# Patient Record
Sex: FEMALE | Race: WHITE | NOT HISPANIC OR LATINO | Employment: FULL TIME | URBAN - METROPOLITAN AREA
[De-identification: names, ages, dates, MRNs, and addresses within clinical notes are randomized per-mention and may not be internally consistent; named-entity substitution may affect disease eponyms.]

---

## 2017-01-16 ENCOUNTER — INITIAL CONSULT (OUTPATIENT)
Dept: OPHTHALMOLOGY | Facility: CLINIC | Age: 77
End: 2017-01-16
Payer: COMMERCIAL

## 2017-01-16 ENCOUNTER — HOSPITAL ENCOUNTER (OUTPATIENT)
Dept: CARDIOLOGY | Facility: CLINIC | Age: 77
Discharge: HOME | End: 2017-01-16
Payer: COMMERCIAL

## 2017-01-16 ENCOUNTER — TELEPHONE (OUTPATIENT)
Dept: ELECTROPHYSIOLOGY | Facility: CLINIC | Age: 77
End: 2017-01-16

## 2017-01-16 DIAGNOSIS — H02.132 SENILE ECTROPION OF BOTH LOWER EYELIDS: Primary | ICD-10-CM

## 2017-01-16 DIAGNOSIS — H02.135 SENILE ECTROPION OF BOTH LOWER EYELIDS: Primary | ICD-10-CM

## 2017-01-16 DIAGNOSIS — I50.20 SYSTOLIC CONGESTIVE HEART FAILURE WITH REDUCED LEFT VENTRICULAR FUNCTION, NYHA CLASS 2: ICD-10-CM

## 2017-01-16 LAB
AORTIC VALVE REGURGITATION: ABNORMAL
DIASTOLIC DYSFUNCTION: YES
ESTIMATED PA SYSTOLIC PRESSURE: 32
MITRAL VALVE REGURGITATION: ABNORMAL
RETIRED EF AND QEF - SEE NOTES: 58 (ref 55–65)
TRICUSPID VALVE REGURGITATION: ABNORMAL

## 2017-01-16 PROCEDURE — 68840 EXPLORE/IRRIGATE TEAR DUCTS: CPT | Mod: 50,S$GLB,, | Performed by: OPHTHALMOLOGY

## 2017-01-16 PROCEDURE — 99999 PR PBB SHADOW E&M-EST. PATIENT-LVL IV: CPT | Mod: PBBFAC,,, | Performed by: OPHTHALMOLOGY

## 2017-01-16 PROCEDURE — 92014 COMPRE OPH EXAM EST PT 1/>: CPT | Mod: 25,S$GLB,, | Performed by: OPHTHALMOLOGY

## 2017-01-16 PROCEDURE — 93306 TTE W/DOPPLER COMPLETE: CPT | Mod: S$GLB,,, | Performed by: INTERNAL MEDICINE

## 2017-01-16 PROCEDURE — 92285 EXTERNAL OCULAR PHOTOGRAPHY: CPT | Mod: S$GLB,,, | Performed by: OPHTHALMOLOGY

## 2017-01-16 NOTE — TELEPHONE ENCOUNTER
Called MsZane Jamila to discuss echo results from this morning and explained that her heart's systolic function has made a full recovery since revascularization in October. She expressed gratitude for the care she has received at [facility] and continues to feel very well.    Edita LANZA

## 2017-01-16 NOTE — LETTER
January 16, 2017      Raina Daily, OD  [address]  [facility] [address]           [facility] - Ophthalmology  [address]  [facility] [address]  Phone: [phone]  Fax: [phone]          Patient: Krissy Marino   MR Number: [id number]   YOB: 1940   Date of Visit: 1/16/2017       Dear Dr. Raina Daily:    Thank you for referring Krissy Marino to me for evaluation. Attached you will find relevant portions of my assessment and plan of care.    If you have questions, please do not hesitate to call me. I look forward to following Krissy Mairno along with you.    Sincerely,    Anastasia Nieto MD    Enclosure  CC:  No Recipients    If you would like to receive this communication electronically, please contact [email or web address] or [phone] to request more information on [employer] access.    For providers and/or their staff who would like to refer a patient to [facility], please contact us through our one-stop-shop provider referral line, [facility], at [phone].    If you feel you have received this communication in error or would no longer like to receive these types of communications, please e-mail [email or web address]

## 2017-01-16 NOTE — PROGRESS NOTES
HPI     Eye Problem    Additional comments: ref. by Dr. Daily for ectropion eval.           Comments   Right eye started tearing about 1 month. Feels irritated. Used   erythromycin ointment for 1 week       Last edited by Nayeli Light on 1/16/2017 11:52 AM. (History)            Assessment /Plan     For exam results, see Encounter Report.    Senile ectropion of both lower eyelids  -     External Photography - OU - Both Eyes      Patient bothered by chronic tearing and irritation od>os.  Plan for bilateral lower eyelid ectropion repair.      Irrigation note:  OD: patent right lower lid punctum, canaliculus, and nld  OS:patent left lower lid punctum, canaliculus, and nld     Informed consent obtained after extensive risks/benefits/alternatives were discussed with the patient including but not limited to pain, bleeding, infection, ocular injury, loss of the eye, asymmetry, need for revision in future, scarring.  Alternatives such as waiting were discussed.  All questions were answered.      Hold ASA, NSAIDS, and fish oil 5 to 7 days prior to procedure.  Patient with cardiac stent placement in October 2016.  Patient recommended to be on plavix for one year.  Will defer surgery at this time. Return in October to discuss bilateral lower eyelid ectropion repair.

## 2017-01-18 ENCOUNTER — OFFICE VISIT (OUTPATIENT)
Dept: RHEUMATOLOGY | Facility: CLINIC | Age: 77
End: 2017-01-18
Payer: COMMERCIAL

## 2017-01-18 VITALS
HEIGHT: 65 IN | HEART RATE: 64 BPM | SYSTOLIC BLOOD PRESSURE: 133 MMHG | DIASTOLIC BLOOD PRESSURE: 66 MMHG | BODY MASS INDEX: 35.18 KG/M2 | WEIGHT: 211.19 LBS

## 2017-01-18 DIAGNOSIS — Z79.52 LONG TERM (CURRENT) USE OF SYSTEMIC STEROIDS: ICD-10-CM

## 2017-01-18 DIAGNOSIS — E55.9 MILD VITAMIN D DEFICIENCY: ICD-10-CM

## 2017-01-18 DIAGNOSIS — M31.6 GIANT CELL ARTERITIS: Primary | ICD-10-CM

## 2017-01-18 DIAGNOSIS — L40.9 PSORIASIS: ICD-10-CM

## 2017-01-18 PROCEDURE — 1159F MED LIST DOCD IN RCRD: CPT | Mod: S$GLB,,, | Performed by: INTERNAL MEDICINE

## 2017-01-18 PROCEDURE — 1160F RVW MEDS BY RX/DR IN RCRD: CPT | Mod: S$GLB,,, | Performed by: INTERNAL MEDICINE

## 2017-01-18 PROCEDURE — 1126F AMNT PAIN NOTED NONE PRSNT: CPT | Mod: S$GLB,,, | Performed by: INTERNAL MEDICINE

## 2017-01-18 PROCEDURE — 99214 OFFICE O/P EST MOD 30 MIN: CPT | Mod: S$GLB,,, | Performed by: INTERNAL MEDICINE

## 2017-01-18 PROCEDURE — 3078F DIAST BP <80 MM HG: CPT | Mod: S$GLB,,, | Performed by: INTERNAL MEDICINE

## 2017-01-18 PROCEDURE — 99999 PR PBB SHADOW E&M-EST. PATIENT-LVL III: CPT | Mod: PBBFAC,,, | Performed by: INTERNAL MEDICINE

## 2017-01-18 PROCEDURE — 1157F ADVNC CARE PLAN IN RCRD: CPT | Mod: S$GLB,,, | Performed by: INTERNAL MEDICINE

## 2017-01-18 PROCEDURE — 3075F SYST BP GE 130 - 139MM HG: CPT | Mod: S$GLB,,, | Performed by: INTERNAL MEDICINE

## 2017-01-18 RX ORDER — ERGOCALCIFEROL 1.25 MG/1
CAPSULE ORAL
Qty: 6 CAPSULE | Refills: 2 | Status: SHIPPED | OUTPATIENT
Start: 2017-01-18 | End: 2017-03-05 | Stop reason: DRUGHIGH

## 2017-01-18 RX ORDER — PREDNISONE 2.5 MG/1
5 TABLET ORAL DAILY
Qty: 180 TABLET | Refills: 1 | Status: SHIPPED | OUTPATIENT
Start: 2017-01-18 | End: 2017-05-03

## 2017-01-18 ASSESSMENT — ROUTINE ASSESSMENT OF PATIENT INDEX DATA (RAPID3)
AM STIFFNESS SCORE: 0, NO
PSYCHOLOGICAL DISTRESS SCORE: 0
TOTAL RAPID3 SCORE: .44
FATIGUE SCORE: 0
MDHAQ FUNCTION SCORE: .4
PATIENT GLOBAL ASSESSMENT SCORE: 0
PAIN SCORE: 0

## 2017-01-18 NOTE — PROGRESS NOTES
Subjective:       Patient ID: Krissy Marino is a 76 y.o. female possible GCA, osteoporosis and OA of hands & knees.  .    Chief Complaint: No chief complaint on file.  Returns for follow-up.  Was last seen on 9/14/16.  She has been asymptomatic on prednisone 5 mg/day. She still has bouts of instantaneous right eye pain that comes & goes but even this is less frequent.  Denies joint pains, fever, chills,visual loss, diplopia, scalp tenderness, jaw or other claudication, headache or head pain. Denies PMR sxs: AM stiffness, shoulder or hip girdle stiffness. Her OA is not bothering her. No gout episodes. No pain, pruritus, visual disturbance. Psoriasis is present over elbows and scalp. Possibly a tad worse but not bothersome.        Pertinent negatives include no fatigue or fever.           Had CA stent placed in October.    Review of patient's allergies indicates:   Allergen Reactions    Sulfamethoxazole-trimethoprim Nausea And Vomiting    Latex, natural rubber Rash    Pcn [penicillins] Rash       Past Medical History   Diagnosis Date    Adverse effect of glucocorticoid or synthetic analogue     Allergy     Arthritis     Cancer     CHF (congestive heart failure)     Chronic kidney disease     Coronary artery disease involving native coronary artery of native heart without angina pectoris 10/11/2016    Diabetic neuropathy 10/6/2014    Giant cell arteritis 9/24/2012    Hyperlipidemia     Hypertension     Hypothyroid     Obesity (BMI 30-39.9) 10/6/2014    Osteopenia     Primary osteoarthritis of both knees 9/24/2012    Type II or unspecified type diabetes mellitus with renal manifestations, uncontrolled        Past Surgical History   Procedure Laterality Date    Appendectomy      Tonsillectomy      Cholecystectomy      Hysterectomy      Cataract extraction, bilateral      Joint replacement       bilateral total knee    Skin biopsy      Eye surgery           Review of Systems   Constitutional:  "Negative.  Negative for diaphoresis, fatigue, fever and unexpected weight change.   HENT: Negative.  Negative for mouth sores, sore throat and tinnitus.    Eyes: Negative.  Negative for visual disturbance.   Respiratory: Negative.  Negative for cough, choking, chest tightness and shortness of breath.    Cardiovascular: Negative.  Negative for chest pain, palpitations and leg swelling.   Gastrointestinal: Negative.  Negative for abdominal distention, abdominal pain, blood in stool, constipation, diarrhea, nausea and vomiting.   Endocrine: Negative.    Genitourinary: Negative.  Negative for frequency, hematuria and menstrual problem.   Musculoskeletal: Negative.  Negative for back pain, neck pain and neck stiffness.   Skin: Positive for rash (scalp & elbow psoriasis).   Allergic/Immunologic: Negative.    Neurological: Negative.  Negative for dizziness, syncope, weakness, light-headedness and numbness.   Hematological: Negative for adenopathy. Bruises/bleeds easily.   Psychiatric/Behavioral: Negative.  Negative for dysphoric mood and sleep disturbance. The patient is not nervous/anxious.          SOCIAL HISTORY: She does not smoke, does not drink alcohol. There is no   recreational drug use. She is the  of [employer] and loves what she does. She is not formally exercising but is quite active.    FAMILY HISTORY: Family history is negative for giant cell arteritis.   Family history is positive for a brother with liver cancer and lung cancer.         Objective:     Visit Vitals    /66    Pulse 64    Ht 5' 5" (1.651 m)    Wt 95.8 kg (211 lb 3.2 oz)    BMI 35.15 kg/m2    Was 216 lb 8 oz on 9/14/16.       Physical Exam   Vitals reviewed.  Constitutional: She is oriented to person, place, and time and well-developed, well-nourished, and in no distress. No distress.   HENT:   Head: Normocephalic and atraumatic.   Mouth/Throat: Oropharynx is clear and moist. No oropharyngeal exudate.   No facial " rashes  Parotids not enlarged  No oral ulcers  Temporal aa good pulsations;   No scalp tenderness or pain.   Eyes: Conjunctivae and EOM are normal. Pupils are equal, round, and reactive to light. Right eye exhibits no discharge. Left eye exhibits no discharge. No scleral icterus.   Neck: Neck supple. No JVD present. No tracheal deviation present. No thyromegaly present.   Cardiovascular: Normal rate, regular rhythm, normal heart sounds and intact distal pulses.  Exam reveals no gallop and no friction rub.    No murmur heard.  Pulmonary/Chest: Effort normal and breath sounds normal. No respiratory distress. She has no wheezes. She has no rales. She exhibits no tenderness.   Abdominal: Soft. Bowel sounds are normal. She exhibits no distension and no mass. There is no splenomegaly or hepatomegaly. There is no tenderness. There is no rebound and no guarding.   Lymphadenopathy:     She has no cervical adenopathy.        Right: No inguinal adenopathy present.        Left: No inguinal adenopathy present.   Neurological: She is alert and oriented to person, place, and time. She has normal reflexes. No cranial nerve deficit. Gait normal.   Quad strength 4/5 bilaterally. Cannot get out of chair without pushing off on arms.  Otherwise 5/5.   Skin: Skin is warm and dry. No rash noted. She is not diaphoretic.     Psoriasis both elbows R>L & left occipital scalp.   Psychiatric: Mood, memory, affect and judgment normal.   Musculoskeletal: Normal range of motion. She exhibits no edema or tenderness.   Cspine FROM no tenderness  Tspine FROM no tenderness  Lspine FROM no tenderness.  TMJ: unremarkable  Shoulders: FROM; no synovitis;  Elbows: FROM; no synovitis; no tophi or nodules  Wrists: FROM; no synovitis;    MCPs: FROM; no synovitis; no metacarpalgia;  ok;  PIPs: Rell's; FROM; no synovitis;   DIPs: Heberden's; FROM; no synovitis;   HIPS: FROM  Knees: Bilateral TKR scars; FROM; no synovitis; no instability;  Ankles: FROM:  no synovitis   Toes: Bilateral HV; redness on 1st MTP; no heel pain.         LABS:  11/28/16: ESR 46; CRP 1.7; CBC ok; BUN 64; cnne 1.3; GFR 39.4; ;   9/7/16: CMP cnne 1.1;   8/19/16: ESR 48; CRP 4.8; cnne 1.7; ;   5/28/16: ESR 83; CRp 19.1; CMP BUN 52; cnne 1.0; Glu 138;   5/5/16: ESR 41; CBC plt 138;   2/27/16: ESR 40; CRP 8;  2/13/16: ESR 63; CRP 9.7  8/22/15: ESR 26; CRP 7; CBC ok; cnne 1.1; BUN 40; ;   7/11/15: Vit d 43;   10/4/14: ESR 32; CRP 3.2; BUN 63; cnne 1.1; TSH ok;  8/22/14: CBC ok;  6/30/14: ESR 35;  6/21/14: ESR 49; CRP 3.0  11/11/13: ESR 35; CRP 9.6;   9/21/13: ESR 47; CRP 4.7; ; cnne 1.3;   6/25/13: ESR 40; CRP 3.9   6/11/13: cnne 1.5; GFR 34;  10/25/10: UA 5.5       3/2/16: bilateral feet:  personally reviewed: Mark HV with inferior calcaneal spurs & pes planus. Also vascular calcifications.  DXA 2/16/15: TLX +1.7; TFN -1.0; TTH -0.9    Does not do MELANIE's.     Assessment:   Possible GCA: Elevated ESR (with normal CRPs;  dating back to 12/17/09),    max at 120 associated with intermittent left eye pain; subsequently right sided and responsive to steroids.    Negative biopsy.    Sore mouth w. MTX (uncertain). Never on HCQ.    Developed eye pain/headache on 5 mg/day with ESR 50;   (bouts with intermittent eye pain (instantaneous);    R/O mild trigeminal neuralgia.   On prednisone 5 mg/day.    CAD with LAD stent 10/16 & systolic CHF with decreased LVF    NYHA class 2    At risk for steroid induced OP: on alendronate & steroids: alendronate stopped by endocrine end of 2015   DXA 2/16/15: TLS +1.7; TFN -1.0; TTH- 0.9     Vitamin D deficiency now on chronic monthly supplementation.     OA of both knees. S/P B TKRs.     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     Hypertension & Dyslipidemia.     Psoriasis--Had been on MTX remotely--appeared to have responded to soriatane..     Hypothyroidism.     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     Past history of gout (not substantiated).     Past history of asthma.     S/P Basal cell carcinoma removed from nose.     Obesity --improving    Plan:   Drop prednisone further to 5 mg alternating with 2.5 mg/day and after 1 month if no change drop to 2.5 mg/daily.  Rx given for 2.5 mg tablets.  Contact us if GCA or PMR sxs (reviewed).  Medi Dan Extra Strength for scalp psoriasis.  Refilled ergocalciferol 50,000 IU every 2 weeks prescribed by Endocrine.  We will check labs at the beginning of March & include vitamin D level.  RTC 3-4 months or prn

## 2017-01-18 NOTE — MR AVS SNAPSHOT
[facility] - [facility]  [address]  [facility] [address]  Phone: [phone]  Fax: [phone]                  Krissy Marino   2017 3:00 PM   Office Visit    Description:  Female : 1940   Provider:  Leticia Mcnamara MD   Department:  [facility]           Reason for Visit     Disease Management           Diagnoses this Visit        Comments    Giant cell arteritis    -  Primary     Mild vitamin D deficiency         Psoriasis         Long term (current) use of systemic steroids                To Do List           Future Appointments        Provider Department Dept Phone    2017 1:00 PM Rock Lopez MD [facility] - Sports Medicine [phone]    3/28/2017 1:00 PM Og Cid MD [facility] - Internal Medicine [phone]      Goals (5 Years of Data)     None       These Medications        Disp Refills Start End    predniSONE (DELTASONE) 2.5 MG tablet 180 tablet 1 2017     Take 2 tablets (5 mg total) by mouth once daily. - Oral    Pharmacy: [facility] [address] [place] [address] [facility] [address] [address] Ph #: [phone]       ergocalciferol (VITAMIN D2) 50,000 unit Cap 6 capsule 2 2017     TAKE 1 CAPSULE BY MOUTH EVERY 2 WEEKS    Pharmacy: [facility] [address] [place] [address] [facility] [address] Ph #: [phone]         [facility]     [facility] On Call Nurse Care Line -  Assistance  Registered nurses in the [facility] On Call Center provide clinical advisement, health education, appointment booking, and other advisory services.  Call for this free service at [phone].             Medications           Message regarding Medications     Verify the changes and/or additions to your medication regime listed below are the same as discussed with your clinician today.  If any of these changes or additions are incorrect, please notify your healthcare  provider.        START taking these NEW medications        Refills    predniSONE (DELTASONE) 2.5 MG tablet 1    Sig: Take 2 tablets (5 mg total) by mouth once daily.    Class: Normal    Route: Oral      CHANGE how you are taking these medications     Start Taking Instead of    ergocalciferol (VITAMIN D2) 50,000 unit Cap VITAMIN D2 50,000 unit capsule    Dosage:  TAKE 1 CAPSULE BY MOUTH EVERY 2 WEEKS Dosage:  TAKE 1 CAPSULE BY MOUTH EVERY 2 WEEKS    Reason for Change:  Reorder            Verify that the below list of medications is an accurate representation of the medications you are currently taking.  If none reported, the list may be blank. If incorrect, please contact your healthcare provider. Carry this list with you in case of emergency.           Current Medications     albuterol 90 mcg/actuation inhaler Inhale 2 puffs into the lungs every 6 (six) hours as needed for Wheezing.    allopurinol (ZYLOPRIM) 300 MG tablet Take 1 tablet (300 mg total) by mouth once daily.    aspirin (ECOTRIN) 81 MG EC tablet Take 1 tablet (81 mg total) by mouth once daily.    atorvastatin (LIPITOR) 40 MG tablet Take 1 tablet (40 mg total) by mouth once daily.    azelastine (ASTELIN) 137 mcg (0.1 %) nasal spray 1 spray (137 mcg total) by Nasal route 2 (two) times daily.    BIOTIN ORAL Take by mouth.    blood sugar diagnostic (ACCU-CHEK AMELIA PLUS TEST STRP) Strp Checks bg 2 x day before meals    ciclopirox (PENLAC) 8 % Soln Apply topically nightly.    clopidogrel (PLAVIX) 75 mg tablet Take 1 tablet (75 mg total) by mouth once daily.    desonide (DESOWEN) 0.05 % cream AAA bid    ergocalciferol (VITAMIN D2) 50,000 unit Cap TAKE 1 CAPSULE BY MOUTH EVERY 2 WEEKS    erythromycin (ROMYCIN) ophthalmic ointment Place into the right eye 3 (three) times daily.    FERROUS GLUCONATE (FERATE ORAL) Take by mouth.    folic acid (FOLVITE) 1 MG tablet Take 1 tablet (1,000 mcg total) by mouth once daily.    furosemide (LASIX) 40 MG tablet Take 1 tablet  "(40 mg total) by mouth once daily.    insulin lispro (HUMALOG KWIKPEN) 100 unit/mL InPn pen Inject 5 Units into the skin 3 (three) times daily with meals.    insulin needles, disposable, (BD ULTRA-FINE ILIANA PEN NEEDLES) 32 x 5/32 " Ndle Injects insulin 3 x day    KRILL/OM3/DHA/EPA/OM6/LIP/ASTX (KRILL OIL, OMEGA 3 & 6, ORAL) Take by mouth.    levothyroxine (SYNTHROID) 75 MCG tablet Take 1 tablet (75 mcg total) by mouth before breakfast.    lidocaine HCL 2% (XYLOCAINE) 2 % jelly Apply topically as needed.    lisinopril (PRINIVIL,ZESTRIL) 2.5 MG tablet Take 1 tablet (2.5 mg total) by mouth once daily.    metoprolol succinate (TOPROL-XL) 50 MG 24 hr tablet Take 1 tablet (50 mg total) by mouth once daily.    miconazole NITRATE 2 % (ZEASORB AF) 2 % top powder Apply topically as needed for Itching.    mometasone (NASONEX) 50 mcg/actuation nasal spray 2 sprays by Nasal route once daily.    nystatin-triamcinolone (MYCOLOG II) cream Apply topically 2 (two) times daily.    omeprazole (PRILOSEC) 40 MG capsule Take 1 capsule (40 mg total) by mouth daily as needed.    ondansetron (ZOFRAN-ODT) 8 MG TbDL Take 1 tablet (8 mg total) by mouth every 12 (twelve) hours as needed.    ranitidine (ZANTAC) 150 MG tablet Take 1 tablet (150 mg total) by mouth 2 (two) times daily as needed for Heartburn.    silver sulfADIAZINE 1% (SILVADENE) 1 % cream aaa buttock bid    SORIATANE 10 mg capsule     SYMBICORT 160-4.5 mcg/actuation HFAA INHALE 2 PUFFS BY MOUTH INTO THE LUNGS EVERY 12 HOURS    triamcinolone acetonide 0.1% (KENALOG) 0.1 % cream APPLY TO THE AFFECTED AREA TWICE DAILY    predniSONE (DELTASONE) 2.5 MG tablet Take 2 tablets (5 mg total) by mouth once daily.           Clinical Reference Information           Vital Signs - Last Recorded  Most recent update: 1/18/2017  3:26 PM by Svitlana C Cl, RN    BP Pulse Ht Wt BMI    133/66 64 5' 5" (1.651 m) 95.8 kg (211 lb 3.2 oz) 35.15 kg/m2      Blood Pressure          Most Recent Value    BP  " 133/66      Allergies as of 1/18/2017     Sulfamethoxazole-trimethoprim    Latex, Natural Rubber    Pcn [Penicillins]      Immunizations Administered on Date of Encounter - 1/18/2017     None      Orders Placed During Today's Visit     Future Labs/Procedures Expected by Expires    Vitamin D  1/18/2017 3/19/2018    Recurring Lab Work Interval Expires    C-reactive protein   1/18/2018    CBC auto differential   1/18/2018    Comprehensive metabolic panel   1/18/2018    Sedimentation rate, manual   1/18/2018      Instructions    Try topicals for your psoriasis. Medi Dan Extra Strength shampoo.    Try taking one prednisone 2.5 mg alternating with 2 prednisone (total 5 mg) every day x 1 month & then if about the same drop to one 2.5 mg tablet every day.

## 2017-01-18 NOTE — PATIENT INSTRUCTIONS
Try topicals for your psoriasis. Medi Dan Extra Strength shampoo.    Try taking one prednisone 2.5 mg alternating with 2 prednisone (total 5 mg) every day x 1 month & then if about the same drop to one 2.5 mg tablet every day.

## 2017-01-27 ENCOUNTER — OFFICE VISIT (OUTPATIENT)
Dept: SPORTS MEDICINE | Facility: CLINIC | Age: 77
End: 2017-01-27
Payer: COMMERCIAL

## 2017-01-27 VITALS — HEIGHT: 65 IN | WEIGHT: 211 LBS | TEMPERATURE: 98 F | BODY MASS INDEX: 35.16 KG/M2

## 2017-01-27 DIAGNOSIS — M12.811 ROTATOR CUFF ARTHROPATHY, RIGHT: Primary | ICD-10-CM

## 2017-01-27 DIAGNOSIS — M19.011 OSTEOARTHRITIS OF GLENOHUMERAL JOINT, RIGHT: ICD-10-CM

## 2017-01-27 DIAGNOSIS — M25.511 CHRONIC RIGHT SHOULDER PAIN: ICD-10-CM

## 2017-01-27 DIAGNOSIS — G89.29 CHRONIC RIGHT SHOULDER PAIN: ICD-10-CM

## 2017-01-27 PROCEDURE — 99999 PR PBB SHADOW E&M-EST. PATIENT-LVL III: CPT | Mod: PBBFAC,,, | Performed by: FAMILY MEDICINE

## 2017-01-27 PROCEDURE — 1160F RVW MEDS BY RX/DR IN RCRD: CPT | Mod: S$GLB,,, | Performed by: FAMILY MEDICINE

## 2017-01-27 PROCEDURE — 20610 DRAIN/INJ JOINT/BURSA W/O US: CPT | Mod: RT,S$GLB,, | Performed by: FAMILY MEDICINE

## 2017-01-27 PROCEDURE — 1159F MED LIST DOCD IN RCRD: CPT | Mod: S$GLB,,, | Performed by: FAMILY MEDICINE

## 2017-01-27 PROCEDURE — 1157F ADVNC CARE PLAN IN RCRD: CPT | Mod: S$GLB,,, | Performed by: FAMILY MEDICINE

## 2017-01-27 PROCEDURE — 99214 OFFICE O/P EST MOD 30 MIN: CPT | Mod: 25,S$GLB,, | Performed by: FAMILY MEDICINE

## 2017-01-27 PROCEDURE — 1125F AMNT PAIN NOTED PAIN PRSNT: CPT | Mod: S$GLB,,, | Performed by: FAMILY MEDICINE

## 2017-01-27 RX ORDER — TRIAMCINOLONE ACETONIDE 40 MG/ML
40 INJECTION, SUSPENSION INTRA-ARTICULAR; INTRAMUSCULAR
Status: DISCONTINUED | OUTPATIENT
Start: 2017-01-27 | End: 2017-01-27 | Stop reason: HOSPADM

## 2017-01-27 RX ADMIN — TRIAMCINOLONE ACETONIDE 40 MG: 40 INJECTION, SUSPENSION INTRA-ARTICULAR; INTRAMUSCULAR at 12:01

## 2017-01-27 NOTE — MR AVS SNAPSHOT
[facility]  [address]  [facility] [address]  Phone: [phone]                  Krissy Marino   2017 12:00 PM   Appointment    Description:  Female : 1940   Provider:  Rock Lopez MD   Department:  [facility]                To Do List           Future Appointments        Provider Department Dept Phone    2017 12:00 PM Rock Lopez MD [facility] [phone]    2017 1:00 PM Rock Lopez MD [facility] [phone]    3/4/2017 8:00 AM [facility] [phone]    3/28/2017 1:00 PM Og Cid MD [facility] [address] - Internal Medicine [phone]    2017 3:00 PM Leticia Mcnamara MD [facility] [address] - Rheumatology [phone]      Goals (5 Years of Data)     None      [facility]     [facility] On Call Nurse Care Line -  Assistance  Registered nurses in the [facility] On Call Center provide clinical advisement, health education, appointment booking, and other advisory services.  Call for this free service at [phone].             Medications           Message regarding Medications     Verify the changes and/or additions to your medication regime listed below are the same as discussed with your clinician today.  If any of these changes or additions are incorrect, please notify your healthcare provider.             Verify that the below list of medications is an accurate representation of the medications you are currently taking.  If none reported, the list may be blank. If incorrect, please contact your healthcare provider. Carry this list with you in case of emergency.           Current Medications     albuterol 90 mcg/actuation inhaler Inhale 2 puffs into the lungs every 6 (six) hours as needed for Wheezing.    allopurinol (ZYLOPRIM) 300 MG tablet Take 1 tablet (300 mg total) by mouth once daily.    aspirin (ECOTRIN) 81 MG EC tablet Take 1 tablet  "(81 mg total) by mouth once daily.    atorvastatin (LIPITOR) 40 MG tablet Take 1 tablet (40 mg total) by mouth once daily.    azelastine (ASTELIN) 137 mcg (0.1 %) nasal spray 1 spray (137 mcg total) by Nasal route 2 (two) times daily.    BIOTIN ORAL Take by mouth.    blood sugar diagnostic (ACCU-CHEK AMELIA PLUS TEST STRP) Strp Checks bg 2 x day before meals    ciclopirox (PENLAC) 8 % Soln Apply topically nightly.    clopidogrel (PLAVIX) 75 mg tablet Take 1 tablet (75 mg total) by mouth once daily.    desonide (DESOWEN) 0.05 % cream AAA bid    ergocalciferol (VITAMIN D2) 50,000 unit Cap TAKE 1 CAPSULE BY MOUTH EVERY 2 WEEKS    erythromycin (ROMYCIN) ophthalmic ointment Place into the right eye 3 (three) times daily.    FERROUS GLUCONATE (FERATE ORAL) Take by mouth.    folic acid (FOLVITE) 1 MG tablet Take 1 tablet (1,000 mcg total) by mouth once daily.    furosemide (LASIX) 40 MG tablet Take 1 tablet (40 mg total) by mouth once daily.    insulin lispro (HUMALOG KWIKPEN) 100 unit/mL InPn pen Inject 5 Units into the skin 3 (three) times daily with meals.    insulin needles, disposable, (BD ULTRA-FINE ILIANA PEN NEEDLES) 32 x 5/32 " Ndle Injects insulin 3 x day    KRILL/OM3/DHA/EPA/OM6/LIP/ASTX (KRILL OIL, OMEGA 3 & 6, ORAL) Take by mouth.    levothyroxine (SYNTHROID) 75 MCG tablet Take 1 tablet (75 mcg total) by mouth before breakfast.    lidocaine HCL 2% (XYLOCAINE) 2 % jelly Apply topically as needed.    lisinopril (PRINIVIL,ZESTRIL) 2.5 MG tablet Take 1 tablet (2.5 mg total) by mouth once daily.    metoprolol succinate (TOPROL-XL) 50 MG 24 hr tablet Take 1 tablet (50 mg total) by mouth once daily.    miconazole NITRATE 2 % (ZEASORB AF) 2 % top powder Apply topically as needed for Itching.    mometasone (NASONEX) 50 mcg/actuation nasal spray 2 sprays by Nasal route once daily.    nystatin-triamcinolone (MYCOLOG II) cream Apply topically 2 (two) times daily.    omeprazole (PRILOSEC) 40 MG capsule Take 1 capsule (40 mg " total) by mouth daily as needed.    ondansetron (ZOFRAN-ODT) 8 MG TbDL Take 1 tablet (8 mg total) by mouth every 12 (twelve) hours as needed.    predniSONE (DELTASONE) 2.5 MG tablet Take 2 tablets (5 mg total) by mouth once daily.    ranitidine (ZANTAC) 150 MG tablet Take 1 tablet (150 mg total) by mouth 2 (two) times daily as needed for Heartburn.    silver sulfADIAZINE 1% (SILVADENE) 1 % cream aaa buttock bid    SORIATANE 10 mg capsule     SYMBICORT 160-4.5 mcg/actuation HFAA INHALE 2 PUFFS BY MOUTH INTO THE LUNGS EVERY 12 HOURS    triamcinolone acetonide 0.1% (KENALOG) 0.1 % cream APPLY TO THE AFFECTED AREA TWICE DAILY           Clinical Reference Information           Allergies as of 1/27/2017     Sulfamethoxazole-trimethoprim    Latex, Natural Rubber    Pcn [Penicillins]      Immunizations Administered on Date of Encounter - 1/27/2017     None

## 2017-01-27 NOTE — PROGRESS NOTES
Subjective:          Chief Complaint: Krissy Marino is a 76 y.o. female who had concerns including Follow-up of the Right Shoulder.    Pain       Hand dominance, right    Location:  anterior and lateral, right  Onset:  Insidious, many years  Palliative:     Relative rest   Oral analgesics (tylenol PM)   CSI, SAB, 07/18/16, 80% improvement   CSI, SAB, 12/02/17, moderate improvement for ~ 3 weeks    fPT @ [facility], has concluded    Heat   Provocative:     abduction and GH flexion   Overhead ADLs   Prior:     Currently doing cardiac rehab following stent placement 16.10  Progression: worsening discomfort   Quality:     Sharp pain at times activity   Throbbing at times with activity    Muscle soreness   Radiation:  none  Severity:  per nursing documentation  Timing:  intermittent with use  Trauma:  none known    ROS  Review of systems (ROS):  A 10+ review of systems was performed with pertinent positives and negatives noted above in the history of present illness. Other systems were negative unless otherwise specified.      Pain Related Questions  Over the past 3 days, what was your average pain during activity? (I.e. running, jogging, walking, climbing stairs, getting dressed, ect.): 10  Over the past 3 days, what was your highest pain level?: 10  Over the past 3 days, what was your lowest pain level? : 5    Other  How many nights a week are you awakened by your affected body part?: 0  Was the patient's HEIGHT measured or patient reported?: Patient Reported  Was the patient's WEIGHT measured or patient reported?: Measured      Objective:        General: Krissy is well-developed, well-nourished, appears stated age, in no acute distress, alert and oriented to time, place and person.     Ortho/SPM Exam    Constitutional:     -Vital signs: height, weight, and temperature: reviewed    -General appearance: no apparent distress  Dermatologic/skin:     -Inspection: No acute lesions, ulceration, or induration of the skin  noted about the area evaluated   -Palpation: No subcutaneous crepitus noted about the area evaluated  Musculoskeletal:   Observation:  There is no edema, erythema, or ecchymoses about the affected shoulder.      PAIN WITH active GH abduction to 90 degrees, and THROUGH THE PAINFUL ARC  PAIN WITH active GH flexion to 90 degrees   POSITIVE empty can test (humeral internal rotation and flexion against resistance)   OHaydens test is negative for evidence of glenoid labral pathology  Active internal humeral rotation against resistance DOES produce pain   Active external rotation against rotation DOES produce pain   Back scratch test is PAINFUL   No tenderness with palpation of the long head of the biceps tendon nor the AC joint  Neer's and Hawkins tests are negative for evidence of subacromial impingment   Scarf movement does not produce pain  Speed's test is negative    Strength:  Strength testing is 4/5 in all significant muscle groups of the shoulder, EXCEPT AS DESCRIBED ABOVE DURING PAINFUL TESTS    Neg sulcus sign in affected shoulder.  Neg anterior/posterior laxity of humeral head in affected shoulder.  Neg anterior humeral head apprehension in affected shoulder.  Neg posterior humeral head apprehension in affected shoulder.      Cardiovascular:   -Examined extremity is warm and well perfused   Neurologic:   -Examined extremitys sensation is appropriate     AAFP/FPM: Pocket Guide Documentation Guidelines, (c)2014    (20646=3+ systems/areas or 12+ bullets (8 MSK)   37785=0+ systems/areas or 18+ bullets (12 MSK))        Assessment:       Encounter Diagnoses   Name Primary?    Chronic right shoulder pain     Rotator cuff arthropathy, right Yes    Osteoarthritis of glenohumeral joint, right           Plan:       Assessment:   #1 glenohumeral osteoarthritis, right   W/ supraspinatus tendinosis    No evidence of vascular pathology    Imaging studies reviewed:   X-ray spine, cervical 16.07   X-ray shoulder, right  16.07    Plan:    We discussed options including:  #1 watchful waiting  #2 physical therapy aimed at:   GH RoM   Rotator cuff strengthening   Scapular stability  #3 injection therapy:   CSI SAB    Right: moderate effectiveness, repeat    CSI iaGH   orthobiologics   #4 consultation re: TSA   Surgical candidate?     The patient chooses #3 csi sab right    Pain management: handout given  Bracing:   Physical therapy:   Activity (e.g. sports, work) restrictions: as tolerated   school/vocation: works at [employer]     Follow up in 4 w  A/e csi sab right  Consider csi iaGH  Should symptoms worsen or fail to resolve, consider:  Revisiting the above options      Patient questionnaires may have been collected.

## 2017-01-27 NOTE — PROCEDURES
Large Joint Aspiration/Injection  Date/Time: 1/27/2017 12:05 PM  Performed by: MOE MIRELES  Authorized by: MOE MIRELES     Consent Done?:  Yes (Verbal)  Indications:  Pain  Procedure site marked: Yes    Timeout: Prior to procedure the correct patient, procedure, and site was verified      Location:  Shoulder  Site:  R subacromial bursa  Prep: Patient was prepped and draped in usual sterile fashion    Ultrasonic Guidance for needle placement: No  Needle size:  22 G  Approach:  Posterior  Medications:  40 mg triamcinolone acetonide 40 mg/mL  Patient tolerance:  Patient tolerated the procedure well with no immediate complications

## 2017-02-10 ENCOUNTER — TELEPHONE (OUTPATIENT)
Dept: DERMATOLOGY | Facility: CLINIC | Age: 77
End: 2017-02-10

## 2017-02-10 NOTE — TELEPHONE ENCOUNTER
Left message for patient to return call to schedule an appointment with labs.----- Message from Charisma Higuera sent at 2/9/2017  3:22 PM CST -----  Contact: pt  Jam pt-Pt needs to set up appt and labs for her Soriatane.  Pt can be reached  Today.  If not tomorrow after 0400 at [phone].

## 2017-02-13 ENCOUNTER — TELEPHONE (OUTPATIENT)
Dept: DERMATOLOGY | Facility: CLINIC | Age: 77
End: 2017-02-13

## 2017-02-13 NOTE — TELEPHONE ENCOUNTER
-left message for patient to return call to schedule appointment and labs.---- Message from Tonya Martell sent at 2/10/2017  4:16 PM CST -----  Contact: pt   LEXUS-pt- pt is calling to schedule an appt for labs and to see Dr. Alejandre so the pt can get  Her prescriptions. Can you please call pt at [phone] or leave message at home [phone].    ERIN

## 2017-02-16 ENCOUNTER — TELEPHONE (OUTPATIENT)
Dept: INTERNAL MEDICINE | Facility: CLINIC | Age: 77
End: 2017-02-16

## 2017-02-16 ENCOUNTER — OFFICE VISIT (OUTPATIENT)
Dept: INTERNAL MEDICINE | Facility: CLINIC | Age: 77
End: 2017-02-16
Payer: COMMERCIAL

## 2017-02-16 ENCOUNTER — HOSPITAL ENCOUNTER (OUTPATIENT)
Dept: RADIOLOGY | Facility: HOSPITAL | Age: 77
Discharge: HOME | End: 2017-02-16
Attending: STUDENT IN AN ORGANIZED HEALTH CARE EDUCATION/TRAINING PROGRAM
Payer: COMMERCIAL

## 2017-02-16 VITALS
OXYGEN SATURATION: 96 % | HEIGHT: 65 IN | TEMPERATURE: 98 F | WEIGHT: 210.31 LBS | DIASTOLIC BLOOD PRESSURE: 76 MMHG | BODY MASS INDEX: 35.04 KG/M2 | SYSTOLIC BLOOD PRESSURE: 114 MMHG | HEART RATE: 68 BPM

## 2017-02-16 DIAGNOSIS — R68.89 FLU-LIKE SYMPTOMS: Primary | ICD-10-CM

## 2017-02-16 DIAGNOSIS — R68.89 FLU-LIKE SYMPTOMS: ICD-10-CM

## 2017-02-16 LAB
FLUAV AG SPEC QL IA: NEGATIVE
FLUBV AG SPEC QL IA: POSITIVE
SPECIMEN SOURCE: ABNORMAL

## 2017-02-16 PROCEDURE — 99214 OFFICE O/P EST MOD 30 MIN: CPT | Mod: S$GLB,,, | Performed by: STUDENT IN AN ORGANIZED HEALTH CARE EDUCATION/TRAINING PROGRAM

## 2017-02-16 PROCEDURE — 1157F ADVNC CARE PLAN IN RCRD: CPT | Mod: S$GLB,,, | Performed by: STUDENT IN AN ORGANIZED HEALTH CARE EDUCATION/TRAINING PROGRAM

## 2017-02-16 PROCEDURE — 1160F RVW MEDS BY RX/DR IN RCRD: CPT | Mod: S$GLB,,, | Performed by: STUDENT IN AN ORGANIZED HEALTH CARE EDUCATION/TRAINING PROGRAM

## 2017-02-16 PROCEDURE — 1125F AMNT PAIN NOTED PAIN PRSNT: CPT | Mod: S$GLB,,, | Performed by: STUDENT IN AN ORGANIZED HEALTH CARE EDUCATION/TRAINING PROGRAM

## 2017-02-16 PROCEDURE — 3078F DIAST BP <80 MM HG: CPT | Mod: S$GLB,,, | Performed by: STUDENT IN AN ORGANIZED HEALTH CARE EDUCATION/TRAINING PROGRAM

## 2017-02-16 PROCEDURE — 1159F MED LIST DOCD IN RCRD: CPT | Mod: S$GLB,,, | Performed by: STUDENT IN AN ORGANIZED HEALTH CARE EDUCATION/TRAINING PROGRAM

## 2017-02-16 PROCEDURE — 99999 PR PBB SHADOW E&M-EST. PATIENT-LVL V: CPT | Mod: PBBFAC,,, | Performed by: STUDENT IN AN ORGANIZED HEALTH CARE EDUCATION/TRAINING PROGRAM

## 2017-02-16 PROCEDURE — 71020 XR CHEST PA AND LATERAL: CPT | Mod: 26,,, | Performed by: RADIOLOGY

## 2017-02-16 PROCEDURE — 71020 XR CHEST PA AND LATERAL: CPT | Mod: TC

## 2017-02-16 PROCEDURE — 87400 INFLUENZA A/B EACH AG IA: CPT | Mod: 59

## 2017-02-16 PROCEDURE — 3074F SYST BP LT 130 MM HG: CPT | Mod: S$GLB,,, | Performed by: STUDENT IN AN ORGANIZED HEALTH CARE EDUCATION/TRAINING PROGRAM

## 2017-02-16 NOTE — TELEPHONE ENCOUNTER
----- Message from Tara Rodriguez sent at 2/16/2017  7:06 AM CST -----  Contact: Pt at [phone]  Patient would like to get medical advice.  Symptoms (please be specific):  Cough,sore throat,sneezing,body aches  How long has patient had these symptoms:  About 3 days  Pharmacy name and phone #:  [facility]  [phone] (Phone)  [phone] (Fax)  Any drug allergies:    Sulfamethoxazole-trimethoprimNausea And Vomiting  Latex, Natural RubberRash  Pcn [Penicillins]Rash      Comments:

## 2017-02-16 NOTE — PROGRESS NOTES
"Subjective:       Patient ID: Krissy Marino is a 76 y.o. female.    Chief Complaint: flu like symptoms    HPI   Ms aMrino comes to clinic for evaluation of flu like symptoms. She states it started Monday (3 days ago), began with chest congestion and cough which she could not clear by coughing, also with nasal congestion and drainage, symptoms worse in the am. Also with fatigue, myalgias, and coughing has led to chest discomfort with repetition. She denies any fevers, but states her son had the flu a week ago. She is not taking any medications to help with symptoms at this time as she was told by her cardiologist to not take any flu medications which contain "DM". She denies any wheezing, SOB, dyspnea on exertion. She was treated for pneumonia last June with Levofloxacin    Review of Systems   Constitutional: Positive for fatigue. Negative for activity change, appetite change, fever and unexpected weight change.   HENT: Positive for postnasal drip, rhinorrhea, sore throat and trouble swallowing. Negative for congestion and hearing loss.    Eyes: Negative for discharge and visual disturbance.   Respiratory: Positive for cough. Negative for apnea, choking and shortness of breath.    Cardiovascular: Negative for chest pain, palpitations and leg swelling.   Gastrointestinal: Negative for abdominal distention, abdominal pain, blood in stool, diarrhea and nausea.   Endocrine: Negative for cold intolerance, heat intolerance, polydipsia, polyphagia and polyuria.   Genitourinary: Negative for difficulty urinating, dysuria, flank pain and hematuria.   Musculoskeletal: Negative for arthralgias, back pain, joint swelling and myalgias.   Skin: Negative for color change, pallor and rash.   Allergic/Immunologic: Negative for environmental allergies and food allergies.   Neurological: Negative for dizziness and headaches.   Hematological: Negative for adenopathy. Does not bruise/bleed easily.   Psychiatric/Behavioral: Negative for " behavioral problems, decreased concentration, dysphoric mood and sleep disturbance. The patient is not nervous/anxious.        Objective:      Physical Exam   Constitutional: She is oriented to person, place, and time. She appears well-developed and well-nourished. No distress.   HENT:   Head: Normocephalic and atraumatic.   Right Ear: External ear normal.   Left Ear: External ear normal.   Mouth/Throat: No oropharyngeal exudate.   Eyes: Conjunctivae and EOM are normal. Pupils are equal, round, and reactive to light. Right eye exhibits no discharge. Left eye exhibits no discharge. No scleral icterus.   Neck: Normal range of motion. Neck supple. No JVD present. No tracheal deviation present. No thyromegaly present.   Cardiovascular: Normal rate and regular rhythm.  Exam reveals no gallop and no friction rub.    No murmur heard.  Pulmonary/Chest: Effort normal. No respiratory distress. She has no wheezes.   Positive rhonchi bilaterally, worse on the left   Abdominal: Soft. Bowel sounds are normal. She exhibits no distension and no mass. There is no tenderness. There is no rebound and no guarding.   Musculoskeletal: Normal range of motion. She exhibits no edema or tenderness.   Lymphadenopathy:     She has no cervical adenopathy.   Neurological: She is alert and oriented to person, place, and time.   Skin: Skin is warm and dry. No rash noted. She is not diaphoretic. No erythema. No pallor.   Psychiatric: She has a normal mood and affect. Her behavior is normal. Judgment and thought content normal.       Assessment:       1. Flu-like symptoms        Plan:       Krissy was seen today for flu like symptoms.    Diagnoses and all orders for this visit:    Flu-like symptoms  -     Influenza antigen Nasal Swab  -     X-Ray Chest PA And Lateral  - If CXR is positive, will treat with abx  -  If flu swab positive will treat with Tamiflu despite outside the 48 hour window

## 2017-02-16 NOTE — MR AVS SNAPSHOT
[facility]  [address]  [facility] [address]  Phone: [phone]  Fax: [phone]                  Krissy Marino   2017 1:00 PM   Office Visit    Description:  Female : 1940   Provider:  Desean Valverde MD   Department:  [facility]           Reason for Visit     flu like symptoms           Diagnoses this Visit        Comments    Flu-like symptoms    -  Primary            To Do List           Future Appointments        Provider Department Dept Phone    2017 1:00 PM Rock Lopez MD [facility] Sports Medicine [phone]    3/4/2017 8:00 AM [facility] [phone]    3/28/2017 1:00 PM Og Cid MD [facility] Internal Medicine [phone]    3/31/2017 10:40 AM Jaky Alejandre MD [facility] - Dermatology [phone]    2017 3:00 PM Leticia Mcnamara MD [facility] Rheumatology [phone]      Goals (5 Years of Data)     None      Follow-Up and Disposition     Return if symptoms worsen or fail to improve.      [facility]     [facility] Nurse Care Line -  Assistance  Registered nurses in the [facility] provide clinical advisement, health education, appointment booking, and other advisory services.  Call for this free service at [phone].             Medications           Message regarding Medications     Verify the changes and/or additions to your medication regime listed below are the same as discussed with your clinician today.  If any of these changes or additions are incorrect, please notify your healthcare provider.             Verify that the below list of medications is an accurate representation of the medications you are currently taking.  If none reported, the list may be blank. If incorrect, please contact your healthcare provider. Carry this list with you in case of emergency.           Current Medications     albuterol 90 mcg/actuation inhaler  "Inhale 2 puffs into the lungs every 6 (six) hours as needed for Wheezing.    allopurinol (ZYLOPRIM) 300 MG tablet Take 1 tablet (300 mg total) by mouth once daily.    aspirin (ECOTRIN) 81 MG EC tablet Take 1 tablet (81 mg total) by mouth once daily.    atorvastatin (LIPITOR) 40 MG tablet Take 1 tablet (40 mg total) by mouth once daily.    azelastine (ASTELIN) 137 mcg (0.1 %) nasal spray 1 spray (137 mcg total) by Nasal route 2 (two) times daily.    BIOTIN ORAL Take by mouth.    blood sugar diagnostic (ACCU-CHEK AMELIA PLUS TEST STRP) Strp Checks bg 2 x day before meals    ciclopirox (PENLAC) 8 % Soln Apply topically nightly.    clopidogrel (PLAVIX) 75 mg tablet Take 1 tablet (75 mg total) by mouth once daily.    desonide (DESOWEN) 0.05 % cream AAA bid    ergocalciferol (VITAMIN D2) 50,000 unit Cap TAKE 1 CAPSULE BY MOUTH EVERY 2 WEEKS    erythromycin (ROMYCIN) ophthalmic ointment Place into the right eye 3 (three) times daily.    FERROUS GLUCONATE (FERATE ORAL) Take by mouth.    folic acid (FOLVITE) 1 MG tablet Take 1 tablet (1,000 mcg total) by mouth once daily.    furosemide (LASIX) 40 MG tablet Take 1 tablet (40 mg total) by mouth once daily.    insulin lispro (HUMALOG KWIKPEN) 100 unit/mL InPn pen Inject 5 Units into the skin 3 (three) times daily with meals.    insulin needles, disposable, (BD ULTRA-FINE ILIANA PEN NEEDLES) 32 x 5/32 " Ndle Injects insulin 3 x day    KRILL/OM3/DHA/EPA/OM6/LIP/ASTX (KRILL OIL, OMEGA 3 & 6, ORAL) Take by mouth.    levothyroxine (SYNTHROID) 75 MCG tablet Take 1 tablet (75 mcg total) by mouth before breakfast.    lidocaine HCL 2% (XYLOCAINE) 2 % jelly Apply topically as needed.    lisinopril (PRINIVIL,ZESTRIL) 2.5 MG tablet Take 1 tablet (2.5 mg total) by mouth once daily.    metoprolol succinate (TOPROL-XL) 50 MG 24 hr tablet Take 1 tablet (50 mg total) by mouth once daily.    miconazole NITRATE 2 % (ZEASORB AF) 2 % top powder Apply topically as needed for Itching.    mometasone " "(NASONEX) 50 mcg/actuation nasal spray 2 sprays by Nasal route once daily.    nystatin-triamcinolone (MYCOLOG II) cream Apply topically 2 (two) times daily.    omeprazole (PRILOSEC) 40 MG capsule Take 1 capsule (40 mg total) by mouth daily as needed.    ondansetron (ZOFRAN-ODT) 8 MG TbDL Take 1 tablet (8 mg total) by mouth every 12 (twelve) hours as needed.    predniSONE (DELTASONE) 2.5 MG tablet Take 2 tablets (5 mg total) by mouth once daily.    ranitidine (ZANTAC) 150 MG tablet Take 1 tablet (150 mg total) by mouth 2 (two) times daily as needed for Heartburn.    silver sulfADIAZINE 1% (SILVADENE) 1 % cream aaa buttock bid    SORIATANE 10 mg capsule     SYMBICORT 160-4.5 mcg/actuation HFAA INHALE 2 PUFFS BY MOUTH INTO THE LUNGS EVERY 12 HOURS    triamcinolone acetonide 0.1% (KENALOG) 0.1 % cream APPLY TO THE AFFECTED AREA TWICE DAILY           Clinical Reference Information           Your Vitals Were     BP Pulse Temp Height Weight SpO2    114/76 (BP Location: Left arm, Patient Position: Sitting, BP Method: Manual) 68 98.4 °F (36.9 °C) (Oral) 5' 5" (1.651 m) 95.4 kg (210 lb 5.1 oz) 96%    BMI                35 kg/m2          Blood Pressure          Most Recent Value    BP  114/76      Allergies as of 2/16/2017     Sulfamethoxazole-trimethoprim    Latex, Natural Rubber    Pcn [Penicillins]      Immunizations Administered on Date of Encounter - 2/16/2017     None      Orders Placed During Today's Visit      Normal Orders This Visit    Influenza antigen Nasal Swab     Future Labs/Procedures Expected by Expires    X-Ray Chest PA And Lateral  2/16/2017 2/16/2018      Language Assistance Services     ATTENTION: Language assistance services are available, free of charge. Please call [phone].      ATENCIÓN: Si matthewla marleni, tiene a doyle disposición servicios gratuitos de asistencia lingüística. Llame al [phone].     CHÚ Ý: N?u b?n nói Ti?ng Vi?t, có các d?ch v? h? tr? ngôn ng? mi?n phí dành cho b?n. G?i s? " [phone].         [facility] complies with applicable Federal civil rights laws and does not discriminate on the basis of race, color, national origin, age, disability, or sex.

## 2017-02-17 ENCOUNTER — PATIENT MESSAGE (OUTPATIENT)
Dept: INTERNAL MEDICINE | Facility: CLINIC | Age: 77
End: 2017-02-17

## 2017-02-17 DIAGNOSIS — J10.1 INFLUENZA B: Primary | ICD-10-CM

## 2017-02-17 RX ORDER — OSELTAMIVIR PHOSPHATE 75 MG/1
75 CAPSULE ORAL 2 TIMES DAILY
Qty: 10 CAPSULE | Refills: 0 | Status: SHIPPED | OUTPATIENT
Start: 2017-02-17 | End: 2017-02-22

## 2017-02-17 NOTE — TELEPHONE ENCOUNTER
Patient's flu came back positive, will go ahead and treat with tamiflu given high risk for flu associated complications given age and comorbidities.

## 2017-02-17 NOTE — TELEPHONE ENCOUNTER
----- Message from Jean Claude Jackson MA sent at 2/17/2017  8:10 AM CST -----  Contact: self-[phone] or [phone]  Desean Valverde MD    Pt is requesting to speak with the Assistant regarding her visit on Yesterday. She stated that she did not receive any medication and she would like to know why. Please advise and call. Thanks!

## 2017-02-17 NOTE — TELEPHONE ENCOUNTER
----- Message from Jean Claude Jacskon MA sent at 2/17/2017  8:10 AM CST -----  Contact: self-[phone] or [phone]  Desean Valverde MD    Pt is requesting to speak with the Assistant regarding her visit on Yesterday. She stated that she did not receive any medication and she would like to know why. Please advise and call. Thanks!

## 2017-02-20 ENCOUNTER — OFFICE VISIT (OUTPATIENT)
Dept: INTERNAL MEDICINE | Facility: CLINIC | Age: 77
End: 2017-02-20
Payer: COMMERCIAL

## 2017-02-20 ENCOUNTER — HOSPITAL ENCOUNTER (OUTPATIENT)
Dept: RADIOLOGY | Facility: HOSPITAL | Age: 77
Discharge: HOME | End: 2017-02-20
Attending: FAMILY MEDICINE
Payer: COMMERCIAL

## 2017-02-20 VITALS
DIASTOLIC BLOOD PRESSURE: 82 MMHG | SYSTOLIC BLOOD PRESSURE: 138 MMHG | BODY MASS INDEX: 34.16 KG/M2 | WEIGHT: 205 LBS | HEART RATE: 96 BPM | HEIGHT: 65 IN

## 2017-02-20 DIAGNOSIS — J40 BRONCHITIS: ICD-10-CM

## 2017-02-20 DIAGNOSIS — J45.909 ASTHMA WITHOUT STATUS ASTHMATICUS, UNSPECIFIED ASTHMA SEVERITY, UNCOMPLICATED: ICD-10-CM

## 2017-02-20 DIAGNOSIS — J18.9 PNEUMONIA DUE TO INFECTIOUS ORGANISM, UNSPECIFIED LATERALITY, UNSPECIFIED PART OF LUNG: ICD-10-CM

## 2017-02-20 DIAGNOSIS — Z79.4 TYPE 2 DIABETES MELLITUS WITH COMPLICATION, WITH LONG-TERM CURRENT USE OF INSULIN: ICD-10-CM

## 2017-02-20 DIAGNOSIS — R05.9 COUGH: Primary | ICD-10-CM

## 2017-02-20 DIAGNOSIS — R05.9 COUGH: ICD-10-CM

## 2017-02-20 DIAGNOSIS — E11.8 TYPE 2 DIABETES MELLITUS WITH COMPLICATION, WITH LONG-TERM CURRENT USE OF INSULIN: ICD-10-CM

## 2017-02-20 PROCEDURE — 1125F AMNT PAIN NOTED PAIN PRSNT: CPT | Mod: S$GLB,,, | Performed by: FAMILY MEDICINE

## 2017-02-20 PROCEDURE — 3075F SYST BP GE 130 - 139MM HG: CPT | Mod: S$GLB,,, | Performed by: FAMILY MEDICINE

## 2017-02-20 PROCEDURE — 71020 XR CHEST PA AND LATERAL: CPT | Mod: 26,,, | Performed by: RADIOLOGY

## 2017-02-20 PROCEDURE — 3079F DIAST BP 80-89 MM HG: CPT | Mod: S$GLB,,, | Performed by: FAMILY MEDICINE

## 2017-02-20 PROCEDURE — 99215 OFFICE O/P EST HI 40 MIN: CPT | Mod: S$GLB,,, | Performed by: FAMILY MEDICINE

## 2017-02-20 PROCEDURE — 99999 PR PBB SHADOW E&M-EST. PATIENT-LVL V: CPT | Mod: PBBFAC,,, | Performed by: FAMILY MEDICINE

## 2017-02-20 PROCEDURE — 1157F ADVNC CARE PLAN IN RCRD: CPT | Mod: S$GLB,,, | Performed by: FAMILY MEDICINE

## 2017-02-20 PROCEDURE — 71020 XR CHEST PA AND LATERAL: CPT | Mod: TC

## 2017-02-20 PROCEDURE — 1159F MED LIST DOCD IN RCRD: CPT | Mod: S$GLB,,, | Performed by: FAMILY MEDICINE

## 2017-02-20 RX ORDER — ALBUTEROL SULFATE 90 UG/1
2 AEROSOL, METERED RESPIRATORY (INHALATION) EVERY 6 HOURS PRN
Qty: 1 EACH | Refills: 11 | Status: SHIPPED | OUTPATIENT
Start: 2017-02-20 | End: 2019-07-17 | Stop reason: ALTCHOICE

## 2017-02-20 RX ORDER — LEVOTHYROXINE SODIUM 75 UG/1
TABLET ORAL
Qty: 90 TABLET | Refills: 0 | Status: SHIPPED | OUTPATIENT
Start: 2017-02-20 | End: 2017-05-18 | Stop reason: SDUPTHER

## 2017-02-20 RX ORDER — DOXYCYCLINE HYCLATE 100 MG
100 TABLET ORAL 2 TIMES DAILY
Qty: 20 TABLET | Refills: 0 | Status: SHIPPED | OUTPATIENT
Start: 2017-02-20 | End: 2017-03-02

## 2017-02-20 NOTE — PROGRESS NOTES
Subjective:       Patient ID: Krissy Marino is a 76 y.o. female.    Chief Complaint: URI and Generalized Body Aches  Krissy Marino 76 y.o. female is here for office visit to review care and physical exam, reports cough andd fatigue for a whilel or so.  Son sick also.  Has green/brown production.  Went to a , given TAmiflu w/o relief.      HPI  Review of Systems   Constitutional: Negative for chills and fever.   HENT: Positive for postnasal drip, rhinorrhea and sore throat. Negative for ear pain.    Respiratory: Positive for cough. Negative for shortness of breath and wheezing.        Objective:      Physical Exam   Constitutional: She is oriented to person, place, and time. She appears well-developed and well-nourished. No distress.   HENT:   Head: Normocephalic.   Mouth/Throat: No oropharyngeal exudate.   Eyes: EOM are normal. Pupils are equal, round, and reactive to light. No scleral icterus.   Neck: Neck supple. No JVD present. No thyromegaly present.   Cardiovascular: Normal rate, regular rhythm and normal heart sounds.  Exam reveals no gallop and no friction rub.    No murmur heard.  Pulmonary/Chest: Effort normal and breath sounds normal. No stridor. No respiratory distress. She has no wheezes. She has no rales.   Abdominal: Soft. Bowel sounds are normal. She exhibits no distension and no mass. There is no tenderness. There is no guarding.   Musculoskeletal: Normal range of motion. She exhibits no edema.   Lymphadenopathy:     She has no cervical adenopathy.   Neurological: She is alert and oriented to person, place, and time. She has normal reflexes. She displays normal reflexes. No cranial nerve deficit. She exhibits normal muscle tone.   Skin: Skin is warm. No rash noted. She is not diaphoretic. No erythema.   Psychiatric: She has a normal mood and affect. Thought content normal.       Assessment:       No diagnosis found.    Plan:       Krissy was seen today for uri and generalized body  aches.    Diagnoses and all orders for this visit:    Cough  -     X-Ray Chest PA And Lateral; Future  -     doxycycline (VIBRA-TABS) 100 MG tablet; Take 1 tablet (100 mg total) by mouth 2 (two) times daily.    Bronchitis  -     doxycycline (VIBRA-TABS) 100 MG tablet; Take 1 tablet (100 mg total) by mouth 2 (two) times daily.    Pneumonia due to infectious organism, unspecified laterality, unspecified part of lung  - chart reviewed, cxr reviewed  Type 2 diabetes mellitus with complication, with long-term current use of insulin  - has f/u appt

## 2017-02-21 ENCOUNTER — TELEPHONE (OUTPATIENT)
Dept: SPORTS MEDICINE | Facility: CLINIC | Age: 77
End: 2017-02-21

## 2017-02-21 ENCOUNTER — OFFICE VISIT (OUTPATIENT)
Dept: SPORTS MEDICINE | Facility: CLINIC | Age: 77
End: 2017-02-21
Payer: COMMERCIAL

## 2017-02-21 VITALS — WEIGHT: 205 LBS | HEIGHT: 65 IN | TEMPERATURE: 99 F | BODY MASS INDEX: 34.16 KG/M2

## 2017-02-21 DIAGNOSIS — M75.50 SUBACROMIAL BURSITIS: ICD-10-CM

## 2017-02-21 DIAGNOSIS — M25.511 CHRONIC RIGHT SHOULDER PAIN: ICD-10-CM

## 2017-02-21 DIAGNOSIS — M19.011 OSTEOARTHRITIS OF GLENOHUMERAL JOINT, RIGHT: Primary | ICD-10-CM

## 2017-02-21 DIAGNOSIS — G89.29 CHRONIC RIGHT SHOULDER PAIN: ICD-10-CM

## 2017-02-21 PROCEDURE — 1159F MED LIST DOCD IN RCRD: CPT | Mod: S$GLB,,, | Performed by: FAMILY MEDICINE

## 2017-02-21 PROCEDURE — 1157F ADVNC CARE PLAN IN RCRD: CPT | Mod: S$GLB,,, | Performed by: FAMILY MEDICINE

## 2017-02-21 PROCEDURE — 99999 PR PBB SHADOW E&M-EST. PATIENT-LVL III: CPT | Mod: PBBFAC,,, | Performed by: FAMILY MEDICINE

## 2017-02-21 PROCEDURE — 20611 DRAIN/INJ JOINT/BURSA W/US: CPT | Mod: RT,S$GLB,, | Performed by: FAMILY MEDICINE

## 2017-02-21 PROCEDURE — 99214 OFFICE O/P EST MOD 30 MIN: CPT | Mod: 25,S$GLB,, | Performed by: FAMILY MEDICINE

## 2017-02-21 PROCEDURE — 20610 DRAIN/INJ JOINT/BURSA W/O US: CPT | Mod: 59,RT,S$GLB, | Performed by: FAMILY MEDICINE

## 2017-02-21 PROCEDURE — 1160F RVW MEDS BY RX/DR IN RCRD: CPT | Mod: S$GLB,,, | Performed by: FAMILY MEDICINE

## 2017-02-21 PROCEDURE — 1125F AMNT PAIN NOTED PAIN PRSNT: CPT | Mod: S$GLB,,, | Performed by: FAMILY MEDICINE

## 2017-02-21 RX ORDER — TRIAMCINOLONE ACETONIDE 40 MG/ML
40 INJECTION, SUSPENSION INTRA-ARTICULAR; INTRAMUSCULAR
Status: DISCONTINUED | OUTPATIENT
Start: 2017-02-21 | End: 2017-02-21 | Stop reason: HOSPADM

## 2017-02-21 RX ADMIN — TRIAMCINOLONE ACETONIDE 40 MG: 40 INJECTION, SUSPENSION INTRA-ARTICULAR; INTRAMUSCULAR at 01:02

## 2017-02-21 NOTE — PROCEDURES
Large Joint Aspiration/Injection  Date/Time: 2/21/2017 1:26 PM  Performed by: MOE MIRELES  Authorized by: MOE MIRELES     Consent Done?:  Yes (Verbal)  Indications:  Pain  Procedure site marked: Yes    Timeout: Prior to procedure the correct patient, procedure, and site was verified      Location:  Shoulder  Site:  R glenohumeral  Prep: Patient was prepped and draped in usual sterile fashion    Ultrasonic Guidance for needle placement: Yes  Images are saved and documented.  Needle size:  20 G  Approach:  Posterior  Medications:  40 mg triamcinolone acetonide 40 mg/mL  Patient tolerance:  Patient tolerated the procedure well with no immediate complications    Additional Comments: Description of ultrasound utilization for needle guidance:   Ultrasound guidance used for needle localization.  Images saved and stored for documentation.  The glenohumeral joint was visualized.  Dynamic visualization of the 20g x 3.5 needle was continuous throughout the procedure.

## 2017-02-21 NOTE — TELEPHONE ENCOUNTER
----- Message from Sabina Schroeder MA sent at 2/21/2017  3:26 PM CST -----  Contact: self   Pt has a question in concerns her injection pt can not raise arm.  Pt wants to know if she can put ice or heat on the shoulder. Pt can be reached at [phone].

## 2017-02-21 NOTE — PROCEDURES
Large Joint Aspiration/Injection  Date/Time: 2/21/2017 1:25 PM  Performed by: MOE MIRELES  Authorized by: MOE MIRELES     Consent Done?:  Yes (Verbal)  Indications:  Pain  Procedure site marked: Yes    Timeout: Prior to procedure the correct patient, procedure, and site was verified      Location:  Shoulder  Site:  R subacromial bursa  Prep: Patient was prepped and draped in usual sterile fashion    Ultrasonic Guidance for needle placement: No  Needle size:  22 G  Approach:  Posterior  Medications:  40 mg triamcinolone acetonide 40 mg/mL  Patient tolerance:  Patient tolerated the procedure well with no immediate complications

## 2017-02-21 NOTE — MR AVS SNAPSHOT
[facility]  [address]  [facility] [address]  Phone: [phone]                  Krissy Marino   2017 1:00 PM   Appointment    Description:  Female : 1940   Provider:  Rock Lopez MD   Department:  [facility]                To Do List           Future Appointments        Provider Department Dept Phone    2017 1:00 PM Rock Lopez MD [facility] [phone]    3/4/2017 8:00 AM [facility] [phone]    3/28/2017 1:00 PM Og Cid MD [facility] Internal Medicine [phone]    3/31/2017 10:40 AM Jaky Alejandre MD [facility] - Dermatology [phone]    2017 3:00 PM Leticia Mcnamara MD [facility] - Rheumatology [phone]      Goals (5 Years of Data)     None      [facility]     [facility] Nurse Care Line -  Assistance  Registered nurses in the [facility] provide clinical advisement, health education, appointment booking, and other advisory services.  Call for this free service at [phone].             Medications           Message regarding Medications     Verify the changes and/or additions to your medication regime listed below are the same as discussed with your clinician today.  If any of these changes or additions are incorrect, please notify your healthcare provider.             Verify that the below list of medications is an accurate representation of the medications you are currently taking.  If none reported, the list may be blank. If incorrect, please contact your healthcare provider. Carry this list with you in case of emergency.           Current Medications     albuterol 90 mcg/actuation inhaler Inhale 2 puffs into the lungs every 6 (six) hours as needed for Wheezing.    allopurinol (ZYLOPRIM) 300 MG tablet Take 1 tablet (300 mg total) by mouth once daily.    aspirin (ECOTRIN) 81 MG EC tablet Take 1 tablet (81 mg  "total) by mouth once daily.    atorvastatin (LIPITOR) 40 MG tablet Take 1 tablet (40 mg total) by mouth once daily.    azelastine (ASTELIN) 137 mcg (0.1 %) nasal spray 1 spray (137 mcg total) by Nasal route 2 (two) times daily.    BIOTIN ORAL Take by mouth.    blood sugar diagnostic (ACCU-CHEK AMELIA PLUS TEST STRP) Strp Checks bg 2 x day before meals    ciclopirox (PENLAC) 8 % Soln Apply topically nightly.    clopidogrel (PLAVIX) 75 mg tablet Take 1 tablet (75 mg total) by mouth once daily.    desonide (DESOWEN) 0.05 % cream AAA bid    doxycycline (VIBRA-TABS) 100 MG tablet Take 1 tablet (100 mg total) by mouth 2 (two) times daily.    ergocalciferol (VITAMIN D2) 50,000 unit Cap TAKE 1 CAPSULE BY MOUTH EVERY 2 WEEKS    erythromycin (ROMYCIN) ophthalmic ointment Place into the right eye 3 (three) times daily.    FERROUS GLUCONATE (FERATE ORAL) Take by mouth.    folic acid (FOLVITE) 1 MG tablet Take 1 tablet (1,000 mcg total) by mouth once daily.    furosemide (LASIX) 40 MG tablet Take 1 tablet (40 mg total) by mouth once daily.    insulin lispro (HUMALOG KWIKPEN) 100 unit/mL InPn pen Inject 5 Units into the skin 3 (three) times daily with meals.    insulin needles, disposable, (BD ULTRA-FINE ILIANA PEN NEEDLES) 32 x 5/32 " Ndle Injects insulin 3 x day    KRILL/OM3/DHA/EPA/OM6/LIP/ASTX (KRILL OIL, OMEGA 3 & 6, ORAL) Take by mouth.    levothyroxine (SYNTHROID) 75 MCG tablet TAKE 1 TABLET(75 MCG) BY MOUTH BEFORE BREAKFAST    lidocaine HCL 2% (XYLOCAINE) 2 % jelly Apply topically as needed.    lisinopril (PRINIVIL,ZESTRIL) 2.5 MG tablet Take 1 tablet (2.5 mg total) by mouth once daily.    metoprolol succinate (TOPROL-XL) 50 MG 24 hr tablet Take 1 tablet (50 mg total) by mouth once daily.    miconazole NITRATE 2 % (ZEASORB AF) 2 % top powder Apply topically as needed for Itching.    mometasone (NASONEX) 50 mcg/actuation nasal spray 2 sprays by Nasal route once daily.    nystatin-triamcinolone (MYCOLOG II) cream Apply " topically 2 (two) times daily.    omeprazole (PRILOSEC) 40 MG capsule Take 1 capsule (40 mg total) by mouth daily as needed.    ondansetron (ZOFRAN-ODT) 8 MG TbDL Take 1 tablet (8 mg total) by mouth every 12 (twelve) hours as needed.    oseltamivir (TAMIFLU) 75 MG capsule Take 1 capsule (75 mg total) by mouth 2 (two) times daily.    predniSONE (DELTASONE) 2.5 MG tablet Take 2 tablets (5 mg total) by mouth once daily.    ranitidine (ZANTAC) 150 MG tablet Take 1 tablet (150 mg total) by mouth 2 (two) times daily as needed for Heartburn.    silver sulfADIAZINE 1% (SILVADENE) 1 % cream aaa buttock bid    SORIATANE 10 mg capsule     SYMBICORT 160-4.5 mcg/actuation HFAA INHALE 2 PUFFS BY MOUTH INTO THE LUNGS EVERY 12 HOURS    triamcinolone acetonide 0.1% (KENALOG) 0.1 % cream APPLY TO THE AFFECTED AREA TWICE DAILY           Clinical Reference Information           Allergies as of 2/21/2017     Sulfamethoxazole-trimethoprim    Latex, Natural Rubber    Pcn [Penicillins]      Immunizations Administered on Date of Encounter - 2/21/2017     None      Language Assistance Services     ATTENTION: Language assistance services are available, free of charge. Please call [phone].      ATENCIÓN: Si habla marleni, tiene a doyle disposición servicios gratuitos de asistencia lingüística. Llame al [phone].     VIVEK Ý: N?u b?n nói Ti?ng Vi?t, có các d?ch v? h? tr? ngôn ng? mi?n phí dành cho b?n. G?i s? [phone].         [facility] complies with applicable Federal civil rights laws and does not discriminate on the basis of race, color, national origin, age, disability, or sex.

## 2017-02-21 NOTE — PROGRESS NOTES
Subjective:          Chief Complaint: Krissy Marino is a 76 y.o. female who had concerns including Follow-up of the Right Shoulder.    Pain     Hand dominance, right    Location:  anterior and lateral, right  Onset:  Insidious, many years  Palliative:     Relative rest   Oral analgesics (tylenol PM)   CSI, SAB, 07/18/16, 80% improvement   CSI, SAB, 12/02/17, moderate improvement for ~ 3 weeks    CSI, SAB, 01/27/17, no improvement    fPT @ [facility], has concluded    Heat   Provocative:     ADLs   Prior:     Currently doing cardiac rehab following stent placement 16.10  Progression: plateau discomfort   Quality:     Sharp pain at times activity   Throbbing at times with activity    Muscle soreness   Radiation:  none  Severity:  per nursing documentation  Timing:  intermittent with use  Trauma:  none known    ROS  Review of systems (ROS):  A 10+ review of systems was performed with pertinent positives and negatives noted above in the history of present illness. Other systems were negative unless otherwise specified.    Pain Related Questions  Over the past 3 days, what was your average pain during activity? (I.e. running, jogging, walking, climbing stairs, getting dressed, ect.): 7  Over the past 3 days, what was your highest pain level?: 7  Over the past 3 days, what was your lowest pain level? : 7    Other  How many nights a week are you awakened by your affected body part?: 0  Was the patient's HEIGHT measured or patient reported?: Patient Reported  Was the patient's WEIGHT measured or patient reported?: Measured      Objective:        General: Krissy is well-developed, well-nourished, appears stated age, in no acute distress, alert and oriented to time, place and person.     Ortho/SPM Exam    Constitutional:     -Vital signs: height, weight, and temperature: reviewed    -General appearance: no apparent distress  Dermatologic/skin:     -Inspection: No acute lesions, ulceration, or induration of the skin noted  about the area evaluated   -Palpation: No subcutaneous crepitus noted about the area evaluated  Musculoskeletal:   Observation:  There is no edema, erythema, or ecchymoses about the affected shoulder.      PAIN WITH active GH abduction to 90 degrees, and THROUGH THE PAINFUL ARC  PAIN WITH active GH flexion to 90 degrees   POSITIVE empty can test (humeral internal rotation and flexion against resistance)   OHaydens test is negative for evidence of glenoid labral pathology  Active internal humeral rotation against resistance DOES produce pain   Active external rotation against rotation DOES produce pain   Back scratch test is PAINFUL   No tenderness with palpation of the long head of the biceps tendon nor the AC joint  Neer's and Hawkins tests are negative for evidence of subacromial impingment   Scarf movement does not produce pain  Speed's test is negative    Strength:  Strength testing is 4/5 in all significant muscle groups of the shoulder, EXCEPT AS DESCRIBED ABOVE DURING PAINFUL TESTS    Neg sulcus sign in affected shoulder.  Neg anterior/posterior laxity of humeral head in affected shoulder.  Neg anterior humeral head apprehension in affected shoulder.  Neg posterior humeral head apprehension in affected shoulder.      Cardiovascular:   -Examined extremity is warm and well perfused   Neurologic:   -Examined extremitys sensation is appropriate     AAFP/FPM: Pocket Guide Documentation Guidelines, (c)2014    (14597=7+ systems/areas or 12+ bullets (8 MSK)   43758=2+ systems/areas or 18+ bullets (12 MSK))        Assessment:       Encounter Diagnoses   Name Primary?    Chronic right shoulder pain     Osteoarthritis of glenohumeral joint, right Yes          Plan:       Assessment:   #1 glenohumeral osteoarthritis, right   W/ supraspinatus tendinosis    No evidence of vascular pathology    Imaging studies reviewed:   X-ray spine, cervical 16.07   X-ray shoulder, right 16.07    Plan:    We discussed options  including:  #1 watchful waiting  #2 physical therapy aimed at:   GH RoM   Rotator cuff strengthening   Scapular stability  #3 injection therapy:   CSI SAB    Right: moderate effectiveness, repeat    CSI iaGH    Right,    orthobiologics   #4 consultation re: TSA   It is unlikely she is a good surgical candidate     The patient chooses #3 csi right    Pain management: handout given  Bracing:   Physical therapy:   Activity (e.g. sports, work) restrictions: as tolerated   school/vocation: works at [employer]     Follow up in 3-4 w  A/e csi iaGH+sab right  Should symptoms worsen or fail to resolve, consider:  Revisiting the above options      Patient questionnaires may have been collected.

## 2017-02-21 NOTE — TELEPHONE ENCOUNTER
Spoke c pt.     She reports increased pain in her shoulder following iaGH/SAB CSI today.     She states it was difficulty to put her car in reverse.     She states she is having trouble lifting her arm due to pain. She denies numbness, tingling and coolness to the touch in her R UE.     She asked if she should ice/heat. She was instructed to heat the area.     Pt instructed to contact [facility] 02/22/17 to give status update.

## 2017-03-04 ENCOUNTER — LAB VISIT (OUTPATIENT)
Dept: LAB | Facility: HOSPITAL | Age: 77
End: 2017-03-04
Attending: INTERNAL MEDICINE
Payer: COMMERCIAL

## 2017-03-04 DIAGNOSIS — E55.9 MILD VITAMIN D DEFICIENCY: ICD-10-CM

## 2017-03-04 DIAGNOSIS — M31.6 GIANT CELL ARTERITIS: ICD-10-CM

## 2017-03-04 DIAGNOSIS — Z79.52 LONG TERM (CURRENT) USE OF SYSTEMIC STEROIDS: ICD-10-CM

## 2017-03-04 LAB
25(OH)D3+25(OH)D2 SERPL-MCNC: 24 NG/ML
ALBUMIN SERPL BCP-MCNC: 3.5 G/DL
ALP SERPL-CCNC: 112 U/L
ALT SERPL W/O P-5'-P-CCNC: 29 U/L
ANION GAP SERPL CALC-SCNC: 7 MMOL/L
AST SERPL-CCNC: 22 U/L
BASOPHILS # BLD AUTO: 0 K/UL
BASOPHILS NFR BLD: 0 %
BILIRUB SERPL-MCNC: 0.8 MG/DL
BUN SERPL-MCNC: 51 MG/DL
CALCIUM SERPL-MCNC: 9.1 MG/DL
CHLORIDE SERPL-SCNC: 109 MMOL/L
CO2 SERPL-SCNC: 26 MMOL/L
CREAT SERPL-MCNC: 0.9 MG/DL
CRP SERPL-MCNC: 0.9 MG/L
DIFFERENTIAL METHOD: ABNORMAL
EOSINOPHIL # BLD AUTO: 0.1 K/UL
EOSINOPHIL NFR BLD: 0.6 %
ERYTHROCYTE [DISTWIDTH] IN BLOOD BY AUTOMATED COUNT: 14.6 %
ERYTHROCYTE [SEDIMENTATION RATE] IN BLOOD BY WESTERGREN METHOD: 36 MM/HR
EST. GFR  (AFRICAN AMERICAN): >60 ML/MIN/1.73 M^2
EST. GFR  (NON AFRICAN AMERICAN): >60 ML/MIN/1.73 M^2
GLUCOSE SERPL-MCNC: 149 MG/DL
HCT VFR BLD AUTO: 37.7 %
HGB BLD-MCNC: 12.3 G/DL
LYMPHOCYTES # BLD AUTO: 1.5 K/UL
LYMPHOCYTES NFR BLD: 15.8 %
MCH RBC QN AUTO: 32.2 PG
MCHC RBC AUTO-ENTMCNC: 32.6 %
MCV RBC AUTO: 99 FL
MONOCYTES # BLD AUTO: 0.5 K/UL
MONOCYTES NFR BLD: 5.4 %
NEUTROPHILS # BLD AUTO: 7.3 K/UL
NEUTROPHILS NFR BLD: 77.9 %
PLATELET # BLD AUTO: 146 K/UL
PMV BLD AUTO: 11.8 FL
POTASSIUM SERPL-SCNC: 4.2 MMOL/L
PROT SERPL-MCNC: 6.3 G/DL
RBC # BLD AUTO: 3.82 M/UL
SODIUM SERPL-SCNC: 142 MMOL/L
WBC # BLD AUTO: 9.33 K/UL

## 2017-03-04 PROCEDURE — 86140 C-REACTIVE PROTEIN: CPT

## 2017-03-04 PROCEDURE — 80053 COMPREHEN METABOLIC PANEL: CPT

## 2017-03-04 PROCEDURE — 36415 COLL VENOUS BLD VENIPUNCTURE: CPT | Mod: PO

## 2017-03-04 PROCEDURE — 85025 COMPLETE CBC W/AUTO DIFF WBC: CPT

## 2017-03-04 PROCEDURE — 82306 VITAMIN D 25 HYDROXY: CPT

## 2017-03-04 PROCEDURE — 85651 RBC SED RATE NONAUTOMATED: CPT

## 2017-03-05 ENCOUNTER — PATIENT MESSAGE (OUTPATIENT)
Dept: RHEUMATOLOGY | Facility: CLINIC | Age: 77
End: 2017-03-05

## 2017-03-05 DIAGNOSIS — E55.9 VITAMIN D INSUFFICIENCY: Primary | ICD-10-CM

## 2017-03-05 RX ORDER — ASPIRIN 325 MG
50000 TABLET, DELAYED RELEASE (ENTERIC COATED) ORAL
Qty: 40 CAPSULE | Refills: 0 | Status: SHIPPED | OUTPATIENT
Start: 2017-03-06 | End: 2018-07-09

## 2017-03-16 ENCOUNTER — OFFICE VISIT (OUTPATIENT)
Dept: SPORTS MEDICINE | Facility: CLINIC | Age: 77
End: 2017-03-16
Payer: COMMERCIAL

## 2017-03-16 VITALS — TEMPERATURE: 97 F | WEIGHT: 206 LBS | BODY MASS INDEX: 34.32 KG/M2 | HEIGHT: 65 IN

## 2017-03-16 DIAGNOSIS — M19.011 OSTEOARTHRITIS OF GLENOHUMERAL JOINT, RIGHT: ICD-10-CM

## 2017-03-16 DIAGNOSIS — M12.811 ROTATOR CUFF ARTHROPATHY, RIGHT: Primary | ICD-10-CM

## 2017-03-16 DIAGNOSIS — M21.619 BUNION OF GREAT TOE: ICD-10-CM

## 2017-03-16 PROCEDURE — 99999 PR PBB SHADOW E&M-EST. PATIENT-LVL III: CPT | Mod: PBBFAC,,, | Performed by: FAMILY MEDICINE

## 2017-03-16 PROCEDURE — 99214 OFFICE O/P EST MOD 30 MIN: CPT | Mod: 25,S$GLB,, | Performed by: FAMILY MEDICINE

## 2017-03-16 PROCEDURE — 1157F ADVNC CARE PLAN IN RCRD: CPT | Mod: S$GLB,,, | Performed by: FAMILY MEDICINE

## 2017-03-16 PROCEDURE — 20610 DRAIN/INJ JOINT/BURSA W/O US: CPT | Mod: RT,S$GLB,, | Performed by: FAMILY MEDICINE

## 2017-03-16 PROCEDURE — 1159F MED LIST DOCD IN RCRD: CPT | Mod: S$GLB,,, | Performed by: FAMILY MEDICINE

## 2017-03-16 PROCEDURE — 1160F RVW MEDS BY RX/DR IN RCRD: CPT | Mod: S$GLB,,, | Performed by: FAMILY MEDICINE

## 2017-03-16 PROCEDURE — 1125F AMNT PAIN NOTED PAIN PRSNT: CPT | Mod: S$GLB,,, | Performed by: FAMILY MEDICINE

## 2017-03-16 RX ORDER — TRIAMCINOLONE ACETONIDE 40 MG/ML
40 INJECTION, SUSPENSION INTRA-ARTICULAR; INTRAMUSCULAR
Status: DISCONTINUED | OUTPATIENT
Start: 2017-03-16 | End: 2017-03-16 | Stop reason: HOSPADM

## 2017-03-16 RX ADMIN — TRIAMCINOLONE ACETONIDE 40 MG: 40 INJECTION, SUSPENSION INTRA-ARTICULAR; INTRAMUSCULAR at 01:03

## 2017-03-16 NOTE — PROGRESS NOTES
Subjective:          Chief Complaint: Krissy Marino is a 76 y.o. female who had concerns including Follow-up of the Right Shoulder.    Pain     Hand dominance, right    Location:  anterior and lateral, right  Onset:  Insidious, many years  Palliative:     Relative rest   Oral analgesics (tylenol PM)   CSI, SAB, 07/18/16, 80% improvement   CSI, SAB, 12/02/17, moderate improvement for ~ 3 weeks    CSI, SAB, 01/27/17, no improvement    CSI, iaGH/SAB, 02/21/17, mild to moderate relief    fPT @ [facility], has concluded    Heat   Provocative:     ADLs   Prior:     Currently doing cardiac rehab following stent placement 16.10  Progression: plateau discomfort   Quality:     Sharp pain at times activity   Throbbing at times with activity    Muscle soreness   Radiation:  none  Severity:  per nursing documentation  Timing:  intermittent with use  Trauma:  none known    ROS  Review of systems (ROS):  A 10+ review of systems was performed with pertinent positives and negatives noted above in the history of present illness. Other systems were negative unless otherwise specified.    Pain Related Questions  Over the past 3 days, what was your average pain during activity? (I.e. running, jogging, walking, climbing stairs, getting dressed, ect.): 7  Over the past 3 days, what was your highest pain level?: 6  Over the past 3 days, what was your lowest pain level? : 3    Other  How many nights a week are you awakened by your affected body part?: 0  Was the patient's HEIGHT measured or patient reported?: Patient Reported  Was the patient's WEIGHT measured or patient reported?: Measured      Objective:        General: Krissy is well-developed, well-nourished, appears stated age, in no acute distress, alert and oriented to time, place and person.     Ortho/SPM Exam    Constitutional:     -Vital signs: height, weight, and temperature: reviewed    -General appearance: no apparent distress  Dermatologic/skin:     -Inspection: No acute  lesions, ulceration, or induration of the skin noted about the area evaluated   -Palpation: No subcutaneous crepitus noted about the area evaluated  Musculoskeletal:   Observation:  There is no edema, erythema, or ecchymoses about the affected shoulder.      PAIN WITH active GH abduction to 90 degrees, and THROUGH THE PAINFUL ARC  PAIN WITH active GH flexion to 90 degrees   POSITIVE empty can test (humeral internal rotation and flexion against resistance)   OHaydens test is negative for evidence of glenoid labral pathology  Active internal humeral rotation against resistance DOES produce pain   Active external rotation against rotation DOES produce pain   Back scratch test is PAINFUL   No tenderness with palpation of the long head of the biceps tendon nor the AC joint  Neer's and Hawkins tests are negative for evidence of subacromial impingment   Scarf movement does not produce pain  Speed's test is negative    Strength:  Strength testing is 4/5 in all significant muscle groups of the shoulder, EXCEPT AS DESCRIBED ABOVE DURING PAINFUL TESTS    Neg sulcus sign in affected shoulder.  Neg anterior/posterior laxity of humeral head in affected shoulder.  Neg anterior humeral head apprehension in affected shoulder.  Neg posterior humeral head apprehension in affected shoulder.      Cardiovascular:   -Examined extremity is warm and well perfused   Neurologic:   -Examined extremitys sensation is appropriate     AAFP/FPM: Pocket Guide Documentation Guidelines, (c)2014    (15776=9+ systems/areas or 12+ bullets (8 MSK)   23590=0+ systems/areas or 18+ bullets (12 MSK))      Assessment:       Encounter Diagnoses   Name Primary?    Bunion of great toe     Rotator cuff arthropathy, right Yes    Osteoarthritis of glenohumeral joint, right           Plan:       Assessment:   #1 glenohumeral osteoarthritis, right   W/ supraspinatus tendinosis    No evidence of vascular pathology    Imaging studies reviewed:   X-ray spine,  cervical 16.07   X-ray shoulder, right 16.07    Plan:    We discussed options including:  #1 watchful waiting  #2 physical therapy aimed at:   GH RoM   Rotator cuff strengthening   Scapular stability  #3 injection therapy:   CSI SAB    Right: moderate effectiveness, repeat    CSI iaGH    Right, mild to moderate effectiveness, repeat    orthobiologics   #4 consultation re: TSA   It is unlikely she is a good surgical candidate   nfs     The patient chooses #3 csi sab right    Pain management: handout given  Bracing:   Physical therapy: currently doing cardiac rehab  Activity (e.g. sports, work) restrictions: as tolerated   school/vocation: works at [employer]     Follow up   A/e csi sab right  Consider re: csi iagh right  Consider fPT re: cervical spine and shoulder  Should symptoms worsen or fail to resolve, consider:  Revisiting the above options      Patient questionnaires may have been collected.

## 2017-03-16 NOTE — PROCEDURES
Large Joint Aspiration/Injection  Date/Time: 3/16/2017 1:46 PM  Performed by: MOE MIRELES  Authorized by: MOE MIRELES     Consent Done?:  Yes (Verbal)  Indications:  Pain  Procedure site marked: Yes    Timeout: Prior to procedure the correct patient, procedure, and site was verified      Location:  Shoulder  Site:  R subacromial bursa  Prep: Patient was prepped and draped in usual sterile fashion    Ultrasonic Guidance for needle placement: No  Needle size:  22 G  Approach:  Posterior  Medications:  40 mg triamcinolone acetonide 40 mg/mL  Patient tolerance:  Patient tolerated the procedure well with no immediate complications

## 2017-03-16 NOTE — MR AVS SNAPSHOT
[facility]  [address]  [facility] [address]  Phone: [phone]                  Krissy Marino   3/16/2017 12:45 PM   Appointment    Description:  Female : 1940   Provider:  Rock Lopez MD   Department:  [facility]                To Do List           Future Appointments        Provider Department Dept Phone    3/16/2017 12:45 PM Rock Lopez MD [facility] [phone]    3/28/2017 1:00 PM gO Cid MD [facility] - Internal Medicine [phone]    2017 3:00 PM Leticia Mcnamara MD [facility] - Rheumatology [phone]    2017 8:45 AM [facility] [phone]    5/15/2017 1:50 PM Jaky Alejandre MD [facility] - Dermatology [phone]      Goals (5 Years of Data)     None      [facility]     [facility] Care [facility] -  Assistance  Registered nurses in the [facility] provide clinical advisement, health education, appointment booking, and other advisory services.  Call for this free service at [phone].             Medications           Message regarding Medications     Verify the changes and/or additions to your medication regime listed below are the same as discussed with your clinician today.  If any of these changes or additions are incorrect, please notify your healthcare provider.             Verify that the below list of medications is an accurate representation of the medications you are currently taking.  If none reported, the list may be blank. If incorrect, please contact your healthcare provider. Carry this list with you in case of emergency.           Current Medications     albuterol 90 mcg/actuation inhaler Inhale 2 puffs into the lungs every 6 (six) hours as needed for Wheezing.    allopurinol (ZYLOPRIM) 300 MG tablet Take 1 tablet (300 mg total) by mouth once daily.    aspirin (ECOTRIN) 81 MG EC tablet Take 1 tablet (81 mg  "total) by mouth once daily.    atorvastatin (LIPITOR) 40 MG tablet Take 1 tablet (40 mg total) by mouth once daily.    azelastine (ASTELIN) 137 mcg (0.1 %) nasal spray 1 spray (137 mcg total) by Nasal route 2 (two) times daily.    BIOTIN ORAL Take by mouth.    blood sugar diagnostic (ACCU-CHEK AMELIA PLUS TEST STRP) Strp Checks bg 2 x day before meals    cholecalciferol, vitamin D3, 50,000 unit capsule Take 1 capsule (50,000 Units total) by mouth twice a week.    ciclopirox (PENLAC) 8 % Soln Apply topically nightly.    clopidogrel (PLAVIX) 75 mg tablet Take 1 tablet (75 mg total) by mouth once daily.    desonide (DESOWEN) 0.05 % cream AAA bid    erythromycin (ROMYCIN) ophthalmic ointment Place into the right eye 3 (three) times daily.    FERROUS GLUCONATE (FERATE ORAL) Take by mouth.    folic acid (FOLVITE) 1 MG tablet Take 1 tablet (1,000 mcg total) by mouth once daily.    furosemide (LASIX) 40 MG tablet Take 1 tablet (40 mg total) by mouth once daily.    insulin lispro (HUMALOG KWIKPEN) 100 unit/mL InPn pen Inject 5 Units into the skin 3 (three) times daily with meals.    insulin needles, disposable, (BD ULTRA-FINE ILIANA PEN NEEDLES) 32 x 5/32 " Ndle Injects insulin 3 x day    KRILL/OM3/DHA/EPA/OM6/LIP/ASTX (KRILL OIL, OMEGA 3 & 6, ORAL) Take by mouth.    levothyroxine (SYNTHROID) 75 MCG tablet TAKE 1 TABLET(75 MCG) BY MOUTH BEFORE BREAKFAST    lidocaine HCL 2% (XYLOCAINE) 2 % jelly Apply topically as needed.    lisinopril (PRINIVIL,ZESTRIL) 2.5 MG tablet Take 1 tablet (2.5 mg total) by mouth once daily.    metoprolol succinate (TOPROL-XL) 50 MG 24 hr tablet Take 1 tablet (50 mg total) by mouth once daily.    miconazole NITRATE 2 % (ZEASORB AF) 2 % top powder Apply topically as needed for Itching.    mometasone (NASONEX) 50 mcg/actuation nasal spray 2 sprays by Nasal route once daily.    nystatin-triamcinolone (MYCOLOG II) cream Apply topically 2 (two) times daily.    omeprazole (PRILOSEC) 40 MG capsule Take 1 " capsule (40 mg total) by mouth daily as needed.    ondansetron (ZOFRAN-ODT) 8 MG TbDL Take 1 tablet (8 mg total) by mouth every 12 (twelve) hours as needed.    predniSONE (DELTASONE) 2.5 MG tablet Take 2 tablets (5 mg total) by mouth once daily.    ranitidine (ZANTAC) 150 MG tablet Take 1 tablet (150 mg total) by mouth 2 (two) times daily as needed for Heartburn.    silver sulfADIAZINE 1% (SILVADENE) 1 % cream aaa buttock bid    SORIATANE 10 mg capsule     SYMBICORT 160-4.5 mcg/actuation HFAA INHALE 2 PUFFS BY MOUTH INTO THE LUNGS EVERY 12 HOURS    triamcinolone acetonide 0.1% (KENALOG) 0.1 % cream APPLY TO THE AFFECTED AREA TWICE DAILY           Clinical Reference Information           Allergies as of 3/16/2017     Sulfamethoxazole-trimethoprim    Latex, Natural Rubber    Pcn [Penicillins]      Immunizations Administered on Date of Encounter - 3/16/2017     None      Language Assistance Services     ATTENTION: Language assistance services are available, free of charge. Please call [phone].      ATENCIÓN: Si zohra yepez, tiene a doyle disposición servicios gratuitos de asistencia lingüística. Llame al [phone].     VIVEK Ý: N?u b?n nói Ti?ng Vi?t, có các d?ch v? h? tr? ngôn ng? mi?n phí dành cho b?n. G?i s? [phone].         [facility] complies with applicable Federal civil rights laws and does not discriminate on the basis of race, color, national origin, age, disability, or sex.

## 2017-03-22 ENCOUNTER — TELEPHONE (OUTPATIENT)
Dept: CARDIOLOGY | Facility: CLINIC | Age: 77
End: 2017-03-22

## 2017-03-22 NOTE — TELEPHONE ENCOUNTER
----- Message from Bibi Gunter MA sent at 3/22/2017 11:42 AM CDT -----  Contact: patient called  Mere please  call the patient at [phone] she need to talk to you about her cough and her medication. Last visit was on 12- . Thank you.

## 2017-03-28 ENCOUNTER — OFFICE VISIT (OUTPATIENT)
Dept: INTERNAL MEDICINE | Facility: CLINIC | Age: 77
End: 2017-03-28
Payer: COMMERCIAL

## 2017-03-28 VITALS
HEART RATE: 70 BPM | SYSTOLIC BLOOD PRESSURE: 118 MMHG | WEIGHT: 202.63 LBS | HEIGHT: 65 IN | BODY MASS INDEX: 33.76 KG/M2 | DIASTOLIC BLOOD PRESSURE: 68 MMHG

## 2017-03-28 DIAGNOSIS — K13.79 MOUTH SORE: ICD-10-CM

## 2017-03-28 DIAGNOSIS — E78.5 DYSLIPIDEMIA: ICD-10-CM

## 2017-03-28 DIAGNOSIS — I10 ESSENTIAL HYPERTENSION: ICD-10-CM

## 2017-03-28 DIAGNOSIS — R80.9 CONTROLLED TYPE 2 DIABETES MELLITUS WITH MICROALBUMINURIA, WITH LONG-TERM CURRENT USE OF INSULIN: ICD-10-CM

## 2017-03-28 DIAGNOSIS — M85.80 OSTEOPENIA: Primary | ICD-10-CM

## 2017-03-28 DIAGNOSIS — I25.10 CORONARY ARTERY DISEASE INVOLVING NATIVE CORONARY ARTERY OF NATIVE HEART WITHOUT ANGINA PECTORIS: ICD-10-CM

## 2017-03-28 DIAGNOSIS — I50.22 CHRONIC SYSTOLIC HEART FAILURE: ICD-10-CM

## 2017-03-28 DIAGNOSIS — Z79.4 CONTROLLED TYPE 2 DIABETES MELLITUS WITH MICROALBUMINURIA, WITH LONG-TERM CURRENT USE OF INSULIN: ICD-10-CM

## 2017-03-28 DIAGNOSIS — E11.29 CONTROLLED TYPE 2 DIABETES MELLITUS WITH MICROALBUMINURIA, WITH LONG-TERM CURRENT USE OF INSULIN: ICD-10-CM

## 2017-03-28 DIAGNOSIS — M31.6 GIANT CELL ARTERITIS: ICD-10-CM

## 2017-03-28 DIAGNOSIS — N18.2 CHRONIC KIDNEY DISEASE, STAGE II (MILD): ICD-10-CM

## 2017-03-28 PROCEDURE — 1126F AMNT PAIN NOTED NONE PRSNT: CPT | Mod: S$GLB,,, | Performed by: FAMILY MEDICINE

## 2017-03-28 PROCEDURE — 1157F ADVNC CARE PLAN IN RCRD: CPT | Mod: S$GLB,,, | Performed by: FAMILY MEDICINE

## 2017-03-28 PROCEDURE — 1160F RVW MEDS BY RX/DR IN RCRD: CPT | Mod: S$GLB,,, | Performed by: FAMILY MEDICINE

## 2017-03-28 PROCEDURE — 3074F SYST BP LT 130 MM HG: CPT | Mod: S$GLB,,, | Performed by: FAMILY MEDICINE

## 2017-03-28 PROCEDURE — 99999 PR PBB SHADOW E&M-EST. PATIENT-LVL V: CPT | Mod: PBBFAC,,, | Performed by: FAMILY MEDICINE

## 2017-03-28 PROCEDURE — 99215 OFFICE O/P EST HI 40 MIN: CPT | Mod: S$GLB,,, | Performed by: FAMILY MEDICINE

## 2017-03-28 PROCEDURE — 3078F DIAST BP <80 MM HG: CPT | Mod: S$GLB,,, | Performed by: FAMILY MEDICINE

## 2017-03-28 PROCEDURE — 1159F MED LIST DOCD IN RCRD: CPT | Mod: S$GLB,,, | Performed by: FAMILY MEDICINE

## 2017-03-28 NOTE — MR AVS SNAPSHOT
[facility]  [address]  [facility] [address]  Phone: [phone]  Fax: [phone]                  Krissy Marino   3/28/2017 1:00 PM   Office Visit    Description:  Female : 1940   Provider:  Og Cid MD   Department:  [facility]           Reason for Visit     Diabetes           Diagnoses this Visit        Comments    Osteopenia    -  Primary     Controlled type 2 diabetes mellitus with microalbuminuria, with long-term current use of insulin         Essential hypertension         Chronic kidney disease, stage II (mild)         Giant cell arteritis         Dyslipidemia         Chronic systolic heart failure         Coronary artery disease involving native coronary artery of native heart without angina pectoris         Mouth sore                To Do List           Future Appointments        Provider Department Dept Phone    3/29/2017 2:30 PM Nancy Vázquez MD [facility] - Head/Neck Surg Onc [phone]    2017 1:15 PM Alisson Arora DPM [facility] - Podiatry [phone]    2017 4:00 PM Toñito Kohler MD [facility] - Cardiology [phone]    2017 3:00 PM Leticia Mcnamara MD [facility] - Rheumatology [phone]    2017 8:45 AM [facility] [phone]      Goals (5 Years of Data)     None      Follow-Up and Disposition     Return in about 3 months (around 2017), or if symptoms worsen or fail to improve.    Follow-up and Disposition History      [facility]     [facility] -  Assistance  Registered nurses in the [facility] provide clinical advisement, health education, appointment booking, and other advisory services.  Call for this free service at [phone].             Medications           Message regarding Medications     Verify the changes and/or additions to your medication regime listed below are the same as discussed  "with your clinician today.  If any of these changes or additions are incorrect, please notify your healthcare provider.             Verify that the below list of medications is an accurate representation of the medications you are currently taking.  If none reported, the list may be blank. If incorrect, please contact your healthcare provider. Carry this list with you in case of emergency.           Current Medications     albuterol 90 mcg/actuation inhaler Inhale 2 puffs into the lungs every 6 (six) hours as needed for Wheezing.    allopurinol (ZYLOPRIM) 300 MG tablet Take 1 tablet (300 mg total) by mouth once daily.    aspirin (ECOTRIN) 81 MG EC tablet Take 1 tablet (81 mg total) by mouth once daily.    atorvastatin (LIPITOR) 40 MG tablet Take 1 tablet (40 mg total) by mouth once daily.    azelastine (ASTELIN) 137 mcg (0.1 %) nasal spray 1 spray (137 mcg total) by Nasal route 2 (two) times daily.    BIOTIN ORAL Take by mouth.    blood sugar diagnostic (ACCU-CHEK AMELIA PLUS TEST STRP) Strp Checks bg 2 x day before meals    cholecalciferol, vitamin D3, 50,000 unit capsule Take 1 capsule (50,000 Units total) by mouth twice a week.    ciclopirox (PENLAC) 8 % Soln Apply topically nightly.    clopidogrel (PLAVIX) 75 mg tablet Take 1 tablet (75 mg total) by mouth once daily.    desonide (DESOWEN) 0.05 % cream AAA bid    erythromycin (ROMYCIN) ophthalmic ointment Place into the right eye 3 (three) times daily.    FERROUS GLUCONATE (FERATE ORAL) Take by mouth.    folic acid (FOLVITE) 1 MG tablet Take 1 tablet (1,000 mcg total) by mouth once daily.    furosemide (LASIX) 40 MG tablet Take 1 tablet (40 mg total) by mouth once daily.    insulin lispro (HUMALOG KWIKPEN) 100 unit/mL InPn pen Inject 5 Units into the skin 3 (three) times daily with meals.    insulin needles, disposable, (BD ULTRA-FINE ILIANA PEN NEEDLES) 32 x 5/32 " Ndle Injects insulin 3 x day    KRILL/OM3/DHA/EPA/OM6/LIP/ASTX (KRILL OIL, OMEGA 3 & 6, ORAL) Take by " "mouth.    levothyroxine (SYNTHROID) 75 MCG tablet TAKE 1 TABLET(75 MCG) BY MOUTH BEFORE BREAKFAST    lidocaine HCL 2% (XYLOCAINE) 2 % jelly Apply topically as needed.    lisinopril (PRINIVIL,ZESTRIL) 2.5 MG tablet Take 1 tablet (2.5 mg total) by mouth once daily.    metoprolol succinate (TOPROL-XL) 50 MG 24 hr tablet Take 1 tablet (50 mg total) by mouth once daily.    miconazole NITRATE 2 % (ZEASORB AF) 2 % top powder Apply topically as needed for Itching.    mometasone (NASONEX) 50 mcg/actuation nasal spray 2 sprays by Nasal route once daily.    nystatin-triamcinolone (MYCOLOG II) cream Apply topically 2 (two) times daily.    omeprazole (PRILOSEC) 40 MG capsule Take 1 capsule (40 mg total) by mouth daily as needed.    ondansetron (ZOFRAN-ODT) 8 MG TbDL Take 1 tablet (8 mg total) by mouth every 12 (twelve) hours as needed.    predniSONE (DELTASONE) 2.5 MG tablet Take 2 tablets (5 mg total) by mouth once daily.    ranitidine (ZANTAC) 150 MG tablet Take 1 tablet (150 mg total) by mouth 2 (two) times daily as needed for Heartburn.    silver sulfADIAZINE 1% (SILVADENE) 1 % cream aaa buttock bid    SORIATANE 10 mg capsule     SYMBICORT 160-4.5 mcg/actuation HFAA INHALE 2 PUFFS BY MOUTH INTO THE LUNGS EVERY 12 HOURS    triamcinolone acetonide 0.1% (KENALOG) 0.1 % cream APPLY TO THE AFFECTED AREA TWICE DAILY           Clinical Reference Information           Your Vitals Were     BP Pulse Height Weight BMI    118/68 70 5' 5" (1.651 m) 91.9 kg (202 lb 9.6 oz) 33.71 kg/m2      Blood Pressure          Most Recent Value    BP  118/68      Allergies as of 3/28/2017     Sulfamethoxazole-trimethoprim    Latex, Natural Rubber    Pcn [Penicillins]      Immunizations Administered on Date of Encounter - 3/28/2017     None      Orders Placed During Today's Visit      Normal Orders This Visit    Ambulatory Referral to ENT     Future Labs/Procedures Expected by Expires    DXA Bone Density Spine And Hip  3/28/2017 3/14/2018    Hemoglobin " A1c  6/26/2017 5/27/2018    Lipid panel  6/26/2017 5/27/2018      Language Assistance Services     ATTENTION: Language assistance services are available, free of charge. Please call [phone].      ATENCIÓN: Si habla marleni, tiene a doyle disposición servicios gratuitos de asistencia lingüística. Llame al [phone].     CHÚ Ý: N?u b?n nói Ti?ng Vi?t, có các d?ch v? h? tr? ngôn ng? mi?n phí dành cho b?n. G?i s? [phone].         [facility] complies with applicable Federal civil rights laws and does not discriminate on the basis of race, color, national origin, age, disability, or sex.

## 2017-03-28 NOTE — PROGRESS NOTES
"Subjective:       Patient ID: Krissy Marino is a 76 y.o. female.    Chief Complaint: Diabetes  Krissy Marino 76 y.o. female is here for office visit to review care and physical exam, feels well in general but for sore in mouth.  Reports "was born with it."  Eating puddings.  Says A1c likely goodd, po decreased. Has 1/2 pill steroid use now for psoriasis.  HAs ESR checked for GC issue, as per Rheumatology.      HPI  Review of Systems   Constitutional: Negative for activity change, fatigue, fever and unexpected weight change.   HENT: Positive for mouth sores. Negative for congestion, hearing loss, postnasal drip and rhinorrhea.    Eyes: Negative for redness and visual disturbance.   Respiratory: Negative for chest tightness, shortness of breath and wheezing.    Cardiovascular: Negative for chest pain, palpitations and leg swelling.   Gastrointestinal: Negative for abdominal distention.   Genitourinary: Negative for decreased urine volume, dysuria, flank pain, hematuria, pelvic pain and urgency.   Musculoskeletal: Negative for back pain, gait problem, joint swelling and neck stiffness.   Skin: Negative for color change, rash and wound.   Neurological: Negative for dizziness, syncope, weakness and headaches.   Psychiatric/Behavioral: Negative for behavioral problems, confusion and sleep disturbance. The patient is not nervous/anxious.        Objective:      Physical Exam   Constitutional: She is oriented to person, place, and time. She appears well-developed and well-nourished. No distress.   HENT:   Head: Normocephalic.   Mouth/Throat: No oropharyngeal exudate.   Inflamed looking Torus Tbarius   Eyes: EOM are normal. Pupils are equal, round, and reactive to light. No scleral icterus.   Neck: Neck supple. No JVD present. No thyromegaly present.   Cardiovascular: Normal rate, regular rhythm and normal heart sounds.  Exam reveals no gallop and no friction rub.    No murmur heard.  Pulmonary/Chest: Effort normal and " breath sounds normal. She has no wheezes. She has no rales.   Abdominal: Soft. Bowel sounds are normal. She exhibits no distension and no mass. There is no tenderness. There is no guarding.   Musculoskeletal: Normal range of motion. She exhibits no edema.   Lymphadenopathy:     She has no cervical adenopathy.   Neurological: She is alert and oriented to person, place, and time. She has normal reflexes. She displays normal reflexes. No cranial nerve deficit. She exhibits normal muscle tone.   Skin: Skin is warm. No rash noted. No erythema.   Psychiatric: She has a normal mood and affect. Thought content normal.       Assessment:       1. Osteopenia        Plan:       Krissy was seen today for diabetes.    Diagnoses and all orders for this visit:    Osteopenia  -     DXA Bone Density Spine And Hip; Future    Controlled type 2 diabetes mellitus with microalbuminuria, with long-term current use of insulin  -     Hemoglobin A1c; Future    Essential hypertension  - controled  Chronic kidney disease, stage II (mild)  - control risk  Giant cell arteritis  - follow  Dyslipidemia  -     Lipid panel; Future    Chronic systolic heart failure  - control risk  Coronary artery disease involving native coronary artery of native heart without angina pectoris    Mouth sore  -     Ambulatory Referral to ENT

## 2017-03-29 ENCOUNTER — OFFICE VISIT (OUTPATIENT)
Dept: OTOLARYNGOLOGY | Facility: CLINIC | Age: 77
End: 2017-03-29
Payer: COMMERCIAL

## 2017-03-29 VITALS
SYSTOLIC BLOOD PRESSURE: 140 MMHG | TEMPERATURE: 97 F | WEIGHT: 202.19 LBS | BODY MASS INDEX: 33.64 KG/M2 | HEART RATE: 71 BPM | DIASTOLIC BLOOD PRESSURE: 78 MMHG

## 2017-03-29 DIAGNOSIS — M27.0 TORUS PALATINUS: ICD-10-CM

## 2017-03-29 DIAGNOSIS — K12.1: Primary | ICD-10-CM

## 2017-03-29 PROCEDURE — 3077F SYST BP >= 140 MM HG: CPT | Mod: S$GLB,,, | Performed by: OTOLARYNGOLOGY

## 2017-03-29 PROCEDURE — 1157F ADVNC CARE PLAN IN RCRD: CPT | Mod: S$GLB,,, | Performed by: OTOLARYNGOLOGY

## 2017-03-29 PROCEDURE — 1159F MED LIST DOCD IN RCRD: CPT | Mod: S$GLB,,, | Performed by: OTOLARYNGOLOGY

## 2017-03-29 PROCEDURE — 99999 PR PBB SHADOW E&M-EST. PATIENT-LVL III: CPT | Mod: PBBFAC,,, | Performed by: OTOLARYNGOLOGY

## 2017-03-29 PROCEDURE — 1160F RVW MEDS BY RX/DR IN RCRD: CPT | Mod: S$GLB,,, | Performed by: OTOLARYNGOLOGY

## 2017-03-29 PROCEDURE — 99203 OFFICE O/P NEW LOW 30 MIN: CPT | Mod: S$GLB,,, | Performed by: OTOLARYNGOLOGY

## 2017-03-29 PROCEDURE — 1125F AMNT PAIN NOTED PAIN PRSNT: CPT | Mod: S$GLB,,, | Performed by: OTOLARYNGOLOGY

## 2017-03-29 PROCEDURE — 3078F DIAST BP <80 MM HG: CPT | Mod: S$GLB,,, | Performed by: OTOLARYNGOLOGY

## 2017-03-29 NOTE — PROGRESS NOTES
Chief Complaint   Patient presents with    bump in palet         76 y.o. female presents with pain on roof of mouth since eating very cold ice cream two weeks ago. Feels that it has been getting better. No bleeding.       Review of Systems     Constitutional: Negative for fatigue and unexpected weight change.   HENT: per HPI.   Eyes: Negative for visual disturbance.   Respiratory: Negative for shortness of breath, hemoptysis  Cardiovascular: Negative for chest pain and palpitations.   Genitourinary: Negative for dysuria and difficulty urinating.   Musculoskeletal: Negative for decreased ROM, back pain.   Skin: Negative for rash, sunburn, itching.   Neurological: Negative for dizziness and seizures.   Hematological: Negative for adenopathy. Does not bruise/bleed easily.   Psychiatric/Behavioral: Negative for agitation. The patient is not nervous/anxious.   Endocrine: Negative for rapid weight loss/weight gain, heat/cold intolerance.     Past Medical History:   Diagnosis Date    Adverse effect of glucocorticoid or synthetic analogue     Allergy     Arthritis     Cancer     CHF (congestive heart failure)     Chronic kidney disease     Coronary artery disease involving native coronary artery of native heart without angina pectoris 10/11/2016    Diabetic neuropathy 10/6/2014    Giant cell arteritis 9/24/2012    Hyperlipidemia     Hypertension     Hypothyroid     Obesity (BMI 30-39.9) 10/6/2014    Osteopenia     Primary osteoarthritis of both knees 9/24/2012    Type II or unspecified type diabetes mellitus with renal manifestations, uncontrolled        Past Surgical History:   Procedure Laterality Date    APPENDECTOMY      CATARACT EXTRACTION, BILATERAL      CHOLECYSTECTOMY      EYE SURGERY      HYSTERECTOMY      JOINT REPLACEMENT      bilateral total knee    SKIN BIOPSY      TONSILLECTOMY         family history includes Diabetes in her mother; Hypertension in her father and mother. There is no  history of Kidney disease, Eczema, Psoriasis, Melanoma, Lupus, or Acne.    Pt  reports that she has never smoked. She does not have any smokeless tobacco history on file. She reports that she does not drink alcohol or use illicit drugs.    Review of patient's allergies indicates:   Allergen Reactions    Sulfamethoxazole-trimethoprim Nausea And Vomiting    Latex, natural rubber Rash    Pcn [penicillins] Rash        Physical Exam    Vitals:    03/29/17 1445   BP: (!) 140/78   Pulse: 71   Temp: 96.9 °F (36.1 °C)     Body mass index is 33.64 kg/(m^2).    Physical Exam   Constitutional: She is oriented to person, place, and time. She appears well-developed and well-nourished. No distress.   HENT:   Head: Normocephalic and atraumatic.   Right Ear: Hearing, tympanic membrane, external ear and ear canal normal. Tympanic membrane mobility is normal. No middle ear effusion. No decreased hearing is noted.   Left Ear: Hearing, tympanic membrane, external ear and ear canal normal. Tympanic membrane mobility is normal.  No middle ear effusion. No decreased hearing is noted.   Nose: Nose normal.   Mouth/Throat: Uvula is midline, oropharynx is clear and moist and mucous membranes are normal. Oral lesions present.       Eyes: Conjunctivae, EOM and lids are normal. Pupils are equal, round, and reactive to light. Right eye exhibits no discharge. Left eye exhibits no discharge.   Neck: Trachea normal, normal range of motion and phonation normal. Neck supple. No tracheal tenderness present. No tracheal deviation, no edema and no erythema present. No thyroid mass and no thyromegaly present.   Cardiovascular: Normal heart sounds.    Pulmonary/Chest: Breath sounds normal. No stridor.   Abdominal: Soft.   Lymphadenopathy:     She has no cervical adenopathy.   Neurological: She is alert and oriented to person, place, and time.   Skin: Skin is warm and dry. No rash noted. She is not diaphoretic. No erythema. No pallor.   Psychiatric: She  has a normal mood and affect.   Nursing note and vitals reviewed.        Assessment     1. Ulcer, oral mucosa traumatic    2. Torus palatinus          Plan  In summary, Ms. Marino is a 76 year old female with torus palatini with traumatic erosion after eating cold ice cream. Improving per patient. Continue soft diet/tylenol prn/increased hydration. Return to clinic in 2-3 weeks for recheck or sooner if worsens. All questions were answered and she is in agreement with the plan.

## 2017-03-29 NOTE — LETTER
March 29, 2017      Og Cid MD  [address]  [facility] [address]           [facility]  [address]  [facility] [address]  Phone: [phone]  Fax: [phone]          Patient: Krissy Marino   MR Number: [id number]   YOB: 1940   Date of Visit: 3/29/2017       Dear Dr. Og Cid:    Thank you for referring Krissy Marino to me for evaluation. Attached you will find relevant portions of my assessment and plan of care.    If you have questions, please do not hesitate to call me. I look forward to following Krissy Marino along with you.    Sincerely,    Nancy Vázquez MD    Enclosure  CC:  No Recipients    If you would like to receive this communication electronically, please contact [email or web address] or [phone] to request more information on [facility] access.    For providers and/or their staff who would like to refer a patient to [facility], please contact us through our one-stop-shop provider referral line, [facility], at [phone].    If you feel you have received this communication in error or would no longer like to receive these types of communications, please e-mail [email or web address]

## 2017-04-04 ENCOUNTER — HOSPITAL ENCOUNTER (OUTPATIENT)
Dept: RADIOLOGY | Facility: CLINIC | Age: 77
Discharge: HOME | End: 2017-04-04
Attending: FAMILY MEDICINE
Payer: COMMERCIAL

## 2017-04-04 ENCOUNTER — OFFICE VISIT (OUTPATIENT)
Dept: PODIATRY | Facility: CLINIC | Age: 77
End: 2017-04-04
Payer: COMMERCIAL

## 2017-04-04 VITALS
SYSTOLIC BLOOD PRESSURE: 116 MMHG | BODY MASS INDEX: 34.16 KG/M2 | WEIGHT: 205 LBS | HEART RATE: 83 BPM | DIASTOLIC BLOOD PRESSURE: 64 MMHG | HEIGHT: 65 IN

## 2017-04-04 DIAGNOSIS — M85.80 OSTEOPENIA: ICD-10-CM

## 2017-04-04 DIAGNOSIS — M21.611 BUNION, RIGHT: ICD-10-CM

## 2017-04-04 DIAGNOSIS — L84 CORN OR CALLUS: ICD-10-CM

## 2017-04-04 DIAGNOSIS — E11.49 TYPE II DIABETES MELLITUS WITH NEUROLOGICAL MANIFESTATIONS: Primary | ICD-10-CM

## 2017-04-04 DIAGNOSIS — B35.1 ONYCHOMYCOSIS DUE TO DERMATOPHYTE: ICD-10-CM

## 2017-04-04 PROCEDURE — 99999 PR PBB SHADOW E&M-EST. PATIENT-LVL III: CPT | Mod: PBBFAC,,, | Performed by: PODIATRIST

## 2017-04-04 PROCEDURE — 77080 DXA BONE DENSITY AXIAL: CPT | Mod: TC

## 2017-04-04 PROCEDURE — 1125F AMNT PAIN NOTED PAIN PRSNT: CPT | Mod: S$GLB,,, | Performed by: PODIATRIST

## 2017-04-04 PROCEDURE — 99213 OFFICE O/P EST LOW 20 MIN: CPT | Mod: 25,S$GLB,, | Performed by: PODIATRIST

## 2017-04-04 PROCEDURE — 3074F SYST BP LT 130 MM HG: CPT | Mod: S$GLB,,, | Performed by: PODIATRIST

## 2017-04-04 PROCEDURE — 11056 PARNG/CUTG B9 HYPRKR LES 2-4: CPT | Mod: S$GLB,,, | Performed by: PODIATRIST

## 2017-04-04 PROCEDURE — 1157F ADVNC CARE PLAN IN RCRD: CPT | Mod: S$GLB,,, | Performed by: PODIATRIST

## 2017-04-04 PROCEDURE — 11721 DEBRIDE NAIL 6 OR MORE: CPT | Mod: 59,S$GLB,, | Performed by: PODIATRIST

## 2017-04-04 PROCEDURE — 3078F DIAST BP <80 MM HG: CPT | Mod: S$GLB,,, | Performed by: PODIATRIST

## 2017-04-04 PROCEDURE — 77080 DXA BONE DENSITY AXIAL: CPT | Mod: 26,,, | Performed by: INTERNAL MEDICINE

## 2017-04-04 PROCEDURE — 1160F RVW MEDS BY RX/DR IN RCRD: CPT | Mod: S$GLB,,, | Performed by: PODIATRIST

## 2017-04-04 PROCEDURE — 1159F MED LIST DOCD IN RCRD: CPT | Mod: S$GLB,,, | Performed by: PODIATRIST

## 2017-04-04 RX ORDER — PREDNISONE 5 MG/1
TABLET ORAL
COMMUNITY
Start: 2017-01-12 | End: 2017-05-03

## 2017-04-04 RX ORDER — FUROSEMIDE 20 MG/1
TABLET ORAL
COMMUNITY
Start: 2017-03-19 | End: 2017-05-03 | Stop reason: DRUGHIGH

## 2017-04-04 RX ORDER — CLINDAMYCIN HYDROCHLORIDE 150 MG/1
CAPSULE ORAL
COMMUNITY
Start: 2017-01-14 | End: 2017-05-03

## 2017-04-04 RX ORDER — OSELTAMIVIR PHOSPHATE 75 MG
CAPSULE ORAL
COMMUNITY
Start: 2017-02-17 | End: 2017-04-19 | Stop reason: ALTCHOICE

## 2017-04-04 RX ORDER — METOPROLOL TARTRATE 25 MG/1
TABLET, FILM COATED ORAL
COMMUNITY
Start: 2017-03-13 | End: 2017-05-03 | Stop reason: DRUGHIGH

## 2017-04-04 NOTE — PROGRESS NOTES
Subjective:      Patient ID: Krissy Marino is a 76 y.o. female.    Chief Complaint: PCP ( Og Cid MD 3/28/17); Diabetic Foot Exam; Foot Problem (RT foot bunion red and swelling ); and Callouses (corns )    Krissy is a 76 y.o. female who presents to the clinic for evaluation and treatment of high risk feet. Krissy has a past medical history of Adverse effect of glucocorticoid or synthetic analogue; Allergy; Arthritis; Cancer; CHF (congestive heart failure); Chronic kidney disease; Coronary artery disease involving native coronary artery of native heart without angina pectoris (10/11/2016); Diabetic neuropathy (10/6/2014); Giant cell arteritis (9/24/2012); Hyperlipidemia; Hypertension; Hypothyroid; Obesity (BMI 30-39.9) (10/6/2014); Osteopenia; Primary osteoarthritis of both knees (9/24/2012); and Type II or unspecified type diabetes mellitus with renal manifestations, uncontrolled. The patient's chief complaint is long, thick toenails and  Painful r great toe bunion. She reports developing a blister after wearing a new pair of shoes, this has resolved but the toe is still quite sore       PCP: Og Cid MD    Date Last Seen by PCP:   Chief Complaint   Patient presents with    PCP      Og Cid MD 3/28/17    Diabetic Foot Exam    Foot Problem     RT foot bunion red and swelling     Callouses     corns      Current shoe gear: MaxVision     Hemoglobin A1C   Date Value Ref Range Status   11/25/2016 7.1 (H) 4.5 - 6.2 % Final     Comment:     According to ADA guidelines, hemoglobin A1C <7.0% represents  optimal control in non-pregnant diabetic patients.  Different  metrics may apply to specific populations.   Standards of Medical Care in Diabetes - 2016.  For the purpose of screening for the presence of diabetes:  <5.7%     Consistent with the absence of diabetes  5.7-6.4%  Consistent with increasing risk for diabetes   (prediabetes)  >or=6.5%  Consistent with diabetes  Currently no consensus exists  for use of hemoglobin A1C  for diagnosis of diabetes for children.     07/23/2016 7.0 (H) 4.5 - 6.2 % Final     Comment:     According to ADA guidelines, hemoglobin A1C <7.0% represents  optimal control in non-pregnant diabetic patients.  Different  metrics may apply to specific populations.   Standards of Medical Care in Diabetes - 2016.  For the purpose of screening for the presence of diabetes:  <5.7%     Consistent with the absence of diabetes  5.7-6.4%  Consistent with increasing risk for diabetes   (prediabetes)  >or=6.5%  Consistent with diabetes  Currently no consensus exists for use of hemoglobin A1C  for diagnosis of diabetes for children.     05/05/2016 6.7 (H) 4.5 - 6.2 % Final         Review of Systems   Constitution: Negative for chills, decreased appetite and fever.   Cardiovascular: Negative for leg swelling.   Skin: Positive for dry skin and nail changes.   Musculoskeletal: Positive for arthritis. Negative for falls, joint pain, joint swelling and myalgias.   Gastrointestinal: Negative for nausea and vomiting.   Neurological: Positive for numbness and paresthesias. Negative for loss of balance.           Objective:      Physical Exam   Constitutional: She is oriented to person, place, and time. She appears well-developed and well-nourished.   Cardiovascular:   Pulses:       Dorsalis pedis pulses are 1+ on the right side, and 1+ on the left side.        Posterior tibial pulses are 1+ on the right side, and 1+ on the left side.   Musculoskeletal: Normal range of motion. She exhibits edema (mild), tenderness and deformity.        Right ankle: Normal.        Left ankle: Normal.        Right foot: There is no swelling, no crepitus and no deformity.        Left foot: There is no swelling, no crepitus and no deformity.   Adequate joint range of motion without pain, limitation, nor crepitation Bilateral feet and ankle joints. Muscle strength is 5/5 in all groups bilaterally.    Rigid hammertoe deformity L  2nd digit    1st MPJ exostosis w/ medial deviation of hallux, non trackbound. No pain w/ ROM to R hallux    Atrophy of fat  Pad from bilateral foot with easily palpable bone       Lymphadenopathy:   No palpable lymph nodes   Neurological: She is alert and oriented to person, place, and time. She has normal strength.   Sharp dull sensation diminished Light touch sensation diminished    Skin: Skin is warm, dry and intact. No abrasion, no bruising, no lesion, no petechiae and no rash noted. She is not diaphoretic. No pallor. Nails show no clubbing.   Nails 1-5 R, 2-5 L   are elongated by  3-4mm's, thickened by 2-5 mm's, dystrophic, and are darkened in  coloration . Xerosis Bilaterally. No open lesions noted.      Hyperkeratotic tissue noted to medial HIPJ l, plantar hallux L       Psychiatric: She has a normal mood and affect. Her behavior is normal. Judgment normal.   Nursing note and vitals reviewed.            Assessment:       Encounter Diagnoses   Name Primary?    Type II diabetes mellitus with neurological manifestations Yes    Onychomycosis due to dermatophyte     Corn or callus     Bunion, right          Plan:       Krissy was seen today for pcp, diabetic foot exam, foot problem and callouses.    Diagnoses and all orders for this visit:    Type II diabetes mellitus with neurological manifestations    Onychomycosis due to dermatophyte    Corn or callus    Bunion, right      I counseled the patient on her conditions, their implications and medical management.    - Shoe inspection. Diabetic Foot Education. Patient reminded of the importance of good nutrition and blood sugar control to help prevent podiatric complications of diabetes. Patient instructed on proper foot hygeine. We discussed wearing proper shoe gear, daily foot inspections, never walking without protective shoe gear, never putting sharp instruments to feet, routine podiatric nail visits every 2-3 months.      - With patient's permission, nails  were aggressively reduced and debrided x 9 to their soft tissue attachment mechanically and with electric , removing all offending nail and debris. Patient relates relief following the procedure. She will continue to monitor the areas daily, inspect her feet, wear protective shoe gear when ambulatory, moisturizer to maintain skin integrity and follow in this office in approximately 2-3 months, sooner p.r.n.    - After cleansing the  area w/ alcohol prep pad the above mentioned hyperkeratosis was trimmed utilizing No 15 scapel, to a smooth base with out incident. Patient tolerated this  well and reported comfort to the area of   medial HIPJ L, plantar hallux L      - Discussed surgical and conservative management of R Bunion deformity. Conservatively we did discuss padding, and shoe modifications such as softer shoes with wide toe boxes. Surgically we briefly discussed pre and post operative expectations. The patient elects for conservative  management at this time     - Patient fitted with Darco shoe and instructed on proper use. This should be worn for at least 2 weeks

## 2017-04-10 ENCOUNTER — TELEPHONE (OUTPATIENT)
Dept: INTERNAL MEDICINE | Facility: CLINIC | Age: 77
End: 2017-04-10

## 2017-04-10 DIAGNOSIS — M85.80 OSTEOPENIA, UNSPECIFIED LOCATION: Primary | ICD-10-CM

## 2017-04-10 RX ORDER — ALENDRONATE SODIUM 70 MG/1
70 TABLET ORAL
Qty: 4 TABLET | Refills: 11 | Status: SHIPPED | OUTPATIENT
Start: 2017-04-10 | End: 2018-03-09 | Stop reason: SDUPTHER

## 2017-04-10 NOTE — TELEPHONE ENCOUNTER
Please advise pt bone density showed decreased bone density though not in osteoporosis range. Radiology recommended Fosamax, sent as Generic to [facility], thanks

## 2017-04-13 DIAGNOSIS — E78.5 HYPERLIPIDEMIA: ICD-10-CM

## 2017-04-13 RX ORDER — ATORVASTATIN CALCIUM 40 MG/1
TABLET, FILM COATED ORAL
Qty: 90 TABLET | Refills: 0 | Status: SHIPPED | OUTPATIENT
Start: 2017-04-13 | End: 2017-07-15 | Stop reason: SDUPTHER

## 2017-04-19 ENCOUNTER — OFFICE VISIT (OUTPATIENT)
Dept: RHEUMATOLOGY | Facility: CLINIC | Age: 77
End: 2017-04-19
Payer: COMMERCIAL

## 2017-04-19 VITALS
HEIGHT: 65 IN | DIASTOLIC BLOOD PRESSURE: 70 MMHG | HEART RATE: 90 BPM | WEIGHT: 207.13 LBS | SYSTOLIC BLOOD PRESSURE: 113 MMHG | BODY MASS INDEX: 34.51 KG/M2

## 2017-04-19 DIAGNOSIS — M31.6 GIANT CELL ARTERITIS: Primary | ICD-10-CM

## 2017-04-19 DIAGNOSIS — E55.9 VITAMIN D INSUFFICIENCY: ICD-10-CM

## 2017-04-19 DIAGNOSIS — L40.9 PSORIASIS: ICD-10-CM

## 2017-04-19 DIAGNOSIS — Z79.52 LONG TERM (CURRENT) USE OF SYSTEMIC STEROIDS: ICD-10-CM

## 2017-04-19 PROCEDURE — 1159F MED LIST DOCD IN RCRD: CPT | Mod: S$GLB,,, | Performed by: INTERNAL MEDICINE

## 2017-04-19 PROCEDURE — 99214 OFFICE O/P EST MOD 30 MIN: CPT | Mod: S$GLB,,, | Performed by: INTERNAL MEDICINE

## 2017-04-19 PROCEDURE — 1125F AMNT PAIN NOTED PAIN PRSNT: CPT | Mod: S$GLB,,, | Performed by: INTERNAL MEDICINE

## 2017-04-19 PROCEDURE — 99999 PR PBB SHADOW E&M-EST. PATIENT-LVL III: CPT | Mod: PBBFAC,,, | Performed by: INTERNAL MEDICINE

## 2017-04-19 PROCEDURE — 1160F RVW MEDS BY RX/DR IN RCRD: CPT | Mod: S$GLB,,, | Performed by: INTERNAL MEDICINE

## 2017-04-19 PROCEDURE — 1157F ADVNC CARE PLAN IN RCRD: CPT | Mod: 8P,S$GLB,, | Performed by: INTERNAL MEDICINE

## 2017-04-19 PROCEDURE — 3078F DIAST BP <80 MM HG: CPT | Mod: S$GLB,,, | Performed by: INTERNAL MEDICINE

## 2017-04-19 PROCEDURE — 3074F SYST BP LT 130 MM HG: CPT | Mod: S$GLB,,, | Performed by: INTERNAL MEDICINE

## 2017-04-19 ASSESSMENT — ROUTINE ASSESSMENT OF PATIENT INDEX DATA (RAPID3)
PAIN SCORE: 5
MDHAQ FUNCTION SCORE: .6
TOTAL RAPID3 SCORE: 4
AM STIFFNESS SCORE: 0, NO
PSYCHOLOGICAL DISTRESS SCORE: 6.6
PATIENT GLOBAL ASSESSMENT SCORE: 5
FATIGUE SCORE: 5

## 2017-04-19 NOTE — PROGRESS NOTES
Subjective:       Patient ID: Krissy Marino is a 76 y.o. female possible GCA, osteoporosis and OA of hands & knees.  .    Chief Complaint: Disease Management  Returns for follow-up.  Was last seen on 1/18/17. Has no complaints on tapering doses of prednisone. She is now taking 2.5 mg 3 x/week. She has been on this dose x 4 weeks.   She is sleeping better, but otherwise, she has not changed. She still has bouts of instantaneous right eye pain that comes & goes.  Denies joint pains, fever, chills,visual loss, diplopia, scalp tenderness, jaw or other claudication, headache or head pain. Denies PMR sxs: AM stiffness, shoulder or hip girdle stiffness. Her OA is not bothering her. No gout episodes. No pain, pruritus, visual disturbance. Psoriasis was improved on Soriatane, but she was told she has to get generic and has not gotten it yet.    She was doing well weight wise but ate shrimp (salty) over Easter and gained back a few lbs.         Pertinent negatives include no fatigue or fever.         Current Outpatient Prescriptions   Medication Sig Dispense Refill    albuterol 90 mcg/actuation inhaler Inhale 2 puffs into the lungs every 6 (six) hours as needed for Wheezing. 1 each 11    alendronate (FOSAMAX) 70 MG tablet Take 1 tablet (70 mg total) by mouth every 7 days. 4 tablet 11    allopurinol (ZYLOPRIM) 300 MG tablet Take 1 tablet (300 mg total) by mouth once daily. 90 tablet 3    aspirin (ECOTRIN) 81 MG EC tablet Take 1 tablet (81 mg total) by mouth once daily. 90 tablet 3    atorvastatin (LIPITOR) 40 MG tablet TAKE 1 TABLET(40 MG) BY MOUTH EVERY DAY 90 tablet 0    azelastine (ASTELIN) 137 mcg (0.1 %) nasal spray 1 spray (137 mcg total) by Nasal route 2 (two) times daily. 30 mL 11    BIOTIN ORAL Take by mouth.      blood sugar diagnostic (ACCU-CHEK AMELIA PLUS TEST STRP) Strp Checks bg 2 x day before meals 200 strip 4    cholecalciferol, vitamin D3, 50,000 unit capsule Take 1 capsule (50,000 Units total) by  "mouth twice a week. 40 capsule 0    ciclopirox (PENLAC) 8 % Soln Apply topically nightly. 6.6 mL 4    clindamycin (CLEOCIN) 150 MG capsule       clopidogrel (PLAVIX) 75 mg tablet Take 1 tablet (75 mg total) by mouth once daily. 90 tablet 3    desonide (DESOWEN) 0.05 % cream AAA bid 180 g 1    erythromycin (ROMYCIN) ophthalmic ointment Place into the right eye 3 (three) times daily. 1 Tube 3    FERROUS GLUCONATE (FERATE ORAL) Take by mouth.      folic acid (FOLVITE) 1 MG tablet Take 1 tablet (1,000 mcg total) by mouth once daily. 90 tablet 3    insulin lispro (HUMALOG KWIKPEN) 100 unit/mL InPn pen Inject 5 Units into the skin 3 (three) times daily with meals. 1 Box 1    insulin needles, disposable, ([employer] ULTRA-FINE ILIANA PEN NEEDLES) 32 x 5/32 " Ndle Injects insulin 3 x day 300 each 3    KRILL/OM3/DHA/EPA/OM6/LIP/ASTX (KRILL OIL, OMEGA 3 & 6, ORAL) Take by mouth.      levothyroxine (SYNTHROID) 75 MCG tablet TAKE 1 TABLET(75 MCG) BY MOUTH BEFORE BREAKFAST 90 tablet 0    lidocaine HCL 2% (XYLOCAINE) 2 % jelly Apply topically as needed. 30 mL 1    lisinopril (PRINIVIL,ZESTRIL) 2.5 MG tablet Take 1 tablet (2.5 mg total) by mouth once daily. 90 tablet 3    metoprolol succinate (TOPROL-XL) 50 MG 24 hr tablet Take 1 tablet (50 mg total) by mouth once daily. 90 tablet 3    miconazole NITRATE 2 % (ZEASORB AF) 2 % top powder Apply topically as needed for Itching. 71 g 0    mometasone (NASONEX) 50 mcg/actuation nasal spray 2 sprays by Nasal route once daily. 1 each 0    nystatin-triamcinolone (MYCOLOG II) cream Apply topically 2 (two) times daily. 30 g 0    omeprazole (PRILOSEC) 40 MG capsule Take 1 capsule (40 mg total) by mouth daily as needed. 30 capsule 0    ondansetron (ZOFRAN-ODT) 8 MG TbDL Take 1 tablet (8 mg total) by mouth every 12 (twelve) hours as needed. 20 tablet 0    predniSONE (DELTASONE) 5 MG tablet       ranitidine (ZANTAC) 150 MG tablet Take 1 tablet (150 mg total) by mouth 2 (two) times " daily as needed for Heartburn. 120 tablet 5    SORIATANE 10 mg capsule       SYMBICORT 160-4.5 mcg/actuation HFAA INHALE 2 PUFFS BY MOUTH INTO THE LUNGS EVERY 12 HOURS 10.2 g 0    triamcinolone acetonide 0.1% (KENALOG) 0.1 % cream APPLY TO THE AFFECTED AREA TWICE DAILY 240 g 0    furosemide (LASIX) 20 MG tablet       furosemide (LASIX) 40 MG tablet Take 1 tablet (40 mg total) by mouth once daily. 90 tablet 3    metoprolol tartrate (LOPRESSOR) 25 MG tablet       predniSONE (DELTASONE) 2.5 MG tablet Take 2 tablets (5 mg total) by mouth once daily. 180 tablet 1    silver sulfADIAZINE 1% (SILVADENE) 1 % cream aaa buttock bid 50 g 2     No current facility-administered medications for this visit.        Had CA stent placed in October.    Review of patient's allergies indicates:   Allergen Reactions    Sulfamethoxazole-trimethoprim Nausea And Vomiting    Latex, natural rubber Rash    Pcn [penicillins] Rash       Past Medical History:   Diagnosis Date    Adverse effect of glucocorticoid or synthetic analogue     Allergy     Arthritis     Cancer     CHF (congestive heart failure)     Chronic kidney disease     Coronary artery disease involving native coronary artery of native heart without angina pectoris 10/11/2016    Diabetic neuropathy 10/6/2014    Giant cell arteritis 9/24/2012    Hyperlipidemia     Hypertension     Hypothyroid     Obesity (BMI 30-39.9) 10/6/2014    Osteopenia     Primary osteoarthritis of both knees 9/24/2012    Type II or unspecified type diabetes mellitus with renal manifestations, uncontrolled        Past Surgical History:   Procedure Laterality Date    APPENDECTOMY      CATARACT EXTRACTION, BILATERAL      CHOLECYSTECTOMY      EYE SURGERY      HYSTERECTOMY      JOINT REPLACEMENT      bilateral total knee    SKIN BIOPSY      TONSILLECTOMY           Review of Systems   Constitutional: Negative.  Negative for diaphoresis, fatigue, fever and unexpected weight change.  "  HENT: Negative.  Negative for mouth sores, sore throat and tinnitus.    Eyes: Negative.  Negative for visual disturbance.   Respiratory: Positive for cough. Negative for choking, chest tightness and shortness of breath.    Cardiovascular: Negative.  Negative for chest pain, palpitations and leg swelling.   Gastrointestinal: Negative.  Negative for abdominal distention, abdominal pain, blood in stool, constipation, diarrhea, nausea and vomiting.   Endocrine: Negative.    Genitourinary: Negative.  Negative for frequency, hematuria and menstrual problem.   Musculoskeletal: Negative.  Negative for back pain, neck pain and neck stiffness.   Skin: Positive for rash (scalp & elbow psoriasis).   Allergic/Immunologic: Negative.    Neurological: Negative.  Negative for dizziness, syncope, weakness, light-headedness and numbness.   Hematological: Negative for adenopathy. Bruises/bleeds easily.   Psychiatric/Behavioral: Positive for dysphoric mood. Negative for sleep disturbance. The patient is nervous/anxious.          SOCIAL HISTORY: She does not smoke, does not drink alcohol. There is no   recreational drug use. She is the  of [employer] and loves what she does. 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  She is not formally exercising but is quite active.    FAMILY HISTORY: Family history is negative for giant cell arteritis.   Family history is positive for a brother with liver cancer and lung cancer.         Objective:     /70  Pulse 90  Ht 5' 5" (1.651 m)  Wt 93.9 kg (207 lb 1.6 oz)  BMI 34.46 kg/m2   Eas 211 lb 3.2 oz on 1/18/17  Was 216 lb 8 oz on 9/14/16.       Physical Exam   Vitals reviewed.  Constitutional: She is oriented to person, place, and time and well-developed, well-nourished, and in no distress. No distress.   HENT:   Head: Normocephalic and atraumatic.   Mouth/Throat: Oropharynx is clear and moist. No oropharyngeal exudate.   No facial rashes  Parotids not enlarged  No oral " ulcers  Temporal aa good pulsations;   No scalp tenderness or pain.   Eyes: Conjunctivae and EOM are normal. Pupils are equal, round, and reactive to light. Right eye exhibits no discharge. Left eye exhibits no discharge. No scleral icterus.   Neck: Neck supple. No JVD present. No tracheal deviation present. No thyromegaly present.   Cardiovascular: Normal rate, regular rhythm, normal heart sounds and intact distal pulses.  Exam reveals no gallop and no friction rub.    No murmur heard.  Pulmonary/Chest: Effort normal and breath sounds normal. No respiratory distress. She has no wheezes. She has no rales. She exhibits no tenderness.   Abdominal: Soft. Bowel sounds are normal. She exhibits no distension and no mass. There is no splenomegaly or hepatomegaly. There is no tenderness. There is no rebound and no guarding.   Lymphadenopathy:     She has no cervical adenopathy.        Right: No inguinal adenopathy present.        Left: No inguinal adenopathy present.   Neurological: She is alert and oriented to person, place, and time. She has normal reflexes. No cranial nerve deficit. Gait normal.   Quad strength 4/5 bilaterally. Cannot get out of chair without pushing off on arms.  Otherwise 5/5.   Skin: Skin is warm and dry. No rash noted. She is not diaphoretic.     Psoriasis both elbows R>L & mild left occipital scalp.  Multiple ecchymoses in upper arms.   Psychiatric: Mood, memory, affect and judgment normal.   Musculoskeletal: Normal range of motion. She exhibits no edema or tenderness.   Cspine FROM no tenderness  Tspine FROM no tenderness  Lspine FROM no tenderness.  TMJ: unremarkable  Shoulders: FROM; no synovitis;  Elbows: FROM; no synovitis; no tophi or nodules  Wrists: FROM; no synovitis;    MCPs: FROM; no synovitis; no metacarpalgia;  ok;  PIPs: Rell's; FROM; no synovitis;   DIPs: Heberden's; FROM; no synovitis;   HIPS: FROM  Knees: Bilateral TKR scars; FROM; no synovitis; no instability;  Ankles:  FROM: no synovitis   Toes: Bilateral HV; redness on 1st MTP; no heel pain.         LABS:  3/4/17: ESR 36; CRP 0.9; CBC plt 146; CMP ; Vit D 24;   11/28/16: ESR 46; CRP 1.7; CBC ok; BUN 64; cnne 1.3; GFR 39.4; ;   9/7/16: CMP cnne 1.1;   8/19/16: ESR 48; CRP 4.8; cnne 1.7; ;   5/28/16: ESR 83; CRp 19.1; CMP BUN 52; cnne 1.0; Glu 138;   5/5/16: ESR 41; CBC plt 138;   2/27/16: ESR 40; CRP 8;  2/13/16: ESR 63; CRP 9.7  8/22/15: ESR 26; CRP 7; CBC ok; cnne 1.1; BUN 40; ;   7/11/15: Vit d 43;   10/4/14: ESR 32; CRP 3.2; BUN 63; cnne 1.1; TSH ok;  8/22/14: CBC ok;  6/30/14: ESR 35;  6/21/14: ESR 49; CRP 3.0  11/11/13: ESR 35; CRP 9.6;   9/21/13: ESR 47; CRP 4.7; ; cnne 1.3;   6/25/13: ESR 40; CRP 3.9   6/11/13: cnne 1.5; GFR 34;  10/25/10: UA 5.5       3/2/16: bilateral feet:  personally reviewed: Mark HV with inferior calcaneal spurs & pes planus. Also vascular calcifications.  DXA 2/16/15: TLX +1.7; TFN -1.0; TTH -0.9    Does not do MELANIE's.     Assessment:   Possible GCA: Elevated ESR (with normal CRPs;  dating back to 12/17/09),    max at 120 associated with intermittent left eye pain; subsequently right sided and responsive to steroids.    Negative biopsy.    Sore mouth w. MTX (uncertain). Never on HCQ.    Developed eye pain/headache on 5 mg/day with ESR 50;   (bouts with intermittent eye pain (instantaneous);    R/O mild trigeminal neuralgia.   On prednisone 5 mg/day.    CAD with LAD stent 10/16 & systolic CHF with decreased LVF    NYHA class 2    At risk for steroid induced OP: on alendronate & steroids: alendronate stopped by endocrine end of 2015   DXA 2/16/15: TLS +1.7; TFN -1.0; TTH- 0.9     Vitamin D deficiency    On rx.     OA of both knees. S/P B TKRs.     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     Hypertension & Dyslipidemia.     Psoriasis--Had been on MTX remotely--appeared to have responded to soriatane..     Hypothyroidism.     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     Past history of gout (not substantiated).     Past history of asthma.     S/P Basal cell carcinoma removed from nose.     Obesity --improving    Plan:   Ok to stop prednisone 2.5 mg.  Add labs to 5/6 labs & August including vitamin D  We are not going to be a slave to ESR.  Take vitamin D3 50,000 IU twice a week for a total of 20 weeks then take the vitamin D2 capsules 50,000 IU one capsule every 2 weeks..  Contact us if GCA or PMR sxs (reviewed) & handout provided.  RTC beginning of August.

## 2017-04-19 NOTE — MR AVS SNAPSHOT
[facility] - Rheumatology  [address] [address]  Phone: [phone]  Fax: [phone]                  Krissy Marino   2017 3:00 PM   Office Visit    Description:  Female : 1940   Provider:  Leticia Mcnamara MD   Department:  [facility] - Rheumatology           Reason for Visit     Disease Management           Diagnoses this Visit        Comments    Giant cell arteritis    -  Primary     Long term (current) use of systemic steroids         Psoriasis         Vitamin D insufficiency                To Do List           Future Appointments        Provider Department Dept Phone    5/3/2017 1:00 PM MD [facility] Gaspar [facility] Cardiology [phone]    2017 8:45 AM LAB, [facility] [phone]    5/15/2017 1:50 PM MD [facility] Mercado [facility] Dermatology [phone]    2017 12:45 PM Rock Loepz MD [facility] Sports Medicine [phone]    2017 8:15 AM CRAIG, [facility] [phone]      Goals (5 Years of Data)     None      Follow-Up and Disposition     Return in about 4 months (around 8/10/2017).      [facility]     [facility] Nurse Care Line -  Assistance  Unless otherwise directed by your provider, please contact [facility], our nurse care line that is available for  assistance.     Registered nurses in the [facility] provide: appointment scheduling, clinical advisement, health education, and other advisory services.  Call: [phone] (toll free)               Medications           Message regarding Medications     Verify the changes and/or additions to your medication regime listed below are the same as discussed with your clinician today.  If any of these changes or additions are incorrect, please notify your healthcare provider.        STOP taking these medications     TAMIFLU 75 mg capsule            Verify that the below list of medications is  "an accurate representation of the medications you are currently taking.  If none reported, the list may be blank. If incorrect, please contact your healthcare provider. Carry this list with you in case of emergency.           Current Medications     albuterol 90 mcg/actuation inhaler Inhale 2 puffs into the lungs every 6 (six) hours as needed for Wheezing.    alendronate (FOSAMAX) 70 MG tablet Take 1 tablet (70 mg total) by mouth every 7 days.    allopurinol (ZYLOPRIM) 300 MG tablet Take 1 tablet (300 mg total) by mouth once daily.    aspirin (ECOTRIN) 81 MG EC tablet Take 1 tablet (81 mg total) by mouth once daily.    atorvastatin (LIPITOR) 40 MG tablet TAKE 1 TABLET(40 MG) BY MOUTH EVERY DAY    azelastine (ASTELIN) 137 mcg (0.1 %) nasal spray 1 spray (137 mcg total) by Nasal route 2 (two) times daily.    BIOTIN ORAL Take by mouth.    blood sugar diagnostic (ACCU-CHEK AMELIA PLUS TEST STRP) Strp Checks bg 2 x day before meals    cholecalciferol, vitamin D3, 50,000 unit capsule Take 1 capsule (50,000 Units total) by mouth twice a week.    ciclopirox (PENLAC) 8 % Soln Apply topically nightly.    clindamycin (CLEOCIN) 150 MG capsule     clopidogrel (PLAVIX) 75 mg tablet Take 1 tablet (75 mg total) by mouth once daily.    desonide (DESOWEN) 0.05 % cream AAA bid    erythromycin (ROMYCIN) ophthalmic ointment Place into the right eye 3 (three) times daily.    FERROUS GLUCONATE (FERATE ORAL) Take by mouth.    folic acid (FOLVITE) 1 MG tablet Take 1 tablet (1,000 mcg total) by mouth once daily.    insulin lispro (HUMALOG KWIKPEN) 100 unit/mL InPn pen Inject 5 Units into the skin 3 (three) times daily with meals.    insulin needles, disposable, (BD ULTRA-FINE ILIANA PEN NEEDLES) 32 x 5/32 " Ndle Injects insulin 3 x day    KRILL/OM3/DHA/EPA/OM6/LIP/ASTX (KRILL OIL, OMEGA 3 & 6, ORAL) Take by mouth.    levothyroxine (SYNTHROID) 75 MCG tablet TAKE 1 TABLET(75 MCG) BY MOUTH BEFORE BREAKFAST    lidocaine HCL 2% (XYLOCAINE) 2 % jelly " "Apply topically as needed.    lisinopril (PRINIVIL,ZESTRIL) 2.5 MG tablet Take 1 tablet (2.5 mg total) by mouth once daily.    metoprolol succinate (TOPROL-XL) 50 MG 24 hr tablet Take 1 tablet (50 mg total) by mouth once daily.    miconazole NITRATE 2 % (ZEASORB AF) 2 % top powder Apply topically as needed for Itching.    mometasone (NASONEX) 50 mcg/actuation nasal spray 2 sprays by Nasal route once daily.    nystatin-triamcinolone (MYCOLOG II) cream Apply topically 2 (two) times daily.    omeprazole (PRILOSEC) 40 MG capsule Take 1 capsule (40 mg total) by mouth daily as needed.    ondansetron (ZOFRAN-ODT) 8 MG TbDL Take 1 tablet (8 mg total) by mouth every 12 (twelve) hours as needed.    predniSONE (DELTASONE) 5 MG tablet     ranitidine (ZANTAC) 150 MG tablet Take 1 tablet (150 mg total) by mouth 2 (two) times daily as needed for Heartburn.    SORIATANE 10 mg capsule     SYMBICORT 160-4.5 mcg/actuation HFAA INHALE 2 PUFFS BY MOUTH INTO THE LUNGS EVERY 12 HOURS    triamcinolone acetonide 0.1% (KENALOG) 0.1 % cream APPLY TO THE AFFECTED AREA TWICE DAILY    furosemide (LASIX) 20 MG tablet     furosemide (LASIX) 40 MG tablet Take 1 tablet (40 mg total) by mouth once daily.    metoprolol tartrate (LOPRESSOR) 25 MG tablet     predniSONE (DELTASONE) 2.5 MG tablet Take 2 tablets (5 mg total) by mouth once daily.    silver sulfADIAZINE 1% (SILVADENE) 1 % cream aaa buttock bid           Clinical Reference Information           Your Vitals Were     BP Pulse Height Weight BMI    113/70 90 5' 5" (1.651 m) 93.9 kg (207 lb 1.6 oz) 34.46 kg/m2      Blood Pressure          Most Recent Value    BP  113/70      Allergies as of 4/19/2017     Sulfamethoxazole-trimethoprim    Latex, Natural Rubber    Pcn [Penicillins]      Immunizations Administered on Date of Encounter - 4/19/2017     None      Language Assistance Services     ATTENTION: Language assistance services are available, free of charge. Please call [phone].  "     ATENCIÓN: Si habla español, tiene a doyle disposición servicios gratuitos de asistencia lingüística. Mehul fontanez [phone].     CHÚ Ý: N?u b?n nói Ti?ng Vi?t, có các d?ch v? h? tr? ngôn ng? mi?n phí dành cho b?n. G?i s? [phone].         [facility] complies with applicable Federal civil rights laws and does not discriminate on the basis of race, color, national origin, age, disability, or sex.

## 2017-04-26 ENCOUNTER — TELEPHONE (OUTPATIENT)
Dept: INTERNAL MEDICINE | Facility: CLINIC | Age: 77
End: 2017-04-26

## 2017-04-26 ENCOUNTER — OFFICE VISIT (OUTPATIENT)
Dept: INTERNAL MEDICINE | Facility: CLINIC | Age: 77
End: 2017-04-26
Payer: COMMERCIAL

## 2017-04-26 ENCOUNTER — CLINICAL SUPPORT (OUTPATIENT)
Dept: CARDIOLOGY | Facility: CLINIC | Age: 77
End: 2017-04-26
Payer: COMMERCIAL

## 2017-04-26 ENCOUNTER — INITIAL CONSULT (OUTPATIENT)
Dept: CARDIOLOGY | Facility: CLINIC | Age: 77
End: 2017-04-26
Payer: COMMERCIAL

## 2017-04-26 VITALS — HEART RATE: 68 BPM | SYSTOLIC BLOOD PRESSURE: 120 MMHG | DIASTOLIC BLOOD PRESSURE: 79 MMHG

## 2017-04-26 VITALS
HEIGHT: 65 IN | HEART RATE: 74 BPM | WEIGHT: 207 LBS | SYSTOLIC BLOOD PRESSURE: 118 MMHG | BODY MASS INDEX: 34.49 KG/M2 | DIASTOLIC BLOOD PRESSURE: 78 MMHG

## 2017-04-26 DIAGNOSIS — R60.0 BILATERAL LEG EDEMA: ICD-10-CM

## 2017-04-26 DIAGNOSIS — R60.0 LEG EDEMA, RIGHT: Primary | ICD-10-CM

## 2017-04-26 DIAGNOSIS — M31.6 GIANT CELL ARTERITIS: ICD-10-CM

## 2017-04-26 DIAGNOSIS — I25.10 CORONARY ARTERY DISEASE INVOLVING NATIVE CORONARY ARTERY OF NATIVE HEART WITHOUT ANGINA PECTORIS: ICD-10-CM

## 2017-04-26 DIAGNOSIS — I73.9 PVD (PERIPHERAL VASCULAR DISEASE): ICD-10-CM

## 2017-04-26 DIAGNOSIS — R60.0 LEG EDEMA, RIGHT: ICD-10-CM

## 2017-04-26 DIAGNOSIS — I10 ESSENTIAL HYPERTENSION: Primary | ICD-10-CM

## 2017-04-26 DIAGNOSIS — I83.893 VARICOSE VEINS OF LEG WITH EDEMA, BILATERAL: ICD-10-CM

## 2017-04-26 DIAGNOSIS — E11.9 TYPE 2 DIABETES MELLITUS WITHOUT RETINOPATHY: ICD-10-CM

## 2017-04-26 DIAGNOSIS — N18.2 CHRONIC KIDNEY DISEASE, STAGE II (MILD): ICD-10-CM

## 2017-04-26 PROCEDURE — 1160F RVW MEDS BY RX/DR IN RCRD: CPT | Mod: S$GLB,,, | Performed by: INTERNAL MEDICINE

## 2017-04-26 PROCEDURE — 99999 PR PBB SHADOW E&M-EST. PATIENT-LVL V: CPT | Mod: PBBFAC,,, | Performed by: INTERNAL MEDICINE

## 2017-04-26 PROCEDURE — 1125F AMNT PAIN NOTED PAIN PRSNT: CPT | Mod: S$GLB,,, | Performed by: INTERNAL MEDICINE

## 2017-04-26 PROCEDURE — 93971 EXTREMITY STUDY: CPT | Mod: S$GLB,,, | Performed by: INTERNAL MEDICINE

## 2017-04-26 PROCEDURE — 99215 OFFICE O/P EST HI 40 MIN: CPT | Mod: S$GLB,,, | Performed by: FAMILY MEDICINE

## 2017-04-26 PROCEDURE — 3074F SYST BP LT 130 MM HG: CPT | Mod: S$GLB,,, | Performed by: INTERNAL MEDICINE

## 2017-04-26 PROCEDURE — 1157F ADVNC CARE PLAN IN RCRD: CPT | Mod: 8P,S$GLB,, | Performed by: INTERNAL MEDICINE

## 2017-04-26 PROCEDURE — 3074F SYST BP LT 130 MM HG: CPT | Mod: S$GLB,,, | Performed by: FAMILY MEDICINE

## 2017-04-26 PROCEDURE — 1159F MED LIST DOCD IN RCRD: CPT | Mod: S$GLB,,, | Performed by: FAMILY MEDICINE

## 2017-04-26 PROCEDURE — 1157F ADVNC CARE PLAN IN RCRD: CPT | Mod: 8P,S$GLB,, | Performed by: FAMILY MEDICINE

## 2017-04-26 PROCEDURE — 99999 PR PBB SHADOW E&M-EST. PATIENT-LVL II: CPT | Mod: PBBFAC,,, | Performed by: FAMILY MEDICINE

## 2017-04-26 PROCEDURE — 1159F MED LIST DOCD IN RCRD: CPT | Mod: S$GLB,,, | Performed by: INTERNAL MEDICINE

## 2017-04-26 PROCEDURE — 99999 PR PBB SHADOW E&M-EST. PATIENT-LVL I: CPT | Mod: PBBFAC,,,

## 2017-04-26 PROCEDURE — 3078F DIAST BP <80 MM HG: CPT | Mod: S$GLB,,, | Performed by: INTERNAL MEDICINE

## 2017-04-26 PROCEDURE — 3078F DIAST BP <80 MM HG: CPT | Mod: S$GLB,,, | Performed by: FAMILY MEDICINE

## 2017-04-26 PROCEDURE — 99213 OFFICE O/P EST LOW 20 MIN: CPT | Mod: S$GLB,,, | Performed by: INTERNAL MEDICINE

## 2017-04-26 NOTE — TELEPHONE ENCOUNTER
----- Message from Jean Claude Jackson MA sent at 4/26/2017  7:10 AM CDT -----  Contact: [phone]  Type: Sooner appointment than  is able to schedule    When is the first available appointment? 5/1/17    What is the nature of the appointment? Stomach issues    What appointment type: URGENT    Comments: Please advise and call. Thanks!

## 2017-04-26 NOTE — LETTER
April 26, 2017      Og Cid MD  [address]  [place] [address]           [facility] Diseases  [address]  [facility] [address]  Phone: [phone]          Patient: Krissy Marino   MR Number: [id number]   YOB: 1940   Date of Visit: 4/26/2017       Dear Dr. Og Cid:    Thank you for referring Krissy Marino to me for evaluation. Attached you will find relevant portions of my assessment and plan of care.    If you have questions, please do not hesitate to call me. I look forward to following Krissy Marino along with you.    Sincerely,    Sayfaustino Erwin MD    Enclosure  CC:  No Recipients    If you would like to receive this communication electronically, please contact [email or web address] or [phone] to request more information on [employer] Link access.    For providers and/or their staff who would like to refer a patient to [facility], please contact us through our one-stop-shop provider referral line, [facility], at [phone].    If you feel you have received this communication in error or would no longer like to receive these types of communications, please e-mail [email or web address]

## 2017-04-26 NOTE — MR AVS SNAPSHOT
[facility] - Internal Medicine  [address]  [facility] [address]  Phone: [phone]  Fax: [phone]                  Krissy Marino   2017 10:00 AM   Office Visit    Description:  Female : 1940   Provider:  Og Cid MD   Department:  [facility] - Internal Medicine           Diagnoses this Visit        Comments    Essential hypertension    -  Primary     PVD (peripheral vascular disease)         Bilateral leg edema         Chronic kidney disease, stage II (mild)         Coronary artery disease involving native coronary artery of native heart without angina pectoris         Giant cell arteritis         Type 2 diabetes mellitus without retinopathy                To Do List           Future Appointments        Provider Department Dept Phone    5/3/2017 1:00 PM Toñito Kohler MD [facility] Cardiology [phone]    2017 8:45 AM LAB, [facility] [phone]    5/15/2017 1:50 PM Jaky Alejandre MD [facility] Dermatology [phone]    2017 12:45 PM Rock Lopez MD [place] - Sports Medicine [phone]    2017 8:15 AM [facility], [facility] [phone]      Goals (5 Years of Data)     None      Follow-Up and Disposition     Return in about 3 months (around 2017), or if symptoms worsen or fail to improve.      [facility]     [facility] Care Line -  Assistance  Unless otherwise directed by your provider, please contact [facility], our nurse care line that is available for  assistance.     Registered nurses in the [facility] provide: appointment scheduling, clinical advisement, health education, and other advisory services.  Call: [phone] (toll free)               Medications           Message regarding Medications     Verify the changes and/or additions to your medication regime listed below are the same as discussed with your clinician today.  If any of  these changes or additions are incorrect, please notify your healthcare provider.             Verify that the below list of medications is an accurate representation of the medications you are currently taking.  If none reported, the list may be blank. If incorrect, please contact your healthcare provider. Carry this list with you in case of emergency.           Current Medications     albuterol 90 mcg/actuation inhaler Inhale 2 puffs into the lungs every 6 (six) hours as needed for Wheezing.    alendronate (FOSAMAX) 70 MG tablet Take 1 tablet (70 mg total) by mouth every 7 days.    allopurinol (ZYLOPRIM) 300 MG tablet Take 1 tablet (300 mg total) by mouth once daily.    aspirin (ECOTRIN) 81 MG EC tablet Take 1 tablet (81 mg total) by mouth once daily.    atorvastatin (LIPITOR) 40 MG tablet TAKE 1 TABLET(40 MG) BY MOUTH EVERY DAY    azelastine (ASTELIN) 137 mcg (0.1 %) nasal spray 1 spray (137 mcg total) by Nasal route 2 (two) times daily.    BIOTIN ORAL Take by mouth.    blood sugar diagnostic (ACCU-CHEK AMELIA PLUS TEST STRP) Strp Checks bg 2 x day before meals    cholecalciferol, vitamin D3, 50,000 unit capsule Take 1 capsule (50,000 Units total) by mouth twice a week.    ciclopirox (PENLAC) 8 % Soln Apply topically nightly.    clindamycin (CLEOCIN) 150 MG capsule     clopidogrel (PLAVIX) 75 mg tablet Take 1 tablet (75 mg total) by mouth once daily.    desonide (DESOWEN) 0.05 % cream AAA bid    erythromycin (ROMYCIN) ophthalmic ointment Place into the right eye 3 (three) times daily.    FERROUS GLUCONATE (FERATE ORAL) Take by mouth.    folic acid (FOLVITE) 1 MG tablet Take 1 tablet (1,000 mcg total) by mouth once daily.    furosemide (LASIX) 20 MG tablet     furosemide (LASIX) 40 MG tablet Take 1 tablet (40 mg total) by mouth once daily.    insulin lispro (HUMALOG KWIKPEN) 100 unit/mL InPn pen Inject 5 Units into the skin 3 (three) times daily with meals.    insulin needles, disposable, (BD ULTRA-FINE ILIANA PEN  "NEEDLES) 32 x 5/32 " Ndle Injects insulin 3 x day    KRILL/OM3/DHA/EPA/OM6/LIP/ASTX (KRILL OIL, OMEGA 3 & 6, ORAL) Take by mouth.    levothyroxine (SYNTHROID) 75 MCG tablet TAKE 1 TABLET(75 MCG) BY MOUTH BEFORE BREAKFAST    lidocaine HCL 2% (XYLOCAINE) 2 % jelly Apply topically as needed.    lisinopril (PRINIVIL,ZESTRIL) 2.5 MG tablet Take 1 tablet (2.5 mg total) by mouth once daily.    metoprolol succinate (TOPROL-XL) 50 MG 24 hr tablet Take 1 tablet (50 mg total) by mouth once daily.    metoprolol tartrate (LOPRESSOR) 25 MG tablet     miconazole NITRATE 2 % (ZEASORB AF) 2 % top powder Apply topically as needed for Itching.    mometasone (NASONEX) 50 mcg/actuation nasal spray 2 sprays by Nasal route once daily.    nystatin-triamcinolone (MYCOLOG II) cream Apply topically 2 (two) times daily.    omeprazole (PRILOSEC) 40 MG capsule Take 1 capsule (40 mg total) by mouth daily as needed.    ondansetron (ZOFRAN-ODT) 8 MG TbDL Take 1 tablet (8 mg total) by mouth every 12 (twelve) hours as needed.    predniSONE (DELTASONE) 2.5 MG tablet Take 2 tablets (5 mg total) by mouth once daily.    predniSONE (DELTASONE) 5 MG tablet     ranitidine (ZANTAC) 150 MG tablet Take 1 tablet (150 mg total) by mouth 2 (two) times daily as needed for Heartburn.    silver sulfADIAZINE 1% (SILVADENE) 1 % cream aaa buttock bid    SORIATANE 10 mg capsule     SYMBICORT 160-4.5 mcg/actuation HFAA INHALE 2 PUFFS BY MOUTH INTO THE LUNGS EVERY 12 HOURS    triamcinolone acetonide 0.1% (KENALOG) 0.1 % cream APPLY TO THE AFFECTED AREA TWICE DAILY           Clinical Reference Information           Your Vitals Were     BP Pulse                120/79 68          Blood Pressure          Most Recent Value    BP  120/79      Allergies as of 4/26/2017     Sulfamethoxazole-trimethoprim    Latex, Natural Rubber    Pcn [Penicillins]      Immunizations Administered on Date of Encounter - 4/26/2017     None      Orders Placed During Today's Visit      Normal Orders " This Visit    Ambulatory Referral to Vascular Medicine       Language Assistance Services     ATTENTION: Language assistance services are available, free of charge. Please call [phone].      ATENCIÓN: Si habla marleni, tiene a doyle disposición servicios gratuitos de asistencia lingüística. Llame al [phone].     CHÚ Ý: N?u b?n nói Ti?ng Vi?t, có các d?ch v? h? tr? ngôn ng? mi?n phí dành cho b?n. G?i s? [phone].         [facility] complies with applicable Federal civil rights laws and does not discriminate on the basis of race, color, national origin, age, disability, or sex.

## 2017-04-26 NOTE — PROGRESS NOTES
Subjective:       Patient ID: Krissy Marino is a 77 y.o. female.    Chief Complaint: No chief complaint on file.  Krissy Marino 77 y.o. female is here for office visit to review care and physical exam, here for LE swelling lately, started Friday?  HAd six schrimps.  No increased SOB, no increased NEAL.  Has no steroid use.  No other concern.      HPI  Review of Systems   Constitutional: Negative for activity change, fatigue, fever and unexpected weight change.   HENT: Negative for congestion, hearing loss, postnasal drip and rhinorrhea.    Eyes: Negative for redness and visual disturbance.   Respiratory: Negative for chest tightness, shortness of breath and wheezing.    Cardiovascular: Positive for leg swelling. Negative for chest pain and palpitations.   Gastrointestinal: Negative for abdominal distention.   Genitourinary: Negative for decreased urine volume, dysuria, flank pain, hematuria, pelvic pain and urgency.   Musculoskeletal: Negative for back pain, gait problem, joint swelling and neck stiffness.   Skin: Negative for color change, rash and wound.   Neurological: Negative for dizziness, syncope, weakness and headaches.   Psychiatric/Behavioral: Negative for behavioral problems, confusion and sleep disturbance. The patient is not nervous/anxious.        Objective:      Physical Exam   Constitutional: She is oriented to person, place, and time. She appears well-developed and well-nourished. No distress.   HENT:   Head: Normocephalic.   Mouth/Throat: No oropharyngeal exudate.   Eyes: EOM are normal. Pupils are equal, round, and reactive to light. No scleral icterus.   Neck: Neck supple. No JVD present. No thyromegaly present.   Cardiovascular: Normal rate, regular rhythm and normal heart sounds.  Exam reveals no gallop and no friction rub.    No murmur heard.  LE pitting edema R>L   Pulmonary/Chest: Effort normal and breath sounds normal. She has no wheezes. She has no rales.   Abdominal: Soft. Bowel sounds  are normal. She exhibits no distension and no mass. There is no tenderness. There is no guarding.   Musculoskeletal: Normal range of motion. She exhibits no edema.   Lymphadenopathy:     She has no cervical adenopathy.   Neurological: She is alert and oriented to person, place, and time. She has normal reflexes. She displays normal reflexes. No cranial nerve deficit. She exhibits normal muscle tone.   Skin: Skin is warm. No rash noted. No erythema.   Psychiatric: She has a normal mood and affect. Thought content normal.       Assessment:       No diagnosis found.    Plan:       Diagnoses and all orders for this visit:    Essential hypertension  - follow  PVD (peripheral vascular disease)  -     Ambulatory Referral to Vascular Medicine    Bilateral leg edema  - See Vascualr  Chronic kidney disease, stage II (mild)  - lasrt lab reviewed  Coronary artery disease involving native coronary artery of native heart without angina pectoris  - no new concerns  Giant cell arteritis  - chart reviewed, no steroids presently  Type 2 diabetes mellitus without retinopathy  - reviewed, labs in next few months suggested

## 2017-04-26 NOTE — MR AVS SNAPSHOT
[facility] - Vas Diseases  [address]  [facility] [address]  Phone: [phone]                  Krissy Marino   2017 3:00 PM   Initial consult    Description:  Female : 1940   Provider:  Braydon Erwin MD   Department:  [facility] Diseases           Reason for Visit     Peripheral Vascular Disease     Leg Swelling           Diagnoses this Visit        Comments    Leg edema, right    -  Primary     Varicose veins of leg with edema, bilateral                To Do List           Future Appointments        Provider Department Dept Phone    2017 4:00 PM VASCULAR, CARDIOLOGY [facility] Vascular Cardiology [phone]    5/3/2017 1:00 PM Toñito Kohler MD [facility] Cardiology [phone]    2017 8:45 AM [facility] [phone]    5/15/2017 1:50 PM Jayk Alejandre MD [facility] Dermatology [phone]    2017 12:45 PM Rock Lopez MD [facility] Sports Medicine [phone]      Goals (5 Years of Data)     None      Follow-Up and Disposition     Call patient with results Return in about 6 months (around 10/26/2017).      [facility]     [facility] Care [facility] -  Assistance  Unless otherwise directed by your provider, please contact [facility], our nurse care line that is available for  assistance.     Registered nurses in the [facility] provide: appointment scheduling, clinical advisement, health education, and other advisory services.  Call: [phone] (toll free)               Medications           Message regarding Medications     Verify the changes and/or additions to your medication regime listed below are the same as discussed with your clinician today.  If any of these changes or additions are incorrect, please notify your healthcare provider.             Verify that the below list of medications is an accurate representation of the medications you are currently  "taking.  If none reported, the list may be blank. If incorrect, please contact your healthcare provider. Carry this list with you in case of emergency.           Current Medications     albuterol 90 mcg/actuation inhaler Inhale 2 puffs into the lungs every 6 (six) hours as needed for Wheezing.    alendronate (FOSAMAX) 70 MG tablet Take 1 tablet (70 mg total) by mouth every 7 days.    allopurinol (ZYLOPRIM) 300 MG tablet Take 1 tablet (300 mg total) by mouth once daily.    aspirin (ECOTRIN) 81 MG EC tablet Take 1 tablet (81 mg total) by mouth once daily.    atorvastatin (LIPITOR) 40 MG tablet TAKE 1 TABLET(40 MG) BY MOUTH EVERY DAY    azelastine (ASTELIN) 137 mcg (0.1 %) nasal spray 1 spray (137 mcg total) by Nasal route 2 (two) times daily.    BIOTIN ORAL Take by mouth.    blood sugar diagnostic (ACCU-CHEK AMELIA PLUS TEST STRP) Strp Checks bg 2 x day before meals    cholecalciferol, vitamin D3, 50,000 unit capsule Take 1 capsule (50,000 Units total) by mouth twice a week.    ciclopirox (PENLAC) 8 % Soln Apply topically nightly.    clindamycin (CLEOCIN) 150 MG capsule     clopidogrel (PLAVIX) 75 mg tablet Take 1 tablet (75 mg total) by mouth once daily.    desonide (DESOWEN) 0.05 % cream AAA bid    erythromycin (ROMYCIN) ophthalmic ointment Place into the right eye 3 (three) times daily.    FERROUS GLUCONATE (FERATE ORAL) Take by mouth.    folic acid (FOLVITE) 1 MG tablet Take 1 tablet (1,000 mcg total) by mouth once daily.    furosemide (LASIX) 20 MG tablet     furosemide (LASIX) 40 MG tablet Take 1 tablet (40 mg total) by mouth once daily.    insulin lispro (HUMALOG KWIKPEN) 100 unit/mL InPn pen Inject 5 Units into the skin 3 (three) times daily with meals.    insulin needles, disposable, (BD ULTRA-FINE ILIANA PEN NEEDLES) 32 x 5/32 " Ndle Injects insulin 3 x day    KRILL/OM3/DHA/EPA/OM6/LIP/ASTX (KRILL OIL, OMEGA 3 & 6, ORAL) Take by mouth.    levothyroxine (SYNTHROID) 75 MCG tablet TAKE 1 TABLET(75 MCG) BY MOUTH " "BEFORE BREAKFAST    lidocaine HCL 2% (XYLOCAINE) 2 % jelly Apply topically as needed.    lisinopril (PRINIVIL,ZESTRIL) 2.5 MG tablet Take 1 tablet (2.5 mg total) by mouth once daily.    metoprolol succinate (TOPROL-XL) 50 MG 24 hr tablet Take 1 tablet (50 mg total) by mouth once daily.    metoprolol tartrate (LOPRESSOR) 25 MG tablet     miconazole NITRATE 2 % (ZEASORB AF) 2 % top powder Apply topically as needed for Itching.    mometasone (NASONEX) 50 mcg/actuation nasal spray 2 sprays by Nasal route once daily.    nystatin-triamcinolone (MYCOLOG II) cream Apply topically 2 (two) times daily.    omeprazole (PRILOSEC) 40 MG capsule Take 1 capsule (40 mg total) by mouth daily as needed.    ondansetron (ZOFRAN-ODT) 8 MG TbDL Take 1 tablet (8 mg total) by mouth every 12 (twelve) hours as needed.    predniSONE (DELTASONE) 2.5 MG tablet Take 2 tablets (5 mg total) by mouth once daily.    predniSONE (DELTASONE) 5 MG tablet     ranitidine (ZANTAC) 150 MG tablet Take 1 tablet (150 mg total) by mouth 2 (two) times daily as needed for Heartburn.    silver sulfADIAZINE 1% (SILVADENE) 1 % cream aaa buttock bid    SORIATANE 10 mg capsule     SYMBICORT 160-4.5 mcg/actuation HFAA INHALE 2 PUFFS BY MOUTH INTO THE LUNGS EVERY 12 HOURS    triamcinolone acetonide 0.1% (KENALOG) 0.1 % cream APPLY TO THE AFFECTED AREA TWICE DAILY           Clinical Reference Information           Your Vitals Were     BP Pulse Height Weight BMI    118/78 (BP Location: Left arm, Patient Position: Sitting, BP Method: Manual) 74 5' 5" (1.651 m) 93.9 kg (207 lb 0.2 oz) 34.45 kg/m2      Blood Pressure          Most Recent Value    BP  118/78      Allergies as of 4/26/2017     Sulfamethoxazole-trimethoprim    Latex, Natural Rubber    Pcn [Penicillins]      Immunizations Administered on Date of Encounter - 4/26/2017     None      Orders Placed During Today's Visit     Future Labs/Procedures Expected by Expires    Cardiology Lab US Lower Extremity Veins Right  As " directed 4/26/2018      Language Assistance Services     ATTENTION: Language assistance services are available, free of charge. Please call [phone].      ATENCIÓN: Si habla marleni, tiene a doyle disposición servicios gratuitos de asistencia lingüística. Llame al [phone].     CHÚ Ý: N?u b?n nói Ti?ng Vi?t, có các d?ch v? h? tr? ngôn ng? mi?n phí dành cho b?n. G?i s? [phone].         [facility] complies with applicable Federal civil rights laws and does not discriminate on the basis of race, color, national origin, age, disability, or sex.

## 2017-04-26 NOTE — PROGRESS NOTES
Subjective:    Patient ID:  Krissy Marino is a 77 y.o. Y.o.female who presents for evaluation of leg edema      HPI: 77 year old female with history of chronic CHF and venous insuff presents today for evaluation of leg edema started last Friday when she had 6 salty shrimp at dinner the next day she noticed her leg are swollen the right > left. She denies SOB, weight gain.6 salty shrimps last Friday.      Past Medical History:   Diagnosis Date    Adverse effect of glucocorticoid or synthetic analogue     Allergy     Arthritis     Cancer     CHF (congestive heart failure)     Chronic kidney disease     Coronary artery disease involving native coronary artery of native heart without angina pectoris 10/11/2016    Diabetic neuropathy 10/6/2014    Giant cell arteritis 2012    Hyperlipidemia     Hypertension     Hypothyroid     Obesity (BMI 30-39.9) 10/6/2014    Osteopenia     Primary osteoarthritis of both knees 2012    Type II or unspecified type diabetes mellitus with renal manifestations, uncontrolled        indicated that her mother is . She indicated that her father is . She indicated that both of her sisters are . She indicated that her brother is . She indicated that the status of her neg hx is unknown.     Social History     Social History    Marital status:      Spouse name:     Number of children: N/A    Years of education: N/A     Occupational History     [employer]     Social History Main Topics    Smoking status: Never Smoker    Smokeless tobacco: Not on file    Alcohol use No    Drug use: No    Sexual activity: No     Other Topics Concern    Are You Pregnant Or Think You May Be? No    Breast-Feeding No     Social History Narrative       Current Outpatient Prescriptions   Medication Sig    albuterol 90 mcg/actuation inhaler Inhale 2 puffs into the lungs every 6 (six) hours as needed for Wheezing.    alendronate  "(FOSAMAX) 70 MG tablet Take 1 tablet (70 mg total) by mouth every 7 days.    allopurinol (ZYLOPRIM) 300 MG tablet Take 1 tablet (300 mg total) by mouth once daily.    aspirin (ECOTRIN) 81 MG EC tablet Take 1 tablet (81 mg total) by mouth once daily.    atorvastatin (LIPITOR) 40 MG tablet TAKE 1 TABLET(40 MG) BY MOUTH EVERY DAY    azelastine (ASTELIN) 137 mcg (0.1 %) nasal spray 1 spray (137 mcg total) by Nasal route 2 (two) times daily.    BIOTIN ORAL Take by mouth.    blood sugar diagnostic (ACCU-CHEK AMELIA PLUS TEST STRP) Strp Checks bg 2 x day before meals    cholecalciferol, vitamin D3, 50,000 unit capsule Take 1 capsule (50,000 Units total) by mouth twice a week.    ciclopirox (PENLAC) 8 % Soln Apply topically nightly.    clindamycin (CLEOCIN) 150 MG capsule     clopidogrel (PLAVIX) 75 mg tablet Take 1 tablet (75 mg total) by mouth once daily.    desonide (DESOWEN) 0.05 % cream AAA bid    erythromycin (ROMYCIN) ophthalmic ointment Place into the right eye 3 (three) times daily.    FERROUS GLUCONATE (FERATE ORAL) Take by mouth.    folic acid (FOLVITE) 1 MG tablet Take 1 tablet (1,000 mcg total) by mouth once daily.    furosemide (LASIX) 20 MG tablet     furosemide (LASIX) 40 MG tablet Take 1 tablet (40 mg total) by mouth once daily.    insulin lispro (HUMALOG KWIKPEN) 100 unit/mL InPn pen Inject 5 Units into the skin 3 (three) times daily with meals.    insulin needles, disposable, (BD ULTRA-FINE ILIANA PEN NEEDLES) 32 x 5/32 " Ndle Injects insulin 3 x day    KRILL/OM3/DHA/EPA/OM6/LIP/ASTX (KRILL OIL, OMEGA 3 & 6, ORAL) Take by mouth.    levothyroxine (SYNTHROID) 75 MCG tablet TAKE 1 TABLET(75 MCG) BY MOUTH BEFORE BREAKFAST    lidocaine HCL 2% (XYLOCAINE) 2 % jelly Apply topically as needed.    lisinopril (PRINIVIL,ZESTRIL) 2.5 MG tablet Take 1 tablet (2.5 mg total) by mouth once daily.    metoprolol succinate (TOPROL-XL) 50 MG 24 hr tablet Take 1 tablet (50 mg total) by mouth once daily. "    metoprolol tartrate (LOPRESSOR) 25 MG tablet     miconazole NITRATE 2 % (ZEASORB AF) 2 % top powder Apply topically as needed for Itching.    mometasone (NASONEX) 50 mcg/actuation nasal spray 2 sprays by Nasal route once daily.    nystatin-triamcinolone (MYCOLOG II) cream Apply topically 2 (two) times daily.    omeprazole (PRILOSEC) 40 MG capsule Take 1 capsule (40 mg total) by mouth daily as needed.    ondansetron (ZOFRAN-ODT) 8 MG TbDL Take 1 tablet (8 mg total) by mouth every 12 (twelve) hours as needed.    predniSONE (DELTASONE) 2.5 MG tablet Take 2 tablets (5 mg total) by mouth once daily.    predniSONE (DELTASONE) 5 MG tablet     ranitidine (ZANTAC) 150 MG tablet Take 1 tablet (150 mg total) by mouth 2 (two) times daily as needed for Heartburn.    silver sulfADIAZINE 1% (SILVADENE) 1 % cream aaa buttock bid    SORIATANE 10 mg capsule     SYMBICORT 160-4.5 mcg/actuation HFAA INHALE 2 PUFFS BY MOUTH INTO THE LUNGS EVERY 12 HOURS    triamcinolone acetonide 0.1% (KENALOG) 0.1 % cream APPLY TO THE AFFECTED AREA TWICE DAILY     No current facility-administered medications for this visit.        CMP  Sodium   Date Value Ref Range Status   03/04/2017 142 136 - 145 mmol/L Final     Potassium   Date Value Ref Range Status   03/04/2017 4.2 3.5 - 5.1 mmol/L Final     Chloride   Date Value Ref Range Status   03/04/2017 109 95 - 110 mmol/L Final     CO2   Date Value Ref Range Status   03/04/2017 26 23 - 29 mmol/L Final     Glucose   Date Value Ref Range Status   03/04/2017 149 (H) 70 - 110 mg/dL Final     BUN, Bld   Date Value Ref Range Status   03/04/2017 51 (H) 8 - 23 mg/dL Final     Creatinine   Date Value Ref Range Status   03/04/2017 0.9 0.5 - 1.4 mg/dL Final     Calcium   Date Value Ref Range Status   03/04/2017 9.1 8.7 - 10.5 mg/dL Final     Total Protein   Date Value Ref Range Status   03/04/2017 6.3 6.0 - 8.4 g/dL Final     Albumin   Date Value Ref Range Status   03/04/2017 3.5 3.5 - 5.2 g/dL Final      Total Bilirubin   Date Value Ref Range Status   03/04/2017 0.8 0.1 - 1.0 mg/dL Final     Comment:     For infants and newborns, interpretation of results should be based  on gestational age, weight and in agreement with clinical  observations.  Premature Infant recommended reference ranges:  Up to 24 hours.............<8.0 mg/dL  Up to 48 hours............<12.0 mg/dL  3-5 days..................<15.0 mg/dL  6-29 days.................<15.0 mg/dL       Alkaline Phosphatase   Date Value Ref Range Status   03/04/2017 112 55 - 135 U/L Final     AST   Date Value Ref Range Status   03/04/2017 22 10 - 40 U/L Final     ALT   Date Value Ref Range Status   03/04/2017 29 10 - 44 U/L Final     Anion Gap   Date Value Ref Range Status   03/04/2017 7 (L) 8 - 16 mmol/L Final     eGFR if    Date Value Ref Range Status   03/04/2017 >60.0 >60 mL/min/1.73 m^2 Final     eGFR if non    Date Value Ref Range Status   03/04/2017 >60.0 >60 mL/min/1.73 m^2 Final     Comment:     Calculation used to obtain the estimated glomerular filtration  rate (eGFR) is the CKD-EPI equation. Since race is unknown   in our information system, the eGFR values for   -American and Non--American patients are given   for each creatinine result.         Lab Results   Component Value Date    WBC 9.33 03/04/2017    HGB 12.3 03/04/2017    HCT 37.7 03/04/2017    MCV 99 (H) 03/04/2017     (L) 03/04/2017       Review of Systems   Constitution: Negative for decreased appetite, fever and weight gain.   HENT: Negative.    Cardiovascular: Positive for leg swelling. Negative for chest pain, claudication and cyanosis.   Respiratory: Negative for cough, shortness of breath and wheezing.    Skin: Negative for color change, dry skin, itching, rash and suspicious lesions.   Musculoskeletal: Negative for arthritis, back pain, joint swelling and muscle weakness.   Gastrointestinal: Negative.    Genitourinary: Negative.   "  Neurological: Negative.  Negative for loss of balance, numbness and paresthesias.        Objective:   /78 (BP Location: Left arm, Patient Position: Sitting, BP Method: Manual)  Pulse 74  Ht 5' 5" (1.651 m)  Wt 93.9 kg (207 lb 0.2 oz)  BMI 34.45 kg/m2  Physical Exam   Constitutional: She is oriented to person, place, and time. She appears well-developed and well-nourished. No distress.   HENT:   Head: Normocephalic and atraumatic.   Eyes: Conjunctivae and EOM are normal. Pupils are equal, round, and reactive to light.   Neck: Normal range of motion. Neck supple. No JVD present.   Cardiovascular: Normal rate, regular rhythm, normal heart sounds and intact distal pulses.  Exam reveals no gallop and no friction rub.    No murmur heard.  Pulmonary/Chest: Effort normal and breath sounds normal. No respiratory distress. She has no wheezes.   Musculoskeletal: Normal range of motion. She exhibits edema. She exhibits no tenderness.   Neurological: She is alert and oriented to person, place, and time.   Skin: Skin is warm. No rash noted. She is not diaphoretic. No erythema. No pallor.       Assessment:       1. Leg edema, right  Cardiology Lab US Lower Extremity Veins Right   2. Varicose veins of leg with edema, bilateral          Plan:       Krissy was seen today for peripheral vascular disease and leg swelling.    1. Venous doppler right leg to rule out DVT.  2. Ace bandage daily.  3. Leg elevation and walking exercise.    Braydon Erwin                  "

## 2017-04-27 ENCOUNTER — TELEPHONE (OUTPATIENT)
Dept: CARDIOLOGY | Facility: CLINIC | Age: 77
End: 2017-04-27

## 2017-04-27 NOTE — TELEPHONE ENCOUNTER
Spoke  with patient in regards to results.    Advised patient to use ace wrap .    Patient verbalized understanding.

## 2017-04-27 NOTE — TELEPHONE ENCOUNTER
----- Message from Braydon Erwin MD sent at 4/26/2017 10:56 PM CDT -----  No DVT. Please use ace wrap.

## 2017-05-03 ENCOUNTER — OFFICE VISIT (OUTPATIENT)
Dept: CARDIOLOGY | Facility: CLINIC | Age: 77
End: 2017-05-03
Payer: COMMERCIAL

## 2017-05-03 VITALS
HEIGHT: 64 IN | WEIGHT: 205.25 LBS | DIASTOLIC BLOOD PRESSURE: 56 MMHG | HEART RATE: 98 BPM | SYSTOLIC BLOOD PRESSURE: 102 MMHG | BODY MASS INDEX: 35.04 KG/M2

## 2017-05-03 DIAGNOSIS — I25.10 CORONARY ARTERY DISEASE INVOLVING NATIVE CORONARY ARTERY OF NATIVE HEART WITHOUT ANGINA PECTORIS: ICD-10-CM

## 2017-05-03 DIAGNOSIS — I50.20 SYSTOLIC CONGESTIVE HEART FAILURE WITH REDUCED LEFT VENTRICULAR FUNCTION, NYHA CLASS 2: Primary | ICD-10-CM

## 2017-05-03 PROCEDURE — 3074F SYST BP LT 130 MM HG: CPT | Mod: S$GLB,,, | Performed by: INTERNAL MEDICINE

## 2017-05-03 PROCEDURE — 1160F RVW MEDS BY RX/DR IN RCRD: CPT | Mod: S$GLB,,, | Performed by: INTERNAL MEDICINE

## 2017-05-03 PROCEDURE — 1159F MED LIST DOCD IN RCRD: CPT | Mod: S$GLB,,, | Performed by: INTERNAL MEDICINE

## 2017-05-03 PROCEDURE — 1157F ADVNC CARE PLAN IN RCRD: CPT | Mod: 8P,S$GLB,, | Performed by: INTERNAL MEDICINE

## 2017-05-03 PROCEDURE — 3078F DIAST BP <80 MM HG: CPT | Mod: S$GLB,,, | Performed by: INTERNAL MEDICINE

## 2017-05-03 PROCEDURE — 99214 OFFICE O/P EST MOD 30 MIN: CPT | Mod: S$GLB,,, | Performed by: INTERNAL MEDICINE

## 2017-05-03 PROCEDURE — 99999 PR PBB SHADOW E&M-EST. PATIENT-LVL V: CPT | Mod: PBBFAC,,, | Performed by: INTERNAL MEDICINE

## 2017-05-03 PROCEDURE — 1125F AMNT PAIN NOTED PAIN PRSNT: CPT | Mod: S$GLB,,, | Performed by: INTERNAL MEDICINE

## 2017-05-03 NOTE — PROGRESS NOTES
Cardiology Clinic Note  Reason for Visit: F/u CHF and CAD    HPI:   Ms. Marino is a 77 year old white female with HTN DM (A1c 7.0%), HLD and ICM with LVEF 30-35% (now recovered) on exercise stress echo who presents to cardiology clinic for follow up. Seen by me multiple times since August. Found to have 99% prox LAD and 90% prox D1. Received OSMEL to prox and mid LAD with DONNY 1 flow. Had repeat PET showing improved LVEF and no evidence of ischemia. Last echo in January showed LVEF 60% with diastolic dysfunction. Feels much better now. No limiting symptoms. Does two hours of therapy on bike and on tredmill for two hours twice/week. No chest pain or SOB during that. Has had chronic dry cough since around October, started lisinopril around that time. Checks BP at home and usually runs 120/60-70. Recently saw vascular medicine for lower extremity edema. Given stockings and keeping legs elevated, showing improvement.     ROS:    Constitution: Negative for fever, chills, weight loss or gain.   HENT: Negative for sore throat, rhinorrhea, or headache.  Eyes: Negative for blurred or double vision.   Cardiovascular: See above  Pulmonary: no NEAL, denies cough   Gastrointestinal: Negative for abdominal pain, nausea, vomiting, or diarrhea.   : Negative for dysuria.   Neurological: Negative for focal weakness or sensory changes.  PMH:     Past Medical History:   Diagnosis Date    Adverse effect of glucocorticoid or synthetic analogue     Allergy     Arthritis     Cancer     CHF (congestive heart failure)     Chronic kidney disease     Coronary artery disease involving native coronary artery of native heart without angina pectoris 10/11/2016    Diabetic neuropathy 10/6/2014    Giant cell arteritis 9/24/2012    Hyperlipidemia     Hypertension     Hypothyroid     Obesity (BMI 30-39.9) 10/6/2014    Osteopenia     Primary osteoarthritis of both knees 9/24/2012    Type II or unspecified type diabetes mellitus with  renal manifestations, uncontrolled      Past Surgical History:   Procedure Laterality Date    APPENDECTOMY      CATARACT EXTRACTION, BILATERAL      CHOLECYSTECTOMY      EYE SURGERY      HYSTERECTOMY      JOINT REPLACEMENT      bilateral total knee    SKIN BIOPSY      TONSILLECTOMY       Allergies:     Review of patient's allergies indicates:   Allergen Reactions    Sulfamethoxazole-trimethoprim Nausea And Vomiting    Latex, natural rubber Rash    Pcn [penicillins] Rash     Medications:     Current Outpatient Prescriptions on File Prior to Visit   Medication Sig Dispense Refill    albuterol 90 mcg/actuation inhaler Inhale 2 puffs into the lungs every 6 (six) hours as needed for Wheezing. 1 each 11    alendronate (FOSAMAX) 70 MG tablet Take 1 tablet (70 mg total) by mouth every 7 days. 4 tablet 11    allopurinol (ZYLOPRIM) 300 MG tablet Take 1 tablet (300 mg total) by mouth once daily. 90 tablet 3    aspirin (ECOTRIN) 81 MG EC tablet Take 1 tablet (81 mg total) by mouth once daily. 90 tablet 3    atorvastatin (LIPITOR) 40 MG tablet TAKE 1 TABLET(40 MG) BY MOUTH EVERY DAY 90 tablet 0    azelastine (ASTELIN) 137 mcg (0.1 %) nasal spray 1 spray (137 mcg total) by Nasal route 2 (two) times daily. 30 mL 11    BIOTIN ORAL Take by mouth.      blood sugar diagnostic (ACCU-CHEK AMELIA PLUS TEST STRP) Strp Checks bg 2 x day before meals 200 strip 4    cholecalciferol, vitamin D3, 50,000 unit capsule Take 1 capsule (50,000 Units total) by mouth twice a week. 40 capsule 0    ciclopirox (PENLAC) 8 % Soln Apply topically nightly. 6.6 mL 4    clopidogrel (PLAVIX) 75 mg tablet Take 1 tablet (75 mg total) by mouth once daily. 90 tablet 3    desonide (DESOWEN) 0.05 % cream AAA bid 180 g 1    erythromycin (ROMYCIN) ophthalmic ointment Place into the right eye 3 (three) times daily. 1 Tube 3    FERROUS GLUCONATE (FERATE ORAL) Take by mouth once daily at 6am.       folic acid (FOLVITE) 1 MG tablet Take 1 tablet  "(1,000 mcg total) by mouth once daily. 90 tablet 3    furosemide (LASIX) 40 MG tablet Take 1 tablet (40 mg total) by mouth once daily. 90 tablet 3    insulin lispro (HUMALOG KWIKPEN) 100 unit/mL InPn pen Inject 5 Units into the skin 3 (three) times daily with meals. 1 Box 1    insulin needles, disposable, (BD ULTRA-FINE ILIANA PEN NEEDLES) 32 x 5/32 " Ndle Injects insulin 3 x day 300 each 3    KRILL/OM3/DHA/EPA/OM6/LIP/ASTX (KRILL OIL, OMEGA 3 & 6, ORAL) Take by mouth once daily at 6am.       levothyroxine (SYNTHROID) 75 MCG tablet TAKE 1 TABLET(75 MCG) BY MOUTH BEFORE BREAKFAST 90 tablet 0    lidocaine HCL 2% (XYLOCAINE) 2 % jelly Apply topically as needed. 30 mL 1    lisinopril (PRINIVIL,ZESTRIL) 2.5 MG tablet Take 1 tablet (2.5 mg total) by mouth once daily. 90 tablet 3    metoprolol succinate (TOPROL-XL) 50 MG 24 hr tablet Take 1 tablet (50 mg total) by mouth once daily. 90 tablet 3    miconazole NITRATE 2 % (ZEASORB AF) 2 % top powder Apply topically as needed for Itching. 71 g 0    mometasone (NASONEX) 50 mcg/actuation nasal spray 2 sprays by Nasal route once daily. 1 each 0    nystatin-triamcinolone (MYCOLOG II) cream Apply topically 2 (two) times daily. 30 g 0    omeprazole (PRILOSEC) 40 MG capsule Take 1 capsule (40 mg total) by mouth daily as needed. 30 capsule 0    ondansetron (ZOFRAN-ODT) 8 MG TbDL Take 1 tablet (8 mg total) by mouth every 12 (twelve) hours as needed. 20 tablet 0    ranitidine (ZANTAC) 150 MG tablet Take 1 tablet (150 mg total) by mouth 2 (two) times daily as needed for Heartburn. 120 tablet 5    silver sulfADIAZINE 1% (SILVADENE) 1 % cream aaa buttock bid 50 g 2    SORIATANE 10 mg capsule Take 10 mg by mouth once daily.       SYMBICORT 160-4.5 mcg/actuation HFAA INHALE 2 PUFFS BY MOUTH INTO THE LUNGS EVERY 12 HOURS 10.2 g 0    triamcinolone acetonide 0.1% (KENALOG) 0.1 % cream APPLY TO THE AFFECTED AREA TWICE DAILY 240 g 0    [DISCONTINUED] clindamycin (CLEOCIN) 150 MG " "capsule       [DISCONTINUED] furosemide (LASIX) 20 MG tablet       [DISCONTINUED] metoprolol tartrate (LOPRESSOR) 25 MG tablet       [DISCONTINUED] predniSONE (DELTASONE) 2.5 MG tablet Take 2 tablets (5 mg total) by mouth once daily. 180 tablet 1    [DISCONTINUED] predniSONE (DELTASONE) 5 MG tablet        No current facility-administered medications on file prior to visit.      Social History:     Social History   Substance Use Topics    Smoking status: Never Smoker    Smokeless tobacco: Not on file    Alcohol use No     Family History:     Family History   Problem Relation Age of Onset    Diabetes Mother     Hypertension Mother     Hypertension Father     Kidney disease Neg Hx     Eczema Neg Hx     Psoriasis Neg Hx     Melanoma Neg Hx     Lupus Neg Hx     Acne Neg Hx      Physical Exam:     BP (!) 102/56 (BP Location: Left arm, Patient Position: Sitting, BP Method: Automatic)  Pulse 98  Ht 5' 4" (1.626 m)  Wt 93.1 kg (205 lb 4 oz)  BMI 35.23 kg/m2   BP recheck standing 120/70    Constitutional: NAD, conversant  HEENT: Sclera anicteric, PERRLA, EOMI  Neck: No JVD, no carotid bruits  CV: RRR, no murmur, normal S1/S2, no S3  Pulm: CTAB, no wheezes, rales, or ronchi  GI: Abdomen soft, NTND, +BS  Extremities: 2+ doughy LE edema to shins, warm and well perfused  Skin: No ecchymosis, erythema, or ulcers  Psych: AOx3, appropriate affect  Neuro: CNII-XII intact, no focal deficits    Labs:     Lab Results   Component Value Date     03/04/2017    K 4.2 03/04/2017     03/04/2017    CO2 26 03/04/2017    BUN 51 (H) 03/04/2017    CREATININE 0.9 03/04/2017    ANIONGAP 7 (L) 03/04/2017     Lab Results   Component Value Date    HGBA1C 7.1 (H) 11/25/2016     Lab Results   Component Value Date     (H) 08/03/2016    BNP 23 02/19/2010    Lab Results   Component Value Date    WBC 9.33 03/04/2017    HGB 12.3 03/04/2017    HCT 37.7 03/04/2017     (L) 03/04/2017    GRAN 7.3 03/04/2017    GRAN " 77.9 (H) 03/04/2017     Lab Results   Component Value Date    CHOL 155 11/25/2016    HDL 36 (L) 11/25/2016    LDLCALC 82.6 11/25/2016    TRIG 182 (H) 11/25/2016          Imaging:     TTE -- 1/2017    1 - Mild left ventricular enlargement.     2 - Normal left ventricular systolic function (EF 55-60%).     3 - Eccentric hypertrophy.     4 - Left ventricular diastolic dysfunction.     5 - Biatrial enlargement.     6 - Normal right ventricular systolic function .     7 - Trivial aortic regurgitation.     8 - Trivial to mild mitral regurgitation.     9 - Trivial to mild tricuspid regurgitation.     10 - The estimated PA systolic pressure is 32 mmHg.    EF   Date Value Ref Range Status   01/16/2017 58 55 - 65    08/03/2016 30 (A) 55 - 65      PET -- 1. There is a large sized severe ischemia with a small amount of underlying infarct in the base to apical septal and anteroseptal walls in the usual distribution of the proximal Left Anterior Descending Artery with associated stress induced LV cavity   dilatation. This defect comprises 60 % of the left ventricular myocardium.  Based on absolute flow, the LAD is likely occluded.  The majority of the LAD territory is viable.  2. There is abnormal wall motion at rest showing hypokinesis of the anteroapical and inferior walls of the left ventricle. There is abnormal wall motion at stress showing akinesis of the anteroseptal wall of the left ventricle.   3. There is resting LV dysfunction with a reduced ejection fraction of 45 %. There is stress induced LV dysfunction with a reduced ejection fraction of 46 %.  (normal is >= 51%)  4. There is mild stress induced LV cavity dilatation.  5. The extracardiac distribution of radioactivity is normal.     PET stress (after revascularization) 1. The perfusion scan is free of evidence for myocardial ischemia or injury.   2. Resting wall motion is physiologic. Stress wall motion is physiologic.   3. LV function is normal at rest and stress.   (normal is >= 51%)  4. The ventricular volumes are normal at rest and stress.   5. The extracardiac distribution of radioactivity is normal.   6. No comparison made per research protocol.    EKG: NSR with TWI in I and aVL    LHC -- 99% prox LAD and septal branch s/p PCI    Assessment:    Krissy Marino is a 77 year old with DM HTN and HLD who presents today after PCI to prox LAD after being found to have large area of ischemia on PET after exercise stress echo revealed newly depressed LVEF. She has been medically optimized and now revascularized. Doing much better today. LVEF appears to have recovered. Recheck of BP today 94/56. No orthostatic symptoms. Has noticed if she skips lasix, she tends to reaccumulate fluid.    Plan:   1) Systolic Heart Failure with Reduced EF -- NYHA II, warm and euvolemic today   --continue ASA/plavix and atorvastatin (plavix through October 2017)   --continiue toprol XL 50mg   --will stop lisinopril today as LVEF recovered, low BP and has chronic cough   --lasix 40mg daily     2) CAD -- prox LAD disease   --continue DAPT plus statin   --enrolled in ADAPTABLE study   --in cardiac rehab     3) HTN -- controlled well   --continue meds as above  4) HLD   --atorvastatin 40mg, at goal  5) DM   --at goal  6) GERD   --changed omeprazole to ranitidine PRN  7) Venous stasis   --compression stockings    RTC 6 months    Signed:  Toñito Kohler MD  Cardiology Fellow, PGY-4  Pager: [phone]  5/3/2017 4:34 PM

## 2017-05-03 NOTE — Clinical Note
Stopped Lisinopril due to lower BP, recovered EF and chronic dry cough. Will get OTC cough meds w/o DM

## 2017-05-03 NOTE — MR AVS SNAPSHOT
[facility]  [address]  [facility] [address]  Phone: [phone]                  Krissy Marino   5/3/2017 1:00 PM   Office Visit    Description:  Female : 1940   Provider:  Toñito Kohler MD   Department:  [facility]           Reason for Visit     Essential hypertension           Diagnoses this Visit        Comments    Systolic congestive heart failure with reduced left ventricular function, NYHA class 2    -  Primary     Coronary artery disease involving native coronary artery of native heart without angina pectoris                To Do List           Future Appointments        Provider Department Dept Phone    2017 8:45 AM LAB, [facility] [phone]    5/15/2017 1:50 PM MD [facility] [place] [facility] D[facility] [phone]    2017 12:45 PM Rock Lopez MD [place] - [facility] Medicine [phone]    2017 8:15 AM LAB, [facility] [phone]    2017 10:00 AM LAB, [facility] [phone]      Goals (5 Years of Data)     None      Follow-Up and Disposition     Return in about 6 months (around 11/3/2017).    Follow-up and Disposition History      [facility]     [facility] -  Assistance  Unless otherwise directed by your provider, please contact [facility], our nurse care line that is available for  assistance.     Registered nurses in the [facility] provide: appointment scheduling, clinical advisement, health education, and other advisory services.  Call: [phone] (toll free)               Medications           Message regarding Medications     Verify the changes and/or additions to your medication regime listed below are the same as discussed with your clinician today.  If any of these changes or additions are incorrect, please notify your healthcare provider.        STOP taking these medications      clindamycin (CLEOCIN) 150 MG capsule     predniSONE (DELTASONE) 5 MG tablet     predniSONE (DELTASONE) 2.5 MG tablet Take 2 tablets (5 mg total) by mouth once daily.    metoprolol tartrate (LOPRESSOR) 25 MG tablet     lisinopril (PRINIVIL,ZESTRIL) 2.5 MG tablet Take 1 tablet (2.5 mg total) by mouth once daily.           Verify that the below list of medications is an accurate representation of the medications you are currently taking.  If none reported, the list may be blank. If incorrect, please contact your healthcare provider. Carry this list with you in case of emergency.           Current Medications     albuterol 90 mcg/actuation inhaler Inhale 2 puffs into the lungs every 6 (six) hours as needed for Wheezing.    alendronate (FOSAMAX) 70 MG tablet Take 1 tablet (70 mg total) by mouth every 7 days.    allopurinol (ZYLOPRIM) 300 MG tablet Take 1 tablet (300 mg total) by mouth once daily.    aspirin (ECOTRIN) 81 MG EC tablet Take 1 tablet (81 mg total) by mouth once daily.    atorvastatin (LIPITOR) 40 MG tablet TAKE 1 TABLET(40 MG) BY MOUTH EVERY DAY    azelastine (ASTELIN) 137 mcg (0.1 %) nasal spray 1 spray (137 mcg total) by Nasal route 2 (two) times daily.    BIOTIN ORAL Take by mouth.    blood sugar diagnostic (ACCU-CHEK AMELIA PLUS TEST STRP) Strp Checks bg 2 x day before meals    cholecalciferol, vitamin D3, 50,000 unit capsule Take 1 capsule (50,000 Units total) by mouth twice a week.    ciclopirox (PENLAC) 8 % Soln Apply topically nightly.    clopidogrel (PLAVIX) 75 mg tablet Take 1 tablet (75 mg total) by mouth once daily.    desonide (DESOWEN) 0.05 % cream AAA bid    erythromycin (ROMYCIN) ophthalmic ointment Place into the right eye 3 (three) times daily.    FERROUS GLUCONATE (FERATE ORAL) Take by mouth once daily at 6am.     folic acid (FOLVITE) 1 MG tablet Take 1 tablet (1,000 mcg total) by mouth once daily.    furosemide (LASIX) 40 MG tablet Take 1 tablet (40 mg total) by mouth once daily.     "insulin lispro (HUMALOG KWIKPEN) 100 unit/mL InPn pen Inject 5 Units into the skin 3 (three) times daily with meals.    insulin needles, disposable, (BD ULTRA-FINE ILIANA PEN NEEDLES) 32 x 5/32 " Ndle Injects insulin 3 x day    KRILL/OM3/DHA/EPA/OM6/LIP/ASTX (KRILL OIL, OMEGA 3 & 6, ORAL) Take by mouth once daily at 6am.     levothyroxine (SYNTHROID) 75 MCG tablet TAKE 1 TABLET(75 MCG) BY MOUTH BEFORE BREAKFAST    lidocaine HCL 2% (XYLOCAINE) 2 % jelly Apply topically as needed.    metoprolol succinate (TOPROL-XL) 50 MG 24 hr tablet Take 1 tablet (50 mg total) by mouth once daily.    miconazole NITRATE 2 % (ZEASORB AF) 2 % top powder Apply topically as needed for Itching.    mometasone (NASONEX) 50 mcg/actuation nasal spray 2 sprays by Nasal route once daily.    nystatin-triamcinolone (MYCOLOG II) cream Apply topically 2 (two) times daily.    omeprazole (PRILOSEC) 40 MG capsule Take 1 capsule (40 mg total) by mouth daily as needed.    ondansetron (ZOFRAN-ODT) 8 MG TbDL Take 1 tablet (8 mg total) by mouth every 12 (twelve) hours as needed.    ranitidine (ZANTAC) 150 MG tablet Take 1 tablet (150 mg total) by mouth 2 (two) times daily as needed for Heartburn.    silver sulfADIAZINE 1% (SILVADENE) 1 % cream aaa buttock bid    SORIATANE 10 mg capsule Take 10 mg by mouth once daily.     SYMBICORT 160-4.5 mcg/actuation HFAA INHALE 2 PUFFS BY MOUTH INTO THE LUNGS EVERY 12 HOURS    triamcinolone acetonide 0.1% (KENALOG) 0.1 % cream APPLY TO THE AFFECTED AREA TWICE DAILY           Clinical Reference Information           Your Vitals Were     BP Pulse Height Weight BMI    102/56 (BP Location: Left arm, Patient Position: Sitting, BP Method: Automatic) 98 5' 4" (1.626 m) 93.1 kg (205 lb 4 oz) 35.23 kg/m2      Blood Pressure          Most Recent Value    Right Arm BP - Sitting  102/58    Left Arm BP - Sitting  102/56    BP  (!)  102/56      Allergies as of 5/3/2017     Sulfamethoxazole-trimethoprim    Latex, Natural Rubber    Pcn " [Penicillins]      Immunizations Administered on Date of Encounter - 5/3/2017     None      Language Assistance Services     ATTENTION: Language assistance services are available, free of charge. Please call [phone].      ATENCIÓN: Si zohra yepez, tiene a doyle disposición servicios gratuitos de asistencia lingüística. Llame al [phone].     VIVEK Ý: N?u b?n nói Ti?ng Vi?t, có các d?ch v? h? tr? ngôn ng? mi?n phí dành cho b?n. G?i s? [phone].         [facility] complies with applicable Federal civil rights laws and does not discriminate on the basis of race, color, national origin, age, disability, or sex.

## 2017-05-04 ENCOUNTER — TELEPHONE (OUTPATIENT)
Dept: SPORTS MEDICINE | Facility: CLINIC | Age: 77
End: 2017-05-04

## 2017-05-05 ENCOUNTER — OFFICE VISIT (OUTPATIENT)
Dept: SPORTS MEDICINE | Facility: CLINIC | Age: 77
End: 2017-05-05
Payer: COMMERCIAL

## 2017-05-05 ENCOUNTER — HOSPITAL ENCOUNTER (OUTPATIENT)
Dept: RADIOLOGY | Facility: HOSPITAL | Age: 77
Discharge: HOME | End: 2017-05-05
Attending: FAMILY MEDICINE
Payer: COMMERCIAL

## 2017-05-05 VITALS — BODY MASS INDEX: 35 KG/M2 | HEIGHT: 64 IN | WEIGHT: 205 LBS | TEMPERATURE: 97 F

## 2017-05-05 DIAGNOSIS — M75.101 ROTATOR CUFF SYNDROME OF RIGHT SHOULDER: ICD-10-CM

## 2017-05-05 DIAGNOSIS — M19.011 PRIMARY OSTEOARTHRITIS OF RIGHT SHOULDER: ICD-10-CM

## 2017-05-05 DIAGNOSIS — M19.011 OSTEOARTHRITIS OF GLENOHUMERAL JOINT, RIGHT: Primary | ICD-10-CM

## 2017-05-05 DIAGNOSIS — M75.51 SUBACROMIAL BURSITIS OF RIGHT SHOULDER JOINT: ICD-10-CM

## 2017-05-05 PROCEDURE — 1125F AMNT PAIN NOTED PAIN PRSNT: CPT | Mod: S$GLB,,, | Performed by: FAMILY MEDICINE

## 2017-05-05 PROCEDURE — 99214 OFFICE O/P EST MOD 30 MIN: CPT | Mod: 25,S$GLB,, | Performed by: FAMILY MEDICINE

## 2017-05-05 PROCEDURE — 73030 X-RAY EXAM OF SHOULDER: CPT | Mod: 26,RT,, | Performed by: RADIOLOGY

## 2017-05-05 PROCEDURE — 1157F ADVNC CARE PLAN IN RCRD: CPT | Mod: 8P,S$GLB,, | Performed by: FAMILY MEDICINE

## 2017-05-05 PROCEDURE — 73030 X-RAY EXAM OF SHOULDER: CPT | Mod: TC,PO,RT

## 2017-05-05 PROCEDURE — 20611 DRAIN/INJ JOINT/BURSA W/US: CPT | Mod: RT,S$GLB,, | Performed by: FAMILY MEDICINE

## 2017-05-05 PROCEDURE — 99999 PR PBB SHADOW E&M-EST. PATIENT-LVL III: CPT | Mod: 25,PBBFAC,, | Performed by: FAMILY MEDICINE

## 2017-05-05 PROCEDURE — 1160F RVW MEDS BY RX/DR IN RCRD: CPT | Mod: S$GLB,,, | Performed by: FAMILY MEDICINE

## 2017-05-05 PROCEDURE — 1159F MED LIST DOCD IN RCRD: CPT | Mod: S$GLB,,, | Performed by: FAMILY MEDICINE

## 2017-05-05 PROCEDURE — 20610 DRAIN/INJ JOINT/BURSA W/O US: CPT | Mod: 59,RT,S$GLB, | Performed by: FAMILY MEDICINE

## 2017-05-05 RX ORDER — TRIAMCINOLONE ACETONIDE 40 MG/ML
40 INJECTION, SUSPENSION INTRA-ARTICULAR; INTRAMUSCULAR
Status: DISCONTINUED | OUTPATIENT
Start: 2017-05-05 | End: 2017-05-05 | Stop reason: HOSPADM

## 2017-05-05 RX ADMIN — TRIAMCINOLONE ACETONIDE 40 MG: 40 INJECTION, SUSPENSION INTRA-ARTICULAR; INTRAMUSCULAR at 11:05

## 2017-05-05 NOTE — PROGRESS NOTES
Krissy Marino, a 77 y.o. female, is here for evaluation of right shoulder pain.     HISTORY OF PRESENT ILLNESS  Hand dominance, right     Location: anterior and lateral, right  Onset: Insidious, many years  Palliative:   Relative rest  Oral analgesics (tylenol PM)  CSI, SAB, 07/18/16, 80% improvement  CSI, SAB, 12/02/17, moderate improvement for ~ 3 weeks   CSI, SAB, 01/27/17, no improvement   CSI, iaGH/SAB, 02/21/17, mild to moderate relief   fPT @ [facility], has concluded   Heat   Provocative:   ADLs   Prior:   Currently doing cardiac rehab following stent placement 16.10  Progression: plateau discomfort   Quality:   Sharp pain at times activity  Throbbing at times with activity   Muscle soreness   Radiation: none  Severity: per nursing documentation  Timing: intermittent with use  Trauma: none known    Review of systems (ROS):  A 10+ review of systems was performed with pertinent positives and negatives noted above in the history of present illness. Other systems were negative unless otherwise specified.      PHYSICAL EXAMINATION  General:  The patient is alert and oriented x 3. Mood is pleasant. Observation of ears, eyes and nose reveal no gross abnormalities. HEENT: NCAT, sclera anicteric. Lungs: Respirations are equal and unlabored.     RIGHT SHOULDER EXAMINATION     OBSERVATION:     Swelling  none  Deformity  none   Discoloration  none   Scapular winging none   Scars   none  Atrophy  none    TENDERNESS / CREPITUS (T/C):          T/C      T/C   Clavicle   -/-  SUPRAspinatus    -/-     AC Jt.    -/-  INFRAspinatus  -/-    SC Jt.    -/-  Deltoid    -/-      G. Tuberosity  -/-  LH BICEP groove  -/-   Acromion:  -/-  Midline Neck   -/-     Scapular Spine -/-  Trapezium   -/-   SMA Scapula  -/-  GH jt. line - post  -/-     Scapulothoracic  -/-         ROM:     Right shoulder   Left shoulder        AROM (PROM)   AROM (PROM)   FE    170° (175°)     170° (175°)     ER at 0°    60°  (65°)    60°  (65°)   ER at 90°  ABD  90°  (90°)    90°  (90°)   IR at 90°  ABD   NA  (40°)     NA  (40°)      IR (spine level)   T10     T10    STRENGTH: (* = with pain) RIGHT SHOULDER  LEFT SHOULDER   SCAPTION   5/5    5/5    IR    5/5    5/5   ER    5/5    5/5   BICEPS   5/5    5/5   Deltoid    5/5    5/5     SIGNS:  Painful side       NEER   -   OSONNYS        -    CHAPA   -   SPEEDS        -   DROP ARM   -   BELLY PRESS       -    X-Body ADD    -   LIFT-OFF        -   HORNBLOWERS      -              STABILITY TESTING     RIGHT SHOULDER  LEFT SHOULDER     Translation     Anterior up face    up face    Posterior up face   up face    Sulcus  < 10mm   < 10 mm     Signs   Apprehension   neg      neg       Relocation   no change     no change      Jerk test  neg     neg    EXTREMITY NEURO-VASCULAR EXAM    Sensation grossly intact to light touch all dermatomal regions.    DTR 2+ Biceps, Triceps, BR and Negative Chinas sign   Grossly intact motor function at Elbow, Wrist and Hand   Distal pulses radial and ulnar 2+, brisk cap refill, symmetric.      NECK:  Painless FROM and spinous processes non-tender. Negative Spurlings sign.       Other Findings:    ASSESSMENT & PLAN   Assessment:   #1 advanced OA changes of GH, right   W/ advanced acromialization of humeral head   W/ chronic tearing of superior rotator cuff   W/ chronic glenoid labral degenerative changes    No evidence of neurologic pathology  No evidence of vascular pathology    Imaging studies reviewed:   X-ray shoulder, right 17.05    Plan:  We discussed options including:  #1 watchful waiting  #2 re start physical therapy aimed at:   RoM glenohumeral joint   Strengthening rotator cuff   Scapular stability  #3 injection therapy:   CSI SAB    Right,    CSI iaGH    Right,    orthobiologics   #4 consultation re: rTSA   Likely poor surgical candidate given CAD     The patient chooses #2 and #3 csi iagh and csi sab    Pain management: handout given  Bracing: shoulder sling, offered,  deferred by pt  Physical therapy: fPT, @ OSH, re start as above   Activity (e.g. sports, work) restrictions: as tolerated  school/vocation: works at [employer]     Follow up in 4 w  A/e fPT  A/e csi x 2    ? Repeat csi x 2  Should symptoms worsen or fail to resolve, consider:  Revisiting the above options

## 2017-05-05 NOTE — PROCEDURES
Large Joint Aspiration/Injection  Date/Time: 5/5/2017 11:48 AM  Performed by: MOE MIRELES  Authorized by: MOE MIRELES     Consent Done?:  Yes (Verbal)  Indications:  Pain  Procedure site marked: Yes    Timeout: Prior to procedure the correct patient, procedure, and site was verified      Location:  Shoulder  Site:  R subacromial bursa  Prep: Patient was prepped and draped in usual sterile fashion    Ultrasonic Guidance for needle placement: No  Needle size:  22 G  Approach:  Posterior  Medications:  40 mg triamcinolone acetonide 40 mg/mL  Patient tolerance:  Patient tolerated the procedure well with no immediate complications

## 2017-05-05 NOTE — PROCEDURES
Large Joint Aspiration/Injection  Date/Time: 5/5/2017 11:49 AM  Performed by: MOE MIRELES  Authorized by: MOE MIRELES     Consent Done?:  Yes (Verbal)  Indications:  Pain  Procedure site marked: Yes    Timeout: Prior to procedure the correct patient, procedure, and site was verified      Location:  Shoulder  Site:  R glenohumeral  Prep: Patient was prepped and draped in usual sterile fashion    Ultrasonic Guidance for needle placement: Yes  Images are saved and documented.  Needle size:  20 G  Approach:  Posterior  Medications:  40 mg triamcinolone acetonide 40 mg/mL  Patient tolerance:  Patient tolerated the procedure well with no immediate complications    Additional Comments: Description of ultrasound utilization for needle guidance:   Ultrasound guidance used for needle localization.  Images saved and stored for documentation.  The glenohumeral joint was visualized.  Dynamic visualization of the 20g x 3.5 needle was continuous throughout the procedure.

## 2017-05-06 ENCOUNTER — LAB VISIT (OUTPATIENT)
Dept: LAB | Facility: HOSPITAL | Age: 77
End: 2017-05-06
Attending: DERMATOLOGY
Payer: COMMERCIAL

## 2017-05-06 DIAGNOSIS — M31.6 GIANT CELL ARTERITIS: ICD-10-CM

## 2017-05-06 DIAGNOSIS — Z79.899 ENCOUNTER FOR LONG-TERM (CURRENT) USE OF HIGH-RISK MEDICATION: ICD-10-CM

## 2017-05-06 LAB
ALBUMIN SERPL BCP-MCNC: 3.6 G/DL
ALP SERPL-CCNC: 105 U/L
ALT SERPL W/O P-5'-P-CCNC: 15 U/L
AST SERPL-CCNC: 20 U/L
BILIRUB DIRECT SERPL-MCNC: 0.3 MG/DL
BILIRUB SERPL-MCNC: 0.6 MG/DL
CHOLEST/HDLC SERPL: 4 {RATIO}
CRP SERPL-MCNC: 3 MG/L
ERYTHROCYTE [SEDIMENTATION RATE] IN BLOOD BY WESTERGREN METHOD: 55 MM/HR
HDL/CHOLESTEROL RATIO: 24.8 %
HDLC SERPL-MCNC: 141 MG/DL
HDLC SERPL-MCNC: 35 MG/DL
LDLC SERPL CALC-MCNC: 82.2 MG/DL
NONHDLC SERPL-MCNC: 106 MG/DL
PROT SERPL-MCNC: 6.5 G/DL
TRIGL SERPL-MCNC: 119 MG/DL

## 2017-05-06 PROCEDURE — 80061 LIPID PANEL: CPT

## 2017-05-06 PROCEDURE — 86140 C-REACTIVE PROTEIN: CPT

## 2017-05-06 PROCEDURE — 36415 COLL VENOUS BLD VENIPUNCTURE: CPT | Mod: PO

## 2017-05-06 PROCEDURE — 80076 HEPATIC FUNCTION PANEL: CPT

## 2017-05-06 PROCEDURE — 85651 RBC SED RATE NONAUTOMATED: CPT

## 2017-05-09 ENCOUNTER — TELEPHONE (OUTPATIENT)
Dept: RHEUMATOLOGY | Facility: CLINIC | Age: 77
End: 2017-05-09

## 2017-05-15 ENCOUNTER — OFFICE VISIT (OUTPATIENT)
Dept: DERMATOLOGY | Facility: CLINIC | Age: 77
End: 2017-05-15
Payer: COMMERCIAL

## 2017-05-15 DIAGNOSIS — L40.9 PSORIASIS: Primary | ICD-10-CM

## 2017-05-15 DIAGNOSIS — L82.0 LICHENOID KERATOSIS: ICD-10-CM

## 2017-05-15 DIAGNOSIS — Z79.899 ENCOUNTER FOR LONG-TERM (CURRENT) USE OF HIGH-RISK MEDICATION: ICD-10-CM

## 2017-05-15 PROCEDURE — 1157F ADVNC CARE PLAN IN RCRD: CPT | Mod: 8P,S$GLB,, | Performed by: DERMATOLOGY

## 2017-05-15 PROCEDURE — 1159F MED LIST DOCD IN RCRD: CPT | Mod: S$GLB,,, | Performed by: DERMATOLOGY

## 2017-05-15 PROCEDURE — 1160F RVW MEDS BY RX/DR IN RCRD: CPT | Mod: S$GLB,,, | Performed by: DERMATOLOGY

## 2017-05-15 PROCEDURE — 99214 OFFICE O/P EST MOD 30 MIN: CPT | Mod: 25,S$GLB,, | Performed by: DERMATOLOGY

## 2017-05-15 PROCEDURE — 17110 DESTRUCTION B9 LES UP TO 14: CPT | Mod: S$GLB,,, | Performed by: DERMATOLOGY

## 2017-05-15 PROCEDURE — 99999 PR PBB SHADOW E&M-EST. PATIENT-LVL II: CPT | Mod: PBBFAC,,, | Performed by: DERMATOLOGY

## 2017-05-15 RX ORDER — ACITRETIN 10 MG/1
CAPSULE ORAL
Qty: 180 CAPSULE | Refills: 3 | Status: SHIPPED | OUTPATIENT
Start: 2017-05-15 | End: 2017-11-13 | Stop reason: SDUPTHER

## 2017-05-15 RX ORDER — DESONIDE 0.5 MG/G
CREAM TOPICAL
Qty: 180 G | Refills: 1 | Status: SHIPPED | OUTPATIENT
Start: 2017-05-15 | End: 2017-11-13 | Stop reason: SDUPTHER

## 2017-05-15 RX ORDER — HYDROCORTISONE BUTYRATE 1 MG/G
CREAM TOPICAL
Qty: 180 G | Refills: 0 | Status: ON HOLD | OUTPATIENT
Start: 2017-05-15 | End: 2018-12-26 | Stop reason: HOSPADM

## 2017-05-15 NOTE — ASSESSMENT & PLAN NOTE
Today's Plan:      Cont soriatane at 20mg qday  Start vinegar:water soaks followed by cool blow dry  Desonide cream bid for under breasts  locoid cream bid to buttocks    F/u 3 months

## 2017-05-15 NOTE — PROGRESS NOTES
Subjective:       Patient ID:  Krissy Marino is a 77 y.o. female who presents for   Chief Complaint   Patient presents with    Psoriasis     Follow up      HPI Comments: Also has h/o pressure sores - now with painful lesions on bilateral buttocks. Ran out of silvadene      Psoriasis  - Follow-up  Symptom course: unchanged  Currently using: soriatane 20mg qday and TAC cream for body prn, and desonide cream bid under breasts.  Affected locations: chest  Signs / symptoms: asymptomatic        Review of Systems   Constitutional: Negative for fever and chills.   HENT: Negative for nosebleeds, sore throat and headaches.    Eyes: Negative for visual change.   Respiratory: Negative for cough and shortness of breath.    Gastrointestinal: Negative for nausea, vomiting, abdominal pain and diarrhea.   Musculoskeletal: Negative for myalgias, joint swelling and arthralgias.   Skin: Positive for itching (mild and occ), rash, dry skin, daily sunscreen use, activity-related sunscreen use and dry lips (occ). Negative for sun sensitivity, recent sunburn and abscesses.   Neurological: Negative for focal weakness, seizures, numbness and headaches.   Psychiatric/Behavioral: Negative for depressed mood.   Hematologic/Lymphatic: Bruises/bleeds easily (takes aspirin).        Objective:    Physical Exam   Constitutional: She appears well-developed and well-nourished. She is obese.  No distress.   Neurological: She is alert and oriented to person, place, and time. She is not disoriented.   Psychiatric: She has a normal mood and affect.   Skin:   Areas Examined (abnormalities noted in diagram):   Scalp / Hair Palpated and Inspected  Head / Face Inspection Performed  Neck Inspection Performed  Chest / Axilla Inspection Performed  Abdomen Inspection Performed  Genitals / Buttocks / Groin Inspection Performed  Back Inspection Performed  RUE Inspected  LUE Inspection Performed  RLE Inspected  LLE Inspection Performed  Nails and Digits  Inspection Performed                   Diagram Legend     Erythematous scaling macule/papule c/w actinic keratosis       Vascular papule c/w angioma      Pigmented verrucoid papule/plaque c/w seborrheic keratosis      Yellow umbilicated papule c/w sebaceous hyperplasia      Irregularly shaped tan macule c/w lentigo     1-2 mm smooth white papules consistent with Milia      Movable subcutaneous cyst with punctum c/w epidermal inclusion cyst      Subcutaneous movable cyst c/w pilar cyst      Firm pink to brown papule c/w dermatofibroma      Pedunculated fleshy papule(s) c/w skin tag(s)      Evenly pigmented macule c/w junctional nevus     Mildly variegated pigmented, slightly irregular-bordered macule c/w mildly atypical nevus      Flesh colored to evenly pigmented papule c/w intradermal nevus       Pink pearly papule/plaque c/w basal cell carcinoma      Erythematous hyperkeratotic cursted plaque c/w SCC      Surgical scar with no sign of skin cancer recurrence      Open and closed comedones      Inflammatory papules and pustules      Verrucoid papule consistent consistent with wart     Erythematous eczematous patches and plaques     Dystrophic onycholytic nail with subungual debris c/w onychomycosis     Umbilicated papule    Erythematous-base heme-crusted tan verrucoid plaque consistent with inflamed seborrheic keratosis     Erythematous Silvery Scaling Plaque c/w Psoriasis     See annotation    Lab Results   Component Value Date    ALT 15 05/06/2017    AST 20 05/06/2017    ALKPHOS 105 05/06/2017    BILITOT 0.6 05/06/2017     Lab Results   Component Value Date    CHOL 141 05/06/2017    CHOL 155 11/25/2016    CHOL 148 07/23/2016     Lab Results   Component Value Date    HDL 35 (L) 05/06/2017    HDL 36 (L) 11/25/2016    HDL 40 07/23/2016     Lab Results   Component Value Date    LDLCALC 82.2 05/06/2017    LDLCALC 82.6 11/25/2016    LDLCALC 78.6 07/23/2016     Lab Results   Component Value Date    TRIG 119 05/06/2017     TRIG 182 (H) 11/25/2016    TRIG 147 07/23/2016     Lab Results   Component Value Date    CHOLHDL 24.8 05/06/2017    CHOLHDL 23.2 11/25/2016    CHOLHDL 27.0 07/23/2016       Assessment / Plan:        Psoriasis  -     SORIATANE 10 mg capsule; Take 2 po qday  Dispense: 180 capsule; Refill: 3  -     desonide (DESOWEN) 0.05 % cream; AAA bid  Dispense: 180 g; Refill: 1  -     hydrocortisone butyrate (LOCOID) 0.1 % Crea cream; AAA buttock bid  Dispense: 180 g; Refill: 0    Encounter for long-term (current) use of high-risk medication  -     Hepatic function panel; Future  -     Lipid panel; Future    Lichenoid keratosis - left cheek  Cryosurgery procedure note:    Verbal consent from the patient is obtained. Liquid nitrogen cryosurgery is applied to 2 lesions to produce a freeze injury. The patient is aware that blisters may form and is instructed on wound care with gentle cleansing and use of vaseline ointment to keep moist until healed. The patient is supplied a handout on cryosurgery and is instructed to call if lesions do not completely resolve.        Psoriasis  Today's Plan:      Cont soriatane at 20mg qday  Start vinegar:water soaks followed by cool blow dry  Desonide cream bid for under breasts  locoid cream bid to buttocks    F/u 3 months      Return in about 6 months (around 11/15/2017) for with labs.

## 2017-05-15 NOTE — PATIENT INSTRUCTIONS
Recommend white vinegar: water 1:1 compresses 2x/day followed by cool blow dry and then application of prescription medication.

## 2017-05-15 NOTE — MR AVS SNAPSHOT
[facility]  [address]  [facility] [address]  Phone: [phone]  Fax: [phone]                  Krissy Marino   5/15/2017 1:50 PM   Office Visit    Description:  Female : 1940   Provider:  Jaky Alejandre MD   Department:  [facility]           Reason for Visit     Psoriasis           Diagnoses this Visit        Comments    Psoriasis    -  Primary     Encounter for long-term (current) use of high-risk medication         Lichenoid keratosis                To Do List           Future Appointments        Provider Department Dept Phone    2017 8:15 AM LAB, [facility] [phone]    2017 10:00 AM LAB, [facility] [phone]    2017 3:00 PM Leticia Mcnamara MD [facility] [phone]      Goals (5 Years of Data)     None      Follow-Up and Disposition     Return in about 6 months (around 11/15/2017) for with labs.    Follow-up and Disposition History       These Medications        Disp Refills Start End    SORIATANE 10 mg capsule 180 capsule 3 5/15/2017     Take 2 po qday    Pharmacy: [facility] [place] [address] AT [facility] [place] Ph #: [phone]       Notes to Pharmacy: 3 month supply    desonide (DESOWEN) 0.05 % cream 180 g 1 5/15/2017     AAA bid    Pharmacy: [facility] [employer] [place] [address] AT [facility] [place] Ph #: [phone]       Notes to Pharmacy: 3 month supply    hydrocortisone butyrate (LOCOID) 0.1 % Crea cream 180 g 0 5/15/2017     AAA buttock bid    Pharmacy: [facility] [employer] [place] [address] AT [facility] [place] Ph #: [phone]       Notes to Pharmacy: 3 month supply      [facility]     [facility] -  Assistance  Unless otherwise directed by your provider, please contact [facility], our  nurse care line that is available for 24/7 assistance.     Registered nurses in the [facility] provide: appointment scheduling, clinical advisement, health education, and other advisory services.  Call: [phone] (toll free)               Medications           Message regarding Medications     Verify the changes and/or additions to your medication regime listed below are the same as discussed with your clinician today.  If any of these changes or additions are incorrect, please notify your healthcare provider.        START taking these NEW medications        Refills    hydrocortisone butyrate (LOCOID) 0.1 % Crea cream 0    Sig: AAA buttock bid    Class: Normal      CHANGE how you are taking these medications     Start Taking Instead of    SORIATANE 10 mg capsule SORIATANE 10 mg capsule    Dosage:  Take 2 po qday Dosage:  Take 10 mg by mouth once daily.     Reason for Change:  Reorder            Verify that the below list of medications is an accurate representation of the medications you are currently taking.  If none reported, the list may be blank. If incorrect, please contact your healthcare provider. Carry this list with you in case of emergency.           Current Medications     albuterol 90 mcg/actuation inhaler Inhale 2 puffs into the lungs every 6 (six) hours as needed for Wheezing.    alendronate (FOSAMAX) 70 MG tablet Take 1 tablet (70 mg total) by mouth every 7 days.    allopurinol (ZYLOPRIM) 300 MG tablet Take 1 tablet (300 mg total) by mouth once daily.    aspirin (ECOTRIN) 81 MG EC tablet Take 1 tablet (81 mg total) by mouth once daily.    atorvastatin (LIPITOR) 40 MG tablet TAKE 1 TABLET(40 MG) BY MOUTH EVERY DAY    azelastine (ASTELIN) 137 mcg (0.1 %) nasal spray 1 spray (137 mcg total) by Nasal route 2 (two) times daily.    BIOTIN ORAL Take by mouth.    blood sugar diagnostic (ACCU-CHEK AMELIA PLUS TEST STRP) Strp Checks bg 2 x day before meals    cholecalciferol, vitamin D3, 50,000  "unit capsule Take 1 capsule (50,000 Units total) by mouth twice a week.    ciclopirox (PENLAC) 8 % Soln Apply topically nightly.    clopidogrel (PLAVIX) 75 mg tablet Take 1 tablet (75 mg total) by mouth once daily.    desonide (DESOWEN) 0.05 % cream AAA bid    erythromycin (ROMYCIN) ophthalmic ointment Place into the right eye 3 (three) times daily.    FERROUS GLUCONATE (FERATE ORAL) Take by mouth once daily at 6am.     folic acid (FOLVITE) 1 MG tablet Take 1 tablet (1,000 mcg total) by mouth once daily.    furosemide (LASIX) 40 MG tablet Take 1 tablet (40 mg total) by mouth once daily.    insulin lispro (HUMALOG KWIKPEN) 100 unit/mL InPn pen Inject 5 Units into the skin 3 (three) times daily with meals.    insulin needles, disposable, ([employer] ULTRA-FINE ILIANA PEN NEEDLES) 32 x 5/32 " Ndle Injects insulin 3 x day    KRILL/OM3/DHA/EPA/OM6/LIP/ASTX (KRILL OIL, OMEGA 3 & 6, ORAL) Take by mouth once daily at 6am.     levothyroxine (SYNTHROID) 75 MCG tablet TAKE 1 TABLET(75 MCG) BY MOUTH BEFORE BREAKFAST    lidocaine HCL 2% (XYLOCAINE) 2 % jelly Apply topically as needed.    metoprolol succinate (TOPROL-XL) 50 MG 24 hr tablet Take 1 tablet (50 mg total) by mouth once daily.    miconazole NITRATE 2 % (ZEASORB AF) 2 % top powder Apply topically as needed for Itching.    mometasone (NASONEX) 50 mcg/actuation nasal spray 2 sprays by Nasal route once daily.    nystatin-triamcinolone (MYCOLOG II) cream Apply topically 2 (two) times daily.    omeprazole (PRILOSEC) 40 MG capsule Take 1 capsule (40 mg total) by mouth daily as needed.    ondansetron (ZOFRAN-ODT) 8 MG TbDL Take 1 tablet (8 mg total) by mouth every 12 (twelve) hours as needed.    ranitidine (ZANTAC) 150 MG tablet Take 1 tablet (150 mg total) by mouth 2 (two) times daily as needed for Heartburn.    silver sulfADIAZINE 1% (SILVADENE) 1 % cream aaa buttock bid    SORIATANE 10 mg capsule Take 2 po qday    SYMBICORT 160-4.5 mcg/actuation HFAA INHALE 2 PUFFS BY MOUTH INTO THE " LUNGS EVERY 12 HOURS    triamcinolone acetonide 0.1% (KENALOG) 0.1 % cream APPLY TO THE AFFECTED AREA TWICE DAILY    hydrocortisone butyrate (LOCOID) 0.1 % Crea cream AAA buttock bid           Clinical Reference Information           Allergies as of 5/15/2017     Sulfamethoxazole-trimethoprim    Latex, Natural Rubber    Pcn [Penicillins]      Immunizations Administered on Date of Encounter - 5/15/2017     None      Orders Placed During Today's Visit     Future Labs/Procedures Expected by Expires    Hepatic function panel  5/15/2017 5/15/2018    Lipid panel  5/15/2017 5/15/2018      Instructions    Recommend white vinegar: water 1:1 compresses 2x/day followed by cool blow dry and then application of prescription medication.            Language Assistance Services     ATTENTION: Language assistance services are available, free of charge. Please call [phone].      ATENCIÓN: Si zohra yepez, tiene a doyle disposición servicios gratuitos de asistencia lingüística. Llame al [phone].     CHÚ Ý: N?u b?n nói Ti?ng Vi?t, có các d?ch v? h? tr? ngôn ng? mi?n phí dành cho b?n. G?i s? [phone].         [facility] complies with applicable Federal civil rights laws and does not discriminate on the basis of race, color, national origin, age, disability, or sex.

## 2017-05-18 ENCOUNTER — TELEPHONE (OUTPATIENT)
Dept: DERMATOLOGY | Facility: CLINIC | Age: 77
End: 2017-05-18

## 2017-05-18 RX ORDER — LEVOTHYROXINE SODIUM 75 UG/1
TABLET ORAL
Qty: 90 TABLET | Refills: 0 | Status: SHIPPED | OUTPATIENT
Start: 2017-05-18 | End: 2017-08-17 | Stop reason: SDUPTHER

## 2017-05-18 NOTE — TELEPHONE ENCOUNTER
----- Message from Jaky Alejandre MD sent at 5/18/2017 12:03 PM CDT -----  Contact: in ref to her Medication  i don't have the generic soriatane allergy documented. i would call and ask the patient what the pharmacist is talking about. LEXUS    ----- Message -----     From: Hien Powell MA     Sent: 5/18/2017  11:27 AM       To: MD Destiney Mercado    Pt ins formulary has change and may no longer receive Brand name Soriatane only generic from [facility] for only $15.00 but they are saying that pt is allergic to the generic, IRAJ did not see this in pt chart, is this okay for generic?    Please advise,

## 2017-05-29 ENCOUNTER — OFFICE VISIT (OUTPATIENT)
Dept: SPORTS MEDICINE | Facility: CLINIC | Age: 77
End: 2017-05-29
Payer: COMMERCIAL

## 2017-05-29 VITALS — TEMPERATURE: 98 F | HEIGHT: 64 IN | WEIGHT: 205 LBS | BODY MASS INDEX: 35 KG/M2

## 2017-05-29 DIAGNOSIS — Z00.00 GENERAL MEDICAL EXAM: ICD-10-CM

## 2017-05-29 DIAGNOSIS — M19.011 OSTEOARTHRITIS OF GLENOHUMERAL JOINT, RIGHT: Primary | ICD-10-CM

## 2017-05-29 PROCEDURE — 1159F MED LIST DOCD IN RCRD: CPT | Mod: S$GLB,,, | Performed by: FAMILY MEDICINE

## 2017-05-29 PROCEDURE — 1125F AMNT PAIN NOTED PAIN PRSNT: CPT | Mod: S$GLB,,, | Performed by: FAMILY MEDICINE

## 2017-05-29 PROCEDURE — 97760 ORTHOTIC MGMT&TRAING 1ST ENC: CPT | Mod: S$GLB,,, | Performed by: FAMILY MEDICINE

## 2017-05-29 PROCEDURE — 99999 PR PBB SHADOW E&M-EST. PATIENT-LVL III: CPT | Mod: PBBFAC,,, | Performed by: FAMILY MEDICINE

## 2017-05-29 PROCEDURE — 99214 OFFICE O/P EST MOD 30 MIN: CPT | Mod: 25,S$GLB,, | Performed by: FAMILY MEDICINE

## 2017-05-29 NOTE — PROGRESS NOTES
Krissy Marino, a 77 y.o. female, is here for evaluation of right shoulder pain.     HISTORY OF PRESENT ILLNESS  Hand dominance, right     Location: anterior and lateral, right  Onset: Insidious, many years  Palliative:    Relative rest   Oral analgesics (tylenol PM)   CSI, SAB, 07/18/16, 80% improvement   CSI, SAB, 12/02/17, moderate improvement for ~ 3 weeks    CSI, SAB, 01/27/17, no improvement    CSI, ia/SAB, 02/21/17, mild to moderate relief    fPT @ [facility]   Heat    CSI, iaGH/SAB, 05/05/17, moderate%  Provocative:    ADLs   Prior:    Currently doing cardiac rehab following stent placement 16.10  Progression: plateau discomfort   Quality:    Sharp pain at times activity   Throbbing at times with activity    Muscle soreness   Radiation: none  Severity: per nursing documentation  Timing: intermittent with use  Trauma: none known    Review of systems (ROS):  A 10+ review of systems was performed with pertinent positives and negatives noted above in the history of present illness. Other systems were negative unless otherwise specified.      PHYSICAL EXAMINATION  General:  The patient is alert and oriented x 3. Mood is pleasant. Observation of ears, eyes and nose reveal no gross abnormalities. HEENT: NCAT, sclera anicteric. Lungs: Respirations are equal and unlabored.     RIGHT SHOULDER EXAMINATION     OBSERVATION:     Swelling  none  Deformity  none   Discoloration  none   Scapular winging none   Scars   none  Atrophy  none    TENDERNESS / CREPITUS (T/C):          T/C      T/C   Clavicle   -/-  SUPRAspinatus    -/-     AC Jt.    -/-  INFRAspinatus  -/-    SC Jt.    -/-  Deltoid    -/-      G. Tuberosity  -/-  LH BICEP groove  -/-   Acromion:  -/-  Midline Neck   -/-     Scapular Spine -/-  Trapezium   -/-   SMA Scapula  -/-  GH jt. line - post  -/-     Scapulothoracic  -/-         ROM:     Right shoulder   Left shoulder        AROM (PROM)   AROM (PROM)   FE    170° (175°)     170° (175°)     ER at 0°    60°   (65°)    60°  (65°)   ER at 90° ABD  90°  (90°)    90°  (90°)   IR at 90°  ABD   NA  (40°)     NA  (40°)      IR (spine level)   T10     T10    STRENGTH: (* = with pain) RIGHT SHOULDER  LEFT SHOULDER   SCAPTION   5/5    5/5    IR    5/5    5/5   ER    5/5    5/5   BICEPS   5/5    5/5   Deltoid    5/5    5/5     SIGNS:  Painful side       NEER   -   OSONNYS        -    CHAPA   -   SPEEDS        -   DROP ARM   -   BELLY PRESS       -    X-Body ADD    -   LIFT-OFF        -   HORNBLOWERS      -              STABILITY TESTING     RIGHT SHOULDER  LEFT SHOULDER     Translation     Anterior up face    up face    Posterior up face   up face    Sulcus  < 10mm   < 10 mm     Signs   Apprehension   neg      neg       Relocation   no change     no change      Jerk test  neg     neg    EXTREMITY NEURO-VASCULAR EXAM    Sensation grossly intact to light touch all dermatomal regions.    DTR 2+ Biceps, Triceps, BR and Negative Chinas sign   Grossly intact motor function at Elbow, Wrist and Hand   Distal pulses radial and ulnar 2+, brisk cap refill, symmetric.      NECK:  Painless FROM and spinous processes non-tender. Negative Spurlings sign.       Other Findings:    ASSESSMENT & PLAN   Assessment:   #1 advanced OA changes of GH, right   W/ advanced acromialization of humeral head   W/ chronic tearing of superior rotator cuff   W/ chronic glenoid labral degenerative changes    No evidence of neurologic pathology  No evidence of vascular pathology    Imaging studies reviewed:   X-ray shoulder, right 17.05    Plan:  We discussed options including:  #1 watchful waiting  #2 continue physical therapy aimed at:   RoM glenohumeral joint   Strengthening rotator cuff   Scapular stability  #3 injection therapy:   CSI SAB    Right,    CSI iaGH    Right,    orthobiologics   #4 consultation re: rTSA   Likely poor surgical candidate given CAD   Likely 01/18 surgical consultation     The patient chooses #2     Pain management: handout  given, discussed heat  Bracing: shoulder sling, fit with at today's visit   Physical therapy: fPT, @ OSH, continue as above   Activity (e.g. sports, work) restrictions: as tolerated   school/vocation: works at [employer]     Follow up in 16 w  A/e fPT  Should symptoms worsen or fail to resolve, consider:  Revisiting the above options    [id number] The patient was fitted with, adjusted to, and trained in the use of a custom orthotic/brace. The patient demonstrated understanding in the use and proper care of the equipment.   This interaction took 15 minutes.

## 2017-06-08 ENCOUNTER — PROCEDURE VISIT (OUTPATIENT)
Dept: OPTOMETRY | Facility: CLINIC | Age: 77
End: 2017-06-08
Attending: FAMILY MEDICINE
Payer: COMMERCIAL

## 2017-06-08 ENCOUNTER — OFFICE VISIT (OUTPATIENT)
Dept: INTERNAL MEDICINE | Facility: CLINIC | Age: 77
End: 2017-06-08
Payer: COMMERCIAL

## 2017-06-08 VITALS
HEIGHT: 64 IN | DIASTOLIC BLOOD PRESSURE: 60 MMHG | BODY MASS INDEX: 34.67 KG/M2 | WEIGHT: 203.06 LBS | SYSTOLIC BLOOD PRESSURE: 108 MMHG | HEART RATE: 66 BPM

## 2017-06-08 DIAGNOSIS — H57.89 EYE IRRITATION: ICD-10-CM

## 2017-06-08 DIAGNOSIS — E03.9 HYPOTHYROIDISM, UNSPECIFIED TYPE: ICD-10-CM

## 2017-06-08 DIAGNOSIS — E11.9 TYPE 2 DIABETES MELLITUS WITHOUT RETINOPATHY: Primary | ICD-10-CM

## 2017-06-08 DIAGNOSIS — I10 ESSENTIAL HYPERTENSION: ICD-10-CM

## 2017-06-08 DIAGNOSIS — E11.9 TYPE 2 DIABETES MELLITUS WITHOUT RETINOPATHY: ICD-10-CM

## 2017-06-08 PROCEDURE — 1126F AMNT PAIN NOTED NONE PRSNT: CPT | Mod: S$GLB,,, | Performed by: FAMILY MEDICINE

## 2017-06-08 PROCEDURE — 99999 PR PBB SHADOW E&M-EST. PATIENT-LVL V: CPT | Mod: PBBFAC,,, | Performed by: FAMILY MEDICINE

## 2017-06-08 PROCEDURE — 99214 OFFICE O/P EST MOD 30 MIN: CPT | Mod: S$GLB,,, | Performed by: FAMILY MEDICINE

## 2017-06-08 PROCEDURE — 1159F MED LIST DOCD IN RCRD: CPT | Mod: S$GLB,,, | Performed by: FAMILY MEDICINE

## 2017-06-08 PROCEDURE — 92227 IMG RTA DETCJ/MNTR DS STAFF: CPT | Mod: S$GLB,,, | Performed by: OPHTHALMOLOGY

## 2017-06-08 RX ORDER — TRIAMCINOLONE ACETONIDE 1 MG/G
CREAM TOPICAL 2 TIMES DAILY
Qty: 454 TUBE | Refills: 3 | Status: ON HOLD | OUTPATIENT
Start: 2017-06-08 | End: 2018-11-30 | Stop reason: HOSPADM

## 2017-06-08 RX ORDER — TRIAMCINOLONE ACETONIDE 1 MG/G
CREAM TOPICAL 2 TIMES DAILY
Qty: 454 TUBE | Refills: 3 | Status: SHIPPED | OUTPATIENT
Start: 2017-06-08 | End: 2017-06-08 | Stop reason: SDUPTHER

## 2017-06-08 RX ORDER — OLOPATADINE HYDROCHLORIDE 1 MG/ML
1 SOLUTION/ DROPS OPHTHALMIC 2 TIMES DAILY
Qty: 5 ML | Refills: 1 | Status: ON HOLD | OUTPATIENT
Start: 2017-06-08 | End: 2018-11-30 | Stop reason: HOSPADM

## 2017-06-08 RX ORDER — FOLIC ACID 1 MG/1
TABLET ORAL
Qty: 90 TABLET | Refills: 0 | Status: SHIPPED | OUTPATIENT
Start: 2017-06-08 | End: 2018-02-11 | Stop reason: SDUPTHER

## 2017-06-08 NOTE — PROGRESS NOTES
"Subjective:       Patient ID: Krissy Marino is a 77 y.o. female.    Chief Complaint: Hypertension  Krissy Marino 77 y.o. female is here for office visit to review care and physical exam, reports awoke with medal right eye redness, no pain or change in vision, no crusting.  Co-workers were asking "what is it?"  Other wise doingwell, feels well.  Sees [facility],   HPI  Review of Systems   Constitutional: Negative for activity change, fatigue, fever and unexpected weight change.   HENT: Negative for congestion, hearing loss, postnasal drip and rhinorrhea.    Eyes: Positive for redness. Negative for visual disturbance.   Respiratory: Negative for chest tightness, shortness of breath and wheezing.    Cardiovascular: Negative for chest pain, palpitations and leg swelling.   Gastrointestinal: Negative for abdominal distention.   Genitourinary: Negative for decreased urine volume, dysuria, flank pain, hematuria, pelvic pain and urgency.   Musculoskeletal: Negative for back pain, gait problem, joint swelling and neck stiffness.   Skin: Negative for color change, rash and wound.   Neurological: Negative for dizziness, syncope, weakness and headaches.   Psychiatric/Behavioral: Negative for behavioral problems, confusion and sleep disturbance. The patient is not nervous/anxious.        Objective:      Physical Exam    Assessment:       No diagnosis found.    Plan:       Assessment   Krissy was seen today for hypertension.    Diagnoses and all orders for this visit:    Type 2 diabetes mellitus without retinopathy  -     Diabetic Eye Screening Photo; Future   - has A1c planned  Essential hypertension  - follow  Hypothyroidism, unspecified type  - reviewed  Eye irritation  -     olopatadine (PATANOL) 0.1 % ophthalmic solution; Place 1 drop into the right eye 2 (two) times daily.      "

## 2017-06-09 NOTE — PROGRESS NOTES
HPI     Pt 77 years old female here for a screening for diabetic retinopathy with   non dilated fundus per Dr Og Cid.    Last edited by Vianney Rebollar on 6/9/2017 10:25 AM. (History)              Assessment /Plan     For exam results, see Encounter Report.    Type 2 diabetes mellitus without retinopathy  -     Diabetic Eye Screening Photo

## 2017-06-16 ENCOUNTER — TELEPHONE (OUTPATIENT)
Dept: INTERNAL MEDICINE | Facility: CLINIC | Age: 77
End: 2017-06-16

## 2017-06-16 NOTE — TELEPHONE ENCOUNTER
----- Message from Solange Lima sent at 6/15/2017  3:21 PM CDT -----  Contact: self/[phone]  Patient called in regards needing and advise on what pain pill without sodium Dr Swan can recommend . Please call and advise.         Thank you!!!

## 2017-06-19 ENCOUNTER — OFFICE VISIT (OUTPATIENT)
Dept: CARDIOLOGY | Facility: CLINIC | Age: 77
End: 2017-06-19
Payer: COMMERCIAL

## 2017-06-19 ENCOUNTER — HOSPITAL ENCOUNTER (OUTPATIENT)
Dept: CARDIOLOGY | Facility: CLINIC | Age: 77
Discharge: HOME | End: 2017-06-19
Payer: COMMERCIAL

## 2017-06-19 VITALS
HEART RATE: 80 BPM | WEIGHT: 200.63 LBS | OXYGEN SATURATION: 95 % | HEIGHT: 65 IN | BODY MASS INDEX: 33.43 KG/M2 | DIASTOLIC BLOOD PRESSURE: 67 MMHG | SYSTOLIC BLOOD PRESSURE: 132 MMHG

## 2017-06-19 DIAGNOSIS — R80.9 CONTROLLED TYPE 2 DIABETES MELLITUS WITH MICROALBUMINURIA, WITH LONG-TERM CURRENT USE OF INSULIN: Chronic | ICD-10-CM

## 2017-06-19 DIAGNOSIS — I50.32 CHRONIC CONGESTIVE HEART FAILURE WITH LEFT VENTRICULAR DIASTOLIC DYSFUNCTION: Chronic | ICD-10-CM

## 2017-06-19 DIAGNOSIS — Z79.4 CONTROLLED TYPE 2 DIABETES MELLITUS WITH MICROALBUMINURIA, WITH LONG-TERM CURRENT USE OF INSULIN: Chronic | ICD-10-CM

## 2017-06-19 DIAGNOSIS — I25.10 CORONARY ARTERY DISEASE INVOLVING NATIVE CORONARY ARTERY OF NATIVE HEART WITHOUT ANGINA PECTORIS: Primary | Chronic | ICD-10-CM

## 2017-06-19 DIAGNOSIS — R60.0 BILATERAL LEG EDEMA: Chronic | ICD-10-CM

## 2017-06-19 DIAGNOSIS — R06.02 SOB (SHORTNESS OF BREATH): ICD-10-CM

## 2017-06-19 DIAGNOSIS — E11.29 CONTROLLED TYPE 2 DIABETES MELLITUS WITH MICROALBUMINURIA, WITH LONG-TERM CURRENT USE OF INSULIN: Chronic | ICD-10-CM

## 2017-06-19 DIAGNOSIS — I10 ESSENTIAL HYPERTENSION: Chronic | ICD-10-CM

## 2017-06-19 DIAGNOSIS — R06.02 SOB (SHORTNESS OF BREATH): Primary | ICD-10-CM

## 2017-06-19 PROCEDURE — 93000 ELECTROCARDIOGRAM COMPLETE: CPT | Mod: S$GLB,,, | Performed by: INTERNAL MEDICINE

## 2017-06-19 PROCEDURE — 99214 OFFICE O/P EST MOD 30 MIN: CPT | Mod: S$GLB,,, | Performed by: NURSE PRACTITIONER

## 2017-06-19 PROCEDURE — 1159F MED LIST DOCD IN RCRD: CPT | Mod: S$GLB,,, | Performed by: NURSE PRACTITIONER

## 2017-06-19 PROCEDURE — 99999 PR PBB SHADOW E&M-EST. PATIENT-LVL IV: CPT | Mod: PBBFAC,,, | Performed by: NURSE PRACTITIONER

## 2017-06-19 PROCEDURE — 1125F AMNT PAIN NOTED PAIN PRSNT: CPT | Mod: S$GLB,,, | Performed by: NURSE PRACTITIONER

## 2017-06-19 NOTE — PROGRESS NOTES
Ms. Marino is a patient of Dr. Cobb and was last seen in [facility] 5/3/2017.    Subjective:   Patient ID:  Krissy Marino is a 77 y.o. female who presents for follow-up of Chest Pain (x 2-3 days); Edema (both feet); and Shortness of Breath    HPI  Ms. Marino is a 77y.o.  female with chronic diastolic heart failure (EF 55%), hypertension, hyperlipidemia, hypothyroidism, obesity, and diabetes mellitus type II.  In August of 2016, she had a decrease in her EF to 25-30%.  After her PCI, her EF in 1/2017 increased to 55-60%.  She is in clinic today with worsening lower extremity edema and NEAL since the weekend.  States that she had a couple of episodes of chest pain that were non-exertional and lasted seconds on the right side of her chest. She describes the pain as sharp in nature and it is not associated with nausea, jaw pain, arm pain, or diaphoresis.  Patient denies palpitations, SOB, dizziness, syncope, edema, orthopnea, PND, or claudication.     Review of Systems   Constitution: Negative for decreased appetite, diaphoresis, weakness, malaise/fatigue, weight gain and weight loss.   HENT: Negative for headaches.    Eyes: Negative for visual disturbance.   Cardiovascular: Positive for dyspnea on exertion and leg swelling. Negative for chest pain, claudication, irregular heartbeat, near-syncope, orthopnea, palpitations, paroxysmal nocturnal dyspnea and syncope.        Denies chest pressure   Respiratory: Negative for cough, hemoptysis, shortness of breath, sleep disturbances due to breathing and snoring.    Endocrine: Negative for cold intolerance and heat intolerance.   Hematologic/Lymphatic: Negative for bleeding problem. Does not bruise/bleed easily.   Musculoskeletal: Negative for myalgias.   Gastrointestinal: Negative for bloating, abdominal pain, anorexia, change in bowel habit, constipation, diarrhea, nausea and vomiting.   Neurological: Negative for difficulty with concentration, disturbances in  coordination, excessive daytime sleepiness, dizziness, light-headedness, loss of balance and numbness.   Psychiatric/Behavioral: The patient does not have insomnia.      Allergies and current medications updated and reviewed:  Review of patient's allergies indicates:   Allergen Reactions    Sulfamethoxazole-trimethoprim Nausea And Vomiting    Latex, natural rubber Rash    Pcn [penicillins] Rash     Current Outpatient Prescriptions   Medication Sig    albuterol 90 mcg/actuation inhaler Inhale 2 puffs into the lungs every 6 (six) hours as needed for Wheezing.    alendronate (FOSAMAX) 70 MG tablet Take 1 tablet (70 mg total) by mouth every 7 days.    allopurinol (ZYLOPRIM) 300 MG tablet Take 1 tablet (300 mg total) by mouth once daily.    aspirin (ECOTRIN) 81 MG EC tablet Take 1 tablet (81 mg total) by mouth once daily.    atorvastatin (LIPITOR) 40 MG tablet TAKE 1 TABLET(40 MG) BY MOUTH EVERY DAY    azelastine (ASTELIN) 137 mcg (0.1 %) nasal spray 1 spray (137 mcg total) by Nasal route 2 (two) times daily.    BIOTIN ORAL Take by mouth.    blood sugar diagnostic (ACCU-CHEK AMELIA PLUS TEST STRP) Strp Checks bg 2 x day before meals    cholecalciferol, vitamin D3, 50,000 unit capsule Take 1 capsule (50,000 Units total) by mouth twice a week.    ciclopirox (PENLAC) 8 % Soln Apply topically nightly.    clopidogrel (PLAVIX) 75 mg tablet Take 1 tablet (75 mg total) by mouth once daily.    desonide (DESOWEN) 0.05 % cream AAA bid    erythromycin (ROMYCIN) ophthalmic ointment Place into the right eye 3 (three) times daily.    FERROUS GLUCONATE (FERATE ORAL) Take by mouth once daily at 6am.     folic acid (FOLVITE) 1 MG tablet TAKE 1 TABLET(1000 MCG) BY MOUTH EVERY DAY    furosemide (LASIX) 40 MG tablet Take 1 tablet (40 mg total) by mouth once daily.    hydrocortisone butyrate (LOCOID) 0.1 % Crea cream AAA buttock bid    insulin lispro (HUMALOG KWIKPEN) 100 unit/mL InPn pen Inject 5 Units into the skin 3  "(three) times daily with meals.    insulin needles, disposable, (BD ULTRA-FINE ILIANA PEN NEEDLES) 32 x 5/32 " Ndle Injects insulin 3 x day    KRILL/OM3/DHA/EPA/OM6/LIP/ASTX (KRILL OIL, OMEGA 3 & 6, ORAL) Take by mouth once daily at 6am.     levothyroxine (SYNTHROID) 75 MCG tablet TAKE 1 TABLET(75 MCG) BY MOUTH BEFORE BREAKFAST    lidocaine HCL 2% (XYLOCAINE) 2 % jelly Apply topically as needed.    metoprolol succinate (TOPROL-XL) 50 MG 24 hr tablet Take 1 tablet (50 mg total) by mouth once daily.    miconazole NITRATE 2 % (ZEASORB AF) 2 % top powder Apply topically as needed for Itching.    mometasone (NASONEX) 50 mcg/actuation nasal spray 2 sprays by Nasal route once daily.    nystatin-triamcinolone (MYCOLOG II) cream Apply topically 2 (two) times daily.    olopatadine (PATANOL) 0.1 % ophthalmic solution Place 1 drop into the right eye 2 (two) times daily.    omeprazole (PRILOSEC) 40 MG capsule Take 1 capsule (40 mg total) by mouth daily as needed.    ondansetron (ZOFRAN-ODT) 8 MG TbDL Take 1 tablet (8 mg total) by mouth every 12 (twelve) hours as needed.    ranitidine (ZANTAC) 150 MG tablet Take 1 tablet (150 mg total) by mouth 2 (two) times daily as needed for Heartburn.    silver sulfADIAZINE 1% (SILVADENE) 1 % cream aaa buttock bid    SORIATANE 10 mg capsule Take 2 po qday    SYMBICORT 160-4.5 mcg/actuation HFAA INHALE 2 PUFFS BY MOUTH INTO THE LUNGS EVERY 12 HOURS    triamcinolone acetonide 0.1% (KENALOG) 0.1 % cream Apply topically 2 (two) times daily. Apply to affected area     No current facility-administered medications for this visit.      Objective:     Right Arm BP - Sittin/72 (17 1437)  Left Arm BP - Sittin/67 (17 1437)    /67 (BP Location: Left arm, Patient Position: Sitting, BP Method: Automatic)   Pulse 80   Ht 5' 5" (1.651 m)   Wt 91 kg (200 lb 9.9 oz)   SpO2 95%   BMI 33.38 kg/m²     Physical Exam   Constitutional: She is oriented to person, place, " and time. Vital signs are normal. She appears well-developed and well-nourished. She is active. No distress.   HENT:   Head: Normocephalic and atraumatic.   Eyes: Conjunctivae and lids are normal. No scleral icterus.   Neck: Neck supple. Normal carotid pulses, no hepatojugular reflux and no JVD present. Carotid bruit is not present.   Cardiovascular: Normal rate, regular rhythm, S1 normal, S2 normal and intact distal pulses.  PMI is not displaced.  Exam reveals no gallop and no friction rub.    No murmur heard.  Pulses:       Carotid pulses are 2+ on the right side, and 2+ on the left side.       Radial pulses are 2+ on the right side, and 2+ on the left side.        Dorsalis pedis pulses are 2+ on the right side, and 2+ on the left side.        Posterior tibial pulses are 1+ on the right side, and 1+ on the left side.   Pulmonary/Chest: Effort normal and breath sounds normal. No respiratory distress. She has no decreased breath sounds. She has no wheezes. She has no rhonchi. She has no rales. She exhibits no tenderness.   Abdominal: Soft. Normal appearance and bowel sounds are normal. She exhibits no distension, no fluid wave, no abdominal bruit, no ascites and no pulsatile midline mass. There is no hepatosplenomegaly. There is no tenderness.   Musculoskeletal: She exhibits edema (BLE +1 pitting edema in ankles and feet.  ).   Neurological: She is alert and oriented to person, place, and time. Gait normal.   Skin: Skin is warm, dry and intact. No rash noted. She is not diaphoretic. Nails show no clubbing.   BLE venous staining   Psychiatric: She has a normal mood and affect. Her speech is normal and behavior is normal. Judgment and thought content normal. Cognition and memory are normal.   Nursing note and vitals reviewed.      Chemistry        Component Value Date/Time     03/04/2017 0822    K 4.2 03/04/2017 0822     03/04/2017 0822    CO2 26 03/04/2017 0822    BUN 51 (H) 03/04/2017 0822    CREATININE  0.9 03/04/2017 0822     (H) 03/04/2017 0822        Component Value Date/Time    CALCIUM 9.1 03/04/2017 0822    ALKPHOS 105 05/06/2017 0826    AST 20 05/06/2017 0826    ALT 15 05/06/2017 0826    BILITOT 0.6 05/06/2017 0826        Lab Results   Component Value Date    HGBA1C 7.1 (H) 11/25/2016       Recent Labs  Lab 10/12/15  0721  08/03/16  1712  03/04/17  0822 05/06/17  0826   WHITE BLOOD CELL COUNT  --   < >  --   < > 9.33  --    HEMOGLOBIN  --   < >  --   < > 12.3  --    HEMATOCRIT  --   < >  --   < > 37.7  --    MCV  --   < >  --   < > 99 H  --    PLATELETS  --   < >  --   < > 146 L  --    BNP  --   --  508 H  --   --   --    TSH 1.793  --   --   --   --   --    CHOLESTEROL 143  < >  --   < >  --  141   HDL 36 L  < >  --   < >  --  35 L   LDL CHOLESTEROL 77.0  < >  --   < >  --  82.2   TRIGLYCERIDES 150  < >  --   < >  --  119   HDL/CHOLESTEROL RATIO 25.2  < >  --   < >  --  24.8   < > = values in this interval not displayed.      Recent Labs  Lab 09/27/16  1240 10/05/16  0736   INR 0.9 0.9      Test(s) Reviewed  I have reviewed the following in detail:  [x] Stress test   [x] Angiography   [x] Echocardiogram   [x] Labs   [] Other:       Assessment:     1. Chronic diastolic heart failure  Lungs clear to auscultation and no JVD. BLE swelling. 3 pound weight loss since last visit. Not acute heart failure.    2. Coronary artery disease involving native coronary artery of native heart without angina pectoris  Atypical chest pain not thought to be cardiac in origin. Taking GDMT   3. Essential hypertension  Well controlled   4. Bilateral leg edema  Chronic. Not using support stockings. RLE has an ace wrap   5. Diabetes mellitus type II A1C 7.1       Plan:     Coronary artery disease involving native coronary artery of native heart without angina pectoris  Comments:  Continue GDMT    Essential hypertension  Comments:  No changes    Bilateral leg edema  Comments:  Support stockings    Chronic congestive heart  failure with left ventricular diastolic dysfunction  Comments:  No change    Controlled type 2 diabetes mellitus with microalbuminuria, with long-term current use of insulin  Comments:  Follow up as planned with PCP    Follow up with Dr. Luciano as previously scheduled.

## 2017-06-21 ENCOUNTER — OFFICE VISIT (OUTPATIENT)
Dept: INTERNAL MEDICINE | Facility: CLINIC | Age: 77
End: 2017-06-21
Payer: COMMERCIAL

## 2017-06-21 ENCOUNTER — NURSE TRIAGE (OUTPATIENT)
Dept: ADMINISTRATIVE | Facility: CLINIC | Age: 77
End: 2017-06-21

## 2017-06-21 VITALS — HEART RATE: 80 BPM | SYSTOLIC BLOOD PRESSURE: 118 MMHG | DIASTOLIC BLOOD PRESSURE: 68 MMHG

## 2017-06-21 DIAGNOSIS — N18.2 CHRONIC KIDNEY DISEASE, STAGE II (MILD): ICD-10-CM

## 2017-06-21 DIAGNOSIS — M75.101 ROTATOR CUFF SYNDROME OF RIGHT SHOULDER: Primary | ICD-10-CM

## 2017-06-21 DIAGNOSIS — E11.9 TYPE 2 DIABETES MELLITUS WITHOUT RETINOPATHY: ICD-10-CM

## 2017-06-21 DIAGNOSIS — I25.10 CORONARY ARTERY DISEASE INVOLVING NATIVE CORONARY ARTERY OF NATIVE HEART WITHOUT ANGINA PECTORIS: ICD-10-CM

## 2017-06-21 PROCEDURE — 99999 PR PBB SHADOW E&M-EST. PATIENT-LVL II: CPT | Mod: PBBFAC,,, | Performed by: FAMILY MEDICINE

## 2017-06-21 PROCEDURE — 99215 OFFICE O/P EST HI 40 MIN: CPT | Mod: S$GLB,,, | Performed by: FAMILY MEDICINE

## 2017-06-21 PROCEDURE — 1159F MED LIST DOCD IN RCRD: CPT | Mod: S$GLB,,, | Performed by: FAMILY MEDICINE

## 2017-06-21 RX ORDER — TRAMADOL HYDROCHLORIDE 50 MG/1
50 TABLET ORAL EVERY 6 HOURS PRN
Qty: 30 TABLET | Refills: 0 | Status: SHIPPED | OUTPATIENT
Start: 2017-06-21 | End: 2017-07-01

## 2017-06-21 NOTE — PROGRESS NOTES
Subjective:       Patient ID: Krissy Marino is a 77 y.o. female.    Chief Complaint: No chief complaint on file.  Krissy Marino 77 y.o. female is here for office visit to review care and physical exam, heref or appt discuss right shoulder pain, worse lying down, worse last night.  Had recent radiography with pathology noted.      HPI  Review of Systems   Constitutional: Negative for activity change, fatigue, fever and unexpected weight change.   HENT: Negative for congestion, hearing loss, postnasal drip and rhinorrhea.    Eyes: Negative for redness and visual disturbance.   Respiratory: Negative for chest tightness, shortness of breath and wheezing.    Cardiovascular: Negative for chest pain, palpitations and leg swelling.   Gastrointestinal: Negative for abdominal distention.   Genitourinary: Negative for decreased urine volume, dysuria, flank pain, hematuria, pelvic pain and urgency.   Musculoskeletal: Negative for back pain, gait problem, joint swelling and neck stiffness.   Skin: Negative for color change, rash and wound.   Neurological: Negative for dizziness, syncope, weakness and headaches.   Psychiatric/Behavioral: Negative for behavioral problems, confusion and sleep disturbance. The patient is not nervous/anxious.        Objective:      Physical Exam   Constitutional: She is oriented to person, place, and time. She appears well-developed and well-nourished. No distress.   HENT:   Head: Normocephalic.   Mouth/Throat: No oropharyngeal exudate.   Eyes: EOM are normal. Pupils are equal, round, and reactive to light. No scleral icterus.   Neck: Neck supple. No JVD present. No thyromegaly present.   Cardiovascular: Normal rate, regular rhythm and normal heart sounds.  Exam reveals no gallop and no friction rub.    No murmur heard.  Pulmonary/Chest: Effort normal and breath sounds normal. She has no wheezes. She has no rales.   Abdominal: Soft. Bowel sounds are normal. She exhibits no distension and no mass.  There is no tenderness. There is no guarding.   Musculoskeletal: Normal range of motion. She exhibits no edema.   Lymphadenopathy:     She has no cervical adenopathy.   Neurological: She is alert and oriented to person, place, and time. She has normal reflexes. She displays normal reflexes. No cranial nerve deficit. She exhibits normal muscle tone.   Skin: Skin is warm. No rash noted. No erythema.   Psychiatric: She has a normal mood and affect. Thought content normal.       Assessment:       No diagnosis found.    Plan:       Diagnoses and all orders for this visit:    Rotator cuff syndrome of right shoulder  -     Ambulatory Referral to Orthopedics    Type 2 diabetes mellitus without retinopathy  -     Hemoglobin A1c; Future    Coronary artery disease involving native coronary artery of native heart without angina pectoris  - Seen by [facility] recently  Chronic kidney disease, stage II (mild)    - follow, discussed Kidney health

## 2017-06-21 NOTE — TELEPHONE ENCOUNTER
Reason for Disposition   Patient wants to be seen    Protocols used: ST SHOULDER PAIN-A-OH  Patient states she is experiencing right shoulder pain that radiates down her back.

## 2017-06-24 ENCOUNTER — LAB VISIT (OUTPATIENT)
Dept: LAB | Facility: HOSPITAL | Age: 77
End: 2017-06-24
Attending: FAMILY MEDICINE
Payer: COMMERCIAL

## 2017-06-24 DIAGNOSIS — E11.29 CONTROLLED TYPE 2 DIABETES MELLITUS WITH MICROALBUMINURIA, WITH LONG-TERM CURRENT USE OF INSULIN: ICD-10-CM

## 2017-06-24 DIAGNOSIS — R80.9 CONTROLLED TYPE 2 DIABETES MELLITUS WITH MICROALBUMINURIA, WITH LONG-TERM CURRENT USE OF INSULIN: ICD-10-CM

## 2017-06-24 DIAGNOSIS — E78.5 DYSLIPIDEMIA: ICD-10-CM

## 2017-06-24 DIAGNOSIS — Z79.4 CONTROLLED TYPE 2 DIABETES MELLITUS WITH MICROALBUMINURIA, WITH LONG-TERM CURRENT USE OF INSULIN: ICD-10-CM

## 2017-06-24 LAB
CHOLEST/HDLC SERPL: 3.9 {RATIO}
HDL/CHOLESTEROL RATIO: 25.9 %
HDLC SERPL-MCNC: 112 MG/DL
HDLC SERPL-MCNC: 29 MG/DL
LDLC SERPL CALC-MCNC: 59.6 MG/DL
NONHDLC SERPL-MCNC: 83 MG/DL
TRIGL SERPL-MCNC: 117 MG/DL

## 2017-06-24 PROCEDURE — 80061 LIPID PANEL: CPT

## 2017-06-24 PROCEDURE — 36415 COLL VENOUS BLD VENIPUNCTURE: CPT | Mod: PO

## 2017-06-24 PROCEDURE — 83036 HEMOGLOBIN GLYCOSYLATED A1C: CPT

## 2017-06-25 LAB
ESTIMATED AVG GLUCOSE: 131 MG/DL
HBA1C MFR BLD HPLC: 6.2 %

## 2017-07-01 DIAGNOSIS — M25.50 JOINT PAIN: ICD-10-CM

## 2017-07-03 RX ORDER — LIDOCAINE HYDROCHLORIDE 20 MG/ML
JELLY TOPICAL
Qty: 30 ML | Refills: 3 | Status: ON HOLD | OUTPATIENT
Start: 2017-07-03 | End: 2018-11-30 | Stop reason: HOSPADM

## 2017-07-06 ENCOUNTER — OFFICE VISIT (OUTPATIENT)
Dept: WOUND CARE | Facility: CLINIC | Age: 77
End: 2017-07-06
Payer: COMMERCIAL

## 2017-07-06 DIAGNOSIS — S41.112A SKIN TEAR OF LEFT UPPER ARM WITHOUT COMPLICATION, INITIAL ENCOUNTER: ICD-10-CM

## 2017-07-06 DIAGNOSIS — E11.9 TYPE 2 DIABETES MELLITUS WITHOUT RETINOPATHY: Primary | ICD-10-CM

## 2017-07-06 PROCEDURE — 99213 OFFICE O/P EST LOW 20 MIN: CPT | Mod: S$GLB,,, | Performed by: CLINICAL NURSE SPECIALIST

## 2017-07-06 PROCEDURE — 1159F MED LIST DOCD IN RCRD: CPT | Mod: S$GLB,,, | Performed by: CLINICAL NURSE SPECIALIST

## 2017-07-06 PROCEDURE — 99999 PR PBB SHADOW E&M-EST. PATIENT-LVL II: CPT | Mod: PBBFAC,,, | Performed by: CLINICAL NURSE SPECIALIST

## 2017-07-07 NOTE — PROGRESS NOTES
Subjective:       Patient ID: Krissy Marino is a 77 y.o. female.    Chief Complaint: Wound Check (Skin tears)    This is new pt here for eval of skin tear sustained several days ago when at  Therapy her skin was accidentally torn when staff member caught her from falling. She has been dressing with telfa and medihoney.       Review of Systems   Constitutional: Negative for activity change and appetite change.   HENT: Negative for congestion and rhinorrhea.    Respiratory: Negative for cough and shortness of breath.    Cardiovascular: Negative for chest pain.   Gastrointestinal: Negative.    Genitourinary: Negative.    Musculoskeletal: Negative.    Skin: Positive for wound.   Neurological: Negative.    Psychiatric/Behavioral: Negative.        Objective:      Physical Exam   Constitutional: She is oriented to person, place, and time. She appears well-developed and well-nourished.   Pulmonary/Chest: Effort normal. No respiratory distress.   Abdominal: Soft. Bowel sounds are normal.   Musculoskeletal: Normal range of motion. She exhibits no edema.   Neurological: She is alert and oriented to person, place, and time.   Skin: Skin is warm and dry.       Skin tear is partial thickness with no sign of cellulitis. Will cover with mepilex transfer ag for week.    Assessment:       1. Type 2 diabetes mellitus without retinopathy    2. Skin tear of left upper arm without complication, initial encounter        Plan:       Topical care with silver foam long term dressing , secured with tubigrip  discussed other skin issue and gave her Calmoseptine to use topically  I have reviewed the plan of care with the patient and she express understanding. I spent over 50% of this 20 minute visit in face to face counseling.

## 2017-07-12 ENCOUNTER — HOSPITAL ENCOUNTER (OUTPATIENT)
Dept: RADIOLOGY | Facility: HOSPITAL | Age: 77
Discharge: HOME | End: 2017-07-12
Attending: FAMILY MEDICINE
Payer: COMMERCIAL

## 2017-07-12 ENCOUNTER — OFFICE VISIT (OUTPATIENT)
Dept: SPORTS MEDICINE | Facility: CLINIC | Age: 77
End: 2017-07-12
Payer: COMMERCIAL

## 2017-07-12 VITALS — HEIGHT: 65 IN | TEMPERATURE: 99 F | BODY MASS INDEX: 33.32 KG/M2 | WEIGHT: 200 LBS

## 2017-07-12 DIAGNOSIS — M12.811 ROTATOR CUFF ARTHROPATHY, RIGHT: Primary | ICD-10-CM

## 2017-07-12 DIAGNOSIS — M25.512 ACUTE PAIN OF LEFT SHOULDER: ICD-10-CM

## 2017-07-12 DIAGNOSIS — M12.812 ROTATOR CUFF ARTHROPATHY, LEFT: ICD-10-CM

## 2017-07-12 PROCEDURE — 99214 OFFICE O/P EST MOD 30 MIN: CPT | Mod: 25,S$GLB,, | Performed by: FAMILY MEDICINE

## 2017-07-12 PROCEDURE — 73030 X-RAY EXAM OF SHOULDER: CPT | Mod: 26,LT,, | Performed by: RADIOLOGY

## 2017-07-12 PROCEDURE — 99999 PR PBB SHADOW E&M-EST. PATIENT-LVL III: CPT | Mod: PBBFAC,,, | Performed by: FAMILY MEDICINE

## 2017-07-12 PROCEDURE — 1125F AMNT PAIN NOTED PAIN PRSNT: CPT | Mod: S$GLB,,, | Performed by: FAMILY MEDICINE

## 2017-07-12 PROCEDURE — 73030 X-RAY EXAM OF SHOULDER: CPT | Mod: TC,PO,LT

## 2017-07-12 PROCEDURE — 20610 DRAIN/INJ JOINT/BURSA W/O US: CPT | Mod: 50,S$GLB,, | Performed by: FAMILY MEDICINE

## 2017-07-12 PROCEDURE — 1159F MED LIST DOCD IN RCRD: CPT | Mod: S$GLB,,, | Performed by: FAMILY MEDICINE

## 2017-07-12 RX ORDER — TRIAMCINOLONE ACETONIDE 40 MG/ML
40 INJECTION, SUSPENSION INTRA-ARTICULAR; INTRAMUSCULAR
Status: DISCONTINUED | OUTPATIENT
Start: 2017-07-12 | End: 2017-07-12 | Stop reason: HOSPADM

## 2017-07-12 RX ADMIN — TRIAMCINOLONE ACETONIDE 40 MG: 40 INJECTION, SUSPENSION INTRA-ARTICULAR; INTRAMUSCULAR at 02:07

## 2017-07-12 NOTE — PROGRESS NOTES
Krissy Marino, a 77 y.o. female, is here for evaluation of RIGHT and LEFT shoulder pain.     HISTORY OF PRESENT ILLNESS  Hand dominance, right     Location: anterior and lateral, bilateral   Onset: Insidious, many years  Palliative:    Relative rest   Oral analgesics (tylenol PM)   R- CSI, SAB, 07/18/16, 80% improvement   R - CSI, SAB, 12/02/17, moderate improvement for ~ 3 weeks    R - CSI, SAB, 01/27/17, no improvement    R - CSI, iaGH/SAB, 02/21/17, mild to moderate relief    fPT @ [facility] - going well, but ROM remains painful   Heat    R - CSI, iaGH/SAB, 05/05/17, moderate%   Sling PRN  Provocative:    ADLs   Prior:    Currently doing cardiac rehab following stent placement 16.10  Progression: plateau discomfort   Quality:    Sharp pain at times activity   Throbbing at times with activity    Muscle soreness   Radiation: none  Severity: per nursing documentation  Timing: intermittent with use  Trauma:    17.07: mechanical fall while at PT --> landed on L arm     Review of systems (ROS):  A 10+ review of systems was performed with pertinent positives and negatives noted above in the history of present illness. Other systems were negative unless otherwise specified.      PHYSICAL EXAMINATION  General:  The patient is alert and oriented x 3. Mood is pleasant. Observation of ears, eyes and nose reveal no gross abnormalities. HEENT: NCAT, sclera anicteric. Lungs: Respirations are equal and unlabored.     RIGHT SHOULDER EXAMINATION     OBSERVATION:     Swelling  none  Deformity  none   Discoloration  none   Scapular winging none   Scars   none  Atrophy  none    TENDERNESS / CREPITUS (T/C):          T/C      T/C   Clavicle   -/-  SUPRAspinatus    -/-     AC Jt.    -/-  INFRAspinatus  -/-    SC Jt.    -/-  Deltoid    -/-      G. Tuberosity  -/-  LH BICEP groove  -/-   Acromion:  -/-  Midline Neck   -/-     Scapular Spine -/-  Trapezium   -/-   SMA Scapula  -/-  GH jt. line - post  -/-     Scapulothoracic   -/-         ROM:     Right shoulder   Left shoulder        AROM (PROM)   AROM (PROM)   FE    170° (175°)*     170° (175°)*     ER at 0°    60°  (65°) *   60°  (65°)*   ER at 90° ABD  90°  (90°) *   90°  (90°)*   IR at 90°  ABD   NA  (40°)  *   NA  (40°)  *    IR (spine level)   T10  *   T10*    STRENGTH: (* = with pain) RIGHT SHOULDER  LEFT SHOULDER   SCAPTION   4/5    4/5   IR    4/5    4/5   ER    4/5    4/5   BICEPS   4/5    4/5   Deltoid    4/5    4/5     SIGNS:  Painful side       NEER   -   OSONNYS        +    CHAPA   +   SPEEDS        +   DROP ARM   -   BELLY PRESS       -    X-Body ADD    +   LIFT-OFF        +   HORNBLOWERS      +              STABILITY TESTING   RIGHT SHOULDER  LEFT SHOULDER     Translation     Anterior up face    up face    Posterior up face   up face    Sulcus  < 10mm   < 10 mm     Signs   Apprehension   neg      neg       Relocation   no change     no change      Jerk test  neg     neg    EXTREMITY NEURO-VASCULAR EXAM    Sensation grossly intact to light touch all dermatomal regions.    DTR 2+ Biceps, Triceps, BR and Negative Chinas sign   Grossly intact motor function at Elbow, Wrist and Hand   Distal pulses radial and ulnar 2+, brisk cap refill, symmetric.      NECK:  Painless FROM and spinous processes non-tender. Negative Spurlings sign.       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   W/ advanced acromialization of humeral head   W/ chronic tearing of superior rotator cuff   W/ chronic glenoid labral degenerative changes    No evidence of neurologic pathology  No evidence of vascular pathology    Imaging studies reviewed:   X-ray shoulder, right 17.05  X-ray shoulder, left 17.07    Plan:  We discussed options including:  #1 watchful waiting  #2 continue physical therapy aimed at:   RoM glenohumeral joint   Strengthening rotator cuff   Scapular stability    fpt  #3 injection therapy:   CSI SAB    Right, effective  moderate%, repeat    Left,    CSI iaGH    Right, effect moderate%    Left,    orthobiologics   #4 consultation re: rTSA   Likely poor surgical candidate given CAD   Likely 01/18 surgical consultation     The patient chooses #2 and #3 csi sab bilateral    Pain management: handout given  Bracing: shoulder sling, prn  Physical therapy: fPT, @ OSH, continue as above   Activity (e.g. sports, work) restrictions: as tolerated   school/vocation: works at [employer]     Follow up in 6-8 w  A/e fPT and csi x 2  Should symptoms worsen or fail to resolve, consider:  Revisiting the above options

## 2017-07-12 NOTE — PROCEDURES
Large Joint Aspiration/Injection  Date/Time: 7/12/2017 2:12 PM  Performed by: MOE MIRELES  Authorized by: MOE MIRELES     Consent Done?:  Yes (Verbal)  Indications:  Pain  Procedure site marked: Yes    Timeout: Prior to procedure the correct patient, procedure, and site was verified      Location:  Shoulder  Site:  R subacromial bursa and L subacromial bursa  Prep: Patient was prepped and draped in usual sterile fashion    Ultrasonic Guidance for needle placement: No  Needle size:  22 G  Approach:  Posterior  Medications:  40 mg triamcinolone acetonide 40 mg/mL; 40 mg triamcinolone acetonide 40 mg/mL  Patient tolerance:  Patient tolerated the procedure well with no immediate complications

## 2017-07-15 DIAGNOSIS — E78.5 HYPERLIPIDEMIA: ICD-10-CM

## 2017-07-16 RX ORDER — ATORVASTATIN CALCIUM 40 MG/1
TABLET, FILM COATED ORAL
Qty: 90 TABLET | Refills: 0 | Status: SHIPPED | OUTPATIENT
Start: 2017-07-16 | End: 2017-10-12 | Stop reason: SDUPTHER

## 2017-07-27 RX ORDER — ALLOPURINOL 300 MG/1
TABLET ORAL
Qty: 90 TABLET | Refills: 0 | Status: SHIPPED | OUTPATIENT
Start: 2017-07-27 | End: 2017-10-25 | Stop reason: SDUPTHER

## 2017-08-04 RX ORDER — CICLOPIROX 80 MG/ML
SOLUTION TOPICAL
Qty: 6.6 ML | Refills: 0 | Status: SHIPPED | OUTPATIENT
Start: 2017-08-04 | End: 2017-09-02 | Stop reason: SDUPTHER

## 2017-08-12 ENCOUNTER — LAB VISIT (OUTPATIENT)
Dept: LAB | Facility: HOSPITAL | Age: 77
End: 2017-08-12
Attending: INTERNAL MEDICINE
Payer: COMMERCIAL

## 2017-08-12 DIAGNOSIS — E55.9 VITAMIN D INSUFFICIENCY: ICD-10-CM

## 2017-08-12 DIAGNOSIS — M31.6 GIANT CELL ARTERITIS: ICD-10-CM

## 2017-08-12 LAB
ALBUMIN SERPL BCP-MCNC: 3.3 G/DL
ALP SERPL-CCNC: 120 U/L
ALT SERPL W/O P-5'-P-CCNC: 20 U/L
ANION GAP SERPL CALC-SCNC: 10 MMOL/L
AST SERPL-CCNC: 20 U/L
BASOPHILS # BLD AUTO: 0.01 K/UL
BASOPHILS NFR BLD: 0.1 %
BILIRUB SERPL-MCNC: 0.9 MG/DL
BUN SERPL-MCNC: 29 MG/DL
CALCIUM SERPL-MCNC: 9.4 MG/DL
CHLORIDE SERPL-SCNC: 104 MMOL/L
CO2 SERPL-SCNC: 28 MMOL/L
CREAT SERPL-MCNC: 0.8 MG/DL
CRP SERPL-MCNC: 1.7 MG/L
DIFFERENTIAL METHOD: ABNORMAL
EOSINOPHIL # BLD AUTO: 0.1 K/UL
EOSINOPHIL NFR BLD: 1.4 %
ERYTHROCYTE [DISTWIDTH] IN BLOOD BY AUTOMATED COUNT: 13.7 %
ERYTHROCYTE [SEDIMENTATION RATE] IN BLOOD BY WESTERGREN METHOD: 45 MM/HR
EST. GFR  (AFRICAN AMERICAN): >60 ML/MIN/1.73 M^2
EST. GFR  (NON AFRICAN AMERICAN): >60 ML/MIN/1.73 M^2
GLUCOSE SERPL-MCNC: 142 MG/DL
HCT VFR BLD AUTO: 39.7 %
HGB BLD-MCNC: 12.6 G/DL
LYMPHOCYTES # BLD AUTO: 1.3 K/UL
LYMPHOCYTES NFR BLD: 14.2 %
MCH RBC QN AUTO: 32.3 PG
MCHC RBC AUTO-ENTMCNC: 31.7 G/DL
MCV RBC AUTO: 102 FL
MONOCYTES # BLD AUTO: 0.5 K/UL
MONOCYTES NFR BLD: 4.8 %
NEUTROPHILS # BLD AUTO: 7.4 K/UL
NEUTROPHILS NFR BLD: 79.3 %
PLATELET # BLD AUTO: 164 K/UL
PMV BLD AUTO: 11 FL
POTASSIUM SERPL-SCNC: 4.1 MMOL/L
PROT SERPL-MCNC: 6.1 G/DL
RBC # BLD AUTO: 3.9 M/UL
SODIUM SERPL-SCNC: 142 MMOL/L
WBC # BLD AUTO: 9.29 K/UL

## 2017-08-12 PROCEDURE — 82306 VITAMIN D 25 HYDROXY: CPT

## 2017-08-12 PROCEDURE — 85025 COMPLETE CBC W/AUTO DIFF WBC: CPT

## 2017-08-12 PROCEDURE — 86140 C-REACTIVE PROTEIN: CPT

## 2017-08-12 PROCEDURE — 80053 COMPREHEN METABOLIC PANEL: CPT

## 2017-08-12 PROCEDURE — 85651 RBC SED RATE NONAUTOMATED: CPT

## 2017-08-12 PROCEDURE — 36415 COLL VENOUS BLD VENIPUNCTURE: CPT | Mod: PO

## 2017-08-14 LAB — 25(OH)D3+25(OH)D2 SERPL-MCNC: 64 NG/ML

## 2017-08-16 ENCOUNTER — LAB VISIT (OUTPATIENT)
Dept: LAB | Facility: HOSPITAL | Age: 77
End: 2017-08-16
Payer: COMMERCIAL

## 2017-08-16 ENCOUNTER — OFFICE VISIT (OUTPATIENT)
Dept: RHEUMATOLOGY | Facility: CLINIC | Age: 77
End: 2017-08-16
Payer: COMMERCIAL

## 2017-08-16 ENCOUNTER — OFFICE VISIT (OUTPATIENT)
Dept: CARDIOLOGY | Facility: CLINIC | Age: 77
End: 2017-08-16
Payer: COMMERCIAL

## 2017-08-16 VITALS
WEIGHT: 200.38 LBS | DIASTOLIC BLOOD PRESSURE: 64 MMHG | HEART RATE: 71 BPM | HEIGHT: 65 IN | BODY MASS INDEX: 33.38 KG/M2 | SYSTOLIC BLOOD PRESSURE: 131 MMHG

## 2017-08-16 VITALS
BODY MASS INDEX: 33.38 KG/M2 | WEIGHT: 200.38 LBS | SYSTOLIC BLOOD PRESSURE: 151 MMHG | DIASTOLIC BLOOD PRESSURE: 79 MMHG | HEART RATE: 62 BPM | HEIGHT: 65 IN

## 2017-08-16 DIAGNOSIS — I25.10 CORONARY ARTERY DISEASE INVOLVING NATIVE CORONARY ARTERY OF NATIVE HEART WITHOUT ANGINA PECTORIS: ICD-10-CM

## 2017-08-16 DIAGNOSIS — I10 ESSENTIAL HYPERTENSION: ICD-10-CM

## 2017-08-16 DIAGNOSIS — M31.6 GIANT CELL ARTERITIS: Primary | ICD-10-CM

## 2017-08-16 DIAGNOSIS — I50.32 CHRONIC CONGESTIVE HEART FAILURE WITH LEFT VENTRICULAR DIASTOLIC DYSFUNCTION: ICD-10-CM

## 2017-08-16 DIAGNOSIS — E55.9 VITAMIN D INSUFFICIENCY: ICD-10-CM

## 2017-08-16 DIAGNOSIS — Z79.52 LONG TERM CURRENT USE OF SYSTEMIC STEROIDS: ICD-10-CM

## 2017-08-16 DIAGNOSIS — E78.5 DYSLIPIDEMIA: ICD-10-CM

## 2017-08-16 DIAGNOSIS — E03.9 HYPOTHYROIDISM, UNSPECIFIED TYPE: Primary | ICD-10-CM

## 2017-08-16 DIAGNOSIS — E03.9 HYPOTHYROIDISM, UNSPECIFIED TYPE: ICD-10-CM

## 2017-08-16 DIAGNOSIS — R60.0 BILATERAL LEG EDEMA: ICD-10-CM

## 2017-08-16 DIAGNOSIS — E66.9 OBESITY (BMI 30-39.9): ICD-10-CM

## 2017-08-16 LAB — TSH SERPL DL<=0.005 MIU/L-ACNC: 0.84 UIU/ML

## 2017-08-16 PROCEDURE — 99214 OFFICE O/P EST MOD 30 MIN: CPT | Mod: S$GLB,,, | Performed by: INTERNAL MEDICINE

## 2017-08-16 PROCEDURE — 1159F MED LIST DOCD IN RCRD: CPT | Mod: S$GLB,,, | Performed by: INTERNAL MEDICINE

## 2017-08-16 PROCEDURE — 99999 PR PBB SHADOW E&M-EST. PATIENT-LVL V: CPT | Mod: PBBFAC,,, | Performed by: INTERNAL MEDICINE

## 2017-08-16 PROCEDURE — 3078F DIAST BP <80 MM HG: CPT | Mod: S$GLB,,, | Performed by: INTERNAL MEDICINE

## 2017-08-16 PROCEDURE — 84443 ASSAY THYROID STIM HORMONE: CPT

## 2017-08-16 PROCEDURE — 3008F BODY MASS INDEX DOCD: CPT | Mod: S$GLB,,, | Performed by: INTERNAL MEDICINE

## 2017-08-16 PROCEDURE — 3075F SYST BP GE 130 - 139MM HG: CPT | Mod: S$GLB,,, | Performed by: INTERNAL MEDICINE

## 2017-08-16 PROCEDURE — 1125F AMNT PAIN NOTED PAIN PRSNT: CPT | Mod: S$GLB,,, | Performed by: INTERNAL MEDICINE

## 2017-08-16 PROCEDURE — 99999 PR PBB SHADOW E&M-EST. PATIENT-LVL III: CPT | Mod: PBBFAC,,, | Performed by: INTERNAL MEDICINE

## 2017-08-16 PROCEDURE — 3074F SYST BP LT 130 MM HG: CPT | Mod: S$GLB,,, | Performed by: INTERNAL MEDICINE

## 2017-08-16 PROCEDURE — 36415 COLL VENOUS BLD VENIPUNCTURE: CPT

## 2017-08-16 RX ORDER — CETIRIZINE HYDROCHLORIDE 10 MG/1
10 TABLET ORAL DAILY
Qty: 30 TABLET | Refills: 2 | Status: ON HOLD | OUTPATIENT
Start: 2017-08-16 | End: 2018-12-20 | Stop reason: HOSPADM

## 2017-08-16 ASSESSMENT — ROUTINE ASSESSMENT OF PATIENT INDEX DATA (RAPID3)
PATIENT GLOBAL ASSESSMENT SCORE: 4
PAIN SCORE: 4.5
FATIGUE SCORE: 7
AM STIFFNESS SCORE: 0, NO
TOTAL RAPID3 SCORE: 3.5
PSYCHOLOGICAL DISTRESS SCORE: 0
MDHAQ FUNCTION SCORE: .6

## 2017-08-16 NOTE — Clinical Note
Having some chronic dry cough not related to ACE-inhibitor or CHF. Adding Zyrtec today. Might be related to GERD since she was changed from omeprazole to ranitidine.

## 2017-08-16 NOTE — PROGRESS NOTES
I have personally interviewed and examined the patient. I have reviewed the notes, assessments, and plans performed by Dr. Kohler and I CONCUR with his documentation of  Krissy Marino.

## 2017-08-16 NOTE — PROGRESS NOTES
Cardiology Clinic Note  Reason for Visit: F/u CHF and CAD    HPI:   Ms. Marino is a 77 year old white female with HTN DM well controlled, HLD and ICM with LVEF 30-35% (now recovered) on exercise stress echo who presents to cardiology clinic for follow up. Seen by me multiple times since August 2016. Found to have 99% prox LAD and 90% prox D1. Received OSMEL to prox and mid LAD with DONNY 1 flow. Had repeat PET showing improved LVEF and no evidence of ischemia. Last echo in January showed LVEF 60% with diastolic dysfunction. Feels much better now. No limiting symptoms. During her last visit with me in May, she was complaining of worsening dry cough. She was also borderline hypotensive so lisinopril was discontinued given that her LVEF had recovered. Since that time, she has felt well except that her dry cough has continued to bother her. Symptoms worse when she sits in her lounge chair and relaxes. She does not have chest discomfort or shortness of breath with activity. She has not noticed any association with eating but does have more heartburn since she was switched from omeprazole to ranitidine. Also having more daytime somnolence and poor sleep, unclear is she snores.    ROS:    Constitution: Negative for fever, chills, weight loss or gain.   HENT: Negative for sore throat, rhinorrhea, or headache; post nasal drip  Eyes: Negative for blurred or double vision.   Cardiovascular: See above  Pulmonary: no NEAL, + cough   Gastrointestinal: Negative for abdominal pain, nausea, vomiting, or diarrhea.   : Negative for dysuria.   Neurological: Negative for focal weakness or sensory changes.  PMH:     Past Medical History:   Diagnosis Date    Adverse effect of glucocorticoid or synthetic analogue     Allergy     Arthritis     Cancer     CHF (congestive heart failure)     Chronic kidney disease     Coronary artery disease involving native coronary artery of native heart without angina pectoris 10/11/2016     Diabetic neuropathy 10/6/2014    Giant cell arteritis 9/24/2012    Hyperlipidemia     Hypertension     Hypothyroid     Obesity (BMI 30-39.9) 10/6/2014    Osteopenia     Primary osteoarthritis of both knees 9/24/2012    Type II or unspecified type diabetes mellitus with renal manifestations, uncontrolled      Past Surgical History:   Procedure Laterality Date    APPENDECTOMY      CATARACT EXTRACTION, BILATERAL      CHOLECYSTECTOMY      EYE SURGERY      HYSTERECTOMY      JOINT REPLACEMENT      bilateral total knee    SKIN BIOPSY      TONSILLECTOMY       Allergies:     Review of patient's allergies indicates:   Allergen Reactions    Sulfamethoxazole-trimethoprim Nausea And Vomiting    Latex, natural rubber Rash    Pcn [penicillins] Rash     Medications:     Current Outpatient Prescriptions on File Prior to Visit   Medication Sig Dispense Refill    albuterol 90 mcg/actuation inhaler Inhale 2 puffs into the lungs every 6 (six) hours as needed for Wheezing. 1 each 11    alendronate (FOSAMAX) 70 MG tablet Take 1 tablet (70 mg total) by mouth every 7 days. 4 tablet 11    allopurinol (ZYLOPRIM) 300 MG tablet TAKE 1 TABLET(300 MG) BY MOUTH EVERY DAY 90 tablet 0    aspirin (ECOTRIN) 81 MG EC tablet Take 1 tablet (81 mg total) by mouth once daily. 90 tablet 3    atorvastatin (LIPITOR) 40 MG tablet TAKE 1 TABLET(40 MG) BY MOUTH EVERY DAY 90 tablet 0    azelastine (ASTELIN) 137 mcg (0.1 %) nasal spray 1 spray (137 mcg total) by Nasal route 2 (two) times daily. 30 mL 11    BIOTIN ORAL Take by mouth.      blood sugar diagnostic (ACCU-CHEK AMELIA PLUS TEST STRP) Strp Checks bg 2 x day before meals 200 strip 4    cholecalciferol, vitamin D3, 50,000 unit capsule Take 1 capsule (50,000 Units total) by mouth twice a week. 40 capsule 0    ciclopirox (PENLAC) 8 % Soln APPLY EXTERNALLY TO THE AFFECTED AREA EVERY NIGHT 6.6 mL 0    clopidogrel (PLAVIX) 75 mg tablet Take 1 tablet (75 mg total) by mouth once daily.  "90 tablet 3    desonide (DESOWEN) 0.05 % cream AAA bid 180 g 1    erythromycin (ROMYCIN) ophthalmic ointment Place into the right eye 3 (three) times daily. 1 Tube 3    FERROUS GLUCONATE (FERATE ORAL) Take by mouth once daily at 6am.       folic acid (FOLVITE) 1 MG tablet TAKE 1 TABLET(1000 MCG) BY MOUTH EVERY DAY 90 tablet 0    furosemide (LASIX) 40 MG tablet Take 1 tablet (40 mg total) by mouth once daily. 90 tablet 3    hydrocortisone butyrate (LOCOID) 0.1 % Crea cream AAA buttock bid 180 g 0    insulin lispro (HUMALOG KWIKPEN) 100 unit/mL InPn pen Inject 5 Units into the skin 3 (three) times daily with meals. 1 Box 1    insulin needles, disposable, (Cytoguide ULTRA-FINE ILIANA PEN NEEDLES) 32 x 5/32 " Ndle Injects insulin 3 x day 300 each 3    KRILL/OM3/DHA/EPA/OM6/LIP/ASTX (KRILL OIL, OMEGA 3 & 6, ORAL) Take by mouth once daily at 6am.       levothyroxine (SYNTHROID) 75 MCG tablet TAKE 1 TABLET(75 MCG) BY MOUTH BEFORE BREAKFAST 90 tablet 0    lidocaine HCL 2% (XYLOCAINE) 2 % jelly APPLY TOPICALLY EVERY DAY AS NEEDED 30 mL 3    metoprolol succinate (TOPROL-XL) 50 MG 24 hr tablet Take 1 tablet (50 mg total) by mouth once daily. 90 tablet 3    miconazole NITRATE 2 % (ZEASORB AF) 2 % top powder Apply topically as needed for Itching. 71 g 0    mometasone (NASONEX) 50 mcg/actuation nasal spray 2 sprays by Nasal route once daily. 1 each 0    nystatin-triamcinolone (MYCOLOG II) cream Apply topically 2 (two) times daily. 30 g 0    olopatadine (PATANOL) 0.1 % ophthalmic solution Place 1 drop into the right eye 2 (two) times daily. 5 mL 1    omeprazole (PRILOSEC) 40 MG capsule Take 1 capsule (40 mg total) by mouth daily as needed. 30 capsule 0    ondansetron (ZOFRAN-ODT) 8 MG TbDL Take 1 tablet (8 mg total) by mouth every 12 (twelve) hours as needed. 20 tablet 0    ranitidine (ZANTAC) 150 MG tablet Take 1 tablet (150 mg total) by mouth 2 (two) times daily as needed for Heartburn. 120 tablet 5    silver " "sulfADIAZINE 1% (SILVADENE) 1 % cream aaa buttock bid 50 g 2    SORIATANE 10 mg capsule Take 2 po qday 180 capsule 3    SYMBICORT 160-4.5 mcg/actuation HFAA INHALE 2 PUFFS BY MOUTH INTO THE LUNGS EVERY 12 HOURS 10.2 g 0    triamcinolone acetonide 0.1% (KENALOG) 0.1 % cream Apply topically 2 (two) times daily. Apply to affected area 454 Tube 3     No current facility-administered medications on file prior to visit.      Social History:     Social History   Substance Use Topics    Smoking status: Never Smoker    Smokeless tobacco: Not on file    Alcohol use No     Family History:     Family History   Problem Relation Age of Onset    Diabetes Mother     Hypertension Mother     Hypertension Father     Kidney disease Neg Hx     Eczema Neg Hx     Psoriasis Neg Hx     Melanoma Neg Hx     Lupus Neg Hx     Acne Neg Hx      Physical Exam:     /64 (BP Location: Left arm, Patient Position: Sitting, BP Method: Large (Automatic))   Pulse 71   Ht 5' 5" (1.651 m)   Wt 90.9 kg (200 lb 6.4 oz)   BMI 33.35 kg/m²      Constitutional: NAD, conversant  HEENT: Sclera anicteric, PERRLA, EOMI  Neck: No JVD, no carotid bruits  CV: RRR, no murmur, normal S1/S2, no S3  Pulm: CTAB, no wheezes, rales, or ronchi  GI: Abdomen soft, NTND, +BS  Extremities: 2+ doughy LE edema to ankles, warm and well perfused  Skin: No ecchymosis, erythema, or ulcers  Psych: AOx3, appropriate affect  Neuro: CNII-XII intact, no focal deficits    Labs:     Lab Results   Component Value Date     08/12/2017    K 4.1 08/12/2017     08/12/2017    CO2 28 08/12/2017    BUN 29 (H) 08/12/2017    CREATININE 0.8 08/12/2017    ANIONGAP 10 08/12/2017     Lab Results   Component Value Date    HGBA1C 6.2 (H) 06/24/2017     Lab Results   Component Value Date     (H) 08/03/2016    BNP 23 02/19/2010    Lab Results   Component Value Date    WBC 9.29 08/12/2017    HGB 12.6 08/12/2017    HCT 39.7 08/12/2017     08/12/2017    GRAN 7.4 " 08/12/2017    GRAN 79.3 (H) 08/12/2017     Lab Results   Component Value Date    CHOL 112 (L) 06/24/2017    HDL 29 (L) 06/24/2017    LDLCALC 59.6 (L) 06/24/2017    TRIG 117 06/24/2017          Imaging:     TTE -- 1/2017    1 - Mild left ventricular enlargement.     2 - Normal left ventricular systolic function (EF 55-60%).     3 - Eccentric hypertrophy.     4 - Left ventricular diastolic dysfunction.     5 - Biatrial enlargement.     6 - Normal right ventricular systolic function .     7 - Trivial aortic regurgitation.     8 - Trivial to mild mitral regurgitation.     9 - Trivial to mild tricuspid regurgitation.     10 - The estimated PA systolic pressure is 32 mmHg.    EF   Date Value Ref Range Status   01/16/2017 58 55 - 65    08/03/2016 30 (A) 55 - 65        Assessment:    Krissy Marino is a 77 year old with DM HTN and HLD who presents today after PCI to prox LAD after being found to have large area of ischemia on PET after exercise stress echo revealed newly depressed LVEF. She has been medically optimized and now revascularized. Doing much better today. She continues to struggle with a dry cough that is worse when she sits in her lounge chair and appears to have worsened since changing her omeprazole to ranitidine. Lisinopril cessation did not make the cough disappear.    Plan:   1) Systolic Heart Failure with Reduced EF -- NYHA II, warm and euvolemic today   --continue ASA/plavix and atorvastatin (plavix through October 2017)   --continiue toprol XL 50mg   --will start losartan 12.5mg (uptitrate as tolerated given previous low BP   --lasix 40mg daily, will continue to weigh herself     2) CAD -- prox LAD disease   --continue DAPT plus statin   --enrolled in ADAPTABLE study   --in cardiac rehab     3) HTN -- controlled well   --continue meds as above    4) HLD   --atorvastatin 40mg, at goal    5) DM   --at goal    6) GERD   --changed omeprazole to ranitidine PRN; will change back to omeprazole after Plavix  finishes in October 7) Venous stasis   --compression stockings    RTC in December 2017     Signed:  Toñito Kohler MD  Cardiology Fellow, PGY-5  Pager: [phone]  8/16/2017 4:34 PM

## 2017-08-16 NOTE — PROGRESS NOTES
Subjective:       Patient ID: Krissy Marino is a 77 y.o. female possible GCA, osteoporosis and OA of hands & knees.  .    Chief Complaint: Disease Management  Returns for follow-up.  Was last seen on 4/19/17. At that time had no complaints on tapering doses of prednisone. She now has been off steroids since at least May and has not had any changes in the way she feels. She still has bouts of instantaneous right eye pain that comes & goes.  Denies joint pains, fever, chills,visual loss, diplopia, scalp tenderness, jaw or other claudication, headache or head pain. Denies PMR sxs: AM stiffness, shoulder or hip girdle stiffness. Her OA is not bothering her. No gout episodes. No pain, pruritus, visual disturbance. Psoriasis is practically gone. Her main complaint is fatigue.     She has a few more ergocalciferol capsules left to take. Last Vit D up to 64.             Associated symptoms include fatigue. Pertinent negatives include no fever.         Current Outpatient Prescriptions   Medication Sig Dispense Refill    albuterol 90 mcg/actuation inhaler Inhale 2 puffs into the lungs every 6 (six) hours as needed for Wheezing. 1 each 11    alendronate (FOSAMAX) 70 MG tablet Take 1 tablet (70 mg total) by mouth every 7 days. 4 tablet 11    allopurinol (ZYLOPRIM) 300 MG tablet TAKE 1 TABLET(300 MG) BY MOUTH EVERY DAY 90 tablet 0    aspirin (ECOTRIN) 81 MG EC tablet Take 1 tablet (81 mg total) by mouth once daily. 90 tablet 3    atorvastatin (LIPITOR) 40 MG tablet TAKE 1 TABLET(40 MG) BY MOUTH EVERY DAY 90 tablet 0    azelastine (ASTELIN) 137 mcg (0.1 %) nasal spray 1 spray (137 mcg total) by Nasal route 2 (two) times daily. 30 mL 11    BIOTIN ORAL Take by mouth.      blood sugar diagnostic (ACCU-CHEK AMELIA PLUS TEST STRP) Strp Checks bg 2 x day before meals 200 strip 4    cetirizine (ZYRTEC) 10 MG tablet Take 1 tablet (10 mg total) by mouth once daily. 30 tablet 2    cholecalciferol, vitamin D3, 50,000 unit capsule  "Take 1 capsule (50,000 Units total) by mouth twice a week. 40 capsule 0    ciclopirox (PENLAC) 8 % Soln APPLY EXTERNALLY TO THE AFFECTED AREA EVERY NIGHT 6.6 mL 0    clopidogrel (PLAVIX) 75 mg tablet Take 1 tablet (75 mg total) by mouth once daily. 90 tablet 3    desonide (DESOWEN) 0.05 % cream AAA bid 180 g 1    erythromycin (ROMYCIN) ophthalmic ointment Place into the right eye 3 (three) times daily. 1 Tube 3    FERROUS GLUCONATE (FERATE ORAL) Take by mouth once daily at 6am.       folic acid (FOLVITE) 1 MG tablet TAKE 1 TABLET(1000 MCG) BY MOUTH EVERY DAY 90 tablet 0    furosemide (LASIX) 40 MG tablet Take 1 tablet (40 mg total) by mouth once daily. 90 tablet 3    hydrocortisone butyrate (LOCOID) 0.1 % Crea cream AAA buttock bid 180 g 0    insulin lispro (HUMALOG KWIKPEN) 100 unit/mL InPn pen Inject 5 Units into the skin 3 (three) times daily with meals. 1 Box 1    insulin needles, disposable, (Plumbr ULTRA-FINE ILIANA PEN NEEDLES) 32 x 5/32 " Ndle Injects insulin 3 x day 300 each 3    KRILL/OM3/DHA/EPA/OM6/LIP/ASTX (KRILL OIL, OMEGA 3 & 6, ORAL) Take by mouth once daily at 6am.       levothyroxine (SYNTHROID) 75 MCG tablet TAKE 1 TABLET(75 MCG) BY MOUTH BEFORE BREAKFAST 90 tablet 0    lidocaine HCL 2% (XYLOCAINE) 2 % jelly APPLY TOPICALLY EVERY DAY AS NEEDED 30 mL 3    metoprolol succinate (TOPROL-XL) 50 MG 24 hr tablet Take 1 tablet (50 mg total) by mouth once daily. 90 tablet 3    miconazole NITRATE 2 % (ZEASORB AF) 2 % top powder Apply topically as needed for Itching. 71 g 0    mometasone (NASONEX) 50 mcg/actuation nasal spray 2 sprays by Nasal route once daily. 1 each 0    nystatin-triamcinolone (MYCOLOG II) cream Apply topically 2 (two) times daily. 30 g 0    olopatadine (PATANOL) 0.1 % ophthalmic solution Place 1 drop into the right eye 2 (two) times daily. 5 mL 1    omeprazole (PRILOSEC) 40 MG capsule Take 1 capsule (40 mg total) by mouth daily as needed. 30 capsule 0    ondansetron " (ZOFRAN-ODT) 8 MG TbDL Take 1 tablet (8 mg total) by mouth every 12 (twelve) hours as needed. 20 tablet 0    ranitidine (ZANTAC) 150 MG tablet Take 1 tablet (150 mg total) by mouth 2 (two) times daily as needed for Heartburn. 120 tablet 5    silver sulfADIAZINE 1% (SILVADENE) 1 % cream aaa buttock bid 50 g 2    SORIATANE 10 mg capsule Take 2 po qday 180 capsule 3    SYMBICORT 160-4.5 mcg/actuation HFAA INHALE 2 PUFFS BY MOUTH INTO THE LUNGS EVERY 12 HOURS 10.2 g 0    triamcinolone acetonide 0.1% (KENALOG) 0.1 % cream Apply topically 2 (two) times daily. Apply to affected area 454 Tube 3     No current facility-administered medications for this visit.        Had CA stent placed in October.    Review of patient's allergies indicates:   Allergen Reactions    Sulfamethoxazole-trimethoprim Nausea And Vomiting    Latex, natural rubber Rash    Pcn [penicillins] Rash       Past Medical History:   Diagnosis Date    Adverse effect of glucocorticoid or synthetic analogue     Allergy     Arthritis     Cancer     CHF (congestive heart failure)     Chronic kidney disease     Coronary artery disease involving native coronary artery of native heart without angina pectoris 10/11/2016    Diabetic neuropathy 10/6/2014    Giant cell arteritis 9/24/2012    Hyperlipidemia     Hypertension     Hypothyroid     Obesity (BMI 30-39.9) 10/6/2014    Osteopenia     Primary osteoarthritis of both knees 9/24/2012    Type II or unspecified type diabetes mellitus with renal manifestations, uncontrolled        Past Surgical History:   Procedure Laterality Date    APPENDECTOMY      CATARACT EXTRACTION, BILATERAL      CHOLECYSTECTOMY      EYE SURGERY      HYSTERECTOMY      JOINT REPLACEMENT      bilateral total knee    SKIN BIOPSY      TONSILLECTOMY           Review of Systems   Constitutional: Positive for fatigue. Negative for diaphoresis, fever and unexpected weight change.   HENT: Negative.  Negative for mouth sores,  "sore throat and tinnitus.    Eyes: Negative.  Negative for visual disturbance.   Respiratory: Negative.  Negative for cough, choking, chest tightness and shortness of breath.    Cardiovascular: Negative.  Negative for chest pain, palpitations and leg swelling.   Gastrointestinal: Negative.  Negative for abdominal distention, abdominal pain, blood in stool, constipation, diarrhea, nausea and vomiting.   Endocrine: Negative.    Genitourinary: Negative.  Negative for frequency, hematuria and menstrual problem.   Musculoskeletal: Negative.  Negative for back pain, neck pain and neck stiffness.   Skin: Negative for rash.        Psoriasis helped by Soriatane.   Allergic/Immunologic: Negative.    Neurological: Negative.  Negative for dizziness, syncope, weakness, light-headedness and numbness.   Hematological: Negative for adenopathy. Bruises/bleeds easily.   Psychiatric/Behavioral: Negative for dysphoric mood. The patient is not nervous/anxious.          SOCIAL HISTORY: She does not smoke, does not drink alcohol. There is no   recreational drug use. She is the  of [employer] and loves what she does. 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  She is not formally exercising but is quite active.    FAMILY HISTORY: Family history is negative for giant cell arteritis.   Family history is positive for a brother with liver cancer and lung cancer.         Objective:     BP (!) 151/79   Pulse 62   Ht 5' 5" (1.651 m)   Wt 90.9 kg (200 lb 6.4 oz)   BMI 33.35 kg/m²    Was 207 on 4/19/17  Was 216 lb 8 oz on 9/14/16.   Physical Exam   Vitals reviewed.  Constitutional: She is oriented to person, place, and time and well-developed, well-nourished, and in no distress. No distress.   HENT:   Head: Normocephalic and atraumatic.   Mouth/Throat: Oropharynx is clear and moist. No oropharyngeal exudate.   No facial rashes  Parotids not enlarged  No oral ulcers  Temporal aa good pulsations;   No scalp tenderness or pain.   Eyes: " Conjunctivae and EOM are normal. Pupils are equal, round, and reactive to light. Right eye exhibits no discharge. Left eye exhibits no discharge. No scleral icterus.   Neck: Neck supple. No JVD present. No tracheal deviation present. No thyromegaly present.   Cardiovascular: Normal rate, regular rhythm, normal heart sounds and intact distal pulses.  Exam reveals no gallop and no friction rub.    No murmur heard.  Pulmonary/Chest: Effort normal and breath sounds normal. No respiratory distress. She has no wheezes. She has no rales. She exhibits no tenderness.   Abdominal: Soft. Bowel sounds are normal. She exhibits no distension and no mass. There is no splenomegaly or hepatomegaly. There is no tenderness. There is no rebound and no guarding.   Lymphadenopathy:     She has no cervical adenopathy.        Right: No inguinal adenopathy present.        Left: No inguinal adenopathy present.   Neurological: She is alert and oriented to person, place, and time. She has normal reflexes. No cranial nerve deficit. Gait normal.   Quad strength 4/5 bilaterally. Cannot get out of chair without pushing off on arms.  Otherwise 5/5.   Skin: Skin is warm and dry. No rash noted. She is not diaphoretic.     Psoriasis both elbows now gone; no scalp lesions.  Multiple ecchymoses in upper arms.   Psychiatric: Mood, memory, affect and judgment normal.   Musculoskeletal: Normal range of motion. She exhibits no edema or tenderness.   Cspine FROM no tenderness  Tspine FROM no tenderness  Lspine FROM no tenderness.  TMJ: unremarkable  Shoulders: FROM; no synovitis;  Elbows: FROM; no synovitis; no tophi or nodules  Wrists: FROM; no synovitis;    MCPs: FROM; no synovitis; no metacarpalgia;  ok;  PIPs: Rell's; FROM; no synovitis;   DIPs: Heberden's; FROM; no synovitis;   HIPS: FROM  Knees: Bilateral TKR scars; FROM; no synovitis; no instability;  Ankles: FROM: no synovitis   Toes: Bilateral HV; redness on 1st MTP; no heel pain.          LABS:  8/12/17: ESR 45; CRP 1.7; Alb. 3.3; BUN 29; ; Vit D 64;   5/6/17: ESR 55; CRP 3.0  3/4/17: ESR 36; CRP 0.9; CBC plt 146; CMP ; Vit D 24;   11/28/16: ESR 46; CRP 1.7; CBC ok; BUN 64; cnne 1.3; GFR 39.4; ;   9/7/16: CMP cnne 1.1;   8/19/16: ESR 48; CRP 4.8; cnne 1.7; ;   5/28/16: ESR 83; CRp 19.1; CMP BUN 52; cnne 1.0; Glu 138;   5/5/16: ESR 41; CBC plt 138;   2/27/16: ESR 40; CRP 8;  2/13/16: ESR 63; CRP 9.7  8/22/15: ESR 26; CRP 7; CBC ok; cnne 1.1; BUN 40; ;   7/11/15: Vit d 43;   10/4/14: ESR 32; CRP 3.2; BUN 63; cnne 1.1; TSH ok;  8/22/14: CBC ok;  6/30/14: ESR 35;  6/21/14: ESR 49; CRP 3.0  11/11/13: ESR 35; CRP 9.6;   9/21/13: ESR 47; CRP 4.7; ; cnne 1.3;   6/25/13: ESR 40; CRP 3.9   6/11/13: cnne 1.5; GFR 34;  10/25/10: UA 5.5       3/2/16: bilateral feet:  personally reviewed: Mark HV with inferior calcaneal spurs & pes planus. Also vascular calcifications.  DXA 2/16/15: TLX +1.7; TFN -1.0; TTH -0.9    Does not do MELANIE's.     Assessment:   Possible GCA: Elevated ESR (with normal CRPs;  dating back to 12/17/09),    max at 120 associated with intermittent left eye pain; subsequently right sided and responsive to steroids.    Negative biopsy.    Sore mouth w. MTX (uncertain). Never on HCQ.    Developed eye pain/headache on 5 mg/day with ESR 50;   (bouts with intermittent eye pain (instantaneous);    R/O mild trigeminal neuralgia.   Off prednisone since May 2017 with no adverse effects.    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     OA of both knees. S/P B TKRs.     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     Hypertension & Dyslipidemia.     Psoriasis--Had been on MTX remotely--appeared to have responded to soriatane..      Hypothyroidism.     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     Past history of gout (not substantiated).     Past history of asthma.     S/P Basal cell carcinoma removed from nose.     Obesity --improving    Plan:   Keep off prednisone.  Labs in 3 months & 6 months.   Finish vitamin D2 and stay off vitamin D for a while..  Contact us if GCA or PMR sxs (reviewed).  RTC 6 months as per patient.

## 2017-08-17 RX ORDER — LEVOTHYROXINE SODIUM 75 UG/1
TABLET ORAL
Qty: 90 TABLET | Refills: 0 | Status: SHIPPED | OUTPATIENT
Start: 2017-08-17 | End: 2017-11-12 | Stop reason: SDUPTHER

## 2017-08-23 ENCOUNTER — OFFICE VISIT (OUTPATIENT)
Dept: SPORTS MEDICINE | Facility: CLINIC | Age: 77
End: 2017-08-23
Payer: COMMERCIAL

## 2017-08-23 VITALS — WEIGHT: 200 LBS | BODY MASS INDEX: 33.32 KG/M2 | HEIGHT: 65 IN | TEMPERATURE: 98 F

## 2017-08-23 DIAGNOSIS — M19.012 OSTEOARTHRITIS OF GLENOHUMERAL JOINTS, BILATERAL: Primary | ICD-10-CM

## 2017-08-23 DIAGNOSIS — M19.011 OSTEOARTHRITIS OF GLENOHUMERAL JOINTS, BILATERAL: Primary | ICD-10-CM

## 2017-08-23 DIAGNOSIS — M75.50 SUBACROMIAL BURSITIS: ICD-10-CM

## 2017-08-23 PROCEDURE — 99214 OFFICE O/P EST MOD 30 MIN: CPT | Mod: 25,S$GLB,, | Performed by: FAMILY MEDICINE

## 2017-08-23 PROCEDURE — 20610 DRAIN/INJ JOINT/BURSA W/O US: CPT | Mod: 50,S$GLB,, | Performed by: FAMILY MEDICINE

## 2017-08-23 PROCEDURE — 1159F MED LIST DOCD IN RCRD: CPT | Mod: S$GLB,,, | Performed by: FAMILY MEDICINE

## 2017-08-23 PROCEDURE — 3008F BODY MASS INDEX DOCD: CPT | Mod: S$GLB,,, | Performed by: FAMILY MEDICINE

## 2017-08-23 PROCEDURE — 99999 PR PBB SHADOW E&M-EST. PATIENT-LVL III: CPT | Mod: PBBFAC,,, | Performed by: FAMILY MEDICINE

## 2017-08-23 PROCEDURE — 1125F AMNT PAIN NOTED PAIN PRSNT: CPT | Mod: S$GLB,,, | Performed by: FAMILY MEDICINE

## 2017-08-23 RX ORDER — TRIAMCINOLONE ACETONIDE 40 MG/ML
40 INJECTION, SUSPENSION INTRA-ARTICULAR; INTRAMUSCULAR
Status: DISCONTINUED | OUTPATIENT
Start: 2017-08-23 | End: 2017-08-23 | Stop reason: HOSPADM

## 2017-08-23 RX ADMIN — TRIAMCINOLONE ACETONIDE 40 MG: 40 INJECTION, SUSPENSION INTRA-ARTICULAR; INTRAMUSCULAR at 01:08

## 2017-08-23 NOTE — PROGRESS NOTES
Krissy Marino, a 77 y.o. female, is here for evaluation of RIGHT and LEFT shoulder pain.     HISTORY OF PRESENT ILLNESS  Hand dominance, right     Location: anterior and lateral, bilateral   Onset: Insidious, many years  Palliative:    Relative rest   Oral analgesics (tylenol PM)   R- CSI, SAB, 07/18/16, 80% improvement   R - CSI, SAB, 12/02/17, moderate improvement for ~ 3 weeks    R - CSI, SAB, 01/27/17, no improvement    R - CSI, iaGH/SAB, 02/21/17, mild to moderate relief    fPT @ [facility] - going well, but ROM remains painful   Heat    R - CSI, iaGH/SAB, 05/05/17, moderate%   Sling PRN   R/L - CSI, SAB, 07/12/17, moderate% improvement   Provocative:    ADLs   Prior:   Progression: plateau discomfort   Quality:    Sharp pain at times activity   Throbbing at times with activity    Muscle soreness   Radiation: none  Severity: per nursing documentation  Timing: intermittent with use  Trauma:    17.07: mechanical fall while at PT --> landed on L arm     Review of systems (ROS):  A 10+ review of systems was performed with pertinent positives and negatives noted above in the history of present illness. Other systems were negative unless otherwise specified.      PHYSICAL EXAMINATION  General:  The patient is alert and oriented x 3. Mood is pleasant. Observation of ears, eyes and nose reveal no gross abnormalities. HEENT: NCAT, sclera anicteric. Lungs: Respirations are equal and unlabored.     RIGHT SHOULDER EXAMINATION     OBSERVATION:     Swelling  none  Deformity  none   Discoloration  none   Scapular winging none   Scars   none  Atrophy  none    TENDERNESS / CREPITUS (T/C):          T/C      T/C   Clavicle   -/-  SUPRAspinatus    -/-     AC Jt.    -/-  INFRAspinatus  -/-    SC Jt.    -/-  Deltoid    -/-      G. Tuberosity  -/-  LH BICEP groove  -/-   Acromion:  -/-  Midline Neck   -/-     Scapular Spine -/-  Trapezium   -/-   SMA Scapula  -/-  GH jt. line - post  -/-     Scapulothoracic   -/-         ROM:     Right shoulder   Left shoulder        AROM (PROM)   AROM (PROM)   FE    170° (175°)*     170° (175°)*     ER at 0°    60°  (65°) *   60°  (65°)*   ER at 90° ABD  90°  (90°) *   90°  (90°)*   IR at 90°  ABD   NA  (40°)  *   NA  (40°)  *    IR (spine level)   T10  *   T10*    STRENGTH: (* = with pain) RIGHT SHOULDER  LEFT SHOULDER   SCAPTION   4/5    4/5   IR    4/5    4/5   ER    4/5    4/5   BICEPS   4/5    4/5   Deltoid    4/5    4/5     SIGNS:  Painful side       NEER   -   OSONNYS        +    CHAPA   +   SPEEDS        +   DROP ARM   -   BELLY PRESS       -    X-Body ADD    +   LIFT-OFF        +   HORNBLOWERS      +              STABILITY TESTING   RIGHT SHOULDER  LEFT SHOULDER     Translation     Anterior up face    up face    Posterior up face   up face    Sulcus  < 10mm   < 10 mm     Signs   Apprehension   neg      neg       Relocation   no change     no change      Jerk test  neg     neg    EXTREMITY NEURO-VASCULAR EXAM    Sensation grossly intact to light touch all dermatomal regions.    DTR 2+ Biceps, Triceps, BR and Negative Chinas sign   Grossly intact motor function at Elbow, Wrist and Hand   Distal pulses radial and ulnar 2+, brisk cap refill, symmetric.      NECK:  Painless FROM and spinous processes non-tender. Negative Spurlings sign.       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   W/ advanced acromialization of humeral head   W/ chronic tearing of superior rotator cuff   W/ chronic glenoid labral degenerative changes    No evidence of neurologic pathology  No evidence of vascular pathology    Imaging studies reviewed:   X-ray shoulder, right 17.05  X-ray shoulder, left 17.07    Plan:  We discussed options including:  #1 watchful waiting  #2 continue physical therapy aimed at:   RoM glenohumeral joint   Strengthening rotator cuff   Scapular stability    fpt  #3 injection therapy:   CSI SAB    Right, effective  moderate%, repeat    Left, effective moderate%, repeat    CSI iaGH    Right, effect moderate%, repeat     Left,    orthobiologics   #4 consultation re: rTSA   Likely poor surgical candidate given CAD   Likely 01/18 surgical consultation     The patient chooses #2 and #3 csi sab bilat    Pain management: handout given  Bracing: shoulder sling, prn  Physical therapy: fPT, @ [facility], continue as above   Activity (e.g. sports, work) restrictions: as tolerated   school/vocation: works at [employer]     Follow up in 6-8 w  Anticipate csi iagh bilat  Should symptoms worsen or fail to resolve, consider:  Revisiting the above options

## 2017-08-23 NOTE — PROCEDURES
Large Joint Aspiration/Injection  Date/Time: 8/23/2017 1:28 PM  Performed by: MOE MIRELES  Authorized by: MOE MIRELES     Consent Done?:  Yes (Verbal)  Indications:  Pain  Procedure site marked: Yes    Timeout: Prior to procedure the correct patient, procedure, and site was verified      Location:  Shoulder  Site:  R subacromial bursa and L subacromial bursa  Prep: Patient was prepped and draped in usual sterile fashion    Ultrasonic Guidance for needle placement: No  Needle size:  22 G  Approach:  Posterior  Medications:  40 mg triamcinolone acetonide 40 mg/mL; 40 mg triamcinolone acetonide 40 mg/mL  Patient tolerance:  Patient tolerated the procedure well with no immediate complications

## 2017-09-05 RX ORDER — CICLOPIROX 80 MG/ML
SOLUTION TOPICAL
Qty: 6.6 ML | Refills: 0 | Status: SHIPPED | OUTPATIENT
Start: 2017-09-05 | End: 2017-11-08 | Stop reason: SDUPTHER

## 2017-09-12 ENCOUNTER — TELEPHONE (OUTPATIENT)
Dept: HEMATOLOGY/ONCOLOGY | Facility: CLINIC | Age: 77
End: 2017-09-12

## 2017-09-12 ENCOUNTER — TELEPHONE (OUTPATIENT)
Dept: DERMATOLOGY | Facility: CLINIC | Age: 77
End: 2017-09-12

## 2017-09-12 DIAGNOSIS — D47.Z9 LOW GRADE B CELL LYMPHOPROLIFERATIVE DISORDER: Primary | ICD-10-CM

## 2017-09-12 NOTE — TELEPHONE ENCOUNTER
----- Message from Elizabeth Torres sent at 9/12/2017  1:36 PM CDT -----  Contact: Self  Good afternoon,     Pt is requesting an appt due to annual with labs.    Pt can be reached at [phone]    Thank you!  Spoke with pt at 200pm on 09/12/17,  Clinic appointment set for 11/14/17 with labs scheduled on 11/11/17 at 1015am per pt's request, pt verbalized understanding, appointment letter will be mailed.  Diane

## 2017-09-12 NOTE — TELEPHONE ENCOUNTER
----- Message from Grace Hannah sent at 9/12/2017 11:36 AM CDT -----  Contact: patient  Please call above patient wants sooner appointment

## 2017-09-12 NOTE — TELEPHONE ENCOUNTER
----- Message from Mai Pearson sent at 9/12/2017 11:56 AM CDT -----  Contact: Self  Pt is calling requesting to speak with Staff regarding an annual check up.    She can be reached at [phone].    Thank you.  Spoke with pt at 200pm on 09/12/17, appointment sent for 11/16/17 at 140pm  With labs scheduled on 11/11/17 at 1015am per pt's request. Pt verbalized understanding. Appointment letter will be mailed.  Diane

## 2017-09-19 RX ORDER — CLOPIDOGREL BISULFATE 75 MG/1
75 TABLET ORAL DAILY
Qty: 90 TABLET | Refills: 3 | Status: SHIPPED | OUTPATIENT
Start: 2017-09-19 | End: 2017-10-18 | Stop reason: ALTCHOICE

## 2017-09-19 NOTE — TELEPHONE ENCOUNTER
----- Message from Iram Schroeder sent at 9/19/2017  4:03 PM CDT -----  Contact: pt  Pt need a refill on her Plavix 75 mg  LOV 8/16/17 Dr. Kohler [facility]

## 2017-09-20 ENCOUNTER — OFFICE VISIT (OUTPATIENT)
Dept: INTERNAL MEDICINE | Facility: CLINIC | Age: 77
End: 2017-09-20
Payer: COMMERCIAL

## 2017-09-20 VITALS — DIASTOLIC BLOOD PRESSURE: 61 MMHG | HEART RATE: 70 BPM | SYSTOLIC BLOOD PRESSURE: 118 MMHG

## 2017-09-20 DIAGNOSIS — R80.9 CONTROLLED TYPE 2 DIABETES MELLITUS WITH MICROALBUMINURIA, WITH LONG-TERM CURRENT USE OF INSULIN: ICD-10-CM

## 2017-09-20 DIAGNOSIS — E03.9 HYPOTHYROIDISM, UNSPECIFIED TYPE: ICD-10-CM

## 2017-09-20 DIAGNOSIS — N30.00 ACUTE CYSTITIS WITHOUT HEMATURIA: ICD-10-CM

## 2017-09-20 DIAGNOSIS — I73.9 PVD (PERIPHERAL VASCULAR DISEASE): ICD-10-CM

## 2017-09-20 DIAGNOSIS — I10 ESSENTIAL HYPERTENSION: ICD-10-CM

## 2017-09-20 DIAGNOSIS — I25.10 CORONARY ARTERY DISEASE INVOLVING NATIVE CORONARY ARTERY OF NATIVE HEART WITHOUT ANGINA PECTORIS: ICD-10-CM

## 2017-09-20 DIAGNOSIS — E11.29 CONTROLLED TYPE 2 DIABETES MELLITUS WITH MICROALBUMINURIA, WITH LONG-TERM CURRENT USE OF INSULIN: ICD-10-CM

## 2017-09-20 DIAGNOSIS — N18.2 CHRONIC KIDNEY DISEASE, STAGE II (MILD): Primary | ICD-10-CM

## 2017-09-20 DIAGNOSIS — Z79.4 CONTROLLED TYPE 2 DIABETES MELLITUS WITH MICROALBUMINURIA, WITH LONG-TERM CURRENT USE OF INSULIN: ICD-10-CM

## 2017-09-20 DIAGNOSIS — L40.9 PSORIASIS: ICD-10-CM

## 2017-09-20 LAB
CREAT UR-MCNC: 77 MG/DL
MICROALBUMIN UR DL<=1MG/L-MCNC: 114 UG/ML
MICROALBUMIN/CREATININE RATIO: 148.1 UG/MG

## 2017-09-20 PROCEDURE — 82570 ASSAY OF URINE CREATININE: CPT

## 2017-09-20 PROCEDURE — 3074F SYST BP LT 130 MM HG: CPT | Mod: S$GLB,,, | Performed by: FAMILY MEDICINE

## 2017-09-20 PROCEDURE — 3078F DIAST BP <80 MM HG: CPT | Mod: S$GLB,,, | Performed by: FAMILY MEDICINE

## 2017-09-20 PROCEDURE — 99999 PR PBB SHADOW E&M-EST. PATIENT-LVL V: CPT | Mod: PBBFAC,,, | Performed by: FAMILY MEDICINE

## 2017-09-20 PROCEDURE — 3008F BODY MASS INDEX DOCD: CPT | Mod: S$GLB,,, | Performed by: FAMILY MEDICINE

## 2017-09-20 PROCEDURE — 99215 OFFICE O/P EST HI 40 MIN: CPT | Mod: S$GLB,,, | Performed by: FAMILY MEDICINE

## 2017-09-20 PROCEDURE — 1159F MED LIST DOCD IN RCRD: CPT | Mod: S$GLB,,, | Performed by: FAMILY MEDICINE

## 2017-09-20 NOTE — PROGRESS NOTES
Subjective:       Patient ID: Krissy Marino is a 77 y.o. female.    Chief Complaint: No chief complaint on file.   Krissy Marino 77 y.o. female is here for office visit to review care and physical exam, reports doing well othe rthan a few concerns.  Has ankle swelling, [facility], presently using ACE bandages for relief.  Has slight dysuria, was seeing Nephrology in past, ucx X three with milti-resistant E coli.      HPI  Review of Systems   Constitutional: Negative for activity change, fatigue, fever and unexpected weight change.   HENT: Negative for congestion, hearing loss, postnasal drip and rhinorrhea.    Eyes: Negative for redness and visual disturbance.   Respiratory: Negative for chest tightness, shortness of breath and wheezing.    Cardiovascular: Negative for chest pain, palpitations and leg swelling.   Gastrointestinal: Negative for abdominal distention.   Genitourinary: Negative for decreased urine volume, dysuria, flank pain, hematuria, pelvic pain and urgency.   Musculoskeletal: Negative for back pain, gait problem, joint swelling and neck stiffness.   Skin: Negative for color change, rash and wound.   Neurological: Negative for dizziness, syncope, weakness and headaches.   Psychiatric/Behavioral: Negative for behavioral problems, confusion and sleep disturbance. The patient is not nervous/anxious.        Objective:      Physical Exam   Constitutional: She is oriented to person, place, and time. She appears well-developed and well-nourished. No distress.   HENT:   Head: Normocephalic.   Mouth/Throat: No oropharyngeal exudate.   Eyes: EOM are normal. Pupils are equal, round, and reactive to light. No scleral icterus.   Neck: Neck supple. No JVD present. No thyromegaly present.   Cardiovascular: Normal rate, regular rhythm and normal heart sounds.  Exam reveals no gallop and no friction rub.    No murmur heard.  Pulmonary/Chest: Effort normal and breath sounds normal. She has no wheezes. She  has no rales.   Abdominal: Soft. Bowel sounds are normal. She exhibits no distension and no mass. There is no tenderness. There is no guarding.   Musculoskeletal: Normal range of motion. She exhibits no edema.   Lymphadenopathy:     She has no cervical adenopathy.   Neurological: She is alert and oriented to person, place, and time. She has normal reflexes. She displays normal reflexes. No cranial nerve deficit. She exhibits normal muscle tone.   Skin: Skin is warm. No rash noted. No erythema.   Psychiatric: She has a normal mood and affect. Thought content normal.       Assessment:       No diagnosis found.    Plan:       Krissy was seen today for follow-up.    Diagnoses and all orders for this visit:    Chronic kidney disease, stage II (mild)  - Chart reviewed  Controlled type 2 diabetes mellitus with microalbuminuria, with long-term current use of insulin  -     MICROALBUMIN / CREATININE RATIO URINE    Essential hypertension    - Controled  Hypothyroidism, unspecified type  - Chart reviewed  Psoriasis  - controled  Coronary artery disease involving native coronary artery of native heart without angina pectoris  - Chart reviewed  PVD (peripheral vascular disease)  -     Ambulatory Referral to Vascular Medicine    Acute cystitis without hematuria  -     Urine culture; Future  -     Ambulatory Referral to Urology

## 2017-09-22 DIAGNOSIS — E55.9 MILD VITAMIN D DEFICIENCY: ICD-10-CM

## 2017-09-22 RX ORDER — ERGOCALCIFEROL 1.25 MG/1
CAPSULE ORAL
Qty: 6 CAPSULE | Refills: 0 | OUTPATIENT
Start: 2017-09-22

## 2017-10-12 ENCOUNTER — TELEPHONE (OUTPATIENT)
Dept: INTERNAL MEDICINE | Facility: CLINIC | Age: 77
End: 2017-10-12

## 2017-10-12 DIAGNOSIS — M79.643 PAIN OF HAND, UNSPECIFIED LATERALITY: Primary | ICD-10-CM

## 2017-10-12 DIAGNOSIS — E78.5 HYPERLIPIDEMIA: ICD-10-CM

## 2017-10-12 RX ORDER — ATORVASTATIN CALCIUM 40 MG/1
TABLET, FILM COATED ORAL
Qty: 90 TABLET | Refills: 0 | Status: SHIPPED | OUTPATIENT
Start: 2017-10-12 | End: 2018-01-07 | Stop reason: SDUPTHER

## 2017-10-13 ENCOUNTER — HOSPITAL ENCOUNTER (OUTPATIENT)
Dept: RADIOLOGY | Facility: HOSPITAL | Age: 77
Discharge: HOME | End: 2017-10-13
Attending: FAMILY MEDICINE
Payer: COMMERCIAL

## 2017-10-13 ENCOUNTER — OFFICE VISIT (OUTPATIENT)
Dept: INTERNAL MEDICINE | Facility: CLINIC | Age: 77
End: 2017-10-13
Payer: COMMERCIAL

## 2017-10-13 VITALS
DIASTOLIC BLOOD PRESSURE: 62 MMHG | HEIGHT: 65 IN | WEIGHT: 205.25 LBS | BODY MASS INDEX: 34.2 KG/M2 | SYSTOLIC BLOOD PRESSURE: 116 MMHG | HEART RATE: 68 BPM

## 2017-10-13 DIAGNOSIS — M79.641 HAND PAIN, NOT ARTHRALGIA, RIGHT: Primary | ICD-10-CM

## 2017-10-13 DIAGNOSIS — E11.9 TYPE 2 DIABETES MELLITUS WITHOUT RETINOPATHY: ICD-10-CM

## 2017-10-13 DIAGNOSIS — M79.641 HAND PAIN, NOT ARTHRALGIA, RIGHT: ICD-10-CM

## 2017-10-13 DIAGNOSIS — I10 ESSENTIAL HYPERTENSION: ICD-10-CM

## 2017-10-13 DIAGNOSIS — M17.0 PRIMARY OSTEOARTHRITIS OF BOTH KNEES: ICD-10-CM

## 2017-10-13 PROCEDURE — 99999 PR PBB SHADOW E&M-EST. PATIENT-LVL III: CPT | Mod: PBBFAC,,, | Performed by: FAMILY MEDICINE

## 2017-10-13 PROCEDURE — 73130 X-RAY EXAM OF HAND: CPT | Mod: 26,RT,, | Performed by: RADIOLOGY

## 2017-10-13 PROCEDURE — 73130 X-RAY EXAM OF HAND: CPT | Mod: TC,RT

## 2017-10-13 PROCEDURE — 99214 OFFICE O/P EST MOD 30 MIN: CPT | Mod: S$GLB,,, | Performed by: FAMILY MEDICINE

## 2017-10-13 NOTE — TELEPHONE ENCOUNTER
----- Message from Veronica Malin sent at 10/13/2017  8:42 AM CDT -----  Contact: self/[phone]  pt called in regards to her thumb on right hand she can't move it and is in a lot of pain.        Please advise

## 2017-10-13 NOTE — PROGRESS NOTES
Subjective:       Patient ID: Krissy Marino is a 77 y.o. female.    Chief Complaint: Hand Pain  Krissy Marino 77 y.o. female is here for office visit to review care and physical exam, reports hurt right hand in door a few days ago.  No appt as of yet.  Has some swelling and bruising.  Otherwise feeling well w/o other concern.  HPI  Review of Systems   Constitutional: Negative for activity change, fatigue, fever and unexpected weight change.   HENT: Negative for congestion, hearing loss, postnasal drip and rhinorrhea.    Eyes: Negative for redness and visual disturbance.   Respiratory: Negative for chest tightness, shortness of breath and wheezing.    Cardiovascular: Negative for chest pain, palpitations and leg swelling.   Gastrointestinal: Negative for abdominal distention.   Genitourinary: Negative for decreased urine volume, dysuria, flank pain, hematuria, pelvic pain and urgency.   Musculoskeletal: Negative for back pain, gait problem, joint swelling and neck stiffness.   Skin: Negative for color change, rash and wound.   Neurological: Negative for dizziness, syncope, weakness and headaches.   Psychiatric/Behavioral: Negative for behavioral problems, confusion and sleep disturbance. The patient is not nervous/anxious.        Objective:      Physical Exam   Constitutional: She is oriented to person, place, and time. She appears well-developed and well-nourished. No distress.   HENT:   Head: Normocephalic.   Mouth/Throat: No oropharyngeal exudate.   Eyes: EOM are normal. Pupils are equal, round, and reactive to light. No scleral icterus.   Neck: Neck supple. No JVD present. No thyromegaly present.   Cardiovascular: Normal rate, regular rhythm and normal heart sounds.  Exam reveals no gallop and no friction rub.    No murmur heard.  Pulmonary/Chest: Effort normal and breath sounds normal. She has no wheezes. She has no rales.   Abdominal: Soft. Bowel sounds are normal. She exhibits no distension and no mass.  There is no tenderness. There is no guarding.   Musculoskeletal: Normal range of motion. She exhibits no edema.   Lymphadenopathy:     She has no cervical adenopathy.   Neurological: She is alert and oriented to person, place, and time. She has normal reflexes. She displays normal reflexes. No cranial nerve deficit. She exhibits normal muscle tone.   Skin: Skin is warm. No rash noted. No erythema.   Psychiatric: She has a normal mood and affect. Thought content normal.       Assessment:       No diagnosis found.    Plan:       Krissy was seen today for hand pain.    Diagnoses and all orders for this visit:    Hand pain, not arthralgia, right  -     X-Ray Hand 3 view Right; Future    Type 2 diabetes mellitus without retinopathy  - controled  Primary osteoarthritis of both knees  - discussed oa and tx  Essential hypertension  - follow

## 2017-10-13 NOTE — TELEPHONE ENCOUNTER
----- Message from Rikki Canas MA sent at 10/13/2017  4:07 PM CDT -----  Contact: self - [phone]  Type: Test Results    What test was performed? Xray     Who ordered the test?    When and where were the test performed?  10/13/17    Comments: requesting to speak with someone today. Please call. Thanks!

## 2017-10-16 RX ORDER — METOPROLOL SUCCINATE 50 MG/1
50 TABLET, EXTENDED RELEASE ORAL DAILY
Qty: 90 TABLET | Refills: 3 | Status: CANCELLED | OUTPATIENT
Start: 2017-10-16 | End: 2018-10-16

## 2017-10-16 NOTE — TELEPHONE ENCOUNTER
----- Message from Iram Schroeder sent at 10/16/2017  1:46 PM CDT -----  Contact: pt  Pt need a refill on her Toprol-XL 50 mg and please send to [facility].  LOV 8/16/17 Dr. Kohler    Thanks

## 2017-10-17 ENCOUNTER — OFFICE VISIT (OUTPATIENT)
Dept: UROLOGY | Facility: CLINIC | Age: 77
End: 2017-10-17
Payer: COMMERCIAL

## 2017-10-17 VITALS
HEART RATE: 89 BPM | DIASTOLIC BLOOD PRESSURE: 77 MMHG | HEIGHT: 65 IN | SYSTOLIC BLOOD PRESSURE: 146 MMHG | WEIGHT: 202.81 LBS | BODY MASS INDEX: 33.79 KG/M2

## 2017-10-17 DIAGNOSIS — Z79.4 CONTROLLED TYPE 2 DIABETES MELLITUS WITH MICROALBUMINURIA, WITH LONG-TERM CURRENT USE OF INSULIN: ICD-10-CM

## 2017-10-17 DIAGNOSIS — M25.50 ARTHRALGIA, UNSPECIFIED JOINT: ICD-10-CM

## 2017-10-17 DIAGNOSIS — E11.29 CONTROLLED TYPE 2 DIABETES MELLITUS WITH MICROALBUMINURIA, WITH LONG-TERM CURRENT USE OF INSULIN: ICD-10-CM

## 2017-10-17 DIAGNOSIS — I50.32 CHRONIC CONGESTIVE HEART FAILURE WITH LEFT VENTRICULAR DIASTOLIC DYSFUNCTION: ICD-10-CM

## 2017-10-17 DIAGNOSIS — R80.9 CONTROLLED TYPE 2 DIABETES MELLITUS WITH MICROALBUMINURIA, WITH LONG-TERM CURRENT USE OF INSULIN: ICD-10-CM

## 2017-10-17 DIAGNOSIS — R30.0 DYSURIA: ICD-10-CM

## 2017-10-17 DIAGNOSIS — D47.Z9 LOW GRADE B CELL LYMPHOPROLIFERATIVE DISORDER: ICD-10-CM

## 2017-10-17 DIAGNOSIS — N18.2 CHRONIC KIDNEY DISEASE, STAGE II (MILD): ICD-10-CM

## 2017-10-17 DIAGNOSIS — E11.9 TYPE 2 DIABETES MELLITUS WITHOUT RETINOPATHY: Primary | ICD-10-CM

## 2017-10-17 LAB
BILIRUB SERPL-MCNC: NORMAL MG/DL
BLOOD URINE, POC: NORMAL
COLOR, POC UA: NORMAL
GLUCOSE UR QL STRIP: NORMAL
KETONES UR QL STRIP: NORMAL
LEUKOCYTE ESTERASE URINE, POC: NORMAL
NITRITE, POC UA: NORMAL
PH, POC UA: 6
PROTEIN, POC: 30
SPECIFIC GRAVITY, POC UA: 1.01
UROBILINOGEN, POC UA: NORMAL

## 2017-10-17 PROCEDURE — 81001 URINALYSIS AUTO W/SCOPE: CPT | Mod: S$GLB,,, | Performed by: UROLOGY

## 2017-10-17 PROCEDURE — 51701 INSERT BLADDER CATHETER: CPT | Mod: S$GLB,,, | Performed by: UROLOGY

## 2017-10-17 PROCEDURE — 87088 URINE BACTERIA CULTURE: CPT

## 2017-10-17 PROCEDURE — 87186 SC STD MICRODIL/AGAR DIL: CPT

## 2017-10-17 PROCEDURE — 99204 OFFICE O/P NEW MOD 45 MIN: CPT | Mod: 25,S$GLB,, | Performed by: UROLOGY

## 2017-10-17 PROCEDURE — 99999 PR PBB SHADOW E&M-EST. PATIENT-LVL IV: CPT | Mod: PBBFAC,,, | Performed by: UROLOGY

## 2017-10-17 PROCEDURE — 87086 URINE CULTURE/COLONY COUNT: CPT

## 2017-10-17 PROCEDURE — 87077 CULTURE AEROBIC IDENTIFY: CPT

## 2017-10-17 RX ORDER — CLINDAMYCIN HYDROCHLORIDE 150 MG/1
CAPSULE ORAL
COMMUNITY
Start: 2017-10-12 | End: 2017-12-14

## 2017-10-17 NOTE — PROGRESS NOTES
Subjective:       Patient ID: Krissy Marino is a 77 y.o. female.    Chief Complaint: dysuria    Krissy Marino is a 77 y.o. Female referred from Og Cid MD for dysuria.   No gross hematuria.   Has daytime frequency, on a fluid pill. Nocturia x 3. Occasional urgency. Rare urge incontinence.   No pads.   Has chills. Stable.   No fevers.     Some constipation.   Not sexually active.   No estrogen.     , set up twins, vaginal deliveries.   Had a hysterectomy for vaginal bleeding.   No history of breast cancer.     No history of kidney stones.     Tries to drink water, but the more water she drinks the more her feet swell.   Some days a small bottle of water. Some days 1-2 16oz bottles of water.   Rare soda, iced tea.     No probiotics.         Past Surgical History:   Procedure Laterality Date    APPENDECTOMY      CATARACT EXTRACTION, BILATERAL      CHOLECYSTECTOMY      EYE SURGERY      HYSTERECTOMY      JOINT REPLACEMENT      bilateral total knee    SKIN BIOPSY      TONSILLECTOMY         Past Medical History:   Diagnosis Date    Adverse effect of glucocorticoid or synthetic analogue     Allergy     Arthritis     Cancer     CHF (congestive heart failure)     Chronic kidney disease     Coronary artery disease involving native coronary artery of native heart without angina pectoris 10/11/2016    Diabetic neuropathy 10/6/2014    Giant cell arteritis 2012    Hyperlipidemia     Hypertension     Hypothyroid     Obesity (BMI 30-39.9) 10/6/2014    Osteopenia     Primary osteoarthritis of both knees 2012    Type II or unspecified type diabetes mellitus with renal manifestations, uncontrolled(250.42)        Social History     Social History    Marital status:      Spouse name:     Number of children: N/A    Years of education: N/A     Occupational History      [employer]     Social History Main Topics    Smoking status: Never Smoker    Smokeless  tobacco: Never Used    Alcohol use No    Drug use: No    Sexual activity: No     Other Topics Concern    Are You Pregnant Or Think You May Be? No    Breast-Feeding No     Social History Narrative    No narrative on file       Family History   Problem Relation Age of Onset    Diabetes Mother     Hypertension Mother     Hypertension Father     Kidney disease Neg Hx     Eczema Neg Hx     Psoriasis Neg Hx     Melanoma Neg Hx     Lupus Neg Hx     Acne Neg Hx        Current Outpatient Prescriptions   Medication Sig Dispense Refill    albuterol 90 mcg/actuation inhaler Inhale 2 puffs into the lungs every 6 (six) hours as needed for Wheezing. 1 each 11    alendronate (FOSAMAX) 70 MG tablet Take 1 tablet (70 mg total) by mouth every 7 days. 4 tablet 11    allopurinol (ZYLOPRIM) 300 MG tablet TAKE 1 TABLET(300 MG) BY MOUTH EVERY DAY 90 tablet 0    aspirin (ECOTRIN) 81 MG EC tablet Take 1 tablet (81 mg total) by mouth once daily. 90 tablet 3    atorvastatin (LIPITOR) 40 MG tablet TAKE 1 TABLET(40 MG) BY MOUTH EVERY DAY 90 tablet 0    azelastine (ASTELIN) 137 mcg (0.1 %) nasal spray 1 spray (137 mcg total) by Nasal route 2 (two) times daily. 30 mL 11    BIOTIN ORAL Take by mouth.      blood sugar diagnostic (ACCU-CHEK AMELIA PLUS TEST STRP) Strp Checks bg 2 x day before meals 200 strip 4    cetirizine (ZYRTEC) 10 MG tablet Take 1 tablet (10 mg total) by mouth once daily. 30 tablet 2    cholecalciferol, vitamin D3, 50,000 unit capsule Take 1 capsule (50,000 Units total) by mouth twice a week. 40 capsule 0    ciclopirox (PENLAC) 8 % Soln APPLY EXTERNALLY TO THE AFFECTED AREA EVERY NIGHT 6.6 mL 0    clindamycin (CLEOCIN) 150 MG capsule       clopidogrel (PLAVIX) 75 mg tablet Take 1 tablet (75 mg total) by mouth once daily. 90 tablet 3    desonide (DESOWEN) 0.05 % cream AAA bid 180 g 1    erythromycin (ROMYCIN) ophthalmic ointment Place into the right eye 3 (three) times daily. 1 Tube 3    FERROUS  "GLUCONATE (FERATE ORAL) Take by mouth once daily at 6am.       folic acid (FOLVITE) 1 MG tablet TAKE 1 TABLET(1000 MCG) BY MOUTH EVERY DAY 90 tablet 0    hydrocortisone butyrate (LOCOID) 0.1 % Crea cream AAA buttock bid 180 g 0    insulin lispro (HUMALOG KWIKPEN) 100 unit/mL InPn pen Inject 5 Units into the skin 3 (three) times daily with meals. 1 Box 1    insulin needles, disposable, (BD ULTRA-FINE ILIANA PEN NEEDLES) 32 x 5/32 " Ndle Injects insulin 3 x day 300 each 3    KRILL/OM3/DHA/EPA/OM6/LIP/ASTX (KRILL OIL, OMEGA 3 & 6, ORAL) Take by mouth once daily at 6am.       levothyroxine (SYNTHROID) 75 MCG tablet TAKE 1 TABLET(75 MCG) BY MOUTH BEFORE BREAKFAST 90 tablet 0    lidocaine HCL 2% (XYLOCAINE) 2 % jelly APPLY TOPICALLY EVERY DAY AS NEEDED 30 mL 3    metoprolol succinate (TOPROL-XL) 50 MG 24 hr tablet Take 1 tablet (50 mg total) by mouth once daily. 90 tablet 3    miconazole NITRATE 2 % (ZEASORB AF) 2 % top powder Apply topically as needed for Itching. 71 g 0    mometasone (NASONEX) 50 mcg/actuation nasal spray 2 sprays by Nasal route once daily. 1 each 0    nystatin-triamcinolone (MYCOLOG II) cream Apply topically 2 (two) times daily. 30 g 0    olopatadine (PATANOL) 0.1 % ophthalmic solution Place 1 drop into the right eye 2 (two) times daily. 5 mL 1    omeprazole (PRILOSEC) 40 MG capsule Take 1 capsule (40 mg total) by mouth daily as needed. 30 capsule 0    ondansetron (ZOFRAN-ODT) 8 MG TbDL Take 1 tablet (8 mg total) by mouth every 12 (twelve) hours as needed. 20 tablet 0    ranitidine (ZANTAC) 150 MG tablet Take 1 tablet (150 mg total) by mouth 2 (two) times daily as needed for Heartburn. 120 tablet 5    silver sulfADIAZINE 1% (SILVADENE) 1 % cream aaa buttock bid 50 g 2    SORIATANE 10 mg capsule Take 2 po qday 180 capsule 3    SYMBICORT 160-4.5 mcg/actuation HFAA INHALE 2 PUFFS BY MOUTH INTO THE LUNGS EVERY 12 HOURS 10.2 g 0    triamcinolone acetonide 0.1% (KENALOG) 0.1 % cream Apply " topically 2 (two) times daily. Apply to affected area 454 Tube 3     No current facility-administered medications for this visit.        Review of patient's allergies indicates:   Allergen Reactions    Sulfamethoxazole-trimethoprim Nausea And Vomiting    Latex, natural rubber Rash    Pcn [penicillins] Rash        BMP  Lab Results   Component Value Date     08/12/2017    K 4.1 08/12/2017     08/12/2017    CO2 28 08/12/2017    BUN 29 (H) 08/12/2017    CREATININE 0.8 08/12/2017    CALCIUM 9.4 08/12/2017    ANIONGAP 10 08/12/2017    ESTGFRAFRICA >60.0 08/12/2017    EGFRNONAA >60.0 08/12/2017       Review of Systems   Constitutional: Positive for chills. Negative for fever and unexpected weight change.   HENT: Negative for nosebleeds.    Eyes: Negative for visual disturbance.   Respiratory: Negative for chest tightness.    Cardiovascular: Negative for chest pain.   Gastrointestinal: Negative for diarrhea.   Genitourinary: Positive for dysuria, frequency, nocturia and urgency. Negative for hematuria and vaginal bleeding.   Musculoskeletal: Negative for myalgias.   Skin: Negative for rash.   Neurological: Negative for seizures.   Hematological: Does not bruise/bleed easily.   Psychiatric/Behavioral: Negative for behavioral problems.     Objective:      Physical Exam   Vitals reviewed.  Constitutional: She is oriented to person, place, and time. She appears well-developed and well-nourished.   HENT:   Head: Normocephalic and atraumatic.   Eyes: No scleral icterus.   Cardiovascular: Normal rate and regular rhythm.    Pulmonary/Chest: Effort normal. No respiratory distress.   Abdominal: She exhibits no mass. There is no CVA tenderness.   Genitourinary:   Genitourinary Comments: The external genitalia were normal. No rash. Atrophic vaginitis was  present. The urethral showed no evidence of a urethral diverticulum.  And in and out cath was performed under sterile conditions immediately after voiding. The PVR was  30 ml.    Perineum normal. External hemorrhoids were not present. Levator was not tender to palpation.   The uterus was not present. A cystocele was not present. A rectocele was not present.   Small introitus.      Musculoskeletal: She exhibits edema. She exhibits no tenderness.   Lymphadenopathy:     She has no cervical adenopathy.   Neurological: She is alert and oriented to person, place, and time.   Skin: Skin is warm and dry. Rash noted.     psoriasis   Psychiatric: She has a normal mood and affect.     urine dip 30 protein  Assessment:       1. Type 2 diabetes mellitus without retinopathy    2. Chronic congestive heart failure with left ventricular diastolic dysfunction    3. Low grade B cell lymphoproliferative disorder    4. Chronic kidney disease, stage II (mild)    5. Controlled type 2 diabetes mellitus with microalbuminuria, with long-term current use of insulin    6. Arthralgia, unspecified joint        Plan:   Diagnoses and all orders for this visit:    Type 2 diabetes mellitus without retinopathy    Chronic congestive heart failure with left ventricular diastolic dysfunction    Low grade B cell lymphoproliferative disorder    Chronic kidney disease, stage II (mild)  -     POCT urinalysis, dipstick or tablet reag    Controlled type 2 diabetes mellitus with microalbuminuria, with long-term current use of insulin    Arthralgia, unspecified joint    Dysuria  -     Urine culture      Urine culture. Try replens. If no improvement, trial of vaginal estrogen.   Will decided based on culture results.     I would like to thank Og Cid MD for referral of this patient.

## 2017-10-17 NOTE — LETTER
October 17, 2017      Og Cid MD  [address]  [place] [address]           [facility] - Urology 4th Floor  [address]  [facility] [address]  Phone: [phone]          Patient: Krissy Marino   MR Number: [id number]   YOB: 1940   Date of Visit: 10/17/2017       Dear Dr. Og Cid:    Thank you for referring Krissy Marino to me for evaluation. Attached you will find relevant portions of my assessment and plan of care.    If you have questions, please do not hesitate to call me. I look forward to following Krissy Marino along with you.    Sincerely,    Suzan Parrish MD    Enclosure  CC:  No Recipients    If you would like to receive this communication electronically, please contact [email or web address] or [phone] to request more information on [employer] Link access.    For providers and/or their staff who would like to refer a patient to [facility], please contact us through our one-stop-shop provider referral line, [facility], at [phone].    If you feel you have received this communication in error or would no longer like to receive these types of communications, please e-mail [email or web address]

## 2017-10-18 ENCOUNTER — OFFICE VISIT (OUTPATIENT)
Dept: SPORTS MEDICINE | Facility: CLINIC | Age: 77
End: 2017-10-18
Payer: COMMERCIAL

## 2017-10-18 ENCOUNTER — OFFICE VISIT (OUTPATIENT)
Dept: CARDIOLOGY | Facility: CLINIC | Age: 77
End: 2017-10-18
Payer: COMMERCIAL

## 2017-10-18 VITALS — BODY MASS INDEX: 33.66 KG/M2 | WEIGHT: 202 LBS | TEMPERATURE: 98 F | HEIGHT: 65 IN

## 2017-10-18 VITALS
BODY MASS INDEX: 34.24 KG/M2 | HEIGHT: 65 IN | SYSTOLIC BLOOD PRESSURE: 129 MMHG | WEIGHT: 205.5 LBS | DIASTOLIC BLOOD PRESSURE: 62 MMHG | HEART RATE: 91 BPM

## 2017-10-18 DIAGNOSIS — I10 ESSENTIAL HYPERTENSION: Primary | ICD-10-CM

## 2017-10-18 DIAGNOSIS — M25.511 CHRONIC PAIN OF BOTH SHOULDERS: Primary | ICD-10-CM

## 2017-10-18 DIAGNOSIS — I50.32 CHRONIC CONGESTIVE HEART FAILURE WITH LEFT VENTRICULAR DIASTOLIC DYSFUNCTION: ICD-10-CM

## 2017-10-18 DIAGNOSIS — M12.812 ROTATOR CUFF ARTHROPATHY, LEFT: ICD-10-CM

## 2017-10-18 DIAGNOSIS — M25.512 CHRONIC PAIN OF BOTH SHOULDERS: Primary | ICD-10-CM

## 2017-10-18 DIAGNOSIS — M79.641 PAIN OF RIGHT HAND: ICD-10-CM

## 2017-10-18 DIAGNOSIS — M12.811 ROTATOR CUFF TEAR ARTHROPATHY OF RIGHT SHOULDER: ICD-10-CM

## 2017-10-18 DIAGNOSIS — M75.101 ROTATOR CUFF TEAR ARTHROPATHY OF RIGHT SHOULDER: ICD-10-CM

## 2017-10-18 DIAGNOSIS — I25.10 CORONARY ARTERY DISEASE INVOLVING NATIVE CORONARY ARTERY OF NATIVE HEART WITHOUT ANGINA PECTORIS: ICD-10-CM

## 2017-10-18 DIAGNOSIS — R60.0 BILATERAL LEG EDEMA: ICD-10-CM

## 2017-10-18 DIAGNOSIS — E78.5 DYSLIPIDEMIA: ICD-10-CM

## 2017-10-18 DIAGNOSIS — G89.29 CHRONIC PAIN OF BOTH SHOULDERS: Primary | ICD-10-CM

## 2017-10-18 PROCEDURE — 99999 PR PBB SHADOW E&M-EST. PATIENT-LVL III: CPT | Mod: PBBFAC,,, | Performed by: FAMILY MEDICINE

## 2017-10-18 PROCEDURE — 99214 OFFICE O/P EST MOD 30 MIN: CPT | Mod: S$GLB,,, | Performed by: INTERNAL MEDICINE

## 2017-10-18 PROCEDURE — 99214 OFFICE O/P EST MOD 30 MIN: CPT | Mod: 25,S$GLB,, | Performed by: FAMILY MEDICINE

## 2017-10-18 PROCEDURE — 20610 DRAIN/INJ JOINT/BURSA W/O US: CPT | Mod: 50,S$GLB,, | Performed by: FAMILY MEDICINE

## 2017-10-18 PROCEDURE — 99999 PR PBB SHADOW E&M-EST. PATIENT-LVL V: CPT | Mod: PBBFAC,,, | Performed by: INTERNAL MEDICINE

## 2017-10-18 RX ORDER — TRIAMCINOLONE ACETONIDE 40 MG/ML
40 INJECTION, SUSPENSION INTRA-ARTICULAR; INTRAMUSCULAR
Status: DISCONTINUED | OUTPATIENT
Start: 2017-10-18 | End: 2017-10-18 | Stop reason: HOSPADM

## 2017-10-18 RX ORDER — LOSARTAN POTASSIUM 25 MG/1
12.5 TABLET ORAL DAILY
Qty: 45 TABLET | Refills: 3 | Status: SHIPPED | OUTPATIENT
Start: 2017-10-18 | End: 2017-12-20 | Stop reason: SDUPTHER

## 2017-10-18 RX ORDER — METOPROLOL SUCCINATE 50 MG/1
50 TABLET, EXTENDED RELEASE ORAL DAILY
Qty: 90 TABLET | Refills: 3 | Status: SHIPPED | OUTPATIENT
Start: 2017-10-18 | End: 2018-07-25 | Stop reason: SDUPTHER

## 2017-10-18 RX ADMIN — TRIAMCINOLONE ACETONIDE 40 MG: 40 INJECTION, SUSPENSION INTRA-ARTICULAR; INTRAMUSCULAR at 01:10

## 2017-10-18 NOTE — PROGRESS NOTES
I have personally interviewed and examined the patient. I have reviewed the notes, assessments and plans performed by Dr Kohler and I CONCUR with his documentation of Krissy Marino.

## 2017-10-18 NOTE — PROGRESS NOTES
Cardiology Clinic Note  Reason for Visit: F/u CHF and CAD    HPI:   Ms. Marino is a 77 year old white female with HTN DM well controlled, HLD and ICM with LVEF 30-35% (now recovered) on exercise stress echo who presents to cardiology clinic for follow up. Seen by me multiple times since August 2016. Found to have 99% prox LAD and 90% prox D1. Received OSMEL to prox and mid LAD with DONNY 1 flow. Had repeat PET showing improved LVEF and no evidence of ischemia. Last echo in January showed LVEF 60% with diastolic dysfunction. Feels much better now. No limiting symptoms. During her last visit with me in August, she wad doing well with no complaints. No change was made at that time. Comes in today for general follow up and questions re: venous stasis changes. Cannot find compression stockings that fit her legs and would like to have her legs skinnier. Denies CP, SOB, NEAL and fatigue. Happy to be off plavix after 1  Year of treatment.    ROS:    Constitution: Negative for fever, chills, weight loss or gain.   HENT: Negative for sore throat, rhinorrhea, or headache; post nasal drip  Eyes: Negative for blurred or double vision.   Cardiovascular: See above  Pulmonary: no NEAL, no cough   Gastrointestinal: Negative for abdominal pain, nausea, vomiting, or diarrhea.   : Negative for dysuria.   Neurological: Negative for focal weakness or sensory changes.  PMH:     Past Medical History:   Diagnosis Date    Adverse effect of glucocorticoid or synthetic analogue     Allergy     Arthritis     Cancer     CHF (congestive heart failure)     Chronic kidney disease     Coronary artery disease involving native coronary artery of native heart without angina pectoris 10/11/2016    Diabetic neuropathy 10/6/2014    Giant cell arteritis 9/24/2012    Hyperlipidemia     Hypertension     Hypothyroid     Obesity (BMI 30-39.9) 10/6/2014    Osteopenia     Primary osteoarthritis of both knees 9/24/2012    Type II or unspecified  type diabetes mellitus with renal manifestations, uncontrolled(250.42)      Past Surgical History:   Procedure Laterality Date    APPENDECTOMY      CATARACT EXTRACTION, BILATERAL      CHOLECYSTECTOMY      EYE SURGERY      HYSTERECTOMY      JOINT REPLACEMENT      bilateral total knee    SKIN BIOPSY      TONSILLECTOMY       Allergies:     Review of patient's allergies indicates:   Allergen Reactions    Sulfamethoxazole-trimethoprim Nausea And Vomiting    Latex, natural rubber Rash    Pcn [penicillins] Rash     Medications:     Current Outpatient Prescriptions on File Prior to Visit   Medication Sig Dispense Refill    albuterol 90 mcg/actuation inhaler Inhale 2 puffs into the lungs every 6 (six) hours as needed for Wheezing. 1 each 11    alendronate (FOSAMAX) 70 MG tablet Take 1 tablet (70 mg total) by mouth every 7 days. 4 tablet 11    allopurinol (ZYLOPRIM) 300 MG tablet TAKE 1 TABLET(300 MG) BY MOUTH EVERY DAY 90 tablet 0    aspirin (ECOTRIN) 81 MG EC tablet Take 1 tablet (81 mg total) by mouth once daily. 90 tablet 3    atorvastatin (LIPITOR) 40 MG tablet TAKE 1 TABLET(40 MG) BY MOUTH EVERY DAY 90 tablet 0    azelastine (ASTELIN) 137 mcg (0.1 %) nasal spray 1 spray (137 mcg total) by Nasal route 2 (two) times daily. 30 mL 11    BIOTIN ORAL Take by mouth.      blood sugar diagnostic (ACCU-CHEK AMELIA PLUS TEST STRP) Strp Checks bg 2 x day before meals 200 strip 4    cetirizine (ZYRTEC) 10 MG tablet Take 1 tablet (10 mg total) by mouth once daily. 30 tablet 2    cholecalciferol, vitamin D3, 50,000 unit capsule Take 1 capsule (50,000 Units total) by mouth twice a week. 40 capsule 0    ciclopirox (PENLAC) 8 % Soln APPLY EXTERNALLY TO THE AFFECTED AREA EVERY NIGHT 6.6 mL 0    clindamycin (CLEOCIN) 150 MG capsule       desonide (DESOWEN) 0.05 % cream AAA bid 180 g 1    erythromycin (ROMYCIN) ophthalmic ointment Place into the right eye 3 (three) times daily. 1 Tube 3    FERROUS GLUCONATE (FERATE  "ORAL) Take by mouth once daily at 6am.       folic acid (FOLVITE) 1 MG tablet TAKE 1 TABLET(1000 MCG) BY MOUTH EVERY DAY 90 tablet 0    hydrocortisone butyrate (LOCOID) 0.1 % Crea cream AAA buttock bid 180 g 0    insulin lispro (HUMALOG KWIKPEN) 100 unit/mL InPn pen Inject 5 Units into the skin 3 (three) times daily with meals. 1 Box 1    insulin needles, disposable, (BD ULTRA-FINE ILIANA PEN NEEDLES) 32 x 5/32 " Ndle Injects insulin 3 x day 300 each 3    KRILL/OM3/DHA/EPA/OM6/LIP/ASTX (KRILL OIL, OMEGA 3 & 6, ORAL) Take by mouth once daily at 6am.       levothyroxine (SYNTHROID) 75 MCG tablet TAKE 1 TABLET(75 MCG) BY MOUTH BEFORE BREAKFAST 90 tablet 0    lidocaine HCL 2% (XYLOCAINE) 2 % jelly APPLY TOPICALLY EVERY DAY AS NEEDED 30 mL 3    miconazole NITRATE 2 % (ZEASORB AF) 2 % top powder Apply topically as needed for Itching. 71 g 0    mometasone (NASONEX) 50 mcg/actuation nasal spray 2 sprays by Nasal route once daily. 1 each 0    nystatin-triamcinolone (MYCOLOG II) cream Apply topically 2 (two) times daily. 30 g 0    olopatadine (PATANOL) 0.1 % ophthalmic solution Place 1 drop into the right eye 2 (two) times daily. 5 mL 1    omeprazole (PRILOSEC) 40 MG capsule Take 1 capsule (40 mg total) by mouth daily as needed. 30 capsule 0    ondansetron (ZOFRAN-ODT) 8 MG TbDL Take 1 tablet (8 mg total) by mouth every 12 (twelve) hours as needed. 20 tablet 0    ranitidine (ZANTAC) 150 MG tablet Take 1 tablet (150 mg total) by mouth 2 (two) times daily as needed for Heartburn. 120 tablet 5    silver sulfADIAZINE 1% (SILVADENE) 1 % cream aaa buttock bid 50 g 2    SORIATANE 10 mg capsule Take 2 po qday 180 capsule 3    SYMBICORT 160-4.5 mcg/actuation HFAA INHALE 2 PUFFS BY MOUTH INTO THE LUNGS EVERY 12 HOURS 10.2 g 0    triamcinolone acetonide 0.1% (KENALOG) 0.1 % cream Apply topically 2 (two) times daily. Apply to affected area 454 Tube 3    [DISCONTINUED] clopidogrel (PLAVIX) 75 mg tablet Take 1 tablet (75 " "mg total) by mouth once daily. 90 tablet 3    [DISCONTINUED] metoprolol succinate (TOPROL-XL) 50 MG 24 hr tablet Take 1 tablet (50 mg total) by mouth once daily. 90 tablet 3     Current Facility-Administered Medications on File Prior to Visit   Medication Dose Route Frequency Provider Last Rate Last Dose    [DISCONTINUED] triamcinolone acetonide injection 40 mg  40 mg Intra-articular  Rock Lopez MD   40 mg at 10/18/17 1318    [DISCONTINUED] triamcinolone acetonide injection 40 mg  40 mg Intra-articular  Rock Lopez MD   40 mg at 10/18/17 1318     Social History:     Social History   Substance Use Topics    Smoking status: Never Smoker    Smokeless tobacco: Never Used    Alcohol use No     Family History:     Family History   Problem Relation Age of Onset    Diabetes Mother     Hypertension Mother     Hypertension Father     Kidney disease Neg Hx     Eczema Neg Hx     Psoriasis Neg Hx     Melanoma Neg Hx     Lupus Neg Hx     Acne Neg Hx      Physical Exam:     /62 (BP Location: Left arm, Patient Position: Sitting, BP Method: X-Large (Automatic))   Pulse 91   Ht 5' 5" (1.651 m)   Wt 93.2 kg (205 lb 7.5 oz)   BMI 34.19 kg/m²      Constitutional: NAD, conversant  HEENT: Sclera anicteric, PERRLA, EOMI  Neck: No JVD, no carotid bruits  CV: RRR, no murmur, normal S1/S2, no S3  Pulm: CTAB, no wheezes, rales, or ronchi  GI: Abdomen soft, NTND, +BS  Extremities: 2+ doughy LE edema to ankles, warm and well perfused  Skin: No ecchymosis, erythema, or ulcers  Psych: AOx3, appropriate affect  Neuro: CNII-XII intact, no focal deficits    Labs:     Lab Results   Component Value Date     08/12/2017    K 4.1 08/12/2017     08/12/2017    CO2 28 08/12/2017    BUN 29 (H) 08/12/2017    CREATININE 0.8 08/12/2017    ANIONGAP 10 08/12/2017     Lab Results   Component Value Date    HGBA1C 6.2 (H) 06/24/2017     Lab Results   Component Value Date     (H) 08/03/2016    BNP 23 " 02/19/2010    Lab Results   Component Value Date    WBC 9.29 08/12/2017    HGB 12.6 08/12/2017    HCT 39.7 08/12/2017     08/12/2017    GRAN 7.4 08/12/2017    GRAN 79.3 (H) 08/12/2017     Lab Results   Component Value Date    CHOL 112 (L) 06/24/2017    HDL 29 (L) 06/24/2017    LDLCALC 59.6 (L) 06/24/2017    TRIG 117 06/24/2017          Imaging:     TTE -- 1/2017    1 - Mild left ventricular enlargement.     2 - Normal left ventricular systolic function (EF 55-60%).     3 - Eccentric hypertrophy.     4 - Left ventricular diastolic dysfunction.     5 - Biatrial enlargement.     6 - Normal right ventricular systolic function .     7 - Trivial aortic regurgitation.     8 - Trivial to mild mitral regurgitation.     9 - Trivial to mild tricuspid regurgitation.     10 - The estimated PA systolic pressure is 32 mmHg.    EF   Date Value Ref Range Status   01/16/2017 58 55 - 65    08/03/2016 30 (A) 55 - 65        Assessment:    Krissy Marino is a 77 year old with DM HTN and HLD who presents today for follow up of her CAD and ischemic cardiomyopathy. She has been medically optimized and now revascularized. Doing very well. Primary complaint today is lower extremity edema 2/2 venous stasis changes    Plan:   1) Systolic Heart Failure with Reduced EF -- NYHA II, warm and euvolemic today   --continue ASA and atorvastatin; DC plavix as it has been 1 year   --continiue toprol XL 50mg   --will start losartan 12.5mg (uptitrate as tolerated given previous low BP)   --off lasix; weighs herself daily     2) CAD -- prox LAD disease   --continue ASA plus statin   --finished rehab     3) HTN -- controlled well   --continue meds as above   --add losartan 12.5mg    4) HLD   --atorvastatin 40mg, at goal    5) DM   --at goal    6) GERD   --on meds    7) Venous stasis   --compression stockings prescribed today    RTC 4 months    Signed:  Toñito Kohler MD  Cardiology Fellow, PGY-5  Pager: [phone]  10/18/2017 4:34 PM

## 2017-10-18 NOTE — PROCEDURES
Large Joint Aspiration/Injection  Date/Time: 10/18/2017 1:18 PM  Performed by: MOE MIRELES  Authorized by: MOE MIRELES     Consent Done?:  Yes (Verbal)  Indications:  Pain  Procedure site marked: Yes    Timeout: Prior to procedure the correct patient, procedure, and site was verified      Location:  Shoulder  Site:  R subacromial bursa and L subacromial bursa  Prep: Patient was prepped and draped in usual sterile fashion    Ultrasonic Guidance for needle placement: No  Needle size:  22 G  Approach:  Posterior  Medications:  40 mg triamcinolone acetonide 40 mg/mL; 40 mg triamcinolone acetonide 40 mg/mL  Patient tolerance:  Patient tolerated the procedure well with no immediate complications

## 2017-10-19 LAB — BACTERIA UR CULT: NORMAL

## 2017-10-20 ENCOUNTER — TELEPHONE (OUTPATIENT)
Dept: UROLOGY | Facility: CLINIC | Age: 77
End: 2017-10-20

## 2017-10-20 RX ORDER — CEPHALEXIN 500 MG/1
500 CAPSULE ORAL EVERY 12 HOURS
Qty: 20 CAPSULE | Refills: 0 | Status: SHIPPED | OUTPATIENT
Start: 2017-10-20 | End: 2017-10-30

## 2017-10-20 NOTE — TELEPHONE ENCOUNTER
Patient has UTI. Sent keflex. She has rash allergy to penicillin. There is a small chance of cross-reactivity.  See if she has taken it before and willing to try it. She had a lot of antibiotic resistance. Start taking a probiotic daily.     Follow up with me in 3 weeks. A wed is fine that isn't full.

## 2017-10-25 RX ORDER — ALLOPURINOL 300 MG/1
TABLET ORAL
Qty: 90 TABLET | Refills: 3 | Status: SHIPPED | OUTPATIENT
Start: 2017-10-25 | End: 2019-07-17 | Stop reason: ALTCHOICE

## 2017-10-26 ENCOUNTER — TELEPHONE (OUTPATIENT)
Dept: UROLOGY | Facility: CLINIC | Age: 77
End: 2017-10-26

## 2017-10-26 NOTE — TELEPHONE ENCOUNTER
----- Message from Mabel العلي sent at 10/26/2017  1:30 PM CDT -----  Contact: Self [phone]  Pt requesting call back in regards to labs she missed on 10/20. Stated she was not aware of the appointment. Please advise

## 2017-10-28 DIAGNOSIS — Z91.09 ENVIRONMENTAL ALLERGIES: ICD-10-CM

## 2017-10-30 RX ORDER — AZELASTINE 1 MG/ML
SPRAY, METERED NASAL
Qty: 30 ML | Refills: 0 | Status: SHIPPED | OUTPATIENT
Start: 2017-10-30 | End: 2019-07-17 | Stop reason: ALTCHOICE

## 2017-11-07 ENCOUNTER — LAB VISIT (OUTPATIENT)
Dept: LAB | Facility: HOSPITAL | Age: 77
End: 2017-11-07
Attending: INTERNAL MEDICINE
Payer: COMMERCIAL

## 2017-11-07 ENCOUNTER — OFFICE VISIT (OUTPATIENT)
Dept: PODIATRY | Facility: CLINIC | Age: 77
End: 2017-11-07
Payer: COMMERCIAL

## 2017-11-07 VITALS
DIASTOLIC BLOOD PRESSURE: 63 MMHG | SYSTOLIC BLOOD PRESSURE: 116 MMHG | HEART RATE: 70 BPM | RESPIRATION RATE: 18 BRPM | BODY MASS INDEX: 34.66 KG/M2 | WEIGHT: 208 LBS | HEIGHT: 65 IN

## 2017-11-07 DIAGNOSIS — L97.521 TOE ULCER, LEFT, LIMITED TO BREAKDOWN OF SKIN: ICD-10-CM

## 2017-11-07 DIAGNOSIS — B35.1 ONYCHOMYCOSIS DUE TO DERMATOPHYTE: ICD-10-CM

## 2017-11-07 DIAGNOSIS — L84 CORN OR CALLUS: ICD-10-CM

## 2017-11-07 DIAGNOSIS — M31.6 GIANT CELL ARTERITIS: ICD-10-CM

## 2017-11-07 DIAGNOSIS — E11.49 TYPE II DIABETES MELLITUS WITH NEUROLOGICAL MANIFESTATIONS: Primary | ICD-10-CM

## 2017-11-07 LAB
ALBUMIN SERPL BCP-MCNC: 3.2 G/DL
ALP SERPL-CCNC: 124 U/L
ALT SERPL W/O P-5'-P-CCNC: 25 U/L
ANION GAP SERPL CALC-SCNC: 9 MMOL/L
AST SERPL-CCNC: 20 U/L
BASOPHILS # BLD AUTO: 0.03 K/UL
BASOPHILS NFR BLD: 0.4 %
BILIRUB SERPL-MCNC: 0.9 MG/DL
BUN SERPL-MCNC: 34 MG/DL
CALCIUM SERPL-MCNC: 9.3 MG/DL
CHLORIDE SERPL-SCNC: 108 MMOL/L
CO2 SERPL-SCNC: 26 MMOL/L
CREAT SERPL-MCNC: 0.8 MG/DL
CRP SERPL-MCNC: 2.8 MG/L
DIFFERENTIAL METHOD: ABNORMAL
EOSINOPHIL # BLD AUTO: 0.3 K/UL
EOSINOPHIL NFR BLD: 3.4 %
ERYTHROCYTE [DISTWIDTH] IN BLOOD BY AUTOMATED COUNT: 13.8 %
ERYTHROCYTE [SEDIMENTATION RATE] IN BLOOD BY WESTERGREN METHOD: 58 MM/HR
EST. GFR  (AFRICAN AMERICAN): >60 ML/MIN/1.73 M^2
EST. GFR  (NON AFRICAN AMERICAN): >60 ML/MIN/1.73 M^2
GLUCOSE SERPL-MCNC: 166 MG/DL
HCT VFR BLD AUTO: 37.1 %
HGB BLD-MCNC: 12 G/DL
IMM GRANULOCYTES # BLD AUTO: 0.03 K/UL
IMM GRANULOCYTES NFR BLD AUTO: 0.4 %
LYMPHOCYTES # BLD AUTO: 1.6 K/UL
LYMPHOCYTES NFR BLD: 18.8 %
MCH RBC QN AUTO: 32.2 PG
MCHC RBC AUTO-ENTMCNC: 32.3 G/DL
MCV RBC AUTO: 100 FL
MONOCYTES # BLD AUTO: 0.4 K/UL
MONOCYTES NFR BLD: 5.1 %
NEUTROPHILS # BLD AUTO: 6.1 K/UL
NEUTROPHILS NFR BLD: 71.9 %
NRBC BLD-RTO: 0 /100 WBC
PLATELET # BLD AUTO: 134 K/UL
PMV BLD AUTO: 11 FL
POTASSIUM SERPL-SCNC: 4.1 MMOL/L
PROT SERPL-MCNC: 6.1 G/DL
RBC # BLD AUTO: 3.73 M/UL
SODIUM SERPL-SCNC: 143 MMOL/L
WBC # BLD AUTO: 8.46 K/UL

## 2017-11-07 PROCEDURE — 11056 PARNG/CUTG B9 HYPRKR LES 2-4: CPT | Mod: Q9,S$GLB,, | Performed by: PODIATRIST

## 2017-11-07 PROCEDURE — 80053 COMPREHEN METABOLIC PANEL: CPT | Mod: 91

## 2017-11-07 PROCEDURE — 99213 OFFICE O/P EST LOW 20 MIN: CPT | Mod: 25,S$GLB,, | Performed by: PODIATRIST

## 2017-11-07 PROCEDURE — 99999 PR PBB SHADOW E&M-EST. PATIENT-LVL III: CPT | Mod: PBBFAC,,, | Performed by: PODIATRIST

## 2017-11-07 PROCEDURE — 36415 COLL VENOUS BLD VENIPUNCTURE: CPT | Mod: PO

## 2017-11-07 PROCEDURE — 85025 COMPLETE CBC W/AUTO DIFF WBC: CPT | Mod: 91

## 2017-11-07 PROCEDURE — 85651 RBC SED RATE NONAUTOMATED: CPT

## 2017-11-07 PROCEDURE — 11721 DEBRIDE NAIL 6 OR MORE: CPT | Mod: Q9,59,S$GLB, | Performed by: PODIATRIST

## 2017-11-07 PROCEDURE — 86140 C-REACTIVE PROTEIN: CPT

## 2017-11-07 RX ORDER — MUPIROCIN 20 MG/G
OINTMENT TOPICAL 2 TIMES DAILY
Qty: 30 G | Refills: 1 | Status: SHIPPED | OUTPATIENT
Start: 2017-11-07 | End: 2017-11-17

## 2017-11-07 NOTE — PROGRESS NOTES
Subjective:      Patient ID: Krissy Marino is a 77 y.o. female.    Chief Complaint: PCP (Og Cid MD 10/13/17); Diabetic Foot Exam (black spot under the left big toe ); Nail Problem; and Nail Care    Krissy is a 77 y.o. female who presents to the clinic for evaluation and treatment of high risk feet. Krissy has a past medical history of Adverse effect of glucocorticoid or synthetic analogue; Allergy; Arthritis; Cancer; CHF (congestive heart failure); Chronic kidney disease; Coronary artery disease involving native coronary artery of native heart without angina pectoris (10/11/2016); Diabetic neuropathy (10/6/2014); Giant cell arteritis (9/24/2012); Hyperlipidemia; Hypertension; Hypothyroid; Obesity (BMI 30-39.9) (10/6/2014); Osteopenia; Primary osteoarthritis of both knees (9/24/2012); and Type II or unspecified type diabetes mellitus with renal manifestations, uncontrolled(250.42). The patient's chief complaint is long, thick toenails        PCP: Og Cid MD    Date Last Seen by PCP:   Chief Complaint   Patient presents with    PCP     Og Cid MD 10/13/17    Diabetic Foot Exam     black spot under the left big toe     Nail Problem    Nail Care     Current shoe gear: loafers     Hemoglobin A1C   Date Value Ref Range Status   06/24/2017 6.2 (H) 4.0 - 5.6 % Final     Comment:     According to ADA guidelines, hemoglobin A1c <7.0% represents  optimal control in non-pregnant diabetic patients. Different  metrics may apply to specific patient populations.   Standards of Medical Care in Diabetes-2016.  For the purpose of screening for the presence of diabetes:  <5.7%     Consistent with the absence of diabetes  5.7-6.4%  Consistent with increasing risk for diabetes   (prediabetes)  >or=6.5%  Consistent with diabetes  Currently, no consensus exists for use of hemoglobin A1c  for diagnosis of diabetes for children.  This Hemoglobin A1c assay has significant interference with fetal   hemoglobin    (HbF). The results are invalid for patients with abnormal amounts of   HbF,   including those with known Hereditary Persistence   of Fetal Hemoglobin. Heterozygous hemoglobin variants (HbAS, HbAC,   HbAD, HbAE, HbA2) do not significantly interfere with this assay;   however, presence of multiple variants in a sample may impact the %   interference.     11/25/2016 7.1 (H) 4.5 - 6.2 % Final     Comment:     According to ADA guidelines, hemoglobin A1C <7.0% represents  optimal control in non-pregnant diabetic patients.  Different  metrics may apply to specific populations.   Standards of Medical Care in Diabetes - 2016.  For the purpose of screening for the presence of diabetes:  <5.7%     Consistent with the absence of diabetes  5.7-6.4%  Consistent with increasing risk for diabetes   (prediabetes)  >or=6.5%  Consistent with diabetes  Currently no consensus exists for use of hemoglobin A1C  for diagnosis of diabetes for children.     07/23/2016 7.0 (H) 4.5 - 6.2 % Final     Comment:     According to ADA guidelines, hemoglobin A1C <7.0% represents  optimal control in non-pregnant diabetic patients.  Different  metrics may apply to specific populations.   Standards of Medical Care in Diabetes - 2016.  For the purpose of screening for the presence of diabetes:  <5.7%     Consistent with the absence of diabetes  5.7-6.4%  Consistent with increasing risk for diabetes   (prediabetes)  >or=6.5%  Consistent with diabetes  Currently no consensus exists for use of hemoglobin A1C  for diagnosis of diabetes for children.           Review of Systems   Constitution: Negative for chills, decreased appetite and fever.   Cardiovascular: Negative for leg swelling.   Skin: Positive for dry skin and nail changes.   Musculoskeletal: Positive for arthritis. Negative for back pain, gout, joint pain, joint swelling and myalgias.   Gastrointestinal: Negative for nausea and vomiting.   Neurological: Positive for numbness and paresthesias.  Negative for loss of balance.           Objective:      Physical Exam   Constitutional: She is oriented to person, place, and time. She appears well-developed and well-nourished.   Cardiovascular:   Pulses:       Dorsalis pedis pulses are 1+ on the right side, and 1+ on the left side.        Posterior tibial pulses are 1+ on the right side, and 1+ on the left side.   Musculoskeletal: Normal range of motion. She exhibits edema (mild).        Right ankle: Normal.        Left ankle: Normal.        Right foot: There is no swelling, no crepitus and no deformity.        Left foot: There is no swelling, no crepitus and no deformity.   Adequate joint range of motion without pain, limitation, nor crepitation Bilateral feet and ankle joints. Muscle strength is 5/5 in all groups bilaterally.    Rigid hammertoe deformity L 2nd digit    1st MPJ exostosis w/ medial deviation of hallux, non trackbound. No pain w/ ROM to R hallux    Atrophy of fat  Pad from bilateral foot with easily palpable bone       Lymphadenopathy:   No palpable lymph nodes   Neurological: She is alert and oriented to person, place, and time. She has normal strength.   Sharp dull sensation diminished Light touch sensation diminished    Skin: Skin is warm, dry and intact. No abrasion, no bruising, no lesion, no petechiae and no rash noted. She is not diaphoretic. No pallor. Nails show no clubbing.   Nails 1-5 R, 2-5 L   are elongated by  3-6mm's, thickened by 2-5 mm's, dystrophic, and are darkened in  coloration . Xerosis Bilaterally. No open lesions noted.      Hyperkeratotic tissue noted to distal 2nd toe b/l     Ulcer location: plantar distal L hallux   Measurements : 0.2x0.2x0.1 cm   Signs of infection: none  Drainage: none  Periwound: intact  Base: granular     Psychiatric: She has a normal mood and affect. Her behavior is normal. Judgment normal.   Nursing note and vitals reviewed.            Assessment:       Encounter Diagnoses   Name Primary?    Type  II diabetes mellitus with neurological manifestations Yes    Onychomycosis due to dermatophyte     Corn or callus     Toe ulcer, left, limited to breakdown of skin          Plan:       Krissy was seen today for pcp, diabetic foot exam, nail problem and nail care.    Diagnoses and all orders for this visit:    Type II diabetes mellitus with neurological manifestations    Onychomycosis due to dermatophyte    Corn or callus    Toe ulcer, left, limited to breakdown of skin    Other orders  -     mupirocin (BACTROBAN) 2 % ointment; Apply topically 2 (two) times daily.      I counseled the patient on her conditions, their implications and medical management.    - Shoe inspection. Diabetic Foot Education. Patient reminded of the importance of good nutrition and blood sugar control to help prevent podiatric complications of diabetes. Patient instructed on proper foot hygeine. We discussed wearing proper shoe gear, daily foot inspections, never walking without protective shoe gear, never putting sharp instruments to feet, routine podiatric nail visits every 2-3 months.      - With patient's permission, nails were aggressively reduced and debrided x 9 to their soft tissue attachment mechanically and with electric , removing all offending nail and debris. Patient relates relief following the procedure. She will continue to monitor the areas daily, inspect her feet, wear protective shoe gear when ambulatory, moisturizer to maintain skin integrity and follow in this office in approximately 2-3 months, sooner p.r.n.    - After cleansing the  area w/ alcohol prep pad the above mentioned hyperkeratosis was trimmed utilizing No 15 scapel, to a smooth base with out incident. Patient tolerated this  well and reported comfort to the area of   Distal 2nd toe b/l     - stable superficial non infected ulcer   - area deeply cleansed with saline moistened gauze   -  Small amount of antibiotic ointment and bandage applied. Pt  instructed to do this daily. Removing the bandage at night to allow the area to dry   - if no resolution in 2 weeks pt verbally understands to follow up but should call [facility] immediately if any signs of infection, such as fever, chills, sweats, increased redness or pain.    Short-term goals include maintaining good offloading and minimizing bioburden, promoting granulation and epithelialization to healing.  Long-term goals include keeping the wound healed by good offloading and medical management under the direction of internist.

## 2017-11-08 RX ORDER — CICLOPIROX 80 MG/ML
SOLUTION TOPICAL
Qty: 6.6 ML | Refills: 0 | Status: SHIPPED | OUTPATIENT
Start: 2017-11-08 | End: 2017-12-05 | Stop reason: SDUPTHER

## 2017-11-10 ENCOUNTER — LAB VISIT (OUTPATIENT)
Dept: LAB | Facility: HOSPITAL | Age: 77
End: 2017-11-10
Attending: DERMATOLOGY
Payer: COMMERCIAL

## 2017-11-10 DIAGNOSIS — Z79.899 ENCOUNTER FOR LONG-TERM (CURRENT) USE OF HIGH-RISK MEDICATION: ICD-10-CM

## 2017-11-10 LAB
ALBUMIN SERPL BCP-MCNC: 3.3 G/DL
ALP SERPL-CCNC: 128 U/L
ALT SERPL W/O P-5'-P-CCNC: 21 U/L
AST SERPL-CCNC: 18 U/L
BILIRUB DIRECT SERPL-MCNC: 0.6 MG/DL
BILIRUB SERPL-MCNC: 1.6 MG/DL
CHOLEST SERPL-MCNC: 139 MG/DL
CHOLEST/HDLC SERPL: 3.2 {RATIO}
HDLC SERPL-MCNC: 43 MG/DL
HDLC SERPL: 30.9 %
LDLC SERPL CALC-MCNC: 67.4 MG/DL
NONHDLC SERPL-MCNC: 96 MG/DL
PROT SERPL-MCNC: 6.1 G/DL
TRIGL SERPL-MCNC: 143 MG/DL

## 2017-11-10 PROCEDURE — 80076 HEPATIC FUNCTION PANEL: CPT

## 2017-11-10 PROCEDURE — 80061 LIPID PANEL: CPT

## 2017-11-10 PROCEDURE — 36415 COLL VENOUS BLD VENIPUNCTURE: CPT | Mod: PO

## 2017-11-13 ENCOUNTER — OFFICE VISIT (OUTPATIENT)
Dept: DERMATOLOGY | Facility: CLINIC | Age: 77
End: 2017-11-13
Payer: COMMERCIAL

## 2017-11-13 DIAGNOSIS — L40.9 PSORIASIS: Primary | ICD-10-CM

## 2017-11-13 DIAGNOSIS — L57.0 AK (ACTINIC KERATOSIS): ICD-10-CM

## 2017-11-13 DIAGNOSIS — Z79.899 ENCOUNTER FOR LONG-TERM (CURRENT) USE OF HIGH-RISK MEDICATION: ICD-10-CM

## 2017-11-13 PROCEDURE — 17000 DESTRUCT PREMALG LESION: CPT | Mod: S$GLB,,, | Performed by: DERMATOLOGY

## 2017-11-13 PROCEDURE — 99214 OFFICE O/P EST MOD 30 MIN: CPT | Mod: 25,S$GLB,, | Performed by: DERMATOLOGY

## 2017-11-13 PROCEDURE — 11900 INJECT SKIN LESIONS </W 7: CPT | Mod: 59,S$GLB,, | Performed by: DERMATOLOGY

## 2017-11-13 PROCEDURE — 99999 PR PBB SHADOW E&M-EST. PATIENT-LVL III: CPT | Mod: PBBFAC,,, | Performed by: DERMATOLOGY

## 2017-11-13 PROCEDURE — 17003 DESTRUCT PREMALG LES 2-14: CPT | Mod: S$GLB,,, | Performed by: DERMATOLOGY

## 2017-11-13 RX ORDER — DESONIDE 0.5 MG/G
CREAM TOPICAL
Qty: 180 G | Refills: 1 | Status: ON HOLD | OUTPATIENT
Start: 2017-11-13 | End: 2018-11-30 | Stop reason: HOSPADM

## 2017-11-13 RX ORDER — ACITRETIN 10 MG/1
CAPSULE ORAL
Qty: 180 CAPSULE | Refills: 1 | Status: SHIPPED | OUTPATIENT
Start: 2017-11-13 | End: 2018-05-22 | Stop reason: SDUPTHER

## 2017-11-13 RX ORDER — LEVOTHYROXINE SODIUM 75 UG/1
TABLET ORAL
Qty: 90 TABLET | Refills: 0 | Status: SHIPPED | OUTPATIENT
Start: 2017-11-13 | End: 2018-02-16 | Stop reason: SDUPTHER

## 2017-11-13 NOTE — PROGRESS NOTES
Subjective:       Patient ID:  Krissy Marino is a 77 y.o. female who presents for   Chief Complaint   Patient presents with    Follow-up     Psoriasis, Better; No new areas     Also has h/o pressure sores - improved      Psoriasis  - Follow-up  Symptom course: worsening  Currently using: soriatane 20mg qday and cream that starts with a P for buttocks, and vinegar: water compresses under breasts.  Affected locations: neck, torso, left buttock and right buttock  Signs / symptoms: asymptomatic        Review of Systems   Constitutional: Negative for fever and chills.   HENT: Negative for nosebleeds, sore throat and headaches.    Eyes: Negative for visual change.   Respiratory: Negative for cough and shortness of breath.    Gastrointestinal: Negative for nausea, vomiting, abdominal pain and diarrhea.   Musculoskeletal: Negative for myalgias, joint swelling and arthralgias.   Skin: Positive for itching (mild and occ), rash, dry skin, daily sunscreen use, activity-related sunscreen use and dry lips (occ). Negative for sun sensitivity, recent sunburn and abscesses.   Neurological: Negative for focal weakness, seizures, numbness and headaches.   Psychiatric/Behavioral: Negative for depressed mood.   Hematologic/Lymphatic: Bruises/bleeds easily (takes aspirin).        Objective:    Physical Exam   Constitutional: She appears well-developed and well-nourished. She is obese.  No distress.   Neurological: She is alert and oriented to person, place, and time. She is not disoriented.   Psychiatric: She has a normal mood and affect.   Skin:   Areas Examined (abnormalities noted in diagram):   Scalp / Hair Palpated and Inspected  Head / Face Inspection Performed  Neck Inspection Performed  Chest / Axilla Inspection Performed  Abdomen Inspection Performed  Genitals / Buttocks / Groin Inspection Performed  Back Inspection Performed  RUE Inspected  LUE Inspection Performed  RLE Inspected  LLE Inspection Performed  Nails and Digits  Inspection Performed                   Diagram Legend     Erythematous scaling macule/papule c/w actinic keratosis       Vascular papule c/w angioma      Pigmented verrucoid papule/plaque c/w seborrheic keratosis      Yellow umbilicated papule c/w sebaceous hyperplasia      Irregularly shaped tan macule c/w lentigo     1-2 mm smooth white papules consistent with Milia      Movable subcutaneous cyst with punctum c/w epidermal inclusion cyst      Subcutaneous movable cyst c/w pilar cyst      Firm pink to brown papule c/w dermatofibroma      Pedunculated fleshy papule(s) c/w skin tag(s)      Evenly pigmented macule c/w junctional nevus     Mildly variegated pigmented, slightly irregular-bordered macule c/w mildly atypical nevus      Flesh colored to evenly pigmented papule c/w intradermal nevus       Pink pearly papule/plaque c/w basal cell carcinoma      Erythematous hyperkeratotic cursted plaque c/w SCC      Surgical scar with no sign of skin cancer recurrence      Open and closed comedones      Inflammatory papules and pustules      Verrucoid papule consistent consistent with wart     Erythematous eczematous patches and plaques     Dystrophic onycholytic nail with subungual debris c/w onychomycosis     Umbilicated papule    Erythematous-base heme-crusted tan verrucoid plaque consistent with inflamed seborrheic keratosis     Erythematous Silvery Scaling Plaque c/w Psoriasis     See annotation    Lab Results   Component Value Date    ALT 21 11/10/2017    AST 18 11/10/2017    ALKPHOS 128 11/10/2017    BILITOT 1.6 (H) 11/10/2017     Lab Results   Component Value Date    CHOL 139 11/10/2017    CHOL 112 (L) 06/24/2017    CHOL 141 05/06/2017     Lab Results   Component Value Date    HDL 43 11/10/2017    HDL 29 (L) 06/24/2017    HDL 35 (L) 05/06/2017     Lab Results   Component Value Date    LDLCALC 67.4 11/10/2017    LDLCALC 59.6 (L) 06/24/2017    LDLCALC 82.2 05/06/2017     Lab Results   Component Value Date    TRIG 143  11/10/2017    TRIG 117 06/24/2017    TRIG 119 05/06/2017     Lab Results   Component Value Date    CHOLHDL 30.9 11/10/2017    CHOLHDL 25.9 06/24/2017    CHOLHDL 24.8 05/06/2017     Lab Results   Component Value Date    WBC 8.46 11/07/2017    WBC 8.26 11/07/2017    HGB 12.0 11/07/2017    HGB 11.9 (L) 11/07/2017    HCT 37.1 11/07/2017    HCT 36.5 (L) 11/07/2017     (H) 11/07/2017     (H) 11/07/2017     (L) 11/07/2017     (L) 11/07/2017         Assessment / Plan:        Psoriasis  -     desonide (DESOWEN) 0.05 % cream; AAA bid  Dispense: 180 g; Refill: 1  -     acitretin (SORIATANE) 10 MG capsule; Take 2 po qday  Dispense: 180 capsule; Refill: 1    Encounter for long-term (current) use of high-risk medication  -     Hepatitis B surface antigen; Future  -     Hepatitis B surface antibody; Future  -     Hepatitis C antibody; Future  -     Quantiferon Gold TB; Future    actinic keratoses  Cryosurgery Procedure Note    Verbal consent from the patient is obtained and the patient is aware of the precancerous quality and need for treatment of these lesions. Liquid nitrogen cryosurgery is applied to the 2 actinic keratoses, as detailed in the physical exam, to produce a freeze injury. The patient is aware that blisters may form and is instructed on wound care with gentle cleansing and use of vaseline ointment to keep moist until healed. The patient is supplied a handout on cryosurgery and is instructed to call if lesions do not completely resolve.    Psoriasis  Today's Plan:      Failing soriatane. Will cont soriatane at 20mg qday and submit request for stelara approval  Cont vinegar: water compresses to folds followed by zeasorb af powder  Pt using an unknown cream to intergluteal fold.  Intralesional Kenalog 5mg/cc (0.5 cc total) injected into 1 lesions on the left neck today after obtaining verbal consent including risk of surrounding hypopigmentation. Patient tolerated procedure well.      Will  not start biologics 2/2 h/o lymphoma    No Follow-up on file.

## 2017-11-13 NOTE — PATIENT INSTRUCTIONS

## 2017-11-13 NOTE — ASSESSMENT & PLAN NOTE
Today's Plan:      Failing soriatane. Will cont soriatane at 20mg qday and submit request for stelara approval  Cont vinegar: water compresses to folds followed by zeasorb af powder  Pt using an unknown cream to intergluteal fold.  Intralesional Kenalog 5mg/cc (0.5 cc total) injected into 1 lesions on the left neck today after obtaining verbal consent including risk of surrounding hypopigmentation. Patient tolerated procedure well.      Discussed  benefits and risks of Stelara including but not limited to injection site reactions, increased risk of infection, and decreased tumor surveillance.   Patient counseled to avoid live vaccines.    Check baseline Hep B, Hep C, and Quantiferon gold.     If labs are WNL, start Stelara as follows: 90mg vial SQ on day 1 then repeat in 1 month then q 3 months.     Will need Quantiferon gold annually.

## 2017-11-15 ENCOUNTER — TELEPHONE (OUTPATIENT)
Dept: HEMATOLOGY/ONCOLOGY | Facility: CLINIC | Age: 77
End: 2017-11-15

## 2017-11-15 ENCOUNTER — OFFICE VISIT (OUTPATIENT)
Dept: SPORTS MEDICINE | Facility: CLINIC | Age: 77
End: 2017-11-15
Payer: COMMERCIAL

## 2017-11-15 VITALS — TEMPERATURE: 98 F | BODY MASS INDEX: 34.66 KG/M2 | WEIGHT: 208 LBS | HEIGHT: 65 IN

## 2017-11-15 DIAGNOSIS — M75.101 ROTATOR CUFF TEAR ARTHROPATHY OF RIGHT SHOULDER: Primary | ICD-10-CM

## 2017-11-15 DIAGNOSIS — M12.812 ROTATOR CUFF TEAR ARTHROPATHY, LEFT: ICD-10-CM

## 2017-11-15 DIAGNOSIS — M75.102 ROTATOR CUFF TEAR ARTHROPATHY, LEFT: ICD-10-CM

## 2017-11-15 DIAGNOSIS — M12.811 ROTATOR CUFF TEAR ARTHROPATHY OF RIGHT SHOULDER: Primary | ICD-10-CM

## 2017-11-15 PROCEDURE — 99214 OFFICE O/P EST MOD 30 MIN: CPT | Mod: 25,S$GLB,, | Performed by: FAMILY MEDICINE

## 2017-11-15 PROCEDURE — 99999 PR PBB SHADOW E&M-EST. PATIENT-LVL III: CPT | Mod: PBBFAC,,, | Performed by: FAMILY MEDICINE

## 2017-11-15 PROCEDURE — 20610 DRAIN/INJ JOINT/BURSA W/O US: CPT | Mod: 50,S$GLB,, | Performed by: FAMILY MEDICINE

## 2017-11-15 RX ORDER — TRIAMCINOLONE ACETONIDE 40 MG/ML
40 INJECTION, SUSPENSION INTRA-ARTICULAR; INTRAMUSCULAR
Status: DISCONTINUED | OUTPATIENT
Start: 2017-11-15 | End: 2017-11-15 | Stop reason: HOSPADM

## 2017-11-15 RX ADMIN — TRIAMCINOLONE ACETONIDE 40 MG: 40 INJECTION, SUSPENSION INTRA-ARTICULAR; INTRAMUSCULAR at 11:11

## 2017-11-15 NOTE — PROCEDURES
Large Joint Aspiration/Injection  Date/Time: 11/15/2017 11:32 AM  Performed by: MOE MIRELES  Authorized by: MOE MIRELES     Consent Done?:  Yes (Verbal)  Indications:  Pain  Procedure site marked: Yes    Timeout: Prior to procedure the correct patient, procedure, and site was verified      Location:  Shoulder  Site:  R subacromial bursa and L subacromial bursa  Prep: Patient was prepped and draped in usual sterile fashion    Ultrasonic Guidance for needle placement: No  Needle size:  22 G  Approach:  Posterior  Medications:  40 mg triamcinolone acetonide 40 mg/mL; 40 mg triamcinolone acetonide 40 mg/mL  Patient tolerance:  Patient tolerated the procedure well with no immediate complications

## 2017-11-15 NOTE — PROGRESS NOTES
Krissy Marino, a 77 y.o. female, is here for evaluation of RIGHT and LEFT shoulder pain.     HISTORY OF PRESENT ILLNESS  Hand dominance, right     Location: anterior and lateral, bilateral R > L  Onset: Insidious, many years  Palliative:    Relative rest   Oral analgesics (tylenol PM)   R- CSI, SAB, 07/18/16, 80% improvement   R - CSI, SAB, 12/02/17, moderate improvement for ~ 3 weeks    R - CSI, SAB, 01/27/17, no improvement    R - CSI, iaGH/SAB, 02/21/17, mild to moderate relief    fPT @ [facility] - going well, but ROM remains painful   Heat    R - CSI, ia[facility]/SAB, 05/05/17, moderate%   Sling PRN   R/L - CSI, SAB, 07/12/17, moderate% improvement    R/L - CSI, iaGH/SAB 08/23/17, moderate%   R/L - CSI, SAB, 10/18/17, moderate% improvement, though not for long   Provocative:    ADLs   Prior:   Progression: worsening discomfort   Quality:    Sharp pain at times activity   Throbbing at times with activity    Muscle soreness   Radiation: none  Severity: per nursing documentation  Timing: intermittent with use  Trauma:    17.07: mechanical fall while at PT --> landed on L arm     Review of systems (ROS):  A 10+ review of systems was performed with pertinent positives and negatives noted above in the history of present illness. Other systems were negative unless otherwise specified.      PHYSICAL EXAMINATION  General:  The patient is alert and oriented x 3. Mood is pleasant. Observation of ears, eyes and nose reveal no gross abnormalities. HEENT: NCAT, sclera anicteric. Lungs: Respirations are equal and unlabored.     RIGHT SHOULDER EXAMINATION     OBSERVATION:     Swelling  none  Deformity  none   Discoloration  none   Scapular winging none   Scars   none  Atrophy  none    TENDERNESS / CREPITUS (T/C):          T/C      T/C   Clavicle   -/-  SUPRAspinatus    -/-     AC Jt.    -/-  INFRAspinatus  -/-    SC Jt.    -/-  Deltoid    -/-      G. Tuberosity  -/-  LH BICEP groove  -/-   Acromion:  -/-  Midline  Neck   -/-     Scapular Spine -/-  Trapezium   -/-   SMA Scapula  -/-  GH jt. line - post  -/-     Scapulothoracic  -/-         ROM:     Right shoulder   Left shoulder        AROM (PROM)   AROM (PROM)   FE    170° (175°)*     170° (175°)*     ER at 0°    60°  (65°) *   60°  (65°)*   ER at 90° ABD  90°  (90°) *   90°  (90°)*   IR at 90°  ABD   NA  (40°)  *   NA  (40°)  *    IR (spine level)   T10  *   T10*    STRENGTH: (* = with pain) RIGHT SHOULDER  LEFT SHOULDER   SCAPTION   4/5    4/5   IR    4/5    4/5   ER    4/5    4/5   BICEPS   4/5    4/5   Deltoid    4/5    4/5     SIGNS:  Painful side       NEER   -   OSONNYS        +    CHAPA   +   SPEEDS        +   DROP ARM   -   BELLY PRESS       -    X-Body ADD    +   LIFT-OFF        +   HORNBLOWERS      +              STABILITY TESTING   RIGHT SHOULDER  LEFT SHOULDER     Translation     Anterior up face    up face    Posterior up face   up face    Sulcus  < 10mm   < 10 mm     Signs   Apprehension   neg      neg       Relocation   no change     no change      Jerk test  neg     neg    EXTREMITY NEURO-VASCULAR EXAM    Sensation grossly intact to light touch all dermatomal regions.    DTR 2+ Biceps, Triceps, BR and Negative Chinas sign   Grossly intact motor function at Elbow, Wrist and Hand   Distal pulses radial and ulnar 2+, brisk cap refill, symmetric.      NECK:  Painless FROM and spinous processes non-tender. Negative Spurlings sign.       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   W/ advanced acromialization of humeral head   W/ chronic tearing of superior rotator cuff   W/ chronic glenoid labral degenerative changes    No evidence of neurologic pathology  No evidence of vascular pathology    Imaging studies reviewed:   X-ray shoulder, right 17.05  X-ray shoulder, left 17.07    Plan:  We discussed options including:  #1 watchful waiting  #2 re start physical therapy aimed at:   RoM glenohumeral  joint   Strengthening rotator cuff   Scapular stability    fpt  #3 injection therapy:   CSI SAB    Right, effective moderate%, repeat    Left, effective moderate%, repeat    CSI iaGH    Right, effect moderate%, repeat     Left,    orthobiologics   #4 consultation re: rTSA   Likely poor surgical candidate given CAD   Likely 01/18 surgical consultation     The patient chooses #2 and #3 csi sab bilat    Pain management: handout given  Bracing: shoulder sling, prn  Physical therapy:    fPT, @ [facility], re start as above   Activity (e.g. sports, work) restrictions: as tolerated   school/vocation: works at [employer]     Follow up in 12 w  Should symptoms worsen or fail to resolve, consider:  Revisiting the above options

## 2017-11-16 ENCOUNTER — OFFICE VISIT (OUTPATIENT)
Dept: HEMATOLOGY/ONCOLOGY | Facility: CLINIC | Age: 77
End: 2017-11-16
Payer: COMMERCIAL

## 2017-11-16 ENCOUNTER — LAB VISIT (OUTPATIENT)
Dept: LAB | Facility: HOSPITAL | Age: 77
End: 2017-11-16
Attending: INTERNAL MEDICINE
Payer: COMMERCIAL

## 2017-11-16 VITALS
RESPIRATION RATE: 14 BRPM | HEART RATE: 79 BPM | SYSTOLIC BLOOD PRESSURE: 155 MMHG | OXYGEN SATURATION: 97 % | WEIGHT: 206.81 LBS | DIASTOLIC BLOOD PRESSURE: 70 MMHG | TEMPERATURE: 99 F | BODY MASS INDEX: 34.46 KG/M2 | HEIGHT: 65 IN

## 2017-11-16 DIAGNOSIS — D47.Z9 LOW GRADE B CELL LYMPHOPROLIFERATIVE DISORDER: ICD-10-CM

## 2017-11-16 DIAGNOSIS — C85.80 INDOLENT NON-HODGKINS LYMPHOMA: Primary | ICD-10-CM

## 2017-11-16 DIAGNOSIS — D69.6 THROMBOCYTOPENIA: ICD-10-CM

## 2017-11-16 LAB
ALBUMIN SERPL BCP-MCNC: 3.6 G/DL
ALP SERPL-CCNC: 142 U/L
ALT SERPL W/O P-5'-P-CCNC: 21 U/L
ANION GAP SERPL CALC-SCNC: 10 MMOL/L
AST SERPL-CCNC: 17 U/L
BASOPHILS # BLD AUTO: 0 K/UL
BASOPHILS NFR BLD: 0 %
BILIRUB SERPL-MCNC: 0.8 MG/DL
BUN SERPL-MCNC: 37 MG/DL
CALCIUM SERPL-MCNC: 10 MG/DL
CHLORIDE SERPL-SCNC: 102 MMOL/L
CO2 SERPL-SCNC: 26 MMOL/L
CREAT SERPL-MCNC: 0.9 MG/DL
DIFFERENTIAL METHOD: ABNORMAL
EOSINOPHIL # BLD AUTO: 0 K/UL
EOSINOPHIL NFR BLD: 0 %
ERYTHROCYTE [DISTWIDTH] IN BLOOD BY AUTOMATED COUNT: 13 %
EST. GFR  (AFRICAN AMERICAN): >60 ML/MIN/1.73 M^2
EST. GFR  (NON AFRICAN AMERICAN): >60 ML/MIN/1.73 M^2
GLUCOSE SERPL-MCNC: 230 MG/DL
HCT VFR BLD AUTO: 36.7 %
HGB BLD-MCNC: 12.4 G/DL
IMM GRANULOCYTES # BLD AUTO: 0.02 K/UL
IMM GRANULOCYTES NFR BLD AUTO: 0.3 %
LDH SERPL L TO P-CCNC: 231 U/L
LYMPHOCYTES # BLD AUTO: 0.8 K/UL
LYMPHOCYTES NFR BLD: 10.5 %
MCH RBC QN AUTO: 32 PG
MCHC RBC AUTO-ENTMCNC: 33.8 G/DL
MCV RBC AUTO: 95 FL
MONOCYTES # BLD AUTO: 0.1 K/UL
MONOCYTES NFR BLD: 0.8 %
NEUTROPHILS # BLD AUTO: 6.9 K/UL
NEUTROPHILS NFR BLD: 88.4 %
NRBC BLD-RTO: 0 /100 WBC
PLATELET # BLD AUTO: 185 K/UL
PMV BLD AUTO: 10.5 FL
POTASSIUM SERPL-SCNC: 4.6 MMOL/L
PROT SERPL-MCNC: 6.9 G/DL
RBC # BLD AUTO: 3.88 M/UL
SODIUM SERPL-SCNC: 138 MMOL/L
WBC # BLD AUTO: 7.81 K/UL

## 2017-11-16 PROCEDURE — 83615 LACTATE (LD) (LDH) ENZYME: CPT

## 2017-11-16 PROCEDURE — 36415 COLL VENOUS BLD VENIPUNCTURE: CPT

## 2017-11-16 PROCEDURE — 85025 COMPLETE CBC W/AUTO DIFF WBC: CPT

## 2017-11-16 PROCEDURE — 99214 OFFICE O/P EST MOD 30 MIN: CPT | Mod: S$GLB,,, | Performed by: INTERNAL MEDICINE

## 2017-11-16 PROCEDURE — 80053 COMPREHEN METABOLIC PANEL: CPT

## 2017-11-16 PROCEDURE — 99999 PR PBB SHADOW E&M-EST. PATIENT-LVL V: CPT | Mod: PBBFAC,,, | Performed by: INTERNAL MEDICINE

## 2017-11-16 NOTE — PROGRESS NOTES
SECTION OF HEMATOLOGY AND BONE MARROW TRANSPLANT  Return Patient Visit   11/17/2017    CHIEF COMPLAINT:   No chief complaint on file.      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 She was previously a patient of Dr. Daley and then Dr. Ledesma.  She has been under wait and watch strategy since 2010 with no intervention.  She continues to be entirely asympomatic from heme perspective. She continues to work full time at [employer] and where she has worked for over 50 years.  Recent mechanical fall at home but reovering well.    Denies fever, chills, nightsweats, bleeding, brusing, lymphadenopathy, signs/symptoms of splenomegaly.        PAST MEDICAL HISTORY:   Past Medical History:   Diagnosis Date    Adverse effect of glucocorticoid or synthetic analogue     Allergy     Arthritis     Cancer     CHF (congestive heart failure)     Chronic kidney disease     Coronary artery disease involving native coronary artery of native heart without angina pectoris 10/11/2016    Diabetic neuropathy 10/6/2014    Giant cell arteritis 9/24/2012    Hyperlipidemia     Hypertension     Hypothyroid     Obesity (BMI 30-39.9) 10/6/2014    Osteopenia     Primary osteoarthritis of both knees 9/24/2012    Type II or unspecified type diabetes mellitus with renal manifestations, uncontrolled(250.42)        PAST SURGICAL HISTORY:   Past Surgical History:   Procedure Laterality Date    APPENDECTOMY      CATARACT EXTRACTION, BILATERAL      CHOLECYSTECTOMY      EYE SURGERY      HYSTERECTOMY      JOINT REPLACEMENT      bilateral total knee    SKIN BIOPSY      TONSILLECTOMY         PAST SOCIAL HISTORY:   reports that she has never smoked. She has never used smokeless tobacco. She reports that she does not drink alcohol or use drugs.    FAMILY HISTORY:  Family History   Problem Relation Age of Onset    Diabetes Mother      "Hypertension Mother     Hypertension Father     Kidney disease Neg Hx     Eczema Neg Hx     Psoriasis Neg Hx     Melanoma Neg Hx     Lupus Neg Hx     Acne Neg Hx        CURRENT MEDICATIONS:   Current Outpatient Prescriptions   Medication Sig    acitretin (SORIATANE) 10 MG capsule Take 2 po qday    albuterol 90 mcg/actuation inhaler Inhale 2 puffs into the lungs every 6 (six) hours as needed for Wheezing.    alendronate (FOSAMAX) 70 MG tablet Take 1 tablet (70 mg total) by mouth every 7 days.    allopurinol (ZYLOPRIM) 300 MG tablet TAKE 1 TABLET(300 MG) BY MOUTH EVERY DAY    aspirin (ECOTRIN) 81 MG EC tablet Take 1 tablet (81 mg total) by mouth once daily.    atorvastatin (LIPITOR) 40 MG tablet TAKE 1 TABLET(40 MG) BY MOUTH EVERY DAY    azelastine (ASTELIN) 137 mcg (0.1 %) nasal spray USE 1 SPRAY(137 MCG) IN EACH NOSTRIL TWICE DAILY    BIOTIN ORAL Take by mouth.    blood sugar diagnostic (ACCU-CHEK AMELIA PLUS TEST STRP) Strp Checks bg 2 x day before meals    cholecalciferol, vitamin D3, 50,000 unit capsule Take 1 capsule (50,000 Units total) by mouth twice a week.    ciclopirox (PENLAC) 8 % Soln APPLY EXTERNALLY TO THE AFFECTED AREA EVERY NIGHT    clindamycin (CLEOCIN) 150 MG capsule     desonide (DESOWEN) 0.05 % cream AAA bid    erythromycin (ROMYCIN) ophthalmic ointment Place into the right eye 3 (three) times daily.    FERROUS GLUCONATE (FERATE ORAL) Take by mouth once daily at 6am.     folic acid (FOLVITE) 1 MG tablet TAKE 1 TABLET(1000 MCG) BY MOUTH EVERY DAY    hydrocortisone butyrate (LOCOID) 0.1 % Crea cream AAA buttock bid    insulin lispro (HUMALOG KWIKPEN) 100 unit/mL InPn pen Inject 5 Units into the skin 3 (three) times daily with meals.    insulin needles, disposable, (BD ULTRA-FINE ILIANA PEN NEEDLES) 32 x 5/32 " Ndle Injects insulin 3 x day    KRILL/OM3/DHA/EPA/OM6/LIP/ASTX (KRILL OIL, OMEGA 3 & 6, ORAL) Take by mouth once daily at 6am.     levothyroxine (SYNTHROID) 75 MCG " "tablet TAKE 1 TABLET BY MOUTH BEFORE BREAKFAST    lidocaine HCL 2% (XYLOCAINE) 2 % jelly APPLY TOPICALLY EVERY DAY AS NEEDED    losartan (COZAAR) 25 MG tablet Take 0.5 tablets (12.5 mg total) by mouth once daily.    metoprolol succinate (TOPROL-XL) 50 MG 24 hr tablet Take 1 tablet (50 mg total) by mouth once daily.    miconazole NITRATE 2 % (ZEASORB AF) 2 % top powder Apply topically as needed for Itching.    mometasone (NASONEX) 50 mcg/actuation nasal spray 2 sprays by Nasal route once daily.    mupirocin (BACTROBAN) 2 % ointment Apply topically 2 (two) times daily.    nystatin-triamcinolone (MYCOLOG II) cream Apply topically 2 (two) times daily.    olopatadine (PATANOL) 0.1 % ophthalmic solution Place 1 drop into the right eye 2 (two) times daily.    omeprazole (PRILOSEC) 40 MG capsule Take 1 capsule (40 mg total) by mouth daily as needed.    ondansetron (ZOFRAN-ODT) 8 MG TbDL Take 1 tablet (8 mg total) by mouth every 12 (twelve) hours as needed.    silver sulfADIAZINE 1% (SILVADENE) 1 % cream aaa buttock bid    SYMBICORT 160-4.5 mcg/actuation HFAA INHALE 2 PUFFS BY MOUTH INTO THE LUNGS EVERY 12 HOURS    triamcinolone acetonide 0.1% (KENALOG) 0.1 % cream Apply topically 2 (two) times daily. Apply to affected area    cetirizine (ZYRTEC) 10 MG tablet Take 1 tablet (10 mg total) by mouth once daily.    ranitidine (ZANTAC) 150 MG tablet Take 1 tablet (150 mg total) by mouth 2 (two) times daily as needed for Heartburn.     No current facility-administered medications for this visit.      ALLERGIES:   Allergies   Allergen Reactions    Sulfamethoxazole-Trimethoprim Nausea And Vomiting    Latex, Natural Rubber Rash    Pcn [Penicillins] Rash         ASSESSMENTS:   PAIN ASSESSMENT (Patient reports Pain):   Vitals:    11/16/17 1352   BP: (!) 155/70   Pulse: 79   Resp: 14   Temp: 98.6 °F (37 °C)   SpO2: 97%   Weight: 93.8 kg (206 lb 12.7 oz)   Height: 5' 5" (1.651 m)   PainSc: 0-No pain       FALL " ASSESSMENT:     REVIEW OF SYSTEMS:   Review of Systems - General ROS: negative  Psychological ROS: negative  Ophthalmic ROS: negative  Allergy and Immunology ROS: negative  Hematological and Lymphatic ROS: negative  Breast ROS: negative  Respiratory ROS: negative  Cardiovascular ROS: negative  Gastrointestinal ROS: negative  Genito-Urinary ROS: negative  Musculoskeletal ROS: negative  Neurological ROS: negative  Dermatological ROS: negative  PHYSICAL EXAM:     General - well developed, well nourished, no apparent distress  HEENT - oropharynx clear  Chest and Lung - clear to auscultation bilaterally   Cardiovascular - RRR with no MGR, normal S1 and S2  Abdomen-  soft, nontender, no palpable hepatomegaly or splenomegaly  Lymph - no palpable lymphadenopathy  Heme - no bruising, petechiae, pallor  Skin - no rashes or lesions  Psych - appropriate mood and affect      ECOG Performance Status: (foot note - ECOG PS provided by [employer]cology [employer]) 1 - Symptomatic but completely ambulatory    Karnofsky Performance Score:  100%- Normal, No Complaints, No Evidence of Disease  DATA:   Lab Results   Component Value Date    WBC 7.81 11/16/2017    HGB 12.4 11/16/2017    HCT 36.7 (L) 11/16/2017    MCV 95 11/16/2017     11/16/2017     Gran #   Date Value Ref Range Status   11/16/2017 6.9 1.8 - 7.7 K/uL Final     Gran%   Date Value Ref Range Status   11/16/2017 88.4 (H) 38.0 - 73.0 % Final     Lymph #   Date Value Ref Range Status   11/16/2017 0.8 (L) 1.0 - 4.8 K/uL Final     Lymph%   Date Value Ref Range Status   11/16/2017 10.5 (L) 18.0 - 48.0 % Final     CMP  Sodium   Date Value Ref Range Status   11/16/2017 138 136 - 145 mmol/L Final     Potassium   Date Value Ref Range Status   11/16/2017 4.6 3.5 - 5.1 mmol/L Final     Chloride   Date Value Ref Range Status   11/16/2017 102 95 - 110 mmol/L Final     CO2   Date Value Ref Range Status   11/16/2017 26 23 - 29 mmol/L Final     Glucose   Date Value Ref Range  Status   11/16/2017 230 (H) 70 - 110 mg/dL Final     BUN, Bld   Date Value Ref Range Status   11/16/2017 37 (H) 8 - 23 mg/dL Final     Creatinine   Date Value Ref Range Status   11/16/2017 0.9 0.5 - 1.4 mg/dL Final     Calcium   Date Value Ref Range Status   11/16/2017 10.0 8.7 - 10.5 mg/dL Final     Total Protein   Date Value Ref Range Status   11/16/2017 6.9 6.0 - 8.4 g/dL Final     Albumin   Date Value Ref Range Status   11/16/2017 3.6 3.5 - 5.2 g/dL Final     Total Bilirubin   Date Value Ref Range Status   11/16/2017 0.8 0.1 - 1.0 mg/dL Final     Comment:     For infants and newborns, interpretation of results should be based  on gestational age, weight and in agreement with clinical  observations.  Premature Infant recommended reference ranges:  Up to 24 hours.............<8.0 mg/dL  Up to 48 hours............<12.0 mg/dL  3-5 days..................<15.0 mg/dL  6-29 days.................<15.0 mg/dL       Alkaline Phosphatase   Date Value Ref Range Status   11/16/2017 142 (H) 55 - 135 U/L Final     AST   Date Value Ref Range Status   11/16/2017 17 10 - 40 U/L Final     ALT   Date Value Ref Range Status   11/16/2017 21 10 - 44 U/L Final     Anion Gap   Date Value Ref Range Status   11/16/2017 10 8 - 16 mmol/L Final     eGFR if    Date Value Ref Range Status   11/16/2017 >60.0 >60 mL/min/1.73 m^2 Final     eGFR if non    Date Value Ref Range Status   11/16/2017 >60.0 >60 mL/min/1.73 m^2 Final     Comment:     Calculation used to obtain the estimated glomerular filtration  rate (eGFR) is the CKD-EPI equation.        Bone marrow biopsy -2010  PERIPHERAL BLOOD SMEAR SHOWS  MODERATE ANEMIA (H/H = 10.5/32.7) AND NORMAL WHITE  BLOOD CELL COUNT AND NORMAL PLATELET COUNT.  BONE MARROW ASPIRATE, BONE MARROW CLOT, AND BONE MARROW CORE  BIOPSY (INADEQUATE) WITH  CELLULARITY = 35%.  TRILINEAGE HEMATOPOIETIC ACTIVITY (M:E RATIO =  1.9:1).  CONSISTENT WITH BONE MARROW INVOLVEMENT BY LOW  GRADE  B-CELL LYMPHOMA.  SEE COMMENT.  ADEQUATE STORAGE IRON.  ADEQUATE NUMBER OF MEGAKARYOCYTES.  COMMENT: Flow cytometric analysis of bone marrow aspirate  shows lymph gate (3.5%) containing T and B-cells. No abnormal Tcell  population is detected. A lambda light chain restricted Bcell  population with expression of CD19 and CD20 is evident. No  co-expression of CD5/CD19 and CD10/CD19 is identified. Blast  gate is 2.3%.  Immunohistochemical studies were performed on the clot section  with adequate positive and negative controls. The lymphoid  aggregate shows mixed T-cells (CD3 positive, CD5 positive, BCL-2  positive) and B-cells (CD20 positive, few CD23 positive, CD10  negative, BCL-6 negative, cyclin D-1 negative). The  immunostaining results are nonspecific for any subtype of B-cell  lymphoma. The differential diagnosis includes marginal zone Bcell  lymphoma and small lymphocytic lymphoma/chronic lymphocytic  leukemia with atypical phenotype. Correlate cytogenetic results  and clinically.  KURT Jane  (Electronically Signed: 2010)  TUMOR REGISTRY  ___________________________________________________________________________  SUPPLEMENTAL : 02/18/10  Patient Name: JARROD NEGRON  MRN: [id number]  : 1940 Sex: F  FISH study is negative for the following:  Monosomies 5 and 7, trisomy 8, and partial deletions of the  long arms of chromosomes 5, 7 and 20, including EGR1,  D7S52, and [id number], respectively.    ASSESSMENT AND PLAN:   Encounter Diagnoses   Name Primary?    Indolent non-Hodgkins lymphoma Yes    Thrombocytopenia      -indolent NHL, favor MZL vs CLL  -asymptomatic with no indications for further diagnostics or therapeutics at this point  -suspect she has mild immune mediated thrombocytopenia that has resolved on today's labs; continue to monitor  -recommend continued wait and watch approach with annual fu given incredibly indolent nature of her disease  -she was educated on symptoms for which to seek  attn in our clinic earlier    Follow Up: cbc, cmp, ldh and md appt in 1 year     Srini Chaves MD  Hematology/Oncology/Bone Marrow Transplant

## 2017-11-21 ENCOUNTER — OFFICE VISIT (OUTPATIENT)
Dept: INTERNAL MEDICINE | Facility: CLINIC | Age: 77
End: 2017-11-21
Payer: COMMERCIAL

## 2017-11-21 ENCOUNTER — LAB VISIT (OUTPATIENT)
Dept: LAB | Facility: HOSPITAL | Age: 77
End: 2017-11-21
Attending: FAMILY MEDICINE
Payer: COMMERCIAL

## 2017-11-21 VITALS — DIASTOLIC BLOOD PRESSURE: 65 MMHG | SYSTOLIC BLOOD PRESSURE: 135 MMHG | HEART RATE: 70 BPM

## 2017-11-21 DIAGNOSIS — Z00.00 PREVENTATIVE HEALTH CARE: ICD-10-CM

## 2017-11-21 DIAGNOSIS — E11.29 CONTROLLED TYPE 2 DIABETES MELLITUS WITH MICROALBUMINURIA, WITH LONG-TERM CURRENT USE OF INSULIN: ICD-10-CM

## 2017-11-21 DIAGNOSIS — E11.29 CONTROLLED TYPE 2 DIABETES MELLITUS WITH MICROALBUMINURIA, WITH LONG-TERM CURRENT USE OF INSULIN: Primary | ICD-10-CM

## 2017-11-21 DIAGNOSIS — I25.10 CORONARY ARTERY DISEASE INVOLVING NATIVE CORONARY ARTERY OF NATIVE HEART WITHOUT ANGINA PECTORIS: ICD-10-CM

## 2017-11-21 DIAGNOSIS — Z79.4 CONTROLLED TYPE 2 DIABETES MELLITUS WITH MICROALBUMINURIA, WITH LONG-TERM CURRENT USE OF INSULIN: Primary | ICD-10-CM

## 2017-11-21 DIAGNOSIS — Z79.4 CONTROLLED TYPE 2 DIABETES MELLITUS WITH MICROALBUMINURIA, WITH LONG-TERM CURRENT USE OF INSULIN: ICD-10-CM

## 2017-11-21 DIAGNOSIS — R80.9 CONTROLLED TYPE 2 DIABETES MELLITUS WITH MICROALBUMINURIA, WITH LONG-TERM CURRENT USE OF INSULIN: Primary | ICD-10-CM

## 2017-11-21 DIAGNOSIS — R80.9 CONTROLLED TYPE 2 DIABETES MELLITUS WITH MICROALBUMINURIA, WITH LONG-TERM CURRENT USE OF INSULIN: ICD-10-CM

## 2017-11-21 LAB
CHOLEST SERPL-MCNC: 169 MG/DL
CHOLEST/HDLC SERPL: 4.1 {RATIO}
ESTIMATED AVG GLUCOSE: 160 MG/DL
HBA1C MFR BLD HPLC: 7.2 %
HDLC SERPL-MCNC: 41 MG/DL
HDLC SERPL: 24.3 %
LDLC SERPL CALC-MCNC: 82.6 MG/DL
NONHDLC SERPL-MCNC: 128 MG/DL
TRIGL SERPL-MCNC: 227 MG/DL
TSH SERPL DL<=0.005 MIU/L-ACNC: 1.63 UIU/ML

## 2017-11-21 PROCEDURE — 84443 ASSAY THYROID STIM HORMONE: CPT

## 2017-11-21 PROCEDURE — 80061 LIPID PANEL: CPT

## 2017-11-21 PROCEDURE — 36415 COLL VENOUS BLD VENIPUNCTURE: CPT

## 2017-11-21 PROCEDURE — 99214 OFFICE O/P EST MOD 30 MIN: CPT | Mod: S$GLB,,, | Performed by: FAMILY MEDICINE

## 2017-11-21 PROCEDURE — 99999 PR PBB SHADOW E&M-EST. PATIENT-LVL IV: CPT | Mod: PBBFAC,,, | Performed by: FAMILY MEDICINE

## 2017-11-21 PROCEDURE — 83036 HEMOGLOBIN GLYCOSYLATED A1C: CPT

## 2017-11-21 NOTE — PROGRESS NOTES
Subjective:       Patient ID: Krissy Marino is a 77 y.o. female.    Chief Complaint: Hypertension  Krissy Marino 77 y.o. female is here for office visit to review care and physical exam,  HPI  Review of Systems   Constitutional: Negative for activity change, fatigue, fever and unexpected weight change.   HENT: Negative for congestion, hearing loss, postnasal drip and rhinorrhea.    Eyes: Negative for redness and visual disturbance.   Respiratory: Negative for chest tightness, shortness of breath and wheezing.    Cardiovascular: Negative for chest pain, palpitations and leg swelling.   Gastrointestinal: Negative for abdominal distention.   Genitourinary: Negative for decreased urine volume, dysuria, flank pain, hematuria, pelvic pain and urgency.   Musculoskeletal: Negative for back pain, gait problem, joint swelling and neck stiffness.   Skin: Negative for color change, rash and wound.   Neurological: Negative for dizziness, syncope, weakness and headaches.   Psychiatric/Behavioral: Negative for behavioral problems, confusion and sleep disturbance. The patient is not nervous/anxious.        Objective:      Physical Exam   Constitutional: She is oriented to person, place, and time. She appears well-developed and well-nourished. No distress.   HENT:   Head: Normocephalic.   Mouth/Throat: No oropharyngeal exudate.   Eyes: EOM are normal. Pupils are equal, round, and reactive to light. No scleral icterus.   Neck: Neck supple. No JVD present. No thyromegaly present.   Cardiovascular: Normal rate, regular rhythm and normal heart sounds.  Exam reveals no gallop and no friction rub.    No murmur heard.  Pulmonary/Chest: Effort normal and breath sounds normal. She has no wheezes. She has no rales.   Abdominal: Soft. Bowel sounds are normal. She exhibits no distension and no mass. There is no tenderness. There is no guarding.   Musculoskeletal: Normal range of motion. She exhibits no edema.   Lymphadenopathy:     She has no  cervical adenopathy.   Neurological: She is alert and oriented to person, place, and time. She has normal reflexes. She displays normal reflexes. No cranial nerve deficit. She exhibits normal muscle tone.   Skin: Skin is warm. No rash noted. No erythema.   Psychiatric: She has a normal mood and affect. Thought content normal.       Assessment:       1. Controlled type 2 diabetes mellitus with microalbuminuria, with long-term current use of insulin    2. Coronary artery disease involving native coronary artery of native heart without angina pectoris    3. Preventative health care        Plan:   Krissy was seen today for hypertension.    Diagnoses and all orders for this visit:    Controlled type 2 diabetes mellitus with microalbuminuria, with long-term current use of insulin  -     Hemoglobin A1c; Future    Coronary artery disease involving native coronary artery of native heart without angina pectoris  -     Lipid panel; Future    Preventative health care  -     Hemoglobin A1c; Future  -     Lipid panel; Future  -     TSH; Future  -     Mammo Digital Screening Bilateral With CAD; Future

## 2017-11-28 ENCOUNTER — OFFICE VISIT (OUTPATIENT)
Dept: UROLOGY | Facility: CLINIC | Age: 77
End: 2017-11-28
Payer: COMMERCIAL

## 2017-11-28 VITALS
HEART RATE: 88 BPM | HEIGHT: 65 IN | SYSTOLIC BLOOD PRESSURE: 122 MMHG | BODY MASS INDEX: 34.93 KG/M2 | WEIGHT: 209.69 LBS | DIASTOLIC BLOOD PRESSURE: 71 MMHG

## 2017-11-28 DIAGNOSIS — I10 ESSENTIAL HYPERTENSION: ICD-10-CM

## 2017-11-28 DIAGNOSIS — Z79.4 CONTROLLED TYPE 2 DIABETES MELLITUS WITH MICROALBUMINURIA, WITH LONG-TERM CURRENT USE OF INSULIN: ICD-10-CM

## 2017-11-28 DIAGNOSIS — R80.9 CONTROLLED TYPE 2 DIABETES MELLITUS WITH MICROALBUMINURIA, WITH LONG-TERM CURRENT USE OF INSULIN: ICD-10-CM

## 2017-11-28 DIAGNOSIS — I50.32 CHRONIC CONGESTIVE HEART FAILURE WITH LEFT VENTRICULAR DIASTOLIC DYSFUNCTION: ICD-10-CM

## 2017-11-28 DIAGNOSIS — E11.29 CONTROLLED TYPE 2 DIABETES MELLITUS WITH MICROALBUMINURIA, WITH LONG-TERM CURRENT USE OF INSULIN: ICD-10-CM

## 2017-11-28 DIAGNOSIS — E11.9 TYPE 2 DIABETES MELLITUS WITHOUT RETINOPATHY: Primary | ICD-10-CM

## 2017-11-28 DIAGNOSIS — E66.9 OBESITY (BMI 30-39.9): ICD-10-CM

## 2017-11-28 DIAGNOSIS — N39.0 RECURRENT UTI: ICD-10-CM

## 2017-11-28 DIAGNOSIS — D47.Z9 LOW GRADE B CELL LYMPHOPROLIFERATIVE DISORDER: ICD-10-CM

## 2017-11-28 DIAGNOSIS — I25.10 CORONARY ARTERY DISEASE INVOLVING NATIVE CORONARY ARTERY OF NATIVE HEART WITHOUT ANGINA PECTORIS: ICD-10-CM

## 2017-11-28 DIAGNOSIS — N18.2 CHRONIC KIDNEY DISEASE, STAGE II (MILD): ICD-10-CM

## 2017-11-28 PROBLEM — N30.00 ACUTE CYSTITIS WITHOUT HEMATURIA: Status: RESOLVED | Noted: 2017-09-20 | Resolved: 2017-11-28

## 2017-11-28 PROCEDURE — 99999 PR PBB SHADOW E&M-EST. PATIENT-LVL III: CPT | Mod: PBBFAC,,, | Performed by: UROLOGY

## 2017-11-28 PROCEDURE — 99213 OFFICE O/P EST LOW 20 MIN: CPT | Mod: S$GLB,,, | Performed by: UROLOGY

## 2017-11-28 NOTE — PROGRESS NOTES
Subjective:       Patient ID: Krissy Marino is a 77 y.o. female.    Chief Complaint: dysuria    Krissy Marino is a 77 y.o. Female here for follow up.   Recurrent E. Coli MDR. Last infection 10/17.     Has diabetes.   Has CKD stage II  , set up twins, vaginal deliveries.   Had a hysterectomy for vaginal bleeding.   No history of breast cancer.     No history of kidney stones.     No gross hematuria.   Has daytime frequency, on a fluid pill. Nocturia x 2.   Rare urge incontinence if near a toilet.  No pads.   Has chills. Stable.   No fevers.     Some constipation.   Not sexually active.   No estrogen.     Tries to drink water, but the more water she drinks the more her feet swell.   Some days a small bottle of water. Some days 2 16oz bottles of water.   Rare soda, iced tea.     No probiotics.         Past Surgical History:   Procedure Laterality Date    APPENDECTOMY      CATARACT EXTRACTION, BILATERAL      CHOLECYSTECTOMY      EYE SURGERY      HYSTERECTOMY      JOINT REPLACEMENT      bilateral total knee    SKIN BIOPSY      TONSILLECTOMY         Past Medical History:   Diagnosis Date    Adverse effect of glucocorticoid or synthetic analogue     Allergy     Arthritis     Cancer     CHF (congestive heart failure)     Chronic kidney disease     Coronary artery disease involving native coronary artery of native heart without angina pectoris 10/11/2016    Diabetic neuropathy 10/6/2014    Giant cell arteritis 2012    Hyperlipidemia     Hypertension     Hypothyroid     Obesity (BMI 30-39.9) 10/6/2014    Osteopenia     Primary osteoarthritis of both knees 2012    Type II or unspecified type diabetes mellitus with renal manifestations, uncontrolled(250.42)        Social History     Social History    Marital status:      Spouse name:     Number of children: N/A    Years of education: N/A     Occupational History      [employer]     Social History Main Topics     Smoking status: Never Smoker    Smokeless tobacco: Never Used    Alcohol use No    Drug use: No    Sexual activity: No     Other Topics Concern    Are You Pregnant Or Think You May Be? No    Breast-Feeding No     Social History Narrative    No narrative on file       Family History   Problem Relation Age of Onset    Diabetes Mother     Hypertension Mother     Hypertension Father     Kidney disease Neg Hx     Eczema Neg Hx     Psoriasis Neg Hx     Melanoma Neg Hx     Lupus Neg Hx     Acne Neg Hx        Current Outpatient Prescriptions   Medication Sig Dispense Refill    acitretin (SORIATANE) 10 MG capsule Take 2 po qday 180 capsule 1    albuterol 90 mcg/actuation inhaler Inhale 2 puffs into the lungs every 6 (six) hours as needed for Wheezing. 1 each 11    alendronate (FOSAMAX) 70 MG tablet Take 1 tablet (70 mg total) by mouth every 7 days. 4 tablet 11    allopurinol (ZYLOPRIM) 300 MG tablet TAKE 1 TABLET(300 MG) BY MOUTH EVERY DAY 90 tablet 3    aspirin (ECOTRIN) 81 MG EC tablet Take 1 tablet (81 mg total) by mouth once daily. 90 tablet 3    atorvastatin (LIPITOR) 40 MG tablet TAKE 1 TABLET(40 MG) BY MOUTH EVERY DAY 90 tablet 0    azelastine (ASTELIN) 137 mcg (0.1 %) nasal spray USE 1 SPRAY(137 MCG) IN EACH NOSTRIL TWICE DAILY 30 mL 0    BIOTIN ORAL Take by mouth.      blood sugar diagnostic (ACCU-CHEK AMELIA PLUS TEST STRP) Strp Checks bg 2 x day before meals 200 strip 4    cholecalciferol, vitamin D3, 50,000 unit capsule Take 1 capsule (50,000 Units total) by mouth twice a week. 40 capsule 0    ciclopirox (PENLAC) 8 % Soln APPLY EXTERNALLY TO THE AFFECTED AREA EVERY NIGHT 6.6 mL 0    clindamycin (CLEOCIN) 150 MG capsule       desonide (DESOWEN) 0.05 % cream AAA bid 180 g 1    erythromycin (ROMYCIN) ophthalmic ointment Place into the right eye 3 (three) times daily. 1 Tube 3    FERROUS GLUCONATE (FERATE ORAL) Take by mouth once daily at 6am.       folic acid (FOLVITE) 1 MG tablet  "TAKE 1 TABLET(1000 MCG) BY MOUTH EVERY DAY 90 tablet 0    hydrocortisone butyrate (LOCOID) 0.1 % Crea cream AAA buttock bid 180 g 0    insulin lispro (HUMALOG KWIKPEN) 100 unit/mL InPn pen Inject 5 Units into the skin 3 (three) times daily with meals. 1 Box 1    insulin needles, disposable, (BD ULTRA-FINE ILIANA PEN NEEDLES) 32 x 5/32 " Ndle Injects insulin 3 x day 300 each 3    KRILL/OM3/DHA/EPA/OM6/LIP/ASTX (KRILL OIL, OMEGA 3 & 6, ORAL) Take by mouth once daily at 6am.       levothyroxine (SYNTHROID) 75 MCG tablet TAKE 1 TABLET BY MOUTH BEFORE BREAKFAST 90 tablet 0    lidocaine HCL 2% (XYLOCAINE) 2 % jelly APPLY TOPICALLY EVERY DAY AS NEEDED 30 mL 3    losartan (COZAAR) 25 MG tablet Take 0.5 tablets (12.5 mg total) by mouth once daily. 45 tablet 3    metoprolol succinate (TOPROL-XL) 50 MG 24 hr tablet Take 1 tablet (50 mg total) by mouth once daily. 90 tablet 3    miconazole NITRATE 2 % (ZEASORB AF) 2 % top powder Apply topically as needed for Itching. 71 g 0    mometasone (NASONEX) 50 mcg/actuation nasal spray 2 sprays by Nasal route once daily. 1 each 0    nystatin-triamcinolone (MYCOLOG II) cream Apply topically 2 (two) times daily. 30 g 0    olopatadine (PATANOL) 0.1 % ophthalmic solution Place 1 drop into the right eye 2 (two) times daily. 5 mL 1    omeprazole (PRILOSEC) 40 MG capsule Take 1 capsule (40 mg total) by mouth daily as needed. 30 capsule 0    ondansetron (ZOFRAN-ODT) 8 MG TbDL Take 1 tablet (8 mg total) by mouth every 12 (twelve) hours as needed. 20 tablet 0    silver sulfADIAZINE 1% (SILVADENE) 1 % cream aaa buttock bid 50 g 2    SYMBICORT 160-4.5 mcg/actuation HFAA INHALE 2 PUFFS BY MOUTH INTO THE LUNGS EVERY 12 HOURS 10.2 g 0    triamcinolone acetonide 0.1% (KENALOG) 0.1 % cream Apply topically 2 (two) times daily. Apply to affected area 454 Tube 3    cetirizine (ZYRTEC) 10 MG tablet Take 1 tablet (10 mg total) by mouth once daily. 30 tablet 2    ranitidine (ZANTAC) 150 MG " tablet Take 1 tablet (150 mg total) by mouth 2 (two) times daily as needed for Heartburn. 120 tablet 5     No current facility-administered medications for this visit.        Review of patient's allergies indicates:   Allergen Reactions    Sulfamethoxazole-trimethoprim Nausea And Vomiting    Latex, natural rubber Rash    Pcn [penicillins] Rash        BMP  Lab Results   Component Value Date     11/16/2017    K 4.6 11/16/2017     11/16/2017    CO2 26 11/16/2017    BUN 37 (H) 11/16/2017    CREATININE 0.9 11/16/2017    CALCIUM 10.0 11/16/2017    ANIONGAP 10 11/16/2017    ESTGFRAFRICA >60.0 11/16/2017    EGFRNONAA >60.0 11/16/2017       Review of Systems   Constitutional: Positive for chills. Negative for fever and unexpected weight change.   HENT: Negative for nosebleeds.    Eyes: Negative for visual disturbance.   Respiratory: Negative for chest tightness.    Cardiovascular: Negative for chest pain.   Gastrointestinal: Negative for diarrhea.   Genitourinary: Positive for nocturia. Negative for dysuria, frequency, hematuria, urgency and vaginal bleeding.   Musculoskeletal: Negative for myalgias.   Skin: Negative for rash.   Neurological: Negative for seizures.   Hematological: Does not bruise/bleed easily.   Psychiatric/Behavioral: Negative for behavioral problems.     Objective:      Physical Exam   Vitals reviewed.  Constitutional: She is oriented to person, place, and time. She appears well-developed and well-nourished.   HENT:   Head: Normocephalic and atraumatic.   Eyes: No scleral icterus.   Cardiovascular: Normal rate and regular rhythm.    Pulmonary/Chest: Effort normal. No respiratory distress.   Abdominal: She exhibits no mass. There is no CVA tenderness.   Musculoskeletal: She exhibits edema. She exhibits no tenderness.   Lymphadenopathy:     She has no cervical adenopathy.   Neurological: She is alert and oriented to person, place, and time.   Skin: Skin is warm and dry. Rash noted.      psoriasis   Psychiatric: She has a normal mood and affect.     urine dip clear, pH 5  Assessment:       1. Type 2 diabetes mellitus without retinopathy    2. Low grade B cell lymphoproliferative disorder    3. Chronic congestive heart failure with left ventricular diastolic dysfunction    4. Coronary artery disease involving native coronary artery of native heart without angina pectoris    5. Obesity (BMI 30-39.9)    6. Chronic kidney disease, stage II (mild)    7. Controlled type 2 diabetes mellitus with microalbuminuria, with long-term current use of insulin    8. Essential hypertension        Plan:   Krissy was seen today for follow-up.    Diagnoses and all orders for this visit:    Type 2 diabetes mellitus without retinopathy    Low grade B cell lymphoproliferative disorder    Chronic congestive heart failure with left ventricular diastolic dysfunction    Coronary artery disease involving native coronary artery of native heart without angina pectoris    Obesity (BMI 30-39.9)    Chronic kidney disease, stage II (mild)    Controlled type 2 diabetes mellitus with microalbuminuria, with long-term current use of insulin    Essential hypertension      Trial of D-mannose supplement daily.  Increase water intake.

## 2017-12-05 DIAGNOSIS — E55.9 MILD VITAMIN D DEFICIENCY: ICD-10-CM

## 2017-12-05 RX ORDER — ERGOCALCIFEROL 1.25 MG/1
CAPSULE ORAL
Qty: 6 CAPSULE | Refills: 0 | OUTPATIENT
Start: 2017-12-05

## 2017-12-05 RX ORDER — CICLOPIROX 80 MG/ML
SOLUTION TOPICAL
Qty: 6.6 ML | Refills: 0 | Status: SHIPPED | OUTPATIENT
Start: 2017-12-05 | End: 2018-01-08 | Stop reason: SDUPTHER

## 2017-12-13 ENCOUNTER — OFFICE VISIT (OUTPATIENT)
Dept: SPORTS MEDICINE | Facility: CLINIC | Age: 77
End: 2017-12-13
Payer: COMMERCIAL

## 2017-12-13 VITALS — BODY MASS INDEX: 34.82 KG/M2 | HEIGHT: 65 IN | TEMPERATURE: 98 F | WEIGHT: 209 LBS

## 2017-12-13 DIAGNOSIS — M19.012 OSTEOARTHRITIS OF BILATERAL GLENOHUMERAL JOINTS: ICD-10-CM

## 2017-12-13 DIAGNOSIS — M75.102 ROTATOR CUFF SYNDROME OF BOTH SHOULDERS: Primary | ICD-10-CM

## 2017-12-13 DIAGNOSIS — M75.101 ROTATOR CUFF SYNDROME OF BOTH SHOULDERS: Primary | ICD-10-CM

## 2017-12-13 DIAGNOSIS — M19.011 OSTEOARTHRITIS OF BILATERAL GLENOHUMERAL JOINTS: ICD-10-CM

## 2017-12-13 PROCEDURE — 99214 OFFICE O/P EST MOD 30 MIN: CPT | Mod: 25,S$GLB,, | Performed by: FAMILY MEDICINE

## 2017-12-13 PROCEDURE — 20610 DRAIN/INJ JOINT/BURSA W/O US: CPT | Mod: 50,S$GLB,, | Performed by: FAMILY MEDICINE

## 2017-12-13 PROCEDURE — 99999 PR PBB SHADOW E&M-EST. PATIENT-LVL III: CPT | Mod: PBBFAC,,, | Performed by: FAMILY MEDICINE

## 2017-12-13 RX ORDER — TRIAMCINOLONE ACETONIDE 40 MG/ML
40 INJECTION, SUSPENSION INTRA-ARTICULAR; INTRAMUSCULAR
Status: DISCONTINUED | OUTPATIENT
Start: 2017-12-13 | End: 2017-12-13 | Stop reason: HOSPADM

## 2017-12-13 RX ADMIN — TRIAMCINOLONE ACETONIDE 40 MG: 40 INJECTION, SUSPENSION INTRA-ARTICULAR; INTRAMUSCULAR at 03:12

## 2017-12-13 NOTE — PROGRESS NOTES
Krissy Marino, a 77 y.o. female, is here for evaluation of RIGHT and LEFT shoulder pain.     HISTORY OF PRESENT ILLNESS  Hand dominance, right     Location: anterior and lateral, bilateral R > L  Onset: Insidious, many years  Palliative:    Relative rest   Oral analgesics (tylenol PM)   R- CSI, SAB, 07/18/16, 80% improvement   R - CSI, SAB, 12/02/17, moderate improvement for ~ 3 weeks    R - CSI, SAB, 01/27/17, no improvement    R - CSI, iaGH/SAB, 02/21/17, mild to moderate relief    fPT @ [facility] - going well, but ROM remains painful   Heat    R - CSI, iaGH/SAB, 05/05/17, moderate%   Sling PRN   R/L - CSI, SAB, 07/12/17, moderate% improvement    R/L - CSI, iaGH/SAB 08/23/17, moderate%   R/L - CSI, SAB, 10/18/17, moderate% improvement, though not for long    R/L - CSI, SAB, 11/15/17, moderate% improvement, for ~ 3weeks   Provocative:    ADLs   Prior:   Progression: worsening discomfort   Quality:    Sharp pain at times activity   Throbbing at times with activity    Muscle soreness   Radiation: none  Severity: per nursing documentation  Timing: intermittent with use  Trauma:    17.07: mechanical fall while at PT --> landed on L arm     Review of systems (ROS):  A 10+ review of systems was performed with pertinent positives and negatives noted above in the history of present illness. Other systems were negative unless otherwise specified.      PHYSICAL EXAMINATION  General:  The patient is alert and oriented x 3. Mood is pleasant. Observation of ears, eyes and nose reveal no gross abnormalities. HEENT: NCAT, sclera anicteric. Lungs: Respirations are equal and unlabored.     RIGHT SHOULDER EXAMINATION     OBSERVATION:     Swelling  none  Deformity  none   Discoloration  none   Scapular winging none   Scars   none  Atrophy  none    TENDERNESS / CREPITUS (T/C):          T/C      T/C   Clavicle   -/-  SUPRAspinatus    -/-     AC Jt.    -/-  INFRAspinatus  -/-    SC Jt.    -/-  Deltoid    -/-      G.  Tuberosity  -/-  LH BICEP groove  -/-   Acromion:  -/-  Midline Neck   -/-     Scapular Spine -/-  Trapezium   -/-   SMA Scapula  -/-  GH jt. line - post  -/-     Scapulothoracic  -/-         ROM:     Right shoulder   Left shoulder        AROM (PROM)   AROM (PROM)   FE    120° (155°)*     120° (155°)*     ER at 0°    60°  (65°) *   60°  (65°)*   ER at 90° ABD  90°  (90°) *   90°  (90°)*   IR at 90°  ABD   NA  (40°)  *   NA  (40°)  *    IR (spine level)   T10  *   T10*    STRENGTH: (* = with pain) RIGHT SHOULDER  LEFT SHOULDER   SCAPTION   4/5    4/5   IR    4/5    4/5   ER    4/5    4/5   BICEPS   4/5    4/5   Deltoid    4/5    4/5     SIGNS:  Painful side       NEER   +   OSONNYS        +    CHAPA   +   SPEEDS        +   DROP ARM   -   BELLY PRESS       -    X-Body ADD    +   LIFT-OFF        +   HORNBLOWERS      +              STABILITY TESTING   RIGHT SHOULDER  LEFT SHOULDER     Translation     Anterior up face    up face    Posterior up face   up face    Sulcus  < 10mm   < 10 mm     Signs   Apprehension   neg      neg       Relocation   no change     no change      Jerk test  neg     neg    EXTREMITY NEURO-VASCULAR EXAM    Sensation grossly intact to light touch all dermatomal regions.    DTR 2+ Biceps, Triceps, BR and Negative Chinas sign   Grossly intact motor function at Elbow, Wrist and Hand   Distal pulses radial and ulnar 2+, brisk cap refill, symmetric.      NECK:  Painless FROM and spinous processes non-tender. Negative Spurlings sign.       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   W/ advanced acromialization of humeral head   W/ chronic tearing of superior rotator cuff    No evidence of neurologic pathology  No evidence of vascular pathology    Imaging studies reviewed:   X-ray shoulder, right 17.05  X-ray shoulder, left 17.07    Plan:  We discussed options including:  #1 watchful waiting  #2 re start physical therapy aimed at:   RoM  glenohumeral joint   Strengthening rotator cuff   Scapular stability    fpt  #3 injection therapy:   CSI SAB    Right, effective moderate%, repeat    Left, effective moderate%, repeat    CSI iaGH    Right, effect moderate%, repeat     Left,    orthobiologics   #4 consultation re: rTSA   Likely poor surgical candidate given CAD   Likely 01/18 surgical consultation     The patient chooses #3 csi sab bilat    Pain management: handout given  Bracing: shoulder sling, prn  Physical therapy:    fPT, @ [facility], prior as above   Activity (e.g. sports, work) restrictions: as tolerated   school/vocation: works at [employer]     Follow up per pt  Should symptoms worsen or fail to resolve, consider:  Revisiting the above options

## 2017-12-13 NOTE — PROCEDURES
Large Joint Aspiration/Injection  Date/Time: 12/13/2017 3:05 PM  Performed by: MOE MIRELES  Authorized by: MOE MIRELES     Consent Done?:  Yes (Verbal)  Indications:  Pain  Procedure site marked: Yes    Timeout: Prior to procedure the correct patient, procedure, and site was verified      Location:  Shoulder  Site:  R subacromial bursa and L subacromial bursa  Prep: Patient was prepped and draped in usual sterile fashion    Ultrasonic Guidance for needle placement: No  Needle size:  22 G  Approach:  Posterior  Medications:  40 mg triamcinolone acetonide 40 mg/mL; 40 mg triamcinolone acetonide 40 mg/mL  Patient tolerance:  Patient tolerated the procedure well with no immediate complications

## 2017-12-14 ENCOUNTER — OFFICE VISIT (OUTPATIENT)
Dept: OPHTHALMOLOGY | Facility: CLINIC | Age: 77
End: 2017-12-14
Payer: COMMERCIAL

## 2017-12-14 DIAGNOSIS — H02.102 ECTROPION OF BOTH LOWER EYELIDS, UNSPECIFIED ECTROPION TYPE: Primary | ICD-10-CM

## 2017-12-14 DIAGNOSIS — H02.105 ECTROPION OF BOTH LOWER EYELIDS, UNSPECIFIED ECTROPION TYPE: Primary | ICD-10-CM

## 2017-12-14 PROCEDURE — 92285 EXTERNAL OCULAR PHOTOGRAPHY: CPT | Mod: S$GLB,,, | Performed by: OPHTHALMOLOGY

## 2017-12-14 PROCEDURE — 99999 PR PBB SHADOW E&M-EST. PATIENT-LVL IV: CPT | Mod: PBBFAC,,, | Performed by: OPHTHALMOLOGY

## 2017-12-14 PROCEDURE — 92014 COMPRE OPH EXAM EST PT 1/>: CPT | Mod: S$GLB,,, | Performed by: OPHTHALMOLOGY

## 2017-12-14 NOTE — PROGRESS NOTES
HPI     Eye Problem    Additional comments: here for reevaluation lower lids           Comments   DLS 1/16/17  Her to discuss surgery. D/c plavix 10/20/17   still taking aspirin  Eyes feel red all the time, having a lot of tearing. Started a few weeks   ago   No drops, or ointments         Last edited by Nayeli Light on 12/14/2017  2:41 PM. (History)            Assessment /Plan     For exam results, see Encounter Report.    Ectropion of both lower eyelids, unspecified ectropion type  -     External/Slit Lamp Photography      Patient bothered by chronic tearing and irritation od>os.  Plan for bilateral lower eyelid ectropion repair.      Informed consent obtained after extensive risks/benefits/alternatives were discussed with the patient including but not limited to pain, bleeding, infection, ocular injury, loss of the eye, asymmetry, need for revision in future, scarring.  Alternatives such as waiting were discussed.  All questions were answered.      Hold ASA, NSAIDS, and fish oil 5 to 7 days prior to procedure.

## 2017-12-20 ENCOUNTER — TELEPHONE (OUTPATIENT)
Dept: INTERNAL MEDICINE | Facility: CLINIC | Age: 77
End: 2017-12-20

## 2017-12-20 ENCOUNTER — HOSPITAL ENCOUNTER (OUTPATIENT)
Dept: RADIOLOGY | Facility: HOSPITAL | Age: 77
Discharge: HOME | End: 2017-12-20
Attending: FAMILY MEDICINE
Payer: COMMERCIAL

## 2017-12-20 ENCOUNTER — TELEPHONE (OUTPATIENT)
Dept: OPHTHALMOLOGY | Facility: CLINIC | Age: 77
End: 2017-12-20

## 2017-12-20 VITALS — WEIGHT: 209 LBS | BODY MASS INDEX: 34.82 KG/M2 | HEIGHT: 65 IN

## 2017-12-20 DIAGNOSIS — H02.102 ECTROPION OF BOTH LOWER EYELIDS, UNSPECIFIED ECTROPION TYPE: Primary | ICD-10-CM

## 2017-12-20 DIAGNOSIS — H02.105 ECTROPION OF BOTH LOWER EYELIDS, UNSPECIFIED ECTROPION TYPE: Primary | ICD-10-CM

## 2017-12-20 DIAGNOSIS — Z00.00 PREVENTATIVE HEALTH CARE: ICD-10-CM

## 2017-12-20 PROCEDURE — 77067 SCR MAMMO BI INCL CAD: CPT | Mod: 26,,, | Performed by: RADIOLOGY

## 2017-12-20 PROCEDURE — 77067 SCR MAMMO BI INCL CAD: CPT | Mod: TC

## 2017-12-20 RX ORDER — LOSARTAN POTASSIUM 25 MG/1
12.5 TABLET ORAL DAILY
Qty: 45 TABLET | Refills: 3 | Status: SHIPPED | OUTPATIENT
Start: 2017-12-20 | End: 2018-03-26 | Stop reason: SDUPTHER

## 2017-12-20 NOTE — TELEPHONE ENCOUNTER
Pharmacy is calling to clarify an RX.  RX name:  losartan (COZAAR) 25 MG tablet  What do they need to clarify:  Dosage,  rx states take 1/2 tablet and patient states that she takes a whole tablet  Comments: Is she supposed to be taking .5 or 1 tablet. Please advise

## 2017-12-20 NOTE — TELEPHONE ENCOUNTER
----- Message from Yaneth Espinoza sent at 12/20/2017 11:31 AM CST -----  Contact: samantha with [facility] pharmacy [phone]  Pharmacy is calling to clarify an RX.  RX name:  losartan (COZAAR) 25 MG tablet  What do they need to clarify:  Dosage,  rx states take 1/2 tablet and patient states that she takes a whole tablet  Comments:

## 2017-12-21 NOTE — TELEPHONE ENCOUNTER
Likely only needs 1/2 pill as BP has been very good.  The med is not strong and would be safe with whole pill as well.  Home BP goal is Systolic < 130-135 mostly and Diastolic to remain over 60 thanks

## 2017-12-22 ENCOUNTER — HOSPITAL ENCOUNTER (OUTPATIENT)
Dept: RADIOLOGY | Facility: HOSPITAL | Age: 77
Discharge: HOME | End: 2017-12-22
Attending: FAMILY MEDICINE
Payer: COMMERCIAL

## 2017-12-22 ENCOUNTER — OFFICE VISIT (OUTPATIENT)
Dept: INTERNAL MEDICINE | Facility: CLINIC | Age: 77
End: 2017-12-22
Payer: COMMERCIAL

## 2017-12-22 VITALS
SYSTOLIC BLOOD PRESSURE: 118 MMHG | DIASTOLIC BLOOD PRESSURE: 78 MMHG | WEIGHT: 207.44 LBS | HEIGHT: 65 IN | BODY MASS INDEX: 34.56 KG/M2

## 2017-12-22 DIAGNOSIS — R80.9 CONTROLLED TYPE 2 DIABETES MELLITUS WITH MICROALBUMINURIA, WITH LONG-TERM CURRENT USE OF INSULIN: ICD-10-CM

## 2017-12-22 DIAGNOSIS — M79.672 LEFT FOOT PAIN: ICD-10-CM

## 2017-12-22 DIAGNOSIS — I10 ESSENTIAL HYPERTENSION: ICD-10-CM

## 2017-12-22 DIAGNOSIS — Z79.4 CONTROLLED TYPE 2 DIABETES MELLITUS WITH MICROALBUMINURIA, WITH LONG-TERM CURRENT USE OF INSULIN: ICD-10-CM

## 2017-12-22 DIAGNOSIS — R60.0 BILATERAL LEG EDEMA: ICD-10-CM

## 2017-12-22 DIAGNOSIS — E11.29 CONTROLLED TYPE 2 DIABETES MELLITUS WITH MICROALBUMINURIA, WITH LONG-TERM CURRENT USE OF INSULIN: ICD-10-CM

## 2017-12-22 DIAGNOSIS — M79.672 LEFT FOOT PAIN: Primary | ICD-10-CM

## 2017-12-22 PROCEDURE — 99214 OFFICE O/P EST MOD 30 MIN: CPT | Mod: S$GLB,,, | Performed by: FAMILY MEDICINE

## 2017-12-22 PROCEDURE — 73630 X-RAY EXAM OF FOOT: CPT | Mod: 26,LT,, | Performed by: RADIOLOGY

## 2017-12-22 PROCEDURE — 99999 PR PBB SHADOW E&M-EST. PATIENT-LVL V: CPT | Mod: PBBFAC,,, | Performed by: FAMILY MEDICINE

## 2017-12-22 PROCEDURE — 73630 X-RAY EXAM OF FOOT: CPT | Mod: TC,LT

## 2017-12-22 NOTE — PROGRESS NOTES
Subjective:       Patient ID: Krissy Marino is a 77 y.o. female.    Chief Complaint: Ankle Pain  Krissy Marino 77 y.o. female is here for office visit to review care and physical exam,  HPI  Review of Systems   Constitutional: Negative for activity change, fatigue, fever and unexpected weight change.   HENT: Negative for congestion, hearing loss, postnasal drip and rhinorrhea.    Eyes: Negative for redness and visual disturbance.   Respiratory: Negative for chest tightness, shortness of breath and wheezing.    Cardiovascular: Negative for chest pain, palpitations and leg swelling.   Gastrointestinal: Negative for abdominal distention.   Genitourinary: Negative for decreased urine volume, dysuria, flank pain, hematuria, pelvic pain and urgency.   Musculoskeletal: Negative for back pain, gait problem, joint swelling and neck stiffness.   Skin: Negative for color change, rash and wound.   Neurological: Negative for dizziness, syncope, weakness and headaches.   Psychiatric/Behavioral: Negative for behavioral problems, confusion and sleep disturbance. The patient is not nervous/anxious.        Objective:      Physical Exam   Constitutional: She is oriented to person, place, and time. She appears well-developed and well-nourished. No distress.   HENT:   Head: Normocephalic.   Mouth/Throat: No oropharyngeal exudate.   Eyes: EOM are normal. Pupils are equal, round, and reactive to light. No scleral icterus.   Neck: Neck supple. No JVD present. No thyromegaly present.   Cardiovascular: Normal rate, regular rhythm and normal heart sounds.  Exam reveals no gallop and no friction rub.    No murmur heard.  Pulmonary/Chest: Effort normal and breath sounds normal. She has no wheezes. She has no rales.   Abdominal: Soft. Bowel sounds are normal. She exhibits no distension and no mass. There is no tenderness. There is no guarding.   Musculoskeletal: Normal range of motion. She exhibits no edema.   Lymphadenopathy:     She has no  cervical adenopathy.   Neurological: She is alert and oriented to person, place, and time. She has normal reflexes. She displays normal reflexes. No cranial nerve deficit. She exhibits normal muscle tone.   Skin: Skin is warm. No rash noted. No erythema.   Psychiatric: She has a normal mood and affect. Thought content normal.       Assessment:       No diagnosis found.    Plan:       Krissy was seen today for ankle pain.    Diagnoses and all orders for this visit:    Left foot pain  -     Ambulatory Referral to Podiatry  -     X-Ray Foot Complete Left; Future    Controlled type 2 diabetes mellitus with microalbuminuria, with long-term current use of insulin  - Reviewed  Essential hypertension  - Controled  Bilateral leg edema  -     COMPRESSION SLEEVE/SOCK FOR HOME USE

## 2017-12-26 ENCOUNTER — TELEPHONE (OUTPATIENT)
Dept: INTERNAL MEDICINE | Facility: CLINIC | Age: 77
End: 2017-12-26

## 2017-12-26 NOTE — TELEPHONE ENCOUNTER
----- Message from Aleksandar Moreira sent at 12/26/2017  8:14 AM CST -----  Contact: self/[phone] cell/[phone] home  Pt is calling to speak with someone in the office about getting results on an X-Ray that was taken on last week. She would like to speak with someone in the office today if possible. Please call and advise.    Thank You

## 2017-12-27 NOTE — TELEPHONE ENCOUNTER
----- Message from Mabel العلي sent at 12/26/2017  4:12 PM CST -----  Contact: Self [phone]  Pt requesting a script for arthritis relief in ankle.  [facility] [phone] (phone) [phone] (Fax)

## 2018-01-02 ENCOUNTER — OFFICE VISIT (OUTPATIENT)
Dept: ORTHOPEDICS | Facility: CLINIC | Age: 78
End: 2018-01-02
Payer: COMMERCIAL

## 2018-01-02 VITALS
SYSTOLIC BLOOD PRESSURE: 143 MMHG | HEART RATE: 87 BPM | WEIGHT: 211.63 LBS | HEIGHT: 65 IN | BODY MASS INDEX: 35.26 KG/M2 | DIASTOLIC BLOOD PRESSURE: 78 MMHG

## 2018-01-02 DIAGNOSIS — M21.42 PES PLANUS OF BOTH FEET: Primary | ICD-10-CM

## 2018-01-02 DIAGNOSIS — M19.079 ARTHRITIS OF ANKLE: ICD-10-CM

## 2018-01-02 DIAGNOSIS — M25.473 PAIN AND SWELLING OF ANKLE, UNSPECIFIED LATERALITY: ICD-10-CM

## 2018-01-02 DIAGNOSIS — M21.41 PES PLANUS OF BOTH FEET: Primary | ICD-10-CM

## 2018-01-02 DIAGNOSIS — M25.579 PAIN AND SWELLING OF ANKLE, UNSPECIFIED LATERALITY: ICD-10-CM

## 2018-01-02 PROCEDURE — 99214 OFFICE O/P EST MOD 30 MIN: CPT | Mod: S$GLB,,, | Performed by: PHYSICIAN ASSISTANT

## 2018-01-02 PROCEDURE — 99999 PR PBB SHADOW E&M-EST. PATIENT-LVL V: CPT | Mod: PBBFAC,,, | Performed by: PHYSICIAN ASSISTANT

## 2018-01-02 RX ORDER — DICLOFENAC SODIUM 10 MG/G
2 GEL TOPICAL 4 TIMES DAILY
Qty: 400 G | Refills: 0 | Status: SHIPPED | OUTPATIENT
Start: 2018-01-02 | End: 2018-07-09

## 2018-01-02 NOTE — PROGRESS NOTES
SUBJECTIVE:     Chief Complaint & History of Present Illness:  Krissy Marino is a  New  patient 77 y.o. female who is seen here today with a complaint of    Chief Complaint   Patient presents with    Left Ankle - Pain    .  She is here today for evaluation and treatment of progressively worsening swelling and pain in bilateral feet left much more than right.  She has a long history of pes planus deformity as well as circulatory issues in the lower extremities and the significant dependent edema.  She has been prescribed compression stockings in the past but has been unable to tolerate them secondary to ankle pain in the donning and doffing.  She reports she has been a significant increase the amount of time that she has dispense standing and ambulating over the past 1-2 months and that she works at the [employer] and the has been very busy over the [place] holiday season  On a scale of 1-10, with 10 being worst pain imaginable, he rates this pain as 3 on good days and 6 on bad days.  she describes the pain as sore and aching.    Review of patient's allergies indicates:   Allergen Reactions    Sulfamethoxazole-trimethoprim Nausea And Vomiting    Latex, natural rubber Rash    Pcn [penicillins] Rash         Current Outpatient Prescriptions   Medication Sig Dispense Refill    acitretin (SORIATANE) 10 MG capsule Take 2 po qday 180 capsule 1    albuterol 90 mcg/actuation inhaler Inhale 2 puffs into the lungs every 6 (six) hours as needed for Wheezing. 1 each 11    alendronate (FOSAMAX) 70 MG tablet Take 1 tablet (70 mg total) by mouth every 7 days. 4 tablet 11    allopurinol (ZYLOPRIM) 300 MG tablet TAKE 1 TABLET(300 MG) BY MOUTH EVERY DAY 90 tablet 3    aspirin (ECOTRIN) 81 MG EC tablet Take 1 tablet (81 mg total) by mouth once daily. 90 tablet 3    atorvastatin (LIPITOR) 40 MG tablet TAKE 1 TABLET(40 MG) BY MOUTH EVERY DAY 90 tablet 0    azelastine (ASTELIN) 137 mcg (0.1 %) nasal spray USE 1 SPRAY(137  "MCG) IN EACH NOSTRIL TWICE DAILY 30 mL 0    BIOTIN ORAL Take by mouth.      blood sugar diagnostic (ACCU-CHEK AMELIA PLUS TEST STRP) Strp Checks bg 2 x day before meals 200 strip 4    cholecalciferol, vitamin D3, 50,000 unit capsule Take 1 capsule (50,000 Units total) by mouth twice a week. 40 capsule 0    ciclopirox (PENLAC) 8 % Soln APPLY EXTERNALLY TO THE AFFECTED AREA EVERY NIGHT 6.6 mL 0    desonide (DESOWEN) 0.05 % cream AAA bid 180 g 1    erythromycin (ROMYCIN) ophthalmic ointment Place into the right eye 3 (three) times daily. 1 Tube 3    FERROUS GLUCONATE (FERATE ORAL) Take by mouth once daily at 6am.       folic acid (FOLVITE) 1 MG tablet TAKE 1 TABLET(1000 MCG) BY MOUTH EVERY DAY 90 tablet 0    hydrocortisone butyrate (LOCOID) 0.1 % Crea cream AAA buttock bid 180 g 0    insulin lispro (HUMALOG KWIKPEN) 100 unit/mL InPn pen Inject 5 Units into the skin 3 (three) times daily with meals. 1 Box 1    insulin needles, disposable, (BD ULTRA-FINE ILIANA PEN NEEDLES) 32 x 5/32 " Ndle Injects insulin 3 x day 300 each 3    KRILL/OM3/DHA/EPA/OM6/LIP/ASTX (KRILL OIL, OMEGA 3 & 6, ORAL) Take by mouth once daily at 6am.       levothyroxine (SYNTHROID) 75 MCG tablet TAKE 1 TABLET BY MOUTH BEFORE BREAKFAST 90 tablet 0    lidocaine HCL 2% (XYLOCAINE) 2 % jelly APPLY TOPICALLY EVERY DAY AS NEEDED 30 mL 3    losartan (COZAAR) 25 MG tablet Take 0.5 tablets (12.5 mg total) by mouth once daily. 45 tablet 3    metoprolol succinate (TOPROL-XL) 50 MG 24 hr tablet Take 1 tablet (50 mg total) by mouth once daily. 90 tablet 3    miconazole NITRATE 2 % (ZEASORB AF) 2 % top powder Apply topically as needed for Itching. 71 g 0    mometasone (NASONEX) 50 mcg/actuation nasal spray 2 sprays by Nasal route once daily. 1 each 0    nystatin-triamcinolone (MYCOLOG II) cream Apply topically 2 (two) times daily. 30 g 0    olopatadine (PATANOL) 0.1 % ophthalmic solution Place 1 drop into the right eye 2 (two) times daily. 5 mL 1 "    omeprazole (PRILOSEC) 40 MG capsule Take 1 capsule (40 mg total) by mouth daily as needed. 30 capsule 0    ondansetron (ZOFRAN-ODT) 8 MG TbDL Take 1 tablet (8 mg total) by mouth every 12 (twelve) hours as needed. 20 tablet 0    silver sulfADIAZINE 1% (SILVADENE) 1 % cream aaa buttock bid 50 g 2    SYMBICORT 160-4.5 mcg/actuation HFAA INHALE 2 PUFFS BY MOUTH INTO THE LUNGS EVERY 12 HOURS 10.2 g 0    triamcinolone acetonide 0.1% (KENALOG) 0.1 % cream Apply topically 2 (two) times daily. Apply to affected area 454 Tube 3    cetirizine (ZYRTEC) 10 MG tablet Take 1 tablet (10 mg total) by mouth once daily. 30 tablet 2    diclofenac sodium 1 % Gel Apply 2 g topically 4 (four) times daily. 400 g 0    ranitidine (ZANTAC) 150 MG tablet Take 1 tablet (150 mg total) by mouth 2 (two) times daily as needed for Heartburn. 120 tablet 5     No current facility-administered medications for this visit.        Past Medical History:   Diagnosis Date    Adverse effect of glucocorticoid or synthetic analogue     Allergy     Arthritis     Cancer     CHF (congestive heart failure)     Chronic kidney disease     Coronary artery disease involving native coronary artery of native heart without angina pectoris 10/11/2016    Diabetic neuropathy 10/6/2014    Giant cell arteritis 9/24/2012    Hyperlipidemia     Hypertension     Hypothyroid     Obesity (BMI 30-39.9) 10/6/2014    Osteopenia     Primary osteoarthritis of both knees 9/24/2012    Type II or unspecified type diabetes mellitus with renal manifestations, uncontrolled(250.42)        Past Surgical History:   Procedure Laterality Date    APPENDECTOMY      CATARACT EXTRACTION      CATARACT EXTRACTION, BILATERAL      CHOLECYSTECTOMY      EYE SURGERY      HYSTERECTOMY      JOINT REPLACEMENT      bilateral total knee    SKIN BIOPSY      TONSILLECTOMY         Vital Signs (Most Recent)  Vitals:    01/02/18 0817   BP: (!) 143/78   Pulse: 87       Review of  "Systems:  ROS:  Constitutional: no fever or chills  Eyes: no visual changes  ENT: no nasal congestion or sore throat  Respiratory: no cough or shortness of breath  Cardiovascular: no chest pain or palpitations, Positive CHF, CAD, [address] with edema PVD  Gastrointestinal: no nausea or vomiting, tolerating diet  Genitourinary: no hematuria or dysuria, Positive CK D stage II  Integument/Breast: no rash or pruritis  Hematologic/Lymphatic: no easy bruising or lymphadenopathy  Musculoskeletal: no arthralgias or myalgias  Neurological: no seizures or tremors  Behavioral/Psych: no auditory or visual hallucinations  Endocrine: no heat or cold intolerance, Positive for type 2 diabetes with neuropathy      OBJECTIVE:     PHYSICAL EXAM:  Height: 5' 5" (165.1 cm) Weight: 96 kg (211 lb 10.3 oz), General Appearance: Well nourished, well developed, in no acute distress.  Neurological: Mood & affect are normal.  left  Foot/Ankle    Skin: normal  Swelling: moderate  Warmth: no warmth  Tenderness: diffuse  ROM: 90 degrees dorsiflexion, 170 degrees plantarflexion, 30 degrees inversion and 30 degrees eversion  Strength: normal  Gait: normal  Stability: anterior drawer: negative, exterior rotation test: negative, Lachman: negative and Cotton test: negative    soft tissue swelling noted over the Dorsal foot and ankle as well as lower calf with +2 pitting edema    right  Foot/Ankle    Skin: normal  Swelling: moderate  Warmth: no warmth  Tenderness: diffuse  ROM: 90 degrees dorsiflexion, 170 degrees plantarflexion, 35 degrees inversion and 35 degrees eversion  Strength: normal and 5 on 5  Gait: normal  Stability: anterior drawer: negative, exterior rotation test: negative, Lachman: negative and Cotton test: negative    soft tissue swelling noted over the over the dorsal foot and ankle as well as the lower calf +1 pitting edema        RADIOGRAPHS:  X-rays from previous visit reviewed by today demonstrate arthritic changes throughout the " foot and osteopenic of bone.  No ends of fracture dislocation    ASSESSMENT/PLAN:     Plan:  To address the past plantar's deformity custom orthotics bilateral feet  Diclofenac 1% gel to affected area 3 times a day when necessary for soreness  We have scheduled him appointment with cardiology to discuss her lower extremity edema and compression stockings

## 2018-01-07 DIAGNOSIS — E78.5 HYPERLIPIDEMIA: ICD-10-CM

## 2018-01-08 RX ORDER — ATORVASTATIN CALCIUM 40 MG/1
TABLET, FILM COATED ORAL
Qty: 90 TABLET | Refills: 0 | Status: SHIPPED | OUTPATIENT
Start: 2018-01-08 | End: 2018-04-08 | Stop reason: SDUPTHER

## 2018-01-08 RX ORDER — CICLOPIROX 80 MG/ML
SOLUTION TOPICAL
Qty: 6.6 ML | Refills: 0 | Status: SHIPPED | OUTPATIENT
Start: 2018-01-08 | End: 2018-02-07 | Stop reason: SDUPTHER

## 2018-01-17 ENCOUNTER — TELEPHONE (OUTPATIENT)
Dept: SPORTS MEDICINE | Facility: CLINIC | Age: 78
End: 2018-01-17

## 2018-01-19 ENCOUNTER — TELEPHONE (OUTPATIENT)
Dept: SPORTS MEDICINE | Facility: CLINIC | Age: 78
End: 2018-01-19

## 2018-01-23 ENCOUNTER — OFFICE VISIT (OUTPATIENT)
Dept: SPORTS MEDICINE | Facility: CLINIC | Age: 78
End: 2018-01-23
Payer: COMMERCIAL

## 2018-01-23 VITALS — TEMPERATURE: 98 F | HEIGHT: 65 IN | BODY MASS INDEX: 35.32 KG/M2 | WEIGHT: 212 LBS

## 2018-01-23 DIAGNOSIS — M19.012 OSTEOARTHRITIS OF BILATERAL GLENOHUMERAL JOINTS: Primary | ICD-10-CM

## 2018-01-23 DIAGNOSIS — M19.011 OSTEOARTHRITIS OF BILATERAL GLENOHUMERAL JOINTS: Primary | ICD-10-CM

## 2018-01-23 DIAGNOSIS — G89.29 CHRONIC PAIN OF BOTH SHOULDERS: ICD-10-CM

## 2018-01-23 DIAGNOSIS — M25.512 CHRONIC PAIN OF BOTH SHOULDERS: ICD-10-CM

## 2018-01-23 DIAGNOSIS — M25.511 CHRONIC PAIN OF BOTH SHOULDERS: ICD-10-CM

## 2018-01-23 DIAGNOSIS — M75.50 SUBACROMIAL BURSITIS: ICD-10-CM

## 2018-01-23 PROCEDURE — 99999 PR PBB SHADOW E&M-EST. PATIENT-LVL III: CPT | Mod: PBBFAC,,, | Performed by: FAMILY MEDICINE

## 2018-01-23 PROCEDURE — 20611 DRAIN/INJ JOINT/BURSA W/US: CPT | Mod: 50,S$GLB,, | Performed by: FAMILY MEDICINE

## 2018-01-23 PROCEDURE — 20610 DRAIN/INJ JOINT/BURSA W/O US: CPT | Mod: 50,59,S$GLB, | Performed by: FAMILY MEDICINE

## 2018-01-23 PROCEDURE — 99214 OFFICE O/P EST MOD 30 MIN: CPT | Mod: 25,S$GLB,, | Performed by: FAMILY MEDICINE

## 2018-01-23 RX ORDER — TRIAMCINOLONE ACETONIDE 40 MG/ML
40 INJECTION, SUSPENSION INTRA-ARTICULAR; INTRAMUSCULAR
Status: DISCONTINUED | OUTPATIENT
Start: 2018-01-23 | End: 2018-01-23 | Stop reason: HOSPADM

## 2018-01-23 RX ADMIN — TRIAMCINOLONE ACETONIDE 40 MG: 40 INJECTION, SUSPENSION INTRA-ARTICULAR; INTRAMUSCULAR at 02:01

## 2018-01-23 NOTE — PROCEDURES
"Large Joint Aspiration/Injection  Date/Time: 1/23/2018 2:06 PM  Performed by: MOE MIRELES  Authorized by: MOE MIRELES     Consent Done?:  Yes (Verbal)  Indications:  Pain  Procedure site marked: Yes    Timeout: Prior to procedure the correct patient, procedure, and site was verified      Location:  Shoulder  Site:  R glenohumeral and L glenohumeral  Prep: Patient was prepped and draped in usual sterile fashion    Ultrasonic Guidance for needle placement: Yes  Images are saved and documented.  Needle size:  20 G  Approach:  Posterior  Medications:  40 mg triamcinolone acetonide 40 mg/mL; 40 mg triamcinolone acetonide 40 mg/mL  Patient tolerance:  Patient tolerated the procedure well with no immediate complications    Additional Comments: Description of ultrasound utilization for needle guidance:   Ultrasound guidance used for needle localization.  Images saved and stored for documentation.  The glenohumeral joint was visualized.  Dynamic visualization of the 20g x 3.5" needle was continuous throughout the procedure.      "

## 2018-01-23 NOTE — PROCEDURES
Large Joint Aspiration/Injection  Date/Time: 1/23/2018 2:06 PM  Performed by: MOE MIRELES  Authorized by: MOE MIRELES     Consent Done?:  Yes (Verbal)  Indications:  Pain  Procedure site marked: Yes    Timeout: Prior to procedure the correct patient, procedure, and site was verified      Location:  Shoulder  Site:  R subacromial bursa and L subacromial bursa  Prep: Patient was prepped and draped in usual sterile fashion    Ultrasonic Guidance for needle placement: No  Needle size:  22 G  Approach:  Posterior  Medications:  40 mg triamcinolone acetonide 40 mg/mL; 40 mg triamcinolone acetonide 40 mg/mL  Patient tolerance:  Patient tolerated the procedure well with no immediate complications

## 2018-01-23 NOTE — PROGRESS NOTES
Krissy Marino, a 77 y.o. female, is here for evaluation of RIGHT and LEFT shoulder pain.     HISTORY OF PRESENT ILLNESS  Hand dominance, right     Location: anterior and lateral, bilateral R > L  Onset: Insidious, many years  Palliative:    Relative rest   Oral analgesics (tylenol PM)   R- CSI, SAB, 07/18/16, 80% improvement   R - CSI, SAB, 12/02/17, moderate improvement for ~ 3 weeks    R - CSI, SAB, 01/27/17, no improvement    R - CSI, iaGH/SAB, 02/21/17, mild to moderate relief    fPT @ [facility] - going well, but ROM remains painful   Heat    R - CSI, iaGH/SAB, 05/05/17, moderate%   Sling PRN   R/L - CSI, SAB, 07/12/17, moderate% improvement    R/L - CSI, iaGH/SAB 08/23/17, moderate%   R/L - CSI, SAB, 10/18/17, moderate% improvement, though not for long    R/L - CSI, SAB, 11/15/17, moderate% improvement, for ~ 3weeks    R/L - CSI, SAB 12/13/17, mild% improvement   Provocative:    ADLs   Prior:   Progression: worsening discomfort   Quality:    Sharp pain at times activity   Throbbing at times with activity    Muscle soreness   Radiation: none  Severity: per nursing documentation  Timing: intermittent with use  Trauma:    17.07: mechanical fall while at PT --> landed on L arm     Review of systems (ROS):  A 10+ review of systems was performed with pertinent positives and negatives noted above in the history of present illness. Other systems were negative unless otherwise specified.      PHYSICAL EXAMINATION  General:  The patient is alert and oriented x 3. Mood is pleasant. Observation of ears, eyes and nose reveal no gross abnormalities. HEENT: NCAT, sclera anicteric. Lungs: Respirations are equal and unlabored.     RIGHT SHOULDER EXAMINATION     OBSERVATION:     Swelling  none  Deformity  none   Discoloration  none   Scapular winging none   Scars   none  Atrophy  none    TENDERNESS / CREPITUS (T/C):          T/C      T/C   Clavicle   -/-  SUPRAspinatus    -/-     AC Jt.    -/-  INFRAspinatus  -/-    SC Jt.     -/-  Deltoid    -/-      G. Tuberosity  -/-  LH BICEP groove  -/-   Acromion:  -/-  Midline Neck   -/-     Scapular Spine -/-  Trapezium   -/-   SMA Scapula  -/-  GH jt. line - post  -/-     Scapulothoracic  -/-         ROM:     Right shoulder   Left shoulder        AROM (PROM)   AROM (PROM)   FE    120° (155°)*     120° (155°)*     ER at 0°    60°  (65°) *   60°  (65°)*   ER at 90° ABD  90°  (90°) *   90°  (90°)*   IR at 90°  ABD   NA  (40°)  *   NA  (40°)  *    IR (spine level)   T10  *   T10*    STRENGTH: (* = with pain) RIGHT SHOULDER  LEFT SHOULDER   SCAPTION   4/5    4/5   IR    4/5    4/5   ER    4/5    4/5   BICEPS   4/5    4/5   Deltoid    4/5    4/5     SIGNS:  Painful side       NEER   +   OSONNYS        +    CHAPA   +   SPEEDS        +   DROP ARM   -   BELLY PRESS       -    X-Body ADD    +   LIFT-OFF        +   HORNBLOWERS      +              STABILITY TESTING   RIGHT SHOULDER  LEFT SHOULDER     Translation     Anterior up face    up face    Posterior up face   up face    Sulcus  < 10mm   < 10 mm     Signs   Apprehension   neg      neg       Relocation   no change     no change      Jerk test  neg     neg    EXTREMITY NEURO-VASCULAR EXAM    Sensation grossly intact to light touch all dermatomal regions.    DTR 2+ Biceps, Triceps, BR and Negative Chinas sign   Grossly intact motor function at Elbow, Wrist and Hand   Distal pulses radial and ulnar 2+, brisk cap refill, symmetric.      NECK:  Painless FROM and spinous processes non-tender. Negative Spurlings sign.       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   W/ advanced acromialization of humeral head   W/ chronic tearing of superior rotator cuff    No evidence of neurologic pathology  No evidence of vascular pathology    Imaging studies reviewed:   X-ray shoulder, right 17.05  X-ray shoulder, left 17.07    Plan:  We discussed options including:  #1 watchful waiting  #2 re start  physical therapy aimed at:   RoM glenohumeral joint   Strengthening rotator cuff   Scapular stability    fpt  #3 injection therapy:   CSI SAB    Right, effective moderate%, repeat    Left, effective moderate%, repeat    CSI iaGH    Right, effect moderate%, repeat     Left,    orthobiologics   #4 consultation re: rTSA   Likely poor surgical candidate given CAD   Likely 01/18 surgical consultation     The patient chooses #3 csi iagh bilat and csi sab bilat    Pain management: handout given  Bracing: shoulder sling, prn  Physical therapy:    fPT, @ [facility], prior as above   Activity (e.g. sports, work) restrictions: as tolerated   school/vocation: works at [employer]     Follow up in 4-8 w  A/e csis  Should symptoms worsen or fail to resolve, consider:  Revisiting the above options

## 2018-02-07 RX ORDER — CICLOPIROX 80 MG/ML
SOLUTION TOPICAL
Qty: 6.6 ML | Refills: 0 | Status: SHIPPED | OUTPATIENT
Start: 2018-02-07 | End: 2018-03-14 | Stop reason: SDUPTHER

## 2018-02-10 ENCOUNTER — LAB VISIT (OUTPATIENT)
Dept: LAB | Facility: HOSPITAL | Age: 78
End: 2018-02-10
Attending: INTERNAL MEDICINE
Payer: COMMERCIAL

## 2018-02-10 DIAGNOSIS — M31.6 GIANT CELL ARTERITIS: ICD-10-CM

## 2018-02-10 LAB
ALBUMIN SERPL BCP-MCNC: 3.2 G/DL
ALP SERPL-CCNC: 107 U/L
ALT SERPL W/O P-5'-P-CCNC: 19 U/L
ANION GAP SERPL CALC-SCNC: 9 MMOL/L
AST SERPL-CCNC: 14 U/L
BASOPHILS # BLD AUTO: 0.02 K/UL
BASOPHILS NFR BLD: 0.2 %
BILIRUB SERPL-MCNC: 1.2 MG/DL
BUN SERPL-MCNC: 31 MG/DL
CALCIUM SERPL-MCNC: 9.2 MG/DL
CHLORIDE SERPL-SCNC: 107 MMOL/L
CO2 SERPL-SCNC: 26 MMOL/L
CREAT SERPL-MCNC: 0.8 MG/DL
CRP SERPL-MCNC: 0.6 MG/L
DIFFERENTIAL METHOD: ABNORMAL
EOSINOPHIL # BLD AUTO: 0.2 K/UL
EOSINOPHIL NFR BLD: 1.5 %
ERYTHROCYTE [DISTWIDTH] IN BLOOD BY AUTOMATED COUNT: 13.7 %
ERYTHROCYTE [SEDIMENTATION RATE] IN BLOOD BY WESTERGREN METHOD: 25 MM/HR
EST. GFR  (AFRICAN AMERICAN): >60 ML/MIN/1.73 M^2
EST. GFR  (NON AFRICAN AMERICAN): >60 ML/MIN/1.73 M^2
GLUCOSE SERPL-MCNC: 150 MG/DL
HCT VFR BLD AUTO: 38.1 %
HGB BLD-MCNC: 12.3 G/DL
IMM GRANULOCYTES # BLD AUTO: 0.04 K/UL
IMM GRANULOCYTES NFR BLD AUTO: 0.4 %
LYMPHOCYTES # BLD AUTO: 1.5 K/UL
LYMPHOCYTES NFR BLD: 15.1 %
MCH RBC QN AUTO: 31.8 PG
MCHC RBC AUTO-ENTMCNC: 32.3 G/DL
MCV RBC AUTO: 98 FL
MONOCYTES # BLD AUTO: 0.6 K/UL
MONOCYTES NFR BLD: 5.5 %
NEUTROPHILS # BLD AUTO: 7.8 K/UL
NEUTROPHILS NFR BLD: 77.3 %
NRBC BLD-RTO: 0 /100 WBC
PLATELET # BLD AUTO: 129 K/UL
PMV BLD AUTO: 11.3 FL
POTASSIUM SERPL-SCNC: 3.8 MMOL/L
PROT SERPL-MCNC: 6 G/DL
RBC # BLD AUTO: 3.87 M/UL
SODIUM SERPL-SCNC: 142 MMOL/L
WBC # BLD AUTO: 10.09 K/UL

## 2018-02-10 PROCEDURE — 80053 COMPREHEN METABOLIC PANEL: CPT

## 2018-02-10 PROCEDURE — 86140 C-REACTIVE PROTEIN: CPT

## 2018-02-10 PROCEDURE — 85025 COMPLETE CBC W/AUTO DIFF WBC: CPT

## 2018-02-10 PROCEDURE — 85651 RBC SED RATE NONAUTOMATED: CPT

## 2018-02-10 PROCEDURE — 36415 COLL VENOUS BLD VENIPUNCTURE: CPT | Mod: PO

## 2018-02-11 RX ORDER — FOLIC ACID 1 MG/1
TABLET ORAL
Qty: 90 TABLET | Refills: 0 | Status: SHIPPED | OUTPATIENT
Start: 2018-02-11 | End: 2018-07-09

## 2018-02-15 ENCOUNTER — TELEPHONE (OUTPATIENT)
Dept: INTERNAL MEDICINE | Facility: CLINIC | Age: 78
End: 2018-02-15

## 2018-02-15 NOTE — TELEPHONE ENCOUNTER
----- Message from Shahla Lowery MA sent at 2/15/2018  9:20 AM CST -----  Contact: Patient [phone]  Sooner appointment than the  can schedule.  :    When is the first available appointment:02/26  What is the nature of the appointment: pre-op    Patient preference of timeframe to be scheduled:  02/20    Please Advise  Thank You

## 2018-02-16 RX ORDER — LEVOTHYROXINE SODIUM 75 UG/1
TABLET ORAL
Qty: 90 TABLET | Refills: 0 | Status: SHIPPED | OUTPATIENT
Start: 2018-02-16 | End: 2018-05-19 | Stop reason: SDUPTHER

## 2018-02-20 ENCOUNTER — OFFICE VISIT (OUTPATIENT)
Dept: INTERNAL MEDICINE | Facility: CLINIC | Age: 78
End: 2018-02-20
Payer: COMMERCIAL

## 2018-02-20 VITALS
SYSTOLIC BLOOD PRESSURE: 122 MMHG | DIASTOLIC BLOOD PRESSURE: 64 MMHG | BODY MASS INDEX: 35.3 KG/M2 | WEIGHT: 211.88 LBS | HEIGHT: 65 IN

## 2018-02-20 DIAGNOSIS — I50.32 CHRONIC CONGESTIVE HEART FAILURE WITH LEFT VENTRICULAR DIASTOLIC DYSFUNCTION: ICD-10-CM

## 2018-02-20 DIAGNOSIS — M17.0 PRIMARY OSTEOARTHRITIS OF BOTH KNEES: ICD-10-CM

## 2018-02-20 DIAGNOSIS — E11.21 CONTROLLED TYPE 2 DIABETES MELLITUS WITH DIABETIC NEPHROPATHY, WITHOUT LONG-TERM CURRENT USE OF INSULIN: ICD-10-CM

## 2018-02-20 DIAGNOSIS — I10 ESSENTIAL HYPERTENSION: Primary | ICD-10-CM

## 2018-02-20 DIAGNOSIS — E03.1 CONGENITAL HYPOTHYROIDISM WITHOUT GOITER: ICD-10-CM

## 2018-02-20 DIAGNOSIS — I73.9 PVD (PERIPHERAL VASCULAR DISEASE): ICD-10-CM

## 2018-02-20 DIAGNOSIS — E08.41 DIABETIC MONONEUROPATHY ASSOCIATED WITH DIABETES MELLITUS DUE TO UNDERLYING CONDITION: ICD-10-CM

## 2018-02-20 DIAGNOSIS — I25.10 CORONARY ARTERY DISEASE INVOLVING NATIVE CORONARY ARTERY OF NATIVE HEART WITHOUT ANGINA PECTORIS: ICD-10-CM

## 2018-02-20 DIAGNOSIS — Z01.818 PRE-OPERATIVE EXAMINATION FOR INTERNAL MEDICINE: ICD-10-CM

## 2018-02-20 PROCEDURE — 3008F BODY MASS INDEX DOCD: CPT | Mod: S$GLB,,, | Performed by: FAMILY MEDICINE

## 2018-02-20 PROCEDURE — 99215 OFFICE O/P EST HI 40 MIN: CPT | Mod: S$GLB,,, | Performed by: FAMILY MEDICINE

## 2018-02-20 PROCEDURE — 1159F MED LIST DOCD IN RCRD: CPT | Mod: S$GLB,,, | Performed by: FAMILY MEDICINE

## 2018-02-20 PROCEDURE — 1126F AMNT PAIN NOTED NONE PRSNT: CPT | Mod: S$GLB,,, | Performed by: FAMILY MEDICINE

## 2018-02-20 PROCEDURE — 99999 PR PBB SHADOW E&M-EST. PATIENT-LVL III: CPT | Mod: PBBFAC,,, | Performed by: FAMILY MEDICINE

## 2018-02-20 RX ORDER — TRAMADOL HYDROCHLORIDE 50 MG/1
50 TABLET ORAL EVERY 6 HOURS PRN
Qty: 30 TABLET | Refills: 0 | Status: SHIPPED | OUTPATIENT
Start: 2018-02-20 | End: 2018-03-02

## 2018-02-20 NOTE — PROGRESS NOTES
Subjective:       Patient ID: Krissy Marino is a 77 y.o. female.    Chief Complaint: Pre-op Exam   I have personally taken the history and examined Krissy Marino and agree with the findings, assessment and plan.  Here for usual care and discuss knee pain.  Still is very active, working a lot, on feet.  Otherwise ROS unremarkable.  Did have some recent steroid shots, CBGs were then quit high.  HPI  Review of Systems   Constitutional: Negative for activity change, fatigue, fever and unexpected weight change.   HENT: Negative for congestion, hearing loss, postnasal drip and rhinorrhea.    Eyes: Negative for redness and visual disturbance.   Respiratory: Negative for chest tightness, shortness of breath and wheezing.    Cardiovascular: Negative for chest pain, palpitations and leg swelling.   Gastrointestinal: Negative for abdominal distention.   Genitourinary: Negative for decreased urine volume, dysuria, flank pain, hematuria, pelvic pain and urgency.   Musculoskeletal: Negative for back pain, gait problem, joint swelling and neck stiffness.   Skin: Negative for color change, rash and wound.   Neurological: Negative for dizziness, syncope, weakness and headaches.   Psychiatric/Behavioral: Negative for behavioral problems, confusion and sleep disturbance. The patient is not nervous/anxious.        Objective:      Physical Exam   Constitutional: She is oriented to person, place, and time. She appears well-developed and well-nourished. No distress.   HENT:   Head: Normocephalic.   Mouth/Throat: No oropharyngeal exudate.   Eyes: EOM are normal. Pupils are equal, round, and reactive to light. No scleral icterus.   Neck: Neck supple. No JVD present. No thyromegaly present.   Cardiovascular: Normal rate, regular rhythm and normal heart sounds.  Exam reveals no gallop and no friction rub.    No murmur heard.  Pulmonary/Chest: Effort normal and breath sounds normal. She has no wheezes. She has no rales.   Abdominal: Soft.  Bowel sounds are normal. She exhibits no distension and no mass. There is no tenderness. There is no guarding.   Musculoskeletal: Normal range of motion. She exhibits no edema.   Lymphadenopathy:     She has no cervical adenopathy.   Neurological: She is alert and oriented to person, place, and time. She has normal reflexes. She displays normal reflexes. No cranial nerve deficit. She exhibits normal muscle tone.   Skin: Skin is warm. No rash noted. No erythema.   Psychiatric: She has a normal mood and affect. Thought content normal.       Assessment:       1. Essential hypertension    2. Controlled type 2 diabetes mellitus with diabetic nephropathy, without long-term current use of insulin    3. Primary osteoarthritis of both knees    4. Congenital hypothyroidism without goiter    5. Diabetic mononeuropathy associated with diabetes mellitus due to underlying condition    6. Chronic congestive heart failure with left ventricular diastolic dysfunction    7. Coronary artery disease involving native coronary artery of native heart without angina pectoris    8. PVD (peripheral vascular disease)    9. Pre-operative examination for internal medicine        Plan:       Krissy was seen today for pre-op exam.    Diagnoses and all orders for this visit:    Essential hypertension  - controled on presen t tx  Controlled type 2 diabetes mellitus with diabetic nephropathy, without long-term current use of insulin    Primary osteoarthritis of both knees  -     traMADol (ULTRAM) 50 mg tablet; Take 1 tablet (50 mg total) by mouth every 6 (six) hours as needed for Pain.   - Discussed knee pain, chart reviewed  Congenital hypothyroidism without goiter  - follow  Diabetic mononeuropathy associated with diabetes mellitus due to underlying condition  - reviewed  Chronic congestive heart failure with left ventricular diastolic dysfunction  - chart reviewed  Coronary artery disease involving native coronary artery of native heart without  angina pectoris  - contrl risk  PVD (peripheral vascular disease)  - doiscussed, seems much better

## 2018-02-20 NOTE — PROGRESS NOTES
Patient, Krissy Marino (MRN #[id number]), presented with a recorded BMI of 35.26 kg/m^2 and a documented comorbidity(s):  - Diabetes Mellitus Type 2  - Hypertension  - Atrial Fibrillation  to which the severe obesity is a contributing factor. This is consistent with the definition of severe obesity (BMI 35.0-35.9) with comorbidity (ICD-10 E66.01, Z68.35). The patient's severe obesity was monitored, evaluated, addressed and/or treated. This addendum to the medical record is made on 02/20/2018.

## 2018-02-20 NOTE — PROGRESS NOTES
Patient, Krissy Marino (MRN #[id number]), presented with a recent Platelet count less than 150 K/uL consistent with the definition of thrombocytopenia (ICD10 - D69.6).    Platelets   Date Value Ref Range Status   02/10/2018 129 (L) 150 - 350 K/uL Final     The patient's thrombocytopenia was monitored, evaluated, addressed and/or treated. This addendum to the medical record is made on 02/20/2018.

## 2018-02-27 ENCOUNTER — OFFICE VISIT (OUTPATIENT)
Dept: SPORTS MEDICINE | Facility: CLINIC | Age: 78
End: 2018-02-27
Payer: COMMERCIAL

## 2018-02-27 VITALS — BODY MASS INDEX: 35.16 KG/M2 | WEIGHT: 211 LBS | HEIGHT: 65 IN

## 2018-02-27 DIAGNOSIS — M67.911 BILATERAL ROTATOR CUFF DYSFUNCTION: Primary | ICD-10-CM

## 2018-02-27 DIAGNOSIS — M67.912 BILATERAL ROTATOR CUFF DYSFUNCTION: Primary | ICD-10-CM

## 2018-02-27 PROCEDURE — 1159F MED LIST DOCD IN RCRD: CPT | Mod: S$GLB,,, | Performed by: FAMILY MEDICINE

## 2018-02-27 PROCEDURE — 3008F BODY MASS INDEX DOCD: CPT | Mod: S$GLB,,, | Performed by: FAMILY MEDICINE

## 2018-02-27 PROCEDURE — 1125F AMNT PAIN NOTED PAIN PRSNT: CPT | Mod: S$GLB,,, | Performed by: FAMILY MEDICINE

## 2018-02-27 PROCEDURE — 99214 OFFICE O/P EST MOD 30 MIN: CPT | Mod: 25,S$GLB,, | Performed by: FAMILY MEDICINE

## 2018-02-27 PROCEDURE — 99999 PR PBB SHADOW E&M-EST. PATIENT-LVL III: CPT | Mod: PBBFAC,,, | Performed by: FAMILY MEDICINE

## 2018-02-27 PROCEDURE — 20610 DRAIN/INJ JOINT/BURSA W/O US: CPT | Mod: 50,S$GLB,, | Performed by: FAMILY MEDICINE

## 2018-02-27 RX ORDER — TRIAMCINOLONE ACETONIDE 40 MG/ML
40 INJECTION, SUSPENSION INTRA-ARTICULAR; INTRAMUSCULAR
Status: DISCONTINUED | OUTPATIENT
Start: 2018-02-27 | End: 2018-02-27 | Stop reason: HOSPADM

## 2018-02-27 RX ADMIN — TRIAMCINOLONE ACETONIDE 40 MG: 40 INJECTION, SUSPENSION INTRA-ARTICULAR; INTRAMUSCULAR at 01:02

## 2018-02-28 ENCOUNTER — OFFICE VISIT (OUTPATIENT)
Dept: PODIATRY | Facility: CLINIC | Age: 78
End: 2018-02-28
Payer: COMMERCIAL

## 2018-02-28 ENCOUNTER — OFFICE VISIT (OUTPATIENT)
Dept: CARDIOLOGY | Facility: CLINIC | Age: 78
End: 2018-02-28
Payer: COMMERCIAL

## 2018-02-28 VITALS
WEIGHT: 213 LBS | HEIGHT: 65 IN | SYSTOLIC BLOOD PRESSURE: 136 MMHG | HEART RATE: 69 BPM | DIASTOLIC BLOOD PRESSURE: 86 MMHG | BODY MASS INDEX: 35.49 KG/M2 | RESPIRATION RATE: 18 BRPM

## 2018-02-28 VITALS
SYSTOLIC BLOOD PRESSURE: 171 MMHG | WEIGHT: 212.5 LBS | HEIGHT: 65 IN | BODY MASS INDEX: 35.4 KG/M2 | HEART RATE: 91 BPM | DIASTOLIC BLOOD PRESSURE: 72 MMHG

## 2018-02-28 DIAGNOSIS — I50.32 CHRONIC CONGESTIVE HEART FAILURE WITH LEFT VENTRICULAR DIASTOLIC DYSFUNCTION: ICD-10-CM

## 2018-02-28 DIAGNOSIS — E11.49 TYPE II DIABETES MELLITUS WITH NEUROLOGICAL MANIFESTATIONS: Primary | ICD-10-CM

## 2018-02-28 DIAGNOSIS — N18.2 CHRONIC KIDNEY DISEASE, STAGE II (MILD): ICD-10-CM

## 2018-02-28 DIAGNOSIS — B35.1 ONYCHOMYCOSIS DUE TO DERMATOPHYTE: ICD-10-CM

## 2018-02-28 DIAGNOSIS — E11.21 CONTROLLED TYPE 2 DIABETES MELLITUS WITH DIABETIC NEPHROPATHY, WITHOUT LONG-TERM CURRENT USE OF INSULIN: ICD-10-CM

## 2018-02-28 DIAGNOSIS — L84 CORN OR CALLUS: ICD-10-CM

## 2018-02-28 DIAGNOSIS — E66.9 OBESITY (BMI 30-39.9): ICD-10-CM

## 2018-02-28 DIAGNOSIS — E78.5 DYSLIPIDEMIA: ICD-10-CM

## 2018-02-28 DIAGNOSIS — I10 ESSENTIAL HYPERTENSION: Primary | ICD-10-CM

## 2018-02-28 PROCEDURE — 11056 PARNG/CUTG B9 HYPRKR LES 2-4: CPT | Mod: Q9,S$GLB,, | Performed by: PODIATRIST

## 2018-02-28 PROCEDURE — 11721 DEBRIDE NAIL 6 OR MORE: CPT | Mod: 59,Q9,S$GLB, | Performed by: PODIATRIST

## 2018-02-28 PROCEDURE — 99499 UNLISTED E&M SERVICE: CPT | Mod: S$GLB,,, | Performed by: PODIATRIST

## 2018-02-28 PROCEDURE — 99999 PR PBB SHADOW E&M-EST. PATIENT-LVL V: CPT | Mod: PBBFAC,,, | Performed by: INTERNAL MEDICINE

## 2018-02-28 PROCEDURE — 99214 OFFICE O/P EST MOD 30 MIN: CPT | Mod: S$GLB,,, | Performed by: INTERNAL MEDICINE

## 2018-02-28 PROCEDURE — 99999 PR PBB SHADOW E&M-EST. PATIENT-LVL III: CPT | Mod: PBBFAC,,, | Performed by: PODIATRIST

## 2018-02-28 RX ORDER — FUROSEMIDE 20 MG/1
20 TABLET ORAL DAILY
Qty: 90 TABLET | Refills: 3 | Status: SHIPPED | OUTPATIENT
Start: 2018-02-28 | End: 2018-07-09 | Stop reason: DRUGHIGH

## 2018-02-28 NOTE — PROGRESS NOTES
Subjective:      Patient ID: Krissy Marino is a 77 y.o. female.    Chief Complaint: PCP (Og Cid MD 2/20/18); Diabetic Foot Exam; Nail Problem; Foot Swelling (PAIN ); and Nail Care    Krissy is a 77 y.o. female who presents to the clinic for evaluation and treatment of high risk feet. Krissy has a past medical history of Adverse effect of glucocorticoid or synthetic analogue; Allergy; Arthritis; Cancer; CHF (congestive heart failure); Chronic kidney disease; Coronary artery disease involving native coronary artery of native heart without angina pectoris (10/11/2016); Diabetic neuropathy (10/6/2014); Giant cell arteritis (9/24/2012); Hyperlipidemia; Hypertension; Hypothyroid; Obesity (BMI 30-39.9) (10/6/2014); Osteopenia; Primary osteoarthritis of both knees (9/24/2012); and Type II or unspecified type diabetes mellitus with renal manifestations, uncontrolled(250.42). The patient's chief complaint is long, thick toenails        PCP: Og Cid MD    Date Last Seen by PCP:   Chief Complaint   Patient presents with    PCP     Og Cid MD 2/20/18    Diabetic Foot Exam    Nail Problem    Foot Swelling     PAIN     Nail Care     Current shoe gear: [employer]     Hemoglobin A1C   Date Value Ref Range Status   11/21/2017 7.2 (H) 4.0 - 5.6 % Final     Comment:     According to ADA guidelines, hemoglobin A1c <7.0% represents  optimal control in non-pregnant diabetic patients. Different  metrics may apply to specific patient populations.   Standards of Medical Care in Diabetes-2016.  For the purpose of screening for the presence of diabetes:  <5.7%     Consistent with the absence of diabetes  5.7-6.4%  Consistent with increasing risk for diabetes   (prediabetes)  >or=6.5%  Consistent with diabetes  Currently, no consensus exists for use of hemoglobin A1c  for diagnosis of diabetes for children.  This Hemoglobin A1c assay has significant interference with fetal   hemoglobin   (HbF). The results are invalid  for patients with abnormal amounts of   HbF,   including those with known Hereditary Persistence   of Fetal Hemoglobin. Heterozygous hemoglobin variants (HbAS, HbAC,   HbAD, HbAE, HbA2) do not significantly interfere with this assay;   however, presence of multiple variants in a sample may impact the %   interference.     06/24/2017 6.2 (H) 4.0 - 5.6 % Final     Comment:     According to ADA guidelines, hemoglobin A1c <7.0% represents  optimal control in non-pregnant diabetic patients. Different  metrics may apply to specific patient populations.   Standards of Medical Care in Diabetes-2016.  For the purpose of screening for the presence of diabetes:  <5.7%     Consistent with the absence of diabetes  5.7-6.4%  Consistent with increasing risk for diabetes   (prediabetes)  >or=6.5%  Consistent with diabetes  Currently, no consensus exists for use of hemoglobin A1c  for diagnosis of diabetes for children.  This Hemoglobin A1c assay has significant interference with fetal   hemoglobin   (HbF). The results are invalid for patients with abnormal amounts of   HbF,   including those with known Hereditary Persistence   of Fetal Hemoglobin. Heterozygous hemoglobin variants (HbAS, HbAC,   HbAD, HbAE, HbA2) do not significantly interfere with this assay;   however, presence of multiple variants in a sample may impact the %   interference.     11/25/2016 7.1 (H) 4.5 - 6.2 % Final     Comment:     According to ADA guidelines, hemoglobin A1C <7.0% represents  optimal control in non-pregnant diabetic patients.  Different  metrics may apply to specific populations.   Standards of Medical Care in Diabetes - 2016.  For the purpose of screening for the presence of diabetes:  <5.7%     Consistent with the absence of diabetes  5.7-6.4%  Consistent with increasing risk for diabetes   (prediabetes)  >or=6.5%  Consistent with diabetes  Currently no consensus exists for use of hemoglobin A1C  for diagnosis of diabetes for children.            Review of Systems   Constitution: Negative for chills, decreased appetite and fever.   Cardiovascular: Negative for leg swelling.   Skin: Positive for dry skin and nail changes. Negative for flushing, itching and poor wound healing.   Musculoskeletal: Positive for arthritis. Negative for back pain, gout, joint pain, joint swelling and myalgias.   Gastrointestinal: Negative for nausea and vomiting.   Neurological: Positive for numbness and paresthesias. Negative for loss of balance.           Objective:      Physical Exam   Constitutional: She is oriented to person, place, and time. She appears well-developed and well-nourished.   Cardiovascular:   Pulses:       Dorsalis pedis pulses are 1+ on the right side, and 1+ on the left side.        Posterior tibial pulses are 1+ on the right side, and 1+ on the left side.   Musculoskeletal: Normal range of motion. She exhibits edema (mild).        Right ankle: Normal.        Left ankle: Normal.        Right foot: There is no swelling, no crepitus and no deformity.        Left foot: There is no swelling, no crepitus and no deformity.   Adequate joint range of motion without pain, limitation, nor crepitation Bilateral feet and ankle joints. Muscle strength is 5/5 in all groups bilaterally.    Rigid hammertoe deformity L 2nd digit    1st MPJ exostosis w/ medial deviation of hallux, non trackbound. No pain w/ ROM to R hallux    Atrophy of fat  Pad from bilateral foot with easily palpable bone       Lymphadenopathy:   No palpable lymph nodes   Neurological: She is alert and oriented to person, place, and time. She has normal strength.   Sharp dull sensation diminished Light touch sensation diminished    Skin: Skin is warm, dry and intact. No abrasion, no bruising, no lesion, no petechiae and no rash noted. She is not diaphoretic. No pallor. Nails show no clubbing.   Nails 1-5 R, 2-5 L   are elongated by  3-4mm's, thickened by 2-5 mm's, dystrophic, and are darkened in   coloration . Xerosis Bilaterally. No open lesions noted.      Hyperkeratotic tissue noted to distal 2nd toe b/l      Psychiatric: She has a normal mood and affect. Her behavior is normal. Judgment normal.   Nursing note and vitals reviewed.            Assessment:       Encounter Diagnoses   Name Primary?    Type II diabetes mellitus with neurological manifestations Yes    Onychomycosis due to dermatophyte     Corn or callus          Plan:       Krissy was seen today for pcp, diabetic foot exam, nail problem, foot swelling and nail care.    Diagnoses and all orders for this visit:    Type II diabetes mellitus with neurological manifestations    Onychomycosis due to dermatophyte    Corn or callus      I counseled the patient on her conditions, their implications and medical management.    - Shoe inspection. Diabetic Foot Education. Patient reminded of the importance of good nutrition and blood sugar control to help prevent podiatric complications of diabetes. Patient instructed on proper foot hygeine. We discussed wearing proper shoe gear, daily foot inspections, never walking without protective shoe gear, never putting sharp instruments to feet, routine podiatric nail visits every 2-3 months.      - With patient's permission, nails were aggressively reduced and debrided x 9 to their soft tissue attachment mechanically and with electric , removing all offending nail and debris. Patient relates relief following the procedure. She will continue to monitor the areas daily, inspect her feet, wear protective shoe gear when ambulatory, moisturizer to maintain skin integrity and follow in this office in approximately 2-3 months, sooner p.r.n.    - After cleansing the  area w/ alcohol prep pad the above mentioned hyperkeratosis was trimmed utilizing No 15 scapel, to a smooth base with out incident. Patient tolerated this  well and reported comfort to the area of   Distal 2nd toe b/l

## 2018-02-28 NOTE — PROGRESS NOTES
Cardiology Clinic Note  Reason for Visit: F/u CHF and CAD    HPI:   Ms. Marino is a 77 year old white female with HTN DM well controlled, HLD and ICM with LVEF 30-35% (now recovered) on exercise stress echo who presents to cardiology clinic for follow up. Seen by me multiple times since August 2016. Found to have 99% prox LAD and 90% prox D1. Received OSMEL to prox and mid LAD with DONNY 1 flow. Had repeat PET showing improved LVEF and no evidence of ischemia. Last echo in January showed LVEF 60% with diastolic dysfunction. Feels much better now. No limiting symptoms.   Presents today for general follow up. Due to have eyelid surgery next week and wanted cardiac clearance. Has gained ~5 lbs because of eating sweets, denies edema, SOB, CP, NEAL, orthopnea. BP normally well controlled but was rushing today as she was running late.     ROS:    Constitution: Negative for fever, chills, weight loss or gain.   HENT: Negative for sore throat, rhinorrhea, or headache; post nasal drip  Eyes: Negative for blurred or double vision.   Cardiovascular: See above  Pulmonary: no NEAL, no cough   Gastrointestinal: Negative for abdominal pain, nausea, vomiting, or diarrhea.   : Negative for dysuria.   Neurological: Negative for focal weakness or sensory changes.  PMH:     Past Medical History:   Diagnosis Date    Adverse effect of glucocorticoid or synthetic analogue     Allergy     Arthritis     Cancer     CHF (congestive heart failure)     Chronic kidney disease     Coronary artery disease involving native coronary artery of native heart without angina pectoris 10/11/2016    Diabetic neuropathy 10/6/2014    Giant cell arteritis 9/24/2012    Hyperlipidemia     Hypertension     Hypothyroid     Obesity (BMI 30-39.9) 10/6/2014    Osteopenia     Primary osteoarthritis of both knees 9/24/2012    Type II or unspecified type diabetes mellitus with renal manifestations, uncontrolled(250.42)      Past Surgical History:    Procedure Laterality Date    APPENDECTOMY      CATARACT EXTRACTION      CATARACT EXTRACTION, BILATERAL      CHOLECYSTECTOMY      EYE SURGERY      HYSTERECTOMY      JOINT REPLACEMENT      bilateral total knee    SKIN BIOPSY      TONSILLECTOMY       Allergies:     Review of patient's allergies indicates:   Allergen Reactions    Sulfamethoxazole-trimethoprim Nausea And Vomiting    Latex, natural rubber Rash    Pcn [penicillins] Rash     Medications:     Current Outpatient Prescriptions on File Prior to Visit   Medication Sig Dispense Refill    acitretin (SORIATANE) 10 MG capsule Take 2 po qday 180 capsule 1    albuterol 90 mcg/actuation inhaler Inhale 2 puffs into the lungs every 6 (six) hours as needed for Wheezing. 1 each 11    alendronate (FOSAMAX) 70 MG tablet Take 1 tablet (70 mg total) by mouth every 7 days. 4 tablet 11    allopurinol (ZYLOPRIM) 300 MG tablet TAKE 1 TABLET(300 MG) BY MOUTH EVERY DAY 90 tablet 3    aspirin (ECOTRIN) 81 MG EC tablet Take 1 tablet (81 mg total) by mouth once daily. 90 tablet 3    atorvastatin (LIPITOR) 40 MG tablet TAKE 1 TABLET(40 MG) BY MOUTH EVERY DAY 90 tablet 0    azelastine (ASTELIN) 137 mcg (0.1 %) nasal spray USE 1 SPRAY(137 MCG) IN EACH NOSTRIL TWICE DAILY 30 mL 0    BIOTIN ORAL Take by mouth.      blood sugar diagnostic (ACCU-CHEK AMELIA PLUS TEST STRP) Strp Checks bg 2 x day before meals 200 strip 4    cholecalciferol, vitamin D3, 50,000 unit capsule Take 1 capsule (50,000 Units total) by mouth twice a week. 40 capsule 0    ciclopirox (PENLAC) 8 % Soln APPLY EXTERNALLY TO THE AFFECTED AREA EVERY NIGHT 6.6 mL 0    desonide (DESOWEN) 0.05 % cream AAA bid 180 g 1    erythromycin (ROMYCIN) ophthalmic ointment Place into the right eye 3 (three) times daily. 1 Tube 3    FERROUS GLUCONATE (FERATE ORAL) Take by mouth once daily at 6am.       folic acid (FOLVITE) 1 MG tablet TAKE 1 TABLET(1000 MCG) BY MOUTH EVERY DAY 90 tablet 0    hydrocortisone  "butyrate (LOCOID) 0.1 % Crea cream AAA buttock bid 180 g 0    insulin lispro (HUMALOG KWIKPEN) 100 unit/mL InPn pen Inject 5 Units into the skin 3 (three) times daily with meals. 1 Box 1    insulin needles, disposable, (BD ULTRA-FINE ILIANA PEN NEEDLES) 32 x 5/32 " Ndle Injects insulin 3 x day 300 each 3    KRILL/OM3/DHA/EPA/OM6/LIP/ASTX (KRILL OIL, OMEGA 3 & 6, ORAL) Take by mouth once daily at 6am.       levothyroxine (SYNTHROID) 75 MCG tablet TAKE 1 TABLET BY MOUTH BEFORE BREAKFAST 90 tablet 0    lidocaine HCL 2% (XYLOCAINE) 2 % jelly APPLY TOPICALLY EVERY DAY AS NEEDED 30 mL 3    losartan (COZAAR) 25 MG tablet Take 0.5 tablets (12.5 mg total) by mouth once daily. 45 tablet 3    metoprolol succinate (TOPROL-XL) 50 MG 24 hr tablet Take 1 tablet (50 mg total) by mouth once daily. 90 tablet 3    miconazole NITRATE 2 % (ZEASORB AF) 2 % top powder Apply topically as needed for Itching. 71 g 0    mometasone (NASONEX) 50 mcg/actuation nasal spray 2 sprays by Nasal route once daily. 1 each 0    nystatin-triamcinolone (MYCOLOG II) cream Apply topically 2 (two) times daily. 30 g 0    olopatadine (PATANOL) 0.1 % ophthalmic solution Place 1 drop into the right eye 2 (two) times daily. 5 mL 1    silver sulfADIAZINE 1% (SILVADENE) 1 % cream aaa buttock bid 50 g 2    SYMBICORT 160-4.5 mcg/actuation [facility] INHALE 2 PUFFS BY MOUTH INTO THE LUNGS EVERY 12 HOURS 10.2 g 0    traMADol (ULTRAM) 50 mg tablet Take 1 tablet (50 mg total) by mouth every 6 (six) hours as needed for Pain. 30 tablet 0    triamcinolone acetonide 0.1% (KENALOG) 0.1 % cream Apply topically 2 (two) times daily. Apply to affected area 454 Tube 3    cetirizine (ZYRTEC) 10 MG tablet Take 1 tablet (10 mg total) by mouth once daily. 30 tablet 2    diclofenac sodium 1 % Gel Apply 2 g topically 4 (four) times daily. 400 g 0    ranitidine (ZANTAC) 150 MG tablet Take 1 tablet (150 mg total) by mouth 2 (two) times daily as needed for Heartburn. 120 tablet 5 " "    Current Facility-Administered Medications on File Prior to Visit   Medication Dose Route Frequency Provider Last Rate Last Dose    [DISCONTINUED] triamcinolone acetonide injection 40 mg  40 mg Intra-articular  Rock Lopez MD   40 mg at 02/27/18 1316    [DISCONTINUED] triamcinolone acetonide injection 40 mg  40 mg Intra-articular  Rock Lopez MD   40 mg at 02/27/18 1316     Social History:     Social History   Substance Use Topics    Smoking status: Never Smoker    Smokeless tobacco: Never Used    Alcohol use No     Family History:     Family History   Problem Relation Age of Onset    Diabetes Mother     Hypertension Mother     Hypertension Father     Kidney disease Neg Hx     Eczema Neg Hx     Psoriasis Neg Hx     Melanoma Neg Hx     Lupus Neg Hx     Acne Neg Hx      Physical Exam:     BP (!) 171/72 (BP Location: Left arm, Patient Position: Sitting, BP Method: Medium (Automatic))   Pulse 91   Ht 5' 5" (1.651 m)   Wt 96.4 kg (212 lb 8.4 oz)   BMI 35.37 kg/m²      Constitutional: NAD, conversant  HEENT: Sclera anicteric, PERRLA, EOMI  Neck: No JVD, no carotid bruits  CV: RRR, no murmur, normal S1/S2, no S3  Pulm: CTAB, no wheezes, rales, or ronchi  GI: Abdomen soft, NTND, +BS  Extremities: 1+ doughy LE edema to ankles, warm and well perfused  Skin: No ecchymosis, erythema, or ulcers  Psych: AOx3, appropriate affect  Neuro: CNII-XII intact, no focal deficits    Labs:     Lab Results   Component Value Date     02/10/2018    K 3.8 02/10/2018     02/10/2018    CO2 26 02/10/2018    BUN 31 (H) 02/10/2018    CREATININE 0.8 02/10/2018    ANIONGAP 9 02/10/2018     Lab Results   Component Value Date    HGBA1C 7.2 (H) 11/21/2017     Lab Results   Component Value Date     (H) 08/03/2016    BNP 23 02/19/2010    Lab Results   Component Value Date    WBC 10.09 02/10/2018    HGB 12.3 02/10/2018    HCT 38.1 02/10/2018     (L) 02/10/2018    GRAN 7.8 (H) 02/10/2018    " GRAN 77.3 (H) 02/10/2018     Lab Results   Component Value Date    CHOL 169 11/21/2017    HDL 41 11/21/2017    LDLCALC 82.6 11/21/2017    TRIG 227 (H) 11/21/2017          Imaging:     TTE -- 1/2017    1 - Mild left ventricular enlargement.     2 - Normal left ventricular systolic function (EF 55-60%).     3 - Eccentric hypertrophy.     4 - Left ventricular diastolic dysfunction.     5 - Biatrial enlargement.     6 - Normal right ventricular systolic function .     7 - Trivial aortic regurgitation.     8 - Trivial to mild mitral regurgitation.     9 - Trivial to mild tricuspid regurgitation.     10 - The estimated PA systolic pressure is 32 mmHg.    EF   Date Value Ref Range Status   01/16/2017 58 55 - 65    08/03/2016 30 (A) 55 - 65        Assessment:    Krissy Marino is a 77 year old with DM HTN and HLD who presents today for follow up of her CAD and ischemic cardiomyopathy. She has been medically optimized and now revascularized. Doing very well. BP elevated but normally well controlled at home, normal on recheck. Due for eyelid surgery next week. She is a high risk patient for a low risk procedure. Medically optimized.  Revised Cardiac Risk Index   High risk surgery: No  History of ischemic heart disease: Yes  History of congestive heart failure: Yes  History of stroke: No  Insulin dependent diabetes: Yes  Cr > 2: No  3 points, class IV risk, 11% risk of cardiac event 2014 ACC/AHA Perioperative Guidelines  Known or risk factors for CAD: Yes  High risk of MACE: Yes  Functional capacity < 4 METs: No, proceed without further testing     Pt has no active cardiac condition (ACS/USA, decompenstated CHF, significant arrhythmias or severe valvular disease) and can easily achieve 4 METS.  As such, pt does not require further cardiac evaluation prior to undergoing surgery.  Pt should remain on beta-blockers throughout the entire saadia-procedure time period.  Aspirin therapy can be held but should be restarted as soon as  safely possible.  The remaining cardiac meds can beheld as needed but should also be restarted after the procedure. These recommendations follow the most current Guideline on Perioperative Cardiovascular Evaluation and Management of Patients Undergoing Noncardiac Surgery released by the ACC/AHA (JACC 2014.07.944).      Plan:   1) Systolic Heart Failure with Reduced EF -- NYHA I, warm and euvolemic today   --continue ASA and atorvastatin   --continiue toprol XL 50mg   --losartan 12.5mg (plan to titrate up in future as still has occasional low BP)   --lasix 20mg daily     2) CAD -- prox LAD disease   --continue ASA plus statin   --finished rehab     3) HTN -- controlled well   --continue meds as above   --add losartan 12.5mg    4) HLD   --atorvastatin 40mg, at goal    5) DM   --at goal    6) GERD   --on meds    7) Venous stasis   --compression stockings     8) Obesity   --stressed dietary changes with patient who is excited to lose weight    RTC 6 months    Signed:  Toñito Kohler MD  Cardiology Fellow, PGY-5  Pager: [phone]  2/28/2018 2:34 PM

## 2018-03-01 ENCOUNTER — TELEPHONE (OUTPATIENT)
Dept: OPHTHALMOLOGY | Facility: CLINIC | Age: 78
End: 2018-03-01

## 2018-03-01 NOTE — TELEPHONE ENCOUNTER
LM on both phone numbers to remind her to discontinue ASA and NSAIDS from now until surgery next week.

## 2018-03-05 ENCOUNTER — TELEPHONE (OUTPATIENT)
Dept: OPHTHALMOLOGY | Facility: CLINIC | Age: 78
End: 2018-03-05

## 2018-03-06 ENCOUNTER — TELEPHONE (OUTPATIENT)
Dept: OPHTHALMOLOGY | Facility: CLINIC | Age: 78
End: 2018-03-06

## 2018-03-06 NOTE — TELEPHONE ENCOUNTER
----- Message from Symone Ramírez sent at 3/6/2018  1:29 PM CST -----  Contact: Jamila Rey   Pt returned the called back ,can we called the pt back please ,pt can be reached at [phone] please thank you.

## 2018-03-07 ENCOUNTER — HOSPITAL ENCOUNTER (OUTPATIENT)
Facility: HOSPITAL | Age: 78
Discharge: HOME | End: 2018-03-07
Attending: OPHTHALMOLOGY | Admitting: OPHTHALMOLOGY
Payer: COMMERCIAL

## 2018-03-07 ENCOUNTER — ANESTHESIA (OUTPATIENT)
Dept: SURGERY | Facility: HOSPITAL | Age: 78
End: 2018-03-07
Payer: COMMERCIAL

## 2018-03-07 ENCOUNTER — ANESTHESIA EVENT (OUTPATIENT)
Dept: SURGERY | Facility: HOSPITAL | Age: 78
End: 2018-03-07
Payer: COMMERCIAL

## 2018-03-07 VITALS
HEIGHT: 65 IN | BODY MASS INDEX: 34.99 KG/M2 | SYSTOLIC BLOOD PRESSURE: 163 MMHG | OXYGEN SATURATION: 96 % | TEMPERATURE: 97 F | DIASTOLIC BLOOD PRESSURE: 78 MMHG | HEART RATE: 84 BPM | WEIGHT: 210 LBS | RESPIRATION RATE: 16 BRPM

## 2018-03-07 DIAGNOSIS — H02.135 SENILE ECTROPION OF BOTH LOWER EYELIDS: Primary | ICD-10-CM

## 2018-03-07 DIAGNOSIS — H02.106: ICD-10-CM

## 2018-03-07 DIAGNOSIS — H02.103: ICD-10-CM

## 2018-03-07 DIAGNOSIS — H02.132 SENILE ECTROPION OF BOTH LOWER EYELIDS: Primary | ICD-10-CM

## 2018-03-07 LAB — POCT GLUCOSE: 137 MG/DL (ref 70–110)

## 2018-03-07 PROCEDURE — 36000706: Performed by: OPHTHALMOLOGY

## 2018-03-07 PROCEDURE — 25000003 PHARM REV CODE 250: Performed by: OPHTHALMOLOGY

## 2018-03-07 PROCEDURE — 71000039 HC RECOVERY, EACH ADD'L HOUR: Performed by: OPHTHALMOLOGY

## 2018-03-07 PROCEDURE — 63600175 PHARM REV CODE 636 W HCPCS: Performed by: NURSE ANESTHETIST, CERTIFIED REGISTERED

## 2018-03-07 PROCEDURE — 82962 GLUCOSE BLOOD TEST: CPT | Performed by: OPHTHALMOLOGY

## 2018-03-07 PROCEDURE — 36000707: Performed by: OPHTHALMOLOGY

## 2018-03-07 PROCEDURE — 63600175 PHARM REV CODE 636 W HCPCS: Performed by: OPHTHALMOLOGY

## 2018-03-07 PROCEDURE — D9220A PRA ANESTHESIA: Mod: ANES,,, | Performed by: ANESTHESIOLOGY

## 2018-03-07 PROCEDURE — 82962 GLUCOSE BLOOD TEST: CPT | Mod: 91 | Performed by: OPHTHALMOLOGY

## 2018-03-07 PROCEDURE — S0020 INJECTION, BUPIVICAINE HYDRO: HCPCS | Performed by: OPHTHALMOLOGY

## 2018-03-07 PROCEDURE — 25000003 PHARM REV CODE 250: Performed by: NURSE ANESTHETIST, CERTIFIED REGISTERED

## 2018-03-07 PROCEDURE — 71000033 HC RECOVERY, INTIAL HOUR: Performed by: OPHTHALMOLOGY

## 2018-03-07 PROCEDURE — 37000008 HC ANESTHESIA 1ST 15 MINUTES: Performed by: OPHTHALMOLOGY

## 2018-03-07 PROCEDURE — 37000009 HC ANESTHESIA EA ADD 15 MINS: Performed by: OPHTHALMOLOGY

## 2018-03-07 PROCEDURE — S0077 INJECTION, CLINDAMYCIN PHOSP: HCPCS | Performed by: OPHTHALMOLOGY

## 2018-03-07 PROCEDURE — 25000003 PHARM REV CODE 250: Performed by: ANESTHESIOLOGY

## 2018-03-07 PROCEDURE — 25000003 PHARM REV CODE 250

## 2018-03-07 PROCEDURE — 67917 REPAIR EYELID DEFECT: CPT | Mod: 50,,, | Performed by: OPHTHALMOLOGY

## 2018-03-07 PROCEDURE — D9220A PRA ANESTHESIA: Mod: CRNA,,, | Performed by: NURSE ANESTHETIST, CERTIFIED REGISTERED

## 2018-03-07 RX ORDER — SODIUM CHLORIDE 9 MG/ML
INJECTION, SOLUTION INTRAVENOUS CONTINUOUS
Status: DISCONTINUED | OUTPATIENT
Start: 2018-03-07 | End: 2018-03-08 | Stop reason: HOSPADM

## 2018-03-07 RX ORDER — TETRACAINE HYDROCHLORIDE 5 MG/ML
SOLUTION OPHTHALMIC
Status: DISCONTINUED | OUTPATIENT
Start: 2018-03-07 | End: 2018-03-07 | Stop reason: HOSPADM

## 2018-03-07 RX ORDER — FENTANYL CITRATE 50 UG/ML
INJECTION, SOLUTION INTRAMUSCULAR; INTRAVENOUS
Status: DISCONTINUED | OUTPATIENT
Start: 2018-03-07 | End: 2018-03-07

## 2018-03-07 RX ORDER — TOBRAMYCIN AND DEXAMETHASONE 3; 1 MG/ML; MG/ML
1 SUSPENSION/ DROPS OPHTHALMIC 4 TIMES DAILY
Qty: 5 ML | Refills: 0 | Status: SHIPPED | OUTPATIENT
Start: 2018-03-07 | End: 2018-03-17

## 2018-03-07 RX ORDER — BUPIVACAINE HYDROCHLORIDE 5 MG/ML
INJECTION, SOLUTION EPIDURAL; INTRACAUDAL
Status: DISCONTINUED | OUTPATIENT
Start: 2018-03-07 | End: 2018-03-07 | Stop reason: HOSPADM

## 2018-03-07 RX ORDER — LIDOCAINE HYDROCHLORIDE AND EPINEPHRINE 20; 10 MG/ML; UG/ML
INJECTION, SOLUTION INFILTRATION; PERINEURAL
Status: DISCONTINUED | OUTPATIENT
Start: 2018-03-07 | End: 2018-03-07 | Stop reason: HOSPADM

## 2018-03-07 RX ORDER — TETRACAINE HYDROCHLORIDE 5 MG/ML
1 SOLUTION OPHTHALMIC ONCE
Status: COMPLETED | OUTPATIENT
Start: 2018-03-07 | End: 2018-03-07

## 2018-03-07 RX ORDER — HYDROCODONE BITARTRATE AND ACETAMINOPHEN 5; 325 MG/1; MG/1
TABLET ORAL
Status: DISCONTINUED
Start: 2018-03-07 | End: 2018-03-08 | Stop reason: HOSPADM

## 2018-03-07 RX ORDER — HYDROCODONE BITARTRATE AND ACETAMINOPHEN 5; 325 MG/1; MG/1
1 TABLET ORAL ONCE
Status: COMPLETED | OUTPATIENT
Start: 2018-03-07 | End: 2018-03-07

## 2018-03-07 RX ORDER — LABETALOL HYDROCHLORIDE 5 MG/ML
INJECTION, SOLUTION INTRAVENOUS
Status: COMPLETED
Start: 2018-03-07 | End: 2018-03-07

## 2018-03-07 RX ORDER — FENTANYL CITRATE 50 UG/ML
25 INJECTION, SOLUTION INTRAMUSCULAR; INTRAVENOUS EVERY 5 MIN PRN
Status: DISCONTINUED | OUTPATIENT
Start: 2018-03-07 | End: 2018-03-08 | Stop reason: HOSPADM

## 2018-03-07 RX ORDER — GLYCOPYRROLATE 0.2 MG/ML
INJECTION INTRAMUSCULAR; INTRAVENOUS
Status: DISCONTINUED | OUTPATIENT
Start: 2018-03-07 | End: 2018-03-07

## 2018-03-07 RX ORDER — LIDOCAINE HCL/PF 100 MG/5ML
SYRINGE (ML) INTRAVENOUS
Status: DISCONTINUED | OUTPATIENT
Start: 2018-03-07 | End: 2018-03-07

## 2018-03-07 RX ORDER — HYDROCODONE BITARTRATE AND ACETAMINOPHEN 5; 325 MG/1; MG/1
1 TABLET ORAL EVERY 6 HOURS PRN
Status: DISCONTINUED | OUTPATIENT
Start: 2018-03-07 | End: 2018-03-08 | Stop reason: HOSPADM

## 2018-03-07 RX ORDER — SODIUM CHLORIDE 0.9 % (FLUSH) 0.9 %
3 SYRINGE (ML) INJECTION
Status: DISCONTINUED | OUTPATIENT
Start: 2018-03-07 | End: 2018-03-08 | Stop reason: HOSPADM

## 2018-03-07 RX ORDER — VASOPRESSIN 20 [USP'U]/ML
INJECTION, SOLUTION INTRAMUSCULAR; SUBCUTANEOUS
Status: DISCONTINUED | OUTPATIENT
Start: 2018-03-07 | End: 2018-03-07

## 2018-03-07 RX ORDER — PROPOFOL 10 MG/ML
VIAL (ML) INTRAVENOUS
Status: DISCONTINUED | OUTPATIENT
Start: 2018-03-07 | End: 2018-03-07

## 2018-03-07 RX ORDER — HYDROCODONE BITARTRATE AND ACETAMINOPHEN 5; 325 MG/1; MG/1
1 TABLET ORAL EVERY 6 HOURS PRN
Qty: 16 TABLET | Refills: 0 | Status: ON HOLD | OUTPATIENT
Start: 2018-03-07 | End: 2018-11-30 | Stop reason: SDUPTHER

## 2018-03-07 RX ORDER — LABETALOL HYDROCHLORIDE 5 MG/ML
10 INJECTION, SOLUTION INTRAVENOUS ONCE
Status: COMPLETED | OUTPATIENT
Start: 2018-03-07 | End: 2018-03-07

## 2018-03-07 RX ORDER — CLINDAMYCIN PHOSPHATE 600 MG/50ML
600 INJECTION, SOLUTION INTRAVENOUS ONCE
Status: COMPLETED | OUTPATIENT
Start: 2018-03-07 | End: 2018-03-07

## 2018-03-07 RX ORDER — LIDOCAINE HYDROCHLORIDE 10 MG/ML
1 INJECTION, SOLUTION EPIDURAL; INFILTRATION; INTRACAUDAL; PERINEURAL ONCE
Status: COMPLETED | OUTPATIENT
Start: 2018-03-07 | End: 2018-03-07

## 2018-03-07 RX ADMIN — PROPOFOL 40 MG: 10 INJECTION, EMULSION INTRAVENOUS at 07:03

## 2018-03-07 RX ADMIN — TETRACAINE HYDROCHLORIDE 1 DROP: 5 SOLUTION OPHTHALMIC at 01:03

## 2018-03-07 RX ADMIN — FENTANYL CITRATE 25 MCG: 50 INJECTION, SOLUTION INTRAMUSCULAR; INTRAVENOUS at 08:03

## 2018-03-07 RX ADMIN — CLINDAMYCIN IN 5 PERCENT DEXTROSE 600 MG: 12 INJECTION, SOLUTION INTRAVENOUS at 08:03

## 2018-03-07 RX ADMIN — GLYCOPYRROLATE 0.2 MG: 0.2 INJECTION, SOLUTION INTRAMUSCULAR; INTRAVENOUS at 08:03

## 2018-03-07 RX ADMIN — LIDOCAINE HYDROCHLORIDE 40 MG: 20 INJECTION, SOLUTION INTRAVENOUS at 07:03

## 2018-03-07 RX ADMIN — VASOPRESSIN 1 UNITS: 20 INJECTION INTRAVENOUS at 08:03

## 2018-03-07 RX ADMIN — PROPOFOL 60 MG: 10 INJECTION, EMULSION INTRAVENOUS at 07:03

## 2018-03-07 RX ADMIN — HYDROCODONE BITARTRATE AND ACETAMINOPHEN 1 TABLET: 5; 325 TABLET ORAL at 10:03

## 2018-03-07 RX ADMIN — LIDOCAINE HYDROCHLORIDE 10 MG: 10 INJECTION, SOLUTION EPIDURAL; INFILTRATION; INTRACAUDAL; PERINEURAL at 01:03

## 2018-03-07 RX ADMIN — FENTANYL CITRATE 50 MCG: 50 INJECTION, SOLUTION INTRAMUSCULAR; INTRAVENOUS at 07:03

## 2018-03-07 RX ADMIN — SODIUM CHLORIDE: 0.9 INJECTION, SOLUTION INTRAVENOUS at 01:03

## 2018-03-07 RX ADMIN — LABETALOL HYDROCHLORIDE 10 MG: 5 INJECTION, SOLUTION INTRAVENOUS at 09:03

## 2018-03-07 RX ADMIN — VASOPRESSIN 2 UNITS: 20 INJECTION INTRAVENOUS at 08:03

## 2018-03-07 NOTE — PLAN OF CARE
Plan of care reviewed with patient and family. Pre-op questions answered. Instructed family once patient goes to surgery, they will go back out to waiting area, MD will come and talk to family once surgery done, patient will then go to PACU for about an hour, then come to Phase II of recovery where they will be able to come back and sit with patient until discharged home. Verbalization of understanding. Family instructed to take all patients belongings. Instructed of projected surgery start time. Pt and family are aware of delay. Verbalization of understanding. Call light within reach. Will cont to monitor.

## 2018-03-07 NOTE — H&P
Pre-Operative History & Physical  Ophthalmology      SUBJECTIVE:     History of Present Illness:  Patient is a 77 y.o. female presents with bilateral ectropion.    MEDICATIONS:   PTA Medications   Medication Sig    allopurinol (ZYLOPRIM) 300 MG tablet TAKE 1 TABLET(300 MG) BY MOUTH EVERY DAY    atorvastatin (LIPITOR) 40 MG tablet TAKE 1 TABLET(40 MG) BY MOUTH EVERY DAY    azelastine (ASTELIN) 137 mcg (0.1 %) nasal spray USE 1 SPRAY(137 MCG) IN EACH NOSTRIL TWICE DAILY    FERROUS GLUCONATE (FERATE ORAL) Take by mouth once daily at 6am.     levothyroxine (SYNTHROID) 75 MCG tablet TAKE 1 TABLET BY MOUTH BEFORE BREAKFAST    metoprolol succinate (TOPROL-XL) 50 MG 24 hr tablet Take 1 tablet (50 mg total) by mouth once daily.    mometasone (NASONEX) 50 mcg/actuation nasal spray 2 sprays by Nasal route once daily.    olopatadine (PATANOL) 0.1 % ophthalmic solution Place 1 drop into the right eye 2 (two) times daily.    acitretin (SORIATANE) 10 MG capsule Take 2 po qday    albuterol 90 mcg/actuation inhaler Inhale 2 puffs into the lungs every 6 (six) hours as needed for Wheezing.    alendronate (FOSAMAX) 70 MG tablet Take 1 tablet (70 mg total) by mouth every 7 days.    aspirin (ECOTRIN) 81 MG EC tablet Take 1 tablet (81 mg total) by mouth once daily.    BIOTIN ORAL Take by mouth.    blood sugar diagnostic (ACCU-CHEK AMELIA PLUS TEST STRP) Strp Checks bg 2 x day before meals    cetirizine (ZYRTEC) 10 MG tablet Take 1 tablet (10 mg total) by mouth once daily.    cholecalciferol, vitamin D3, 50,000 unit capsule Take 1 capsule (50,000 Units total) by mouth twice a week.    ciclopirox (PENLAC) 8 % Soln APPLY EXTERNALLY TO THE AFFECTED AREA EVERY NIGHT    desonide (DESOWEN) 0.05 % cream AAA bid    diclofenac sodium 1 % Gel Apply 2 g topically 4 (four) times daily.    erythromycin (ROMYCIN) ophthalmic ointment Place into the right eye 3 (three) times daily.    folic acid (FOLVITE) 1 MG tablet TAKE 1 TABLET(1000  MCG) BY MOUTH EVERY DAY    furosemide (LASIX) 20 MG tablet Take 1 tablet (20 mg total) by mouth once daily.    hydrocortisone butyrate (LOCOID) 0.1 % Crea cream AAA buttock bid    KRILL/OM3/DHA/EPA/OM6/LIP/ASTX (KRILL OIL, OMEGA 3 & 6, ORAL) Take by mouth once daily at 6am.     lidocaine HCL 2% (XYLOCAINE) 2 % jelly APPLY TOPICALLY EVERY DAY AS NEEDED    losartan (COZAAR) 25 MG tablet Take 0.5 tablets (12.5 mg total) by mouth once daily.    miconazole NITRATE 2 % (ZEASORB AF) 2 % top powder Apply topically as needed for Itching.    nystatin-triamcinolone (MYCOLOG II) cream Apply topically 2 (two) times daily.    ranitidine (ZANTAC) 150 MG tablet Take 1 tablet (150 mg total) by mouth 2 (two) times daily as needed for Heartburn.    silver sulfADIAZINE 1% (SILVADENE) 1 % cream aaa buttock bid    SYMBICORT 160-4.5 mcg/actuation [facility]AA INHALE 2 PUFFS BY MOUTH INTO THE LUNGS EVERY 12 HOURS    triamcinolone acetonide 0.1% (KENALOG) 0.1 % cream Apply topically 2 (two) times daily. Apply to affected area       ALLERGIES:   Review of patient's allergies indicates:   Allergen Reactions    Sulfamethoxazole-trimethoprim Nausea And Vomiting    Latex, natural rubber Rash    Pcn [penicillins] Rash       PAST MEDICAL HISTORY:   Past Medical History:   Diagnosis Date    Adverse effect of glucocorticoid or synthetic analogue     Allergy     Arthritis     Cancer     CHF (congestive heart failure)     Chronic kidney disease     Coronary artery disease involving native coronary artery of native heart without angina pectoris 10/11/2016    Diabetic neuropathy 10/6/2014    Giant cell arteritis 9/24/2012    Hyperlipidemia     Hypertension     Hypothyroid     Obesity (BMI 30-39.9) 10/6/2014    Osteopenia     Primary osteoarthritis of both knees 9/24/2012    Type II or unspecified type diabetes mellitus with renal manifestations, uncontrolled(250.42)      PAST SURGICAL HISTORY:   Past Surgical History:   Procedure  Laterality Date    APPENDECTOMY      CATARACT EXTRACTION      CATARACT EXTRACTION, BILATERAL      CHOLECYSTECTOMY      EYE SURGERY      HYSTERECTOMY      JOINT REPLACEMENT      bilateral total knee    SKIN BIOPSY      TONSILLECTOMY       PAST FAMILY HISTORY:   Family History   Problem Relation Age of Onset    Diabetes Mother     Hypertension Mother     Hypertension Father     Kidney disease Neg Hx     Eczema Neg Hx     Psoriasis Neg Hx     Melanoma Neg Hx     Lupus Neg Hx     Acne Neg Hx      SOCIAL HISTORY:   Social History   Substance Use Topics    Smoking status: Never Smoker    Smokeless tobacco: Never Used    Alcohol use No        MENTAL STATUS: Alert    REVIEW OF SYSTEMS: Negative    OBJECTIVE:     Vital Signs (Most Recent)  Temp: 97.9 °F (36.6 °C) (03/07/18 1317)  Pulse: (!) 57 (03/07/18 1317)  Resp: 20 (03/07/18 1317)  BP: (!) 141/54 (03/07/18 1317)  SpO2: 99 % (03/07/18 1317)    Physical Exam:  General: NAD  HEENT: Bilateral ectropion, see eye note for further details   Lungs: Adequate respirations  Heart: + pulses  Abdomen: Soft    ASSESSMENT/PLAN:     Patient is a 77 y.o. female with bilateral ectropion      - Risks/benefits/alternatives of the procedure discussed with the patient   - Informed consent obtained prior to surgery and the patient voiced good understanding.   - Denies use of blood thinners or aspirin products in the past 7 days    - To OR for surgical correction of ectropion OU

## 2018-03-08 LAB — POCT GLUCOSE: 118 MG/DL (ref 70–110)

## 2018-03-08 NOTE — ANESTHESIA PREPROCEDURE EVALUATION
03/07/2018  Krissy Marino is a 77 y.o., female here for the below procedure.    Pre-operative evaluation for Procedure(s) (LRB):  REPAIR-ECTROPION (Bilateral)        Patient Active Problem List   Diagnosis    Chronic kidney disease, stage II (mild)    Type 2 diabetes, controlled, with renal manifestation    Hypertension    Joint pain    Giant cell arteritis    Primary osteoarthritis of both knees    Psoriasis    Mild vitamin D deficiency    Low grade B cell lymphoproliferative disorder    Monoclonal paraproteinemia    Fuch's endothelial dystrophy    Hypothyroidism    Osteopenia    Dyslipidemia    Bilateral leg edema    Varicose veins of leg with edema    Obesity (BMI 30-39.9)    Diabetic neuropathy    Type 2 diabetes mellitus without retinopathy    Ingrown nail of great toe of right foot    Pneumonia    Shoulder pain, right    DJD of right shoulder    Rotator cuff syndrome of right shoulder    Neck pain    Chronic congestive heart failure with left ventricular diastolic dysfunction    Abnormal cardiovascular function study    Abnormal cardiovascular stress test    Coronary artery disease involving native coronary artery of native heart without angina pectoris    PVD (peripheral vascular disease)    Pre-operative examination for internal medicine    Bilateral eye ectropion       Review of patient's allergies indicates:   Allergen Reactions    Sulfamethoxazole-trimethoprim Nausea And Vomiting    Latex, natural rubber Rash    Pcn [penicillins] Rash       No current facility-administered medications on file prior to encounter.      Current Outpatient Prescriptions on File Prior to Encounter   Medication Sig Dispense Refill    allopurinol (ZYLOPRIM) 300 MG tablet TAKE 1 TABLET(300 MG) BY MOUTH EVERY DAY 90 tablet 3    azelastine (ASTELIN) 137 mcg (0.1 %) nasal spray USE 1  SPRAY(137 MCG) IN EACH NOSTRIL TWICE DAILY 30 mL 0    FERROUS GLUCONATE (FERATE ORAL) Take by mouth once daily at 6am.       metoprolol succinate (TOPROL-XL) 50 MG 24 hr tablet Take 1 tablet (50 mg total) by mouth once daily. 90 tablet 3    mometasone (NASONEX) 50 mcg/actuation nasal spray 2 sprays by Nasal route once daily. 1 each 0    olopatadine (PATANOL) 0.1 % ophthalmic solution Place 1 drop into the right eye 2 (two) times daily. 5 mL 1    acitretin (SORIATANE) 10 MG capsule Take 2 po qday 180 capsule 1    albuterol 90 mcg/actuation inhaler Inhale 2 puffs into the lungs every 6 (six) hours as needed for Wheezing. 1 each 11    alendronate (FOSAMAX) 70 MG tablet Take 1 tablet (70 mg total) by mouth every 7 days. 4 tablet 11    aspirin (ECOTRIN) 81 MG EC tablet Take 1 tablet (81 mg total) by mouth once daily. 90 tablet 3    BIOTIN ORAL Take by mouth.      blood sugar diagnostic (ACCU-CHEK AMELIA PLUS TEST STRP) Strp Checks bg 2 x day before meals 200 strip 4    cetirizine (ZYRTEC) 10 MG tablet Take 1 tablet (10 mg total) by mouth once daily. 30 tablet 2    cholecalciferol, vitamin D3, 50,000 unit capsule Take 1 capsule (50,000 Units total) by mouth twice a week. 40 capsule 0    desonide (DESOWEN) 0.05 % cream AAA bid 180 g 1    diclofenac sodium 1 % Gel Apply 2 g topically 4 (four) times daily. 400 g 0    erythromycin (ROMYCIN) ophthalmic ointment Place into the right eye 3 (three) times daily. 1 Tube 3    hydrocortisone butyrate (LOCOID) 0.1 % Crea cream AAA buttock bid 180 g 0    KRILL/OM3/DHA/EPA/OM6/LIP/ASTX (KRILL OIL, OMEGA 3 & 6, ORAL) Take by mouth once daily at 6am.       lidocaine HCL 2% (XYLOCAINE) 2 % jelly APPLY TOPICALLY EVERY DAY AS NEEDED 30 mL 3    losartan (COZAAR) 25 MG tablet Take 0.5 tablets (12.5 mg total) by mouth once daily. 45 tablet 3    miconazole NITRATE 2 % (ZEASORB AF) 2 % top powder Apply topically as needed for Itching. 71 g 0    nystatin-triamcinolone (MYCOLOG  II) cream Apply topically 2 (two) times daily. 30 g 0    ranitidine (ZANTAC) 150 MG tablet Take 1 tablet (150 mg total) by mouth 2 (two) times daily as needed for Heartburn. 120 tablet 5    silver sulfADIAZINE 1% (SILVADENE) 1 % cream aaa buttock bid 50 g 2    SYMBICORT 160-4.5 mcg/actuation HFAA INHALE 2 PUFFS BY MOUTH INTO THE LUNGS EVERY 12 HOURS 10.2 g 0    triamcinolone acetonide 0.1% (KENALOG) 0.1 % cream Apply topically 2 (two) times daily. Apply to affected area 454 Tube 3       Past Surgical History:   Procedure Laterality Date    APPENDECTOMY      CATARACT EXTRACTION      CATARACT EXTRACTION, BILATERAL      CHOLECYSTECTOMY      EYE SURGERY      HYSTERECTOMY      JOINT REPLACEMENT      bilateral total knee    SKIN BIOPSY      TONSILLECTOMY         Social History     Social History    Marital status:      Spouse name:     Number of children: N/A    Years of education: N/A     Occupational History      [employer]     Social History Main Topics    Smoking status: Never Smoker    Smokeless tobacco: Never Used    Alcohol use No    Drug use: No    Sexual activity: No     Other Topics Concern    Are You Pregnant Or Think You May Be? No    Breast-Feeding No     Social History Narrative    No narrative on file         Vital Signs Range (Last 24H):  Temp:  [36.6 °C (97.9 °F)]   Pulse:  [57]   Resp:  [20]   BP: (141)/(54)   SpO2:  [99 %]       CBC: No results for input(s): WBC, RBC, HGB, HCT, PLT, MCV, MCH, MCHC in the last 72 hours.    CMP: No results for input(s): NA, K, CL, CO2, BUN, CREATININE, GLU, MG, PHOS, CALCIUM, ALBUMIN, PROT, ALKPHOS, ALT, AST, BILITOT in the last 72 hours.    INR  No results for input(s): PT, INR, PROTIME, APTT in the last 72 hours.            2D Echo: CONCLUSIONS     1 - Mild left ventricular enlargement.     2 - Normal left ventricular systolic function (EF 55-60%).     3 - Eccentric hypertrophy.     4 - Left ventricular diastolic  dysfunction.     5 - Biatrial enlargement.     6 - Normal right ventricular systolic function .     7 - Trivial aortic regurgitation.     8 - Trivial to mild mitral regurgitation.     9 - Trivial to mild tricuspid regurgitation.     10 - The estimated PA systolic pressure is 32 mmHg.             This document has been electronically    SIGNED BY: Jan Catherine MD On: 01/16/2017 09:04        Anesthesia Evaluation    I have reviewed the Patient Summary Reports.    I have reviewed the Nursing Notes.   I have reviewed the Medications.     Review of Systems  Anesthesia Hx:  No problems with previous Anesthesia    Hematology/Oncology:  Hematology Normal   Oncology Normal     EENT/Dental:EENT/Dental Normal   Cardiovascular:   Hypertension CAD asymptomatic CABG/stent  CHF    Pulmonary:  Pulmonary Normal Denies Pneumonia    Renal/:   Chronic Renal Disease, CRI    Hepatic/GI:  Hepatic/GI Normal    Musculoskeletal:   Arthritis     Neurological:  Neurology Normal    Endocrine:   Diabetes Hypothyroidism    Dermatological:  Skin Normal    Psych:  Psychiatric Normal           Physical Exam  General:  Well nourished    Airway/Jaw/Neck:  Airway Findings: Mouth Opening: Normal Tongue: Normal  General Airway Assessment: Adult  Mallampati: II  TM Distance: Normal, at least 6 cm  Jaw/Neck Findings:  Neck ROM: Normal ROM     Eyes/Ears/Nose:  Eyes/Ears/Nose Findings:    Dental:  Dental Findings: In tact    Chest/Lungs:  Chest/Lungs Findings: Clear to auscultation, Normal Respiratory Rate     Heart/Vascular:  Heart Findings: Rate: Normal  Rhythm: Regular Rhythm  Sounds: Normal  Heart Murmur  Vascular Findings:        Mental Status:  Mental Status Findings:  Cooperative, Alert and Oriented         Anesthesia Plan  Type of Anesthesia, risks & benefits discussed:  Anesthesia Type:  general  Patient's Preference: General/LMA  Intra-op Monitoring Plan: standard ASA monitors  Intra-op Monitoring Plan Comments:   Post Op Pain Control Plan:   Post  Op Pain Control Plan Comments: IV pain medications with transition to po as tolerated, discussed other pain modalities including regional anesthesia as appropriate  Induction:   IV  Beta Blocker:  Patient is on a Beta-Blocker and has received one dose within the past 24 hours (No further documentation required).       Informed Consent: Patient understands risks and agrees with Anesthesia plan.  Questions answered. Anesthesia consent signed with patient.  ASA Score: 3     Day of Surgery Review of History & Physical:    H&P update referred to the surgeon.     Anesthesia Plan Notes: Discussed plan for General anesthesia with Laryngeal Mask Airway    Pt understands risks and agrees with plan        Ready For Surgery From Anesthesia Perspective.

## 2018-03-08 NOTE — DISCHARGE SUMMARY
Discharge Summary  Ophthalmology Service    Admit Date: 3/7/2018     Discharge Date: 3/7/2018     Attending Physician: Anastasia Nieto MD     Discharge Physician: Zaira Zamudio MD    Discharged Condition: Good    Reason for Admission: Ectropion of both lower eyelids, unspecified ectropion type [H02.102, H02.105]  Bilateral eye ectropion [H02.103, H02.106]     Treatments/Procedures: Procedure(s) (LRB):  STRIPPING-TARSAL (Bilateral) (see dictated report for details)    Hospital Course: Stable    Consults: None    Significant Diagnostic Studies: None     Disposition: Home    Patient Instructions:   - Resume same diet as prior to surgery  - Limit activity, no heavy lifting  - Call MD for severe uncontrolled pain  - Follow-up with ophthalmology as instructed    Patient Instructions:   Current Discharge Medication List      START taking these medications    Details   hydrocodone-acetaminophen 5-325mg (NORCO) 5-325 mg per tablet Take 1 tablet by mouth every 6 (six) hours as needed for Pain.  Qty: 16 tablet, Refills: 0      tobramycin-dexamethasone 0.3-0.1% (TOBRADEX) 0.3-0.1 % DrpS Place 1 drop into both eyes 4 (four) times daily.  Qty: 5 mL, Refills: 0      tobramycin-dexamethasone 0.3-0.1% (TOBRADEX) 0.3-0.1 % Oint Place into the left eye 3 (three) times daily. Apply to wounds TID and in eyes at bedtime  Qty: 3.5 g, Refills: 0         CONTINUE these medications which have NOT CHANGED    Details   allopurinol (ZYLOPRIM) 300 MG tablet TAKE 1 TABLET(300 MG) BY MOUTH EVERY DAY  Qty: 90 tablet, Refills: 3      atorvastatin (LIPITOR) 40 MG tablet TAKE 1 TABLET(40 MG) BY MOUTH EVERY DAY  Qty: 90 tablet, Refills: 0    Associated Diagnoses: Hyperlipidemia      azelastine (ASTELIN) 137 mcg (0.1 %) nasal spray USE 1 SPRAY(137 MCG) IN EACH NOSTRIL TWICE DAILY  Qty: 30 mL, Refills: 0    Associated Diagnoses: Environmental allergies      FERROUS GLUCONATE (FERATE ORAL) Take by mouth once daily at 6am.       levothyroxine  (SYNTHROID) 75 MCG tablet TAKE 1 TABLET BY MOUTH BEFORE BREAKFAST  Qty: 90 tablet, Refills: 0      metoprolol succinate (TOPROL-XL) 50 MG 24 hr tablet Take 1 tablet (50 mg total) by mouth once daily.  Qty: 90 tablet, Refills: 3      mometasone (NASONEX) 50 mcg/actuation nasal spray 2 sprays by Nasal route once daily.  Qty: 1 each, Refills: 0    Associated Diagnoses: Environmental allergies      olopatadine (PATANOL) 0.1 % ophthalmic solution Place 1 drop into the right eye 2 (two) times daily.  Qty: 5 mL, Refills: 1    Associated Diagnoses: Eye irritation      acitretin (SORIATANE) 10 MG capsule Take 2 po qday  Qty: 180 capsule, Refills: 1    Comments: 3 month supply  Associated Diagnoses: Psoriasis      albuterol 90 mcg/actuation inhaler Inhale 2 puffs into the lungs every 6 (six) hours as needed for Wheezing.  Qty: 1 each, Refills: 11    Associated Diagnoses: Asthma without status asthmaticus, unspecified asthma severity, uncomplicated      alendronate (FOSAMAX) 70 MG tablet Take 1 tablet (70 mg total) by mouth every 7 days.  Qty: 4 tablet, Refills: 11    Associated Diagnoses: Osteopenia, unspecified location      aspirin (ECOTRIN) 81 MG EC tablet Take 1 tablet (81 mg total) by mouth once daily.  Qty: 90 tablet, Refills: 3      BIOTIN ORAL Take by mouth.      blood sugar diagnostic (ACCU-CHEK AMELIA PLUS TEST STRP) Strp Checks bg 2 x day before meals  Qty: 200 strip, Refills: 4      cetirizine (ZYRTEC) 10 MG tablet Take 1 tablet (10 mg total) by mouth once daily.  Qty: 30 tablet, Refills: 2      cholecalciferol, vitamin D3, 50,000 unit capsule Take 1 capsule (50,000 Units total) by mouth twice a week.  Qty: 40 capsule, Refills: 0    Associated Diagnoses: Vitamin D insufficiency      ciclopirox (PENLAC) 8 % Soln APPLY EXTERNALLY TO THE AFFECTED AREA EVERY NIGHT  Qty: 6.6 mL, Refills: 0      desonide (DESOWEN) 0.05 % cream AAA bid  Qty: 180 g, Refills: 1    Comments: 3 month supply  Associated Diagnoses: Psoriasis       diclofenac sodium 1 % Gel Apply 2 g topically 4 (four) times daily.  Qty: 400 g, Refills: 0      erythromycin (ROMYCIN) ophthalmic ointment Place into the right eye 3 (three) times daily.  Qty: 1 Tube, Refills: 3    Associated Diagnoses: Ectropion, right      folic acid (FOLVITE) 1 MG tablet TAKE 1 TABLET(1000 MCG) BY MOUTH EVERY DAY  Qty: 90 tablet, Refills: 0      furosemide (LASIX) 20 MG tablet Take 1 tablet (20 mg total) by mouth once daily.  Qty: 90 tablet, Refills: 3      hydrocortisone butyrate (LOCOID) 0.1 % Crea cream AAA buttock bid  Qty: 180 g, Refills: 0    Comments: 3 month supply  Associated Diagnoses: Psoriasis      KRILL/OM3/DHA/EPA/OM6/LIP/ASTX (KRILL OIL, OMEGA 3 & 6, ORAL) Take by mouth once daily at 6am.       lidocaine HCL 2% (XYLOCAINE) 2 % jelly APPLY TOPICALLY EVERY DAY AS NEEDED  Qty: 30 mL, Refills: 3    Associated Diagnoses: Joint pain      losartan (COZAAR) 25 MG tablet Take 0.5 tablets (12.5 mg total) by mouth once daily.  Qty: 45 tablet, Refills: 3      miconazole NITRATE 2 % (ZEASORB AF) 2 % top powder Apply topically as needed for Itching.  Qty: 71 g, Refills: 0    Associated Diagnoses: Dermatitis      nystatin-triamcinolone (MYCOLOG II) cream Apply topically 2 (two) times daily.  Qty: 30 g, Refills: 0    Associated Diagnoses: Dermatitis      ranitidine (ZANTAC) 150 MG tablet Take 1 tablet (150 mg total) by mouth 2 (two) times daily as needed for Heartburn.  Qty: 120 tablet, Refills: 5      silver sulfADIAZINE 1% (SILVADENE) 1 % cream aaa buttock bid  Qty: 50 g, Refills: 2      SYMBICORT 160-4.5 mcg/actuation HFAA INHALE 2 PUFFS BY MOUTH INTO THE LUNGS EVERY 12 HOURS  Qty: 10.2 g, Refills: 0    Associated Diagnoses: Asthma without status asthmaticus, unspecified asthma severity, uncomplicated      triamcinolone acetonide 0.1% (KENALOG) 0.1 % cream Apply topically 2 (two) times daily. Apply to affected area  Qty: 454 Tube, Refills: 3               Discharge Procedure Orders  Diet  Adult Regular     Activity as tolerated     No dressing needed

## 2018-03-08 NOTE — ANESTHESIA POSTPROCEDURE EVALUATION
"Anesthesia Post Evaluation    Patient: Krissy Marino    Procedure(s) Performed: Procedure(s) (LRB):  STRIPPING-TARSAL (Bilateral)    Final Anesthesia Type: general  Patient location during evaluation: PACU  Patient participation: Yes- Able to Participate  Level of consciousness: awake and alert and oriented  Post-procedure vital signs: reviewed and stable  Pain management: adequate  Airway patency: patent  PONV status at discharge: No PONV  Anesthetic complications: no      Cardiovascular status: blood pressure returned to baseline and hemodynamically stable  Respiratory status: unassisted, spontaneous ventilation and room air  Hydration status: euvolemic  Follow-up not needed.        Visit Vitals  BP (!) 174/79   Pulse 79   Temp 36.3 °C (97.3 °F) (Temporal)   Resp 13   Ht 5' 5" (1.651 m)   Wt 95.3 kg (210 lb)   SpO2 95%   Breastfeeding? No   BMI 34.95 kg/m²       Pain/Tayler Score: Pain Assessment Performed: Yes (3/7/2018  8:54 PM)  Presence of Pain: denies (3/7/2018  8:54 PM)  Tayler Score: 9 (3/7/2018  8:54 PM)      "

## 2018-03-08 NOTE — ANESTHESIA RELEASE NOTE
"Anesthesia Release from PACU Note    Patient: Krissy Marino    Procedure(s) Performed: Procedure(s) (LRB):  STRIPPING-TARSAL (Bilateral)    Anesthesia type: general    Post pain: Adequate analgesia    Post assessment: no apparent anesthetic complications, tolerated procedure well and no evidence of recall    Last Vitals:   Visit Vitals  BP (!) 174/79   Pulse 79   Temp 36.3 °C (97.3 °F) (Temporal)   Resp 13   Ht 5' 5" (1.651 m)   Wt 95.3 kg (210 lb)   SpO2 95%   Breastfeeding? No   BMI 34.95 kg/m²       Post vital signs: stable    Level of consciousness: awake, alert  and oriented    Nausea/Vomiting: no nausea/no vomiting    Complications: none    Airway Patency: patent    Respiratory: unassisted, spontaneous ventilation, room air    Cardiovascular: stable and blood pressure at baseline    Hydration: euvolemic  "

## 2018-03-08 NOTE — BRIEF OP NOTE
Brief Operative Note  Ophthalmology Service      Date of Procedure: 3/7/2018     Attending Physician: Anastasia Nieto MD     Assistant: Zaira Zamudio MD    Pre-Operative Diagnosis: Ectropion of both lower eyelids, unspecified ectropion type [H02.102, H02.105]  Bilateral eye ectropion [H02.103, H02.106]     Post-Operative Diagnosis: Same as pre-operative diagnosis    Treatments/Procedures: Lateral tarsal strip procedure performed bilaterally    Intraoperative Findings: Ectropion OU     Anesthesia: General with local     Complications: None    Estimated Blood Loss: < 5 cc

## 2018-03-08 NOTE — ANESTHESIA POSTPROCEDURE EVALUATION
"Anesthesia Post Evaluation    Patient: Krissy Marino    Procedure(s) Performed: Procedure(s) (LRB):  STRIPPING-TARSAL (Bilateral)    Final Anesthesia Type: general  Patient location during evaluation: PACU  Patient participation: Yes- Able to Participate  Level of consciousness: awake and alert  Post-procedure vital signs: reviewed and stable  Pain management: adequate  Airway patency: patent  PONV status at discharge: No PONV  Anesthetic complications: no      Cardiovascular status: blood pressure returned to baseline  Respiratory status: unassisted  Hydration status: euvolemic  Follow-up not needed.        Visit Vitals  BP (!) 163/78   Pulse 84   Temp 36.3 °C (97.3 °F) (Temporal)   Resp 16   Ht 5' 5" (1.651 m)   Wt 95.3 kg (210 lb)   SpO2 96%   Breastfeeding? No   BMI 34.95 kg/m²       Pain/Tayler Score: Pain Assessment Performed: Yes (3/7/2018  8:54 PM)  Presence of Pain: denies (3/7/2018  8:54 PM)  Pain Rating Prior to Med Admin: 2 (3/7/2018 10:04 PM)  Tayler Score: 9 (3/7/2018  8:54 PM)      "

## 2018-03-08 NOTE — OP NOTE
PROCEDURE NOTE:  Attending: Anastasia Nieto M.D.  Resident: Zaira Zamudio M.D.  Procedure: Lateral tarsal strip OU (84644)  Pre-op Dx: Ectropion OU  Post-op Dx: Same   Local: 2% lidocaine with epinephrine with 0.5% marcaine and vitrase   Specimens: None  Complications: None  Blood Loss: minimal      Indications for surgery: 77 y.o. female with chronic tearing and irritation of both eyes. Informed consent obtained after extensive risks/benefits/alternatives were discussed with the patient including but not limited to pain, bleeding, infection, ocular injury, loss of the eye, asymmetry, need for revision in future, scarring.  Alternatives such as waiting were discussed.  All questions were answered.       The patient was prepped and draped in the usual sterile manner for ophthalmic plastic surgery. 4 cc's of 2% lidocaine with epinephrine with 0.5% marcaine and vitrase was injected into each eyelid and to the lateral orbital rim. Corneal shields were placed in each eye. Attention was then placed to the right lateral canthal angle. A #15 blade was used to incise the skin approximately 1 cm from the lateral canthal angle laterally and slightly superiorly. Straight Ansari scissors were used to dissect down to the periosteum of the zygomatic rim. The inferior martina of the lateral canthal tendon was then incised using straight Ansari scissors and electrocautery. Any remaining adhesions were lysed with a combination of blunt dissection with a q-tip and electrocautery which was also used for hemostasis. This released the lower lid, allowing it to be moved laterally freely.      The eyelid was draped laterally over the zygoma, and a full-thickness lid excision was taken of the portion of the lid overlying the zygoma. To fixate the lid down to the rim, a 5-0 Vicryl suture on P-2 semicircular needle was passed anterior-inferiorly through the tarsus exiting through the tarsus post-sup, then passed 3 mm posterior to the orbital  rim and passed back through the superior martina of the lateral canthal tendon and tied tightly, allowing the knot to lie flat as a horizontal mattress suture. To reform the lateral canthal angle, a 6-0 Vicryl suture on a S-14 was sewn through the wound exiting the grayline of the lower lid and and then in the reverse through the upper lid. The skin of the lateral canthal incision was then re-apposed using a running 6-0 Prolene suture. This procedure was repeated in its entirety in the left eye. Copious Tobradex ointment was placed onto the wounds and both eyes. The patient tolerated the procedure well without any complication.

## 2018-03-09 DIAGNOSIS — M85.80 OSTEOPENIA, UNSPECIFIED LOCATION: ICD-10-CM

## 2018-03-09 RX ORDER — ALENDRONATE SODIUM 70 MG/1
TABLET ORAL
Qty: 4 TABLET | Refills: 0 | Status: SHIPPED | OUTPATIENT
Start: 2018-03-09 | End: 2018-04-04 | Stop reason: SDUPTHER

## 2018-03-13 ENCOUNTER — OFFICE VISIT (OUTPATIENT)
Dept: OPHTHALMOLOGY | Facility: CLINIC | Age: 78
End: 2018-03-13
Payer: COMMERCIAL

## 2018-03-13 DIAGNOSIS — Z98.890 POST-OPERATIVE STATE: Primary | ICD-10-CM

## 2018-03-13 DIAGNOSIS — H02.105 ECTROPION OF BOTH LOWER EYELIDS, UNSPECIFIED ECTROPION TYPE: ICD-10-CM

## 2018-03-13 DIAGNOSIS — H02.102 ECTROPION OF BOTH LOWER EYELIDS, UNSPECIFIED ECTROPION TYPE: ICD-10-CM

## 2018-03-13 PROCEDURE — 92285 EXTERNAL OCULAR PHOTOGRAPHY: CPT | Mod: S$GLB,,, | Performed by: OPHTHALMOLOGY

## 2018-03-13 PROCEDURE — 99999 PR PBB SHADOW E&M-EST. PATIENT-LVL IV: CPT | Mod: PBBFAC,,, | Performed by: OPHTHALMOLOGY

## 2018-03-13 PROCEDURE — 99024 POSTOP FOLLOW-UP VISIT: CPT | Mod: S$GLB,,, | Performed by: OPHTHALMOLOGY

## 2018-03-13 NOTE — PROGRESS NOTES
HPI     S/p ectropion repair 3/7/18  Using TD drops qid ou             TD ointment tid      Last edited by Nayeli Light on 3/13/2018 10:35 AM. (History)            Assessment /Plan     For exam results, see Encounter Report.    Post-operative state  -     External/Slit Lamp Photography    Ectropion of both lower eyelids, unspecified ectropion type      Patient doing well! Post-operative instructions reviewed. Sutures removed. All questions answered.  Return in 3 weeks prn sooner any worsening of vision/symptoms or any concerns.

## 2018-03-14 RX ORDER — CICLOPIROX 80 MG/ML
SOLUTION TOPICAL
Qty: 6.6 ML | Refills: 0 | Status: SHIPPED | OUTPATIENT
Start: 2018-03-14 | End: 2018-04-10 | Stop reason: SDUPTHER

## 2018-03-26 RX ORDER — LOSARTAN POTASSIUM 25 MG/1
12.5 TABLET ORAL DAILY
Qty: 45 TABLET | Refills: 3 | Status: SHIPPED | OUTPATIENT
Start: 2018-03-26 | End: 2018-07-25 | Stop reason: SDUPTHER

## 2018-03-26 NOTE — TELEPHONE ENCOUNTER
"----- Message from Dillon Haro sent at 3/26/2018  8:04 AM CDT -----  Contact: Patient [phone]  Patient Requesting same Rx losartan (COZAAR) 25 MG tablet also   Patient is currently taking this Rx (COZAAR) 25 MG tablet patient states she has to Cut this pill want you to prescribe her to a Rx that she doesn't have to cut.    Pharmacy name and phone # (DON'T enter "on file" or "in chart"): [facility] [address] [place], [place] [id number] [facility] AT [facility] [address]      Please call and advise  Thank you         "

## 2018-04-04 DIAGNOSIS — M85.80 OSTEOPENIA, UNSPECIFIED LOCATION: ICD-10-CM

## 2018-04-04 RX ORDER — ALENDRONATE SODIUM 70 MG/1
TABLET ORAL
Qty: 4 TABLET | Refills: 0 | Status: SHIPPED | OUTPATIENT
Start: 2018-04-04 | End: 2018-05-05 | Stop reason: SDUPTHER

## 2018-04-08 DIAGNOSIS — E78.5 HYPERLIPIDEMIA: ICD-10-CM

## 2018-04-08 RX ORDER — ATORVASTATIN CALCIUM 40 MG/1
TABLET, FILM COATED ORAL
Qty: 90 TABLET | Refills: 0 | Status: SHIPPED | OUTPATIENT
Start: 2018-04-08 | End: 2018-07-06 | Stop reason: SDUPTHER

## 2018-04-10 RX ORDER — CICLOPIROX 80 MG/ML
SOLUTION TOPICAL
Qty: 6.6 ML | Refills: 0 | Status: SHIPPED | OUTPATIENT
Start: 2018-04-10 | End: 2018-05-07 | Stop reason: SDUPTHER

## 2018-04-12 ENCOUNTER — OFFICE VISIT (OUTPATIENT)
Dept: OPHTHALMOLOGY | Facility: CLINIC | Age: 78
End: 2018-04-12
Payer: COMMERCIAL

## 2018-04-12 DIAGNOSIS — Z98.890 POST-OPERATIVE STATE: Primary | ICD-10-CM

## 2018-04-12 DIAGNOSIS — H02.135 SENILE ECTROPION OF BOTH LOWER EYELIDS: ICD-10-CM

## 2018-04-12 DIAGNOSIS — H02.132 SENILE ECTROPION OF BOTH LOWER EYELIDS: ICD-10-CM

## 2018-04-12 DIAGNOSIS — H49.02 3RD NERVE PALSY, PARTIAL, LEFT: ICD-10-CM

## 2018-04-12 PROCEDURE — 99999 PR PBB SHADOW E&M-EST. PATIENT-LVL IV: CPT | Mod: PBBFAC,,, | Performed by: OPHTHALMOLOGY

## 2018-04-12 PROCEDURE — 92285 EXTERNAL OCULAR PHOTOGRAPHY: CPT | Mod: S$GLB,,, | Performed by: OPHTHALMOLOGY

## 2018-04-12 PROCEDURE — 99024 POSTOP FOLLOW-UP VISIT: CPT | Mod: S$GLB,,, | Performed by: OPHTHALMOLOGY

## 2018-04-12 NOTE — PROGRESS NOTES
HPI     S/p bilateral ectropion repair 3/7/18  Finished drops, and ointment  Diplopia started 2 weeks ago when looking to the right    Last edited by Nayeli Light on 4/12/2018  2:32 PM. (History)            Assessment /Plan     For exam results, see Encounter Report.    Post-operative state  -     External Photography - OU - Both Eyes    Senile ectropion of both lower eyelids    3rd nerve palsy, partial, left      HPI     S/p bilateral ectropion repair 3/7/18  Finished drops, and ointment  Diplopia started 2 weeks ago when looking to the right    Last edited by Nayeli Light on 4/12/2018  2:32 PM. (History)            Assessment /Plan     For exam results, see Encounter Report.    Post-operative state  -     External Photography - OU - Both Eyes    Senile ectropion of both lower eyelids    3rd nerve palsy, partial, left      Patient doing well!        Patient has some adduction deficit in left eye, also reports having diplopia in far left gaze.     Motility with slight limitation on upgaze and downgaze and adduction. Likely partial third nerve palsy of the left eye. No pupillary involvement. Patient with both htn and dm. Will continue to monitor. If no improvement, will need MRI.     I have reviewed and concur with the resident's history, physical, assessment, and plan.  I have personally interviewed and examined the patient.      Return to clinic 1 month prn sooner any worsening

## 2018-04-17 ENCOUNTER — TELEPHONE (OUTPATIENT)
Dept: UROLOGY | Facility: CLINIC | Age: 78
End: 2018-04-17

## 2018-04-17 NOTE — TELEPHONE ENCOUNTER
----- Message from Jasmyne Downing sent at 4/16/2018  3:59 PM CDT -----  Contact: pt [phone]  Pt states she dropped off a urine sample on 04/13/18 and would like to know if the results are in and have medication sent to her rx. Pt states she has been suffering with back pain and urgency for 2 weeks. Pt is asking to speak with the nurse today.     [facility] [address] [place] [address] AT [facility] [address]   [phone] (Phone)  [phone] (Fax)

## 2018-04-18 ENCOUNTER — PATIENT MESSAGE (OUTPATIENT)
Dept: UROLOGY | Facility: CLINIC | Age: 78
End: 2018-04-18

## 2018-04-24 ENCOUNTER — OFFICE VISIT (OUTPATIENT)
Dept: SPORTS MEDICINE | Facility: CLINIC | Age: 78
End: 2018-04-24
Payer: COMMERCIAL

## 2018-04-24 VITALS
DIASTOLIC BLOOD PRESSURE: 82 MMHG | BODY MASS INDEX: 34.99 KG/M2 | SYSTOLIC BLOOD PRESSURE: 144 MMHG | WEIGHT: 210 LBS | HEIGHT: 65 IN | HEART RATE: 87 BPM

## 2018-04-24 DIAGNOSIS — M19.011 OSTEOARTHRITIS OF GLENOHUMERAL JOINT, RIGHT: Primary | ICD-10-CM

## 2018-04-24 DIAGNOSIS — M19.012 OSTEOARTHRITIS OF GLENOHUMERAL JOINT, LEFT: ICD-10-CM

## 2018-04-24 DIAGNOSIS — M75.50 SUBACROMIAL BURSITIS: ICD-10-CM

## 2018-04-24 PROCEDURE — 99999 PR PBB SHADOW E&M-EST. PATIENT-LVL III: CPT | Mod: PBBFAC,,, | Performed by: FAMILY MEDICINE

## 2018-04-24 PROCEDURE — 3077F SYST BP >= 140 MM HG: CPT | Mod: CPTII,S$GLB,, | Performed by: FAMILY MEDICINE

## 2018-04-24 PROCEDURE — 99214 OFFICE O/P EST MOD 30 MIN: CPT | Mod: 25,S$GLB,, | Performed by: FAMILY MEDICINE

## 2018-04-24 PROCEDURE — 20610 DRAIN/INJ JOINT/BURSA W/O US: CPT | Mod: LT,S$GLB,, | Performed by: FAMILY MEDICINE

## 2018-04-24 PROCEDURE — 3079F DIAST BP 80-89 MM HG: CPT | Mod: CPTII,S$GLB,, | Performed by: FAMILY MEDICINE

## 2018-04-24 RX ORDER — TRIAMCINOLONE ACETONIDE 40 MG/ML
40 INJECTION, SUSPENSION INTRA-ARTICULAR; INTRAMUSCULAR
Status: DISCONTINUED | OUTPATIENT
Start: 2018-04-24 | End: 2018-04-24 | Stop reason: HOSPADM

## 2018-04-24 RX ORDER — DIAZEPAM 10 MG/1
10 TABLET ORAL ONCE
Qty: 1 TABLET | Refills: 0 | Status: SHIPPED | OUTPATIENT
Start: 2018-04-24 | End: 2018-07-09

## 2018-04-24 RX ADMIN — TRIAMCINOLONE ACETONIDE 40 MG: 40 INJECTION, SUSPENSION INTRA-ARTICULAR; INTRAMUSCULAR at 02:04

## 2018-04-24 NOTE — PROCEDURES
Large Joint Aspiration/Injection  Date/Time: 4/24/2018 2:05 PM  Performed by: MOE MIRELES  Authorized by: MOE MIRELES     Consent Done?:  Yes (Verbal)  Indications:  Pain  Procedure site marked: Yes    Timeout: Prior to procedure the correct patient, procedure, and site was verified      Location:  Shoulder  Site:  L subacromial bursa  Prep: Patient was prepped and draped in usual sterile fashion    Ultrasonic Guidance for needle placement: No  Needle size:  22 G  Approach:  Posterior  Medications:  40 mg triamcinolone acetonide 40 mg/mL  Patient tolerance:  Patient tolerated the procedure well with no immediate complications

## 2018-04-24 NOTE — PROGRESS NOTES
Krissy Marino, a 77 y.o. female, is here for evaluation of RIGHT and LEFT shoulder pain.     HISTORY OF PRESENT ILLNESS  Hand dominance, right     Location: anterior and lateral, bilateral R > L  Onset: Insidious, many years  Palliative:    Relative rest   Oral analgesics (tylenol PM)   R- CSI, SAB, 07/18/16, 80% improvement   R - CSI, SAB, 12/02/17, moderate improvement for ~ 3 weeks    R - CSI, SAB, 01/27/17, no improvement    R - CSI, iaGH/SAB, 02/21/17, mild to moderate relief    fPT @ [facility] - going well, but ROM remains painful   Heat    R - CSI, iaGH/SAB, 05/05/17, moderate%   Sling PRN   R/L - CSI, SAB, 07/12/17, moderate% improvement    R/L - CSI, iaGH/SAB 08/23/17, moderate%   R/L - CSI, SAB, 10/18/17, moderate% improvement, though not for long    R/L - CSI, SAB, 11/15/17, moderate% improvement, for ~ 3weeks    R/L - CSI, SAB 12/13/17, mild% improvement    R/L - CSI, iaGH/SAB 01/23/18, 40-50%   R/L - CSI, SAB, 02/27/18, moderate improvement, though did not last long  Provocative:    ADLs   Prior:   Progression: worsening discomfort   Quality:    Sharp pain at times activity   Throbbing at times with activity    Muscle soreness   Radiation: none  Severity: per nursing documentation  Timing: intermittent with use  Trauma:    17.07: mechanical fall while at PT --> landed on L arm     Review of systems (ROS):  A 10+ review of systems was performed with pertinent positives and negatives noted above in the history of present illness. Other systems were negative unless otherwise specified.      PHYSICAL EXAMINATION  General:  The patient is alert and oriented x 3. Mood is pleasant. Observation of ears, eyes and nose reveal no gross abnormalities. HEENT: NCAT, sclera anicteric. Lungs: Respirations are equal and unlabored.     RIGHT SHOULDER EXAMINATION     OBSERVATION:     Swelling  none  Deformity  none   Discoloration  none   Scapular winging none   Scars   none  Atrophy  none    TENDERNESS / CREPITUS  (T/C):          T/C      T/C   Clavicle   -/-  SUPRAspinatus    -/-     AC Jt.    -/-  INFRAspinatus  -/-    SC Jt.    -/-  Deltoid    -/-      G. Tuberosity  -/-  LH BICEP groove  -/-   Acromion:  -/-  Midline Neck   -/-     Scapular Spine -/-  Trapezium   -/-   SMA Scapula  -/-  GH jt. line - post  -/-     Scapulothoracic  -/-         ROM:     Right shoulder   Left shoulder        AROM (PROM)   AROM (PROM)   FE    120° (155°)*     120° (155°)*     ER at 0°    60°  (65°) *   60°  (65°)*   ER at 90° ABD  90°  (90°) *   90°  (90°)*   IR at 90°  ABD   NA  (40°)  *   NA  (40°)  *    IR (spine level)   T10  *   T10*    STRENGTH: (* = with pain) RIGHT SHOULDER  LEFT SHOULDER   SCAPTION   4/5    4/5   IR    4/5    4/5   ER    4/5    4/5   BICEPS   4/5    4/5   Deltoid    4/5    4/5     SIGNS:  Painful side       NEER   +   OSONNYS        +    CHAPA   +   SPEEDS        +   DROP ARM   -   BELLY PRESS       -    X-Body ADD    +   LIFT-OFF        +   HORNBLOWERS      +              STABILITY TESTING   RIGHT SHOULDER  LEFT SHOULDER     Translation     Anterior up face    up face    Posterior up face   up face    Sulcus  < 10mm   < 10 mm     Signs   Apprehension   neg      neg       Relocation   no change     no change      Jerk test  neg     neg    EXTREMITY NEURO-VASCULAR EXAM    Sensation grossly intact to light touch all dermatomal regions.    DTR 2+ Biceps, Triceps, BR and Negative Chinas sign   Grossly intact motor function at Elbow, Wrist and Hand   Distal pulses radial and ulnar 2+, brisk cap refill, symmetric.      NECK:  Painless FROM and spinous processes non-tender. Negative Spurlings sign.       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   W/ advanced acromialization of humeral head   W/ chronic tearing of superior rotator cuff    No evidence of neurologic pathology  No evidence of vascular pathology    Imaging studies reviewed:   X-ray shoulder,  right 17.05  X-ray shoulder, left 17.07    Plan:  We discussed options including:  #1 watchful waiting  #2 re start physical therapy aimed at:   RoM glenohumeral joint   Strengthening rotator cuff   Scapular stability    fpt  #3 injection therapy:   CSI SAB    Right, effective 40-50%, repeat    Left, effective 40-50%, repeat    CSI iaGH    Right,effective 40-50%, repeat     Left, effective 40-50%, repeat   orthobiologics   #4 consultation re: TSA   Likely poor surgical candidate given CAD     The patient chooses #3 csi sab left and #4 re: right    Pain management: handout given  Bracing: shoulder sling, prn  Physical therapy:    fPT, @ [facility], prior as above   Activity (e.g. sports, work) restrictions: as tolerated   school/vocation: works at [employer]     Follow up per pt  Should symptoms worsen or fail to resolve, consider:  Revisiting the above options

## 2018-05-01 NOTE — PROGRESS NOTES
Subjective:       Patient ID: Krissy Marino is a 78 y.o. female possible GCA, osteoporosis and OA of hands & knees.  .    Chief Complaint: No chief complaint on file.  Returns for follow-up.  Was last seen on 8/16/17. She has been off prednisone for GCA since May of last year and was doing ok.  On 3/7/18 she underwent surgery for ectropion of both lower eyelids and weeks after that developed double vision  She reports seeing double when gazing to the right. She has not had any other eye sxs.  She denies amaurosis fugax. She still has bouts of instantaneous right eye pain that comes & goes.  This is chronic. She denies joint pains (except OA joints--R shoulder, L ankle), fever, chills, visual loss, scalp tenderness, jaw or other claudication, headache or head pain. Denies PMR sxs: AM stiffness, shoulder or hip girdle stiffness.             Associated symptoms include fatigue. Pertinent negatives include no fever.         Current Outpatient Prescriptions   Medication Sig Dispense Refill    acitretin (SORIATANE) 10 MG capsule Take 2 po qday 180 capsule 1    albuterol 90 mcg/actuation inhaler Inhale 2 puffs into the lungs every 6 (six) hours as needed for Wheezing. 1 each 11    alendronate (FOSAMAX) 70 MG tablet TAKE 1 TABLET BY MOUTH EVERY 7 DAYS 4 tablet 0    allopurinol (ZYLOPRIM) 300 MG tablet TAKE 1 TABLET(300 MG) BY MOUTH EVERY DAY 90 tablet 3    aspirin (ECOTRIN) 81 MG EC tablet Take 1 tablet (81 mg total) by mouth once daily. 90 tablet 3    atorvastatin (LIPITOR) 40 MG tablet TAKE 1 TABLET(40 MG) BY MOUTH EVERY DAY 90 tablet 0    azelastine (ASTELIN) 137 mcg (0.1 %) nasal spray USE 1 SPRAY(137 MCG) IN EACH NOSTRIL TWICE DAILY 30 mL 0    BIOTIN ORAL Take by mouth.      blood sugar diagnostic (ACCU-CHEK AMELIA PLUS TEST STRP) Strp Checks bg 2 x day before meals 200 strip 4    cetirizine (ZYRTEC) 10 MG tablet Take 1 tablet (10 mg total) by mouth once daily. 30 tablet 2    cholecalciferol, vitamin D3,  50,000 unit capsule Take 1 capsule (50,000 Units total) by mouth twice a week. 40 capsule 0    ciclopirox (PENLAC) 8 % Soln APPLY EXTERNALLY TO THE AFFECTED AREA EVERY NIGHT 6.6 mL 0    desonide (DESOWEN) 0.05 % cream AAA bid 180 g 1    diazePAM (VALIUM) 10 MG Tab Take 1 tablet (10 mg total) by mouth once. Take 1 tablet 60 minute prior to procedure. 1 tablet 0    diclofenac sodium 1 % Gel Apply 2 g topically 4 (four) times daily. 400 g 0    FERROUS GLUCONATE (FERATE ORAL) Take by mouth once daily at 6am.       folic acid (FOLVITE) 1 MG tablet TAKE 1 TABLET(1000 MCG) BY MOUTH EVERY DAY 90 tablet 0    furosemide (LASIX) 20 MG tablet Take 1 tablet (20 mg total) by mouth once daily. 90 tablet 3    hydrocodone-acetaminophen 5-325mg (NORCO) 5-325 mg per tablet Take 1 tablet by mouth every 6 (six) hours as needed for Pain. 16 tablet 0    hydrocortisone butyrate (LOCOID) 0.1 % Crea cream AAA buttock bid 180 g 0    KRILL/OM3/DHA/EPA/OM6/LIP/ASTX (KRILL OIL, OMEGA 3 & 6, ORAL) Take by mouth once daily at 6am.       levothyroxine (SYNTHROID) 75 MCG tablet TAKE 1 TABLET BY MOUTH BEFORE BREAKFAST 90 tablet 0    lidocaine HCL 2% (XYLOCAINE) 2 % jelly APPLY TOPICALLY EVERY DAY AS NEEDED 30 mL 3    losartan (COZAAR) 25 MG tablet Take 0.5 tablets (12.5 mg total) by mouth once daily. 45 tablet 3    metoprolol succinate (TOPROL-XL) 50 MG 24 hr tablet Take 1 tablet (50 mg total) by mouth once daily. 90 tablet 3    miconazole NITRATE 2 % (ZEASORB AF) 2 % top powder Apply topically as needed for Itching. 71 g 0    mometasone (NASONEX) 50 mcg/actuation nasal spray 2 sprays by Nasal route once daily. 1 each 0    nystatin-triamcinolone (MYCOLOG II) cream Apply topically 2 (two) times daily. 30 g 0    olopatadine (PATANOL) 0.1 % ophthalmic solution Place 1 drop into the right eye 2 (two) times daily. 5 mL 1    ranitidine (ZANTAC) 150 MG tablet Take 1 tablet (150 mg total) by mouth 2 (two) times daily as needed for  Heartburn. 120 tablet 5    silver sulfADIAZINE 1% (SILVADENE) 1 % cream aaa buttock bid 50 g 2    SYMBICORT 160-4.5 mcg/actuation HFAA INHALE 2 PUFFS BY MOUTH INTO THE LUNGS EVERY 12 HOURS 10.2 g 0    triamcinolone acetonide 0.1% (KENALOG) 0.1 % cream Apply topically 2 (two) times daily. Apply to affected area 454 Tube 3     No current facility-administered medications for this visit.        Had CA stent placed in October.    Review of patient's allergies indicates:   Allergen Reactions    Sulfamethoxazole-trimethoprim Nausea And Vomiting    Latex, natural rubber Rash    Pcn [penicillins] Rash       Past Medical History:   Diagnosis Date    Adverse effect of glucocorticoid or synthetic analogue     Allergy     Arthritis     Cancer     CHF (congestive heart failure)     Chronic kidney disease     Coronary artery disease involving native coronary artery of native heart without angina pectoris 10/11/2016    Diabetic neuropathy 10/6/2014    Giant cell arteritis 9/24/2012    Hyperlipidemia     Hypertension     Hypothyroid     Obesity (BMI 30-39.9) 10/6/2014    Osteopenia     Primary osteoarthritis of both knees 9/24/2012    Type II or unspecified type diabetes mellitus with renal manifestations, uncontrolled(250.42)        Past Surgical History:   Procedure Laterality Date    APPENDECTOMY      CATARACT EXTRACTION      CATARACT EXTRACTION, BILATERAL      CHOLECYSTECTOMY      EYE SURGERY      HYSTERECTOMY      JOINT REPLACEMENT      bilateral total knee    SKIN BIOPSY      TONSILLECTOMY           Review of Systems   Constitutional: Positive for fatigue. Negative for diaphoresis, fever and unexpected weight change.   HENT: Negative.  Negative for mouth sores, sore throat and tinnitus.    Eyes: Negative.  Negative for visual disturbance.   Respiratory: Negative.  Negative for cough, choking, chest tightness and shortness of breath.    Cardiovascular: Negative.  Negative for chest pain,  "palpitations and leg swelling.   Gastrointestinal: Negative.  Negative for abdominal distention, abdominal pain, blood in stool, constipation, diarrhea, nausea and vomiting.   Endocrine: Negative.    Genitourinary: Negative.  Negative for frequency, hematuria and menstrual problem.   Musculoskeletal: Negative.  Negative for back pain, neck pain and neck stiffness.   Skin: Negative for rash.        Psoriasis helped by Soriatane.   Allergic/Immunologic: Negative.    Neurological: Negative.  Negative for dizziness, syncope, weakness, light-headedness and numbness.   Hematological: Negative for adenopathy. Bruises/bleeds easily.   Psychiatric/Behavioral: Negative for dysphoric mood. The patient is not nervous/anxious.          SOCIAL HISTORY: She does not smoke, does not drink alcohol. There is no   recreational drug use. She is the  of [employer] and loves what she does. 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  She is not formally exercising but is quite active.    FAMILY HISTORY: Family history is negative for giant cell arteritis.   Family history is positive for a brother with liver cancer and lung cancer.         Objective:   BP (!) 167/78   Pulse 69   Resp 18   Ht 5' 5" (1.651 m)   Wt 97.5 kg (215 lb)   BMI 35.78 kg/m²   Was 207 on 4/19/17  Was 216 lb 8 oz on 9/14/16.   Physical Exam   Vitals reviewed.  Constitutional: She is oriented to person, place, and time and well-developed, well-nourished, and in no distress. No distress.   HENT:   Head: Normocephalic and atraumatic.   Mouth/Throat: Oropharynx is clear and moist. No oropharyngeal exudate.   Temporal & occipital aa good pulsations;   Scalp non tender or painful.  Binocular diplopia.  No facial rashes  Parotids not enlarged  No oral ulcers       Eyes: Conjunctivae and EOM are normal. Pupils are equal, round, and reactive to light. Right eye exhibits no discharge. Left eye exhibits no discharge. No scleral icterus.   Neck: Neck supple. No JVD " present. No tracheal deviation present. No thyromegaly present.   Cardiovascular: Normal rate, regular rhythm, normal heart sounds and intact distal pulses.  Exam reveals no gallop and no friction rub.    No murmur heard.  Pulmonary/Chest: Effort normal and breath sounds normal. No respiratory distress. She has no wheezes. She has no rales. She exhibits no tenderness.   Abdominal: Soft. Bowel sounds are normal. She exhibits no distension and no mass. There is no splenomegaly or hepatomegaly. There is no tenderness. There is no rebound and no guarding.   Lymphadenopathy:     She has no cervical adenopathy.        Right: No inguinal adenopathy present.        Left: No inguinal adenopathy present.   Neurological: She is alert and oriented to person, place, and time. She has normal reflexes. No cranial nerve deficit. Gait normal.   Quads still weak 4/5 bilaterally. Cannot get out of chair without pushing off on arms.  Otherwise 5/5.   Skin: Skin is warm and dry. No rash noted. She is not diaphoretic.     Psoriasis both elbows now gone; no scalp lesions.  Multiple ecchymoses in upper arms.   Psychiatric: Mood, memory, affect and judgment normal.   Musculoskeletal: Normal range of motion. She exhibits no edema or tenderness.   Cspine FROM no tenderness  Tspine FROM no tenderness  Lspine FROM no tenderness.  TMJ: unremarkable  Shoulders: crepitus R shoulder; no synovitis;  Elbows: FROM; no synovitis; no tophi or nodules  Wrists: FROM; no synovitis;    MCPs: FROM; no synovitis; no metacarpalgia;  ok;  PIPs: Rell's; FROM; no synovitis;   DIPs: Heberden's; FROM; no synovitis;   HIPS: FROM  Knees: Bilateral TKR scars; FROM; no synovitis; no instability;  Ankles: FROM: no synovitis   Toes: Bilateral HV;         LABS:  4/9/18: ESR 49; CRP 3.8; CBC (hi indices)   plts 143; CMP ; cnne 0.72;   2/10/18: ESR 25; CRP 0.6;   8/12/17: ESR 45; CRP 1.7; Alb. 3.3; BUN 29; ; Vit D 64;   5/6/17: ESR 55; CRP 3.0  3/4/17:  ESR 36; CRP 0.9; CBC plt 146; CMP ; Vit D 24;   11/28/16: ESR 46; CRP 1.7; CBC ok; BUN 64; cnne 1.3; GFR 39.4; ;   9/7/16: CMP cnne 1.1;   8/19/16: ESR 48; CRP 4.8; cnne 1.7; ;   5/28/16: ESR 83; CRp 19.1; CMP BUN 52; cnne 1.0; Glu 138;   5/5/16: ESR 41; CBC plt 138;   2/27/16: ESR 40; CRP 8;  2/13/16: ESR 63; CRP 9.7  8/22/15: ESR 26; CRP 7; CBC ok; cnne 1.1; BUN 40; ;   7/11/15: Vit d 43;   10/4/14: ESR 32; CRP 3.2; BUN 63; cnne 1.1; TSH ok;  8/22/14: CBC ok;  6/30/14: ESR 35;  6/21/14: ESR 49; CRP 3.0  11/11/13: ESR 35; CRP 9.6;   9/21/13: ESR 47; CRP 4.7; ; cnne 1.3;   6/25/13: ESR 40; CRP 3.9   6/11/13: cnne 1.5; GFR 34;  10/25/10: UA 5.5       3/2/16: bilateral feet:  personally reviewed: Mark HV with inferior calcaneal spurs & pes planus. Also vascular calcifications.  DXA 2/16/15: TLX +1.7; TFN -1.0; TTH -0.9    Does not do MELANIE's.     Assessment:   Possible GCA: Elevated ESR (with normal CRPs;  dating back to 12/17/09),    max at 120 associated with intermittent left eye pain; subsequently right sided and responsive to steroids.    Negative biopsy.    Sore mouth w. MTX (uncertain). Never on HCQ.    Now with binocular diplopia beginning after surgery for ectropion   Developed eye pain/headache on 5 mg/day with ESR 50;   (bouts with intermittent eye pain (instantaneous);    R/O mild trigeminal neuralgia.   Off prednisone since May 2017 with no adverse effects.    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     OA of both knees. S/P B TKRs.     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     Hypertension & Dyslipidemia.     Psoriasis--Had been on MTX remotely--appeared to have responded to soriatane..     Hypothyroidism.     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     Past history of gout (not substantiated).     Past history of asthma.     S/P Basal cell carcinoma removed from nose.     Obesity     Plan:   Labs today.  We will contact Dr. Nieto to let him know of our concern re: GCA as she chronically runs elevated ESRs and the labs may not add much to the clinical picture. I would not like to resume steroids in this diabetic patient with CAD & multiple other morbidities unless we are reasonably certain of dx.   Alternatively, may need to do a temporal artery biopsy.  Patient will contact us tomorrow for disposition.      CC: Anastasia Nieto MD      Addendum: 5/2/18: ESR 39; CRP1.7; CBC ok; CMP T bili 1.2; ;   Addendum II; note from Dr. Nieto:    Hi Dr. Mcnamara,     I think Ms. Marino is still healing from her surgery. During the surgery, I am close to the lateral rectii on both sides. Nothing out of the ordinary occurred during her surgery. However, patients on immunosuppressives definitely take longer to heal. She may have some post-op irritation of the right lateral rectus which would give her the pain on right gaze. Without any other visual complaints, I would be hesitant to send her for biopsy.     Anastasia Damon     5/3/18: Contacted patient and relayed message.

## 2018-05-02 ENCOUNTER — TELEPHONE (OUTPATIENT)
Dept: DERMATOLOGY | Facility: CLINIC | Age: 78
End: 2018-05-02

## 2018-05-02 ENCOUNTER — LAB VISIT (OUTPATIENT)
Dept: LAB | Facility: HOSPITAL | Age: 78
End: 2018-05-02
Attending: INTERNAL MEDICINE
Payer: COMMERCIAL

## 2018-05-02 ENCOUNTER — OFFICE VISIT (OUTPATIENT)
Dept: RHEUMATOLOGY | Facility: CLINIC | Age: 78
End: 2018-05-02
Payer: COMMERCIAL

## 2018-05-02 VITALS
SYSTOLIC BLOOD PRESSURE: 167 MMHG | HEART RATE: 69 BPM | DIASTOLIC BLOOD PRESSURE: 78 MMHG | RESPIRATION RATE: 18 BRPM | WEIGHT: 215 LBS | HEIGHT: 65 IN | BODY MASS INDEX: 35.82 KG/M2

## 2018-05-02 DIAGNOSIS — M31.6 GIANT CELL ARTERITIS: Primary | ICD-10-CM

## 2018-05-02 DIAGNOSIS — H53.2 DIPLOPIA: ICD-10-CM

## 2018-05-02 DIAGNOSIS — M31.6 GIANT CELL ARTERITIS: ICD-10-CM

## 2018-05-02 LAB
ALBUMIN SERPL BCP-MCNC: 3.7 G/DL
ALP SERPL-CCNC: 127 U/L
ALT SERPL W/O P-5'-P-CCNC: 23 U/L
ANION GAP SERPL CALC-SCNC: 10 MMOL/L
AST SERPL-CCNC: 20 U/L
BASOPHILS # BLD AUTO: 0.04 K/UL
BASOPHILS NFR BLD: 0.4 %
BILIRUB SERPL-MCNC: 1.2 MG/DL
BUN SERPL-MCNC: 31 MG/DL
CALCIUM SERPL-MCNC: 9.7 MG/DL
CHLORIDE SERPL-SCNC: 104 MMOL/L
CO2 SERPL-SCNC: 27 MMOL/L
CREAT SERPL-MCNC: 0.8 MG/DL
CRP SERPL-MCNC: 1.7 MG/L
DIFFERENTIAL METHOD: ABNORMAL
EOSINOPHIL # BLD AUTO: 0.3 K/UL
EOSINOPHIL NFR BLD: 2.7 %
ERYTHROCYTE [DISTWIDTH] IN BLOOD BY AUTOMATED COUNT: 13.6 %
ERYTHROCYTE [SEDIMENTATION RATE] IN BLOOD BY WESTERGREN METHOD: 39 MM/HR
EST. GFR  (AFRICAN AMERICAN): >60 ML/MIN/1.73 M^2
EST. GFR  (NON AFRICAN AMERICAN): >60 ML/MIN/1.73 M^2
GLUCOSE SERPL-MCNC: 115 MG/DL
HCT VFR BLD AUTO: 41.2 %
HGB BLD-MCNC: 13.5 G/DL
LYMPHOCYTES # BLD AUTO: 2.3 K/UL
LYMPHOCYTES NFR BLD: 20.5 %
MCH RBC QN AUTO: 33 PG
MCHC RBC AUTO-ENTMCNC: 32.8 G/DL
MCV RBC AUTO: 101 FL
MONOCYTES # BLD AUTO: 0.6 K/UL
MONOCYTES NFR BLD: 5.5 %
NEUTROPHILS # BLD AUTO: 8 K/UL
NEUTROPHILS NFR BLD: 70.5 %
NRBC BLD-RTO: 0 /100 WBC
PLATELET # BLD AUTO: 167 K/UL
PMV BLD AUTO: 11.1 FL
POTASSIUM SERPL-SCNC: 4.4 MMOL/L
PROT SERPL-MCNC: 6.6 G/DL
RBC # BLD AUTO: 4.09 M/UL
SODIUM SERPL-SCNC: 141 MMOL/L
WBC # BLD AUTO: 11.34 K/UL

## 2018-05-02 PROCEDURE — 85651 RBC SED RATE NONAUTOMATED: CPT

## 2018-05-02 PROCEDURE — 3077F SYST BP >= 140 MM HG: CPT | Mod: CPTII,S$GLB,, | Performed by: INTERNAL MEDICINE

## 2018-05-02 PROCEDURE — 99999 PR PBB SHADOW E&M-EST. PATIENT-LVL V: CPT | Mod: PBBFAC,,, | Performed by: INTERNAL MEDICINE

## 2018-05-02 PROCEDURE — 85025 COMPLETE CBC W/AUTO DIFF WBC: CPT

## 2018-05-02 PROCEDURE — 80053 COMPREHEN METABOLIC PANEL: CPT

## 2018-05-02 PROCEDURE — 86140 C-REACTIVE PROTEIN: CPT

## 2018-05-02 PROCEDURE — 99214 OFFICE O/P EST MOD 30 MIN: CPT | Mod: S$GLB,,, | Performed by: INTERNAL MEDICINE

## 2018-05-02 PROCEDURE — 36415 COLL VENOUS BLD VENIPUNCTURE: CPT

## 2018-05-02 PROCEDURE — 3078F DIAST BP <80 MM HG: CPT | Mod: CPTII,S$GLB,, | Performed by: INTERNAL MEDICINE

## 2018-05-02 NOTE — TELEPHONE ENCOUNTER
----- Message from Savannahpranav Linn sent at 5/2/2018  3:03 PM CDT -----  Contact: patient   Patient Requesting Sooner Appointment.     Reason: Patient has recurring spot of concern on her face    Name of Caller: Krissy Marino  When is the first available appointment? 07/09  Symptoms: Redness  Communication Preference [phone] or [phone]

## 2018-05-02 NOTE — Clinical Note
Anastasia: Please see my note.  A bit concerned about GCA. Do you think we need to bx her temporal artery again? Thanks, Angela

## 2018-05-02 NOTE — PATIENT INSTRUCTIONS
Contact us tomorrow if you don't hear from us.  We will try to communicate with your ophthalmologist.

## 2018-05-05 DIAGNOSIS — M85.80 OSTEOPENIA, UNSPECIFIED LOCATION: ICD-10-CM

## 2018-05-07 RX ORDER — ALENDRONATE SODIUM 70 MG/1
TABLET ORAL
Qty: 4 TABLET | Refills: 0 | Status: SHIPPED | OUTPATIENT
Start: 2018-05-07 | End: 2018-05-22 | Stop reason: SDUPTHER

## 2018-05-07 RX ORDER — CICLOPIROX 80 MG/ML
SOLUTION TOPICAL
Qty: 6.6 ML | Refills: 0 | Status: ON HOLD | OUTPATIENT
Start: 2018-05-07 | End: 2018-11-30 | Stop reason: HOSPADM

## 2018-05-08 ENCOUNTER — HOSPITAL ENCOUNTER (OUTPATIENT)
Dept: RADIOLOGY | Facility: HOSPITAL | Age: 78
Discharge: HOME | End: 2018-05-08
Attending: FAMILY MEDICINE
Payer: COMMERCIAL

## 2018-05-08 DIAGNOSIS — M19.011 OSTEOARTHRITIS OF GLENOHUMERAL JOINT, RIGHT: ICD-10-CM

## 2018-05-08 PROCEDURE — 73221 MRI JOINT UPR EXTREM W/O DYE: CPT | Mod: TC,RT

## 2018-05-08 PROCEDURE — 73200 CT UPPER EXTREMITY W/O DYE: CPT | Mod: 26,RT,, | Performed by: RADIOLOGY

## 2018-05-08 PROCEDURE — 73221 MRI JOINT UPR EXTREM W/O DYE: CPT | Mod: 26,RT,, | Performed by: RADIOLOGY

## 2018-05-08 PROCEDURE — 73200 CT UPPER EXTREMITY W/O DYE: CPT | Mod: TC,RT

## 2018-05-14 RX ORDER — FOLIC ACID 1 MG/1
TABLET ORAL
Qty: 90 TABLET | Refills: 0 | OUTPATIENT
Start: 2018-05-14

## 2018-05-15 ENCOUNTER — OFFICE VISIT (OUTPATIENT)
Dept: OPHTHALMOLOGY | Facility: CLINIC | Age: 78
End: 2018-05-15
Payer: COMMERCIAL

## 2018-05-15 DIAGNOSIS — Z98.890 POST-OPERATIVE STATE: Primary | ICD-10-CM

## 2018-05-15 DIAGNOSIS — H49.02 3RD NERVE PALSY, PARTIAL, LEFT: ICD-10-CM

## 2018-05-15 DIAGNOSIS — H02.135 SENILE ECTROPION OF BOTH LOWER EYELIDS: ICD-10-CM

## 2018-05-15 DIAGNOSIS — H02.132 SENILE ECTROPION OF BOTH LOWER EYELIDS: ICD-10-CM

## 2018-05-15 PROCEDURE — 99024 POSTOP FOLLOW-UP VISIT: CPT | Mod: S$GLB,,, | Performed by: OPHTHALMOLOGY

## 2018-05-15 PROCEDURE — 99999 PR PBB SHADOW E&M-EST. PATIENT-LVL IV: CPT | Mod: PBBFAC,,, | Performed by: OPHTHALMOLOGY

## 2018-05-15 NOTE — PROGRESS NOTES
HPI     Pt is here today for a post- op. S/P ectropion OU on 3/7/2018. C/O   horizontal diplopia when looking to the right which was mentioned last   visit.     Last edited by Sally Flores, PCT on 5/15/2018  1:35 PM. (History)            Assessment /Plan     For exam results, see Encounter Report.    Post-operative state    Senile ectropion of both lower eyelids    3rd nerve palsy, partial, left    Other orders  -     Cancel: External Photography - OU - Both Eyes      Overall, patient healing well. Still with some diplopia on right gaze. Possible partial 3rd nerve palsy vs. Internuclear ophthalmoplegia. Will have patient evaluated by Dr. Painting.     Return to me prn

## 2018-05-17 ENCOUNTER — OFFICE VISIT (OUTPATIENT)
Dept: SPORTS MEDICINE | Facility: CLINIC | Age: 78
End: 2018-05-17
Payer: COMMERCIAL

## 2018-05-17 VITALS
HEART RATE: 79 BPM | DIASTOLIC BLOOD PRESSURE: 80 MMHG | WEIGHT: 215 LBS | SYSTOLIC BLOOD PRESSURE: 143 MMHG | BODY MASS INDEX: 35.82 KG/M2 | HEIGHT: 65 IN

## 2018-05-17 DIAGNOSIS — M12.811 ROTATOR CUFF TEAR ARTHROPATHY, RIGHT: Primary | ICD-10-CM

## 2018-05-17 DIAGNOSIS — M75.101 ROTATOR CUFF TEAR ARTHROPATHY, RIGHT: Primary | ICD-10-CM

## 2018-05-17 PROCEDURE — 99214 OFFICE O/P EST MOD 30 MIN: CPT | Mod: S$GLB,,, | Performed by: ORTHOPAEDIC SURGERY

## 2018-05-17 PROCEDURE — 99999 PR PBB SHADOW E&M-EST. PATIENT-LVL III: CPT | Mod: PBBFAC,,, | Performed by: ORTHOPAEDIC SURGERY

## 2018-05-17 PROCEDURE — 3079F DIAST BP 80-89 MM HG: CPT | Mod: CPTII,S$GLB,, | Performed by: ORTHOPAEDIC SURGERY

## 2018-05-17 PROCEDURE — 3077F SYST BP >= 140 MM HG: CPT | Mod: CPTII,S$GLB,, | Performed by: ORTHOPAEDIC SURGERY

## 2018-05-17 NOTE — PROGRESS NOTES
CC: RIGHT shoulder pain     78 y.o. Female presents as a new patient to me. She works at the post-[employer]. C/o chronic RIGHT shoulder pain for years.  Has worsened with time.  She also has developed stiffness and decreased functional use. Previously seen by my colleague Dr. Lopez & diagnosed with severe arthritis. Severely limiting motion, function and QOL. Associated weakness. Can be disruptive to sleep at night. Better with rest. Denies neck pain or radicular symptoms. Treatment thus far has included PT, multiple injections dating back to 2016 (most recently in 4/2018), activity modifications, rest, and oral medication.  Here today to discuss diagnosis and treatment options.     PMHx notable for HBP.   Negative for tobacco.   Borderline Diabetic. Last A1C 7.2 (11/17)    Pain Score:   8    PAST MEDICAL HISTORY:   Past Medical History:   Diagnosis Date    Adverse effect of glucocorticoid or synthetic analogue     Allergy     Arthritis     Cancer     CHF (congestive heart failure)     Chronic kidney disease     Coronary artery disease involving native coronary artery of native heart without angina pectoris 10/11/2016    Diabetic neuropathy 10/6/2014    Giant cell arteritis 9/24/2012    Hyperlipidemia     Hypertension     Hypothyroid     Obesity (BMI 30-39.9) 10/6/2014    Osteopenia     Primary osteoarthritis of both knees 9/24/2012    Type II or unspecified type diabetes mellitus with renal manifestations, uncontrolled(250.42)        PAST SURGICAL HISTORY:  Past Surgical History:   Procedure Laterality Date    APPENDECTOMY      CATARACT EXTRACTION      CATARACT EXTRACTION, BILATERAL      CHOLECYSTECTOMY      EYE SURGERY      HYSTERECTOMY      JOINT REPLACEMENT      bilateral total knee    SKIN BIOPSY      TONSILLECTOMY         FAMILY HISTORY:  Family History   Problem Relation Age of Onset    Diabetes Mother     Hypertension Mother     Hypertension Father     Kidney disease Neg Hx      Eczema Neg Hx     Psoriasis Neg Hx     Melanoma Neg Hx     Lupus Neg Hx     Acne Neg Hx        MEDICATIONS:    Current Outpatient Prescriptions:     alendronate (FOSAMAX) 70 MG tablet, TAKE 1 TABLET BY MOUTH EVERY 7 DAYS, Disp: 4 tablet, Rfl: 0    allopurinol (ZYLOPRIM) 300 MG tablet, TAKE 1 TABLET(300 MG) BY MOUTH EVERY DAY, Disp: 90 tablet, Rfl: 3    aspirin (ECOTRIN) 81 MG EC tablet, Take 1 tablet (81 mg total) by mouth once daily., Disp: 90 tablet, Rfl: 3    atorvastatin (LIPITOR) 40 MG tablet, TAKE 1 TABLET(40 MG) BY MOUTH EVERY DAY, Disp: 90 tablet, Rfl: 0    azelastine (ASTELIN) 137 mcg (0.1 %) nasal spray, USE 1 SPRAY(137 MCG) IN EACH NOSTRIL TWICE DAILY, Disp: 30 mL, Rfl: 0    blood sugar diagnostic (ACCU-CHEK AMELIA PLUS TEST STRP) Strp, Checks bg 2 x day before meals, Disp: 200 strip, Rfl: 4    cholecalciferol, vitamin D3, 50,000 unit capsule, Take 1 capsule (50,000 Units total) by mouth twice a week., Disp: 40 capsule, Rfl: 0    desonide (DESOWEN) 0.05 % cream, AAA bid, Disp: 180 g, Rfl: 1    diclofenac sodium 1 % Gel, Apply 2 g topically 4 (four) times daily., Disp: 400 g, Rfl: 0    FERROUS GLUCONATE (FERATE ORAL), Take by mouth once daily at 6am. , Disp: , Rfl:     furosemide (LASIX) 20 MG tablet, Take 1 tablet (20 mg total) by mouth once daily., Disp: 90 tablet, Rfl: 3    hydrocodone-acetaminophen 5-325mg (NORCO) 5-325 mg per tablet, Take 1 tablet by mouth every 6 (six) hours as needed for Pain., Disp: 16 tablet, Rfl: 0    hydrocortisone butyrate (LOCOID) 0.1 % Crea cream, AAA buttock bid, Disp: 180 g, Rfl: 0    KRILL/OM3/DHA/EPA/OM6/LIP/ASTX (KRILL OIL, OMEGA 3 & 6, ORAL), Take by mouth once daily at 6am. , Disp: , Rfl:     levothyroxine (SYNTHROID) 75 MCG tablet, TAKE 1 TABLET BY MOUTH BEFORE BREAKFAST, Disp: 90 tablet, Rfl: 0    lidocaine HCL 2% (XYLOCAINE) 2 % jelly, APPLY TOPICALLY EVERY DAY AS NEEDED, Disp: 30 mL, Rfl: 3    losartan (COZAAR) 25 MG tablet, Take 0.5 tablets  (12.5 mg total) by mouth once daily., Disp: 45 tablet, Rfl: 3    miconazole NITRATE 2 % (ZEASORB AF) 2 % top powder, Apply topically as needed for Itching., Disp: 71 g, Rfl: 0    mometasone (NASONEX) 50 mcg/actuation nasal spray, 2 sprays by Nasal route once daily., Disp: 1 each, Rfl: 0    olopatadine (PATANOL) 0.1 % ophthalmic solution, Place 1 drop into the right eye 2 (two) times daily., Disp: 5 mL, Rfl: 1    silver sulfADIAZINE 1% (SILVADENE) 1 % cream, aaa buttock bid, Disp: 50 g, Rfl: 2    triamcinolone acetonide 0.1% (KENALOG) 0.1 % cream, Apply topically 2 (two) times daily. Apply to affected area, Disp: 454 Tube, Rfl: 3    acitretin (SORIATANE) 10 MG capsule, Take 2 po qday, Disp: 180 capsule, Rfl: 1    albuterol 90 mcg/actuation inhaler, Inhale 2 puffs into the lungs every 6 (six) hours as needed for Wheezing., Disp: 1 each, Rfl: 11    BIOTIN ORAL, Take by mouth., Disp: , Rfl:     cetirizine (ZYRTEC) 10 MG tablet, Take 1 tablet (10 mg total) by mouth once daily., Disp: 30 tablet, Rfl: 2    ciclopirox (PENLAC) 8 % Soln, APPLY EXTERNALLY TO THE AFFECTED AREA EVERY NIGHT, Disp: 6.6 mL, Rfl: 0    diazePAM (VALIUM) 10 MG Tab, Take 1 tablet (10 mg total) by mouth once. Take 1 tablet 60 minute prior to procedure., Disp: 1 tablet, Rfl: 0    folic acid (FOLVITE) 1 MG tablet, TAKE 1 TABLET(1000 MCG) BY MOUTH EVERY DAY, Disp: 90 tablet, Rfl: 0    metoprolol succinate (TOPROL-XL) 50 MG 24 hr tablet, Take 1 tablet (50 mg total) by mouth once daily., Disp: 90 tablet, Rfl: 3    nystatin-triamcinolone (MYCOLOG II) cream, Apply topically 2 (two) times daily., Disp: 30 g, Rfl: 0    ranitidine (ZANTAC) 150 MG tablet, Take 1 tablet (150 mg total) by mouth 2 (two) times daily as needed for Heartburn., Disp: 120 tablet, Rfl: 5    SYMBICORT 160-4.5 mcg/actuation HFAA, INHALE 2 PUFFS BY MOUTH INTO THE LUNGS EVERY 12 HOURS, Disp: 10.2 g, Rfl: 0    ALLERGIES:  Review of patient's allergies indicates:   Allergen  "Reactions    Sulfamethoxazole-trimethoprim Nausea And Vomiting    Latex, natural rubber Rash    Pcn [penicillins] Rash          REVIEW OF SYSTEMS:  Constitution: Negative. Negative for chills, fever and night sweats.    Hematologic/Lymphatic: Negative for bleeding problem. Does not bruise/bleed easily.   Skin: Negative for dry skin, itching and rash.   Musculoskeletal: Negative for falls. Positive for right shoulder pain and  muscle weakness.     PHYSICAL EXAMINATION:  Vitals:  BP (!) 143/80   Pulse 79   Ht 5' 5" (1.651 m)   Wt 97.5 kg (215 lb)   BMI 35.78 kg/m²    General: Well-developed well-nourished 78 y.o. femalein no acute distress   Cardiovascular: Regular rhythm by palpation of distal pulse, normal color and temperature, no concerning varicosities on symptomatic side   Lungs: No labored breathing or wheezing appreciated   Neuro: Alert and oriented ×3   Psychiatric: well oriented to person, place and time, demonstrates normal mood and affect   Skin: No rashes, lesions or ulcers, normal temperature, turgor, and texture on uninvolved extremity    Ortho/SPM Exam   Right shoulder exam demonstrates stiffness to passive motion.  Forward elevation to 70°, external rotation with arm at side to 20-30°.  Active external rotation is minimal.  Active forward elevation in the scapular plane to 30-40°.  Unable to get hand to the top of her head.  Able to get hand to mouth.  Old incision traversing obliquely over the anterior aspect from prior dermatologic procedure more than 20 years ago.  Pain and crepitus with shoulder range of motion.  Deficient rotator cuff strength.  Positive external rotation lag sign of approximately 20-30° to neutral.    IMAGING:  Xrays including AP, Outlet and Axillary Lateral of RIGHT shoulder are ordered / images reviewed by me:   No fracture or acute change appreciated. No significant glenohumeral degenerative changes. Mild to moderate AC joint arthritis. Normal acromiohumeral distance. "     MRI of RIGHT shoulder reviewed by me and discussed with patient. Study shows:    1. Massive rotator cuff tear with severe rotator cuff muscle atrophy.  Remodeling of the inferior acromion.   2. Severe glenohumeral osteoarthritis with remodeling of the glenoid.    CT of RIGHT shoulder reviewed by me and discussed with patient. Study shows:    Severe right-sided glenohumeral and acromioclavicular degenerative joint disease.    On my read: Concentric wear pattern with mild anterior subluxation and decentering.  No evidence of acromial stress fracture.     ASSESSMENT:      ICD-10-CM ICD-9-CM   1. Rotator cuff tear arthropathy, right M12.811 716.81       PLAN:     -Findings and treatment options were discussed with the patient.  Diagnosis is advanced cuff tear arthropathy.  The patient has failed prior conservative treatment and this is a quality of life issue for her.  She is a candidate for a reverse total shoulder arthroplasty.  The details of such were discussed to include the expected postop rehabilitation and recovery course.  Now is not a good time for family reasons and the patient would like to pursue physical therapy which has been somewhat beneficial in the past.    -Referral to PT in [place]   -RTC in 6 weeks to monitor things   -All questions answered      Procedures

## 2018-05-21 DIAGNOSIS — M85.80 OSTEOPENIA, UNSPECIFIED LOCATION: ICD-10-CM

## 2018-05-21 RX ORDER — LEVOTHYROXINE SODIUM 75 UG/1
TABLET ORAL
Qty: 90 TABLET | Refills: 0 | Status: SHIPPED | OUTPATIENT
Start: 2018-05-21 | End: 2018-08-14 | Stop reason: SDUPTHER

## 2018-05-22 ENCOUNTER — INITIAL CONSULT (OUTPATIENT)
Dept: OPHTHALMOLOGY | Facility: CLINIC | Age: 78
End: 2018-05-22
Payer: COMMERCIAL

## 2018-05-22 DIAGNOSIS — H51.22 INTERNUCLEAR OPHTHALMOPLEGIA, LEFT EYE: Primary | ICD-10-CM

## 2018-05-22 DIAGNOSIS — L40.9 PSORIASIS: ICD-10-CM

## 2018-05-22 DIAGNOSIS — H50.112 MONOCULAR EXOTROPIA OF LEFT EYE: ICD-10-CM

## 2018-05-22 PROCEDURE — 92014 COMPRE OPH EXAM EST PT 1/>: CPT | Mod: S$GLB,,, | Performed by: OPHTHALMOLOGY

## 2018-05-22 PROCEDURE — 99999 PR PBB SHADOW E&M-EST. PATIENT-LVL III: CPT | Mod: PBBFAC,,, | Performed by: OPHTHALMOLOGY

## 2018-05-22 RX ORDER — ALENDRONATE SODIUM 70 MG/1
TABLET ORAL
Qty: 4 TABLET | Refills: 0 | Status: SHIPPED | OUTPATIENT
Start: 2018-05-22 | End: 2018-05-30 | Stop reason: SDUPTHER

## 2018-05-22 RX ORDER — ACITRETIN 10 MG/1
CAPSULE ORAL
Qty: 180 CAPSULE | Refills: 0 | Status: SHIPPED | OUTPATIENT
Start: 2018-05-22 | End: 2018-05-28 | Stop reason: SDUPTHER

## 2018-05-22 NOTE — PROGRESS NOTES
HPI     Referred by   S/P ectropion OU on 3/7/2018.  Patient states had surg and 2 weeks later started seeing double   vision(side by side).  Only notice double when she turns head to the right.  No eye pain.    I have personally interviewed the patient, reviewed the history and   examined the patient and agree with the technician's exam.    Last edited by Rock Painting MD on 5/22/2018  2:37 PM. (History)            Assessment /Plan     For exam results, see Encounter Report.    Internuclear ophthalmoplegia, left eye    Monocular exotropia of left eye      Ms. Marino has a mild left NEIL. Her odds of recovering are excellent. She can occlude an eye as desired to relieve diplopia. I will repeat her exam in two months if her symptoms persist.

## 2018-05-22 NOTE — PATIENT INSTRUCTIONS
Repeat exam in two months if double vision persists.  Return sooner if the double vision worsens.  Cancel appointment if double vision resolves.

## 2018-05-22 NOTE — LETTER
May 22, 2018      Anastasia Nieto MD  [address]  [facility] [address]           [facility] - Ophthalmology  [address]  [facility] [address]  Phone: [phone]  Fax: [phone]          Patient: Krissy Marino   MR Number: [id number]   YOB: 1940   Date of Visit: 5/22/2018       Dear Dr. Anastasia Nieto:    Thank you for referring Krissy Marino to me for evaluation. Attached you will find relevant portions of my assessment and plan of care.    If you have questions, please do not hesitate to call me. I look forward to following Krissy Marino along with you.    Sincerely,    Rock Painting MD    Enclosure  CC:  No Recipients    If you would like to receive this communication electronically, please contact [email or web address] or [phone] to request more information on [facility] [employer] access.    For providers and/or their staff who would like to refer a patient to [facility], please contact us through our one-stop-shop provider referral line, [facility], at [phone].    If you feel you have received this communication in error or would no longer like to receive these types of communications, please e-mail [email or web address]

## 2018-05-23 ENCOUNTER — TELEPHONE (OUTPATIENT)
Dept: DERMATOLOGY | Facility: CLINIC | Age: 78
End: 2018-05-23

## 2018-05-23 DIAGNOSIS — Z79.899 ENCOUNTER FOR LONG-TERM (CURRENT) USE OF HIGH-RISK MEDICATION: Primary | ICD-10-CM

## 2018-05-23 NOTE — TELEPHONE ENCOUNTER
LVM for pt to return call to [facility] and f/u, Rx has been mailed out to pt for the generic-Acitretin.

## 2018-05-28 DIAGNOSIS — L40.9 PSORIASIS: ICD-10-CM

## 2018-05-28 RX ORDER — ACITRETIN 10 MG/1
CAPSULE ORAL
Qty: 180 CAPSULE | Refills: 0 | Status: SHIPPED | OUTPATIENT
Start: 2018-05-28 | End: 2018-07-09

## 2018-05-30 ENCOUNTER — OFFICE VISIT (OUTPATIENT)
Dept: PODIATRY | Facility: CLINIC | Age: 78
End: 2018-05-30
Payer: COMMERCIAL

## 2018-05-30 ENCOUNTER — OFFICE VISIT (OUTPATIENT)
Dept: INTERNAL MEDICINE | Facility: CLINIC | Age: 78
End: 2018-05-30
Payer: COMMERCIAL

## 2018-05-30 VITALS
HEART RATE: 73 BPM | SYSTOLIC BLOOD PRESSURE: 108 MMHG | DIASTOLIC BLOOD PRESSURE: 62 MMHG | WEIGHT: 214.5 LBS | BODY MASS INDEX: 35.74 KG/M2 | HEIGHT: 65 IN

## 2018-05-30 VITALS
DIASTOLIC BLOOD PRESSURE: 73 MMHG | HEIGHT: 65 IN | BODY MASS INDEX: 35.82 KG/M2 | WEIGHT: 215 LBS | RESPIRATION RATE: 18 BRPM | SYSTOLIC BLOOD PRESSURE: 143 MMHG | HEART RATE: 85 BPM

## 2018-05-30 DIAGNOSIS — E11.9 TYPE 2 DIABETES MELLITUS WITHOUT RETINOPATHY: ICD-10-CM

## 2018-05-30 DIAGNOSIS — L84 CORN OR CALLUS: ICD-10-CM

## 2018-05-30 DIAGNOSIS — E11.49 TYPE II DIABETES MELLITUS WITH NEUROLOGICAL MANIFESTATIONS: Primary | ICD-10-CM

## 2018-05-30 DIAGNOSIS — M85.80 OSTEOPENIA, UNSPECIFIED LOCATION: ICD-10-CM

## 2018-05-30 DIAGNOSIS — I25.10 CORONARY ARTERY DISEASE INVOLVING NATIVE CORONARY ARTERY OF NATIVE HEART WITHOUT ANGINA PECTORIS: ICD-10-CM

## 2018-05-30 DIAGNOSIS — E03.9 ACQUIRED HYPOTHYROIDISM: ICD-10-CM

## 2018-05-30 DIAGNOSIS — I10 ESSENTIAL HYPERTENSION: Primary | ICD-10-CM

## 2018-05-30 DIAGNOSIS — B35.1 ONYCHOMYCOSIS DUE TO DERMATOPHYTE: ICD-10-CM

## 2018-05-30 PROCEDURE — 11721 DEBRIDE NAIL 6 OR MORE: CPT | Mod: 59,Q9,S$GLB, | Performed by: PODIATRIST

## 2018-05-30 PROCEDURE — 99999 PR PBB SHADOW E&M-EST. PATIENT-LVL III: CPT | Mod: PBBFAC,,, | Performed by: PODIATRIST

## 2018-05-30 PROCEDURE — 99999 PR PBB SHADOW E&M-EST. PATIENT-LVL V: CPT | Mod: PBBFAC,,, | Performed by: FAMILY MEDICINE

## 2018-05-30 PROCEDURE — 3074F SYST BP LT 130 MM HG: CPT | Mod: CPTII,S$GLB,, | Performed by: FAMILY MEDICINE

## 2018-05-30 PROCEDURE — 11056 PARNG/CUTG B9 HYPRKR LES 2-4: CPT | Mod: Q9,S$GLB,, | Performed by: PODIATRIST

## 2018-05-30 PROCEDURE — 99214 OFFICE O/P EST MOD 30 MIN: CPT | Mod: S$GLB,,, | Performed by: FAMILY MEDICINE

## 2018-05-30 PROCEDURE — 3078F DIAST BP <80 MM HG: CPT | Mod: CPTII,S$GLB,, | Performed by: FAMILY MEDICINE

## 2018-05-30 PROCEDURE — 99499 UNLISTED E&M SERVICE: CPT | Mod: S$GLB,,, | Performed by: PODIATRIST

## 2018-05-30 RX ORDER — ALENDRONATE SODIUM 70 MG/1
70 TABLET ORAL
Qty: 4 TABLET | Refills: 6 | Status: SHIPPED | OUTPATIENT
Start: 2018-05-30

## 2018-05-30 NOTE — PROGRESS NOTES
Subjective:      Patient ID: Krissy Marino is a 78 y.o. female.    Chief Complaint: PCP (Og Cid MD 5/30/18); Diabetic Foot Exam; Nail Care; and Foot Problem ([place])    Krissy is a 78 y.o. female who presents to the clinic for evaluation and treatment of high risk feet. Krissy has a past medical history of Adverse effect of glucocorticoid or synthetic analogue; Allergy; Arthritis; Cancer; CHF (congestive heart failure); Chronic kidney disease; Coronary artery disease involving native coronary artery of native heart without angina pectoris (10/11/2016); Diabetic neuropathy (10/6/2014); Giant cell arteritis (9/24/2012); Hyperlipidemia; Hypertension; Hypothyroid; Obesity (BMI 30-39.9) (10/6/2014); Osteopenia; Primary osteoarthritis of both knees (9/24/2012); and Type II or unspecified type diabetes mellitus with renal manifestations, uncontrolled(250.42). The patient's chief complaint is long, thick toenails        PCP: Og Cid MD    Date Last Seen by PCP:   Chief Complaint   Patient presents with    PCP     Og Cid MD 5/30/18    Diabetic Foot Exam    Nail Care    Foot Problem     [place]     Current shoe gear: Doctor[employer]     Hemoglobin A1C   Date Value Ref Range Status   11/21/2017 7.2 (H) 4.0 - 5.6 % Final     Comment:     According to ADA guidelines, hemoglobin A1c <7.0% represents  optimal control in non-pregnant diabetic patients. Different  metrics may apply to specific patient populations.   Standards of Medical Care in Diabetes-2016.  For the purpose of screening for the presence of diabetes:  <5.7%     Consistent with the absence of diabetes  5.7-6.4%  Consistent with increasing risk for diabetes   (prediabetes)  >or=6.5%  Consistent with diabetes  Currently, no consensus exists for use of hemoglobin A1c  for diagnosis of diabetes for children.  This Hemoglobin A1c assay has significant interference with fetal   hemoglobin   (HbF). The results are invalid for patients with abnormal  amounts of   HbF,   including those with known Hereditary Persistence   of Fetal Hemoglobin. Heterozygous hemoglobin variants (HbAS, HbAC,   HbAD, HbAE, HbA2) do not significantly interfere with this assay;   however, presence of multiple variants in a sample may impact the %   interference.     06/24/2017 6.2 (H) 4.0 - 5.6 % Final     Comment:     According to ADA guidelines, hemoglobin A1c <7.0% represents  optimal control in non-pregnant diabetic patients. Different  metrics may apply to specific patient populations.   Standards of Medical Care in Diabetes-2016.  For the purpose of screening for the presence of diabetes:  <5.7%     Consistent with the absence of diabetes  5.7-6.4%  Consistent with increasing risk for diabetes   (prediabetes)  >or=6.5%  Consistent with diabetes  Currently, no consensus exists for use of hemoglobin A1c  for diagnosis of diabetes for children.  This Hemoglobin A1c assay has significant interference with fetal   hemoglobin   (HbF). The results are invalid for patients with abnormal amounts of   HbF,   including those with known Hereditary Persistence   of Fetal Hemoglobin. Heterozygous hemoglobin variants (HbAS, HbAC,   HbAD, HbAE, HbA2) do not significantly interfere with this assay;   however, presence of multiple variants in a sample may impact the %   interference.     11/25/2016 7.1 (H) 4.5 - 6.2 % Final     Comment:     According to ADA guidelines, hemoglobin A1C <7.0% represents  optimal control in non-pregnant diabetic patients.  Different  metrics may apply to specific populations.   Standards of Medical Care in Diabetes - 2016.  For the purpose of screening for the presence of diabetes:  <5.7%     Consistent with the absence of diabetes  5.7-6.4%  Consistent with increasing risk for diabetes   (prediabetes)  >or=6.5%  Consistent with diabetes  Currently no consensus exists for use of hemoglobin A1C  for diagnosis of diabetes for children.           Review of Systems    Constitution: Negative for chills, decreased appetite and fever.   Cardiovascular: Negative for leg swelling.   Skin: Positive for dry skin and nail changes. Negative for color change, flushing, itching and poor wound healing.   Musculoskeletal: Positive for arthritis. Negative for back pain, gout, joint pain, joint swelling and myalgias.   Gastrointestinal: Negative for nausea and vomiting.   Neurological: Positive for numbness and paresthesias. Negative for loss of balance.           Objective:      Physical Exam   Constitutional: She is oriented to person, place, and time. She appears well-developed and well-nourished.   Cardiovascular:   Pulses:       Dorsalis pedis pulses are 1+ on the right side, and 1+ on the left side.        Posterior tibial pulses are 1+ on the right side, and 1+ on the left side.   Musculoskeletal: Normal range of motion. She exhibits edema (mild).        Right ankle: Normal.        Left ankle: Normal.        Right foot: There is no swelling, no crepitus and no deformity.        Left foot: There is no swelling, no crepitus and no deformity.   Adequate joint range of motion without pain, limitation, nor crepitation Bilateral feet and ankle joints. Muscle strength is 5/5 in all groups bilaterally.    Rigid hammertoe deformity L 2nd digit    1st MPJ exostosis w/ medial deviation of hallux, non trackbound. No pain w/ ROM to R hallux    Atrophy of fat  Pad from bilateral foot with easily palpable bone       Lymphadenopathy:   No palpable lymph nodes   Neurological: She is alert and oriented to person, place, and time. She has normal strength.   Sharp dull sensation diminished Light touch sensation diminished    Skin: Skin is warm, dry and intact. No abrasion, no bruising, no lesion, no petechiae and no rash noted. She is not diaphoretic. No pallor. Nails show no clubbing.   Nails 1-5 R, 2-5 L   are elongated by  3-6mm's, thickened by 2-5 mm's, dystrophic, and are darkened in  coloration .  Xerosis Bilaterally. No open lesions noted.      Hyperkeratotic tissue noted to distal 2nd toe b/l      Psychiatric: She has a normal mood and affect. Her behavior is normal. Judgment normal.   Nursing note and vitals reviewed.            Assessment:       Encounter Diagnoses   Name Primary?    Type II diabetes mellitus with neurological manifestations Yes    Onychomycosis due to dermatophyte     Corn or callus          Plan:       Krissy was seen today for pcp, diabetic foot exam, nail care and foot problem.    Diagnoses and all orders for this visit:    Type II diabetes mellitus with neurological manifestations    Onychomycosis due to dermatophyte    Corn or callus      I counseled the patient on her conditions, their implications and medical management.    - Shoe inspection. Diabetic Foot Education. Patient reminded of the importance of good nutrition and blood sugar control to help prevent podiatric complications of diabetes. Patient instructed on proper foot hygeine. We discussed wearing proper shoe gear, daily foot inspections, never walking without protective shoe gear, never putting sharp instruments to feet, routine podiatric nail visits every 2-3 months.      - With patient's permission, nails were aggressively reduced and debrided x 9 to their soft tissue attachment mechanically and with electric , removing all offending nail and debris. Patient relates relief following the procedure. She will continue to monitor the areas daily, inspect her feet, wear protective shoe gear when ambulatory, moisturizer to maintain skin integrity and follow in this office in approximately 2-3 months, sooner p.r.n.    - After cleansing the  area w/ alcohol prep pad the above mentioned hyperkeratosis was trimmed utilizing No 15 scapel, to a smooth base with out incident. Patient tolerated this  well and reported comfort to the area of   Distal 2nd toe b/l

## 2018-05-30 NOTE — PROGRESS NOTES
Subjective:       Patient ID: Krissy Marino is a 78 y.o. female.    Chief Complaint: Follow-up (3mo.)  Krissy Marino 78 y.o. female is here for office visit to review care and physical exam,  HPI  Review of Systems   Constitutional: Negative for activity change, fatigue, fever and unexpected weight change.   HENT: Negative for congestion, hearing loss, postnasal drip and rhinorrhea.    Eyes: Negative for redness and visual disturbance.   Respiratory: Negative for chest tightness, shortness of breath and wheezing.    Cardiovascular: Negative for chest pain, palpitations and leg swelling.   Gastrointestinal: Negative for abdominal distention.   Genitourinary: Negative for decreased urine volume, dysuria, flank pain, hematuria, pelvic pain and urgency.   Musculoskeletal: Negative for back pain, gait problem, joint swelling and neck stiffness.   Skin: Negative for color change, rash and wound.   Neurological: Negative for dizziness, syncope, weakness and headaches.   Psychiatric/Behavioral: Negative for behavioral problems, confusion and sleep disturbance. The patient is not nervous/anxious.        Objective:      Physical Exam   Constitutional: She is oriented to person, place, and time. She appears well-developed and well-nourished. No distress.   HENT:   Head: Normocephalic.   Mouth/Throat: No oropharyngeal exudate.   Eyes: EOM are normal. Pupils are equal, round, and reactive to light. No scleral icterus.   Neck: Neck supple. No JVD present. No thyromegaly present.   Cardiovascular: Normal rate, regular rhythm and normal heart sounds.  Exam reveals no gallop and no friction rub.    No murmur heard.  Pulmonary/Chest: Effort normal and breath sounds normal. She has no wheezes. She has no rales.   Abdominal: Soft. Bowel sounds are normal. She exhibits no distension and no mass. There is no tenderness. There is no guarding.   Musculoskeletal: Normal range of motion. She exhibits no edema.   Lymphadenopathy:     She  has no cervical adenopathy.   Neurological: She is alert and oriented to person, place, and time. She has normal reflexes. She displays normal reflexes. No cranial nerve deficit. She exhibits normal muscle tone.   Skin: Skin is warm. No rash noted. No erythema.   Psychiatric: She has a normal mood and affect. Thought content normal.       Assessment:       1. Essential hypertension    2. Acquired hypothyroidism    3. Type 2 diabetes mellitus without retinopathy    4. Coronary artery disease involving native coronary artery of native heart without angina pectoris    5. Osteopenia, unspecified location        Plan:       Krissy was seen today for follow-up.    Diagnoses and all orders for this visit:    Essential hypertension  - Follow  Acquired hypothyroidism  - Reviewed  Type 2 diabetes mellitus without retinopathy  -     Hemoglobin A1c; Future    Coronary artery disease involving native coronary artery of native heart without angina pectoris  - Control risk  Osteopenia, unspecified location  -     alendronate (FOSAMAX) 70 MG tablet; Take 1 tablet (70 mg total) by mouth every 7 days.

## 2018-06-15 ENCOUNTER — TELEPHONE (OUTPATIENT)
Dept: INTERNAL MEDICINE | Facility: CLINIC | Age: 78
End: 2018-06-15

## 2018-06-15 NOTE — TELEPHONE ENCOUNTER
----- Message from Dada Chisholm sent at 6/15/2018 11:56 AM CDT -----  Contact: self/[phone]  Pt is calling to speak with someone in the office in regards to getting something sent into the pharmacy. Pt states that she has been very tired lately. Please advise.      Thanks

## 2018-06-26 ENCOUNTER — NURSE TRIAGE (OUTPATIENT)
Dept: ADMINISTRATIVE | Facility: CLINIC | Age: 78
End: 2018-06-26

## 2018-06-26 ENCOUNTER — TELEPHONE (OUTPATIENT)
Dept: CARDIOLOGY | Facility: CLINIC | Age: 78
End: 2018-06-26

## 2018-06-26 NOTE — TELEPHONE ENCOUNTER
Pt said that she said that she had called her PCP last week and c/o just feeling bad and very tired,she said that she can not explain it but it has been going on for over a week. Denies SOB and states she had swelling to LE's ,this is not new.Denies c/p.Instructed her that I did that she called her PCP on 6/15/18 and she was to schedule an an appt with UC [place].Pt said she was unaware of this.Advised the pt to schedule an UC appt.Call transfered

## 2018-06-26 NOTE — TELEPHONE ENCOUNTER
----- Message from Bibi Gunter MA sent at 6/26/2018  8:13 AM CDT -----  Contact: patient called   Lamar please call the patient at [phone] she need  To talk to you about her sob/ weak last visit was on 2- . Thank you.

## 2018-06-26 NOTE — TELEPHONE ENCOUNTER
Reason for Disposition   MODERATE weakness (i.e., interferes with work, school, normal activities) and persists > 3 days    Protocols used: ST WEAKNESS (GENERALIZED) AND FATIGUE-A-OH    Krissy is calling to speak with Dr. Kohler.  States she has been feeling weak and tired like she was before her stents.  Is wondering if she should have a stress test or echocardiogram or maybe if her meds need to be adjusted.  Please contact Krissy to advise.  She can be reached on her cell phone.

## 2018-06-27 ENCOUNTER — TELEPHONE (OUTPATIENT)
Dept: CARDIAC CATH/INVASIVE PROCEDURES | Facility: HOSPITAL | Age: 78
End: 2018-06-27

## 2018-06-27 DIAGNOSIS — R06.09 DOE (DYSPNEA ON EXERTION): Primary | ICD-10-CM

## 2018-06-27 NOTE — TELEPHONE ENCOUNTER
Called Ms. Marino who is having worsening exertional SOB and fatigue for the past week or so. Very similar to her symptoms prior to her angiogram. No CP though. Still able to perform ADL/IADL but noticing that she has to rest 2-3 mins longer than normal. Has prior PCI to LAD with normal PET afterwards.     WIll order basic lab work and SAMUEL as she is still able to walk at work. BP and weights stable per patient.

## 2018-06-30 ENCOUNTER — LAB VISIT (OUTPATIENT)
Dept: LAB | Facility: HOSPITAL | Age: 78
End: 2018-06-30
Attending: DERMATOLOGY
Payer: COMMERCIAL

## 2018-06-30 DIAGNOSIS — Z79.899 ENCOUNTER FOR LONG-TERM (CURRENT) USE OF HIGH-RISK MEDICATION: ICD-10-CM

## 2018-06-30 LAB
CHOLEST SERPL-MCNC: 112 MG/DL
CHOLEST/HDLC SERPL: 3.5 {RATIO}
HDLC SERPL-MCNC: 32 MG/DL
HDLC SERPL: 28.6 %
LDLC SERPL CALC-MCNC: 53.8 MG/DL
NONHDLC SERPL-MCNC: 80 MG/DL
TRIGL SERPL-MCNC: 131 MG/DL

## 2018-06-30 PROCEDURE — 80061 LIPID PANEL: CPT

## 2018-06-30 PROCEDURE — 36415 COLL VENOUS BLD VENIPUNCTURE: CPT | Mod: PO

## 2018-07-06 ENCOUNTER — TELEPHONE (OUTPATIENT)
Dept: CARDIOLOGY | Facility: CLINIC | Age: 78
End: 2018-07-06

## 2018-07-06 DIAGNOSIS — R60.9 SWELLING: Primary | ICD-10-CM

## 2018-07-06 DIAGNOSIS — E78.5 HYPERLIPIDEMIA: ICD-10-CM

## 2018-07-06 RX ORDER — ATORVASTATIN CALCIUM 40 MG/1
TABLET, FILM COATED ORAL
Qty: 90 TABLET | Refills: 0 | Status: SHIPPED | OUTPATIENT
Start: 2018-07-06 | End: 2018-07-25 | Stop reason: SDUPTHER

## 2018-07-06 NOTE — TELEPHONE ENCOUNTER
Spoke with the pt - says that she is having sob with exertion, and swelling of her feet.  Advised the pt to go to the ED - refuses to go - says she is not that bad.  Is requesting an appt. Scheduled the pt for an ekg and an appt with Dr. Patterson on Monday. Advised the pt to go to the ED prior to the appt if her symptoms worsen and she verbalized understanding.

## 2018-07-06 NOTE — TELEPHONE ENCOUNTER
----- Message from Iram Cummingst sent at 7/6/2018 11:17 AM CDT -----  Contact: pt [phone]   Pt called to schedule her stress test, labs and doctor visit. She says she has SOB with exertion when walking, and her feet are very swollen. She only wants to see Dr. Kohler.  LOV 2/28/18 Dr. Kohler    Thanks

## 2018-07-09 ENCOUNTER — OFFICE VISIT (OUTPATIENT)
Dept: DERMATOLOGY | Facility: CLINIC | Age: 78
End: 2018-07-09
Payer: COMMERCIAL

## 2018-07-09 ENCOUNTER — HOSPITAL ENCOUNTER (OUTPATIENT)
Dept: CARDIOLOGY | Facility: CLINIC | Age: 78
Discharge: HOME | End: 2018-07-09
Payer: COMMERCIAL

## 2018-07-09 ENCOUNTER — OFFICE VISIT (OUTPATIENT)
Dept: CARDIOLOGY | Facility: CLINIC | Age: 78
End: 2018-07-09
Payer: COMMERCIAL

## 2018-07-09 VITALS
SYSTOLIC BLOOD PRESSURE: 135 MMHG | BODY MASS INDEX: 36.58 KG/M2 | WEIGHT: 219.56 LBS | HEART RATE: 97 BPM | HEIGHT: 65 IN | OXYGEN SATURATION: 96 % | DIASTOLIC BLOOD PRESSURE: 68 MMHG

## 2018-07-09 DIAGNOSIS — E78.5 DYSLIPIDEMIA: ICD-10-CM

## 2018-07-09 DIAGNOSIS — D48.5 NEOPLASM OF UNCERTAIN BEHAVIOR OF SKIN: Primary | ICD-10-CM

## 2018-07-09 DIAGNOSIS — I10 ESSENTIAL HYPERTENSION: ICD-10-CM

## 2018-07-09 DIAGNOSIS — R06.09 DYSPNEA ON EXERTION: ICD-10-CM

## 2018-07-09 DIAGNOSIS — I83.893 VARICOSE VEINS OF BOTH LEGS WITH EDEMA: Primary | ICD-10-CM

## 2018-07-09 DIAGNOSIS — R60.9 SWELLING: ICD-10-CM

## 2018-07-09 DIAGNOSIS — I50.43 ACUTE ON CHRONIC COMBINED SYSTOLIC AND DIASTOLIC HEART FAILURE: ICD-10-CM

## 2018-07-09 DIAGNOSIS — L40.9 PSORIASIS: ICD-10-CM

## 2018-07-09 DIAGNOSIS — I25.10 CORONARY ARTERY DISEASE INVOLVING NATIVE CORONARY ARTERY OF NATIVE HEART WITHOUT ANGINA PECTORIS: ICD-10-CM

## 2018-07-09 PROCEDURE — 88305 TISSUE EXAM BY PATHOLOGIST: CPT | Performed by: PATHOLOGY

## 2018-07-09 PROCEDURE — 99999 PR PBB SHADOW E&M-EST. PATIENT-LVL II: CPT | Mod: PBBFAC,,, | Performed by: DERMATOLOGY

## 2018-07-09 PROCEDURE — 99214 OFFICE O/P EST MOD 30 MIN: CPT | Mod: S$GLB,,, | Performed by: STUDENT IN AN ORGANIZED HEALTH CARE EDUCATION/TRAINING PROGRAM

## 2018-07-09 PROCEDURE — 3075F SYST BP GE 130 - 139MM HG: CPT | Mod: CPTII,S$GLB,, | Performed by: STUDENT IN AN ORGANIZED HEALTH CARE EDUCATION/TRAINING PROGRAM

## 2018-07-09 PROCEDURE — 11100 PR BIOPSY OF SKIN LESION: CPT | Mod: S$GLB,,, | Performed by: DERMATOLOGY

## 2018-07-09 PROCEDURE — 99999 PR PBB SHADOW E&M-EST. PATIENT-LVL V: CPT | Mod: PBBFAC,,, | Performed by: STUDENT IN AN ORGANIZED HEALTH CARE EDUCATION/TRAINING PROGRAM

## 2018-07-09 PROCEDURE — 93000 ELECTROCARDIOGRAM COMPLETE: CPT | Mod: S$GLB,,, | Performed by: INTERNAL MEDICINE

## 2018-07-09 PROCEDURE — 99214 OFFICE O/P EST MOD 30 MIN: CPT | Mod: 25,S$GLB,, | Performed by: DERMATOLOGY

## 2018-07-09 PROCEDURE — 3078F DIAST BP <80 MM HG: CPT | Mod: CPTII,S$GLB,, | Performed by: STUDENT IN AN ORGANIZED HEALTH CARE EDUCATION/TRAINING PROGRAM

## 2018-07-09 PROCEDURE — 88342 IMHCHEM/IMCYTCHM 1ST ANTB: CPT | Mod: 26,,, | Performed by: PATHOLOGY

## 2018-07-09 RX ORDER — FUROSEMIDE 40 MG/1
40 TABLET ORAL DAILY
Qty: 30 TABLET | Refills: 11 | Status: SHIPPED | OUTPATIENT
Start: 2018-07-09 | End: 2018-07-09 | Stop reason: SDUPTHER

## 2018-07-09 NOTE — PROGRESS NOTES
Subjective:       Patient ID:  Krissy Marino is a 78 y.o. female who presents for   Chief Complaint   Patient presents with    Psoriasis     not improving, currently flare      Patient complains of lesion(s)  Location: left cheek  Duration: 2 months  Symptoms: raised and hard  Relieving factors/Previous treatments: none    This is a high risk patient here to check for the development of new lesions.  H/o nmsc many years ago        Psoriasis  - Follow-up  Symptom course: worsening  Currently using: only TAC cream prn. d/c soriatane few months ago as insurance stopped covering Rx per pt.  Affected locations: right buttock, left buttock, right lower leg and right arm  Signs / symptoms: asymptomatic        Review of Systems   Constitutional: Negative for fever and chills.   HENT: Negative for nosebleeds, sore throat and headaches.    Eyes: Negative for visual change.   Respiratory: Negative for cough and shortness of breath.    Gastrointestinal: Negative for nausea, vomiting, abdominal pain and diarrhea.   Musculoskeletal: Negative for myalgias, joint swelling and arthralgias.   Skin: Positive for rash (flared in past 3 months), dry skin, daily sunscreen use, activity-related sunscreen use and dry lips (occ). Negative for itching, sun sensitivity, recent sunburn and abscesses.   Neurological: Negative for focal weakness, seizures, numbness and headaches.   Psychiatric/Behavioral: Negative for depressed mood.   Hematologic/Lymphatic: Bruises/bleeds easily (takes aspirin).        Objective:    Physical Exam   Constitutional: She appears well-developed and well-nourished. She is obese.  No distress.   Neurological: She is alert and oriented to person, place, and time. She is not disoriented.   Psychiatric: She has a normal mood and affect.   Skin:   Areas Examined (abnormalities noted in diagram):   Scalp / Hair Palpated and Inspected  Head / Face Inspection Performed  Neck Inspection Performed  Chest / Axilla  Inspection Performed  Abdomen Inspection Performed  Genitals / Buttocks / Groin Inspection Performed  Back Inspection Performed  RUE Inspected  LUE Inspection Performed  RLE Inspected  LLE Inspection Performed  Nails and Digits Inspection Performed                   Diagram Legend     Erythematous scaling macule/papule c/w actinic keratosis       Vascular papule c/w angioma      Pigmented verrucoid papule/plaque c/w seborrheic keratosis      Yellow umbilicated papule c/w sebaceous hyperplasia      Irregularly shaped tan macule c/w lentigo     1-2 mm smooth white papules consistent with Milia      Movable subcutaneous cyst with punctum c/w epidermal inclusion cyst      Subcutaneous movable cyst c/w pilar cyst      Firm pink to brown papule c/w dermatofibroma      Pedunculated fleshy papule(s) c/w skin tag(s)      Evenly pigmented macule c/w junctional nevus     Mildly variegated pigmented, slightly irregular-bordered macule c/w mildly atypical nevus      Flesh colored to evenly pigmented papule c/w intradermal nevus       Pink pearly papule/plaque c/w basal cell carcinoma      Erythematous hyperkeratotic cursted plaque c/w SCC      Surgical scar with no sign of skin cancer recurrence      Open and closed comedones      Inflammatory papules and pustules      Verrucoid papule consistent consistent with wart     Erythematous eczematous patches and plaques     Dystrophic onycholytic nail with subungual debris c/w onychomycosis     Umbilicated papule    Erythematous-base heme-crusted tan verrucoid plaque consistent with inflamed seborrheic keratosis     Erythematous Silvery Scaling Plaque c/w Psoriasis     See annotation          Assessment / Plan:      Pathology Orders:     Normal Orders This Visit    Tissue Specimen To Pathology, Dermatology     Questions:    Directional Terms:  Other(comment)    Clinical information:  r/o hak vs other    Specific Site:  left cheek        Neoplasm of uncertain behavior of skin  -      Tissue Specimen To Pathology, Dermatology  Shave biopsy procedure note:    Shave biopsy performed after verbal consent including risk of infection, scar, recurrence, need for additional treatment of site. Area prepped with alcohol, anesthetized with approximately 1.0cc of 1% lidocaine with epinephrine. Lesional tissue shaved with razor blade. Hemostasis achieved with application of aluminum chloride followed by hyfrecation. No complications. Dressing applied. Wound care explained.        Psoriasis      Psoriasis - off soriatane; flaring  Today's Plan:      Discussed benefits and risks of MTX therapy including but not limited to oral ulcers, GI upset, fatigue, bone marrow suppression, pulmonary fibrosis, hepatotoxicity.  MTX brochure discussed and provided to patient.  Patient expresses understanding.    Check baseline Hep B, Hep C and Quantiferon gold.     If labs are WNL, start MTX at 10mg qwk taken in divided dose each week, Folic acid 1mg qd except on day taking MTX. -[facility]    Will need CBC at 2 - 4 weeks after first dose then will need CBC and LFTs q 3 months and CMP q 6 months. Will need Quantiferon gold annually.    Cont TAC cream bid    F/u 1 month with cbc and lft's        Follow-up in about 6 weeks (around 8/20/2018).

## 2018-07-09 NOTE — ASSESSMENT & PLAN NOTE
- NYHA Class II SOB  - 3+ LE edema  - weight increased from 212 lbs (2/28/18) to 219 lbs today  - increase lasix to 40 mg BID (from 20 mg QD) then transition to 40 mg QD  - BNP now  - BMP in 1 week  - 2 gm Na diet advised  - measure weight daily; if weight goes by 3 lbs in 1 day or 5 lbs in 1 week - notify clinic

## 2018-07-09 NOTE — ASSESSMENT & PLAN NOTE
- s/p PCI of proximal and mid LAD with PCI with OSMEL (10/10/16) due to abnormal CPET stress (9/7/16)  - CPET 11/2/16 negative for ischemia  - c/w ASA, toprol XL, losartan, lipitor  - SAMUEL scheduled for tomorrow

## 2018-07-09 NOTE — PROGRESS NOTES
Subjective:   Patient ID:  Krissy Marino is a 78 y.o. female who presents for evaluation of Shortness of Breath (with exertion); Leg Swelling; and Fatigue      HPI: Ms. Marino is an 78 year old white female who has followed Dr. Tao in cardiology clinic in the past who has a PMH of HTN, DM, HLD and ICM with LVEF 30-35% (now recovered), CHF, varicose veins with venous insufficiency, CAD s/p PCI of proximal and mid LAD with OSMEL (10/10/16). Had a repeat CPET (11/2/16) showing improved LVEF and no evidence of ischemia. When she had seen Dr. Tao in clinic on 2/28/18, she reported no limiting symptoms and was undergoing pre-op evaluation for left eyelid sx. On 6/27/18, Dr. Tao had called Ms. Marino who reported worsening exertional SOB and fatigue for the past week or so. Very similar to her symptoms prior to her angiogram. No CP though. She reported still able to perform ADL/IADL but noticing that she has to rest 2-3 mins longer than normal. He recommended a SAMUEL to r/o ischemia.     Today, Ms. Marino continues to report dyspnea on exertion (NYHA Class II) and weakness for the last 6 weeks. Over the last 2 weeks, she also reported progressively worsening LE swelling prohibiting her from being able to put on her shoes. She reported right sided chest pain that occurred twice yesterday. Describes it as a heaviness, lasting 5 minutes, however was mild. Did not take any nitroglycerin. She denies orthopnea or PND however she reports she has been sleeping on her recliner chain on a 45 degree inclined position. Uses cane to walk. Lives with son. She denies smoking cigarettes, alcohol, or recreational drug use. Her weight today is 219 lbs (up from 212 lbs on last cardiology clinic visit).      EKG (7/9/18): NSR, rate 96, PCs, left axis, poor R wave progression, non-specific ST abnormality (no major changes from past EKG)  EKG (6/19/17): NSR, rate 85, LAD, non-specific ST/T wave abnormalities    Past Medical History:    Diagnosis Date    Adverse effect of glucocorticoid or synthetic analogue     Allergy     Arthritis     Cancer     CHF (congestive heart failure)     Chronic kidney disease     Coronary artery disease involving native coronary artery of native heart without angina pectoris 10/11/2016    Diabetic neuropathy 10/6/2014    Giant cell arteritis 9/24/2012    Hyperlipidemia     Hypertension     Hypothyroid     Obesity (BMI 30-39.9) 10/6/2014    Osteopenia     Primary osteoarthritis of both knees 9/24/2012    Type II or unspecified type diabetes mellitus with renal manifestations, uncontrolled(250.42)        Past Surgical History:   Procedure Laterality Date    APPENDECTOMY      CATARACT EXTRACTION      CATARACT EXTRACTION, BILATERAL      CHOLECYSTECTOMY      EYE SURGERY      HYSTERECTOMY      JOINT REPLACEMENT      bilateral total knee    SKIN BIOPSY      TONSILLECTOMY         Social History     Social History    Marital status:      Spouse name:     Number of children: N/A    Years of education: N/A     Occupational History      [employer]     Social History Main Topics    Smoking status: Never Smoker    Smokeless tobacco: Never Used    Alcohol use No    Drug use: No    Sexual activity: No     Other Topics Concern    Are You Pregnant Or Think You May Be? No    Breast-Feeding No     Social History Narrative    None       Family History   Problem Relation Age of Onset    Diabetes Mother     Hypertension Mother     Hypertension Father     Kidney disease Neg Hx     Eczema Neg Hx     Psoriasis Neg Hx     Melanoma Neg Hx     Lupus Neg Hx     Acne Neg Hx        Patient's Medications   New Prescriptions    FUROSEMIDE (LASIX) 40 MG TABLET    Take 1 tablet (40 mg total) by mouth once daily. For 1 week take twice daily then once daily.   Previous Medications    ALBUTEROL 90 MCG/ACTUATION INHALER    Inhale 2 puffs into the lungs every 6 (six) hours as needed  for Wheezing.    ALENDRONATE (FOSAMAX) 70 MG TABLET    Take 1 tablet (70 mg total) by mouth every 7 days.    ALLOPURINOL (ZYLOPRIM) 300 MG TABLET    TAKE 1 TABLET(300 MG) BY MOUTH EVERY DAY    ASPIRIN (ECOTRIN) 81 MG EC TABLET    Take 1 tablet (81 mg total) by mouth once daily.    ATORVASTATIN (LIPITOR) 40 MG TABLET    TAKE 1 TABLET(40 MG) BY MOUTH EVERY DAY    AZELASTINE (ASTELIN) 137 MCG (0.1 %) NASAL SPRAY    USE 1 SPRAY(137 MCG) IN EACH NOSTRIL TWICE DAILY    BLOOD SUGAR DIAGNOSTIC (ACCU-CHEK AMELIA PLUS TEST STRP) STRP    Checks bg 2 x day before meals    CETIRIZINE (ZYRTEC) 10 MG TABLET    Take 1 tablet (10 mg total) by mouth once daily.    CICLOPIROX (PENLAC) 8 % SOLN    APPLY EXTERNALLY TO THE AFFECTED AREA EVERY NIGHT    DESONIDE (DESOWEN) 0.05 % CREAM    AAA bid    FERROUS GLUCONATE (FERATE ORAL)    Take by mouth once daily at 6am.     HYDROCODONE-ACETAMINOPHEN 5-325MG (NORCO) 5-325 MG PER TABLET    Take 1 tablet by mouth every 6 (six) hours as needed for Pain.    HYDROCORTISONE BUTYRATE (LOCOID) 0.1 % CREA CREAM    AAA buttock bid    KRILL/OM3/DHA/EPA/OM6/LIP/ASTX (KRILL OIL, OMEGA 3 & 6, ORAL)    Take by mouth once daily at 6am.     LEVOTHYROXINE (SYNTHROID) 75 MCG TABLET    TAKE 1 TABLET BY MOUTH BEFORE BREAKFAST    LIDOCAINE HCL 2% (XYLOCAINE) 2 % JELLY    APPLY TOPICALLY EVERY DAY AS NEEDED    LOSARTAN (COZAAR) 25 MG TABLET    Take 0.5 tablets (12.5 mg total) by mouth once daily.    METOPROLOL SUCCINATE (TOPROL-XL) 50 MG 24 HR TABLET    Take 1 tablet (50 mg total) by mouth once daily.    MICONAZOLE NITRATE 2 % (ZEASORB AF) 2 % TOP POWDER    Apply topically as needed for Itching.    MOMETASONE (NASONEX) 50 MCG/ACTUATION NASAL SPRAY    2 sprays by Nasal route once daily.    NYSTATIN-TRIAMCINOLONE (MYCOLOG II) CREAM    Apply topically 2 (two) times daily.    OLOPATADINE (PATANOL) 0.1 % OPHTHALMIC SOLUTION    Place 1 drop into the right eye 2 (two) times daily.    RANITIDINE (ZANTAC) 150 MG TABLET    Take  "1 tablet (150 mg total) by mouth 2 (two) times daily as needed for Heartburn.    SILVER SULFADIAZINE 1% (SILVADENE) 1 % CREAM    aaa buttock bid    SYMBICORT 160-4.5 MCG/ACTUATION HFAA    INHALE 2 PUFFS BY MOUTH INTO THE LUNGS EVERY 12 HOURS    TRIAMCINOLONE ACETONIDE 0.1% (KENALOG) 0.1 % CREAM    Apply topically 2 (two) times daily. Apply to affected area   Modified Medications    No medications on file   Discontinued Medications    ACITRETIN (SORIATANE) 10 MG CAPSULE    Take 2 po qday    BIOTIN ORAL    Take by mouth.    CHOLECALCIFEROL, VITAMIN D3, 50,000 UNIT CAPSULE    Take 1 capsule (50,000 Units total) by mouth twice a week.    DIAZEPAM (VALIUM) 10 MG TAB    Take 1 tablet (10 mg total) by mouth once. Take 1 tablet 60 minute prior to procedure.    DICLOFENAC SODIUM 1 % GEL    Apply 2 g topically 4 (four) times daily.    FOLIC ACID (FOLVITE) 1 MG TABLET    TAKE 1 TABLET(1000 MCG) BY MOUTH EVERY DAY    FUROSEMIDE (LASIX) 20 MG TABLET    Take 1 tablet (20 mg total) by mouth once daily.       ROS  Constitution: Negative for fever, chills, weight loss or gain.   HENT: Negative for sore throat, rhinorrhea, or headache.  Eyes: Negative for blurred or double vision.   Cardiovascular: See above  Pulmonary: Positive for SOB   Gastrointestinal: Negative for abdominal pain, nausea, vomiting, or diarrhea.   : Negative for dysuria.   Neurological: Negative for focal weakness or sensory changes.    /68 (BP Location: Left arm, Patient Position: Sitting, BP Method: Large (Automatic))   Pulse 97   Ht 5' 5" (1.651 m)   Wt 99.6 kg (219 lb 9.3 oz)   SpO2 96%   BMI 36.54 kg/m²     Objective:   Physical Exam  Constitutional: NAD, conversant  HEENT: Sclera anicteric, PERRLA, EOMI  Neck: No JVD, no carotid bruits  CV: RRR, no murmur, normal S1/S2, No Pericardial rub  Pulm: CTAB with no wheezes, rales, or rhonchi  GI: Abdomen soft, NTND, +BS  Extremities: 3+ LE edema, warm and well perfused, No cyanosis, No clubbing  Skin: " No ecchymosis or ulcers. B/L leg erythemia. No warmth to touch or tenderness  Psych: AOx3, appropriate affect  Neuro: CNII-XII intact, no focal deficits      Lab Results   Component Value Date     05/02/2018    K 4.4 05/02/2018     05/02/2018    CO2 27 05/02/2018    BUN 31 (H) 05/02/2018    CREATININE 0.8 05/02/2018     (H) 05/02/2018    HGBA1C 6.7 (H) 05/30/2018    MG 2.2 04/15/2011    AST 20 05/02/2018    ALT 23 05/02/2018    ALBUMIN 3.7 05/02/2018    PROT 6.6 05/02/2018    BILITOT 1.2 (H) 05/02/2018    WBC 11.34 05/02/2018    HGB 13.5 05/02/2018    HCT 41.2 05/02/2018     (H) 05/02/2018     05/02/2018    INR 0.9 10/05/2016    INR 1.1 01/26/2010    TSH 1.632 11/21/2017    CHOL 112 (L) 06/30/2018    HDL 32 (L) 06/30/2018    LDLCALC 53.8 (L) 06/30/2018    TRIG 131 06/30/2018     (H) 08/03/2016       Assessment:     1. Varicose veins of both legs with edema    2. Dyspnea on exertion    3. Acute on chronic combined systolic and diastolic heart failure    4. Dyslipidemia    5. Essential hypertension    6. Coronary artery disease involving native coronary artery of native heart without angina pectoris        Plan:     Krissy was seen today for shortness of breath, leg swelling and fatigue.    Diagnoses and all orders for this visit:    Dyspnea on exertion  - related to acute on chronic heart failure  - see ADHF  - SAMUEL scheduled for tomorrow    Acute on chronic combined systolic and diastolic heart failure  - NYHA Class II SOB  - 3+ LE edema  - weight increased from 212 lbs (2/28/18) to 219 lbs today  - increase lasix to 40 mg BID (from 20 mg QD) then transition to 40 mg QD  - BNP now  - BMP in 1 week  - 2 gm Na diet advised  - measure weight daily; if weight goes by 3 lbs in 1 day or 5 lbs in 1 week - notify clinic      Varicose veins of leg with edema  - leg elevation and compression stockings advised    Dyslipidemia  - c/w lipitor 40 mg QD  - LDL 54, HDL 32  (6/30/18)    Hypertension  - controlled  - c/w losartan 12.5 mg QD and toprol XL 50 mg QD    Coronary artery disease involving native coronary artery of native heart without angina pectoris  - s/p PCI of proximal and mid LAD with PCI with OSMEL (10/10/16) due to abnormal CPET stress (9/7/16)  - CPET 11/2/16 negative for ischemia  - c/w ASA, toprol XL, losartan, lipitor  - SAMUEL scheduled for tomorrow    RTC in 2 weeks.  Thank you for allowing me to participate in this patient's care. Please do not hesitate to contact me with any questions or concerns.    Connie Patterson MD  Cardiology Fellow  Pager: [phone]  7/9/2018 11:24 AM

## 2018-07-09 NOTE — PATIENT INSTRUCTIONS
Increase lasix to 40 mg twice a day for 1 week and then 40 mg once daily  BNP now  BMP in 1 week  2 gm Na diet  Compression stockings and leg elevation advised  Measure weight at home daily. If weight goes up by 3 lbs in 1 day or 5 lbs in 1 week - notify clinic  SAMUEL scheduled for tomorrow  RTC in 2 weeks

## 2018-07-09 NOTE — ASSESSMENT & PLAN NOTE
Today's Plan:      Discussed benefits and risks of MTX therapy including but not limited to oral ulcers, GI upset, fatigue, bone marrow suppression, pulmonary fibrosis, hepatotoxicity.  MTX brochure discussed and provided to patient.  Patient expresses understanding.    Check baseline Hep B, Hep C and Quantiferon gold.     If labs are WNL, start MTX at 10mg qwk taken in divided dose each week, Folic acid 1mg qd except on day taking MTX.    Will need CBC at 2 - 4 weeks after first dose then will need CBC and LFTs q 3 months and CMP q 6 months. Will need Quantiferon gold annually.    Cont TAC cream bid    F/u 1 month with cbc and lft's

## 2018-07-10 ENCOUNTER — OFFICE VISIT (OUTPATIENT)
Dept: INTERNAL MEDICINE | Facility: CLINIC | Age: 78
End: 2018-07-10
Payer: COMMERCIAL

## 2018-07-10 ENCOUNTER — HOSPITAL ENCOUNTER (OUTPATIENT)
Dept: RADIOLOGY | Facility: HOSPITAL | Age: 78
Discharge: HOME | End: 2018-07-10
Attending: FAMILY MEDICINE
Payer: COMMERCIAL

## 2018-07-10 VITALS
SYSTOLIC BLOOD PRESSURE: 106 MMHG | HEIGHT: 65 IN | HEART RATE: 115 BPM | OXYGEN SATURATION: 98 % | WEIGHT: 215.19 LBS | DIASTOLIC BLOOD PRESSURE: 58 MMHG | BODY MASS INDEX: 35.85 KG/M2

## 2018-07-10 DIAGNOSIS — L03.032 CELLULITIS OF FIFTH TOE OF LEFT FOOT: Primary | ICD-10-CM

## 2018-07-10 DIAGNOSIS — E11.21 CONTROLLED TYPE 2 DIABETES MELLITUS WITH DIABETIC NEPHROPATHY, WITHOUT LONG-TERM CURRENT USE OF INSULIN: ICD-10-CM

## 2018-07-10 DIAGNOSIS — I25.10 CORONARY ARTERY DISEASE INVOLVING NATIVE CORONARY ARTERY OF NATIVE HEART WITHOUT ANGINA PECTORIS: ICD-10-CM

## 2018-07-10 DIAGNOSIS — J45.909 ASTHMA WITHOUT STATUS ASTHMATICUS, UNSPECIFIED ASTHMA SEVERITY, UNCOMPLICATED: ICD-10-CM

## 2018-07-10 DIAGNOSIS — N18.2 CHRONIC KIDNEY DISEASE, STAGE II (MILD): ICD-10-CM

## 2018-07-10 DIAGNOSIS — I50.32 CHRONIC CONGESTIVE HEART FAILURE WITH LEFT VENTRICULAR DIASTOLIC DYSFUNCTION: ICD-10-CM

## 2018-07-10 DIAGNOSIS — E11.9 TYPE 2 DIABETES MELLITUS WITHOUT RETINOPATHY: ICD-10-CM

## 2018-07-10 DIAGNOSIS — R53.83 OTHER FATIGUE: ICD-10-CM

## 2018-07-10 PROCEDURE — 3078F DIAST BP <80 MM HG: CPT | Mod: CPTII,S$GLB,, | Performed by: FAMILY MEDICINE

## 2018-07-10 PROCEDURE — 71046 X-RAY EXAM CHEST 2 VIEWS: CPT | Mod: 26,,, | Performed by: RADIOLOGY

## 2018-07-10 PROCEDURE — 99999 PR PBB SHADOW E&M-EST. PATIENT-LVL V: CPT | Mod: PBBFAC,,, | Performed by: FAMILY MEDICINE

## 2018-07-10 PROCEDURE — 99215 OFFICE O/P EST HI 40 MIN: CPT | Mod: S$GLB,,, | Performed by: FAMILY MEDICINE

## 2018-07-10 PROCEDURE — 3074F SYST BP LT 130 MM HG: CPT | Mod: CPTII,S$GLB,, | Performed by: FAMILY MEDICINE

## 2018-07-10 PROCEDURE — 71046 X-RAY EXAM CHEST 2 VIEWS: CPT | Mod: TC

## 2018-07-10 RX ORDER — BUDESONIDE AND FORMOTEROL FUMARATE DIHYDRATE 160; 4.5 UG/1; UG/1
2 AEROSOL RESPIRATORY (INHALATION) EVERY 12 HOURS
Qty: 10.2 G | Refills: 3 | Status: SHIPPED | OUTPATIENT
Start: 2018-07-10 | End: 2018-10-05 | Stop reason: SDUPTHER

## 2018-07-10 NOTE — PROGRESS NOTES
Subjective:       Patient ID: Krissy Marino is a 78 y.o. female.    Chief Complaint: Follow-up and Shortness of Breath (6wks)  Krissy Marino 78 y.o. female is here for office visit to review care and physical exam, here for usual care, review PMH and recent testing.  ROS unchanged, no new concerns except chronic SOB, worse with exertion.      HPI  Review of Systems   Constitutional: Negative for activity change, fatigue, fever and unexpected weight change.   HENT: Negative for congestion, hearing loss, postnasal drip and rhinorrhea.    Eyes: Negative for redness and visual disturbance.   Respiratory: Negative for chest tightness, shortness of breath and wheezing.    Cardiovascular: Negative for chest pain, palpitations and leg swelling.   Gastrointestinal: Negative for abdominal distention.   Genitourinary: Negative for decreased urine volume, dysuria, flank pain, hematuria, pelvic pain and urgency.   Musculoskeletal: Negative for back pain, gait problem, joint swelling and neck stiffness.   Skin: Negative for color change, rash and wound.   Neurological: Negative for dizziness, syncope, weakness and headaches.   Psychiatric/Behavioral: Negative for behavioral problems, confusion and sleep disturbance. The patient is not nervous/anxious.        Objective:      Physical Exam   Constitutional: She is oriented to person, place, and time. She appears well-developed and well-nourished. No distress.   HENT:   Head: Normocephalic.   Mouth/Throat: No oropharyngeal exudate.   Eyes: EOM are normal. Pupils are equal, round, and reactive to light. No scleral icterus.   Neck: Neck supple. No JVD present. No thyromegaly present.   Cardiovascular: Normal rate, regular rhythm and normal heart sounds.  Exam reveals no gallop and no friction rub.    No murmur heard.  Pulmonary/Chest: Effort normal and breath sounds normal. She has no wheezes. She has no rales.   Abdominal: Soft. Bowel sounds are normal. She exhibits no distension  and no mass. There is no tenderness. There is no guarding.   Musculoskeletal: Normal range of motion. She exhibits no edema.   Lymphadenopathy:     She has no cervical adenopathy.   Neurological: She is alert and oriented to person, place, and time. She has normal reflexes. She displays normal reflexes. No cranial nerve deficit. She exhibits normal muscle tone.   Skin: Skin is warm. No rash noted. No erythema.   Psychiatric: She has a normal mood and affect. Thought content normal.       Assessment:       1. Cellulitis of fifth toe of left foot    2. Chronic congestive heart failure with left ventricular diastolic dysfunction    3. Chronic kidney disease, stage II (mild)    4. Coronary artery disease involving native coronary artery of native heart without angina pectoris    5. Type 2 diabetes mellitus without retinopathy    6. Controlled type 2 diabetes mellitus with diabetic nephropathy, without long-term current use of insulin    7. Other fatigue    8. Asthma without status asthmaticus, unspecified asthma severity, uncomplicated        Plan:       Krissy was seen today for follow-up and shortness of breath.    Diagnoses and all orders for this visit:    Cellulitis of fifth toe of left foot   - Chart reviewed, no new concern  Chronic congestive heart failure with left ventricular diastolic dysfunction  0 Chart reviewed  Chronic kidney disease, stage II (mild)  - follow, control risk  Coronary artery disease involving native coronary artery of native heart without angina pectoris  - Sees cardiology  Type 2 diabetes mellitus without retinopathy  - controled  Controlled type 2 diabetes mellitus with diabetic nephropathy, without long-term current use of insulin  - controled  Other fatigue  -     TSH; Future  -     X-Ray Chest PA And Lateral; Future  -     budesonide-formoterol 160-4.5 mcg (SYMBICORT) 160-4.5 mcg/actuation HFAA; Inhale 2 puffs into the lungs every 12 (twelve) hours. Controller    Asthma without status  asthmaticus, unspecified asthma severity, uncomplicated  -     budesonide-formoterol 160-4.5 mcg (SYMBICORT) 160-4.5 mcg/actuation HFAA; Inhale 2 puffs into the lungs every 12 (twelve) hours. Controller

## 2018-07-11 ENCOUNTER — HOSPITAL ENCOUNTER (OUTPATIENT)
Dept: CARDIOLOGY | Facility: CLINIC | Age: 78
Discharge: HOME | End: 2018-07-11
Attending: INTERNAL MEDICINE
Payer: COMMERCIAL

## 2018-07-11 ENCOUNTER — TELEPHONE (OUTPATIENT)
Dept: CARDIOLOGY | Facility: HOSPITAL | Age: 78
End: 2018-07-11

## 2018-07-11 DIAGNOSIS — R06.09 DOE (DYSPNEA ON EXERTION): ICD-10-CM

## 2018-07-11 LAB
DIASTOLIC DYSFUNCTION: YES
MITRAL VALVE MOBILITY: NORMAL
RETIRED EF AND QEF - SEE NOTES: 50 (ref 55–65)

## 2018-07-11 PROCEDURE — 93351 STRESS TTE COMPLETE: CPT | Mod: S$GLB,,, | Performed by: INTERNAL MEDICINE

## 2018-07-11 PROCEDURE — 93321 DOPPLER ECHO F-UP/LMTD STD: CPT | Mod: S$GLB,,, | Performed by: INTERNAL MEDICINE

## 2018-07-11 RX ORDER — LANOLIN ALCOHOL/MO/W.PET/CERES
400 CREAM (GRAM) TOPICAL DAILY
Refills: 0 | Status: ON HOLD | COMMUNITY
Start: 2018-07-11 | End: 2019-08-31

## 2018-07-11 NOTE — TELEPHONE ENCOUNTER
Called Ms. Jamila regarding her positive stress echocardiogram and her low magnesium. Will order her magnesium and refer her to interventional cardiology given her recurrent symptoms.    Edita LANZA

## 2018-07-12 ENCOUNTER — OFFICE VISIT (OUTPATIENT)
Dept: CARDIOLOGY | Facility: CLINIC | Age: 78
End: 2018-07-12
Payer: COMMERCIAL

## 2018-07-12 ENCOUNTER — DOCUMENTATION ONLY (OUTPATIENT)
Dept: CARDIOLOGY | Facility: CLINIC | Age: 78
End: 2018-07-12

## 2018-07-12 VITALS
HEIGHT: 65 IN | HEART RATE: 88 BPM | OXYGEN SATURATION: 95 % | DIASTOLIC BLOOD PRESSURE: 71 MMHG | BODY MASS INDEX: 35.55 KG/M2 | SYSTOLIC BLOOD PRESSURE: 131 MMHG | WEIGHT: 213.38 LBS

## 2018-07-12 DIAGNOSIS — I25.110 CORONARY ARTERY DISEASE INVOLVING NATIVE CORONARY ARTERY OF NATIVE HEART WITH UNSTABLE ANGINA PECTORIS: ICD-10-CM

## 2018-07-12 DIAGNOSIS — I50.43 ACUTE ON CHRONIC COMBINED SYSTOLIC AND DIASTOLIC HEART FAILURE: ICD-10-CM

## 2018-07-12 DIAGNOSIS — L40.9 PSORIASIS: Primary | ICD-10-CM

## 2018-07-12 DIAGNOSIS — I10 ESSENTIAL HYPERTENSION: ICD-10-CM

## 2018-07-12 DIAGNOSIS — N18.2 CHRONIC KIDNEY DISEASE, STAGE II (MILD): ICD-10-CM

## 2018-07-12 DIAGNOSIS — R94.30 ABNORMAL CARDIOVASCULAR FUNCTION STUDY: Primary | ICD-10-CM

## 2018-07-12 PROCEDURE — 3078F DIAST BP <80 MM HG: CPT | Mod: CPTII,S$GLB,, | Performed by: INTERNAL MEDICINE

## 2018-07-12 PROCEDURE — 3075F SYST BP GE 130 - 139MM HG: CPT | Mod: CPTII,S$GLB,, | Performed by: INTERNAL MEDICINE

## 2018-07-12 PROCEDURE — 99215 OFFICE O/P EST HI 40 MIN: CPT | Mod: S$GLB,,, | Performed by: INTERNAL MEDICINE

## 2018-07-12 PROCEDURE — 99999 PR PBB SHADOW E&M-EST. PATIENT-LVL III: CPT | Mod: PBBFAC,,, | Performed by: INTERNAL MEDICINE

## 2018-07-12 RX ORDER — DIPHENHYDRAMINE HCL 25 MG
50 CAPSULE ORAL ONCE
Status: CANCELLED | OUTPATIENT
Start: 2018-07-12 | End: 2018-07-12

## 2018-07-12 RX ORDER — CLOPIDOGREL BISULFATE 75 MG/1
TABLET ORAL
Qty: 90 TABLET | Refills: 0 | OUTPATIENT
Start: 2018-07-12

## 2018-07-12 RX ORDER — CLOPIDOGREL BISULFATE 75 MG/1
75 TABLET ORAL DAILY
Qty: 4 TABLET | Refills: 0 | Status: SHIPPED | OUTPATIENT
Start: 2018-07-12 | End: 2018-07-13 | Stop reason: SDUPTHER

## 2018-07-12 RX ORDER — NAPROXEN SODIUM 220 MG/1
81 TABLET, FILM COATED ORAL ONCE
Status: CANCELLED | OUTPATIENT
Start: 2018-07-12 | End: 2018-07-12

## 2018-07-12 RX ORDER — SODIUM CHLORIDE 9 MG/ML
3 INJECTION, SOLUTION INTRAVENOUS CONTINUOUS
Status: CANCELLED | OUTPATIENT
Start: 2018-07-12 | End: 2018-07-12

## 2018-07-12 RX ORDER — FUROSEMIDE 40 MG/1
40 TABLET ORAL DAILY
Qty: 90 TABLET | Refills: 3 | Status: SHIPPED | OUTPATIENT
Start: 2018-07-12 | End: 2018-09-11 | Stop reason: DRUGHIGH

## 2018-07-12 RX ORDER — METHOTREXATE 2.5 MG/1
TABLET ORAL
Qty: 16 TABLET | Refills: 2 | Status: SHIPPED | OUTPATIENT
Start: 2018-07-12 | End: 2018-11-05

## 2018-07-12 RX ORDER — FOLIC ACID 1 MG/1
TABLET ORAL
Qty: 30 TABLET | Refills: 5 | Status: ON HOLD | OUTPATIENT
Start: 2018-07-12 | End: 2018-11-30 | Stop reason: HOSPADM

## 2018-07-12 NOTE — ASSESSMENT & PLAN NOTE
High risk findings in patient with prior PCI.  It has been nearly two years since prior intervention so will n ot perform brachytherapy this time, but if recurs will consider.

## 2018-07-12 NOTE — ASSESSMENT & PLAN NOTE
Over the past few weeks she has been experiencing exertional dyspnea with some chest tightness consistent with an anginal equivalent. CCS Class II. A stress echocardiogram was abnormal. She is a reasonable candidate for angiography.    [facility] Clinical Frailty Scale: Moderately frail    Heart failure: yes    Diabetes Mellitus: yes    Dialysis: no     I have explained the risks, benefits and alternatives to angiography to the patient. All questions were answered. The patient voiced understanding. Informed consent was obtained.    LHC +/- PCI  Right radial access  Preloaded with clopidogrel

## 2018-07-12 NOTE — LETTER
July 12, 2018      Og Cid MD  [address]  [facility] [address]           [facility]  [address]  [facility] [address]  Phone: [phone]          Patient: Krissy Marino   MR Number: [id number]   YOB: 1940   Date of Visit: 7/12/2018       Dear Dr. Toñito Kohler:    Thank you for referring Krissy Marino to me for evaluation. Attached you will find relevant portions of my assessment and plan of care.    If you have questions, please do not hesitate to call me. I look forward to following Krissy Marino along with you.    Sincerely,    Hector Montemayor MD    Enclosure  CC:  No Recipients    If you would like to receive this communication electronically, please contact [email or web address] or [phone] to request more information on [employer] Link access.    For providers and/or their staff who would like to refer a patient to [facility], please contact us through our one-stop-shop provider referral line, [facility], at [phone].    If you feel you have received this communication in error or would no longer like to receive these types of communications, please e-mail [email or web address]

## 2018-07-12 NOTE — LETTER
July 12, 2018      Toñito Kohler MD  [address]  [place] [address]           [facility]  [address]  [place] [address]  Phone: [phone]          Patient: Krissy Marino   MR Number: [id number]   YOB: 1940   Date of Visit: 7/12/2018       Dear Dr. Toñito Kohler:    Thank you for referring Krissy Marino to me for evaluation. Attached you will find relevant portions of my assessment and plan of care.    If you have questions, please do not hesitate to call me. I look forward to following Krissy Marino along with you.    Sincerely,    Hector Montemayor MD    Enclosure  CC:  No Recipients    If you would like to receive this communication electronically, please contact [email or web address] or [phone] to request more information on [employer] Link access.    For providers and/or their staff who would like to refer a patient to [facility], please contact us through our one-stop-shop provider referral line, [facility] , at [phone].    If you feel you have received this communication in error or would no longer like to receive these types of communications, please e-mail [email or web address]

## 2018-07-12 NOTE — PROGRESS NOTES
Subjective:   Referring provider: Toñito Kohler MD  Patient ID: Krissy Marino is 78 y.o. female who presents for Evaluation of Abnormal Stress Test and Coronary Artery Disease        Ms. Marino reports that she has not been feeling well for past few weeks to a month. Increased shortness of breath on exertion. Described as CCS CLass II. Associated with some chest pressure. Relieved by rest. No radiation. No palpitations, nausea. Has not tried sl NTG.  Has worsening edema of legs. Her Lasix recently increased. Has not noted a difference.  She still works in post office 8 hours a day.      Coronary Artery Disease   Presents for initial visit. The disease course has been worsening. The condition has lasted for 3 weeks. Symptoms include chest pain. Pertinent negatives include no dizziness, leg swelling, palpitations, shortness of breath or weight gain. Past treatments include beta blockers and nitrates. The treatment provided no relief. Compliance with prior treatments has been good. Her past medical history is significant for angina pectoris and CHF. Past surgical history includes angioplasty and cardiac stents.     Review of Systems   Constitution: Negative for decreased appetite, fever, malaise/fatigue, weight gain and weight loss.   HENT: Negative for congestion, hoarse voice and sore throat.    Eyes: Negative for blurred vision, vision loss in left eye, vision loss in right eye, visual disturbance and visual halos.   Cardiovascular: Positive for chest pain and dyspnea on exertion. Negative for claudication, irregular heartbeat, leg swelling, near-syncope, orthopnea and palpitations.   Respiratory: Negative for cough, shortness of breath and snoring.    Hematologic/Lymphatic: Negative for bleeding problem. Does not bruise/bleed easily.   Skin: Negative for color change and itching.   Musculoskeletal: Negative for falls, muscle cramps and myalgias.   Gastrointestinal: Negative for abdominal pain, change in  "bowel habit, bowel incontinence, heartburn, nausea and vomiting.   Genitourinary: Negative for flank pain.   Neurological: Negative for excessive daytime sleepiness, dizziness, focal weakness, headaches, light-headedness, loss of balance, sensory change and vertigo.   Psychiatric/Behavioral: Negative for depression. The patient does not have insomnia.      Family History   Problem Relation Age of Onset    Diabetes Mother     Hypertension Mother     Hypertension Father     Kidney disease Neg Hx     Eczema Neg Hx     Psoriasis Neg Hx     Melanoma Neg Hx     Lupus Neg Hx     Acne Neg Hx      Past Surgical History:   Procedure Laterality Date    APPENDECTOMY      CATARACT EXTRACTION      CATARACT EXTRACTION, BILATERAL      CHOLECYSTECTOMY      EYE SURGERY      HYSTERECTOMY      JOINT REPLACEMENT      bilateral total knee    SKIN BIOPSY      TONSILLECTOMY       Family History   Problem Relation Age of Onset    Diabetes Mother     Hypertension Mother     Hypertension Father     Kidney disease Neg Hx     Eczema Neg Hx     Psoriasis Neg Hx     Melanoma Neg Hx     Lupus Neg Hx     Acne Neg Hx      /71 (BP Location: Left arm, Patient Position: Sitting, BP Method: Large (Automatic))   Pulse 88   Ht 5' 5" (1.651 m)   Wt 96.8 kg (213 lb 6.5 oz)   SpO2 95%   BMI 35.51 kg/m²     Objective:   Physical Exam   Constitutional: She is oriented to person, place, and time. She appears well-developed and well-nourished.   She is moderately frail, unable to climb up onto the exam table.   Eyes: Pupils are equal, round, and reactive to light.   Neck: Neck supple. No JVD present. Carotid bruit is not present.   Cardiovascular: Normal rate, regular rhythm, S1 normal, S2 normal and normal pulses.  Exam reveals no gallop and no friction rub.    Murmur heard.   Systolic murmur is present with a grade of 2/6   Pulses:       Radial pulses are 2+ on the right side, and 2+ on the left side.   Pulmonary/Chest: " Effort normal and breath sounds normal. No respiratory distress. She has no wheezes. She has no rales.   Abdominal: Soft. Bowel sounds are normal.   Musculoskeletal:   No kyphoscoliosis   Neurological: She is alert and oriented to person, place, and time.   Skin: Skin is warm and dry.   Psychiatric: She has a normal mood and affect. Thought content normal.     Stress testing:  Post Exercise Imaging:    Immediate post exercise images demonstrate left ventricular function decreasing. Right ventricular function augments. Left ventricular end systolic volume increases.     The anterior septum worsens becoming hypokinetic. The anterolateral wall worsens becoming hypokinetic. The apical septum worsens becoming hypokinetic. The inferior wall worsens becoming hypokinetic. The anterior wall worsens becoming hypokinetic.       CONCLUSIONS     1 - Mildly depressed left ventricular systolic function (EF 50-55%).     2 - Eccentric hypertrophy.     3 - Impaired LV relaxation, normal LAP (grade 1 diastolic dysfunction).     4 - Normal right ventricular systolic function .     Positive stress echocardiographic study demonstrating an ischemic response involving the anterior septum, anterolateral wall, apical septum, inferior wall, anterior wall. This may represent multivessel coronary disease or a nonischemic cardiomyopathy.  Assessment:     1. Abnormal cardiovascular function study    2. Coronary artery disease involving native coronary artery of native heart with unstable angina pectoris    3. Acute on chronic combined systolic and diastolic heart failure    4. Essential hypertension    5. Chronic kidney disease, stage II (mild)      Plan:     Coronary artery disease involving native coronary artery of native heart with unstable angina pectoris  Over the past few weeks she has been experiencing exertional dyspnea with some chest tightness consistent with an anginal equivalent. CCS Class II. A stress echocardiogram was abnormal.  She is a reasonable candidate for angiography.    [facility] Clinical Frailty Scale: Moderately frail    Heart failure: yes    Diabetes Mellitus: yes    Dialysis: no     I have explained the risks, benefits and alternatives to angiography to the patient. All questions were answered. The patient voiced understanding. Informed consent was obtained.    LHC +/- PCI  Right radial access  Preloaded with clopidogrel        Abnormal cardiovascular function study  High risk findings in patient with prior PCI.  It has been nearly two years since prior intervention so will n ot perform brachytherapy this time, but if recurs will consider.    Acute on chronic combined systolic and diastolic heart failure  Will continue diuresis per Dr. Patterson's note.    Hypertension  Borderline controlled    Chronic kidney disease, stage II (mild)  Encouraged oral hydration.      No Follow-up on file.

## 2018-07-12 NOTE — PROGRESS NOTES
OUTPATIENT CATHETERIZATION INSTRUCTIONS    You have been scheduled for a procedure in the catheterization lab on Friday, July 20, 2018.     Please report to the Cardiology Waiting Area on the Third floor of the hospital and check in at 6:30 AM.   You will then be taken to the SSCU (Short Stay Cardiac Unit) and prepared for your procedure. Please be aware that this is not the time of your procedure but the time you are to arrive. The procedures are scheduled on an hourly basis; however, emergency cases take precedence over all other cases.       You may not have anything to eat or drink after midnight the night before your test. You may take your regular morning medications with water. If there are any medications that you should not take you will be instructed to hold them that morning. If you are diabetic and on Metformin (Glucophage) do not take it the day before, the day of, and for 2 days after your procedure.      The procedure will take 1-2 hours to perform. After the procedure, you will return to SSCU on the third floor of the hospital. You will need to lie still (or keep your arm still) for the next 4 to 6 hours to minimize bleeding from the puncture site. Your family may remain in the room with you during this time.       You may be able to be discharged home that same afternoon if there is someone to drive you home and there were no complications. If you have one of the balloon, stent, or device procedures you may spend the night in the hospital. Your doctor will determine, based on your progress, the date and time of your discharge. The results of your procedure will be discussed with you before you are discharged. Any further testing or procedures will be scheduled for you either before you leave or you will be called with these appointments.       If you should have any questions, concerns, or need to change the date of your procedure, please call  URVASHI Ramos @ [phone]    Special  Instructions:  Take 4 Plavix pills the day before your procedure.  Drink plenty of water the day before and after your procedure.               THE ABOVE INSTRUCTIONS WERE GIVEN TO THE PATIENT VERBALLY AND THEY VERBALIZED UNDERSTANDING.  THEY DO NOT REQUIRE ANY SPECIAL NEEDS AND DO NOT HAVE ANY LEARNING BARRIERS.          Directions for Reporting to Cardiology Waiting Area in the Hospital  If you park in the Parking Garage:  Take elevators to the1st floor of the parking garage.  Continue past the gift shop, coffee shop, and piano.  Take a right and go to the gold elevators. (Elevator B)  Take the elevator to the 3rd floor.  Follow the arrow on the sign on the wall that says Cath Lab Registration/EP Lab Registration.  Follow the long hallway all the way around until you come to a big open area.  This is the registration area.  Check in at Reception Desk.    OR    If family is dropping you off:  Have them drop you off at the front of the Hospital under the green overhang.  Enter through the doors and take a right.  Take the E elevators to the 3rd floor Cardiology Waiting Area.  Check in at the Reception Desk in the waiting room.

## 2018-07-13 RX ORDER — CLOPIDOGREL BISULFATE 75 MG/1
TABLET ORAL
Qty: 4 TABLET | Refills: 0 | Status: SHIPPED | OUTPATIENT
Start: 2018-07-13 | End: 2018-10-30 | Stop reason: SDUPTHER

## 2018-07-19 ENCOUNTER — TELEPHONE (OUTPATIENT)
Dept: CARDIOLOGY | Facility: CLINIC | Age: 78
End: 2018-07-19

## 2018-07-20 ENCOUNTER — HOSPITAL ENCOUNTER (OUTPATIENT)
Facility: HOSPITAL | Age: 78
Discharge: HOME | End: 2018-07-21
Attending: INTERNAL MEDICINE | Admitting: INTERNAL MEDICINE
Payer: COMMERCIAL

## 2018-07-20 DIAGNOSIS — R94.30 ABNORMAL CARDIOVASCULAR FUNCTION STUDY: ICD-10-CM

## 2018-07-20 DIAGNOSIS — I25.110 CORONARY ARTERY DISEASE INVOLVING NATIVE CORONARY ARTERY OF NATIVE HEART WITH UNSTABLE ANGINA PECTORIS: ICD-10-CM

## 2018-07-20 LAB
ABO + RH BLD: NORMAL
ANION GAP SERPL CALC-SCNC: 10 MMOL/L
BASOPHILS # BLD AUTO: 0.02 K/UL
BASOPHILS NFR BLD: 0.3 %
BLD GP AB SCN CELLS X3 SERPL QL: NORMAL
BUN SERPL-MCNC: 38 MG/DL
CALCIUM SERPL-MCNC: 9.4 MG/DL
CHLORIDE SERPL-SCNC: 102 MMOL/L
CO2 SERPL-SCNC: 26 MMOL/L
CORONARY STENOSIS: ABNORMAL
CORONARY STENT: YES
CREAT SERPL-MCNC: 0.9 MG/DL
DIFFERENTIAL METHOD: ABNORMAL
EOSINOPHIL # BLD AUTO: 0.4 K/UL
EOSINOPHIL NFR BLD: 5.5 %
ERYTHROCYTE [DISTWIDTH] IN BLOOD BY AUTOMATED COUNT: 13.3 %
EST. GFR  (AFRICAN AMERICAN): >60 ML/MIN/1.73 M^2
EST. GFR  (NON AFRICAN AMERICAN): >60 ML/MIN/1.73 M^2
GLUCOSE SERPL-MCNC: 138 MG/DL
HCT VFR BLD AUTO: 37 %
HGB BLD-MCNC: 12.2 G/DL
IMM GRANULOCYTES # BLD AUTO: 0.02 K/UL
IMM GRANULOCYTES NFR BLD AUTO: 0.3 %
LYMPHOCYTES # BLD AUTO: 1.1 K/UL
LYMPHOCYTES NFR BLD: 16.2 %
MCH RBC QN AUTO: 32.2 PG
MCHC RBC AUTO-ENTMCNC: 33 G/DL
MCV RBC AUTO: 98 FL
MONOCYTES # BLD AUTO: 0.5 K/UL
MONOCYTES NFR BLD: 6.5 %
NEUTROPHILS # BLD AUTO: 5 K/UL
NEUTROPHILS NFR BLD: 71.2 %
NRBC BLD-RTO: 0 /100 WBC
PLATELET # BLD AUTO: 139 K/UL
PMV BLD AUTO: 11.7 FL
POCT GLUCOSE: 138 MG/DL (ref 70–110)
POTASSIUM SERPL-SCNC: 4.6 MMOL/L
RBC # BLD AUTO: 3.79 M/UL
SODIUM SERPL-SCNC: 138 MMOL/L
WBC # BLD AUTO: 6.96 K/UL

## 2018-07-20 PROCEDURE — 25000003 PHARM REV CODE 250: Performed by: INTERNAL MEDICINE

## 2018-07-20 PROCEDURE — 82962 GLUCOSE BLOOD TEST: CPT

## 2018-07-20 PROCEDURE — 92921 CATH LAB PROCEDURE: CPT | Mod: LD,,, | Performed by: INTERNAL MEDICINE

## 2018-07-20 PROCEDURE — 93010 ELECTROCARDIOGRAM REPORT: CPT | Mod: ,,, | Performed by: INTERNAL MEDICINE

## 2018-07-20 PROCEDURE — 93005 ELECTROCARDIOGRAM TRACING: CPT

## 2018-07-20 PROCEDURE — 93010 ELECTROCARDIOGRAM REPORT: CPT | Mod: 76,,, | Performed by: INTERNAL MEDICINE

## 2018-07-20 PROCEDURE — C1874 STENT, COATED/COV W/DEL SYS: HCPCS

## 2018-07-20 PROCEDURE — 86850 RBC ANTIBODY SCREEN: CPT

## 2018-07-20 PROCEDURE — 92928 PRQ TCAT PLMT NTRAC ST 1 LES: CPT | Mod: LD,,, | Performed by: INTERNAL MEDICINE

## 2018-07-20 PROCEDURE — 63600175 PHARM REV CODE 636 W HCPCS

## 2018-07-20 PROCEDURE — 93572 IV DOP VEL&/PRESS C FLO EA: CPT | Mod: 26,LC,, | Performed by: INTERNAL MEDICINE

## 2018-07-20 PROCEDURE — 93458 L HRT ARTERY/VENTRICLE ANGIO: CPT | Mod: 26,59,, | Performed by: INTERNAL MEDICINE

## 2018-07-20 PROCEDURE — 93571 IV DOP VEL&/PRESS C FLO 1ST: CPT | Mod: 26,LD,, | Performed by: INTERNAL MEDICINE

## 2018-07-20 PROCEDURE — 25000003 PHARM REV CODE 250

## 2018-07-20 PROCEDURE — 99152 MOD SED SAME PHYS/QHP 5/>YRS: CPT | Mod: ,,, | Performed by: INTERNAL MEDICINE

## 2018-07-20 PROCEDURE — 25000003 PHARM REV CODE 250: Performed by: STUDENT IN AN ORGANIZED HEALTH CARE EDUCATION/TRAINING PROGRAM

## 2018-07-20 PROCEDURE — 80048 BASIC METABOLIC PNL TOTAL CA: CPT

## 2018-07-20 PROCEDURE — 85025 COMPLETE CBC W/AUTO DIFF WBC: CPT

## 2018-07-20 RX ORDER — ASPIRIN 81 MG/1
81 TABLET ORAL DAILY
Status: DISCONTINUED | OUTPATIENT
Start: 2018-07-20 | End: 2018-07-21 | Stop reason: HOSPADM

## 2018-07-20 RX ORDER — TRIAMCINOLONE ACETONIDE 1 MG/G
CREAM TOPICAL 2 TIMES DAILY
Status: DISCONTINUED | OUTPATIENT
Start: 2018-07-20 | End: 2018-07-21 | Stop reason: HOSPADM

## 2018-07-20 RX ORDER — FERROUS GLUCONATE 324(38)MG
300 TABLET ORAL
Status: DISCONTINUED | OUTPATIENT
Start: 2018-07-21 | End: 2018-07-21 | Stop reason: HOSPADM

## 2018-07-20 RX ORDER — NAPROXEN SODIUM 220 MG/1
81 TABLET, FILM COATED ORAL ONCE
Status: DISCONTINUED | OUTPATIENT
Start: 2018-07-20 | End: 2018-07-21 | Stop reason: HOSPADM

## 2018-07-20 RX ORDER — FLUTICASONE PROPIONATE 50 MCG
1 SPRAY, SUSPENSION (ML) NASAL DAILY
Status: DISCONTINUED | OUTPATIENT
Start: 2018-07-20 | End: 2018-07-21 | Stop reason: HOSPADM

## 2018-07-20 RX ORDER — DIPHENHYDRAMINE HCL 50 MG
50 CAPSULE ORAL ONCE
Status: COMPLETED | OUTPATIENT
Start: 2018-07-20 | End: 2018-07-20

## 2018-07-20 RX ORDER — LANOLIN ALCOHOL/MO/W.PET/CERES
400 CREAM (GRAM) TOPICAL DAILY
Status: DISCONTINUED | OUTPATIENT
Start: 2018-07-21 | End: 2018-07-21 | Stop reason: HOSPADM

## 2018-07-20 RX ORDER — CETIRIZINE HYDROCHLORIDE 10 MG/1
10 TABLET ORAL DAILY
Status: DISCONTINUED | OUTPATIENT
Start: 2018-07-20 | End: 2018-07-21 | Stop reason: HOSPADM

## 2018-07-20 RX ORDER — CLOPIDOGREL BISULFATE 75 MG/1
75 TABLET ORAL DAILY
Status: DISCONTINUED | OUTPATIENT
Start: 2018-07-20 | End: 2018-07-21 | Stop reason: HOSPADM

## 2018-07-20 RX ORDER — SODIUM CHLORIDE 9 MG/ML
3 INJECTION, SOLUTION INTRAVENOUS CONTINUOUS
Status: ACTIVE | OUTPATIENT
Start: 2018-07-20 | End: 2018-07-20

## 2018-07-20 RX ORDER — OLOPATADINE HYDROCHLORIDE 2 MG/ML
1 SOLUTION/ DROPS OPHTHALMIC DAILY
Status: DISCONTINUED | OUTPATIENT
Start: 2018-07-20 | End: 2018-07-21 | Stop reason: HOSPADM

## 2018-07-20 RX ORDER — ATORVASTATIN CALCIUM 20 MG/1
40 TABLET, FILM COATED ORAL DAILY
Status: DISCONTINUED | OUTPATIENT
Start: 2018-07-21 | End: 2018-07-21 | Stop reason: HOSPADM

## 2018-07-20 RX ORDER — AZELASTINE 1 MG/ML
1 SPRAY, METERED NASAL 2 TIMES DAILY
Status: DISCONTINUED | OUTPATIENT
Start: 2018-07-20 | End: 2018-07-21 | Stop reason: HOSPADM

## 2018-07-20 RX ORDER — ALLOPURINOL 300 MG/1
300 TABLET ORAL DAILY
Status: DISCONTINUED | OUTPATIENT
Start: 2018-07-20 | End: 2018-07-21 | Stop reason: HOSPADM

## 2018-07-20 RX ORDER — FUROSEMIDE 40 MG/1
40 TABLET ORAL DAILY
Status: DISCONTINUED | OUTPATIENT
Start: 2018-07-20 | End: 2018-07-21 | Stop reason: HOSPADM

## 2018-07-20 RX ORDER — HYDROCODONE BITARTRATE AND ACETAMINOPHEN 5; 325 MG/1; MG/1
1 TABLET ORAL EVERY 6 HOURS PRN
Status: DISCONTINUED | OUTPATIENT
Start: 2018-07-20 | End: 2018-07-21 | Stop reason: HOSPADM

## 2018-07-20 RX ORDER — ALBUTEROL SULFATE 90 UG/1
2 AEROSOL, METERED RESPIRATORY (INHALATION) EVERY 6 HOURS PRN
Status: DISCONTINUED | OUTPATIENT
Start: 2018-07-20 | End: 2018-07-21 | Stop reason: HOSPADM

## 2018-07-20 RX ORDER — LEVOTHYROXINE SODIUM 75 UG/1
75 TABLET ORAL
Status: DISCONTINUED | OUTPATIENT
Start: 2018-07-21 | End: 2018-07-21 | Stop reason: HOSPADM

## 2018-07-20 RX ORDER — METOPROLOL SUCCINATE 50 MG/1
50 TABLET, EXTENDED RELEASE ORAL DAILY
Status: DISCONTINUED | OUTPATIENT
Start: 2018-07-21 | End: 2018-07-21 | Stop reason: HOSPADM

## 2018-07-20 RX ORDER — FOLIC ACID 1 MG/1
1 TABLET ORAL DAILY
Status: DISCONTINUED | OUTPATIENT
Start: 2018-07-20 | End: 2018-07-21 | Stop reason: HOSPADM

## 2018-07-20 RX ORDER — FAMOTIDINE 20 MG/1
20 TABLET, FILM COATED ORAL 2 TIMES DAILY
Status: DISCONTINUED | OUTPATIENT
Start: 2018-07-20 | End: 2018-07-21 | Stop reason: HOSPADM

## 2018-07-20 RX ORDER — FLUTICASONE FUROATE AND VILANTEROL 100; 25 UG/1; UG/1
1 POWDER RESPIRATORY (INHALATION) DAILY
Status: DISCONTINUED | OUTPATIENT
Start: 2018-07-20 | End: 2018-07-21 | Stop reason: HOSPADM

## 2018-07-20 RX ADMIN — FUROSEMIDE 40 MG: 40 TABLET ORAL at 01:07

## 2018-07-20 RX ADMIN — HYDROCODONE BITARTRATE AND ACETAMINOPHEN 1 TABLET: 5; 325 TABLET ORAL at 01:07

## 2018-07-20 RX ADMIN — FAMOTIDINE 20 MG: 20 TABLET, FILM COATED ORAL at 08:07

## 2018-07-20 RX ADMIN — SODIUM CHLORIDE 3 ML/KG/HR: 0.9 INJECTION, SOLUTION INTRAVENOUS at 06:07

## 2018-07-20 RX ADMIN — HYDROCODONE BITARTRATE AND ACETAMINOPHEN 1 TABLET: 5; 325 TABLET ORAL at 08:07

## 2018-07-20 RX ADMIN — AZELASTINE HYDROCHLORIDE 137 MCG: 137 SPRAY, METERED NASAL at 08:07

## 2018-07-20 RX ADMIN — DIPHENHYDRAMINE HYDROCHLORIDE 50 MG: 50 CAPSULE ORAL at 06:07

## 2018-07-20 RX ADMIN — CLOPIDOGREL 75 MG: 75 TABLET, FILM COATED ORAL at 01:07

## 2018-07-20 NOTE — H&P (VIEW-ONLY)
Subjective:   Referring provider: Toñito Kohler MD  Patient ID: Krissy Marino is 78 y.o. female who presents for Evaluation of Abnormal Stress Test and Coronary Artery Disease        Ms. Marino reports that she has not been feeling well for past few weeks to a month. Increased shortness of breath on exertion. Described as CCS CLass II. Associated with some chest pressure. Relieved by rest. No radiation. No palpitations, nausea. Has not tried sl NTG.  Has worsening edema of legs. Her Lasix recently increased. Has not noted a difference.  She still works in post office 8 hours a day.      Coronary Artery Disease   Presents for initial visit. The disease course has been worsening. The condition has lasted for 3 weeks. Symptoms include chest pain. Pertinent negatives include no dizziness, leg swelling, palpitations, shortness of breath or weight gain. Past treatments include beta blockers and nitrates. The treatment provided no relief. Compliance with prior treatments has been good. Her past medical history is significant for angina pectoris and CHF. Past surgical history includes angioplasty and cardiac stents.     Review of Systems   Constitution: Negative for decreased appetite, fever, malaise/fatigue, weight gain and weight loss.   HENT: Negative for congestion, hoarse voice and sore throat.    Eyes: Negative for blurred vision, vision loss in left eye, vision loss in right eye, visual disturbance and visual halos.   Cardiovascular: Positive for chest pain and dyspnea on exertion. Negative for claudication, irregular heartbeat, leg swelling, near-syncope, orthopnea and palpitations.   Respiratory: Negative for cough, shortness of breath and snoring.    Hematologic/Lymphatic: Negative for bleeding problem. Does not bruise/bleed easily.   Skin: Negative for color change and itching.   Musculoskeletal: Negative for falls, muscle cramps and myalgias.   Gastrointestinal: Negative for abdominal pain, change in  "bowel habit, bowel incontinence, heartburn, nausea and vomiting.   Genitourinary: Negative for flank pain.   Neurological: Negative for excessive daytime sleepiness, dizziness, focal weakness, headaches, light-headedness, loss of balance, sensory change and vertigo.   Psychiatric/Behavioral: Negative for depression. The patient does not have insomnia.      Family History   Problem Relation Age of Onset    Diabetes Mother     Hypertension Mother     Hypertension Father     Kidney disease Neg Hx     Eczema Neg Hx     Psoriasis Neg Hx     Melanoma Neg Hx     Lupus Neg Hx     Acne Neg Hx      Past Surgical History:   Procedure Laterality Date    APPENDECTOMY      CATARACT EXTRACTION      CATARACT EXTRACTION, BILATERAL      CHOLECYSTECTOMY      EYE SURGERY      HYSTERECTOMY      JOINT REPLACEMENT      bilateral total knee    SKIN BIOPSY      TONSILLECTOMY       Family History   Problem Relation Age of Onset    Diabetes Mother     Hypertension Mother     Hypertension Father     Kidney disease Neg Hx     Eczema Neg Hx     Psoriasis Neg Hx     Melanoma Neg Hx     Lupus Neg Hx     Acne Neg Hx      /71 (BP Location: Left arm, Patient Position: Sitting, BP Method: Large (Automatic))   Pulse 88   Ht 5' 5" (1.651 m)   Wt 96.8 kg (213 lb 6.5 oz)   SpO2 95%   BMI 35.51 kg/m²     Objective:   Physical Exam   Constitutional: She is oriented to person, place, and time. She appears well-developed and well-nourished.   She is moderately frail, unable to climb up onto the exam table.   Eyes: Pupils are equal, round, and reactive to light.   Neck: Neck supple. No JVD present. Carotid bruit is not present.   Cardiovascular: Normal rate, regular rhythm, S1 normal, S2 normal and normal pulses.  Exam reveals no gallop and no friction rub.    Murmur heard.   Systolic murmur is present with a grade of 2/6   Pulses:       Radial pulses are 2+ on the right side, and 2+ on the left side.   Pulmonary/Chest: " Effort normal and breath sounds normal. No respiratory distress. She has no wheezes. She has no rales.   Abdominal: Soft. Bowel sounds are normal.   Musculoskeletal:   No kyphoscoliosis   Neurological: She is alert and oriented to person, place, and time.   Skin: Skin is warm and dry.   Psychiatric: She has a normal mood and affect. Thought content normal.     Stress testing:  Post Exercise Imaging:    Immediate post exercise images demonstrate left ventricular function decreasing. Right ventricular function augments. Left ventricular end systolic volume increases.     The anterior septum worsens becoming hypokinetic. The anterolateral wall worsens becoming hypokinetic. The apical septum worsens becoming hypokinetic. The inferior wall worsens becoming hypokinetic. The anterior wall worsens becoming hypokinetic.       CONCLUSIONS     1 - Mildly depressed left ventricular systolic function (EF 50-55%).     2 - Eccentric hypertrophy.     3 - Impaired LV relaxation, normal LAP (grade 1 diastolic dysfunction).     4 - Normal right ventricular systolic function .     Positive stress echocardiographic study demonstrating an ischemic response involving the anterior septum, anterolateral wall, apical septum, inferior wall, anterior wall. This may represent multivessel coronary disease or a nonischemic cardiomyopathy.  Assessment:     1. Abnormal cardiovascular function study    2. Coronary artery disease involving native coronary artery of native heart with unstable angina pectoris    3. Acute on chronic combined systolic and diastolic heart failure    4. Essential hypertension    5. Chronic kidney disease, stage II (mild)      Plan:     Coronary artery disease involving native coronary artery of native heart with unstable angina pectoris  Over the past few weeks she has been experiencing exertional dyspnea with some chest tightness consistent with an anginal equivalent. CCS Class II. A stress echocardiogram was abnormal.  She is a reasonable candidate for angiography.    [facility] Clinical Frailty Scale: Moderately frail    Heart failure: yes    Diabetes Mellitus: yes    Dialysis: no     I have explained the risks, benefits and alternatives to angiography to the patient. All questions were answered. The patient voiced understanding. Informed consent was obtained.    LHC +/- PCI  Right radial access  Preloaded with clopidogrel        Abnormal cardiovascular function study  High risk findings in patient with prior PCI.  It has been nearly two years since prior intervention so will n ot perform brachytherapy this time, but if recurs will consider.    Acute on chronic combined systolic and diastolic heart failure  Will continue diuresis per Dr. Patterson's note.    Hypertension  Borderline controlled    Chronic kidney disease, stage II (mild)  Encouraged oral hydration.      No Follow-up on file.

## 2018-07-20 NOTE — INTERVAL H&P NOTE
The patient has been examined and the H&P has been reviewed:    I concur with the findings and no changes have occurred since H&P was written.    Anesthesia/Surgery risks, benefits and alternative options discussed and understood by patient/family.          Active Hospital Problems    Diagnosis  POA    Abnormal cardiovascular function study [R94.30]  Yes      Resolved Hospital Problems    Diagnosis Date Resolved POA   No resolved problems to display.

## 2018-07-20 NOTE — PROGRESS NOTES
Pt returned to unit AAOx4 and denies pain.  VSS.  Family called to bedside.   Post procedure protocol reviewed. Will continue to monitor.

## 2018-07-21 VITALS
OXYGEN SATURATION: 96 % | SYSTOLIC BLOOD PRESSURE: 139 MMHG | WEIGHT: 210 LBS | BODY MASS INDEX: 34.99 KG/M2 | RESPIRATION RATE: 17 BRPM | TEMPERATURE: 97 F | DIASTOLIC BLOOD PRESSURE: 63 MMHG | HEART RATE: 77 BPM | HEIGHT: 65 IN

## 2018-07-21 LAB
BASOPHILS # BLD AUTO: 0.03 K/UL
BASOPHILS NFR BLD: 0.5 %
DIFFERENTIAL METHOD: ABNORMAL
EOSINOPHIL # BLD AUTO: 0.2 K/UL
EOSINOPHIL NFR BLD: 3.8 %
ERYTHROCYTE [DISTWIDTH] IN BLOOD BY AUTOMATED COUNT: 13.4 %
HCT VFR BLD AUTO: 34.2 %
HGB BLD-MCNC: 11.1 G/DL
IMM GRANULOCYTES # BLD AUTO: 0.01 K/UL
IMM GRANULOCYTES NFR BLD AUTO: 0.2 %
LYMPHOCYTES # BLD AUTO: 1 K/UL
LYMPHOCYTES NFR BLD: 14.9 %
MCH RBC QN AUTO: 32.4 PG
MCHC RBC AUTO-ENTMCNC: 32.5 G/DL
MCV RBC AUTO: 100 FL
MONOCYTES # BLD AUTO: 0.6 K/UL
MONOCYTES NFR BLD: 8.6 %
NEUTROPHILS # BLD AUTO: 4.6 K/UL
NEUTROPHILS NFR BLD: 72 %
NRBC BLD-RTO: 0 /100 WBC
PLATELET # BLD AUTO: 119 K/UL
PMV BLD AUTO: 11 FL
RBC # BLD AUTO: 3.43 M/UL
WBC # BLD AUTO: 6.36 K/UL

## 2018-07-21 PROCEDURE — 36415 COLL VENOUS BLD VENIPUNCTURE: CPT

## 2018-07-21 PROCEDURE — 25000003 PHARM REV CODE 250: Performed by: STUDENT IN AN ORGANIZED HEALTH CARE EDUCATION/TRAINING PROGRAM

## 2018-07-21 PROCEDURE — 85025 COMPLETE CBC W/AUTO DIFF WBC: CPT

## 2018-07-21 RX ORDER — CLOPIDOGREL BISULFATE 75 MG/1
75 TABLET ORAL DAILY
Qty: 30 TABLET | Refills: 11 | Status: SHIPPED | OUTPATIENT
Start: 2018-07-21 | End: 2019-11-21

## 2018-07-21 RX ADMIN — CLOPIDOGREL 75 MG: 75 TABLET, FILM COATED ORAL at 09:07

## 2018-07-21 RX ADMIN — LEVOTHYROXINE SODIUM 75 MCG: 75 TABLET ORAL at 05:07

## 2018-07-21 NOTE — NURSING
Discharge instructions and medlist given.  Patient verbalized understanding.  Off unit via wheelchair with pct to front door to meet ride home.

## 2018-07-21 NOTE — DISCHARGE SUMMARY
[facility]  Interventional Cardiology  Discharge Summary      Patient Name: Krissy Marino  MRN: [id number]  Admission Date: 7/20/2018  Hospital Length of Stay: 0 days  Discharge Date and Time:  07/21/2018 7:22 AM  Attending Physician: Hector Montemayor MD  Discharging Provider: Hardik Hernadez MD  Primary Care Physician: Og Cid MD  Procedure(s) (LRB):  Left heart cath (N/A)     Indwelling Lines/Drains at time of discharge:  Lines/Drains/Airways          No matching active lines, drains, or airways          Hospital Course (synopsis of major diagnoses, care, treatment, and services provided during the course of the hospital stay):   Ms Marino was admitted for coronary angiogram given angina CCS 3 symptoms.   She tolerated well the procedure and there were no complications.   Angiogram showed D1 80% stenosis that had iFR 0.78 and FFR 0.78.Lesion was successfully intervened with 2.5 x 14 OSMEL. Bifurcation balloon dilation was done using a 2.5 x 12 mm NC in the mid LAD and 2.5 x 15 NC in the ostial D1 post intervention.   Patient remained clinically stable and was safely discharged home.       Significant Diagnostic Studies: Labs:   BMP:   Recent Labs  Lab 07/20/18  0624   *      K 4.6      CO2 26   BUN 38*   CREATININE 0.9   CALCIUM 9.4   , CMP   Recent Labs  Lab 07/20/18  0624      K 4.6      CO2 26   *   BUN 38*   CREATININE 0.9   CALCIUM 9.4   ANIONGAP 10   ESTGFRAFRICA >60.0   EGFRNONAA >60.0    and CBC   Recent Labs  Lab 07/20/18  0624 07/21/18  0053   WBC 6.96 6.36   HGB 12.2 11.1*   HCT 37.0 34.2*   * 119*       Pending Diagnostic Studies:     None        Final Active Diagnoses:    Diagnosis Date Noted POA    PRINCIPAL PROBLEM:  Abnormal cardiovascular function study [R94.30] 09/27/2016 Yes      Problems Resolved During this Admission:    Diagnosis Date Noted Date Resolved POA       Discharged Condition: good    Follow Up:  Follow-up Information      Toñito Tao MD In 1 month.    Specialty:  Cardiovascular Disease  Contact information:  [address]  [facility] [id number]  [phone]                 Patient Instructions:     Type & Screen   Standing Status: Future  Standing Exp. Date: 09/10/19       Medications:  Reconciled Home Medications:      Medication List      CHANGE how you take these medications    * clopidogrel 75 mg tablet  Commonly known as:  PLAVIX  TAKE 1 TABLET BY MOUTH ONCE DAILY  What changed:  Another medication with the same name was added. Make sure you understand how and when to take each.     * clopidogrel 75 mg tablet  Commonly known as:  PLAVIX  Take 1 tablet (75 mg total) by mouth once daily.  What changed:  You were already taking a medication with the same name, and this prescription was added. Make sure you understand how and when to take each.        * This list has 2 medication(s) that are the same as other medications prescribed for you. Read the directions carefully, and ask your doctor or other care provider to review them with you.            CONTINUE taking these medications    albuterol 90 mcg/actuation inhaler  Inhale 2 puffs into the lungs every 6 (six) hours as needed for Wheezing.     alendronate 70 MG tablet  Commonly known as:  FOSAMAX  Take 1 tablet (70 mg total) by mouth every 7 days.     allopurinol 300 MG tablet  Commonly known as:  ZYLOPRIM  TAKE 1 TABLET(300 MG) BY MOUTH EVERY DAY     aspirin 81 MG EC tablet  Commonly known as:  ECOTRIN  Take 1 tablet (81 mg total) by mouth once daily.     atorvastatin 40 MG tablet  Commonly known as:  LIPITOR  TAKE 1 TABLET(40 MG) BY MOUTH EVERY DAY     azelastine 137 mcg (0.1 %) nasal spray  Commonly known as:  ASTELIN  USE 1 SPRAY(137 MCG) IN EACH NOSTRIL TWICE DAILY     blood sugar diagnostic Strp  Commonly known as:  ACCU-CHEK AMELIA PLUS TEST STRP  Checks bg 2 x day before meals     BOOSTRIX TDAP 2.5-8-5 Lf-mcg-Lf/0.5mL Syrg injection  Generic drug:   diphth,pertus(acell),tetanus  Inject into the muscle.     budesonide-formoterol 160-4.5 mcg 160-4.5 mcg/actuation Hfaa  Commonly known as:  SYMBICORT  Inhale 2 puffs into the lungs every 12 (twelve) hours. Controller     cetirizine 10 MG tablet  Commonly known as:  ZYRTEC  Take 1 tablet (10 mg total) by mouth once daily.     ciclopirox 8 % Soln  Commonly known as:  PENLAC  APPLY EXTERNALLY TO THE AFFECTED AREA EVERY NIGHT     desonide 0.05 % cream  Commonly known as:  DESOWEN  AAA bid     FERATE ORAL  Take by mouth once daily at 6am.     folic acid 1 MG tablet  Commonly known as:  FOLVITE  1 po qday - do not take on same day as taking methotrexate     furosemide 40 MG tablet  Commonly known as:  LASIX  Take 1 tablet (40 mg total) by mouth once daily. For 1 week take twice daily then once daily.     HYDROcodone-acetaminophen 5-325 mg per tablet  Commonly known as:  NORCO  Take 1 tablet by mouth every 6 (six) hours as needed for Pain.     hydrocortisone butyrate 0.1 % Crea cream  Commonly known as:  LOCOID  AAA buttock bid     KRILL OIL (OMEGA 3 & 6) ORAL  Take by mouth once daily at 6am.     levothyroxine 75 MCG tablet  Commonly known as:  SYNTHROID  TAKE 1 TABLET BY MOUTH BEFORE BREAKFAST     lidocaine HCL 2% 2 % jelly  Commonly known as:  XYLOCAINE  APPLY TOPICALLY EVERY DAY AS NEEDED     losartan 25 MG tablet  Commonly known as:  COZAAR  Take 0.5 tablets (12.5 mg total) by mouth once daily.     magnesium oxide 400 mg tablet  Commonly known as:  MAG-OX  Take 1 tablet (400 mg total) by mouth once daily.     methotrexate 2.5 MG Tab  Take 4 tablets po qweek in divided doses as directed     metoprolol succinate 50 MG 24 hr tablet  Commonly known as:  TOPROL-XL  Take 1 tablet (50 mg total) by mouth once daily.     miconazole NITRATE 2 % 2 % top powder  Commonly known as:  ZEASORB AF  Apply topically as needed for Itching.     mometasone 50 mcg/actuation nasal spray  Commonly known as:  NASONEX  2 sprays by Nasal route  once daily.     nystatin-triamcinolone cream  Commonly known as:  MYCOLOG II  Apply topically 2 (two) times daily.     olopatadine 0.1 % ophthalmic solution  Commonly known as:  PATANOL  Place 1 drop into the right eye 2 (two) times daily.     ranitidine 150 MG tablet  Commonly known as:  ZANTAC  Take 1 tablet (150 mg total) by mouth 2 (two) times daily as needed for Heartburn.     silver sulfADIAZINE 1% 1 % cream  Commonly known as:  SILVADENE  aaa buttock bid     triamcinolone acetonide 0.1% 0.1 % cream  Commonly known as:  KENALOG  Apply topically 2 (two) times daily. Apply to affected area            Time spent on the discharge of patient: 30 minutes    Hardik Hernadez MD  Interventional Cardiology  [facility]

## 2018-07-23 LAB
POC ACTIVATED CLOTTING TIME K: 186 SEC (ref 74–137)
SAMPLE: ABNORMAL

## 2018-07-24 ENCOUNTER — OFFICE VISIT (OUTPATIENT)
Dept: OPHTHALMOLOGY | Facility: CLINIC | Age: 78
End: 2018-07-24
Payer: COMMERCIAL

## 2018-07-24 DIAGNOSIS — H51.22 INTERNUCLEAR OPHTHALMOPLEGIA, LEFT EYE: Primary | ICD-10-CM

## 2018-07-24 PROBLEM — H50.112 MONOCULAR EXOTROPIA OF LEFT EYE: Status: RESOLVED | Noted: 2018-05-22 | Resolved: 2018-07-24

## 2018-07-24 PROCEDURE — 99999 PR PBB SHADOW E&M-EST. PATIENT-LVL III: CPT | Mod: PBBFAC,,, | Performed by: OPHTHALMOLOGY

## 2018-07-24 PROCEDURE — 92012 INTRM OPH EXAM EST PATIENT: CPT | Mod: S$GLB,,, | Performed by: OPHTHALMOLOGY

## 2018-07-24 NOTE — PROGRESS NOTES
HPI     Concerns About Ocular Health    Additional comments: diplopia           Comments   DLS:05/22/2018 Mert  Patient here for 2 months follow up diplopia due to left NEIL.  Pt states still seeing double.  OU water and feeling scratchy.   No eye drops.    I have personally interviewed the patient, reviewed the history and   examined the patient and agree with the technician's exam.    Ms. Marino only has double vision in extreme right gaze. Otherwise, she   fuses in all directions.        Last edited by Rock Painting MD on 7/24/2018  2:16 PM. (History)            Assessment /Plan     For exam results, see Encounter Report.    Internuclear ophthalmoplegia, left eye      Ms. Marino's NEIL has resolved. She has been left with positional diplopia only in far right gaze. No further diagnostic testing is indicated. She will return to me as needed.

## 2018-07-25 ENCOUNTER — OFFICE VISIT (OUTPATIENT)
Dept: SPORTS MEDICINE | Facility: CLINIC | Age: 78
End: 2018-07-25
Payer: COMMERCIAL

## 2018-07-25 ENCOUNTER — OFFICE VISIT (OUTPATIENT)
Dept: CARDIOLOGY | Facility: CLINIC | Age: 78
End: 2018-07-25
Payer: COMMERCIAL

## 2018-07-25 VITALS
BODY MASS INDEX: 36.4 KG/M2 | DIASTOLIC BLOOD PRESSURE: 70 MMHG | WEIGHT: 218.5 LBS | HEART RATE: 77 BPM | SYSTOLIC BLOOD PRESSURE: 161 MMHG | HEIGHT: 65 IN

## 2018-07-25 VITALS — TEMPERATURE: 98 F | BODY MASS INDEX: 36.32 KG/M2 | HEIGHT: 65 IN | WEIGHT: 218 LBS

## 2018-07-25 DIAGNOSIS — M19.022 OSTEOARTHRITIS INVOLVING JOINTS OF UPPER ARMS, BILATERAL: ICD-10-CM

## 2018-07-25 DIAGNOSIS — E78.5 HYPERLIPIDEMIA, UNSPECIFIED HYPERLIPIDEMIA TYPE: ICD-10-CM

## 2018-07-25 DIAGNOSIS — M19.021 OSTEOARTHRITIS INVOLVING JOINTS OF UPPER ARMS, BILATERAL: ICD-10-CM

## 2018-07-25 DIAGNOSIS — I50.32 CHRONIC CONGESTIVE HEART FAILURE WITH LEFT VENTRICULAR DIASTOLIC DYSFUNCTION: ICD-10-CM

## 2018-07-25 DIAGNOSIS — E78.5 DYSLIPIDEMIA: ICD-10-CM

## 2018-07-25 DIAGNOSIS — M19.012 OSTEOARTHRITIS OF GLENOHUMERAL JOINTS, BILATERAL: Primary | ICD-10-CM

## 2018-07-25 DIAGNOSIS — I10 ESSENTIAL HYPERTENSION: Primary | ICD-10-CM

## 2018-07-25 DIAGNOSIS — M19.011 OSTEOARTHRITIS OF GLENOHUMERAL JOINTS, BILATERAL: Primary | ICD-10-CM

## 2018-07-25 DIAGNOSIS — M12.812 ROTATOR CUFF ARTHROPATHY OF LEFT SHOULDER: ICD-10-CM

## 2018-07-25 DIAGNOSIS — M75.50 SUBACROMIAL BURSITIS: ICD-10-CM

## 2018-07-25 DIAGNOSIS — I25.110 CORONARY ARTERY DISEASE INVOLVING NATIVE CORONARY ARTERY OF NATIVE HEART WITH UNSTABLE ANGINA PECTORIS: ICD-10-CM

## 2018-07-25 DIAGNOSIS — E66.9 OBESITY (BMI 30-39.9): ICD-10-CM

## 2018-07-25 DIAGNOSIS — M12.811 ROTATOR CUFF ARTHROPATHY, RIGHT: ICD-10-CM

## 2018-07-25 DIAGNOSIS — I73.9 PVD (PERIPHERAL VASCULAR DISEASE): ICD-10-CM

## 2018-07-25 PROBLEM — I50.43 ACUTE ON CHRONIC COMBINED SYSTOLIC AND DIASTOLIC HEART FAILURE: Status: RESOLVED | Noted: 2018-07-09 | Resolved: 2018-07-25

## 2018-07-25 PROCEDURE — 99999 PR PBB SHADOW E&M-EST. PATIENT-LVL III: CPT | Mod: PBBFAC,,, | Performed by: FAMILY MEDICINE

## 2018-07-25 PROCEDURE — 3077F SYST BP >= 140 MM HG: CPT | Mod: CPTII,S$GLB,, | Performed by: FAMILY MEDICINE

## 2018-07-25 PROCEDURE — 3078F DIAST BP <80 MM HG: CPT | Mod: CPTII,S$GLB,, | Performed by: FAMILY MEDICINE

## 2018-07-25 PROCEDURE — 99214 OFFICE O/P EST MOD 30 MIN: CPT | Mod: 25,S$GLB,, | Performed by: FAMILY MEDICINE

## 2018-07-25 PROCEDURE — 99214 OFFICE O/P EST MOD 30 MIN: CPT | Mod: S$GLB,,, | Performed by: INTERNAL MEDICINE

## 2018-07-25 PROCEDURE — 20611 DRAIN/INJ JOINT/BURSA W/US: CPT | Mod: 50,S$GLB,, | Performed by: FAMILY MEDICINE

## 2018-07-25 PROCEDURE — 3077F SYST BP >= 140 MM HG: CPT | Mod: CPTII,S$GLB,, | Performed by: INTERNAL MEDICINE

## 2018-07-25 PROCEDURE — 3078F DIAST BP <80 MM HG: CPT | Mod: CPTII,S$GLB,, | Performed by: INTERNAL MEDICINE

## 2018-07-25 PROCEDURE — 99999 PR PBB SHADOW E&M-EST. PATIENT-LVL V: CPT | Mod: PBBFAC,,, | Performed by: INTERNAL MEDICINE

## 2018-07-25 RX ORDER — LOSARTAN POTASSIUM 25 MG/1
25 TABLET ORAL DAILY
Qty: 90 TABLET | Refills: 3 | Status: SHIPPED | OUTPATIENT
Start: 2018-07-25 | End: 2018-08-08 | Stop reason: DRUGHIGH

## 2018-07-25 RX ORDER — METOPROLOL SUCCINATE 50 MG/1
50 TABLET, EXTENDED RELEASE ORAL DAILY
Qty: 90 TABLET | Refills: 3 | Status: SHIPPED | OUTPATIENT
Start: 2018-07-25 | End: 2018-10-15 | Stop reason: SDUPTHER

## 2018-07-25 RX ORDER — TRIAMCINOLONE ACETONIDE 40 MG/ML
40 INJECTION, SUSPENSION INTRA-ARTICULAR; INTRAMUSCULAR
Status: DISCONTINUED | OUTPATIENT
Start: 2018-07-25 | End: 2018-07-25 | Stop reason: HOSPADM

## 2018-07-25 RX ORDER — ATORVASTATIN CALCIUM 40 MG/1
TABLET, FILM COATED ORAL
Qty: 90 TABLET | Refills: 3 | Status: SHIPPED | OUTPATIENT
Start: 2018-07-25 | End: 2018-10-15 | Stop reason: SDUPTHER

## 2018-07-25 RX ADMIN — TRIAMCINOLONE ACETONIDE 40 MG: 40 INJECTION, SUSPENSION INTRA-ARTICULAR; INTRAMUSCULAR at 01:07

## 2018-07-25 NOTE — PROGRESS NOTES
Cardiology Clinic Note  Reason for Visit: F/u CHF and CAD    HPI:   Ms. Marino is a 78 year old white female with HTN DM well controlled, HLD and ICM with LVEF 30-35% (now recovered) on exercise stress echo who presents to cardiology clinic for follow up. Seen by me multiple times since August 2016. Found to have 99% prox LAD and 90% prox D1. Received OSMEL to prox and mid LAD with DONNY 1 flow. Had repeat PET showing improved LVEF and no evidence of ischemia.   Since last visit, had recurrence of prior anginal symptoms. SAMUEL positive for ischemia, now s/p OSMEL to LAD bifurcation and to D1 by Dr. Montemayor. Feels well but still fatigued. No more SOB with exertion, no CP. BP running high. Eager to get back to rehab although she is concerned about blockages in her legs after seeing a commercial and correlating that with BL leg heaviness when walking. No ulcers or skin breakdown.    ROS:    Constitution: Negative for fever, chills, weight loss or gain.   HENT: Negative for sore throat, rhinorrhea, or headache; post nasal drip  Eyes: Negative for blurred or double vision.   Cardiovascular: See above  Pulmonary: no NEAL, no cough   Gastrointestinal: Negative for abdominal pain, nausea, vomiting, or diarrhea.   : Negative for dysuria.   Neurological: Negative for focal weakness or sensory changes.  PMH:     Past Medical History:   Diagnosis Date    Adverse effect of glucocorticoid or synthetic analogue     Allergy     Arthritis     Cancer     CHF (congestive heart failure)     Chronic kidney disease     Coronary artery disease involving native coronary artery of native heart without angina pectoris 10/11/2016    Diabetic neuropathy 10/6/2014    Giant cell arteritis 9/24/2012    Hyperlipidemia     Hypertension     Hypothyroid     Obesity (BMI 30-39.9) 10/6/2014    Osteopenia     Primary osteoarthritis of both knees 9/24/2012    Type II or unspecified type diabetes mellitus with renal manifestations,  uncontrolled(250.42)      Past Surgical History:   Procedure Laterality Date    APPENDECTOMY      CATARACT EXTRACTION      CATARACT EXTRACTION, BILATERAL      CHOLECYSTECTOMY      EYE SURGERY      HYSTERECTOMY      JOINT REPLACEMENT      bilateral total knee    SKIN BIOPSY      TONSILLECTOMY       Allergies:     Review of patient's allergies indicates:   Allergen Reactions    Sulfamethoxazole-trimethoprim Nausea And Vomiting    Latex, natural rubber Rash    Pcn [penicillins] Rash     Medications:     Current Outpatient Prescriptions on File Prior to Visit   Medication Sig Dispense Refill    albuterol 90 mcg/actuation inhaler Inhale 2 puffs into the lungs every 6 (six) hours as needed for Wheezing. 1 each 11    alendronate (FOSAMAX) 70 MG tablet Take 1 tablet (70 mg total) by mouth every 7 days. 4 tablet 6    allopurinol (ZYLOPRIM) 300 MG tablet TAKE 1 TABLET(300 MG) BY MOUTH EVERY DAY 90 tablet 3    aspirin (ECOTRIN) 81 MG EC tablet Take 1 tablet (81 mg total) by mouth once daily. 90 tablet 3    azelastine (ASTELIN) 137 mcg (0.1 %) nasal spray USE 1 SPRAY(137 MCG) IN EACH NOSTRIL TWICE DAILY 30 mL 0    blood sugar diagnostic (ACCU-CHEK AMELIA PLUS TEST STRP) Strp Checks bg 2 x day before meals 200 strip 4    budesonide-formoterol 160-4.5 mcg (SYMBICORT) 160-4.5 mcg/actuation HFAA Inhale 2 puffs into the lungs every 12 (twelve) hours. Controller 10.2 g 3    ciclopirox (PENLAC) 8 % Soln APPLY EXTERNALLY TO THE AFFECTED AREA EVERY NIGHT 6.6 mL 0    clopidogrel (PLAVIX) 75 mg tablet TAKE 1 TABLET BY MOUTH ONCE DAILY 4 tablet 0    clopidogrel (PLAVIX) 75 mg tablet Take 1 tablet (75 mg total) by mouth once daily. 30 tablet 11    desonide (DESOWEN) 0.05 % cream AAA bid 180 g 1    diphth,pertus,acell,,tetanus (BOOSTRIX) 2.5-8-5 Lf-mcg-Lf/0.5mL Syrg injection Inject into the muscle. 0.5 mL 0    FERROUS GLUCONATE (FERATE ORAL) Take by mouth once daily at 6am.       folic acid (FOLVITE) 1 MG tablet 1  po qday - do not take on same day as taking methotrexate 30 tablet 5    furosemide (LASIX) 40 MG tablet Take 1 tablet (40 mg total) by mouth once daily. For 1 week take twice daily then once daily. 90 tablet 3    hydrocodone-acetaminophen 5-325mg (NORCO) 5-325 mg per tablet Take 1 tablet by mouth every 6 (six) hours as needed for Pain. 16 tablet 0    hydrocortisone butyrate (LOCOID) 0.1 % Crea cream AAA buttock bid 180 g 0    KRILL/OM3/DHA/EPA/OM6/LIP/ASTX (KRILL OIL, OMEGA 3 & 6, ORAL) Take by mouth once daily at 6am.       levothyroxine (SYNTHROID) 75 MCG tablet TAKE 1 TABLET BY MOUTH BEFORE BREAKFAST 90 tablet 0    lidocaine HCL 2% (XYLOCAINE) 2 % jelly APPLY TOPICALLY EVERY DAY AS NEEDED 30 mL 3    magnesium oxide (MAG-OX) 400 mg tablet Take 1 tablet (400 mg total) by mouth once daily.  0    methotrexate 2.5 MG Tab Take 4 tablets po qweek in divided doses as directed 16 tablet 2    miconazole NITRATE 2 % (ZEASORB AF) 2 % top powder Apply topically as needed for Itching. 71 g 0    mometasone (NASONEX) 50 mcg/actuation nasal spray 2 sprays by Nasal route once daily. 1 each 0    silver sulfADIAZINE 1% (SILVADENE) 1 % cream aaa buttock bid 50 g 2    triamcinolone acetonide 0.1% (KENALOG) 0.1 % cream Apply topically 2 (two) times daily. Apply to affected area 454 Tube 3    [DISCONTINUED] atorvastatin (LIPITOR) 40 MG tablet TAKE 1 TABLET(40 MG) BY MOUTH EVERY DAY 90 tablet 0    [DISCONTINUED] losartan (COZAAR) 25 MG tablet Take 0.5 tablets (12.5 mg total) by mouth once daily. 45 tablet 3    [DISCONTINUED] metoprolol succinate (TOPROL-XL) 50 MG 24 hr tablet Take 1 tablet (50 mg total) by mouth once daily. 90 tablet 3    cetirizine (ZYRTEC) 10 MG tablet Take 1 tablet (10 mg total) by mouth once daily. 30 tablet 2    nystatin-triamcinolone (MYCOLOG II) cream Apply topically 2 (two) times daily. 30 g 0    olopatadine (PATANOL) 0.1 % ophthalmic solution Place 1 drop into the right eye 2 (two) times daily.  "5 mL 1    ranitidine (ZANTAC) 150 MG tablet Take 1 tablet (150 mg total) by mouth 2 (two) times daily as needed for Heartburn. 120 tablet 5     No current facility-administered medications on file prior to visit.      Social History:     Social History   Substance Use Topics    Smoking status: Never Smoker    Smokeless tobacco: Never Used    Alcohol use No     Family History:     Family History   Problem Relation Age of Onset    Diabetes Mother     Hypertension Mother     Hypertension Father     Kidney disease Neg Hx     Eczema Neg Hx     Psoriasis Neg Hx     Melanoma Neg Hx     Lupus Neg Hx     Acne Neg Hx      Physical Exam:     BP (!) 161/70   Pulse 77   Ht 5' 5" (1.651 m)   Wt 99.1 kg (218 lb 7.6 oz)   BMI 36.36 kg/m²      Constitutional: NAD, conversant  HEENT: Sclera anicteric, PERRLA, EOMI  Neck: No JVD, no carotid bruits  CV: RRR, no murmur, normal S1/S2, no S3  Pulm: CTAB, no wheezes, rales, or ronchi  GI: Abdomen soft, NTND, +BS  Extremities: 1+ doughy LE edema to ankles, warm and well perfused  Skin: No ecchymosis, erythema, or ulcers  Psych: AOx3, appropriate affect  Neuro: CNII-XII intact, no focal deficits    Labs:     Lab Results   Component Value Date     07/20/2018    K 4.6 07/20/2018     07/20/2018    CO2 26 07/20/2018    BUN 38 (H) 07/20/2018    CREATININE 0.9 07/20/2018    ANIONGAP 10 07/20/2018     Lab Results   Component Value Date    HGBA1C 6.7 (H) 05/30/2018     Lab Results   Component Value Date    BNP 73 07/09/2018     (H) 08/03/2016    BNP 23 02/19/2010    Lab Results   Component Value Date    WBC 6.36 07/21/2018    HGB 11.1 (L) 07/21/2018    HCT 34.2 (L) 07/21/2018     (L) 07/21/2018    GRAN 4.6 07/21/2018    GRAN 72.0 07/21/2018     Lab Results   Component Value Date    CHOL 112 (L) 06/30/2018    HDL 32 (L) 06/30/2018    LDLCALC 53.8 (L) 06/30/2018    TRIG 131 06/30/2018          Imaging:     SAMUEL    1 - Mildly depressed left ventricular systolic " function (EF 50-55%).     2 - Eccentric hypertrophy.     3 - Impaired LV relaxation, normal LAP (grade 1 diastolic dysfunction).     4 - Normal right ventricular systolic function .     Positive stress echocardiographic study demonstrating an ischemic response involving the anterior septum, anterolateral wall, apical septum, inferior wall, anterior wall. This may represent multivessel coronary disease or a nonischemic cardiomyopathy.    [facility]  Bifurcation of the LAD:              The lesion was successfully intervened. Post-stenosis of 0%, post-DONNY 3 flow and TMP grade 3. The vessel was accessed natively.  The following items were used: 2.5MM 12MM [place] Balloon.       D1:              The lesion was successfully intervened. Post-stenosis of 0%, post-DONNY 3 flow and TMP grade 3. The vessel was accessed natively.  The following items were used: 2.5MM 15MM [place] Balloon and Stent Resolute Rx 2.50x14 (OSMEL).    EF   Date Value Ref Range Status   07/11/2018 50 55 - 65    01/16/2017 58 55 - 65    08/03/2016 30 (A) 55 - 65        Assessment:    Krissy Marino is a 78 year old with DM HTN and HLD who presents today for follow up of her CAD and ischemic cardiomyopathy. Revascularized, needs BP optimization and cardiac rehab. Furthermore, having some atypical symptoms of possible claudication.      Plan:   1) Systolic Heart Failure with Reduced EF -- NYHA I, warm and euvolemic today   --continue ASA and atorvastatin   --continiue toprol XL 50mg   --increase losartan to 25mg (call in 2 weeks to check BP)   --lasix 40mg daily     2) CAD -- prox LAD disease   --continue ASA and plavix (7/2019)   --refer to cardiac rehab   --BP control     3) HTN -- not controlled of late   --intolerat of ACE in past   --increase losartan, continue other current meds   --call in 2 weeks to recheck, f/u in 3 months    4) HLD   --atorvastatin 40mg, at goal    5) DM   --at goal    6) GERD   --on meds    7) Venous stasis   --compression stockings   ordered    8) Obesity   --stressed dietary changes with patient who is excited to lose weight    9) PVD   --check exercise MARIMAR given risk factors and symptoms    RTC 3 months    Signed:  Toñito Kohler MD  Cardiology Fellow, PGY-6  Pager: [phone]  7/25/2018 2:34 PM

## 2018-07-25 NOTE — PROCEDURES
"Large Joint Aspiration/Injection  Date/Time: 7/25/2018 1:12 PM  Performed by: MOE MIRELES  Authorized by: MOE MIRELES     Consent Done?:  Yes (Verbal)  Indications:  Pain  Procedure site marked: Yes    Timeout: Prior to procedure the correct patient, procedure, and site was verified      Location:  Shoulder  Site:  R glenohumeral and L glenohumeral  Prep: Patient was prepped and draped in usual sterile fashion    Ultrasonic Guidance for needle placement: Yes  Images are saved and documented.  Needle size:  20 G  Approach:  Posterior  Medications:  40 mg triamcinolone acetonide 40 mg/mL; 40 mg triamcinolone acetonide 40 mg/mL  Patient tolerance:  Patient tolerated the procedure well with no immediate complications    Additional Comments: Description of ultrasound utilization for needle guidance:   Ultrasound guidance used for needle localization. Images saved and stored for documentation. The glenohumeral joint was visualized. Dynamic visualization of the 20g x 3.5" needle was continuous throughout the procedure.      "

## 2018-07-25 NOTE — PROCEDURES
"Large Joint Aspiration/Injection  Date/Time: 7/25/2018 1:12 PM  Performed by: MOE MIRELES  Authorized by: MOE MIRELES     Consent Done?:  Yes (Verbal)  Indications:  Pain  Procedure site marked: Yes    Timeout: Prior to procedure the correct patient, procedure, and site was verified      Location:  Shoulder  Site:  R subacromial bursa and L subacromial bursa  Prep: Patient was prepped and draped in usual sterile fashion    Ultrasonic Guidance for needle placement: Yes  Images are saved and documented.  Needle size:  20 G  Approach:  Lateral  Medications:  40 mg triamcinolone acetonide 40 mg/mL; 40 mg triamcinolone acetonide 40 mg/mL  Patient tolerance:  Patient tolerated the procedure well with no immediate complications    Additional Comments: Description of ultrasound utilization for needle guidance:   Ultrasound guidance used for needle localization. Images saved and stored for documentation. The subacromial / subdeltoid bursa was visualized. Dynamic visualization of the 20g x 1.5" needle was continuous throughout the procedure.          "

## 2018-07-25 NOTE — PROGRESS NOTES
Krissy Marino, a 78 y.o. female, is here for evaluation of RIGHT and LEFT shoulder pain.     HISTORY OF PRESENT ILLNESS  Hand dominance, right     Location: anterior and lateral, bilateral R > L  Onset: Insidious, many years  Palliative:    Relative rest   Oral analgesics (tylenol PM)   R- CSI, SAB, 07/18/16, 80% improvement   R - CSI, SAB, 12/02/17, moderate improvement for ~ 3 weeks    R - CSI, SAB, 01/27/17, no improvement    R - CSI, iaGH/SAB, 02/21/17, mild to moderate relief    fPT @ [facility] - going well, but ROM remains painful   Heat    R - CSI, iaGH/SAB, 05/05/17, moderate%   Sling PRN   R/L - CSI, SAB, 07/12/17, moderate% improvement    R/L - CSI, iaGH/SAB 08/23/17, moderate%   R/L - CSI, SAB, 10/18/17, moderate% improvement, though not for long    R/L - CSI, SAB, 11/15/17, moderate% improvement, for ~ 3weeks    R/L - CSI, SAB 12/13/17, mild% improvement    R/L - CSI, iaGH/SAB 01/23/18, 40-50%   R/L - CSI, SAB, 02/27/18, moderate improvement, though did not last long   L - CSI, SAB, 4/24/18, Moderate Improvement  Provocative:    ADLs   Prior:   Progression: worsening discomfort   Quality:    Sharp pain at times activity   Throbbing at times with activity    Muscle soreness   Radiation: none  Severity: per nursing documentation  Timing: intermittent with use  Trauma:    17.07: mechanical fall while at PT --> landed on L arm     Review of systems (ROS):  A 10+ review of systems was performed with pertinent positives and negatives noted above in the history of present illness. Other systems were negative unless otherwise specified.      PHYSICAL EXAMINATION  General:  The patient is alert and oriented x 3. Mood is pleasant. Observation of ears, eyes and nose reveal no gross abnormalities. HEENT: NCAT, sclera anicteric. Lungs: Respirations are equal and unlabored.     RIGHT SHOULDER EXAMINATION     OBSERVATION:     Swelling  none  Deformity  none   Discoloration  none   Scapular  winging none   Scars   none  Atrophy  none    TENDERNESS / CREPITUS (T/C):          T/C      T/C   Clavicle   -/-  SUPRAspinatus    -/-     AC Jt.    -/-  INFRAspinatus  -/-    SC Jt.    -/-  Deltoid    -/-      G. Tuberosity  -/-  LH BICEP groove  -/-   Acromion:  -/-  Midline Neck   -/-     Scapular Spine -/-  Trapezium   -/-   SMA Scapula  -/-  GH jt. line - post  -/-     Scapulothoracic  -/-         ROM:     Right shoulder   Left shoulder        AROM (PROM)   AROM (PROM)   FE    120° (155°)*     120° (155°)*     ER at 0°    60°  (65°) *   60°  (65°)*   ER at 90° ABD  90°  (90°) *   90°  (90°)*   IR at 90°  ABD   NA  (40°)  *   NA  (40°)  *    IR (spine level)   T10  *   T10*    STRENGTH: (* = with pain) RIGHT SHOULDER  LEFT SHOULDER   SCAPTION   4/5    4/5   IR    4/5    4/5   ER    4/5    4/5   BICEPS   4/5    4/5   Deltoid    4/5    4/5     SIGNS:  Painful side       NEER   +   OSONNYS        +    CHAPA   +   SPEEDS        +   DROP ARM   -   BELLY PRESS       -    X-Body ADD    +   LIFT-OFF        +   HORNBLOWERS      +              STABILITY TESTING   RIGHT SHOULDER  LEFT SHOULDER     Translation     Anterior up face    up face    Posterior up face   up face    Sulcus  < 10mm   < 10 mm     Signs   Apprehension   neg      neg       Relocation   no change     no change      Jerk test  neg     neg    EXTREMITY NEURO-VASCULAR EXAM    Sensation grossly intact to light touch all dermatomal regions.    DTR 2+ Biceps, Triceps, BR and Negative Chinas sign   Grossly intact motor function at Elbow, Wrist and Hand   Distal pulses radial and ulnar 2+, brisk cap refill, symmetric.      NECK:  Painless FROM and spinous processes non-tender. Negative Spurlings sign.       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   W/ advanced acromialization of humeral head   W/ chronic tearing of superior rotator cuff    No evidence of neurologic pathology  No evidence of  vascular pathology    Imaging studies reviewed:   X-ray shoulder, right 17.05  X-ray shoulder, left 17.07    Plan:  We discussed options including:  #1 watchful waiting  #2 re start physical therapy aimed at:   RoM glenohumeral joint   Strengthening rotator cuff   Scapular stability    fpt  #3 injection therapy:   CSI SAB    Right, effective 40-50%, repeat    Left, effective 40-50%, repeat    CSI iaGH    Right,effective 40-50%, repeat     Left, effective 40-50%, repeat   orthobiologics   #4 re consultation re: JULIANA   Has seen Team French in the past   Question of surgical candidacy given her CAD     The patient chooses #3 csi sab bilat and csi iagh bilat    Pain management: handout given  Bracing: shoulder sling, prn  Physical therapy:    fPT, @ [facility], prior as above   Activity (e.g. sports, work) restrictions: as tolerated   school/vocation: works at [employer]     Follow up in 8 w  REPEAT X-RAY SHOULDER BILATERAL  Should symptoms worsen or fail to resolve, consider:  Revisiting the above options

## 2018-07-25 NOTE — PROGRESS NOTES
I have personally interviewed and examined the patient. I have reviewed the notes, assessments and plans of the Cardiology Fellow and I agree with his documentation.

## 2018-08-08 ENCOUNTER — CLINICAL SUPPORT (OUTPATIENT)
Dept: CARDIOLOGY | Facility: CLINIC | Age: 78
End: 2018-08-08
Attending: INTERNAL MEDICINE
Payer: COMMERCIAL

## 2018-08-08 ENCOUNTER — TELEPHONE (OUTPATIENT)
Dept: CARDIOLOGY | Facility: HOSPITAL | Age: 78
End: 2018-08-08

## 2018-08-08 DIAGNOSIS — I73.9 PVD (PERIPHERAL VASCULAR DISEASE): ICD-10-CM

## 2018-08-08 LAB — VASCULAR ANKLE BRACHIAL INDEX (ABI) RIGHT: 1.45 (ref 0.9–1.2)

## 2018-08-08 PROCEDURE — 93924 LWR XTR VASC STDY BILAT: CPT | Mod: S$GLB,,, | Performed by: INTERNAL MEDICINE

## 2018-08-08 PROCEDURE — 99999 PR PBB SHADOW E&M-EST. PATIENT-LVL I: CPT | Mod: PBBFAC,,,

## 2018-08-08 RX ORDER — LOSARTAN POTASSIUM 50 MG/1
50 TABLET ORAL DAILY
Qty: 90 TABLET | Refills: 3 | Status: SHIPPED | OUTPATIENT
Start: 2018-08-08 | End: 2018-10-10 | Stop reason: DRUGHIGH

## 2018-08-08 NOTE — TELEPHONE ENCOUNTER
Called Ms. Marino to discuss her BP after recent increase in losartan. BP of late improved but still running 140-160 SBP. Will increase losartan to 50mg and check labs in near future    Edita LANZA

## 2018-08-14 RX ORDER — LEVOTHYROXINE SODIUM 75 UG/1
TABLET ORAL
Qty: 90 TABLET | Refills: 0 | Status: SHIPPED | OUTPATIENT
Start: 2018-08-14 | End: 2018-11-05 | Stop reason: SDUPTHER

## 2018-08-15 ENCOUNTER — TELEPHONE (OUTPATIENT)
Dept: CARDIOLOGY | Facility: HOSPITAL | Age: 78
End: 2018-08-15

## 2018-08-15 DIAGNOSIS — I73.9 VASCULAR CLAUDICATION: ICD-10-CM

## 2018-08-15 NOTE — TELEPHONE ENCOUNTER
Called patient to inform of MARIMAR results. Noncompressible vessels. Will order CTA with runoff to assess her lower extremity vessels.    Edita LANZA

## 2018-09-11 ENCOUNTER — OFFICE VISIT (OUTPATIENT)
Dept: OPTOMETRY | Facility: CLINIC | Age: 78
End: 2018-09-11
Payer: COMMERCIAL

## 2018-09-11 ENCOUNTER — OFFICE VISIT (OUTPATIENT)
Dept: DERMATOLOGY | Facility: CLINIC | Age: 78
End: 2018-09-11
Payer: COMMERCIAL

## 2018-09-11 ENCOUNTER — LAB VISIT (OUTPATIENT)
Dept: LAB | Facility: HOSPITAL | Age: 78
End: 2018-09-11
Attending: DERMATOLOGY
Payer: COMMERCIAL

## 2018-09-11 DIAGNOSIS — L40.9 PSORIASIS: Primary | ICD-10-CM

## 2018-09-11 DIAGNOSIS — H52.4 MYOPIA WITH ASTIGMATISM AND PRESBYOPIA, BILATERAL: Primary | ICD-10-CM

## 2018-09-11 DIAGNOSIS — L40.9 PSORIASIS: ICD-10-CM

## 2018-09-11 DIAGNOSIS — H52.13 MYOPIA WITH ASTIGMATISM AND PRESBYOPIA, BILATERAL: Primary | ICD-10-CM

## 2018-09-11 DIAGNOSIS — H52.203 MYOPIA WITH ASTIGMATISM AND PRESBYOPIA, BILATERAL: Primary | ICD-10-CM

## 2018-09-11 LAB
ALBUMIN SERPL BCP-MCNC: 3.4 G/DL
ALP SERPL-CCNC: 139 U/L
ALT SERPL W/O P-5'-P-CCNC: 13 U/L
AST SERPL-CCNC: 17 U/L
BASOPHILS # BLD AUTO: 0.02 K/UL
BASOPHILS NFR BLD: 0.2 %
BILIRUB DIRECT SERPL-MCNC: 0.6 MG/DL
BILIRUB SERPL-MCNC: 1.6 MG/DL
DIFFERENTIAL METHOD: ABNORMAL
EOSINOPHIL # BLD AUTO: 0.2 K/UL
EOSINOPHIL NFR BLD: 2 %
ERYTHROCYTE [DISTWIDTH] IN BLOOD BY AUTOMATED COUNT: 14.4 %
HCT VFR BLD AUTO: 37.3 %
HGB BLD-MCNC: 12.2 G/DL
IMM GRANULOCYTES # BLD AUTO: 0.03 K/UL
IMM GRANULOCYTES NFR BLD AUTO: 0.4 %
LYMPHOCYTES # BLD AUTO: 1.3 K/UL
LYMPHOCYTES NFR BLD: 16.6 %
MCH RBC QN AUTO: 32.9 PG
MCHC RBC AUTO-ENTMCNC: 32.7 G/DL
MCV RBC AUTO: 101 FL
MONOCYTES # BLD AUTO: 0.5 K/UL
MONOCYTES NFR BLD: 5.7 %
NEUTROPHILS # BLD AUTO: 6.1 K/UL
NEUTROPHILS NFR BLD: 75.1 %
NRBC BLD-RTO: 0 /100 WBC
PLATELET # BLD AUTO: 135 K/UL
PMV BLD AUTO: 10.7 FL
PROT SERPL-MCNC: 6.2 G/DL
RBC # BLD AUTO: 3.71 M/UL
WBC # BLD AUTO: 8.08 K/UL

## 2018-09-11 PROCEDURE — 36415 COLL VENOUS BLD VENIPUNCTURE: CPT

## 2018-09-11 PROCEDURE — 80076 HEPATIC FUNCTION PANEL: CPT

## 2018-09-11 PROCEDURE — 92015 DETERMINE REFRACTIVE STATE: CPT | Mod: S$GLB,,, | Performed by: OPTOMETRIST

## 2018-09-11 PROCEDURE — 85025 COMPLETE CBC W/AUTO DIFF WBC: CPT

## 2018-09-11 PROCEDURE — 99999 PR PBB SHADOW E&M-EST. PATIENT-LVL III: CPT | Mod: PBBFAC,,, | Performed by: DERMATOLOGY

## 2018-09-11 PROCEDURE — 99999 PR PBB SHADOW E&M-EST. PATIENT-LVL II: CPT | Mod: PBBFAC,,, | Performed by: OPTOMETRIST

## 2018-09-11 PROCEDURE — 1101F PT FALLS ASSESS-DOCD LE1/YR: CPT | Mod: CPTII,S$GLB,, | Performed by: DERMATOLOGY

## 2018-09-11 PROCEDURE — 99214 OFFICE O/P EST MOD 30 MIN: CPT | Mod: S$GLB,,, | Performed by: DERMATOLOGY

## 2018-09-11 RX ORDER — FUROSEMIDE 20 MG/1
TABLET ORAL
COMMUNITY
Start: 2018-08-24 | End: 2018-10-10 | Stop reason: ALTCHOICE

## 2018-09-11 RX ORDER — ACITRETIN 10 MG/1
10 CAPSULE ORAL
Qty: 90 CAPSULE | Refills: 1 | Status: SHIPPED | OUTPATIENT
Start: 2018-09-11 | End: 2018-11-05

## 2018-09-11 RX ORDER — DESONIDE 0.5 MG/G
CREAM TOPICAL
Qty: 3 TUBE | Refills: 1 | Status: ON HOLD | OUTPATIENT
Start: 2018-09-11 | End: 2018-11-30 | Stop reason: HOSPADM

## 2018-09-11 NOTE — ASSESSMENT & PLAN NOTE
Today's Plan:      Pt concerned about MTX SE. Wants to restart Soriatane 10mg qday (90 day supply $242). Understands not to take MTX and Soriatane adjunctively.   Ok to use TAC cream for body  Rx for desonide cream for inframammary flares    F/u 3 months with labs

## 2018-09-11 NOTE — PROGRESS NOTES
Subjective:       Patient ID:  Krissy Marino is a 78 y.o. female who presents for   Chief Complaint   Patient presents with    Psoriasis     follow up       History of Present Illness: The patient presents for follow up of skin check. She has a history of psoriasis.     The patient was last seen on 7/9/18 where she was prescribed MTX (previously on soriatane), however, patient did not start medication due to concern of side effects of the medication. As a result, has not been on anything systemic. She would like to get back on soriatane.     Of note, patient recently with stent placement in July. Currently on plavix.     Other skin complaints: dry skin        Psoriasis  - Follow-up  Symptom course: worsening and unchanged  Currently using: only TAC cream prn. d/c soriatane few months ago as insurance stopped covering Rx per pt.  Affected locations: right buttock, left buttock, right lower leg and right arm  Signs / symptoms: asymptomatic        Review of Systems   Constitutional: Negative for fever and chills.   HENT: Negative for nosebleeds, sore throat and headaches.    Eyes: Negative for visual change.   Respiratory: Negative for cough and shortness of breath.    Gastrointestinal: Negative for nausea, vomiting, abdominal pain and diarrhea.   Musculoskeletal: Negative for myalgias, joint swelling and arthralgias.   Skin: Positive for itching, rash (flared on the right leg and buttock and under the breast), dry skin, daily sunscreen use, activity-related sunscreen use and dry lips (occ). Negative for sun sensitivity, recent sunburn and abscesses.   Neurological: Negative for focal weakness, seizures, numbness and headaches.   Psychiatric/Behavioral: Negative for depressed mood.   Hematologic/Lymphatic: Bruises/bleeds easily (takes plavix).        Objective:    Physical Exam   Constitutional: She appears well-developed and well-nourished. She is obese.  No distress.   Neurological: She is alert and oriented to  person, place, and time. She is not disoriented.   Psychiatric: She has a normal mood and affect.   Skin:   Areas Examined (abnormalities noted in diagram):   Scalp / Hair Palpated and Inspected  Head / Face Inspection Performed  Neck Inspection Performed  Chest / Axilla Inspection Performed  Abdomen Inspection Performed  Genitals / Buttocks / Groin Inspection Performed  Back Inspection Performed  RUE Inspected  LUE Inspection Performed  RLE Inspected  LLE Inspection Performed  Nails and Digits Inspection Performed                   Diagram Legend     Erythematous scaling macule/papule c/w actinic keratosis       Vascular papule c/w angioma      Pigmented verrucoid papule/plaque c/w seborrheic keratosis      Yellow umbilicated papule c/w sebaceous hyperplasia      Irregularly shaped tan macule c/w lentigo     1-2 mm smooth white papules consistent with Milia      Movable subcutaneous cyst with punctum c/w epidermal inclusion cyst      Subcutaneous movable cyst c/w pilar cyst      Firm pink to brown papule c/w dermatofibroma      Pedunculated fleshy papule(s) c/w skin tag(s)      Evenly pigmented macule c/w junctional nevus     Mildly variegated pigmented, slightly irregular-bordered macule c/w mildly atypical nevus      Flesh colored to evenly pigmented papule c/w intradermal nevus       Pink pearly papule/plaque c/w basal cell carcinoma      Erythematous hyperkeratotic cursted plaque c/w SCC      Surgical scar with no sign of skin cancer recurrence      Open and closed comedones      Inflammatory papules and pustules      Verrucoid papule consistent consistent with wart     Erythematous eczematous patches and plaques     Dystrophic onycholytic nail with subungual debris c/w onychomycosis     Umbilicated papule    Erythematous-base heme-crusted tan verrucoid plaque consistent with inflamed seborrheic keratosis     Erythematous Silvery Scaling Plaque c/w Psoriasis     See annotation    Lab Results   Component Value  Date    ALT 13 09/11/2018    AST 17 09/11/2018    ALKPHOS 139 (H) 09/11/2018    BILITOT 1.6 (H) 09/11/2018     Lab Results   Component Value Date    CHOL 112 (L) 06/30/2018    CHOL 169 11/21/2017    CHOL 139 11/10/2017     Lab Results   Component Value Date    HDL 32 (L) 06/30/2018    HDL 41 11/21/2017    HDL 43 11/10/2017     Lab Results   Component Value Date    LDLCALC 53.8 (L) 06/30/2018    LDLCALC 82.6 11/21/2017    LDLCALC 67.4 11/10/2017     Lab Results   Component Value Date    TRIG 131 06/30/2018    TRIG 227 (H) 11/21/2017    TRIG 143 11/10/2017     Lab Results   Component Value Date    CHOLHDL 28.6 06/30/2018    CHOLHDL 24.3 11/21/2017    CHOLHDL 30.9 11/10/2017       Assessment / Plan:        Psoriasis  -     acitretin (SORIATANE) 10 MG capsule; Take 1 capsule (10 mg total) by mouth before breakfast.  Dispense: 90 capsule; Refill: 1  -     Hepatic function panel; Future  -     Lipid panel; Future  -     desonide (DESOWEN) 0.05 % cream; AAA bid  Dispense: 3 Tube; Refill: 1      Psoriasis  Today's Plan:      Pt concerned about MTX SE. Wants to restart Soriatane 10mg qday (90 day supply $242). Understands not to take MTX and Soriatane adjunctively.   Ok to use TAC cream for body  Rx for desonide cream for inframammary flares    F/u 3 months with labs      Follow-up in about 3 months (around 12/11/2018) for with labs.

## 2018-09-11 NOTE — PROGRESS NOTES
HPI     Concerns About Ocular Health      Additional comments: MR ck              Comments     Last eye exam was 7/24/18 with Dr. Painting.  Patient states didn't update glasses after last exam with Dr. aDily. Wanted   to get an updated glasses rx today. Was dilated by Dr. Painting in May.   Diplopia has improved since last visit with Dr. Painting.   Patient denies headaches, flashes/floaters, and pain.            Last edited by Mague Hernandez on 9/11/2018  7:53 AM. (History)            Assessment /Plan     For exam results, see Encounter Report.    Myopia with astigmatism and presbyopia, bilateral            1.  Bifocal rx given.  RTC 1 year for routine exam.

## 2018-09-19 ENCOUNTER — HOSPITAL ENCOUNTER (OUTPATIENT)
Dept: RADIOLOGY | Facility: HOSPITAL | Age: 78
Discharge: HOME | End: 2018-09-19
Attending: FAMILY MEDICINE
Payer: COMMERCIAL

## 2018-09-19 ENCOUNTER — OFFICE VISIT (OUTPATIENT)
Dept: SPORTS MEDICINE | Facility: CLINIC | Age: 78
End: 2018-09-19
Payer: COMMERCIAL

## 2018-09-19 VITALS — WEIGHT: 218 LBS | HEIGHT: 65 IN | TEMPERATURE: 99 F | BODY MASS INDEX: 36.32 KG/M2

## 2018-09-19 DIAGNOSIS — M25.512 CHRONIC PAIN OF BOTH SHOULDERS: ICD-10-CM

## 2018-09-19 DIAGNOSIS — M75.101 ROTATOR CUFF TEAR ARTHROPATHY OF RIGHT SHOULDER: ICD-10-CM

## 2018-09-19 DIAGNOSIS — M25.511 CHRONIC PAIN OF BOTH SHOULDERS: ICD-10-CM

## 2018-09-19 DIAGNOSIS — M12.812 ROTATOR CUFF ARTHROPATHY, LEFT: ICD-10-CM

## 2018-09-19 DIAGNOSIS — M19.011 OSTEOARTHRITIS OF GLENOHUMERAL JOINTS, BILATERAL: Primary | ICD-10-CM

## 2018-09-19 DIAGNOSIS — M12.811 ROTATOR CUFF TEAR ARTHROPATHY OF RIGHT SHOULDER: ICD-10-CM

## 2018-09-19 DIAGNOSIS — M19.012 OSTEOARTHRITIS OF GLENOHUMERAL JOINTS, BILATERAL: Primary | ICD-10-CM

## 2018-09-19 DIAGNOSIS — G89.29 CHRONIC PAIN OF BOTH SHOULDERS: ICD-10-CM

## 2018-09-19 DIAGNOSIS — R53.81 PHYSICAL DECONDITIONING: ICD-10-CM

## 2018-09-19 PROCEDURE — 1101F PT FALLS ASSESS-DOCD LE1/YR: CPT | Mod: CPTII,S$GLB,, | Performed by: FAMILY MEDICINE

## 2018-09-19 PROCEDURE — 73030 X-RAY EXAM OF SHOULDER: CPT | Mod: TC,50,FY,PO

## 2018-09-19 PROCEDURE — 20611 DRAIN/INJ JOINT/BURSA W/US: CPT | Mod: 59,50,S$GLB, | Performed by: FAMILY MEDICINE

## 2018-09-19 PROCEDURE — 99999 PR PBB SHADOW E&M-EST. PATIENT-LVL III: CPT | Mod: PBBFAC,,, | Performed by: FAMILY MEDICINE

## 2018-09-19 PROCEDURE — 20611 DRAIN/INJ JOINT/BURSA W/US: CPT | Mod: 50,S$GLB,, | Performed by: FAMILY MEDICINE

## 2018-09-19 PROCEDURE — 73030 X-RAY EXAM OF SHOULDER: CPT | Mod: 26,50,, | Performed by: RADIOLOGY

## 2018-09-19 PROCEDURE — 99214 OFFICE O/P EST MOD 30 MIN: CPT | Mod: 25,S$GLB,, | Performed by: FAMILY MEDICINE

## 2018-09-19 RX ORDER — TRIAMCINOLONE ACETONIDE 40 MG/ML
40 INJECTION, SUSPENSION INTRA-ARTICULAR; INTRAMUSCULAR
Status: DISCONTINUED | OUTPATIENT
Start: 2018-09-19 | End: 2018-09-19 | Stop reason: HOSPADM

## 2018-09-19 RX ADMIN — TRIAMCINOLONE ACETONIDE 40 MG: 40 INJECTION, SUSPENSION INTRA-ARTICULAR; INTRAMUSCULAR at 01:09

## 2018-09-19 NOTE — PROGRESS NOTES
Krissy Marino, a 78 y.o. female, is here for evaluation of RIGHT and LEFT shoulder pain.     HISTORY OF PRESENT ILLNESS  Hand dominance, right     Location: anterior and lateral, bilateral R > L  Onset: Insidious, many years  Palliative:    Relative rest   Oral analgesics (tylenol PM)   R- CSI, SAB, 07/18/16, 80% improvement   R - CSI, SAB, 12/02/17, moderate improvement for ~ 3 weeks    R - CSI, SAB, 01/27/17, no improvement    R - CSI, iaGH/SAB, 02/21/17, mild to moderate relief    fPT @ [facility] - going well, but ROM remains painful   Heat    R - CSI, iaGH/SAB, 05/05/17, moderate%   Sling PRN   R/L - CSI, SAB, 07/12/17, moderate% improvement    R/L - CSI, iaGH/SAB 08/23/17, moderate%   R/L - CSI, SAB, 10/18/17, moderate% improvement, though not for long    R/L - CSI, SAB, 11/15/17, moderate% improvement, for ~ 3weeks    R/L - CSI, SAB 12/13/17, mild% improvement    R/L - CSI, iaGH/SAB 01/23/18, 40-50%   R/L - CSI, SAB, 02/27/18, moderate improvement, though did not last long   L - CSI, SAB, 4/24/18, Moderate Improvement   R/L - CSI, iaGH/SAB 07/25/2018, 75% improvement - intermittent pain   Provocative:    ADLs   Prior:   Progression: Pleateaud discomfort   Quality:    Sharp pain at times activity   Throbbing at times with activity    Muscle soreness   Radiation: none  Severity: per nursing documentation  Timing: intermittent with use  Trauma:    17.07: mechanical fall while at PT --> landed on L arm     Review of systems (ROS):  A 10+ review of systems was performed with pertinent positives and negatives noted above in the history of present illness. Other systems were negative unless otherwise specified.      PHYSICAL EXAMINATION  General:  The patient is alert and oriented x 3. Mood is pleasant. Observation of ears, eyes and nose reveal no gross abnormalities. HEENT: NCAT, sclera anicteric. Lungs: Respirations are equal and unlabored.     RIGHT SHOULDER EXAMINATION     OBSERVATION:      Swelling  none  Deformity  none   Discoloration  none   Scapular winging none   Scars   none  Atrophy  none    TENDERNESS / CREPITUS (T/C):          T/C      T/C   Clavicle   -/-  SUPRAspinatus    -/-     AC Jt.    -/-  INFRAspinatus  -/-    SC Jt.    -/-  Deltoid    -/-      G. Tuberosity  -/-  LH BICEP groove  -/-   Acromion:  -/-  Midline Neck   -/-     Scapular Spine -/-  Trapezium   -/-   SMA Scapula  -/-  GH jt. line - post  -/-     Scapulothoracic  -/-         ROM:     Right shoulder   Left shoulder        AROM (PROM)   AROM (PROM)   FE    120° (155°)*     120° (155°)*     ER at 0°    60°  (65°) *   60°  (65°)*   ER at 90° ABD  90°  (90°) *   90°  (90°)*   IR at 90°  ABD   NA  (40°)  *   NA  (40°)  *    IR (spine level)   T10  *   T10*    STRENGTH: (* = with pain) RIGHT SHOULDER  LEFT SHOULDER   SCAPTION   4/5    4/5   IR    4/5    4/5   ER    4/5    4/5   BICEPS   4/5    4/5   Deltoid    4/5    4/5     SIGNS:  Painful side       NEER   +   OSONNYS        +    CHAPA   +   SPEEDS        +   DROP ARM   -   BELLY PRESS       -    X-Body ADD    +   LIFT-OFF        +   HORNBLOWERS      +              STABILITY TESTING   RIGHT SHOULDER  LEFT SHOULDER     Translation     Anterior up face    up face    Posterior up face   up face    Sulcus  < 10mm   < 10 mm     Signs   Apprehension   neg      neg       Relocation   no change     no change      Jerk test  neg     neg    EXTREMITY NEURO-VASCULAR EXAM    Sensation grossly intact to light touch all dermatomal regions.    DTR 2+ Biceps, Triceps, BR and Negative Chinas sign   Grossly intact motor function at Elbow, Wrist and Hand   Distal pulses radial and ulnar 2+, brisk cap refill, symmetric.      NECK:  Painless FROM and spinous processes non-tender. Negative Spurlings sign.       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   W/ advanced acromialization of humeral head   W/ chronic tearing of superior  rotator cuff    No evidence of neurologic pathology  No evidence of vascular pathology    Imaging studies reviewed:   X-ray shoulder, right 18.09  X-ray shoulder, left 18.09    Plan:  We discussed options including:  #1 watchful waiting  #2 re start physical therapy aimed at:   RoM glenohumeral joint   Strengthening rotator cuff   Scapular stability    fpt  #3 injection therapy:   CSI SAB    Right, effective 40-50%, repeat    Left, effective 40-50%, repeat    CSI iaGH    Right,effective 40-50%, repeat     Left, effective 40-50%, repeat   orthobiologics   #4 re consultation re: JULIANA   Has seen Team French in the past   Question of surgical candidacy given her CAD     The patient chooses #3 csi sab bilat and csi iagh bilat    Pain management: handout given  Bracing: shoulder sling, prn  Physical therapy:    fPT, @ [facility], prior as above   Activity (e.g. sports, work) restrictions: as tolerated   school/vocation: works at [employer]     Follow up in 8 w  Should symptoms worsen or fail to resolve, consider:  Revisiting the above options

## 2018-09-26 ENCOUNTER — OFFICE VISIT (OUTPATIENT)
Dept: PODIATRY | Facility: CLINIC | Age: 78
End: 2018-09-26
Payer: COMMERCIAL

## 2018-09-26 VITALS
DIASTOLIC BLOOD PRESSURE: 71 MMHG | BODY MASS INDEX: 36.32 KG/M2 | SYSTOLIC BLOOD PRESSURE: 150 MMHG | RESPIRATION RATE: 18 BRPM | HEIGHT: 65 IN | WEIGHT: 218 LBS | HEART RATE: 64 BPM

## 2018-09-26 DIAGNOSIS — L84 CORN OR CALLUS: ICD-10-CM

## 2018-09-26 DIAGNOSIS — E11.49 TYPE II DIABETES MELLITUS WITH NEUROLOGICAL MANIFESTATIONS: Primary | ICD-10-CM

## 2018-09-26 DIAGNOSIS — B35.1 ONYCHOMYCOSIS DUE TO DERMATOPHYTE: ICD-10-CM

## 2018-09-26 PROCEDURE — 11057 PARNG/CUTG B9 HYPRKR LES >4: CPT | Mod: S$GLB,,, | Performed by: PODIATRIST

## 2018-09-26 PROCEDURE — 99999 PR PBB SHADOW E&M-EST. PATIENT-LVL III: CPT | Mod: PBBFAC,,, | Performed by: PODIATRIST

## 2018-09-26 PROCEDURE — 99499 UNLISTED E&M SERVICE: CPT | Mod: S$GLB,,, | Performed by: PODIATRIST

## 2018-09-26 PROCEDURE — 11721 DEBRIDE NAIL 6 OR MORE: CPT | Mod: 59,S$GLB,, | Performed by: PODIATRIST

## 2018-09-26 NOTE — PROGRESS NOTES
Subjective:      Patient ID: Krissy Marino is a 78 y.o. female.    Chief Complaint: PCP (Og Cid MD 7/10/18); Diabetic Foot Exam; Nail Problem; and Nail Care    Krissy is a 78 y.o. female who presents to the clinic for evaluation and treatment of high risk feet. Krissy has a past medical history of Adverse effect of glucocorticoid or synthetic analogue, Allergy, Arthritis, Cancer, CHF (congestive heart failure), Chronic kidney disease, Coronary artery disease involving native coronary artery of native heart without angina pectoris (10/11/2016), Diabetic neuropathy (10/6/2014), Giant cell arteritis (9/24/2012), Hyperlipidemia, Hypertension, Hypothyroid, Obesity (BMI 30-39.9) (10/6/2014), Osteopenia, Primary osteoarthritis of both knees (9/24/2012), and Type II or unspecified type diabetes mellitus with renal manifestations, uncontrolled(250.42). The patient's chief complaint is long, thick toenails        PCP: Og Cid MD (Inactive)    Date Last Seen by PCP:   Chief Complaint   Patient presents with    PCP     Og Cid MD 7/10/18    Diabetic Foot Exam    Nail Problem    Nail Care     Current shoe gear: [employer]     Hemoglobin A1C   Date Value Ref Range Status   05/30/2018 6.7 (H) 4.0 - 5.6 % Final     Comment:     ADA Screening Guidelines:  5.7-6.4%  Consistent with prediabetes  >or=6.5%  Consistent with diabetes  High levels of fetal hemoglobin interfere with the HbA1C  assay. Heterozygous hemoglobin variants (HbS, HgC, etc)do  not significantly interfere with this assay.   However, presence of multiple variants may affect accuracy.     11/21/2017 7.2 (H) 4.0 - 5.6 % Final     Comment:     According to ADA guidelines, hemoglobin A1c <7.0% represents  optimal control in non-pregnant diabetic patients. Different  metrics may apply to specific patient populations.   Standards of Medical Care in Diabetes-2016.  For the purpose of screening for the presence of diabetes:  <5.7%     Consistent with  the absence of diabetes  5.7-6.4%  Consistent with increasing risk for diabetes   (prediabetes)  >or=6.5%  Consistent with diabetes  Currently, no consensus exists for use of hemoglobin A1c  for diagnosis of diabetes for children.  This Hemoglobin A1c assay has significant interference with fetal   hemoglobin   (HbF). The results are invalid for patients with abnormal amounts of   HbF,   including those with known Hereditary Persistence   of Fetal Hemoglobin. Heterozygous hemoglobin variants (HbAS, HbAC,   HbAD, HbAE, HbA2) do not significantly interfere with this assay;   however, presence of multiple variants in a sample may impact the %   interference.     06/24/2017 6.2 (H) 4.0 - 5.6 % Final     Comment:     According to ADA guidelines, hemoglobin A1c <7.0% represents  optimal control in non-pregnant diabetic patients. Different  metrics may apply to specific patient populations.   Standards of Medical Care in Diabetes-2016.  For the purpose of screening for the presence of diabetes:  <5.7%     Consistent with the absence of diabetes  5.7-6.4%  Consistent with increasing risk for diabetes   (prediabetes)  >or=6.5%  Consistent with diabetes  Currently, no consensus exists for use of hemoglobin A1c  for diagnosis of diabetes for children.  This Hemoglobin A1c assay has significant interference with fetal   hemoglobin   (HbF). The results are invalid for patients with abnormal amounts of   HbF,   including those with known Hereditary Persistence   of Fetal Hemoglobin. Heterozygous hemoglobin variants (HbAS, HbAC,   HbAD, HbAE, HbA2) do not significantly interfere with this assay;   however, presence of multiple variants in a sample may impact the %   interference.           Review of Systems   Constitution: Negative for chills, decreased appetite and fever.   Cardiovascular: Negative for leg swelling.   Skin: Positive for dry skin and nail changes. Negative for color change, flushing, itching, poor wound healing  and rash.   Musculoskeletal: Positive for arthritis. Negative for back pain, gout, joint pain, joint swelling and myalgias.   Gastrointestinal: Negative for nausea and vomiting.   Neurological: Positive for numbness and paresthesias. Negative for loss of balance.           Objective:      Physical Exam   Constitutional: She is oriented to person, place, and time. She appears well-developed and well-nourished.   Cardiovascular:   Pulses:       Dorsalis pedis pulses are 1+ on the right side, and 1+ on the left side.        Posterior tibial pulses are 1+ on the right side, and 1+ on the left side.   Musculoskeletal: Normal range of motion. She exhibits edema (mild).        Right ankle: Normal.        Left ankle: Normal.        Right foot: There is no swelling, no crepitus and no deformity.        Left foot: There is no swelling, no crepitus and no deformity.   Adequate joint range of motion without pain, limitation, nor crepitation Bilateral feet and ankle joints. Muscle strength is 5/5 in all groups bilaterally.    Rigid hammertoe deformity L 2nd digit    1st MPJ exostosis w/ medial deviation of hallux, non trackbound. No pain w/ ROM to R hallux    Atrophy of fat  Pad from bilateral foot with easily palpable bone       Lymphadenopathy:   No palpable lymph nodes   Neurological: She is alert and oriented to person, place, and time. She has normal strength.   Sharp dull sensation diminished Light touch sensation diminished    Skin: Skin is warm, dry and intact. No abrasion, no bruising, no lesion, no petechiae and no rash noted. She is not diaphoretic. No pallor. Nails show no clubbing.   Nails 1-5 R, 2-5 L   are elongated by  5-9mm's, thickened by 2-5 mm's, dystrophic, and are darkened in  coloration . Xerosis Bilaterally. No open lesions noted.      Hyperkeratotic tissue noted to distal 2nd toe b/l , medial HIPJ b/l, distal hallux b/l      Psychiatric: She has a normal mood and affect. Her behavior is normal. Judgment  normal.   Nursing note and vitals reviewed.            Assessment:       Encounter Diagnoses   Name Primary?    Type II diabetes mellitus with neurological manifestations Yes    Onychomycosis due to dermatophyte     Corn or callus          Plan:       Krissy was seen today for pcp, diabetic foot exam, nail problem and nail care.    Diagnoses and all orders for this visit:    Type II diabetes mellitus with neurological manifestations    Onychomycosis due to dermatophyte    Corn or callus      I counseled the patient on her conditions, their implications and medical management.    - Shoe inspection. Diabetic Foot Education. Patient reminded of the importance of good nutrition and blood sugar control to help prevent podiatric complications of diabetes. Patient instructed on proper foot hygeine. We discussed wearing proper shoe gear, daily foot inspections, never walking without protective shoe gear, never putting sharp instruments to feet, routine podiatric nail visits every 2-3 months.      - With patient's permission, nails were aggressively reduced and debrided x 9 to their soft tissue attachment mechanically and with electric , removing all offending nail and debris. Patient relates relief following the procedure. She will continue to monitor the areas daily, inspect her feet, wear protective shoe gear when ambulatory, moisturizer to maintain skin integrity and follow in this office in approximately 2-3 months, sooner p.r.n.    - After cleansing the  area w/ alcohol prep pad the above mentioned hyperkeratosis was trimmed utilizing No 15 scapel, to a smooth base with out incident. Patient tolerated this  well and reported comfort to the area of  distal 2nd toe b/l , medial HIPJ b/l, distal hallux b/l

## 2018-10-05 DIAGNOSIS — J45.909 ASTHMA WITHOUT STATUS ASTHMATICUS, UNSPECIFIED ASTHMA SEVERITY, UNCOMPLICATED: ICD-10-CM

## 2018-10-05 DIAGNOSIS — R53.83 OTHER FATIGUE: ICD-10-CM

## 2018-10-05 RX ORDER — BUDESONIDE AND FORMOTEROL FUMARATE DIHYDRATE 160; 4.5 UG/1; UG/1
2 AEROSOL RESPIRATORY (INHALATION) EVERY 12 HOURS
Qty: 10.2 G | Refills: 0 | Status: SHIPPED | OUTPATIENT
Start: 2018-10-05 | End: 2018-11-05 | Stop reason: SDUPTHER

## 2018-10-08 ENCOUNTER — TELEPHONE (OUTPATIENT)
Dept: CARDIOLOGY | Facility: CLINIC | Age: 78
End: 2018-10-08

## 2018-10-08 NOTE — TELEPHONE ENCOUNTER
Pt came by complaining of increasing bruising spots noted on the arms and face. Would like to know if she should continue the ASA with the Plavix. Please advise

## 2018-10-10 ENCOUNTER — LAB VISIT (OUTPATIENT)
Dept: LAB | Facility: HOSPITAL | Age: 78
End: 2018-10-10
Payer: COMMERCIAL

## 2018-10-10 ENCOUNTER — OFFICE VISIT (OUTPATIENT)
Dept: CARDIOLOGY | Facility: CLINIC | Age: 78
End: 2018-10-10
Payer: COMMERCIAL

## 2018-10-10 VITALS
HEART RATE: 69 BPM | SYSTOLIC BLOOD PRESSURE: 161 MMHG | HEIGHT: 65 IN | WEIGHT: 220.88 LBS | BODY MASS INDEX: 36.8 KG/M2 | DIASTOLIC BLOOD PRESSURE: 76 MMHG

## 2018-10-10 DIAGNOSIS — I25.110 CORONARY ARTERY DISEASE INVOLVING NATIVE CORONARY ARTERY OF NATIVE HEART WITH UNSTABLE ANGINA PECTORIS: ICD-10-CM

## 2018-10-10 DIAGNOSIS — I50.32 CHRONIC CONGESTIVE HEART FAILURE WITH LEFT VENTRICULAR DIASTOLIC DYSFUNCTION: ICD-10-CM

## 2018-10-10 DIAGNOSIS — I10 ESSENTIAL HYPERTENSION: Primary | ICD-10-CM

## 2018-10-10 DIAGNOSIS — I73.9 PVD (PERIPHERAL VASCULAR DISEASE): ICD-10-CM

## 2018-10-10 DIAGNOSIS — E78.5 DYSLIPIDEMIA: ICD-10-CM

## 2018-10-10 LAB — PLATELET RESPONSE PLAVIX: 259 PRU

## 2018-10-10 PROCEDURE — 3077F SYST BP >= 140 MM HG: CPT | Mod: CPTII,S$GLB,, | Performed by: INTERNAL MEDICINE

## 2018-10-10 PROCEDURE — 36415 COLL VENOUS BLD VENIPUNCTURE: CPT

## 2018-10-10 PROCEDURE — 3078F DIAST BP <80 MM HG: CPT | Mod: CPTII,S$GLB,, | Performed by: INTERNAL MEDICINE

## 2018-10-10 PROCEDURE — 99214 OFFICE O/P EST MOD 30 MIN: CPT | Mod: S$GLB,,, | Performed by: INTERNAL MEDICINE

## 2018-10-10 PROCEDURE — 1101F PT FALLS ASSESS-DOCD LE1/YR: CPT | Mod: CPTII,S$GLB,, | Performed by: INTERNAL MEDICINE

## 2018-10-10 PROCEDURE — 85576 BLOOD PLATELET AGGREGATION: CPT

## 2018-10-10 PROCEDURE — 99999 PR PBB SHADOW E&M-EST. PATIENT-LVL V: CPT | Mod: PBBFAC,,, | Performed by: INTERNAL MEDICINE

## 2018-10-10 RX ORDER — HYDROCHLOROTHIAZIDE 12.5 MG/1
12.5 TABLET ORAL DAILY
Qty: 90 TABLET | Refills: 3 | Status: ON HOLD | OUTPATIENT
Start: 2018-10-10 | End: 2018-11-30 | Stop reason: HOSPADM

## 2018-10-10 RX ORDER — LOSARTAN POTASSIUM 100 MG/1
100 TABLET ORAL DAILY
Qty: 90 TABLET | Refills: 3 | Status: ON HOLD | OUTPATIENT
Start: 2018-10-10 | End: 2018-12-20 | Stop reason: SDUPTHER

## 2018-10-10 NOTE — PROGRESS NOTES
Cardiology Clinic Note  Reason for Visit: F/u CHF and CAD    HPI:   Ms. Marino is a 78 year old white female with HTN DM well controlled, HLD and ICM with LVEF 30-35% (now recovered) on exercise stress echo who presents to cardiology clinic for follow up. Seen by me multiple times since August 2016. Found to have 99% prox LAD and 90% prox D1. Received OSMEL to prox and mid LAD with DONNY 1 flow. Had repeat PET showing improved LVEF and no evidence of ischemia.   Had recurrence of prior anginal symptoms. SAMUEL positive for ischemia, now s/p OSMEL to LAD bifurcation and to D1 by Dr. Montemayor. No more SOB with exertion, no CP. BP running high. Eager to get back to rehab although she is concerned about blockages in her legs after seeing a commercial and correlating that with BL leg heaviness when walking. No ulcers or skin breakdown.  Concern today over some small facial petechiae. Otherwise no problems    ROS:    Constitution: Negative for fever, chills, weight loss or gain.   HENT: Negative for sore throat, rhinorrhea, or headache; post nasal drip  Eyes: Negative for blurred or double vision.   Cardiovascular: See above  Pulmonary: no NEAL, no cough   Gastrointestinal: Negative for abdominal pain, nausea, vomiting, or diarrhea.   : Negative for dysuria.   Neurological: Negative for focal weakness or sensory changes.  PMH:     Past Medical History:   Diagnosis Date    Adverse effect of glucocorticoid or synthetic analogue     Allergy     Arthritis     Cancer     CHF (congestive heart failure)     Chronic kidney disease     Coronary artery disease involving native coronary artery of native heart without angina pectoris 10/11/2016    Diabetic neuropathy 10/6/2014    Giant cell arteritis 9/24/2012    Hyperlipidemia     Hypertension     Hypothyroid     Obesity (BMI 30-39.9) 10/6/2014    Osteopenia     Primary osteoarthritis of both knees 9/24/2012    Type II or unspecified type diabetes mellitus with renal  manifestations, uncontrolled(250.42)      Past Surgical History:   Procedure Laterality Date    APPENDECTOMY      CATARACT EXTRACTION      CATARACT EXTRACTION, BILATERAL      CHOLECYSTECTOMY      COLONOSCOPY N/A 12/27/2013    Performed by Shamar Sow MD at [facility] END[facility] (4TH FLR)    EYE SURGERY      HYSTERECTOMY      JOINT REPLACEMENT      bilateral total knee    SKIN BIOPSY      STRIPPING-TARSAL Bilateral 3/7/2018    Performed by Anastasia Nieto MD at [facility] OR 2ND FLR    TONSILLECTOMY       Allergies:     Review of patient's allergies indicates:   Allergen Reactions    Sulfamethoxazole-trimethoprim Nausea And Vomiting    Latex, natural rubber Rash    Pcn [penicillins] Rash     Medications:     Current Outpatient Medications on File Prior to Visit   Medication Sig Dispense Refill    acitretin (SORIATANE) 10 MG capsule Take 1 capsule (10 mg total) by mouth before breakfast. 90 capsule 1    albuterol 90 mcg/actuation inhaler Inhale 2 puffs into the lungs every 6 (six) hours as needed for Wheezing. 1 each 11    alendronate (FOSAMAX) 70 MG tablet Take 1 tablet (70 mg total) by mouth every 7 days. 4 tablet 6    allopurinol (ZYLOPRIM) 300 MG tablet TAKE 1 TABLET(300 MG) BY MOUTH EVERY DAY 90 tablet 3    aspirin (ECOTRIN) 81 MG EC tablet Take 1 tablet (81 mg total) by mouth once daily. 90 tablet 3    atorvastatin (LIPITOR) 40 MG tablet TAKE 1 TABLET(40 MG) BY MOUTH EVERY DAY 90 tablet 3    azelastine (ASTELIN) 137 mcg (0.1 %) nasal spray USE 1 SPRAY(137 MCG) IN EACH NOSTRIL TWICE DAILY 30 mL 0    blood sugar diagnostic (ACCU-CHEK AMELIA PLUS TEST STRP) Strp Checks bg 2 x day before meals 200 strip 4    budesonide-formoterol 160-4.5 mcg (SYMBICORT) 160-4.5 mcg/actuation HFAA Inhale 2 puffs into the lungs every 12 (twelve) hours. Controller 10.2 g 0    ciclopirox (PENLAC) 8 % Soln APPLY EXTERNALLY TO THE AFFECTED AREA EVERY NIGHT 6.6 mL 0    clopidogrel (PLAVIX) 75 mg tablet TAKE 1 TABLET BY MOUTH ONCE DAILY  4 tablet 0    clopidogrel (PLAVIX) 75 mg tablet Take 1 tablet (75 mg total) by mouth once daily. 30 tablet 11    desonide (DESOWEN) 0.05 % cream AAA bid 180 g 1    desonide (DESOWEN) 0.05 % cream AAA bid 3 Tube 1    diphth,pertus,acell,,tetanus (BOOSTRIX) 2.5-8-5 Lf-mcg-Lf/0.5mL Syrg injection Inject into the muscle. 0.5 mL 0    FERROUS GLUCONATE (FERATE ORAL) Take by mouth once daily at 6am.       folic acid (FOLVITE) 1 MG tablet 1 po qday - do not take on same day as taking methotrexate 30 tablet 5    hydrocodone-acetaminophen 5-325mg (NORCO) 5-325 mg per tablet Take 1 tablet by mouth every 6 (six) hours as needed for Pain. 16 tablet 0    hydrocortisone butyrate (LOCOID) 0.1 % Crea cream AAA buttock bid 180 g 0    KRILL/OM3/DHA/EPA/OM6/LIP/ASTX (KRILL OIL, OMEGA 3 & 6, ORAL) Take by mouth once daily at 6am.       levothyroxine (SYNTHROID) 75 MCG tablet TAKE 1 TABLET BY MOUTH BEFORE BREAKFAST 90 tablet 0    lidocaine HCL 2% (XYLOCAINE) 2 % jelly APPLY TOPICALLY EVERY DAY AS NEEDED 30 mL 3    magnesium oxide (MAG-OX) 400 mg tablet Take 1 tablet (400 mg total) by mouth once daily.  0    methotrexate 2.5 MG Tab Take 4 tablets po qweek in divided doses as directed 16 tablet 2    metoprolol succinate (TOPROL-XL) 50 MG 24 hr tablet Take 1 tablet (50 mg total) by mouth once daily. 90 tablet 3    miconazole NITRATE 2 % (ZEASORB AF) 2 % top powder Apply topically as needed for Itching. 71 g 0    mometasone (NASONEX) 50 mcg/actuation nasal spray 2 sprays by Nasal route once daily. 1 each 0    nystatin-triamcinolone (MYCOLOG II) cream Apply topically 2 (two) times daily. 30 g 0    silver sulfADIAZINE 1% (SILVADENE) 1 % cream aaa buttock bid 50 g 2    triamcinolone acetonide 0.1% (KENALOG) 0.1 % cream Apply topically 2 (two) times daily. Apply to affected area 454 Tube 3    [DISCONTINUED] furosemide (LASIX) 20 MG tablet       [DISCONTINUED] losartan (COZAAR) 50 MG tablet Take 1 tablet (50 mg total) by  "mouth once daily. 90 tablet 3    cetirizine (ZYRTEC) 10 MG tablet Take 1 tablet (10 mg total) by mouth once daily. 30 tablet 2    olopatadine (PATANOL) 0.1 % ophthalmic solution Place 1 drop into the right eye 2 (two) times daily. 5 mL 1    ranitidine (ZANTAC) 150 MG tablet Take 1 tablet (150 mg total) by mouth 2 (two) times daily as needed for Heartburn. 120 tablet 5     No current facility-administered medications on file prior to visit.      Social History:     Social History     Tobacco Use    Smoking status: Never Smoker    Smokeless tobacco: Never Used   Substance Use Topics    Alcohol use: No     Family History:     Family History   Problem Relation Age of Onset    Diabetes Mother     Hypertension Mother     Hypertension Father     Kidney disease Neg Hx     Eczema Neg Hx     Psoriasis Neg Hx     Melanoma Neg Hx     Lupus Neg Hx     Acne Neg Hx      Physical Exam:     BP (!) 161/76 (BP Location: Left arm, Patient Position: Sitting, BP Method: Large (Automatic))   Pulse 69   Ht 5' 5" (1.651 m)   Wt 100.2 kg (220 lb 14.4 oz)   BMI 36.76 kg/m²      Constitutional: NAD, conversant  HEENT: Sclera anicteric, PERRLA, EOMI, several small facial petechiae  Neck: No JVD, no carotid bruits  CV: RRR, no murmur, normal S1/S2, no S3  Pulm: CTAB, no wheezes, rales, or ronchi  GI: Abdomen soft, NTND, +BS  Extremities: 1+ doughy LE edema to ankles, warm and well perfused  Skin: No ecchymosis, erythema, or ulcers      Labs:     Lab Results   Component Value Date     07/20/2018    K 4.6 07/20/2018     07/20/2018    CO2 26 07/20/2018    BUN 38 (H) 07/20/2018    CREATININE 0.9 07/20/2018    ANIONGAP 10 07/20/2018     Lab Results   Component Value Date    HGBA1C 6.7 (H) 05/30/2018     Lab Results   Component Value Date    BNP 73 07/09/2018     (H) 08/03/2016    BNP 23 02/19/2010    Lab Results   Component Value Date    WBC 8.08 09/11/2018    HGB 12.2 09/11/2018    HCT 37.3 09/11/2018     " (L) 09/11/2018    GRAN 6.1 09/11/2018    GRAN 75.1 (H) 09/11/2018     Lab Results   Component Value Date    CHOL 112 (L) 06/30/2018    HDL 32 (L) 06/30/2018    LDLCALC 53.8 (L) 06/30/2018    TRIG 131 06/30/2018          Imaging:     SAMUEL    1 - Mildly depressed left ventricular systolic function (EF 50-55%).     2 - Eccentric hypertrophy.     3 - Impaired LV relaxation, normal LAP (grade 1 diastolic dysfunction).     4 - Normal right ventricular systolic function .     Positive stress echocardiographic study demonstrating an ischemic response involving the anterior septum, anterolateral wall, apical septum, inferior wall, anterior wall. This may represent multivessel coronary disease or a nonischemic cardiomyopathy.    [facility]  Bifurcation of the LAD:              The lesion was successfully intervened. Post-stenosis of 0%, post-DONNY 3 flow and TMP grade 3. The vessel was accessed natively.  The following items were used: 2.5MM 12MM [place] Balloon.       D1:              The lesion was successfully intervened. Post-stenosis of 0%, post-DONNY 3 flow and TMP grade 3. The vessel was accessed natively.  The following items were used: 2.5MM 15MM [place] Balloon and Stent Resolute Rx 2.50x14 (OSMEL).    EF   Date Value Ref Range Status   07/11/2018 50 55 - 65    01/16/2017 58 55 - 65    08/03/2016 30 (A) 55 - 65        Assessment:    Krissy Marino is a 78 year old with DM HTN and HLD who presents today for follow up of her CAD and ischemic cardiomyopathy. Revascularized, needs BP optimization and cardiac rehab. Given the severity of her disease, recommend DAPT at least one year and prefer longer if able to tolerate.    Plan:   1) Systolic Heart Failure with Reduced EF -- NYHA I, warm and euvolemic today   --continue ASA and atorvastatin   --continiue toprol XL 50mg   --increase losartan to 100mg   --change lasix 20mg to HCTZ 25mg     2) CAD -- prox LAD disease   --continue ASA and plavix (7/2019); check PRU; if <100, will discuss QOD  plavix   --BP control     3) HTN -- not controlled of late   --intolerat of ACE in past   --increase losartan, continue other current meds   --no smartphone so no dig HTN    4) HLD   --atorvastatin 40mg, at goal    5) DM   --at goal    6) GERD   --on meds    7) Venous stasis   --compression stockings and ACE wraps ineffective    8) Obesity   --stressed dietary changes with patient who is excited to lose weight    9) PVD   --check MARIMAR with dopplers    RTC 6 months    Signed:  Toñito Kohler MD  Cardiology Fellow, PGY-6  Pager: [phone]  10/10/2018

## 2018-10-11 NOTE — PROGRESS NOTES
I have reviewed the fellow's history and physical and discussed the case with the fellow. I have personally spoken with and examined the patient in the clinic. I agree with the findings, assessment, and plan.  Facial bruising may be due to ring on her right hand.

## 2018-10-15 ENCOUNTER — CLINICAL SUPPORT (OUTPATIENT)
Dept: CARDIOLOGY | Facility: CLINIC | Age: 78
End: 2018-10-15
Attending: INTERNAL MEDICINE
Payer: COMMERCIAL

## 2018-10-15 DIAGNOSIS — L30.9 DERMATITIS: ICD-10-CM

## 2018-10-15 DIAGNOSIS — I73.9 PVD (PERIPHERAL VASCULAR DISEASE): ICD-10-CM

## 2018-10-15 DIAGNOSIS — E78.5 HYPERLIPIDEMIA, UNSPECIFIED HYPERLIPIDEMIA TYPE: ICD-10-CM

## 2018-10-15 PROCEDURE — 93925 LOWER EXTREMITY STUDY: CPT | Mod: S$GLB,,, | Performed by: INTERNAL MEDICINE

## 2018-10-16 RX ORDER — METOPROLOL SUCCINATE 50 MG/1
50 TABLET, EXTENDED RELEASE ORAL DAILY
Qty: 90 TABLET | Refills: 3 | Status: ON HOLD | OUTPATIENT
Start: 2018-10-16 | End: 2019-08-27

## 2018-10-16 RX ORDER — ATORVASTATIN CALCIUM 40 MG/1
TABLET, FILM COATED ORAL
Qty: 90 TABLET | Refills: 3 | Status: SHIPPED | OUTPATIENT
Start: 2018-10-16

## 2018-10-22 RX ORDER — ALLOPURINOL 300 MG/1
TABLET ORAL
Qty: 90 TABLET | Refills: 0 | OUTPATIENT
Start: 2018-10-22

## 2018-10-30 ENCOUNTER — OFFICE VISIT (OUTPATIENT)
Dept: UROLOGY | Facility: CLINIC | Age: 78
End: 2018-10-30
Payer: COMMERCIAL

## 2018-10-30 VITALS
HEIGHT: 65 IN | BODY MASS INDEX: 35.96 KG/M2 | SYSTOLIC BLOOD PRESSURE: 139 MMHG | WEIGHT: 215.81 LBS | HEART RATE: 74 BPM | DIASTOLIC BLOOD PRESSURE: 79 MMHG

## 2018-10-30 DIAGNOSIS — N39.0 RECURRENT UTI: ICD-10-CM

## 2018-10-30 DIAGNOSIS — N18.2 CHRONIC KIDNEY DISEASE, STAGE II (MILD): ICD-10-CM

## 2018-10-30 DIAGNOSIS — E11.9 TYPE 2 DIABETES MELLITUS WITHOUT RETINOPATHY: Primary | ICD-10-CM

## 2018-10-30 PROCEDURE — 87186 SC STD MICRODIL/AGAR DIL: CPT

## 2018-10-30 PROCEDURE — 87077 CULTURE AEROBIC IDENTIFY: CPT

## 2018-10-30 PROCEDURE — 99999 PR PBB SHADOW E&M-EST. PATIENT-LVL III: CPT | Mod: PBBFAC,,, | Performed by: UROLOGY

## 2018-10-30 PROCEDURE — 87086 URINE CULTURE/COLONY COUNT: CPT

## 2018-10-30 PROCEDURE — 87088 URINE BACTERIA CULTURE: CPT

## 2018-10-30 PROCEDURE — 3078F DIAST BP <80 MM HG: CPT | Mod: CPTII,S$GLB,, | Performed by: UROLOGY

## 2018-10-30 PROCEDURE — 3075F SYST BP GE 130 - 139MM HG: CPT | Mod: CPTII,S$GLB,, | Performed by: UROLOGY

## 2018-10-30 PROCEDURE — 1101F PT FALLS ASSESS-DOCD LE1/YR: CPT | Mod: CPTII,S$GLB,, | Performed by: UROLOGY

## 2018-10-30 PROCEDURE — 99213 OFFICE O/P EST LOW 20 MIN: CPT | Mod: S$GLB,,, | Performed by: UROLOGY

## 2018-10-30 NOTE — PROGRESS NOTES
Subjective:       Patient ID: Krissy Marino is a 78 y.o. female.    Chief Complaint: dysuria    Krissy Marino is a 78 y.o. Female here for follow up.   Recurrent E. Coli MDR. Last infection 10/17.     Has diabetes.   Has CKD stage II  , set up twins, vaginal deliveries.   Had a hysterectomy for vaginal bleeding.   No history of breast cancer.     No history of kidney stones.     No gross hematuria.   Has daytime frequency, on a fluid pill. Nocturia x 2.   Rare urge incontinence if near a toilet.  No pads.   Has chills. Stable.   No fevers.     Some constipation.   Not sexually active.   No estrogen.     Tries to drink water, but the more water she drinks the more her feet swell.   Some days a small bottle of water. Some days 2 16oz bottles of water.   Rare soda, iced tea.     No probiotics.         Past Surgical History:   Procedure Laterality Date    APPENDECTOMY      CATARACT EXTRACTION      CATARACT EXTRACTION, BILATERAL      CHOLECYSTECTOMY      COLONOSCOPY N/A 2013    Performed by Shamar Sow MD at [facility] ENDO (4TH FLR)    EYE SURGERY      HYSTERECTOMY      JOINT REPLACEMENT      bilateral total knee    SKIN BIOPSY      STRIPPING-TARSAL Bilateral 3/7/2018    Performed by Anastasia Nieto MD at [facility] OR 2ND FLR    TONSILLECTOMY         Past Medical History:   Diagnosis Date    Adverse effect of glucocorticoid or synthetic analogue     Allergy     Arthritis     Cancer     CHF (congestive heart failure)     Chronic kidney disease     Coronary artery disease involving native coronary artery of native heart without angina pectoris 10/11/2016    Diabetic neuropathy 10/6/2014    Giant cell arteritis 2012    Hyperlipidemia     Hypertension     Hypothyroid     Obesity (BMI 30-39.9) 10/6/2014    Osteopenia     Primary osteoarthritis of both knees 2012    Type II or unspecified type diabetes mellitus with renal manifestations, uncontrolled(250.42)        Social History      Socioeconomic History    Marital status:      Spouse name:     Number of children: Not on file    Years of education: Not on file    Highest education level: Not on file   Social Needs    Financial resource strain: Not on file    Food insecurity - worry: Not on file    Food insecurity - inability: Not on file    Transportation needs - medical: Not on file    Transportation needs - non-medical: Not on file   Occupational History    Occupation:      Employer: [employer]   Tobacco Use    Smoking status: Never Smoker    Smokeless tobacco: Never Used   Substance and Sexual Activity    Alcohol use: No    Drug use: No    Sexual activity: No   Other Topics Concern    Are you pregnant or think you may be? No    Breast-feeding No   Social History Narrative    Not on file       Family History   Problem Relation Age of Onset    Diabetes Mother     Hypertension Mother     Hypertension Father     Kidney disease Neg Hx     Eczema Neg Hx     Psoriasis Neg Hx     Melanoma Neg Hx     Lupus Neg Hx     Acne Neg Hx        Current Outpatient Medications   Medication Sig Dispense Refill    acitretin (SORIATANE) 10 MG capsule Take 1 capsule (10 mg total) by mouth before breakfast. 90 capsule 1    albuterol 90 mcg/actuation inhaler Inhale 2 puffs into the lungs every 6 (six) hours as needed for Wheezing. 1 each 11    allopurinol (ZYLOPRIM) 300 MG tablet TAKE 1 TABLET(300 MG) BY MOUTH EVERY DAY 90 tablet 3    aspirin (ECOTRIN) 81 MG EC tablet Take 1 tablet (81 mg total) by mouth once daily. 90 tablet 3    atorvastatin (LIPITOR) 40 MG tablet TAKE 1 TABLET(40 MG) BY MOUTH EVERY DAY 90 tablet 3    azelastine (ASTELIN) 137 mcg (0.1 %) nasal spray USE 1 SPRAY(137 MCG) IN EACH NOSTRIL TWICE DAILY 30 mL 0    blood sugar diagnostic (ACCU-CHEK AMELIA PLUS TEST STRP) Strp Checks bg 2 x day before meals 200 strip 4    budesonide-formoterol 160-4.5 mcg (SYMBICORT) 160-4.5 mcg/actuation  HFAA Inhale 2 puffs into the lungs every 12 (twelve) hours. Controller 10.2 g 0    ciclopirox (PENLAC) 8 % Soln APPLY EXTERNALLY TO THE AFFECTED AREA EVERY NIGHT 6.6 mL 0    clopidogrel (PLAVIX) 75 mg tablet Take 1 tablet (75 mg total) by mouth once daily. 30 tablet 11    desonide (DESOWEN) 0.05 % cream AAA bid 180 g 1    desonide (DESOWEN) 0.05 % cream AAA bid 3 Tube 1    diphth,pertus,acell,,tetanus (BOOSTRIX) 2.5-8-5 Lf-mcg-Lf/0.5mL Syrg injection Inject into the muscle. 0.5 mL 0    FERROUS GLUCONATE (FERATE ORAL) Take by mouth once daily at 6am.       folic acid (FOLVITE) 1 MG tablet 1 po qday - do not take on same day as taking methotrexate 30 tablet 5    hydroCHLOROthiazide (HYDRODIURIL) 12.5 MG Tab Take 1 tablet (12.5 mg total) by mouth once daily. 90 tablet 3    hydrocodone-acetaminophen 5-325mg (NORCO) 5-325 mg per tablet Take 1 tablet by mouth every 6 (six) hours as needed for Pain. 16 tablet 0    hydrocortisone butyrate (LOCOID) 0.1 % Crea cream AAA buttock bid 180 g 0    influenza (FLUZONE HIGH-DOSE 2018-19, PF,) 180 mcg/0.5 mL vaccine Inject into the muscle. 0.5 mL 0    KRILL/OM3/DHA/EPA/OM6/LIP/ASTX (KRILL OIL, OMEGA 3 & 6, ORAL) Take by mouth once daily at 6am.       levothyroxine (SYNTHROID) 75 MCG tablet TAKE 1 TABLET BY MOUTH BEFORE BREAKFAST 90 tablet 0    lidocaine HCL 2% (XYLOCAINE) 2 % jelly APPLY TOPICALLY EVERY DAY AS NEEDED 30 mL 3    losartan (COZAAR) 100 MG tablet Take 1 tablet (100 mg total) by mouth once daily. 90 tablet 3    magnesium oxide (MAG-OX) 400 mg tablet Take 1 tablet (400 mg total) by mouth once daily.  0    methotrexate 2.5 MG Tab Take 4 tablets po qweek in divided doses as directed 16 tablet 2    metoprolol succinate (TOPROL-XL) 50 MG 24 hr tablet Take 1 tablet (50 mg total) by mouth once daily. 90 tablet 3    miconazole NITRATE 2 % (ZEASORB AF) 2 % top powder Apply topically as needed for Itching. 71 g 0    mometasone (NASONEX) 50 mcg/actuation nasal  spray 2 sprays by Nasal route once daily. 1 each 0    nystatin-triamcinolone (MYCOLOG II) cream Apply topically 2 (two) times daily. 30 g 0    silver sulfADIAZINE 1% (SILVADENE) 1 % cream aaa buttock bid 50 g 2    triamcinolone acetonide 0.1% (KENALOG) 0.1 % cream Apply topically 2 (two) times daily. Apply to affected area 454 Tube 3    alendronate (FOSAMAX) 70 MG tablet Take 1 tablet (70 mg total) by mouth every 7 days. 4 tablet 6    cetirizine (ZYRTEC) 10 MG tablet Take 1 tablet (10 mg total) by mouth once daily. 30 tablet 2    olopatadine (PATANOL) 0.1 % ophthalmic solution Place 1 drop into the right eye 2 (two) times daily. 5 mL 1    ranitidine (ZANTAC) 150 MG tablet Take 1 tablet (150 mg total) by mouth 2 (two) times daily as needed for Heartburn. 120 tablet 5     No current facility-administered medications for this visit.        Review of patient's allergies indicates:   Allergen Reactions    Sulfamethoxazole-trimethoprim Nausea And Vomiting    Latex, natural rubber Rash    Pcn [penicillins] Rash        BMP  Lab Results   Component Value Date     07/20/2018    K 4.6 07/20/2018     07/20/2018    CO2 26 07/20/2018    BUN 38 (H) 07/20/2018    CREATININE 0.9 07/20/2018    CALCIUM 9.4 07/20/2018    ANIONGAP 10 07/20/2018    ESTGFRAFRICA >60.0 07/20/2018    EGFRNONAA >60.0 07/20/2018       Review of Systems   Constitutional: Positive for chills. Negative for fever and unexpected weight change.   HENT: Negative for nosebleeds.    Eyes: Negative for visual disturbance.   Respiratory: Negative for chest tightness.    Cardiovascular: Negative for chest pain.   Gastrointestinal: Negative for diarrhea.   Genitourinary: Positive for nocturia. Negative for dysuria, frequency, hematuria, urgency and vaginal bleeding.   Musculoskeletal: Negative for myalgias.   Skin: Negative for rash.   Neurological: Negative for seizures.   Hematological: Does not bruise/bleed easily.   Psychiatric/Behavioral:  Negative for behavioral problems.     Objective:      Physical Exam   Vitals reviewed.  Constitutional: She is oriented to person, place, and time. She appears well-developed and well-nourished.   HENT:   Head: Normocephalic and atraumatic.   Eyes: No scleral icterus.   Cardiovascular: Normal rate and regular rhythm.    Pulmonary/Chest: Effort normal. No respiratory distress.   Abdominal: She exhibits no mass. There is no CVA tenderness.   Musculoskeletal: She exhibits edema. She exhibits no tenderness.   Lymphadenopathy:     She has no cervical adenopathy.   Neurological: She is alert and oriented to person, place, and time.   Skin: Skin is warm and dry.     psoriasis   Psychiatric: She has a normal mood and affect.     urine dip 1+ leuk, trace protein, ph 5  Assessment:       1. Type 2 diabetes mellitus without retinopathy    2. Chronic kidney disease, stage II (mild)    3. Recurrent UTI        Plan:   Krissy was seen today for follow-up.    Diagnoses and all orders for this visit:    Type 2 diabetes mellitus without retinopathy    Chronic kidney disease, stage II (mild)    Increase water intake during the day.   Limit fluids before bedtime.   Home collect urine culture for future UTI.   Urine culture today.

## 2018-11-02 ENCOUNTER — TELEPHONE (OUTPATIENT)
Dept: UROLOGY | Facility: CLINIC | Age: 78
End: 2018-11-02

## 2018-11-02 LAB — BACTERIA UR CULT: NORMAL

## 2018-11-05 ENCOUNTER — TELEPHONE (OUTPATIENT)
Dept: UROLOGY | Facility: CLINIC | Age: 78
End: 2018-11-05

## 2018-11-05 ENCOUNTER — OFFICE VISIT (OUTPATIENT)
Dept: INTERNAL MEDICINE | Facility: CLINIC | Age: 78
End: 2018-11-05
Payer: COMMERCIAL

## 2018-11-05 DIAGNOSIS — R53.83 OTHER FATIGUE: ICD-10-CM

## 2018-11-05 DIAGNOSIS — I10 HYPERTENSION, UNSPECIFIED TYPE: Primary | ICD-10-CM

## 2018-11-05 DIAGNOSIS — J45.909 ASTHMA WITHOUT STATUS ASTHMATICUS, UNSPECIFIED ASTHMA SEVERITY, UNCOMPLICATED: ICD-10-CM

## 2018-11-05 PROCEDURE — 3078F DIAST BP <80 MM HG: CPT | Mod: CPTII,S$GLB,, | Performed by: INTERNAL MEDICINE

## 2018-11-05 PROCEDURE — 99214 OFFICE O/P EST MOD 30 MIN: CPT | Mod: S$GLB,,, | Performed by: INTERNAL MEDICINE

## 2018-11-05 PROCEDURE — 99999 PR PBB SHADOW E&M-EST. PATIENT-LVL V: CPT | Mod: PBBFAC,,, | Performed by: INTERNAL MEDICINE

## 2018-11-05 PROCEDURE — 1101F PT FALLS ASSESS-DOCD LE1/YR: CPT | Mod: CPTII,S$GLB,, | Performed by: INTERNAL MEDICINE

## 2018-11-05 PROCEDURE — 3075F SYST BP GE 130 - 139MM HG: CPT | Mod: CPTII,S$GLB,, | Performed by: INTERNAL MEDICINE

## 2018-11-05 RX ORDER — LEVOTHYROXINE SODIUM 75 UG/1
75 TABLET ORAL
Qty: 90 TABLET | Refills: 0 | Status: SHIPPED | OUTPATIENT
Start: 2018-11-05 | End: 2019-01-31 | Stop reason: SDUPTHER

## 2018-11-05 RX ORDER — CIPROFLOXACIN 500 MG/1
500 TABLET ORAL 2 TIMES DAILY
Qty: 14 TABLET | Refills: 0 | Status: SHIPPED | OUTPATIENT
Start: 2018-11-05 | End: 2018-11-12

## 2018-11-05 RX ORDER — BUDESONIDE AND FORMOTEROL FUMARATE DIHYDRATE 160; 4.5 UG/1; UG/1
AEROSOL RESPIRATORY (INHALATION)
Qty: 10.2 G | Refills: 0 | Status: ON HOLD | OUTPATIENT
Start: 2018-11-05 | End: 2018-12-03 | Stop reason: SDUPTHER

## 2018-11-05 NOTE — TELEPHONE ENCOUNTER
"R/t pt's call and told her that I will inform Dr. Parrish of itching. Also, scheduled f/u appt and mailed out.    ----- Message from Xochilt Lopez MA sent at 11/5/2018  8:08 AM CST -----  Contact: [phone]   Patient Returning Call from [facility]    Who Left Message for Patient: Nurse Bhandari (friday 11/2/18)    Communication Preference: [phone]    Additional Information: pt said when you called her last Friday to ask if she had symptoms of a ULITIS, she said "No". She's calling today to say that she was half asleep and did not fully understand what you asked her and yes, she does have MINOR symptoms of a UTI      "

## 2018-11-09 ENCOUNTER — PES CALL (OUTPATIENT)
Dept: ADMINISTRATIVE | Facility: CLINIC | Age: 78
End: 2018-11-09

## 2018-11-10 VITALS
TEMPERATURE: 99 F | SYSTOLIC BLOOD PRESSURE: 136 MMHG | HEIGHT: 65 IN | DIASTOLIC BLOOD PRESSURE: 68 MMHG | OXYGEN SATURATION: 98 % | BODY MASS INDEX: 36.51 KG/M2 | WEIGHT: 219.13 LBS | HEART RATE: 71 BPM

## 2018-11-11 NOTE — PROGRESS NOTES
Subjective:       Patient ID: Krissy Marino is a 78 y.o. female.    Chief Complaint: Hypertension    HPI  She returns for management of hypertension.  She has had hypertension for over a year.  Current treatment has included medications outlined in medication list.  She denies chest pain or shortness of breath.  No palpitations.  Denies left arm or neck pain. She complains of dysuria and urinary frequency.  No fever, chills, night sweats    Past medical history:  Hypertension, congestive heart failure, coronary artery disease, diabetes, asthma, hyperlipidemia, psoriasis, giant cell arteritis, vitamin-D deficiency, lymphoma, hypothyroidism, osteopenia, peripheral vascular disease    Medications:  Albuterol p.r.n., Fosamax 70 mg once a week, 1 aspirin daily, Lipitor 40 mg daily, Symbicort 2 puffs b.i.d., Plavix 1 p.o. daily, hydrochlorothiazide 12.5 mg daily, Synthroid 0.075 mg daily, Cozaar 100 mg daily, Toprol XL 50 mg daily    Allergies:  Bactrim, penicillin      Review of Systems   Constitutional: Negative for chills, fatigue, fever and unexpected weight change.   Respiratory: Negative for chest tightness and shortness of breath.    Cardiovascular: Negative for chest pain and palpitations.   Gastrointestinal: Negative for abdominal pain and blood in stool.   Neurological: Negative for dizziness, syncope, numbness and headaches.       Objective:      Physical Exam   HENT:   Right Ear: External ear normal.   Left Ear: External ear normal.   Nose: Nose normal.   Mouth/Throat: Oropharynx is clear and moist.   Eyes: Pupils are equal, round, and reactive to light.   Neck: Normal range of motion.   Cardiovascular: Normal rate and regular rhythm.   No murmur heard.  Pulmonary/Chest: Breath sounds normal.   Abdominal: She exhibits no distension. There is no hepatosplenomegaly. There is no tenderness.   Lymphadenopathy:     She has no cervical adenopathy.     She has no axillary adenopathy.   Neurological: She has normal  strength and normal reflexes. No cranial nerve deficit or sensory deficit.       Assessment/Plan     assessment and plan:  1.  Hypertension:  Controlled  2.  UTI:  Cipro 500 mg b.i.d. x7 days.  Call if no relief  3.  She was here for urgent care only appointment.  She will return to clinic for a physical

## 2018-11-14 ENCOUNTER — LAB VISIT (OUTPATIENT)
Dept: LAB | Facility: HOSPITAL | Age: 78
End: 2018-11-14
Attending: UROLOGY
Payer: COMMERCIAL

## 2018-11-14 ENCOUNTER — OFFICE VISIT (OUTPATIENT)
Dept: SPORTS MEDICINE | Facility: CLINIC | Age: 78
End: 2018-11-14
Payer: COMMERCIAL

## 2018-11-14 ENCOUNTER — OFFICE VISIT (OUTPATIENT)
Dept: OTOLARYNGOLOGY | Facility: CLINIC | Age: 78
End: 2018-11-14
Payer: COMMERCIAL

## 2018-11-14 ENCOUNTER — CLINICAL SUPPORT (OUTPATIENT)
Dept: AUDIOLOGY | Facility: CLINIC | Age: 78
End: 2018-11-14
Payer: COMMERCIAL

## 2018-11-14 VITALS — HEIGHT: 65 IN | WEIGHT: 219.13 LBS | BODY MASS INDEX: 36.51 KG/M2 | TEMPERATURE: 98 F

## 2018-11-14 VITALS
SYSTOLIC BLOOD PRESSURE: 140 MMHG | HEART RATE: 63 BPM | WEIGHT: 218.25 LBS | BODY MASS INDEX: 36.32 KG/M2 | DIASTOLIC BLOOD PRESSURE: 67 MMHG

## 2018-11-14 DIAGNOSIS — R53.81 PHYSICAL DECONDITIONING: ICD-10-CM

## 2018-11-14 DIAGNOSIS — M12.812 ROTATOR CUFF ARTHROPATHY OF BOTH SHOULDERS: ICD-10-CM

## 2018-11-14 DIAGNOSIS — M19.012 OSTEOARTHRITIS OF BILATERAL GLENOHUMERAL JOINTS: Primary | ICD-10-CM

## 2018-11-14 DIAGNOSIS — N39.0 RECURRENT UTI: ICD-10-CM

## 2018-11-14 DIAGNOSIS — M19.011 OSTEOARTHRITIS OF BILATERAL GLENOHUMERAL JOINTS: Primary | ICD-10-CM

## 2018-11-14 DIAGNOSIS — H61.21 IMPACTED CERUMEN, RIGHT EAR: Primary | ICD-10-CM

## 2018-11-14 DIAGNOSIS — M12.811 ROTATOR CUFF ARTHROPATHY OF BOTH SHOULDERS: ICD-10-CM

## 2018-11-14 DIAGNOSIS — H90.3 SENSORINEURAL HEARING LOSS, BILATERAL: Primary | ICD-10-CM

## 2018-11-14 PROCEDURE — 99999 PR PBB SHADOW E&M-EST. PATIENT-LVL IV: CPT | Mod: PBBFAC,,, | Performed by: OTOLARYNGOLOGY

## 2018-11-14 PROCEDURE — 99999 PR PBB SHADOW E&M-EST. PATIENT-LVL I: CPT | Mod: PBBFAC,,,

## 2018-11-14 PROCEDURE — 99214 OFFICE O/P EST MOD 30 MIN: CPT | Mod: 25,S$GLB,, | Performed by: FAMILY MEDICINE

## 2018-11-14 PROCEDURE — 99499 UNLISTED E&M SERVICE: CPT | Mod: S$GLB,,, | Performed by: OTOLARYNGOLOGY

## 2018-11-14 PROCEDURE — 69210 REMOVE IMPACTED EAR WAX UNI: CPT | Mod: S$GLB,,, | Performed by: OTOLARYNGOLOGY

## 2018-11-14 PROCEDURE — 92557 COMPREHENSIVE HEARING TEST: CPT | Mod: S$GLB,,, | Performed by: AUDIOLOGIST

## 2018-11-14 PROCEDURE — 87186 SC STD MICRODIL/AGAR DIL: CPT

## 2018-11-14 PROCEDURE — 1101F PT FALLS ASSESS-DOCD LE1/YR: CPT | Mod: CPTII,S$GLB,, | Performed by: FAMILY MEDICINE

## 2018-11-14 PROCEDURE — 92567 TYMPANOMETRY: CPT | Mod: S$GLB,,, | Performed by: AUDIOLOGIST

## 2018-11-14 PROCEDURE — 20611 DRAIN/INJ JOINT/BURSA W/US: CPT | Mod: 50,S$GLB,, | Performed by: FAMILY MEDICINE

## 2018-11-14 PROCEDURE — 99999 PR PBB SHADOW E&M-EST. PATIENT-LVL III: CPT | Mod: PBBFAC,,, | Performed by: FAMILY MEDICINE

## 2018-11-14 PROCEDURE — 87086 URINE CULTURE/COLONY COUNT: CPT

## 2018-11-14 PROCEDURE — 87077 CULTURE AEROBIC IDENTIFY: CPT

## 2018-11-14 PROCEDURE — 87088 URINE BACTERIA CULTURE: CPT

## 2018-11-14 RX ORDER — TRIAMCINOLONE ACETONIDE 40 MG/ML
40 INJECTION, SUSPENSION INTRA-ARTICULAR; INTRAMUSCULAR
Status: DISCONTINUED | OUTPATIENT
Start: 2018-11-14 | End: 2018-11-14 | Stop reason: HOSPADM

## 2018-11-14 RX ADMIN — TRIAMCINOLONE ACETONIDE 40 MG: 40 INJECTION, SUSPENSION INTRA-ARTICULAR; INTRAMUSCULAR at 01:11

## 2018-11-14 NOTE — PROCEDURES
Ear Cerumen Removal  Date/Time: 11/14/2018 2:49 PM  Performed by: Vazquez Louis MD  Authorized by: Vazquez Louis MD     Consent Done?:  Yes (Verbal)  Location details:  Right ear  Procedure type: curette    Cerumen  Removal Results:  Cerumen completely removed  Patient tolerance:  Patient tolerated the procedure well with no immediate complications     Binocular microscopy used to examine ear canal and tympanic membrane.  There was excess cerumen on the left side and an impaction of cerumen on the right.  This was removed using a curette and other microinstrumentation. After removal TM and EAC were normal.

## 2018-11-14 NOTE — PROCEDURES
"Large Joint Aspiration/Injection: R subacromial bursa, L subacromial bursa  Date/Time: 11/14/2018 1:26 PM  Performed by: Rock Lopze MD  Authorized by: Rock Lopez MD     Consent Done?:  Yes (Verbal)  Indications:  Pain  Procedure site marked: Yes    Timeout: Prior to procedure the correct patient, procedure, and site was verified      Location:  Shoulder  Site:  R subacromial bursa and L subacromial bursa  Prep: Patient was prepped and draped in usual sterile fashion    Ultrasonic Guidance for needle placement: Yes  Images are saved and documented.  Needle size:  20 G  Approach:  Posterior  Medications:  40 mg triamcinolone acetonide 40 mg/mL; 40 mg triamcinolone acetonide 40 mg/mL  Patient tolerance:  Patient tolerated the procedure well with no immediate complications    Additional Comments: Description of ultrasound utilization for needle guidance:   Ultrasound guidance used for needle localization. Images saved and stored for documentation. The subacromial / subdeltoid bursa was visualized. Dynamic visualization of the 20g x 1.5" needle was continuous throughout the procedure.        "

## 2018-11-14 NOTE — PROCEDURES
"Large Joint Aspiration/Injection: R glenohumeral, L glenohumeral  Date/Time: 11/14/2018 1:26 PM  Performed by: Rock Lopez MD  Authorized by: Rock Lopez MD     Consent Done?:  Yes (Verbal)  Indications:  Pain  Procedure site marked: Yes    Timeout: Prior to procedure the correct patient, procedure, and site was verified      Location:  Shoulder  Site:  R glenohumeral and L glenohumeral  Prep: Patient was prepped and draped in usual sterile fashion    Ultrasonic Guidance for needle placement: Yes  Images are saved and documented.  Needle size:  20 G  Approach:  Posterior  Medications:  40 mg triamcinolone acetonide 40 mg/mL; 40 mg triamcinolone acetonide 40 mg/mL  Patient tolerance:  Patient tolerated the procedure well with no immediate complications    Additional Comments: Description of ultrasound utilization for needle guidance:   Ultrasound guidance used for needle localization. Images saved and stored for documentation. The glenohumeral joint was visualized. Dynamic visualization of the 20g x 3.5" needle was continuous throughout the procedure.      "

## 2018-11-14 NOTE — PROGRESS NOTES
Krissy Marino, a 78 y.o. female, is here for evaluation of RIGHT and LEFT shoulder pain.     HISTORY OF PRESENT ILLNESS  Hand dominance, right     Location: anterior and lateral, bilateral R > L  Onset: Insidious, many years  Palliative:    Relative rest   Oral analgesics (tylenol PM)   R- CSI, SAB, 07/18/16, 80% improvement   R - CSI, [facility], 12/02/17, moderate improvement for ~ 3 weeks    R - CSI, [facility], 01/27/17, no improvement    R - CSI, [facility], 02/21/17, mild to moderate relief    fPT @ [facility] - going well, but ROM remains painful   Heat    R - CSI, [facility], 05/05/17, moderate%   Sling PRN   R/L - CSI, [facility], 07/12/17, moderate% improvement    R/L - CSI, [facility] 08/23/17, moderate%   R/L - CSI, [facility], 10/18/17, moderate% improvement, though not for long    R/L - CSI, [facility], 11/15/17, moderate% improvement, for ~ 3weeks    R/L - CSI, [facility] 12/13/17, mild% improvement    R/L - CSI, [facility] 01/23/18, 40-50%   R/L - CSI, [facility], 02/27/18, moderate improvement, though did not last long   L - CSI, [facility], 4/24/18, Moderate Improvement   R/L - CSI, [facility] 07/25/2018, 75% improvement - intermittent pain    R/L - CSI, ia/SAB 18.09.19 100% until about 1.5 weeks ago  Provocative:    ADLs   Prior:   Progression: Pleateaud discomfort   Quality:    Sharp pain at times activity   Throbbing at times with activity    Muscle soreness   Radiation: none  Severity: per nursing documentation  Timing: intermittent with use  Trauma:    17.07: mechanical fall while at PT --> landed on L arm     Review of systems (ROS):  A 10+ review of systems was performed with pertinent positives and negatives noted above in the history of present illness. Other systems were negative unless otherwise specified.      PHYSICAL EXAMINATION  General:  The patient is alert and oriented x 3. Mood is pleasant. Observation of ears, eyes and nose reveal no gross abnormalities. HEENT: NCAT, sclera anicteric. Lungs: Respirations are equal and unlabored.     RIGHT  SHOULDER EXAMINATION     OBSERVATION:     Swelling  none  Deformity  none   Discoloration  none   Scapular winging none   Scars   none  Atrophy  none    TENDERNESS / CREPITUS (T/C):          T/C      T/C   Clavicle   -/-  SUPRAspinatus    -/-     AC Jt.    -/-  INFRAspinatus  -/-    SC Jt.    -/-  Deltoid    -/-      G. Tuberosity  -/-  LH BICEP groove  -/-   Acromion:  -/-  Midline Neck   -/-     Scapular Spine -/-  Trapezium   -/-   SMA Scapula  -/-  GH jt. line - post  -/-     Scapulothoracic  -/-         ROM:     Right shoulder   Left shoulder        AROM (PROM)   AROM (PROM)   FE    120° (155°)*     120° (155°)*     ER at 0°    60°  (65°) *   60°  (65°)*   ER at 90° ABD  90°  (90°) *   90°  (90°)*   IR at 90°  ABD   NA  (40°)  *   NA  (40°)  *    IR (spine level)   T10  *   T10*    STRENGTH: (* = with pain) RIGHT SHOULDER  LEFT SHOULDER   SCAPTION   4/5    4/5   IR    4/5    4/5   ER    4/5    4/5   BICEPS   4/5    4/5   Deltoid    4/5    4/5     SIGNS:  Painful side       NEER   +   OSONNYS        +    CHAPA   +   SPEEDS        +   DROP ARM   -   BELLY PRESS       -    X-Body ADD    +   LIFT-OFF        +   HORNBLOWERS      +              STABILITY TESTING   RIGHT SHOULDER  LEFT SHOULDER     Translation     Anterior up face    up face    Posterior up face   up face    Sulcus  < 10mm   < 10 mm     Signs   Apprehension   neg      neg       Relocation   no change     no change      Jerk test  neg     neg    EXTREMITY NEURO-VASCULAR EXAM    Sensation grossly intact to light touch all dermatomal regions.    DTR 2+ Biceps, Triceps, BR and Negative Chinas sign   Grossly intact motor function at Elbow, Wrist and Hand   Distal pulses radial and ulnar 2+, brisk cap refill, symmetric.      NECK:  Painless FROM and spinous processes non-tender. Negative Spurlings sign.       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   W/ advanced acromialization of humeral  head   W/ chronic tearing of superior rotator cuff    No evidence of neurologic pathology  No evidence of vascular pathology    Imaging studies reviewed:   X-ray shoulder, bilateral 18.09    Plan:  We discussed options including:  #1 watchful waiting  #2 re start physical therapy aimed at:   RoM glenohumeral joint   Strengthening rotator cuff   Scapular stability    fpt  #3 injection therapy:   CSI SAB    Right, effective 40-50%, repeat    Left, effective 40-50%, repeat    CSI [facility]    Right,effective 40-50%, repeat     Left, effective 40-50%, repeat   orthobiologics   #4 re consultation re: JULIANA   Has seen [facility] in the past   Question of surgical candidacy given her CAD     The patient chooses #3 csi sab bilat and csi iagh bilat    Pain management: handout given  Bracing: shoulder sling, prn  Physical therapy:    fPT, @ [facility] PT, prior as above   Activity (e.g. sports, work) restrictions: as tolerated   school/vocation: works at [employer]     Follow up in 8 w  Should symptoms worsen or fail to resolve, consider:  Revisiting the above options

## 2018-11-16 LAB — BACTERIA UR CULT: NORMAL

## 2018-11-21 ENCOUNTER — TELEPHONE (OUTPATIENT)
Dept: UROLOGY | Facility: CLINIC | Age: 78
End: 2018-11-21

## 2018-11-21 ENCOUNTER — TELEPHONE (OUTPATIENT)
Dept: SPORTS MEDICINE | Facility: CLINIC | Age: 78
End: 2018-11-21

## 2018-11-21 NOTE — TELEPHONE ENCOUNTER
Called Krissy Marino to schedule an appointment.  I was unable to reach the patient, I left patient a voicemail to return my call.    Juan C Mac   Sports Medicine Assistant   [facility]       ----- Message from Juan C Mac MA sent at 11/21/2018  2:57 PM CST -----  Contact: patient      ----- Message -----  From: Ivy Meade  Sent: 11/21/2018   8:39 AM  To: John STOCKTON Staff    Please call pt at [phone]    Patient is having left ankle pain and would like to see Dr Lopez only if possible    Thank you

## 2018-11-26 PROBLEM — M25.572 ACUTE LEFT ANKLE PAIN: Status: ACTIVE | Noted: 2018-11-26

## 2018-11-26 PROBLEM — N17.9 AKI (ACUTE KIDNEY INJURY): Status: ACTIVE | Noted: 2018-11-26

## 2018-11-26 PROBLEM — A41.9 SEPSIS: Status: ACTIVE | Noted: 2018-11-26

## 2018-11-26 PROBLEM — R65.20 SEVERE SEPSIS WITHOUT SEPTIC SHOCK: Status: ACTIVE | Noted: 2018-11-26

## 2018-11-26 PROBLEM — L03.90 CELLULITIS: Status: ACTIVE | Noted: 2018-11-26

## 2018-11-26 PROBLEM — D53.9 MACROCYTIC ANEMIA: Status: ACTIVE | Noted: 2018-11-26

## 2018-11-26 PROBLEM — B35.6 TINEA CRURIS: Status: ACTIVE | Noted: 2018-11-26

## 2018-11-26 PROBLEM — E87.20 LACTIC ACIDOSIS: Status: ACTIVE | Noted: 2018-11-26

## 2018-11-26 PROBLEM — L03.116 CELLULITIS OF LEFT LOWER EXTREMITY: Status: ACTIVE | Noted: 2018-11-26

## 2018-11-27 PROBLEM — E87.20 LACTIC ACIDOSIS: Status: RESOLVED | Noted: 2018-11-26 | Resolved: 2018-11-27

## 2018-11-27 PROBLEM — D69.6 THROMBOCYTOPENIA, UNSPECIFIED: Status: ACTIVE | Noted: 2018-11-27

## 2018-11-27 PROBLEM — R78.81 BACTEREMIA: Status: ACTIVE | Noted: 2018-11-27

## 2018-11-27 PROBLEM — D72.829 LEUKOCYTOSIS: Status: ACTIVE | Noted: 2018-11-27

## 2018-11-28 PROBLEM — N17.9 AKI (ACUTE KIDNEY INJURY): Status: RESOLVED | Noted: 2018-11-26 | Resolved: 2018-11-28

## 2018-11-28 PROBLEM — B95.1 BACTEREMIA DUE TO GROUP B STREPTOCOCCUS: Status: ACTIVE | Noted: 2018-11-27

## 2018-11-28 PROBLEM — R65.20 SEVERE SEPSIS WITHOUT SEPTIC SHOCK: Status: RESOLVED | Noted: 2018-11-26 | Resolved: 2018-11-28

## 2018-11-28 PROBLEM — A41.9 SEVERE SEPSIS WITHOUT SEPTIC SHOCK: Status: RESOLVED | Noted: 2018-11-26 | Resolved: 2018-11-28

## 2018-11-30 PROBLEM — D72.829 LEUKOCYTOSIS: Status: RESOLVED | Noted: 2018-11-27 | Resolved: 2018-11-30

## 2018-12-01 PROBLEM — R79.89 ELEVATED LFTS: Status: ACTIVE | Noted: 2018-12-01

## 2018-12-01 PROBLEM — R06.09 DYSPNEA ON EXERTION: Status: RESOLVED | Noted: 2018-07-09 | Resolved: 2018-12-01

## 2018-12-03 DIAGNOSIS — R53.83 OTHER FATIGUE: ICD-10-CM

## 2018-12-03 DIAGNOSIS — J45.909 ASTHMA WITHOUT STATUS ASTHMATICUS, UNSPECIFIED ASTHMA SEVERITY, UNCOMPLICATED: ICD-10-CM

## 2018-12-03 PROBLEM — D69.6 THROMBOCYTOPENIA, UNSPECIFIED: Status: RESOLVED | Noted: 2018-11-27 | Resolved: 2018-12-03

## 2018-12-04 RX ORDER — BUDESONIDE AND FORMOTEROL FUMARATE DIHYDRATE 160; 4.5 UG/1; UG/1
AEROSOL RESPIRATORY (INHALATION)
Qty: 10.2 G | Refills: 3 | Status: SHIPPED | OUTPATIENT
Start: 2018-12-04

## 2018-12-06 ENCOUNTER — PATIENT OUTREACH (OUTPATIENT)
Dept: ADMINISTRATIVE | Facility: CLINIC | Age: 78
End: 2018-12-06

## 2018-12-12 ENCOUNTER — HOSPITAL ENCOUNTER (INPATIENT)
Facility: HOSPITAL | Age: 78
LOS: 14 days | Discharge: NURSING, REHAB OR LONG-TERM CARE | DRG: 871 | End: 2018-12-26
Attending: EMERGENCY MEDICINE | Admitting: HOSPITALIST
Payer: COMMERCIAL

## 2018-12-12 DIAGNOSIS — I10 ESSENTIAL HYPERTENSION: ICD-10-CM

## 2018-12-12 DIAGNOSIS — L03.90 CELLULITIS: ICD-10-CM

## 2018-12-12 DIAGNOSIS — E11.9 TYPE 2 DIABETES MELLITUS WITHOUT RETINOPATHY: ICD-10-CM

## 2018-12-12 DIAGNOSIS — E44.0 MODERATE PROTEIN-CALORIE MALNUTRITION: ICD-10-CM

## 2018-12-12 DIAGNOSIS — L03.116 CELLULITIS OF LEFT LOWER EXTREMITY: ICD-10-CM

## 2018-12-12 DIAGNOSIS — I82.409 DVT (DEEP VENOUS THROMBOSIS): ICD-10-CM

## 2018-12-12 DIAGNOSIS — M86.262 SUBACUTE OSTEOMYELITIS OF LEFT TIBIA: ICD-10-CM

## 2018-12-12 DIAGNOSIS — M86.462 CHRONIC OSTEOMYELITIS OF LEFT TIBIA WITH DRAINING SINUS: Primary | ICD-10-CM

## 2018-12-12 DIAGNOSIS — A41.9 SEPSIS: ICD-10-CM

## 2018-12-12 DIAGNOSIS — A41.9 SEPSIS DUE TO CELLULITIS: ICD-10-CM

## 2018-12-12 DIAGNOSIS — I73.9 PERIPHERAL ARTERIAL DISEASE: ICD-10-CM

## 2018-12-12 DIAGNOSIS — R60.0 BILATERAL LEG EDEMA: ICD-10-CM

## 2018-12-12 DIAGNOSIS — D47.2 MONOCLONAL PARAPROTEINEMIA: ICD-10-CM

## 2018-12-12 DIAGNOSIS — M86.9 OSTEOMYELITIS: ICD-10-CM

## 2018-12-12 DIAGNOSIS — L03.90 SEPSIS DUE TO CELLULITIS: ICD-10-CM

## 2018-12-12 DIAGNOSIS — M86.9 OSTEOMYELITIS OF LEFT TIBIA, UNSPECIFIED TYPE: ICD-10-CM

## 2018-12-12 DIAGNOSIS — I50.32 CHRONIC DIASTOLIC HEART FAILURE: ICD-10-CM

## 2018-12-12 DIAGNOSIS — Z01.811 PRE-OP CHEST EXAM: ICD-10-CM

## 2018-12-12 DIAGNOSIS — M86.362: ICD-10-CM

## 2018-12-12 DIAGNOSIS — N28.9 RENAL INSUFFICIENCY: ICD-10-CM

## 2018-12-12 PROBLEM — I11.0 BENIGN HYPERTENSIVE HEART DISEASE WITH HF (HEART FAILURE): Status: ACTIVE | Noted: 2018-12-12

## 2018-12-12 PROBLEM — R79.89 ELEVATED LFTS: Status: RESOLVED | Noted: 2018-12-01 | Resolved: 2018-12-12

## 2018-12-12 PROBLEM — Z01.818 PRE-OPERATIVE EXAMINATION FOR INTERNAL MEDICINE: Status: RESOLVED | Noted: 2018-02-20 | Resolved: 2018-12-12

## 2018-12-12 PROBLEM — M25.572 ACUTE LEFT ANKLE PAIN: Status: RESOLVED | Noted: 2018-11-26 | Resolved: 2018-12-12

## 2018-12-12 LAB
ALBUMIN SERPL BCP-MCNC: 1.8 G/DL
ALP SERPL-CCNC: 224 U/L
ALT SERPL W/O P-5'-P-CCNC: 35 U/L
ANION GAP SERPL CALC-SCNC: 11 MMOL/L
AST SERPL-CCNC: 21 U/L
BASOPHILS # BLD AUTO: 0.02 K/UL
BASOPHILS NFR BLD: 0.2 %
BILIRUB SERPL-MCNC: 0.3 MG/DL
BUN SERPL-MCNC: 41 MG/DL
CALCIUM SERPL-MCNC: 8.9 MG/DL
CHLORIDE SERPL-SCNC: 97 MMOL/L
CK SERPL-CCNC: 21 U/L
CO2 SERPL-SCNC: 28 MMOL/L
CREAT SERPL-MCNC: 0.9 MG/DL
CRP SERPL-MCNC: 66.9 MG/L
DIFFERENTIAL METHOD: ABNORMAL
EOSINOPHIL # BLD AUTO: 0.2 K/UL
EOSINOPHIL NFR BLD: 1.7 %
ERYTHROCYTE [DISTWIDTH] IN BLOOD BY AUTOMATED COUNT: 14.1 %
ERYTHROCYTE [SEDIMENTATION RATE] IN BLOOD BY WESTERGREN METHOD: 56 MM/HR
EST. GFR  (AFRICAN AMERICAN): >60 ML/MIN/1.73 M^2
EST. GFR  (NON AFRICAN AMERICAN): >60 ML/MIN/1.73 M^2
ESTIMATED AVG GLUCOSE: 151 MG/DL
GLUCOSE SERPL-MCNC: 363 MG/DL
HBA1C MFR BLD HPLC: 6.9 %
HCT VFR BLD AUTO: 27 %
HGB BLD-MCNC: 8.8 G/DL
IMM GRANULOCYTES # BLD AUTO: 0.07 K/UL
IMM GRANULOCYTES NFR BLD AUTO: 0.7 %
LACTATE SERPL-SCNC: 1.7 MMOL/L
LYMPHOCYTES # BLD AUTO: 0.8 K/UL
LYMPHOCYTES NFR BLD: 7.6 %
MCH RBC QN AUTO: 33 PG
MCHC RBC AUTO-ENTMCNC: 32.6 G/DL
MCV RBC AUTO: 101 FL
MONOCYTES # BLD AUTO: 0.7 K/UL
MONOCYTES NFR BLD: 6.2 %
NEUTROPHILS # BLD AUTO: 9 K/UL
NEUTROPHILS NFR BLD: 83.6 %
NRBC BLD-RTO: 0 /100 WBC
PLATELET # BLD AUTO: 333 K/UL
PMV BLD AUTO: 10.5 FL
POTASSIUM SERPL-SCNC: 4.4 MMOL/L
PROCALCITONIN SERPL IA-MCNC: 0.15 NG/ML
PROT SERPL-MCNC: 5.9 G/DL
RBC # BLD AUTO: 2.67 M/UL
SODIUM SERPL-SCNC: 136 MMOL/L
WBC # BLD AUTO: 10.72 K/UL

## 2018-12-12 PROCEDURE — 63600175 PHARM REV CODE 636 W HCPCS: Performed by: STUDENT IN AN ORGANIZED HEALTH CARE EDUCATION/TRAINING PROGRAM

## 2018-12-12 PROCEDURE — 83036 HEMOGLOBIN GLYCOSYLATED A1C: CPT

## 2018-12-12 PROCEDURE — 99285 EMERGENCY DEPT VISIT HI MDM: CPT | Mod: 25

## 2018-12-12 PROCEDURE — 11000001 HC ACUTE MED/SURG PRIVATE ROOM

## 2018-12-12 PROCEDURE — 96365 THER/PROPH/DIAG IV INF INIT: CPT

## 2018-12-12 PROCEDURE — 93010 ELECTROCARDIOGRAM REPORT: CPT | Mod: ,,, | Performed by: INTERNAL MEDICINE

## 2018-12-12 PROCEDURE — 80053 COMPREHEN METABOLIC PANEL: CPT

## 2018-12-12 PROCEDURE — 96361 HYDRATE IV INFUSION ADD-ON: CPT

## 2018-12-12 PROCEDURE — 99223 1ST HOSP IP/OBS HIGH 75: CPT | Mod: ,,, | Performed by: HOSPITALIST

## 2018-12-12 PROCEDURE — 83605 ASSAY OF LACTIC ACID: CPT

## 2018-12-12 PROCEDURE — 82550 ASSAY OF CK (CPK): CPT

## 2018-12-12 PROCEDURE — 96372 THER/PROPH/DIAG INJ SC/IM: CPT | Mod: 59

## 2018-12-12 PROCEDURE — 25000003 PHARM REV CODE 250: Performed by: STUDENT IN AN ORGANIZED HEALTH CARE EDUCATION/TRAINING PROGRAM

## 2018-12-12 PROCEDURE — 25000003 PHARM REV CODE 250: Performed by: EMERGENCY MEDICINE

## 2018-12-12 PROCEDURE — 25000003 PHARM REV CODE 250: Performed by: HOSPITALIST

## 2018-12-12 PROCEDURE — A9585 GADOBUTROL INJECTION: HCPCS | Performed by: INTERNAL MEDICINE

## 2018-12-12 PROCEDURE — 85025 COMPLETE CBC W/AUTO DIFF WBC: CPT

## 2018-12-12 PROCEDURE — 93005 ELECTROCARDIOGRAM TRACING: CPT

## 2018-12-12 PROCEDURE — 86140 C-REACTIVE PROTEIN: CPT

## 2018-12-12 PROCEDURE — 96375 TX/PRO/DX INJ NEW DRUG ADDON: CPT

## 2018-12-12 PROCEDURE — 87040 BLOOD CULTURE FOR BACTERIA: CPT

## 2018-12-12 PROCEDURE — 84145 PROCALCITONIN (PCT): CPT

## 2018-12-12 PROCEDURE — 96366 THER/PROPH/DIAG IV INF ADDON: CPT

## 2018-12-12 PROCEDURE — 63600175 PHARM REV CODE 636 W HCPCS: Performed by: HOSPITALIST

## 2018-12-12 PROCEDURE — 85652 RBC SED RATE AUTOMATED: CPT

## 2018-12-12 PROCEDURE — 63600175 PHARM REV CODE 636 W HCPCS: Performed by: EMERGENCY MEDICINE

## 2018-12-12 PROCEDURE — 99284 EMERGENCY DEPT VISIT MOD MDM: CPT | Mod: ,,, | Performed by: EMERGENCY MEDICINE

## 2018-12-12 PROCEDURE — 25500020 PHARM REV CODE 255: Performed by: INTERNAL MEDICINE

## 2018-12-12 RX ORDER — POLYETHYLENE GLYCOL 3350 17 G/17G
17 POWDER, FOR SOLUTION ORAL DAILY
Status: DISCONTINUED | OUTPATIENT
Start: 2018-12-13 | End: 2018-12-26 | Stop reason: HOSPADM

## 2018-12-12 RX ORDER — GLUCAGON 1 MG
1 KIT INJECTION
Status: DISCONTINUED | OUTPATIENT
Start: 2018-12-12 | End: 2018-12-25

## 2018-12-12 RX ORDER — MORPHINE SULFATE 2 MG/ML
2 INJECTION, SOLUTION INTRAMUSCULAR; INTRAVENOUS
Status: ACTIVE | OUTPATIENT
Start: 2018-12-12 | End: 2018-12-13

## 2018-12-12 RX ORDER — AMLODIPINE BESYLATE 5 MG/1
5 TABLET ORAL DAILY
Status: DISCONTINUED | OUTPATIENT
Start: 2018-12-13 | End: 2018-12-18

## 2018-12-12 RX ORDER — ACETAMINOPHEN 325 MG/1
650 TABLET ORAL
Status: COMPLETED | OUTPATIENT
Start: 2018-12-12 | End: 2018-12-12

## 2018-12-12 RX ORDER — VANCOMYCIN HCL IN 5 % DEXTROSE 1.5G/250ML
15 PLASTIC BAG, INJECTION (ML) INTRAVENOUS
Status: DISCONTINUED | OUTPATIENT
Start: 2018-12-13 | End: 2018-12-14

## 2018-12-12 RX ORDER — ONDANSETRON 8 MG/1
8 TABLET, ORALLY DISINTEGRATING ORAL EVERY 8 HOURS PRN
Status: DISCONTINUED | OUTPATIENT
Start: 2018-12-12 | End: 2018-12-26 | Stop reason: HOSPADM

## 2018-12-12 RX ORDER — ALLOPURINOL 300 MG/1
300 TABLET ORAL DAILY
Status: DISCONTINUED | OUTPATIENT
Start: 2018-12-13 | End: 2018-12-26 | Stop reason: HOSPADM

## 2018-12-12 RX ORDER — LOSARTAN POTASSIUM 50 MG/1
100 TABLET ORAL DAILY
Status: DISCONTINUED | OUTPATIENT
Start: 2018-12-13 | End: 2018-12-18

## 2018-12-12 RX ORDER — RAMELTEON 8 MG/1
8 TABLET ORAL NIGHTLY PRN
Status: DISCONTINUED | OUTPATIENT
Start: 2018-12-12 | End: 2018-12-15

## 2018-12-12 RX ORDER — FLUTICASONE FUROATE AND VILANTEROL 100; 25 UG/1; UG/1
1 POWDER RESPIRATORY (INHALATION) DAILY
Status: DISCONTINUED | OUTPATIENT
Start: 2018-12-13 | End: 2018-12-26 | Stop reason: HOSPADM

## 2018-12-12 RX ORDER — ACETAMINOPHEN 325 MG/1
650 TABLET ORAL EVERY 8 HOURS PRN
Status: DISCONTINUED | OUTPATIENT
Start: 2018-12-12 | End: 2018-12-18

## 2018-12-12 RX ORDER — ENOXAPARIN SODIUM 100 MG/ML
40 INJECTION SUBCUTANEOUS EVERY 24 HOURS
Status: DISCONTINUED | OUTPATIENT
Start: 2018-12-12 | End: 2018-12-21

## 2018-12-12 RX ORDER — ASPIRIN 81 MG/1
81 TABLET ORAL DAILY
Status: DISCONTINUED | OUTPATIENT
Start: 2018-12-13 | End: 2018-12-26 | Stop reason: HOSPADM

## 2018-12-12 RX ORDER — LEVOTHYROXINE SODIUM 75 UG/1
75 TABLET ORAL
Status: DISCONTINUED | OUTPATIENT
Start: 2018-12-13 | End: 2018-12-26 | Stop reason: HOSPADM

## 2018-12-12 RX ORDER — CETIRIZINE HYDROCHLORIDE 10 MG/1
10 TABLET ORAL DAILY
Status: DISCONTINUED | OUTPATIENT
Start: 2018-12-13 | End: 2018-12-18

## 2018-12-12 RX ORDER — VANCOMYCIN 2 GRAM/500 ML IN 0.9 % SODIUM CHLORIDE INTRAVENOUS
2000
Status: COMPLETED | OUTPATIENT
Start: 2018-12-12 | End: 2018-12-12

## 2018-12-12 RX ORDER — SODIUM CHLORIDE 0.9 % (FLUSH) 0.9 %
5 SYRINGE (ML) INJECTION
Status: DISCONTINUED | OUTPATIENT
Start: 2018-12-12 | End: 2018-12-26 | Stop reason: HOSPADM

## 2018-12-12 RX ORDER — IBUPROFEN 200 MG
16 TABLET ORAL
Status: DISCONTINUED | OUTPATIENT
Start: 2018-12-12 | End: 2018-12-25

## 2018-12-12 RX ORDER — GADOBUTROL 604.72 MG/ML
10 INJECTION INTRAVENOUS
Status: COMPLETED | OUTPATIENT
Start: 2018-12-12 | End: 2018-12-12

## 2018-12-12 RX ORDER — FAMOTIDINE 20 MG/1
20 TABLET, FILM COATED ORAL 2 TIMES DAILY
Status: DISCONTINUED | OUTPATIENT
Start: 2018-12-12 | End: 2018-12-20

## 2018-12-12 RX ORDER — AMOXICILLIN 250 MG
1 CAPSULE ORAL 2 TIMES DAILY PRN
Status: DISCONTINUED | OUTPATIENT
Start: 2018-12-12 | End: 2018-12-26 | Stop reason: HOSPADM

## 2018-12-12 RX ORDER — IBUPROFEN 200 MG
24 TABLET ORAL
Status: DISCONTINUED | OUTPATIENT
Start: 2018-12-12 | End: 2018-12-25

## 2018-12-12 RX ORDER — LOPERAMIDE HYDROCHLORIDE 2 MG/1
2 CAPSULE ORAL 4 TIMES DAILY PRN
Status: ON HOLD | COMMUNITY
End: 2018-12-26 | Stop reason: HOSPADM

## 2018-12-12 RX ORDER — METOPROLOL SUCCINATE 50 MG/1
50 TABLET, EXTENDED RELEASE ORAL DAILY
Status: DISCONTINUED | OUTPATIENT
Start: 2018-12-13 | End: 2018-12-18

## 2018-12-12 RX ORDER — ATORVASTATIN CALCIUM 20 MG/1
40 TABLET, FILM COATED ORAL DAILY
Status: DISCONTINUED | OUTPATIENT
Start: 2018-12-13 | End: 2018-12-26 | Stop reason: HOSPADM

## 2018-12-12 RX ORDER — CLOPIDOGREL BISULFATE 75 MG/1
75 TABLET ORAL DAILY
Status: DISCONTINUED | OUTPATIENT
Start: 2018-12-13 | End: 2018-12-26 | Stop reason: HOSPADM

## 2018-12-12 RX ORDER — INSULIN ASPART 100 [IU]/ML
0-5 INJECTION, SOLUTION INTRAVENOUS; SUBCUTANEOUS
Status: DISCONTINUED | OUTPATIENT
Start: 2018-12-12 | End: 2018-12-25

## 2018-12-12 RX ADMIN — DOCUSATE SODIUM 50 MG: 50 CAPSULE, LIQUID FILLED ORAL at 07:12

## 2018-12-12 RX ADMIN — ACETAMINOPHEN 650 MG: 325 TABLET, FILM COATED ORAL at 08:12

## 2018-12-12 RX ADMIN — GADOBUTROL 10 ML: 604.72 INJECTION INTRAVENOUS at 09:12

## 2018-12-12 RX ADMIN — SODIUM CHLORIDE 2078 ML: 0.9 INJECTION, SOLUTION INTRAVENOUS at 07:12

## 2018-12-12 RX ADMIN — VANCOMYCIN HYDROCHLORIDE 2000 MG: 10 INJECTION, POWDER, LYOPHILIZED, FOR SOLUTION INTRAVENOUS at 07:12

## 2018-12-12 RX ADMIN — FAMOTIDINE 20 MG: 20 TABLET ORAL at 09:12

## 2018-12-12 RX ADMIN — ENOXAPARIN SODIUM 40 MG: 100 INJECTION SUBCUTANEOUS at 09:12

## 2018-12-13 PROBLEM — Z01.810 PREOPERATIVE CARDIOVASCULAR EXAMINATION: Status: ACTIVE | Noted: 2018-02-20

## 2018-12-13 PROBLEM — M86.9 OSTEOMYELITIS OF LEFT TIBIA: Status: ACTIVE | Noted: 2018-12-12

## 2018-12-13 PROBLEM — M86.462 CHRONIC OSTEOMYELITIS OF LEFT TIBIA WITH DRAINING SINUS: Status: ACTIVE | Noted: 2018-12-13

## 2018-12-13 LAB
ALBUMIN SERPL BCP-MCNC: 1.5 G/DL
ALP SERPL-CCNC: 173 U/L
ALT SERPL W/O P-5'-P-CCNC: 28 U/L
ANION GAP SERPL CALC-SCNC: 8 MMOL/L
AST SERPL-CCNC: 16 U/L
BASOPHILS # BLD AUTO: 0.03 K/UL
BASOPHILS NFR BLD: 0.4 %
BILIRUB SERPL-MCNC: 0.4 MG/DL
BILIRUB UR QL STRIP: NEGATIVE
BUN SERPL-MCNC: 35 MG/DL
CALCIUM SERPL-MCNC: 8.3 MG/DL
CHLORIDE SERPL-SCNC: 104 MMOL/L
CLARITY UR REFRACT.AUTO: CLEAR
CO2 SERPL-SCNC: 25 MMOL/L
COLOR UR AUTO: YELLOW
CREAT SERPL-MCNC: 0.7 MG/DL
DIFFERENTIAL METHOD: ABNORMAL
EOSINOPHIL # BLD AUTO: 0.2 K/UL
EOSINOPHIL NFR BLD: 2.4 %
ERYTHROCYTE [DISTWIDTH] IN BLOOD BY AUTOMATED COUNT: 14.1 %
EST. GFR  (AFRICAN AMERICAN): >60 ML/MIN/1.73 M^2
EST. GFR  (NON AFRICAN AMERICAN): >60 ML/MIN/1.73 M^2
GLUCOSE SERPL-MCNC: 207 MG/DL
GLUCOSE UR QL STRIP: NEGATIVE
HCT VFR BLD AUTO: 23.2 %
HGB BLD-MCNC: 7.1 G/DL
HGB UR QL STRIP: NEGATIVE
IMM GRANULOCYTES # BLD AUTO: 0.05 K/UL
IMM GRANULOCYTES NFR BLD AUTO: 0.7 %
KETONES UR QL STRIP: NEGATIVE
LEUKOCYTE ESTERASE UR QL STRIP: NEGATIVE
LYMPHOCYTES # BLD AUTO: 0.6 K/UL
LYMPHOCYTES NFR BLD: 8.4 %
MAGNESIUM SERPL-MCNC: 1.6 MG/DL
MCH RBC QN AUTO: 32.4 PG
MCHC RBC AUTO-ENTMCNC: 30.6 G/DL
MCV RBC AUTO: 106 FL
MICROSCOPIC COMMENT: NORMAL
MONOCYTES # BLD AUTO: 0.5 K/UL
MONOCYTES NFR BLD: 5.9 %
NEUTROPHILS # BLD AUTO: 6.3 K/UL
NEUTROPHILS NFR BLD: 82.2 %
NITRITE UR QL STRIP: NEGATIVE
NRBC BLD-RTO: 0 /100 WBC
PH UR STRIP: 5 [PH] (ref 5–8)
PHOSPHATE SERPL-MCNC: 3.1 MG/DL
PLATELET # BLD AUTO: 249 K/UL
PMV BLD AUTO: 10.7 FL
POCT GLUCOSE: 128 MG/DL (ref 70–110)
POCT GLUCOSE: 159 MG/DL (ref 70–110)
POCT GLUCOSE: 169 MG/DL (ref 70–110)
POCT GLUCOSE: 228 MG/DL (ref 70–110)
POCT GLUCOSE: 61 MG/DL (ref 70–110)
POCT GLUCOSE: 63 MG/DL (ref 70–110)
POTASSIUM SERPL-SCNC: 4 MMOL/L
PREALB SERPL-MCNC: 11 MG/DL
PROT SERPL-MCNC: 5 G/DL
PROT UR QL STRIP: NEGATIVE
RBC # BLD AUTO: 2.19 M/UL
SODIUM SERPL-SCNC: 137 MMOL/L
SP GR UR STRIP: 1.01 (ref 1–1.03)
SQUAMOUS #/AREA URNS AUTO: 1 /HPF
TRANSFERRIN SERPL-MCNC: 124 MG/DL
URN SPEC COLLECT METH UR: NORMAL
VANCOMYCIN SERPL-MCNC: 16.5 UG/ML
WBC # BLD AUTO: 7.65 K/UL
WBC #/AREA URNS AUTO: 2 /HPF (ref 0–5)

## 2018-12-13 PROCEDURE — 84466 ASSAY OF TRANSFERRIN: CPT

## 2018-12-13 PROCEDURE — 25500020 PHARM REV CODE 255: Performed by: INTERNAL MEDICINE

## 2018-12-13 PROCEDURE — 25000003 PHARM REV CODE 250: Performed by: STUDENT IN AN ORGANIZED HEALTH CARE EDUCATION/TRAINING PROGRAM

## 2018-12-13 PROCEDURE — 99233 SBSQ HOSP IP/OBS HIGH 50: CPT | Mod: ,,, | Performed by: INTERNAL MEDICINE

## 2018-12-13 PROCEDURE — 84134 ASSAY OF PREALBUMIN: CPT

## 2018-12-13 PROCEDURE — 25000003 PHARM REV CODE 250: Performed by: INTERNAL MEDICINE

## 2018-12-13 PROCEDURE — 25000003 PHARM REV CODE 250: Performed by: HOSPITALIST

## 2018-12-13 PROCEDURE — 99223 1ST HOSP IP/OBS HIGH 75: CPT | Mod: ,,, | Performed by: INTERNAL MEDICINE

## 2018-12-13 PROCEDURE — 11000001 HC ACUTE MED/SURG PRIVATE ROOM

## 2018-12-13 PROCEDURE — 63600175 PHARM REV CODE 636 W HCPCS: Performed by: PHYSICIAN ASSISTANT

## 2018-12-13 PROCEDURE — 84100 ASSAY OF PHOSPHORUS: CPT

## 2018-12-13 PROCEDURE — 85025 COMPLETE CBC W/AUTO DIFF WBC: CPT

## 2018-12-13 PROCEDURE — 80202 ASSAY OF VANCOMYCIN: CPT

## 2018-12-13 PROCEDURE — 63600175 PHARM REV CODE 636 W HCPCS: Performed by: HOSPITALIST

## 2018-12-13 PROCEDURE — 83735 ASSAY OF MAGNESIUM: CPT

## 2018-12-13 PROCEDURE — 93010 ELECTROCARDIOGRAM REPORT: CPT | Mod: ,,, | Performed by: INTERNAL MEDICINE

## 2018-12-13 PROCEDURE — 36415 COLL VENOUS BLD VENIPUNCTURE: CPT

## 2018-12-13 PROCEDURE — 93005 ELECTROCARDIOGRAM TRACING: CPT

## 2018-12-13 PROCEDURE — 80053 COMPREHEN METABOLIC PANEL: CPT

## 2018-12-13 PROCEDURE — 25000242 PHARM REV CODE 250 ALT 637 W/ HCPCS: Performed by: HOSPITALIST

## 2018-12-13 PROCEDURE — 81001 URINALYSIS AUTO W/SCOPE: CPT

## 2018-12-13 RX ORDER — GABAPENTIN 300 MG/1
300 CAPSULE ORAL 3 TIMES DAILY
Status: DISCONTINUED | OUTPATIENT
Start: 2018-12-13 | End: 2018-12-15

## 2018-12-13 RX ADMIN — AMLODIPINE BESYLATE 5 MG: 5 TABLET ORAL at 08:12

## 2018-12-13 RX ADMIN — FAMOTIDINE 20 MG: 20 TABLET ORAL at 08:12

## 2018-12-13 RX ADMIN — CLOPIDOGREL 75 MG: 75 TABLET, FILM COATED ORAL at 08:12

## 2018-12-13 RX ADMIN — ASPIRIN 81 MG: 81 TABLET, COATED ORAL at 08:12

## 2018-12-13 RX ADMIN — ATORVASTATIN CALCIUM 40 MG: 20 TABLET, FILM COATED ORAL at 08:12

## 2018-12-13 RX ADMIN — LEVOTHYROXINE SODIUM 75 MCG: 75 TABLET ORAL at 06:12

## 2018-12-13 RX ADMIN — CETIRIZINE HYDROCHLORIDE 10 MG: 10 TABLET, FILM COATED ORAL at 08:12

## 2018-12-13 RX ADMIN — CEFTRIAXONE 2 G: 2 INJECTION, SOLUTION INTRAVENOUS at 12:12

## 2018-12-13 RX ADMIN — FLUTICASONE FUROATE AND VILANTEROL TRIFENATATE 1 PUFF: 100; 25 POWDER RESPIRATORY (INHALATION) at 10:12

## 2018-12-13 RX ADMIN — VANCOMYCIN HYDROCHLORIDE 1500 MG: 10 INJECTION, POWDER, LYOPHILIZED, FOR SOLUTION INTRAVENOUS at 06:12

## 2018-12-13 RX ADMIN — FAMOTIDINE 20 MG: 20 TABLET ORAL at 09:12

## 2018-12-13 RX ADMIN — LOSARTAN POTASSIUM 100 MG: 50 TABLET, FILM COATED ORAL at 08:12

## 2018-12-13 RX ADMIN — ALLOPURINOL 300 MG: 300 TABLET ORAL at 08:12

## 2018-12-13 RX ADMIN — IOHEXOL 100 ML: 350 INJECTION, SOLUTION INTRAVENOUS at 03:12

## 2018-12-13 RX ADMIN — GABAPENTIN 300 MG: 300 CAPSULE ORAL at 09:12

## 2018-12-13 RX ADMIN — SODIUM CHLORIDE, SODIUM LACTATE, POTASSIUM CHLORIDE, AND CALCIUM CHLORIDE 500 ML: .6; .31; .03; .02 INJECTION, SOLUTION INTRAVENOUS at 12:12

## 2018-12-13 RX ADMIN — POLYETHYLENE GLYCOL 3350 17 G: 17 POWDER, FOR SOLUTION ORAL at 08:12

## 2018-12-13 RX ADMIN — ENOXAPARIN SODIUM 40 MG: 100 INJECTION SUBCUTANEOUS at 06:12

## 2018-12-13 RX ADMIN — METOPROLOL SUCCINATE 50 MG: 50 TABLET, EXTENDED RELEASE ORAL at 08:12

## 2018-12-13 NOTE — ASSESSMENT & PLAN NOTE
--RCRI of 2 points, which is a 6.6% risk of MACE  --recent stent placed 07/2018. Would recommend that if at all possible, to try to perform the surgery on ASA and plavix  --if absolutely necessary, can hold the plavix for surgery, but please start as soon as safely able to from a surgical perspective. The aspirin should be continued through the whole perioperative stage.  --can proceed with the surgery from a cardiovascular standpoint without any further cardiac testing.

## 2018-12-13 NOTE — SUBJECTIVE & OBJECTIVE
Interval History:     Ms. Marino felt well this morning, denying chest pain and shortness of breath.     Her MRI last night showed abscess and osteomyelitis extending proximally above her ankle prompting orthopedic surgery consultation. She is on DAPT following OSMEL to bifurcation lesion in July, for which cardiology has been consulted in anticipation of upcoming surgery. Added rocephin at ID's recommendation. CT scan in process.    Review of Systems   Constitutional: Negative for chills and fever.   Respiratory: Negative for cough and shortness of breath.    Cardiovascular: Positive for leg swelling. Negative for chest pain.   Gastrointestinal: Negative for abdominal pain, constipation, diarrhea, nausea and vomiting.   Genitourinary: Negative for difficulty urinating.   Musculoskeletal: Negative for myalgias.   Skin: Positive for rash and wound.   Neurological: Negative for weakness and numbness.   Psychiatric/Behavioral: Negative for dysphoric mood. The patient is not nervous/anxious.      Objective:     Vital Signs (Most Recent):  Temp: 97.6 °F (36.4 °C) (12/13/18 1218)  Pulse: 90 (12/13/18 1218)  Resp: 17 (12/13/18 1218)  BP: (!) 119/53 (12/13/18 1218)  SpO2: 97 % (12/13/18 1218) Vital Signs (24h Range):  Temp:  [97.2 °F (36.2 °C)-98.6 °F (37 °C)] 97.6 °F (36.4 °C)  Pulse:  [] 90  Resp:  [16-19] 17  SpO2:  [94 %-100 %] 97 %  BP: (119-151)/(53-76) 119/53     Weight: 106.6 kg (235 lb 0.2 oz)  Body mass index is 39.11 kg/m².    Intake/Output Summary (Last 24 hours) at 12/13/2018 1407  Last data filed at 12/13/2018 0636  Gross per 24 hour   Intake 300 ml   Output --   Net 300 ml      Physical Exam   Constitutional: She is oriented to person, place, and time. No distress.   Eyes: Pupils are equal, round, and reactive to light.   Cardiovascular: Normal rate and regular rhythm.   No murmur heard.  Pulmonary/Chest: Effort normal and breath sounds normal. No respiratory distress. She has no wheezes.   Abdominal:  "Soft. Bowel sounds are normal. She exhibits no distension. There is no tenderness.   Musculoskeletal: She exhibits edema (bilateral lower extremities worse on the left). She exhibits no tenderness.   Neurological: She is alert and oriented to person, place, and time. She displays normal reflexes. No cranial nerve deficit.   Skin: Skin is warm and dry. She is not diaphoretic. There is erythema (LLE erythema, appears improved relative to admission borders).   Large ulceration present at lateral aspect of left foot with surrounding erythema extending up leg proximally, appears improved relative to border marking from last night   Psychiatric: She has a normal mood and affect. Her behavior is normal.       Significant Labs:     CBC:  Recent Labs   Lab 12/12/18 1852 12/13/18  0340   WBC 10.72 7.65   GRAN 83.6*  9.0* 82.2*  6.3   HGB 8.8* 7.1*   HCT 27.0* 23.2*    249       Chem 10:  Recent Labs   Lab 12/12/18 1852 12/13/18  0340    137   K 4.4 4.0   CL 97 104   CO2 28 25   BUN 41* 35*   CREATININE 0.9 0.7   * 207*   CALCIUM 8.9 8.3*   MG  --  1.6   PHOS  --  3.1       LFTs:  Recent Labs   Lab 12/12/18 1852 12/13/18  0340   ALKPHOS 224* 173*   BILITOT 0.3 0.4   AST 21 16   ALT 35 28   ALBUMIN 1.8* 1.5*       Significant Imaging:     MRI left ankle with and without contrast  "Findings concerning for osteomyelitis of the hindfoot including the distal tibia and tarsal bones with associated cellulitis, myositis, and multiloculated abscesses in the deep soft tissues contiguous with lateral distal foreleg ulceration.  Component of abscess extends cranially outside the field of view, possibly more proximal portions of the left lower extremity." - per interpreting radiologist    X-ray tib fib left  "Status post total knee arthroplasty without evidence for complication.  Degenerative changes are noted at the ankle.  Vascular calcifications are present.  There is soft tissue gas at the lateral aspect of the " "ankle." - per interpreting radiologist  "

## 2018-12-13 NOTE — ASSESSMENT & PLAN NOTE
- Stable  - Continue toprol, losartan, and norvasc  - Will restart lasix as indicated, likely tomorrow upon completion of her IV contrasted studies

## 2018-12-13 NOTE — ASSESSMENT & PLAN NOTE
- Stable, but likely contributing to poor wound healing and increased infection risk  - Previously evaluated in an outpatient setting with recommendations for stent placement  - Limited but need for DAPT  - Continue cardiac regimen with asa, plavix, and lipitor

## 2018-12-13 NOTE — H&P
Hospital Medicine  History and Physical      Patient Name: Krissy Marino  MRN:  [id number]  Hospital Medicine Team: [facility]OS[facility] O Kaleigh Guerrero MD  Date of Admission:  12/12/2018     Principal Problem:  Cellulitis of left lower extremity   Primary Care Physician: Emily Mcpherson MD       History of Present Illness:    Ms. Krissy Marino is a 78 y.o. female with type 2 diabetes, CAD status post PCI (7/2018), and peripheral vascular disease who presents to the emergency department from [facility] for evaluation of left lower extremity cellulitis.  She was just admitted to [facility] from 11/26-12/5 for left lower extremity cellulitis s/p a left ankle sprain, where she was found to have group B strep bacteremia.  During that admission, a PICC line was placed and she was discharged on Ceftriaxone to complete a 14 day course that ended on 12/11.  Since her discharge, family mentions that her left lower extremity cellulitis has not been improving.  They endorse persistent erythema as well as occasional yellow drainage from the left lateral malleolus.  At baseline, the patient is fairly bed-bound and spends most of her time with her lower extremities dangling down.  Labs were done at her facility on 12/07 which showed a white blood cell count 20.  She was told to come to the emergency department for further evaluation.  She continues to endorse pain in her left as well as pain with movement.  She has never had any debridement of her wound, but mentions that it has been cleaned with Clorox by Wound Care.  She denies any fevers or chills.  She does endorse constipation which she attributes to her pain medication.  Of note, the patient was told that because she has such severe peripheral vascular disease, she would require stent placement.  However, because of her recent cardiac stent placement, she is unable to get leg stents placed for at least 1 year, which will continue to prevent good wound  healing.    In the emergency department, labs were notable for a white blood cell count 10, sed rate 56, and CRP 67.  X-rays were ordered which showed edema, but no cellulitis.  She was given IV vancomycin and was admitted to Hospital Medicine for further management.      Review of Systems:  Constitutional: Negative for chills, fatigue, fever.   HENT: Negative for sore throat, trouble swallowing.    Eyes: Negative for photophobia, visual disturbance.   Respiratory: Negative for cough, shortness of breath.    Cardiovascular: Negative for chest pain, palpitations, leg swelling.   Gastrointestinal: + constipation  Endocrine: Negative for cold intolerance, heat intolerance.   Genitourinary: Negative for dysuria, frequency.   Musculoskeletal: + ankle pain  Skin: + erythema  Neurological: Negative for dizziness, syncope, weakness, light-headedness.   Psychiatric/Behavioral: Negative for confusion, hallucinations, anxiety  All other systems reviewed and are negative.      Past Medical History: Patient has a past medical history of Adverse effect of glucocorticoid or synthetic analogue, Allergy, Arthritis, Cancer, CHF (congestive heart failure), Chronic kidney disease, Coronary artery disease involving native coronary artery of native heart without angina pectoris (10/11/2016), Diabetic neuropathy (10/6/2014), Giant cell arteritis (9/24/2012), Hyperlipidemia, Hypertension, Hypothyroid, Obesity (BMI 30-39.9) (10/6/2014), Osteopenia, Primary osteoarthritis of both knees (9/24/2012), and Type II or unspecified type diabetes mellitus with renal manifestations, uncontrolled(250.42).    Past Surgical History: Patient has a past surgical history that includes Appendectomy; Tonsillectomy; Cholecystectomy; Hysterectomy; Cataract extraction, bilateral; Joint replacement; Skin biopsy; Eye surgery; Cataract extraction; STRIPPING-TARSAL (Bilateral, 3/7/2018); and COLONOSCOPY (N/A, 12/27/2013).    Social History: Patient reports that   has never smoked. she has never used smokeless tobacco. She reports that she does not drink alcohol or use drugs.    Family History: Patient's family history includes Diabetes in her mother; Hypertension in her father and mother.    Medications: Scheduled Meds:   [START ON 12/13/2018] allopurinol  300 mg Oral Daily    [START ON 12/13/2018] amLODIPine  5 mg Oral Daily    [START ON 12/13/2018] aspirin  81 mg Oral Daily    [START ON 12/13/2018] atorvastatin  40 mg Oral Daily    [START ON 12/13/2018] cetirizine  10 mg Oral Daily    [START ON 12/13/2018] clopidogrel  75 mg Oral Daily    enoxaparin  40 mg Subcutaneous Daily    famotidine  20 mg Oral BID    [START ON 12/13/2018] fluticasone-vilanterol  1 puff Inhalation Daily    [START ON 12/13/2018] levothyroxine  75 mcg Oral Before breakfast    [START ON 12/13/2018] losartan  100 mg Oral Daily    [START ON 12/13/2018] metoprolol succinate  50 mg Oral Daily    morphine  2 mg Intravenous ED 1 Time    [START ON 12/13/2018] polyethylene glycol  17 g Oral Daily    [START ON 12/13/2018] vancomycin (VANCOCIN) IVPB  15 mg/kg Intravenous Q12H    vancomycin (VANCOCIN) IVPB  2,000 mg Intravenous ED 1 Time     Continuous Infusions:  PRN Meds:.acetaminophen, dextrose 50%, dextrose 50%, glucagon (human recombinant), glucose, glucose, insulin aspart U-100, ondansetron, ramelteon, senna-docusate 8.6-50 mg, sodium chloride 0.9%    Allergies: Patient is allergic to sulfamethoxazole-trimethoprim; latex, natural rubber; and pcn [penicillins].      Physical Exam:    Temp:  [98.6 °F (37 °C)]   Pulse:  [105-118]   Resp:  [19]   BP: (131-136)/(60-76)   SpO2:  [97 %-100 %]     Constitutional: Appears well-developed and well-nourished. Obese  Head: Normocephalic and atraumatic.   Mouth/Throat: Oropharynx is clear and moist.   Eyes: EOM are normal. Pupils are equal, round, and reactive to light. No scleral icterus.   Neck: Normal range of motion. Neck supple.   Cardiovascular:  Normal heart rate.  Regular heart rhythm.  No murmur heard.  Pulmonary/Chest: Effort normal and breath sounds normal. No respiratory distress. No wheezes, rales, or rhonchi  Abdominal: Soft. Bowel sounds are normal.  No distension.  No tenderness  Musculoskeletal: Left ankle with erythema and swelling around the ankle  Neurological: Alert and oriented to person, place, and time.   Skin: Skin is warm and dry. LLE with wound pictured below with erythema  Psychiatric: Normal mood and affect. Behavior is normal.                       No intake or output data in the 24 hours ending 12/12/18 2014  Recent Labs   Lab 12/12/18 1852   WBC 10.72   HGB 8.8*   HCT 27.0*        Recent Labs   Lab 12/12/18 1852      K 4.4   CL 97   CO2 28   BUN 41*   CREATININE 0.9   *   CALCIUM 8.9     Recent Labs   Lab 12/12/18 1852   ALKPHOS 224*   ALT 35   AST 21   ALBUMIN 1.8*   PROT 5.9*   BILITOT 0.3      No results for input(s): POCTGLUCOSE in the last 168 hours.  No results for input(s): CPK, CPKMB, MB, TROPONINI in the last 72 hours.    Recent Labs     12/12/18 1852   LACTATE 1.7          Assessment and Plan:    Ms. Krissy Marino is a 78 y.o. female who presented to [facility] on 12/12/2018 with LE cellulitis.    Cellulitis of LLE  Sepsis 2/2 Cellulitis  · Reportedly with a WBC 20 5 days prior to admission (12/7)  · 2/4 SIRS criteria:  Tachycardia without leukocytosis but with a left shift, ESR 56 and CRP 67  · XR negative for osteo with findings suggestive of edema  · Check MRI ankle to evaluate for possible abscess/underlying osteo  · Possible that PVD is causing poor wound healing.  Podiatry consulted for wound debridement  · ID consulted for antibiotic recs.  Continue IV Vanc for now - as she is at risk for MRSA given hospitalization and SNF placement, as well as the fact that she was on Ceftriaxone with no improvement in her wound.  · Blood cx 12/12 NGTD    BLE Edema  · Chronic issue  · Hold Lasix for now with  IVF resuscitation with sepsis  · Encourage leg elevation to prevent fluid pooling    CAD s/p PCI (7/2018)  · No acute issues  · Continue Aspirin 81mg PO daily  · Continue Plavix 75mg PO daily  · Continue Toprol 50mg PO daily  · Continue Losartan 100mg PO daily  · Continue Lipitor 40mg PO daily    Essential HTN  · Chronic and stable  · Continue Toprol 50mg PO daily  · Continue Losartan 100mg PO daily  · Continue Norvasc 5mg PO daily - Can consider switching to another medication given BLE edema  · Holding Lasix with IVF as above    Chronic Diastolic Heart Failure  · 2D echo July 2018 with EF 50% and diastolic heart failure  · Continue Aspirin 81mg PO daily  · Continue Toprol 50mg PO daily  · Continue Losartan 100mg PO daily  · Holding Lasix with IVF as above    PAD  · No acute issues  · Needing stents - but unable to stop DAPT 2/2 recent CAD with PCI  · Likely contributing to poor wound healing  · Continue Aspirin 81mg PO daily  · Continue Plavix 75mg PO daily  · Continue Lipitor 40mg PO daily    Type 2 DM without Long Term Insulin Use  · HbA1c 6.7 in May 2018, will repeat  · Home DM regimen:  Glipizde 2.5mg PO daily  · SSI with POCT accuchecks and Diabetic diet    Hypothyroidism  · Chronic and stable  · Continue Synthroid 75mcg PO daily    Dyslipidemia  · Continue Lipitor 40mg PO daily    Obesity  · Body mass index is 38.12 kg/m².  · Encourage diet, weight loss, exercise  · Limited by PVD and cellulitis     Diet:  Diabetic  GI PPx:  Not indicated  DVT PPx:  Lovenox  Goals of Care:  Full    High Risk Conditions:   Patient is currently on drug therapy requiring intensive monitoring for toxicity: Vancomycin       Disposition:  Likely return to [place]  Discharge Needs:  TBD      Kaleigh Guerrero MD  [facility] Medicine  Cell:  [phone]  Spectra:  [phone]  Pager:  [phone]

## 2018-12-13 NOTE — ED NOTES
LOC: The patient is awake, alert and aware of environment with an appropriate affect, the patient is oriented x 3 and speaking appropriately.  APPEARANCE: Patient resting comfortably and in no acute distress, patient is clean and well groomed, patient's clothing is properly fastened.  RESPIRATORY: Airway is open and patent, respirations are spontaneous, patient has a normal effort and rate, no accessory muscle use noted  ABDOMEN: Soft and non tender to palpation, no distention noted  NEUROLOGIC:  facial expression is symmetrical, patient moving all extremities spontaneously, normal sensation in all extremities when touched with a finger.  Follows all commands appropriately.    Patient has been at a skilled nursing rehab since thurs on iv antibiotics, patient has open would to left outer ankle due to celulitis, wound has been draining for several days and redness has seemed to resolve but not wound bed is becoming darker, noted to have slough covering the entire wound bed with a small quarter size area in the middle becoming black, cardiac monitoring in progress, will continue to monitor

## 2018-12-13 NOTE — PROGRESS NOTES
[facility] Hospital Medicine  Progress Note    Patient Name: Krissy Marino  MRN: [id number]  Patient Class: IP- Inpatient   Admission Date: 12/12/2018  Length of Stay: 1 days  Attending Physician: Dc Liao MD  Primary Care Provider: Emily Mcpherson MD    [facility] Medicine Team: [facility] HOSP MED O Dc Liao MD    Subjective:     Principal Problem:Osteomyelitis of left tibia    HPI:  Ms. Krissy Marino is a 78 y.o. female with type 2 diabetes, CAD status post PCI (7/2018), and peripheral vascular disease who presents to the emergency department from [facility] for evaluation of left lower extremity cellulitis.  She was just admitted to [facility] from 11/26-12/5 for left lower extremity cellulitis s/p a left ankle sprain, where she was found to have group B strep bacteremia.  During that admission, a PICC line was placed and she was discharged on Ceftriaxone to complete a 14 day course that ended on 12/11.  Since her discharge, family mentions that her left lower extremity cellulitis has not been improving.  They endorse persistent erythema as well as occasional yellow drainage from the left lateral malleolus.  At baseline, the patient is fairly bed-bound and spends most of her time with her lower extremities dangling down.  Labs were done at her facility on 12/07 which showed a white blood cell count 20.  She was told to come to the emergency department for further evaluation.  She continues to endorse pain in her left as well as pain with movement.  She has never had any debridement of her wound, but mentions that it has been cleaned with Clorox by [facility].  She denies any fevers or chills.  She does endorse constipation which she attributes to her pain medication.  Of note, the patient was told that because she has such severe peripheral vascular disease, she would require stent placement.  However, because of her recent cardiac stent placement, she is  unable to get leg stents placed for at least 1 year, which will continue to prevent good wound healing.     In the emergency department, labs were notable for a white blood cell count 10, sed rate 56, and CRP 67.  X-rays were ordered which showed edema, but no cellulitis.  She was given IV vancomycin and was admitted to Hospital Medicine for further management.    Hospital Course:  12/12/2018: Admitted to , started on vanc, MRI ordered along with ID and podiatry consultation  12/13/2018: MRI showed osteo, abscess. ID recommended adding CTX. Podiatry deferring to ortho. Brought on cardiology given DAPT and high risk lesion    Interval History:     Ms. Marino felt well this morning, denying chest pain and shortness of breath.     Her MRI last night showed abscess and osteomyelitis extending proximally above her ankle prompting orthopedic surgery consultation. She is on DAPT following OSMEL to bifurcation lesion in July, for which cardiology has been consulted in anticipation of upcoming surgery. Added rocephin at ID's recommendation. CT scan in process.    Review of Systems   Constitutional: Negative for chills and fever.   Respiratory: Negative for cough and shortness of breath.    Cardiovascular: Positive for leg swelling. Negative for chest pain.   Gastrointestinal: Negative for abdominal pain, constipation, diarrhea, nausea and vomiting.   Genitourinary: Negative for difficulty urinating.   Musculoskeletal: Negative for myalgias.   Skin: Positive for rash and wound.   Neurological: Negative for weakness and numbness.   Psychiatric/Behavioral: Negative for dysphoric mood. The patient is not nervous/anxious.      Objective:     Vital Signs (Most Recent):  Temp: 97.6 °F (36.4 °C) (12/13/18 1218)  Pulse: 90 (12/13/18 1218)  Resp: 17 (12/13/18 1218)  BP: (!) 119/53 (12/13/18 1218)  SpO2: 97 % (12/13/18 1218) Vital Signs (24h Range):  Temp:  [97.2 °F (36.2 °C)-98.6 °F (37 °C)] 97.6 °F (36.4 °C)  Pulse:  []  90  Resp:  [16-19] 17  SpO2:  [94 %-100 %] 97 %  BP: (119-151)/(53-76) 119/53     Weight: 106.6 kg (235 lb 0.2 oz)  Body mass index is 39.11 kg/m².    Intake/Output Summary (Last 24 hours) at 12/13/2018 1407  Last data filed at 12/13/2018 0636  Gross per 24 hour   Intake 300 ml   Output --   Net 300 ml      Physical Exam   Constitutional: She is oriented to person, place, and time. No distress.   Eyes: Pupils are equal, round, and reactive to light.   Cardiovascular: Normal rate and regular rhythm.   No murmur heard.  Pulmonary/Chest: Effort normal and breath sounds normal. No respiratory distress. She has no wheezes.   Abdominal: Soft. Bowel sounds are normal. She exhibits no distension. There is no tenderness.   Musculoskeletal: She exhibits edema (bilateral lower extremities worse on the left). She exhibits no tenderness.   Neurological: She is alert and oriented to person, place, and time. She displays normal reflexes. No cranial nerve deficit.   Skin: Skin is warm and dry. She is not diaphoretic. There is erythema (LLE erythema, appears improved relative to admission borders).   Large ulceration present at lateral aspect of left foot with surrounding erythema extending up leg proximally, appears improved relative to border marking from last night   Psychiatric: She has a normal mood and affect. Her behavior is normal.       Significant Labs:     CBC:  Recent Labs   Lab 12/12/18 1852 12/13/18  0340   WBC 10.72 7.65   GRAN 83.6*  9.0* 82.2*  6.3   HGB 8.8* 7.1*   HCT 27.0* 23.2*    249       Chem 10:  Recent Labs   Lab 12/12/18  1852 12/13/18  0340    137   K 4.4 4.0   CL 97 104   CO2 28 25   BUN 41* 35*   CREATININE 0.9 0.7   * 207*   CALCIUM 8.9 8.3*   MG  --  1.6   PHOS  --  3.1       LFTs:  Recent Labs   Lab 12/12/18 1852 12/13/18  0340   ALKPHOS 224* 173*   BILITOT 0.3 0.4   AST 21 16   ALT 35 28   ALBUMIN 1.8* 1.5*       Significant Imaging:     MRI left ankle with and without  "contrast  "Findings concerning for osteomyelitis of the hindfoot including the distal tibia and tarsal bones with associated cellulitis, myositis, and multiloculated abscesses in the deep soft tissues contiguous with lateral distal foreleg ulceration.  Component of abscess extends cranially outside the field of view, possibly more proximal portions of the left lower extremity." - per interpreting radiologist    X-ray tib fib left  "Status post total knee arthroplasty without evidence for complication.  Degenerative changes are noted at the ankle.  Vascular calcifications are present.  There is soft tissue gas at the lateral aspect of the ankle." - per interpreting radiologist    Assessment/Plan:      * Osteomyelitis of left tibia      - Complicated by abscess formation and overlying cellulitis  - Demonstrated on MRI 12/13, which showed "osteomyelitis of the hindfoot including the distal tibia and tarsal bones with associated cellulitis, myositis, and multiloculated abscesses in the deep soft tissues"  - No leukocytosis. ESR and CRP both elevated  - Ortho consulted, appreciate assistance. NPO.  - f/u CT in process  - Consulted cardiology regarding DAPT in anticipation of surgery  - Blood cultures NGTD, hemodynamically stable  - Continue vanc. Rocephin added at ID's recommendation    Appreciate assistance from multiple consulting services     Cellulitis of left lower extremity      - Erythema improving on vanc; CTX added  - See above for more details     Coronary artery disease involving native coronary artery of native heart with unstable angina pectoris      - Asymptomatic  - Underwent LHC and subsequent PCI in 7/2018:    · "Bifurcation of the LAD:  The lesion was successfully intervened. Post-stenosis of 0%, post-DONNY 3 flow and TMP grade 3. The vessel was accessed natively.  The following items were used: 2.5MM 12MM [place] Balloon.  · D1:  The lesion was successfully intervened. Post-stenosis of 0%, post-DONNY 3 flow " "and TMP grade 3. The vessel was accessed natively.  The following items were used: 2.5MM 15MM [place] Balloon and Stent Resolute Rx 2.50x14 (OSMEL)."    - Given OSMEL placement within 12 months and high risk lesion, consulted cardiology for recommendations regarding perioperative DAPT  - Otherwise continue aspirin, plavix, toprol, losartan, and lipitor  - Diabetic + cardaic diet when not NPO     Peripheral arterial disease      - Stable, but likely contributing to poor wound healing and increased infection risk  - Previously evaluated in an outpatient setting with recommendations for stent placement  - Limited but need for DAPT  - Continue cardiac regimen with asa, plavix, and lipitor     Chronic combined systolic and diastolic heart failure      - Stable, clear chest examination though with bilateral lower extremity edema  - Most recent TTE, a stress echo in July 2018, showed LVEF 50% and grade I diastolic dysfunction  - Continue asa, plavix, toprol, losartan, and lipitor  - Anticipate resuming lasix tomorrow  - Goal K 4-5 and Mg 2-3     Obesity (BMI 30-39.9)      - Stable  - Body mass index is 38.12 kg/m².  - Encourage diet, weight loss, exercise  - Limited by PVD and cellulitis     Bilateral leg edema      - Stable  - Will tread underlying causes: cellulitis, OM, and CHF  - Anticipate resuming lasix tomorrow     Dyslipidemia      - Stable  - Continue home lipitor     Hypothyroidism      - Stable  - Continue home synthroid     Hypertension      - Stable  - Continue toprol, losartan, and norvasc  - Will restart lasix as indicated, likely tomorrow upon completion of her IV contrasted studies     Type 2 diabetes mellitus, without long-term current use of insulin      - Stable  - HbA1c 6.7 in May  - Will hold home oral antihyperglycemic agents in favor of low-dose aspart SSI + POCT glucose checks  - Diabetic + cardaic diet when not NPO       VTE Risk Mitigation (From admission, onward)        Ordered     enoxaparin injection " 40 mg  Daily      12/12/18 1952     IP VTE HIGH RISK PATIENT  Once      12/12/18 1952              Dc Liao MD  Department of Hospital Medicine   [facility]

## 2018-12-13 NOTE — CONSULTS
[facility]  Cardiology  Consult Note    Patient Name: Krissy Marino  MRN: [id number]  Admission Date: 12/12/2018  Hospital Length of Stay: 1 days  Code Status: Full Code   Attending Provider: Dc Liao MD   Consulting Provider: Reanna Avina MD  Primary Care Physician: Emily Mcpherson MD  Principal Problem:Osteomyelitis of left tibia    Patient information was obtained from patient, past medical records and ER records.     Inpatient consult to Cardiology  Consult performed by: Reanna Avina MD  Consult ordered by: Dc Liao MD  Reason for consult: Perioperative antiplatelet management         Subjective:     Chief Complaint: Perioperative antiplatelet management     HPI:   Ms. Krissy Marino is a 78 y.o. female with  DM2, CAD s/p PCI ( pD1 7/2018), and  PVD who was transferred to [facility] for LLE cellulitis that has progressed to osteomyelitis. She is currently on antibiotics and orthopedics is debating between and I&D or an amputation. Cardiology has been consulted for preoperative evaluation. The patient stated that after the stent, she has not had any further chest discomfort, no SOB, and no orthopnea or PND. She stated that she has been feeling well and is compliant with her antiplatelet agents.      Past Medical History:   Diagnosis Date    Adverse effect of glucocorticoid or synthetic analogue     Allergy     Arthritis     Cancer     CHF (congestive heart failure)     Chronic kidney disease     Coronary artery disease involving native coronary artery of native heart without angina pectoris 10/11/2016    Diabetic neuropathy 10/6/2014    Giant cell arteritis 9/24/2012    Hyperlipidemia     Hypertension     Hypothyroid     Obesity (BMI 30-39.9) 10/6/2014    Osteopenia     Primary osteoarthritis of both knees 9/24/2012    Type II or unspecified type diabetes mellitus with renal manifestations, uncontrolled(250.42)        Past Surgical History:   Procedure Laterality Date     APPENDECTOMY      CATARACT EXTRACTION      CATARACT EXTRACTION, BILATERAL      CHOLECYSTECTOMY      COLONOSCOPY N/A 12/27/2013    Performed by Shamar Sow MD at [facility] ENDO (4TH FLR)    EYE SURGERY      HYSTERECTOMY      JOINT REPLACEMENT      bilateral total knee    SKIN BIOPSY      STRIPPING-TARSAL Bilateral 3/7/2018    Performed by Anastasia Nieto MD at [facility] OR 2ND FLR    TONSILLECTOMY         Review of patient's allergies indicates:   Allergen Reactions    Sulfamethoxazole-trimethoprim Nausea And Vomiting    Latex, natural rubber Rash    Pcn [penicillins] Rash       No current facility-administered medications on file prior to encounter.      Current Outpatient Medications on File Prior to Encounter   Medication Sig    acetaminophen (TYLENOL) 325 MG tablet Take 2 tablets (650 mg total) by mouth every 6 (six) hours as needed.    albuterol 90 mcg/actuation inhaler Inhale 2 puffs into the lungs every 6 (six) hours as needed for Wheezing.    alendronate (FOSAMAX) 70 MG tablet Take 1 tablet (70 mg total) by mouth every 7 days.    allopurinol (ZYLOPRIM) 300 MG tablet TAKE 1 TABLET(300 MG) BY MOUTH EVERY DAY    amLODIPine (NORVASC) 5 MG tablet Take 1 tablet (5 mg total) by mouth once daily.    aspirin (ECOTRIN) 81 MG EC tablet Take 1 tablet (81 mg total) by mouth once daily.    atorvastatin (LIPITOR) 40 MG tablet TAKE 1 TABLET(40 MG) BY MOUTH EVERY DAY    azelastine (ASTELIN) 137 mcg (0.1 %) nasal spray USE 1 SPRAY(137 MCG) IN EACH NOSTRIL TWICE DAILY    blood sugar diagnostic (ACCU-CHEK AMELIA PLUS TEST STRP) Strp Checks bg 2 x day before meals    cetirizine (ZYRTEC) 10 MG tablet Take 1 tablet (10 mg total) by mouth once daily.    clopidogrel (PLAVIX) 75 mg tablet Take 1 tablet (75 mg total) by mouth once daily.    collagenase (SANTYL) ointment Apply topically once daily.    FERROUS GLUCONATE (FERATE ORAL) Take by mouth once daily at 6am.     furosemide (LASIX) 40 MG tablet Take 1 tablet (40  mg total) by mouth 2 (two) times daily.    glipiZIDE (GLUCOTROL) 2.5 MG TR24 Take 1 tablet (2.5 mg total) by mouth daily with breakfast.    HYDROcodone-acetaminophen (NORCO) 5-325 mg per tablet Take 1 tablet by mouth every 8 (eight) hours as needed for Pain. (Patient taking differently: Take 1 tablet by mouth every 6 (six) hours as needed for Pain. )    hydrocortisone butyrate (LOCOID) 0.1 % Crea cream AAA buttock bid    KRILL/OM3/DHA/EPA/OM6/LIP/ASTX (KRILL OIL, OMEGA 3 & 6, ORAL) Take by mouth once daily at 6am.     levothyroxine (SYNTHROID) 75 MCG tablet Take 1 tablet (75 mcg total) by mouth before breakfast.    losartan (COZAAR) 100 MG tablet Take 1 tablet (100 mg total) by mouth once daily.    magnesium oxide (MAG-OX) 400 mg tablet Take 1 tablet (400 mg total) by mouth once daily.    metoprolol succinate (TOPROL-XL) 50 MG 24 hr tablet Take 1 tablet (50 mg total) by mouth once daily.    miconazole NITRATE 2 % (ZEASORB AF) 2 % top powder Apply topically as needed for Itching.    ranitidine (ZANTAC) 150 MG tablet Take 1 tablet (150 mg total) by mouth 2 (two) times daily as needed for Heartburn.    SYMBICORT 160-4.5 mcg/actuation HFAA INHALE TWO PUFFS INTO THE LUNGS EVERY 12 HOURS    loperamide (IMODIUM) 2 mg capsule Take 2 mg by mouth 4 (four) times daily as needed for Diarrhea.     Family History     Problem Relation (Age of Onset)    Diabetes Mother    Hypertension Mother, Father        Tobacco Use    Smoking status: Never Smoker    Smokeless tobacco: Never Used   Substance and Sexual Activity    Alcohol use: No    Drug use: No    Sexual activity: No     Review of Systems   Constitution: Negative for chills, fever and malaise/fatigue.   HENT: Negative.    Cardiovascular: Positive for leg swelling. Negative for chest pain, dyspnea on exertion, irregular heartbeat, orthopnea, palpitations and paroxysmal nocturnal dyspnea.   Respiratory: Negative for shortness of breath and wheezing.    Skin:  Negative for color change and rash.   Musculoskeletal: Negative for arthritis, back pain and myalgias.   Gastrointestinal: Negative for abdominal pain, constipation, diarrhea and nausea.   Neurological: Negative for dizziness, numbness and paresthesias.   Psychiatric/Behavioral: Negative.      Objective:     Vital Signs (Most Recent):  Temp: 97.7 °F (36.5 °C) (12/13/18 1609)  Pulse: 90 (12/13/18 1609)  Resp: 16 (12/13/18 1609)  BP: (!) 140/60 (12/13/18 1609)  SpO2: 98 % (12/13/18 1609) Vital Signs (24h Range):  Temp:  [97.2 °F (36.2 °C)-98.5 °F (36.9 °C)] 97.7 °F (36.5 °C)  Pulse:  [] 90  Resp:  [16-18] 16  SpO2:  [94 %-100 %] 98 %  BP: (119-151)/(53-70) 140/60     Weight: 106.6 kg (235 lb 0.2 oz)  Body mass index is 39.11 kg/m².    SpO2: 98 %  O2 Device (Oxygen Therapy): room air      Intake/Output Summary (Last 24 hours) at 12/13/2018 1749  Last data filed at 12/13/2018 0636  Gross per 24 hour   Intake 300 ml   Output --   Net 300 ml       Lines/Drains/Airways     Peripherally Inserted Central Catheter Line                 PICC Double Lumen 11/29/18 1332 right basilic 14 days          Peripheral Intravenous Line                 Peripheral IV - Single Lumen 12/12/18 1850 Antecubital less than 1 day                Physical Exam   Constitutional: Vital signs are normal. She appears well-developed and well-nourished. She is cooperative.  Non-toxic appearance. No distress.   HENT:   Head: Normocephalic and atraumatic.   Neck: No hepatojugular reflux and no JVD present. Carotid bruit is not present.   Cardiovascular: Normal rate, regular rhythm, S1 normal, S2 normal and normal heart sounds. Exam reveals no gallop.   No murmur heard.  Pulses:       Radial pulses are 2+ on the right side, and 2+ on the left side.        Dorsalis pedis pulses are 2+ on the right side, and 2+ on the left side.        Posterior tibial pulses are 2+ on the right side, and 2+ on the left side.   2+ lower extremity edema with signs of  chronic venous stasis.   Pulmonary/Chest: Effort normal and breath sounds normal. No respiratory distress. She has no decreased breath sounds. She has no wheezes. She has no rhonchi. She has no rales.   Abdominal: Soft. Normal appearance and bowel sounds are normal. She exhibits no distension and no mass. There is no tenderness.   Neurological: She is alert.   Skin: Skin is warm, dry and intact.       Significant Labs:   CMP   Recent Labs   Lab 12/12/18  1852 12/13/18  0340    137   K 4.4 4.0   CL 97 104   CO2 28 25   * 207*   BUN 41* 35*   CREATININE 0.9 0.7   CALCIUM 8.9 8.3*   PROT 5.9* 5.0*   ALBUMIN 1.8* 1.5*   BILITOT 0.3 0.4   ALKPHOS 224* 173*   AST 21 16   ALT 35 28   ANIONGAP 11 8   ESTGFRAFRICA >60.0 >60.0   EGFRNONAA >60.0 >60.0   , CBC   Recent Labs   Lab 12/12/18 1852 12/13/18  0340   WBC 10.72 7.65   HGB 8.8* 7.1*   HCT 27.0* 23.2*    249   , INR No results for input(s): INR, PROTIME in the last 48 hours. and Troponin No results for input(s): TROPONINI in the last 48 hours.    Significant Imaging:  EKG (12/12/2018): Sinus tachycardia    Exercise Stress Echo (07/11/2018):  CONCLUSIONS     1 - Mildly depressed left ventricular systolic function (EF 50-55%).     2 - Eccentric hypertrophy.     3 - Impaired LV relaxation, normal LAP (grade 1 diastolic dysfunction).     4 - Normal right ventricular systolic function .     Positive stress echocardiographic study demonstrating an ischemic response involving the anterior septum, anterolateral wall, apical septum, inferior wall, anterior wall. This may represent multivessel coronary disease or a nonischemic cardiomyopathy.    [facility] (07/20/2018):      Patient has a right dominant coronary artery.        The coronary vessels have luminal irregularities.        - Left Main Coronary Artery:             The LM has luminal irregularities. There is DONNY 3 flow.     - Left Anterior Descending Artery:             The proximal LAD is patent within the  stent. There is DONNY 3 flow. There is collateral flow from the RCA (faint).             The bifurcation of the LAD has luminal irregularities. There is DONNY 3 flow. There is collateral flow from the RCA (faint).             The mid LAD is patent within the stent. There is DONNY 3 flow. FFR was 0.78. There is collateral flow from the RCA (faint). The remaining portion of the vessel has luminal irregularities (10).     - D1:             The D1 has luminal irregularities. There is DONNY 3 flow.                     Lesion Details:   The length is 10mm. Bifurcation is present. The minimum luminal diameter is 0.6 millimeters. The reference vessel diameter is 2.5 millimeters.     - Left Circumflex Artery:             The distal LCX has a 70% stenosis. There is DONNY 3 flow. FFR was 0.82.     - Right Coronary Artery:             The RCA has luminal irregularities. There is DONNY 3 flow.      Intervention          Bifurcation of the LAD:              The lesion was successfully intervened. Post-stenosis of 0%, post-DONNY 3 flow and TMP grade 3. The vessel was accessed natively.  The following items were used: 2.5MM 12MM [place] Balloon.       D1:              The lesion was successfully intervened. Post-stenosis of 0%, post-DONNY 3 flow and TMP grade 3. The vessel was accessed natively.  The following items were used: 2.5MM 15MM [place] Balloon and Stent Resolute Rx 2.50x14 (OSMEL).      Assessment and Plan:     Preoperative cardiovascular examination    --RCRI of 2 points, which is a 6.6% risk of MACE  --recent stent placed 07/2018. Would recommend that if at all possible, to try to perform the surgery on ASA and plavix  --if absolutely necessary, can hold the plavix for surgery, but please start as soon as safely able to from a surgical perspective. The aspirin should be continued through the whole perioperative stage.  --can proceed with the surgery from a cardiovascular standpoint without any further cardiac testing.         VTE Risk  Mitigation (From admission, onward)        Ordered     enoxaparin injection 40 mg  Daily      12/12/18 1952     IP VTE HIGH RISK PATIENT  Once      12/12/18 1952          Thank you for your consult. I will sign off. Please contact us if you have any additional questions.    Reanna Avina MD  Cardiology   [facility]

## 2018-12-13 NOTE — ASSESSMENT & PLAN NOTE
- Stable  - Body mass index is 38.12 kg/m².  - Encourage diet, weight loss, exercise  - Limited by PVD and cellulitis

## 2018-12-13 NOTE — ASSESSMENT & PLAN NOTE
- Stable  - Will tread underlying causes: cellulitis, OM, and CHF  - Anticipate resuming lasix tomorrow

## 2018-12-13 NOTE — PLAN OF CARE
Problem: Fall Injury Risk  Goal: Absence of Fall and Fall-Related Injury    Intervention: Identify and Manage Contributors to Fall Injury Risk   12/13/18 1719   Manage Acute Allergic Reaction   Medication Review/Management medications reviewed   Identify and Manage Contributors to Fall Injury Risk   Self-Care Promotion meal setup provided;BADL personal objects within reach         Problem: Adult Inpatient Plan of Care  Goal: Plan of Care Review  Outcome: Ongoing (interventions implemented as appropriate)  IV Abx administered as ordered, xray and CT performed, able to make needs known, no distress noted, will continue to monitor.

## 2018-12-13 NOTE — HPI
Ms. Krissy Marino is a 78 y.o. female with type 2 diabetes, CAD status post PCI (7/2018), and peripheral vascular disease who presents to the emergency department from [facility] for evaluation of left lower extremity cellulitis.  She was just admitted to [facility] from 11/26-12/5 for left lower extremity cellulitis s/p a left ankle sprain, where she was found to have group B strep bacteremia.  During that admission, a PICC line was placed and she was discharged on Ceftriaxone to complete a 14 day course that ended on 12/11.  Since her discharge, family mentions that her left lower extremity cellulitis has not been improving.  They endorse persistent erythema as well as occasional yellow drainage from the left lateral malleolus.  At baseline, the patient is fairly bed-bound and spends most of her time with her lower extremities dangling down.  Labs were done at her facility on 12/07 which showed a white blood cell count 20.  She was told to come to the emergency department for further evaluation.  She continues to endorse pain in her left as well as pain with movement.  She has never had any debridement of her wound, but mentions that it has been cleaned with Clorox by [facility].  She denies any fevers or chills.  Of note, the patient was told that because she has such severe peripheral vascular disease, she would require stent placement.  However, because of her recent cardiac stent placement, she is unable to get leg stents placed for at least 1 year, which will continue to prevent good wound healing.     In the emergency department, labs were notable for a white blood cell count 10, sed rate 56, and CRP 67.  X-rays were ordered which showed edema, but no cellulitis.  She was given IV vancomycin and was admitted to Hospital Medicine for further management.  MRI of LLE c/f large abscess and osteomyelitis of distal tibia and tarsal bones.  She has been placed on Vancomycin.  Blood  cultures 12/12 are NGTD.

## 2018-12-13 NOTE — CONSULTS
"[facility]  Orthopedics  Consult Note    Patient Name: Krissy Marino  MRN: [id number]  Admission Date: 12/12/2018  Hospital Length of Stay: 1 days  Attending Provider: Dc Liao MD  Primary Care Provider: Emily Mcpherson MD    Patient information was obtained from patient and ER records.     Inpatient consult to Orthopedic Surgery  Consult performed by: Abram Way MD  Consult ordered by: Dc Liao MD        Subjective:     Principal Problem:Chronic osteomyelitis of left tibia with draining sinus    Chief Complaint:   Chief Complaint   Patient presents with    Cellulitis     [place] [facility] [place] of unresolved cellulitis of left foot. EMS also reports "low blood cell count" as well from patient rehab ([facility]). Patient A&Ox4 and following commands.         HPI: Krissy Marino is a 78 y.o. female with bilateral TKAs (done many years ago), DMII, CAD s/p PCI in7/18 and PVD who is admitted for left ankle osteomyelitis and multiple abscess.  Patient had been admittted to [facility] for cellulitis with group B strep bacteremia and a PICC line was placed and she was discharged on IV ceftriaxone which she completed 2 days ago.  She continued to have purulent drainage from the lateral left ankle and presented to the ED yesterday.  In the emergency department, labs were notable for a white blood cell count 10, sed rate 56, and CRP 67.   MRI shows osteomyelitis of the hindfoot and distal tibia with multiple abscess that appear to communicate with the OM.  Per the patient she ambulates without any assistance and she still works.  She is on dual antiplatelet therapy for her cardiac stents.  Of note she is a poor historian.    Past Medical History:   Diagnosis Date    Adverse effect of glucocorticoid or synthetic analogue     Allergy     Arthritis     Cancer     CHF (congestive heart failure)     Chronic kidney disease     " Coronary artery disease involving native coronary artery of native heart without angina pectoris 10/11/2016    Diabetic neuropathy 10/6/2014    Giant cell arteritis 9/24/2012    Hyperlipidemia     Hypertension     Hypothyroid     Obesity (BMI 30-39.9) 10/6/2014    Osteopenia     Primary osteoarthritis of both knees 9/24/2012    Type II or unspecified type diabetes mellitus with renal manifestations, uncontrolled(250.42)        Past Surgical History:   Procedure Laterality Date    APPENDECTOMY      CATARACT EXTRACTION      CATARACT EXTRACTION, BILATERAL      CHOLECYSTECTOMY      COLONOSCOPY N/A 12/27/2013    Performed by Shamar Sow MD at [facility] ENDO (4TH FLR)    EYE SURGERY      HYSTERECTOMY      JOINT REPLACEMENT      bilateral total knee    SKIN BIOPSY      STRIPPING-TARSAL Bilateral 3/7/2018    Performed by Anastasia Nieto MD at [facility] OR 2ND FLR    TONSILLECTOMY         Review of patient's allergies indicates:   Allergen Reactions    Sulfamethoxazole-trimethoprim Nausea And Vomiting    Latex, natural rubber Rash    Pcn [penicillins] Rash       Current Facility-Administered Medications   Medication    acetaminophen tablet 650 mg    allopurinol tablet 300 mg    amLODIPine tablet 5 mg    aspirin EC tablet 81 mg    atorvastatin tablet 40 mg    cefTRIAXone (ROCEPHIN) 2 g in dextrose 5 % 50 mL IVPB    cetirizine tablet 10 mg    clopidogrel tablet 75 mg    dextrose 50% injection 12.5 g    dextrose 50% injection 25 g    enoxaparin injection 40 mg    famotidine tablet 20 mg    fluticasone-vilanterol 100-25 mcg/dose diskus inhaler 1 puff    glucagon (human recombinant) injection 1 mg    glucose chewable tablet 16 g    glucose chewable tablet 24 g    insulin aspart U-100 pen 0-5 Units    lactated ringers bolus 500 mL    levothyroxine tablet 75 mcg    losartan tablet 100 mg    metoprolol succinate (TOPROL-XL) 24 hr tablet 50 mg    ondansetron disintegrating tablet 8 mg    polyethylene  "glycol packet 17 g    ramelteon tablet 8 mg    senna-docusate 8.6-50 mg per tablet 1 tablet    sodium chloride 0.9% bolus 500 mL    sodium chloride 0.9% flush 5 mL    vancomycin 1.5 g in 5 % dextrose 250 mL IVPB     Family History     Problem Relation (Age of Onset)    Diabetes Mother    Hypertension Mother, Father        Tobacco Use    Smoking status: Never Smoker    Smokeless tobacco: Never Used   Substance and Sexual Activity    Alcohol use: No    Drug use: No    Sexual activity: No     ROS  Objective:     Vital Signs (Most Recent):  Temp: 97.6 °F (36.4 °C) (12/13/18 1218)  Pulse: 90 (12/13/18 1218)  Resp: 17 (12/13/18 1218)  BP: (!) 119/53 (12/13/18 1218)  SpO2: 97 % (12/13/18 1218) Vital Signs (24h Range):  Temp:  [97.2 °F (36.2 °C)-98.6 °F (37 °C)] 97.6 °F (36.4 °C)  Pulse:  [] 90  Resp:  [16-19] 17  SpO2:  [94 %-100 %] 97 %  BP: (119-151)/(53-76) 119/53     Weight: 106.6 kg (235 lb 0.2 oz)  Height: 5' 5" (165.1 cm)  Body mass index is 39.11 kg/m².      Intake/Output Summary (Last 24 hours) at 12/13/2018 1223  Last data filed at 12/13/2018 0636  Gross per 24 hour   Intake 300 ml   Output --   Net 300 ml       Ortho/SPM Exam     Gen:  No acute distress  CV:  Peripherally well-perfused.  Pulses 2+ bilaterally.  Lungs:  Normal respiratory effort.  Abdomen:  Soft, non-tender, non-distended  Head/Neck:  Normocephalic.  Atraumatic. No TTP, AROM and PROM intact without pain  Neuro:  CN intact without deficit, SILT throughout B/L Upper & Lower Extremities    MSK:  Bilateral UE:  - no obvious deformity no ttp  - AROM and PROM of the intact  - AIN/PIN/Radial/Median/Ulnar Nerves assessed in isolation without deficit  - SILT throughout  - Radial & Ulnar arteries palpated 2+  - Capillary Refill <3s    LLE:  - there is significant swelling and erythema of the LLE from the toes up proximally to the mid leg,  On the lateral aspect of the ankle there is a large 4x7cm area of ulceration with purulent drainage, " she is ttp from the dorsum of the foot up to the mid leg,  The tka incision is well healed and without erythema or drainage   - AROM and PROM of hip knee and ankle is intact.  ROM of the ankle is not painful   - TA/EHL/Gastroc/FHL assessed in isolation without deficit  - SILT throughout  - DP and PT palpated  2+   - Capillary Refill <3s    RLE:  -chronic stasis dermatits appearance with darkening of the skin over the lower leg,  There is no ttp and no erythema or signs of infection   - AROM and PROM of hip knee and ankle is intact.    - TA/EHL/Gastroc/FHL assessed in isolation without deficit  - SILT throughout  - DP and PT palpated  2+  - Capillary Refill <3s        Significant Labs:   ABGs: No results for input(s): PH, PCO2, HCO3, POCSATURATED, BE in the last 48 hours.  Blood Culture:   Recent Labs   Lab 12/12/18  1849 12/12/18  1907   LABBLOO No Growth to date No Growth to date     CBC:   Recent Labs   Lab 12/12/18  1852 12/13/18  0340   WBC 10.72 7.65   HGB 8.8* 7.1*   HCT 27.0* 23.2*    249     CMP:   Recent Labs   Lab 12/12/18  1852 12/13/18  0340    137   K 4.4 4.0   CL 97 104   CO2 28 25   * 207*   BUN 41* 35*   CREATININE 0.9 0.7   CALCIUM 8.9 8.3*   PROT 5.9* 5.0*   ALBUMIN 1.8* 1.5*   BILITOT 0.3 0.4   ALKPHOS 224* 173*   AST 21 16   ALT 35 28   ANIONGAP 11 8   EGFRNONAA >60.0 >60.0     Coagulation: No results for input(s): LABPROT, INR, APTT in the last 48 hours.  CRP:   Recent Labs   Lab 12/12/18  1852   CRP 66.9*     Urine Studies:   Recent Labs   Lab 12/13/18  0655   COLORU Yellow   APPEARANCEUA Clear   PHUR 5.0   SPECGRAV 1.010   PROTEINUA Negative   GLUCUA Negative   KETONESU Negative   BILIRUBINUA Negative   OCCULTUA Negative   NITRITE Negative   LEUKOCYTESUR Negative   WBCUA 2   SQUAMEPITHEL 1     Wound Culture: No results for input(s): LABAERO in the last 48 hours.  All pertinent labs within the past 24 hours have been reviewed.    Significant Imaging: I have reviewed and  interpreted all pertinent imaging results/findings.   There is OM of the distal tibia and hindfoot with cellulitis an multiloculated abscess with the most proximal abscess being incompletely shown.  No acute fx     Assessment/Plan:     * Chronic osteomyelitis of left tibia with draining sinus    Krissy Marino is a 78 y.o. female with bilateral TKAs (done many years ago), DMII, CAD s/p PCI in7/18 and PVD who is admitted for left ankle osteomyelitis and multiple abscess.     -Krissy Marino is a 78 y.o. female with bilateral TKAs (done many years ago), DMII, CAD s/p PCI in7/18 and PVD who is admitted for left ankle osteomyelitis and multiple abscess.     -Labs show white blood cell count 10, sed rate 56, and CRP 67.  MRI shows osteomyelitis of the hindfoot and distal tibia with multiple abscess that appear to communicate with the OM.  CT scan shows multiple abscesses with the most proximal extent to just distal to the fibular head.  It is unlikely that BKA with I&D would be successful especially given that patient has extensive medical hx including DM, PVD, and CAD still on dual platelet anticoagulation.  Patient will likely need an AKA.  Discussed this extensively with the patient.  Patient would like to consider this option further and as she is not acutely septic can wait while patient thinks about things.  She can have a diet.  Will Start on neurontin as this can help with phantom pain in the future.  Continue abx treatment.         Thank you for your consult. I will follow-up with patient. Please contact us if you have any additional questions.    Abram Way MD  Orthopedics  [facility] Note:  I agree with the resident's assessment and plan.  She has venous stasis skin changes in both legs with osteo, a large non healing wound with underlying osteomyelitis and tracking that goes near her TKA.  She is not on board with AKA at this time, but I think this will be inevitable.  She  is not bactermeic or septic.  Would start Neurontin and increase dose to 300 TID to help prevent phantom sensation or pain when that time comes.    Thien Renee MD

## 2018-12-13 NOTE — SUBJECTIVE & OBJECTIVE
Past Medical History:   Diagnosis Date    Adverse effect of glucocorticoid or synthetic analogue     Allergy     Arthritis     Cancer     CHF (congestive heart failure)     Chronic kidney disease     Coronary artery disease involving native coronary artery of native heart without angina pectoris 10/11/2016    Diabetic neuropathy 10/6/2014    Giant cell arteritis 9/24/2012    Hyperlipidemia     Hypertension     Hypothyroid     Obesity (BMI 30-39.9) 10/6/2014    Osteopenia     Primary osteoarthritis of both knees 9/24/2012    Type II or unspecified type diabetes mellitus with renal manifestations, uncontrolled(250.42)        Past Surgical History:   Procedure Laterality Date    APPENDECTOMY      CATARACT EXTRACTION      CATARACT EXTRACTION, BILATERAL      CHOLECYSTECTOMY      COLONOSCOPY N/A 12/27/2013    Performed by Shamra Sow MD at [facility] ENDO (4TH FLR)    EYE SURGERY      HYSTERECTOMY      JOINT REPLACEMENT      bilateral total knee    SKIN BIOPSY      STRIPPING-TARSAL Bilateral 3/7/2018    Performed by Anastasia Nieto MD at [facility] OR 2ND FLR    TONSILLECTOMY         Review of patient's allergies indicates:   Allergen Reactions    Sulfamethoxazole-trimethoprim Nausea And Vomiting    Latex, natural rubber Rash    Pcn [penicillins] Rash       Current Facility-Administered Medications   Medication    acetaminophen tablet 650 mg    allopurinol tablet 300 mg    amLODIPine tablet 5 mg    aspirin EC tablet 81 mg    atorvastatin tablet 40 mg    cefTRIAXone (ROCEPHIN) 2 g in dextrose 5 % 50 mL IVPB    cetirizine tablet 10 mg    clopidogrel tablet 75 mg    dextrose 50% injection 12.5 g    dextrose 50% injection 25 g    enoxaparin injection 40 mg    famotidine tablet 20 mg    fluticasone-vilanterol 100-25 mcg/dose diskus inhaler 1 puff    glucagon (human recombinant) injection 1 mg    glucose chewable tablet 16 g    glucose chewable tablet 24 g    insulin aspart U-100 pen 0-5 Units  "   lactated ringers bolus 500 mL    levothyroxine tablet 75 mcg    losartan tablet 100 mg    metoprolol succinate (TOPROL-XL) 24 hr tablet 50 mg    ondansetron disintegrating tablet 8 mg    polyethylene glycol packet 17 g    ramelteon tablet 8 mg    senna-docusate 8.6-50 mg per tablet 1 tablet    sodium chloride 0.9% bolus 500 mL    sodium chloride 0.9% flush 5 mL    vancomycin 1.5 g in 5 % dextrose 250 mL IVPB     Family History     Problem Relation (Age of Onset)    Diabetes Mother    Hypertension Mother, Father        Tobacco Use    Smoking status: Never Smoker    Smokeless tobacco: Never Used   Substance and Sexual Activity    Alcohol use: No    Drug use: No    Sexual activity: No     ROS  Objective:     Vital Signs (Most Recent):  Temp: 97.6 °F (36.4 °C) (12/13/18 1218)  Pulse: 90 (12/13/18 1218)  Resp: 17 (12/13/18 1218)  BP: (!) 119/53 (12/13/18 1218)  SpO2: 97 % (12/13/18 1218) Vital Signs (24h Range):  Temp:  [97.2 °F (36.2 °C)-98.6 °F (37 °C)] 97.6 °F (36.4 °C)  Pulse:  [] 90  Resp:  [16-19] 17  SpO2:  [94 %-100 %] 97 %  BP: (119-151)/(53-76) 119/53     Weight: 106.6 kg (235 lb 0.2 oz)  Height: 5' 5" (165.1 cm)  Body mass index is 39.11 kg/m².      Intake/Output Summary (Last 24 hours) at 12/13/2018 1223  Last data filed at 12/13/2018 0636  Gross per 24 hour   Intake 300 ml   Output --   Net 300 ml       Ortho/SPM Exam     Gen:  No acute distress  CV:  Peripherally well-perfused.  Pulses 2+ bilaterally.  Lungs:  Normal respiratory effort.  Abdomen:  Soft, non-tender, non-distended  Head/Neck:  Normocephalic.  Atraumatic. No TTP, AROM and PROM intact without pain  Neuro:  CN intact without deficit, SILT throughout B/L Upper & Lower Extremities    MSK:  Bilateral UE:  - no obvious deformity no ttp  - AROM and PROM of the intact  - AIN/PIN/Radial/Median/Ulnar Nerves assessed in isolation without deficit  - SILT throughout  - Radial & Ulnar arteries palpated 2+  - Capillary Refill " <3s    LLE:  - there is significant swelling and erythema of the LLE from the toes up proximally to the mid leg,  On the lateral aspect of the ankle there is a large 4x7cm area of ulceration with purulent drainage, she is ttp from the dorsum of the foot up to the mid leg,  The tka incision is well healed and without erythema or drainage   - AROM and PROM of hip knee and ankle is intact.  ROM of the ankle is not painful   - TA/EHL/Gastroc/FHL assessed in isolation without deficit  - SILT throughout  - DP and PT palpated  2+   - Capillary Refill <3s    RLE:  -chronic stasis dermatits appearance with darkening of the skin over the lower leg,  There is no ttp and no erythema or signs of infection   - AROM and PROM of hip knee and ankle is intact.    - TA/EHL/Gastroc/FHL assessed in isolation without deficit  - SILT throughout  - DP and PT palpated  2+  - Capillary Refill <3s        Significant Labs:   ABGs: No results for input(s): PH, PCO2, HCO3, POCSATURATED, BE in the last 48 hours.  Blood Culture:   Recent Labs   Lab 12/12/18  1849 12/12/18  1907   LABBLOO No Growth to date No Growth to date     CBC:   Recent Labs   Lab 12/12/18  1852 12/13/18  0340   WBC 10.72 7.65   HGB 8.8* 7.1*   HCT 27.0* 23.2*    249     CMP:   Recent Labs   Lab 12/12/18  1852 12/13/18  0340    137   K 4.4 4.0   CL 97 104   CO2 28 25   * 207*   BUN 41* 35*   CREATININE 0.9 0.7   CALCIUM 8.9 8.3*   PROT 5.9* 5.0*   ALBUMIN 1.8* 1.5*   BILITOT 0.3 0.4   ALKPHOS 224* 173*   AST 21 16   ALT 35 28   ANIONGAP 11 8   EGFRNONAA >60.0 >60.0     Coagulation: No results for input(s): LABPROT, INR, APTT in the last 48 hours.  CRP:   Recent Labs   Lab 12/12/18  1852   CRP 66.9*     Urine Studies:   Recent Labs   Lab 12/13/18  0655   COLORU Yellow   APPEARANCEUA Clear   PHUR 5.0   SPECGRAV 1.010   PROTEINUA Negative   GLUCUA Negative   KETONESU Negative   BILIRUBINUA Negative   OCCULTUA Negative   NITRITE Negative   LEUKOCYTESUR Negative    WBCUA 2   SQUAMEPITHEL 1     Wound Culture: No results for input(s): LABAERO in the last 48 hours.  All pertinent labs within the past 24 hours have been reviewed.    Significant Imaging: I have reviewed and interpreted all pertinent imaging results/findings.   There is OM of the distal tibia and hindfoot with cellulitis an multiloculated abscess with the most proximal abscess being incompletely shown.  No acute fx

## 2018-12-13 NOTE — CONSULTS
Consult received, discussed with primary team  Imaging reviewed with abscess and osteomyelitis extending proximal to ankle joint  Consider orthopedic sx consult  Podiatry will sign off  Please contact podiatry resident with questions/concerns    Adriane Beebe DPM, PGY3  [phone]

## 2018-12-13 NOTE — ED NOTES
Telemetry Verification   Patient placed on Telemetry Box  Verified with War Room  Box # [id number]   Monitor Tech Roshanda   Rate 97   Rhythm NSR

## 2018-12-13 NOTE — SUBJECTIVE & OBJECTIVE
Past Medical History:   Diagnosis Date    Adverse effect of glucocorticoid or synthetic analogue     Allergy     Arthritis     Cancer     CHF (congestive heart failure)     Chronic kidney disease     Coronary artery disease involving native coronary artery of native heart without angina pectoris 10/11/2016    Diabetic neuropathy 10/6/2014    Giant cell arteritis 9/24/2012    Hyperlipidemia     Hypertension     Hypothyroid     Obesity (BMI 30-39.9) 10/6/2014    Osteopenia     Primary osteoarthritis of both knees 9/24/2012    Type II or unspecified type diabetes mellitus with renal manifestations, uncontrolled(250.42)        Past Surgical History:   Procedure Laterality Date    APPENDECTOMY      CATARACT EXTRACTION      CATARACT EXTRACTION, BILATERAL      CHOLECYSTECTOMY      COLONOSCOPY N/A 12/27/2013    Performed by Shamar Sow MD at [facility] ENDO (4TH FLR)    EYE SURGERY      HYSTERECTOMY      JOINT REPLACEMENT      bilateral total knee    SKIN BIOPSY      STRIPPING-TARSAL Bilateral 3/7/2018    Performed by Anastasia Nieto MD at [facility] OR 2ND FLR    TONSILLECTOMY         Review of patient's allergies indicates:   Allergen Reactions    Sulfamethoxazole-trimethoprim Nausea And Vomiting    Latex, natural rubber Rash    Pcn [penicillins] Rash       Medications:  Medications Prior to Admission   Medication Sig    acetaminophen (TYLENOL) 325 MG tablet Take 2 tablets (650 mg total) by mouth every 6 (six) hours as needed.    albuterol 90 mcg/actuation inhaler Inhale 2 puffs into the lungs every 6 (six) hours as needed for Wheezing.    alendronate (FOSAMAX) 70 MG tablet Take 1 tablet (70 mg total) by mouth every 7 days.    allopurinol (ZYLOPRIM) 300 MG tablet TAKE 1 TABLET(300 MG) BY MOUTH EVERY DAY    amLODIPine (NORVASC) 5 MG tablet Take 1 tablet (5 mg total) by mouth once daily.    aspirin (ECOTRIN) 81 MG EC tablet Take 1 tablet (81 mg total) by mouth once daily.    atorvastatin (LIPITOR) 40  MG tablet TAKE 1 TABLET(40 MG) BY MOUTH EVERY DAY    azelastine (ASTELIN) 137 mcg (0.1 %) nasal spray USE 1 SPRAY(137 MCG) IN EACH NOSTRIL TWICE DAILY    blood sugar diagnostic (ACCU-CHEK AMELIA PLUS TEST STRP) Strp Checks bg 2 x day before meals    cetirizine (ZYRTEC) 10 MG tablet Take 1 tablet (10 mg total) by mouth once daily.    clopidogrel (PLAVIX) 75 mg tablet Take 1 tablet (75 mg total) by mouth once daily.    collagenase (SANTYL) ointment Apply topically once daily.    FERROUS GLUCONATE (FERATE ORAL) Take by mouth once daily at 6am.     furosemide (LASIX) 40 MG tablet Take 1 tablet (40 mg total) by mouth 2 (two) times daily.    glipiZIDE (GLUCOTROL) 2.5 MG TR24 Take 1 tablet (2.5 mg total) by mouth daily with breakfast.    HYDROcodone-acetaminophen (NORCO) 5-325 mg per tablet Take 1 tablet by mouth every 8 (eight) hours as needed for Pain. (Patient taking differently: Take 1 tablet by mouth every 6 (six) hours as needed for Pain. )    hydrocortisone butyrate (LOCOID) 0.1 % Crea cream AAA buttock bid    KRILL/OM3/DHA/EPA/OM6/LIP/ASTX (KRILL OIL, OMEGA 3 & 6, ORAL) Take by mouth once daily at 6am.     levothyroxine (SYNTHROID) 75 MCG tablet Take 1 tablet (75 mcg total) by mouth before breakfast.    losartan (COZAAR) 100 MG tablet Take 1 tablet (100 mg total) by mouth once daily.    magnesium oxide (MAG-OX) 400 mg tablet Take 1 tablet (400 mg total) by mouth once daily.    metoprolol succinate (TOPROL-XL) 50 MG 24 hr tablet Take 1 tablet (50 mg total) by mouth once daily.    miconazole NITRATE 2 % (ZEASORB AF) 2 % top powder Apply topically as needed for Itching.    ranitidine (ZANTAC) 150 MG tablet Take 1 tablet (150 mg total) by mouth 2 (two) times daily as needed for Heartburn.    SYMBICORT 160-4.5 mcg/actuation HFAA INHALE TWO PUFFS INTO THE LUNGS EVERY 12 HOURS    loperamide (IMODIUM) 2 mg capsule Take 2 mg by mouth 4 (four) times daily as needed for Diarrhea.     Antibiotics (From  admission, onward)    Start     Stop Route Frequency Ordered    12/13/18 0730  vancomycin 1.5 g in 5 % dextrose 250 mL IVPB  (Vancomycin IVPB with levels panel)      -- IV Every 12 hours (non-standard times) 12/12/18 2009        Antifungals (From admission, onward)    None        Antivirals (From admission, onward)    None           Immunization History   Administered Date(s) Administered    Influenza 10/13/2009, 11/11/2010    Influenza - High Dose 09/13/2011, 10/08/2012, 11/11/2013, 10/16/2014, 10/12/2015, 10/25/2016, 10/10/2018    Pneumococcal Polysaccharide - 23 Valent 10/25/2016    Tdap 07/10/2018       Family History     Problem Relation (Age of Onset)    Diabetes Mother    Hypertension Mother, Father        Social History     Socioeconomic History    Marital status:      Spouse name:     Number of children: None    Years of education: None    Highest education level: None   Social Needs    Financial resource strain: None    Food insecurity - worry: None    Food insecurity - inability: None    Transportation needs - medical: None    Transportation needs - non-medical: None   Occupational History    Occupation:      Employer: [email or web address]   Tobacco Use    Smoking status: Never Smoker    Smokeless tobacco: Never Used   Substance and Sexual Activity    Alcohol use: No    Drug use: No    Sexual activity: No   Other Topics Concern    Are you pregnant or think you may be? No    Breast-feeding No   Social History Narrative    None     Review of Systems   Constitutional: Negative for appetite change, chills, diaphoresis, fatigue, fever and unexpected weight change.   HENT: Negative for congestion, ear pain, hearing loss, sore throat and tinnitus.    Eyes: Negative for pain, redness and visual disturbance.   Respiratory: Negative for cough, chest tightness, shortness of breath and wheezing.    Cardiovascular: Negative for chest pain.   Gastrointestinal: Negative for  abdominal pain, constipation, diarrhea, nausea and vomiting.   Endocrine: Negative for cold intolerance and heat intolerance.   Genitourinary: Negative for decreased urine volume, difficulty urinating, dysuria, flank pain, frequency, hematuria and urgency.   Musculoskeletal: Negative for arthralgias, back pain, myalgias and neck pain.        Left leg pain    Skin: Positive for wound. Negative for rash.   Allergic/Immunologic: Negative for environmental allergies, food allergies and immunocompromised state.   Neurological: Negative for dizziness, facial asymmetry, weakness, light-headedness, numbness and headaches.   Hematological: Negative for adenopathy. Does not bruise/bleed easily.   Psychiatric/Behavioral: Negative for agitation, behavioral problems and confusion.     Objective:     Vital Signs (Most Recent):  Temp: 98.5 °F (36.9 °C) (12/13/18 0748)  Pulse: 92 (12/13/18 0753)  Resp: 17 (12/13/18 0748)  BP: 139/60 (12/13/18 0748)  SpO2: 96 % (12/13/18 0748) Vital Signs (24h Range):  Temp:  [97.2 °F (36.2 °C)-98.6 °F (37 °C)] 98.5 °F (36.9 °C)  Pulse:  [] 92  Resp:  [16-19] 17  SpO2:  [94 %-100 %] 96 %  BP: (127-151)/(58-76) 139/60     Weight: 106.6 kg (235 lb 0.2 oz)  Body mass index is 39.11 kg/m².    Estimated Creatinine Clearance: 77.7 mL/min (based on SCr of 0.7 mg/dL).    Physical Exam   Constitutional: She is oriented to person, place, and time. She appears well-developed and well-nourished. No distress.   HENT:   Head: Normocephalic and atraumatic.   Cardiovascular: Normal rate, regular rhythm and normal heart sounds. Exam reveals no gallop and no friction rub.   No murmur heard.  Pulmonary/Chest: Effort normal and breath sounds normal. No respiratory distress. She has no wheezes. She has no rales.   Abdominal: Soft. Bowel sounds are normal. She exhibits no distension and no mass. There is no tenderness. There is no rebound and no guarding.   Musculoskeletal: She exhibits no edema.   Neurological:  She is alert and oriented to person, place, and time.   Skin: Skin is warm and dry. She is not diaphoretic.   Psychiatric: She has a normal mood and affect. Her behavior is normal.                 Significant Labs:   Blood Culture:   Recent Labs   Lab 11/26/18  0810 11/27/18  0858 11/27/18  0859 12/12/18  1849 12/12/18  1907   LABBLOO Gram stain anais bottle: Gram positive cocci in chains resembling Strep  Results called to and read back by:James Hooker RN  STREPTOCOCCUS AGALACTIAE (GROUP B)  Beta-hemolytic streptococci are routinely susceptible to   penicillins,cephalosporins and carbapenems.   No growth after 5 days. No growth after 5 days. No Growth to date No Growth to date     CBC:   Recent Labs   Lab 12/12/18  1852 12/13/18  0340   WBC 10.72 7.65   HGB 8.8* 7.1*   HCT 27.0* 23.2*    249     CMP:   Recent Labs   Lab 12/12/18 1852 12/13/18  0340    137   K 4.4 4.0   CL 97 104   CO2 28 25   * 207*   BUN 41* 35*   CREATININE 0.9 0.7   CALCIUM 8.9 8.3*   PROT 5.9* 5.0*   ALBUMIN 1.8* 1.5*   BILITOT 0.3 0.4   ALKPHOS 224* 173*   AST 21 16   ALT 35 28   ANIONGAP 11 8   EGFRNONAA >60.0 >60.0     Wound Culture: No results for input(s): LABAERO in the last 4320 hours.  All pertinent labs within the past 24 hours have been reviewed.    Significant Imaging: I have reviewed all pertinent imaging results/findings within the past 24 hours.   MRI Ankle W WO Contrast Left [[id number]] Resulted: 12/12/18 2255   Order Status: Completed Updated: 12/12/18 2257   Narrative:     EXAMINATION:  MRI ANKLE W WO CONTRAST LEFT    CLINICAL HISTORY:  Ankle erythema, swelling, cellulitis suspected    TECHNIQUE:  MRI ankle performed before and after the administration 10 mL Gadavist IV contrast.    COMPARISON:  Foot radiograph 12/12/2018, foot radiograph 12/22/2017.    FINDINGS:  There is a lateral malleolar ulcer and circumferential subcutaneous edema with skin thickening and enhancement of the hindfoot.  Large  multiloculated peripherally enhancing fluid collections throughout the visualized soft tissues of the lateral distal foreleg at least 10 cm craniocaudad dimension contiguous with ulceration and extending proximal outside the field of view which are concerning for soft tissue abscesses.  There is diffuse muscular edema and enhancement suggesting significant myositis.    There is abnormal marrow edema and extensive irregular T1 marrow replacement in the distal tibia, calcaneus, talus, and navicular suggesting osteomyelitis.   Impression:       Findings concerning for osteomyelitis of the hindfoot including the distal tibia and tarsal bones with associated cellulitis, myositis, and multiloculated abscesses in the deep soft tissues contiguous with lateral distal foreleg ulceration.  Component of abscess extends cranially outside the field of view, possibly more proximal portions of the left lower extremity.    COMMUNICATION  This critical result was discovered/received at 12/12/2018 at 22:00. The critical information above was relayed directly by me by telephone to Dr. Guzman On 12/12/2018 at 22:05.    Electronically signed by resident: Raymond Vidales  Date: 12/12/2018  Time: 21:35    Electronically signed by: Toñito Flower MD  Date: 12/12/2018  Time: 22:55   US Lower Extremity Veins Left [[id number]] Resulted: 12/12/18 2232   Order Status: Completed Updated: 12/12/18 2234   Narrative:     EXAMINATION:  US LOWER EXTREMITY VEINS LEFT    CLINICAL HISTORY:  Acute embolism and thrombosis of unspecified deep veins of unspecified lower extremity    TECHNIQUE:  Duplex and color flow Doppler evaluation and graded compression of the left lower extremity veins was performed.    COMPARISON:  None.    FINDINGS:  Left thigh veins: The common femoral, femoral, popliteal, upper greater saphenous, and deep femoral veins are patent and free of thrombus. The veins are normally compressible and have normal phasic flow and augmentation  "response.    Left calf veins: The visualized calf veins are patent.    Contralateral CFV: The contralateral (right) common femoral vein is patent and free of thrombus.    Miscellaneous: Soft tissue edema and abnormal fluid about the left ankle, better characterized on recent MRI.   Impression:       No evidence of DVT in the left lower extremity.    Electronically signed by resident: Eliazar Ordonez  Date: 12/12/2018  Time: 22:22    Electronically signed by: Toñito Flower MD  Date: 12/12/2018  Time: 22:32   X-Ray Foot Complete Left [[id number]] Resulted: 12/12/18 1922   Order Status: Completed Updated: 12/12/18 1925   Narrative:     EXAMINATION:  XR FOOT COMPLETE 3 VIEW LEFT    CLINICAL HISTORY:  Cellulitis, unspecified    TECHNIQUE:  Three views of the left foot.    COMPARISON:  11/26/2018.    FINDINGS:  Stable degenerative changes in the foot.  No acute fracture or dislocation identified.  No bony erosion.  Vascular calcifications noted.  There is persistent soft tissue edema at the anterior aspect of the ankle and over the dorsum of the foot.  No distinct soft tissue emphysema.  No radiopaque foreign body.   Impression:       Persistent soft tissue edema at the dorsum of the foot, possibly cellulitis in the correct clinical setting.      Electronically signed by: Toñito Flower MD  Date: 12/12/2018  Time: 19:22   X-Ray Chest AP Portable [[id number]] Resulted: 12/12/18 1919   Order Status: Completed Updated: 12/12/18 1922   Narrative:     EXAMINATION:  XR CHEST AP PORTABLE    CLINICAL HISTORY:  Provided history is "Sepsis;  ".    TECHNIQUE:  One view of the chest.    COMPARISON:  11/29/2018.    FINDINGS:  Multiple radiopaque structures overlie the chest and significantly limit evaluation, including an electronic device which may represent a cell phone.   Per technologist note, the patient refused to comply with exam instructions to remove any overlying material.   Cardiac silhouette is stable.  Right-sided PICC has " been slightly retracted, with the tip now overlying the brachiocephalic/SVC junction.  No large focal area of consolidation or detrimental change in lung aeration.   Impression:       Limited examination, without obvious detrimental change.  Follow-up with upright PA and lateral views with removal of overlying structures as indicated.      Electronically signed by: Toñito Flower MD  Date: 12/12/2018  Time: 19:19

## 2018-12-13 NOTE — HPI
Ms. Krissy Marino is a 78 y.o. female with DM2, CAD s/p PCI (pD1 7/2018), and PVD [place] [facility] [place] that has progressed to osteomyelitis. She is currently on antibiotics and orthopedics is debating between and I&D or an amputation. Cardiology has been consulted for preoperative evaluation. The patient stated that after the stent, she has not had any further chest discomfort, no SOB, and no orthopnea or PND. She stated that she has been feeling well and is compliant with her antiplatelet agents.

## 2018-12-13 NOTE — ED PROVIDER NOTES
"Encounter Date: 12/12/2018       History     Chief Complaint   Patient presents with    Cellulitis     Patient transferred to [facility] for further evaluation of unresolved cellulitis of left foot. EMS also reports "low blood cell count" as well from patient rehab ([facility]). Patient A&Ox4 and following commands.      Patient is an 78-year-old female with Hypothyroidism, DMII, HLD, HTN, CAD, CKD2, and PVD that presenting with unresolving left lower extremity cellulitis. Pt was recently d/c from [facility] on 12/5 for an admission for cellulitis of left lower extremity. She has been on recephin since her admission and cultures of LLE have grown strep agalactia. She had an x-ray which was negative for osteomyelitis.  Today she presents from rehab due to concern for continued drainage of infection and a WBC count of 20.  She reports continued pain and discomfort of the foot on palpation and movement but not worsening of pain since last admission. She completed her course of rocephin yesterday. She reports no headache, recent cough or cold, abdominal pain, chest pain, SOB, or changes in urination. She reports constipation over the past few days.           Review of patient's allergies indicates:   Allergen Reactions    Sulfamethoxazole-trimethoprim Nausea And Vomiting    Latex, natural rubber Rash    Pcn [penicillins] Rash     Past Medical History:   Diagnosis Date    Adverse effect of glucocorticoid or synthetic analogue     Allergy     Arthritis     Cancer     CHF (congestive heart failure)     Chronic kidney disease     Coronary artery disease involving native coronary artery of native heart without angina pectoris 10/11/2016    Diabetic neuropathy 10/6/2014    Giant cell arteritis 9/24/2012    Hyperlipidemia     Hypertension     Hypothyroid     Obesity (BMI 30-39.9) 10/6/2014    Osteopenia     Primary osteoarthritis of both knees 9/24/2012    Type II or unspecified type diabetes mellitus with " renal manifestations, uncontrolled(250.42)      Past Surgical History:   Procedure Laterality Date    APPENDECTOMY      CATARACT EXTRACTION      CATARACT EXTRACTION, BILATERAL      CHOLECYSTECTOMY      COLONOSCOPY N/A 12/27/2013    Performed by Shamar Sow [facility] at [facility] ENDO (4TH FLR)    EYE SURGERY      HYSTERECTOMY      JOINT REPLACEMENT      bilateral total knee    SKIN BIOPSY      STRIPPING-TARSAL Bilateral 3/7/2018    Performed by Anastasia Nieto MD at [facility] OR 2ND FLR    TONSILLECTOMY       Family History   Problem Relation Age of Onset    Diabetes Mother     Hypertension Mother     Hypertension Father     Kidney disease Neg Hx     Eczema Neg Hx     Psoriasis Neg Hx     Melanoma Neg Hx     Lupus Neg Hx     Acne Neg Hx      Social History     Tobacco Use    Smoking status: Never Smoker    Smokeless tobacco: Never Used   Substance Use Topics    Alcohol use: No    Drug use: No     Review of Systems   Constitutional: Negative for appetite change, chills, diaphoresis, fatigue and fever.   HENT: Negative for congestion, facial swelling, hearing loss, nosebleeds, sinus pressure, sneezing, sore throat and voice change.    Eyes: Negative for pain, redness and visual disturbance.   Respiratory: Negative for cough, choking, shortness of breath and wheezing.    Cardiovascular: Negative for chest pain and palpitations.   Gastrointestinal: Negative for abdominal distention and abdominal pain.   Genitourinary: Negative for difficulty urinating.   Musculoskeletal: Negative for arthralgias, gait problem and joint swelling.   Skin: Positive for color change and wound.   Neurological: Negative for dizziness, seizures, weakness, numbness and headaches.   Psychiatric/Behavioral: Negative for agitation and behavioral problems.       Physical Exam     Initial Vitals [12/12/18 1730]   BP Pulse Resp Temp SpO2   136/76 (!) 118 19 98.6 °F (37 °C) 98 %      MAP       --         Physical Exam    Constitutional: She  appears well-developed and well-nourished.   HENT:   Head: Normocephalic and atraumatic.   Eyes: Conjunctivae and EOM are normal. Pupils are equal, round, and reactive to light. Right eye exhibits no discharge. Left eye exhibits no discharge.   Neck: Normal range of motion.   Cardiovascular: Normal rate, regular rhythm, normal heart sounds and intact distal pulses.   No murmur heard.  Pulmonary/Chest: Breath sounds normal. No respiratory distress. She has no wheezes. She exhibits no tenderness.   Abdominal: Soft. Bowel sounds are normal. She exhibits no distension. There is no tenderness.   Musculoskeletal: Normal range of motion.   Neurological: She is alert and oriented to person, place, and time. She has normal strength and normal reflexes.   Skin: There is erythema.   Lower lower extremity wound of lateral malleolus. Wound is 8 cm with purulent drainage with fibrinous base. Wound has large white and black regions. Skin is erythematous around the wound and warm to touch. Pt has peripheral pulses bilaterally.   Psychiatric: She has a normal mood and affect. Thought content normal.                 ED Course   Procedures  Labs Reviewed   CULTURE, BLOOD   CULTURE, BLOOD   CBC W/ AUTO DIFFERENTIAL   COMPREHENSIVE METABOLIC PANEL   LACTIC ACID, PLASMA   URINALYSIS, REFLEX TO URINE CULTURE   PROCALCITONIN   SEDIMENTATION RATE   C-REACTIVE PROTEIN     EKG Readings: (Independently Interpreted)   Sinus tachycardia. Rate: 112 bpm. Left axis deviation. Premature supraventricular complexes. Incomplete right bundle branch block. ST segments are normal. T waves are normal.        Imaging Results    None          Medical Decision Making:   Initial Assessment:   Patient is an 78-year-old female presenting with unresolving left lower extremity cellulitis. Pt was recently d/c from [facility] on 12/5 for an admission for cellulitis of left lower extremity. She has been on recephin since her admission and cultures of LLE have grown strep  agalactia. She had an x-ray which was negative for osteomyelitis.  Pt as peripheral pulses bilaterally. Pt has pain and tenderness to movement and palpation. Wound is at the left lower extremity wound of lateral malleolus. Wound is cm with purulent drainage with fibrinous base. Wound has large white and black regions. Skin is erythematous around the wound and warm to touch. Pt has peripheral pulses bilaterally.    Differential Diagnosis:   Cellulitis, osteomyelitis, necrotizing fasciitis, sepsis, DVT    Pt had a prior admission of cellulitis with a recent discharge earlier in December and on rocephin for cellulitis which finished yesterday. Pt had an x-ray on prior admission to rule out Osteo which was negative. Since wound seems to be unresolving with continued pain and discomfort will repeat Left foot x-ray to assess for osteo. Necrotizing fasciitis was considered but Pt has pulses bilaterally and wound is not rapidly progressive. Pt was tachycardic with a source on admission. Sepsis protocol was initiated. Left lower extremity venous ultrasound was ordered to rule out symptoms worsening due to DVT.       Clinical Tests:   Lab Tests: Ordered and Reviewed  Radiological Study: Ordered and Reviewed  Medical Tests: Ordered and Reviewed  Sepsis Perfusion Assessment: I attest, a sepsis perfusion exam was performed within 6 hours of Septic Shock presentation, following fluid resuscitation.  ED Management:  -CBC  -CMP  -IV fluids  -IV Vancomycin  -morphine IV 2mg  -docusate stool softner  -LLE venous ultrasound  -X-ray foot left side  -CXR  -UA  -Lactic Acid  -Sed rate  -CRP  -pro sim      7:28 PM  Pt has a WBC of 10. QSOFA score of zero. Pt does not meet SIRS criteria -  pt has a source and is tachycardic.   -sed rate of 56  -lactate 1.7    7:50 PM  Pt to be admitted to medicine.                 Attending Attestation:   Physician Attestation Statement for Resident:  As the supervising MD   Physician Attestation  Statement: I have personally seen and examined this patient.   I agree with the above history. -: 77 yo W with pmhx CKD, CAD, obesity, PAD, dCHF (EF 50-55%), recent admission for LLE cellulitis presents from rehab with worsening cellulitis.  Patient has had worsening redness for the past 2 days associated with worsening pain. She has completed 2 weeks of IV antibiotics.  Additionally she had an elevated white count and is referred from rehab.  On exam she is well appearing.  She is tachycardic. She has a right upper extremity PICC that is clean dry and intact. She has significant erythema throughout the dorsum of the left foot extending to the lateral aspect and posterior calf.  She has a large 8 cm ulceration centered over the medial malleolus with fibrinous exudate at the base.  There is erythema, warmth, tenderness throughout the foot.  Additionally, there is some erythema and ecchymosis along the webspace between the 1st and the 2nd toe.  She has thready 1+ palpable pulses in the left foot.  Given the tachycardia with an elevated white count, we initiated empiric treatment for sepsis.  We initiated the sepsis bundle with only a 20 cc/kg bolus given the patient's known heart failure.  Will continue to monitor volume status.  We started coverage with vancomycin for skin and soft tissue.  I am also concerned for DVT and osteo.  X-ray and ultrasound obtained. Patient has had no rapid progression of this infection and I palpated no crepitus, I doubt necrotizing fasciitis.  She require admission.    Reassessment: Labs reveal no leukocytosis. Pt has 1/4 SIRS criteria (tachycardia), 0/3 qSOFA score.  She does not appear septic.  Procalcitonin negative.  ESR and CRP are elevated.  Stable for medical floor.   As the supervising MD I agree with the above PE.    As the supervising MD I agree with the above treatment, course, plan, and disposition.                       Clinical Impression:   The primary encounter diagnosis  was Sepsis. Diagnoses of Cellulitis, DVT (deep venous thrombosis), Osteomyelitis, and Sepsis due to cellulitis were also pertinent to this visit.                             Emir Nolasco MD  Resident  12/12/18 1952       Dioni Guzman MD  12/12/18 [id number]

## 2018-12-13 NOTE — ED PROVIDER NOTES
"Encounter Date: 12/12/2018    SCRIBE #1 NOTE: I, Russel Roper, am scribing for, and in the presence of,  Dr. Guzman. I have scribed the following portions of the note - the EKG reading.       History     Chief Complaint   Patient presents with    Cellulitis     Patient transferred to [facility] for further evaluation of unresolved cellulitis of left foot. EMS also reports "low blood cell count" as well from patient rehab ([facility]). Patient A&Ox4 and following commands.      HPI  Review of patient's allergies indicates:   Allergen Reactions    Sulfamethoxazole-trimethoprim Nausea And Vomiting    Latex, natural rubber Rash    Pcn [penicillins] Rash     Past Medical History:   Diagnosis Date    Adverse effect of glucocorticoid or synthetic analogue     Allergy     Arthritis     Cancer     CHF (congestive heart failure)     Chronic kidney disease     Coronary artery disease involving native coronary artery of native heart without angina pectoris 10/11/2016    Diabetic neuropathy 10/6/2014    Giant cell arteritis 9/24/2012    Hyperlipidemia     Hypertension     Hypothyroid     Obesity (BMI 30-39.9) 10/6/2014    Osteopenia     Primary osteoarthritis of both knees 9/24/2012    Type II or unspecified type diabetes mellitus with renal manifestations, uncontrolled(250.42)      Past Surgical History:   Procedure Laterality Date    APPENDECTOMY      CATARACT EXTRACTION      CATARACT EXTRACTION, BILATERAL      CHOLECYSTECTOMY      COLONOSCOPY N/A 12/27/2013    Performed by Shamar Sow MD at [facility] ENDO (4TH FLR)    EYE SURGERY      HYSTERECTOMY      JOINT REPLACEMENT      bilateral total knee    SKIN BIOPSY      STRIPPING-TARSAL Bilateral 3/7/2018    Performed by Anastasia Nieto MD at [facility] OR 2ND FLR    TONSILLECTOMY       Family History   Problem Relation Age of Onset    Diabetes Mother     Hypertension Mother     Hypertension Father     Kidney disease Neg Hx     Eczema Neg Hx     " Psoriasis Neg Hx     Melanoma Neg Hx     Lupus Neg Hx     Acne Neg Hx      Social History     Tobacco Use    Smoking status: Never Smoker    Smokeless tobacco: Never Used   Substance Use Topics    Alcohol use: No    Drug use: No     Review of Systems    Physical Exam     Initial Vitals [12/12/18 1730]   BP Pulse Resp Temp SpO2   136/76 (!) 118 19 98.6 °F (37 °C) 98 %      MAP       --         Physical Exam            ED Course   Procedures  Labs Reviewed   CULTURE, BLOOD   CULTURE, BLOOD   CBC W/ AUTO DIFFERENTIAL   COMPREHENSIVE METABOLIC PANEL   LACTIC ACID, PLASMA   URINALYSIS, REFLEX TO URINE CULTURE   PROCALCITONIN   SEDIMENTATION RATE   C-REACTIVE PROTEIN     EKG Readings: (Independently Interpreted)   Sinus tachycardia. Rate: 112 bpm. Left axis deviation. Premature supraventricular complexes. Incomplete right bundle branch block. ST segments are normal. T waves are normal.        Imaging Results    None          Medical Decision Making:   History:   Old Medical Records: I decided to obtain old medical records.  Independently Interpreted Test(s):   I have ordered and independently interpreted EKG Reading(s) - see prior notes  Clinical Tests:   Lab Tests: Reviewed and Ordered  Radiological Study: Ordered and Reviewed  Medical Tests: Reviewed and Ordered            Scribe Attestation:   Scribe #1: I performed the above scribed service and the documentation accurately describes the services I performed. I attest to the accuracy of the note.               Clinical Impression:   The primary encounter diagnosis was Sepsis. Diagnoses of Cellulitis, DVT (deep venous thrombosis), and Osteomyelitis were also pertinent to this visit.

## 2018-12-13 NOTE — HPI
Ms. Krissy Marino is a 78 y.o. female with type 2 diabetes, CAD status post PCI (7/2018), and peripheral vascular disease who presents to the emergency department from [facility] for evaluation of left lower extremity cellulitis.  She was just admitted to [facility] from 11/26-12/5 for left lower extremity cellulitis s/p a left ankle sprain, where she was found to have group B strep bacteremia.  During that admission, a PICC line was placed and she was discharged on Ceftriaxone to complete a 14 day course that ended on 12/11.  Since her discharge, family mentions that her left lower extremity cellulitis has not been improving.  They endorse persistent erythema as well as occasional yellow drainage from the left lateral malleolus.  At baseline, the patient is fairly bed-bound and spends most of her time with her lower extremities dangling down.  Labs were done at her facility on 12/07 which showed a white blood cell count 20.  She was told to come to the emergency department for further evaluation.  She continues to endorse pain in her left as well as pain with movement.  She has never had any debridement of her wound, but mentions that it has been cleaned with Clorox by Wound Care.  She denies any fevers or chills.  She does endorse constipation which she attributes to her pain medication.  Of note, the patient was told that because she has such severe peripheral vascular disease, she would require stent placement.  However, because of her recent cardiac stent placement, she is unable to get leg stents placed for at least 1 year, which will continue to prevent good wound healing.     In the emergency department, labs were notable for a white blood cell count 10, sed rate 56, and CRP 67.  X-rays were ordered which showed edema, but no cellulitis.  She was given IV vancomycin and was admitted to Hospital Medicine for further management.

## 2018-12-13 NOTE — ASSESSMENT & PLAN NOTE
"  - Complicated by abscess formation and overlying cellulitis  - Demonstrated on MRI 12/13, which showed "osteomyelitis of the hindfoot including the distal tibia and tarsal bones with associated cellulitis, myositis, and multiloculated abscesses in the deep soft tissues"  - No leukocytosis. ESR and CRP both elevated  - Ortho consulted, appreciate assistance. NPO.  - f/u CT in process  - Consulted cardiology regarding DAPT in anticipation of surgery  - Blood cultures NGTD, hemodynamically stable  - Continue vanc. Rocephin added at ID's recommendation    Appreciate assistance from multiple consulting services  "

## 2018-12-13 NOTE — ASSESSMENT & PLAN NOTE
Krissy Marino is a 78 y.o. female with bilateral TKAs (done many years ago), DMII, CAD s/p PCI in7/18 and PVD who is admitted for left ankle osteomyelitis and multiple abscess.     -Labs show white blood cell count 10, sed rate 56, and CRP 67.  MRI shows osteomyelitis of the hindfoot and distal tibia with multiple abscess that appear to communicate with the OM.  CT scan shows multiple abscesses with the most proximal extent to just distal to the fibular head.  It is unlikely that BKA with I&D would be successful especially given that patient has extensive medical hx including DM, PVD, and CAD still on dual platelet anticoagulation.  Patient will likely need an AKA.  Discussed this extensively with the patient.  Patient would like to consider this option further and as she is not acutely septic can wait while patient thinks about things.  She can have a diet.  Will Start on neurontin as this can help with phantom pain in the future.  Continue abx treatment.

## 2018-12-13 NOTE — ASSESSMENT & PLAN NOTE
"  - Asymptomatic  - Underwent LHC and subsequent PCI in 7/2018:    · "Bifurcation of the LAD:  The lesion was successfully intervened. Post-stenosis of 0%, post-DONNY 3 flow and TMP grade 3. The vessel was accessed natively.  The following items were used: 2.5MM 12MM Axson Balloon.  · D1:  The lesion was successfully intervened. Post-stenosis of 0%, post-DONNY 3 flow and TMP grade 3. The vessel was accessed natively.  The following items were used: 2.5MM 15MM Axson Balloon and Stent Resolute Rx 2.50x14 (OSMEL)."    - Given OSMEL placement within 12 months and high risk lesion, consulted cardiology for recommendations regarding perioperative DAPT  - Otherwise continue aspirin, plavix, toprol, losartan, and lipitor  - Diabetic + cardaic diet when not NPO  "

## 2018-12-13 NOTE — CONSULTS
[facility]  Infectious Disease  Consult Note    Patient Name: Krissy Marino  MRN: [id number]  Admission Date: 12/12/2018  Hospital Length of Stay: 1 days  Attending Physician: Dc Liao MD  Primary Care Provider: Emily Mcpherson MD         Inpatient consult to Infectious Diseases  Consult performed by: JILLIAN Kendall Jr.  Consult ordered by: Kaleigh Guerrero MD      Consult received.  Full consult to follow.      JILLIAN Sheridan  Infectious Disease  [facility]

## 2018-12-13 NOTE — PLAN OF CARE
Problem: Adult Inpatient Plan of Care  Goal: Plan of Care Review  Outcome: Ongoing (interventions implemented as appropriate)  Pt AAOx4, able to make her needs known. Admitted from ED.  Oriented to the room and the unit. PICC line to CLAIRE patent and intact. IVF NS bolus was done. Awaiting the wound / podiatry consult. Kept comfortable. All needs attended. Call light within easy reach. No distress noted.

## 2018-12-13 NOTE — ASSESSMENT & PLAN NOTE
- Stable  - HbA1c 6.7 in May  - Will hold home oral antihyperglycemic agents in favor of low-dose aspart SSI + POCT glucose checks  - Diabetic + cardaic diet when not NPO

## 2018-12-13 NOTE — ASSESSMENT & PLAN NOTE
- Stable, clear chest examination though with bilateral lower extremity edema  - Most recent TTE, a stress echo in July 2018, showed LVEF 50% and grade I diastolic dysfunction  - Continue asa, plavix, toprol, losartan, and lipitor  - Anticipate resuming lasix tomorrow  - Goal K 4-5 and Mg 2-3

## 2018-12-13 NOTE — ED PROVIDER NOTES
"Encounter Date: 12/12/2018       History     Chief Complaint   Patient presents with    Cellulitis     Patient [place] [facility] [place] evaluation of unresolved cellulitis of left foot. EMS also reports "low blood cell count" as well from patient rehab ([facility]). Patient A&Ox4 and following commands.      HPI  Review of patient's allergies indicates:   Allergen Reactions    Sulfamethoxazole-trimethoprim Nausea And Vomiting    Latex, natural rubber Rash    Pcn [penicillins] Rash     Past Medical History:   Diagnosis Date    Adverse effect of glucocorticoid or synthetic analogue     Allergy     Arthritis     Cancer     CHF (congestive heart failure)     Chronic kidney disease     Coronary artery disease involving native coronary artery of native heart without angina pectoris 10/11/2016    Diabetic neuropathy 10/6/2014    Giant cell arteritis 9/24/2012    Hyperlipidemia     Hypertension     Hypothyroid     Obesity (BMI 30-39.9) 10/6/2014    Osteopenia     Primary osteoarthritis of both knees 9/24/2012    Type II or unspecified type diabetes mellitus with renal manifestations, uncontrolled(250.42)      Past Surgical History:   Procedure Laterality Date    APPENDECTOMY      CATARACT EXTRACTION      CATARACT EXTRACTION, BILATERAL      CHOLECYSTECTOMY      COLONOSCOPY N/A 12/27/2013    Performed by Shamar Sow MD at [facility] ENDO (4TH FLR)    EYE SURGERY      HYSTERECTOMY      JOINT REPLACEMENT      bilateral total knee    SKIN BIOPSY      STRIPPING-TARSAL Bilateral 3/7/2018    Performed by Anastasia Nieto MD at [facility] OR 2ND FLR    TONSILLECTOMY       Family History   Problem Relation Age of Onset    Diabetes Mother     Hypertension Mother     Hypertension Father     Kidney disease Neg Hx     Eczema Neg Hx     Psoriasis Neg Hx     Melanoma Neg Hx     Lupus Neg Hx     Acne Neg Hx      Social History     Tobacco Use    Smoking status: Never Smoker    Smokeless tobacco: " Never Used   Substance Use Topics    Alcohol use: No    Drug use: No     Review of Systems    Physical Exam     Initial Vitals [12/12/18 1730]   BP Pulse Resp Temp SpO2   136/76 (!) 118 19 98.6 °F (37 °C) 98 %      MAP       --         Physical Exam    ED Course   Procedures  Labs Reviewed   CBC W/ AUTO DIFFERENTIAL - Abnormal; Notable for the following components:       Result Value    RBC 2.67 (*)     Hemoglobin 8.8 (*)     Hematocrit 27.0 (*)      (*)     MCH 33.0 (*)     Immature Granulocytes 0.7 (*)     Gran # (ANC) 9.0 (*)     Immature Grans (Abs) 0.07 (*)     Lymph # 0.8 (*)     Gran% 83.6 (*)     Lymph% 7.6 (*)     All other components within normal limits    Narrative:     ONE LAVENDER SHARED   COMPREHENSIVE METABOLIC PANEL - Abnormal; Notable for the following components:    Glucose 363 (*)     BUN, Bld 41 (*)     Total Protein 5.9 (*)     Albumin 1.8 (*)     Alkaline Phosphatase 224 (*)     All other components within normal limits    Narrative:     ONE LAVENDER SHARED   SEDIMENTATION RATE - Abnormal; Notable for the following components:    Sed Rate 56 (*)     All other components within normal limits    Narrative:     ONE LAVENDER SHARED   C-REACTIVE PROTEIN - Abnormal; Notable for the following components:    CRP 66.9 (*)     All other components within normal limits    Narrative:     ONE LAVENDER SHARED   HEMOGLOBIN A1C - Abnormal; Notable for the following components:    Hemoglobin A1C 6.9 (*)     Estimated Avg Glucose 151 (*)     All other components within normal limits    Narrative:     ONE LAVENDER SHARED  add on test hemoglobin a1c per dr efrain oneil order #[id number]   12/12/2018  21:05    CULTURE, BLOOD   CULTURE, BLOOD   LACTIC ACID, PLASMA    Narrative:     ONE LAVENDER SHARED   PROCALCITONIN    Narrative:     ONE LAVENDER SHARED   HEMOGLOBIN A1C   URINALYSIS, REFLEX TO URINE CULTURE          Imaging Results          MRI Ankle W WO Contrast Left (In process)    Procedure changed  "from MRI Ankle With Contrast Left                X-Ray Chest AP Portable (Final result)  Result time 12/12/18 19:19:42    Final result by Toñito Flower MD (12/12/18 19:19:42)                 Impression:      Limited examination, without obvious detrimental change.  Follow-up with upright PA and lateral views with removal of overlying structures as indicated.      Electronically signed by: Toñito Flower MD  Date:    12/12/2018  Time:    19:19             Narrative:    EXAMINATION:  XR CHEST AP PORTABLE    CLINICAL HISTORY:  Provided history is "Sepsis;  ".    TECHNIQUE:  One view of the chest.    COMPARISON:  11/29/2018.    FINDINGS:  Multiple radiopaque structures overlie the chest and significantly limit evaluation, including an electronic device which may represent a cell phone.   Per technologist note, the patient refused to comply with exam instructions to remove any overlying material.   Cardiac silhouette is stable.  Right-sided PICC has been slightly retracted, with the tip now overlying the brachiocephalic/SVC junction.  No large focal area of consolidation or detrimental change in lung aeration.                               X-Ray Foot Complete Left (Final result)  Result time 12/12/18 19:22:45    Final result by Toñito Flower MD (12/12/18 19:22:45)                 Impression:      Persistent soft tissue edema at the dorsum of the foot, possibly cellulitis in the correct clinical setting.      Electronically signed by: Toñito Flower MD  Date:    12/12/2018  Time:    19:22             Narrative:    EXAMINATION:  XR FOOT COMPLETE 3 VIEW LEFT    CLINICAL HISTORY:  Cellulitis, unspecified    TECHNIQUE:  Three views of the left foot.    COMPARISON:  11/26/2018.    FINDINGS:  Stable degenerative changes in the foot.  No acute fracture or dislocation identified.  No bony erosion.  Vascular calcifications noted.  There is persistent soft tissue edema at the anterior aspect of the ankle and over the " dorsum of the foot.  No distinct soft tissue emphysema.  No radiopaque foreign body.                                                      Clinical Impression:   {Add your Clinical Impression here. If you haven't documented one yet, please pend the note, finalize a Clinical Impression, and refresh your note before signing.:[id number]}

## 2018-12-13 NOTE — SUBJECTIVE & OBJECTIVE
Past Medical History:   Diagnosis Date    Adverse effect of glucocorticoid or synthetic analogue     Allergy     Arthritis     Cancer     CHF (congestive heart failure)     Chronic kidney disease     Coronary artery disease involving native coronary artery of native heart without angina pectoris 10/11/2016    Diabetic neuropathy 10/6/2014    Giant cell arteritis 9/24/2012    Hyperlipidemia     Hypertension     Hypothyroid     Obesity (BMI 30-39.9) 10/6/2014    Osteopenia     Primary osteoarthritis of both knees 9/24/2012    Type II or unspecified type diabetes mellitus with renal manifestations, uncontrolled(250.42)        Past Surgical History:   Procedure Laterality Date    APPENDECTOMY      CATARACT EXTRACTION      CATARACT EXTRACTION, BILATERAL      CHOLECYSTECTOMY      COLONOSCOPY N/A 12/27/2013    Performed by [facility]amar [facility]ow MD at [facility] ENDO (4TH FLR)    EYE SURGERY      HYSTERECTOMY      JOINT REPLACEMENT      bilateral total knee    SKIN BIOPSY      STRIPPING-TARSAL Bilateral 3/7/2018    Performed by Anastasia Nieto MD at [facility] OR 2ND FLR    TONSILLECTOMY         Review of patient's allergies indicates:   Allergen Reactions    Sulfamethoxazole-trimethoprim Nausea And Vomiting    Latex, natural rubber Rash    Pcn [penicillins] Rash       No current facility-administered medications on file prior to encounter.      Current Outpatient Medications on File Prior to Encounter   Medication Sig    acetaminophen (TYLENOL) 325 MG tablet Take 2 tablets (650 mg total) by mouth every 6 (six) hours as needed.    albuterol 90 mcg/actuation inhaler Inhale 2 puffs into the lungs every 6 (six) hours as needed for Wheezing.    alendronate (FOSAMAX) 70 MG tablet Take 1 tablet (70 mg total) by mouth every 7 days.    allopurinol (ZYLOPRIM) 300 MG tablet TAKE 1 TABLET(300 MG) BY MOUTH EVERY DAY    amLODIPine (NORVASC) 5 MG tablet Take 1 tablet (5 mg total) by mouth once daily.    aspirin (ECOTRIN) 81  MG EC tablet Take 1 tablet (81 mg total) by mouth once daily.    atorvastatin (LIPITOR) 40 MG tablet TAKE 1 TABLET(40 MG) BY MOUTH EVERY DAY    azelastine (ASTELIN) 137 mcg (0.1 %) nasal spray USE 1 SPRAY(137 MCG) IN EACH NOSTRIL TWICE DAILY    blood sugar diagnostic (ACCU-CHEK AMELIA PLUS TEST STRP) Strp Checks bg 2 x day before meals    cetirizine (ZYRTEC) 10 MG tablet Take 1 tablet (10 mg total) by mouth once daily.    clopidogrel (PLAVIX) 75 mg tablet Take 1 tablet (75 mg total) by mouth once daily.    collagenase (SANTYL) ointment Apply topically once daily.    FERROUS GLUCONATE (FERATE ORAL) Take by mouth once daily at 6am.     furosemide (LASIX) 40 MG tablet Take 1 tablet (40 mg total) by mouth 2 (two) times daily.    glipiZIDE (GLUCOTROL) 2.5 MG TR24 Take 1 tablet (2.5 mg total) by mouth daily with breakfast.    HYDROcodone-acetaminophen (NORCO) 5-325 mg per tablet Take 1 tablet by mouth every 8 (eight) hours as needed for Pain. (Patient taking differently: Take 1 tablet by mouth every 6 (six) hours as needed for Pain. )    hydrocortisone butyrate (LOCOID) 0.1 % Crea cream AAA buttock bid    KRILL/OM3/DHA/EPA/OM6/LIP/ASTX (KRILL OIL, OMEGA 3 & 6, ORAL) Take by mouth once daily at 6am.     levothyroxine (SYNTHROID) 75 MCG tablet Take 1 tablet (75 mcg total) by mouth before breakfast.    losartan (COZAAR) 100 MG tablet Take 1 tablet (100 mg total) by mouth once daily.    magnesium oxide (MAG-OX) 400 mg tablet Take 1 tablet (400 mg total) by mouth once daily.    metoprolol succinate (TOPROL-XL) 50 MG 24 hr tablet Take 1 tablet (50 mg total) by mouth once daily.    miconazole NITRATE 2 % (ZEASORB AF) 2 % top powder Apply topically as needed for Itching.    ranitidine (ZANTAC) 150 MG tablet Take 1 tablet (150 mg total) by mouth 2 (two) times daily as needed for Heartburn.    SYMBICORT 160-4.5 mcg/actuation HFAA INHALE TWO PUFFS INTO THE LUNGS EVERY 12 HOURS    loperamide (IMODIUM) 2 mg capsule  Take 2 mg by mouth 4 (four) times daily as needed for Diarrhea.     Family History     Problem Relation (Age of Onset)    Diabetes Mother    Hypertension Mother, Father        Tobacco Use    Smoking status: Never Smoker    Smokeless tobacco: Never Used   Substance and Sexual Activity    Alcohol use: No    Drug use: No    Sexual activity: No     Review of Systems   Constitution: Negative for chills, fever and malaise/fatigue.   HENT: Negative.    Cardiovascular: Positive for leg swelling. Negative for chest pain, dyspnea on exertion, irregular heartbeat, orthopnea, palpitations and paroxysmal nocturnal dyspnea.   Respiratory: Negative for shortness of breath and wheezing.    Skin: Negative for color change and rash.   Musculoskeletal: Negative for arthritis, back pain and myalgias.   Gastrointestinal: Negative for abdominal pain, constipation, diarrhea and nausea.   Neurological: Negative for dizziness, numbness and paresthesias.   Psychiatric/Behavioral: Negative.      Objective:     Vital Signs (Most Recent):  Temp: 97.7 °F (36.5 °C) (12/13/18 1609)  Pulse: 90 (12/13/18 1609)  Resp: 16 (12/13/18 1609)  BP: (!) 140/60 (12/13/18 1609)  SpO2: 98 % (12/13/18 1609) Vital Signs (24h Range):  Temp:  [97.2 °F (36.2 °C)-98.5 °F (36.9 °C)] 97.7 °F (36.5 °C)  Pulse:  [] 90  Resp:  [16-18] 16  SpO2:  [94 %-100 %] 98 %  BP: (119-151)/(53-70) 140/60     Weight: 106.6 kg (235 lb 0.2 oz)  Body mass index is 39.11 kg/m².    SpO2: 98 %  O2 Device (Oxygen Therapy): room air      Intake/Output Summary (Last 24 hours) at 12/13/2018 1749  Last data filed at 12/13/2018 0636  Gross per 24 hour   Intake 300 ml   Output --   Net 300 ml       Lines/Drains/Airways     Peripherally Inserted Central Catheter Line                 PICC Double Lumen 11/29/18 1332 right basilic 14 days          Peripheral Intravenous Line                 Peripheral IV - Single Lumen 12/12/18 1850 Antecubital less than 1 day                Physical Exam    Constitutional: Vital signs are normal. She appears well-developed and well-nourished. She is cooperative.  Non-toxic appearance. No distress.   HENT:   Head: Normocephalic and atraumatic.   Neck: No hepatojugular reflux and no JVD present. Carotid bruit is not present.   Cardiovascular: Normal rate, regular rhythm, S1 normal, S2 normal and normal heart sounds. Exam reveals no gallop.   No murmur heard.  Pulses:       Radial pulses are 2+ on the right side, and 2+ on the left side.        Dorsalis pedis pulses are 2+ on the right side, and 2+ on the left side.        Posterior tibial pulses are 2+ on the right side, and 2+ on the left side.   2+ lower extremity edema with signs of chronic venous stasis.   Pulmonary/Chest: Effort normal and breath sounds normal. No respiratory distress. She has no decreased breath sounds. She has no wheezes. She has no rhonchi. She has no rales.   Abdominal: Soft. Normal appearance and bowel sounds are normal. She exhibits no distension and no mass. There is no tenderness.   Neurological: She is alert.   Skin: Skin is warm, dry and intact.       Significant Labs:   CMP   Recent Labs   Lab 12/12/18  1852 12/13/18  0340    137   K 4.4 4.0   CL 97 104   CO2 28 25   * 207*   BUN 41* 35*   CREATININE 0.9 0.7   CALCIUM 8.9 8.3*   PROT 5.9* 5.0*   ALBUMIN 1.8* 1.5*   BILITOT 0.3 0.4   ALKPHOS 224* 173*   AST 21 16   ALT 35 28   ANIONGAP 11 8   ESTGFRAFRICA >60.0 >60.0   EGFRNONAA >60.0 >60.0   , CBC   Recent Labs   Lab 12/12/18  1852 12/13/18  0340   WBC 10.72 7.65   HGB 8.8* 7.1*   HCT 27.0* 23.2*    249   , INR No results for input(s): INR, PROTIME in the last 48 hours. and Troponin No results for input(s): TROPONINI in the last 48 hours.    Significant Imaging:  EKG (12/12/2018): Sinus tachycardia    Exercise Stress Echo (07/11/2018):  CONCLUSIONS     1 - Mildly depressed left ventricular systolic function (EF 50-55%).     2 - Eccentric hypertrophy.     3 - Impaired  LV relaxation, normal LAP (grade 1 diastolic dysfunction).     4 - Normal right ventricular systolic function .     Positive stress echocardiographic study demonstrating an ischemic response involving the anterior septum, anterolateral wall, apical septum, inferior wall, anterior wall. This may represent multivessel coronary disease or a nonischemic cardiomyopathy.    [facility] (07/20/2018):      Patient has a right dominant coronary artery.        The coronary vessels have luminal irregularities.        - Left Main Coronary Artery:             The LM has luminal irregularities. There is DONNY 3 flow.     - Left Anterior Descending Artery:             The proximal LAD is patent within the stent. There is DONNY 3 flow. There is collateral flow from the RCA (faint).             The bifurcation of the LAD has luminal irregularities. There is DONNY 3 flow. There is collateral flow from the RCA (faint).             The mid LAD is patent within the stent. There is DONNY 3 flow. FFR was 0.78. There is collateral flow from the RCA (faint). The remaining portion of the vessel has luminal irregularities (10).     - D1:             The D1 has luminal irregularities. There is DONNY 3 flow.                     Lesion Details:   The length is 10mm. Bifurcation is present. The minimum luminal diameter is 0.6 millimeters. The reference vessel diameter is 2.5 millimeters.     - Left Circumflex Artery:             The distal LCX has a 70% stenosis. There is DONNY 3 flow. FFR was 0.82.     - Right Coronary Artery:             The RCA has luminal irregularities. There is DONNY 3 flow.      Intervention          Bifurcation of the LAD:              The lesion was successfully intervened. Post-stenosis of 0%, post-DONNY 3 flow and TMP grade 3. The vessel was accessed natively.  The following items were used: 2.5MM 12MM [place] Balloon.       D1:              The lesion was successfully intervened. Post-stenosis of 0%, post-DONNY 3 flow and TMP grade 3.  The vessel was accessed natively.  The following items were used: 2.5MM 15MM [place] Balloon and Stent Resolute Rx 2.50x14 (OSMEL).

## 2018-12-13 NOTE — PT/OT/SLP PROGRESS
Occupational Therapy      Patient Name:  Krissy Marino   MRN:  [id number]    Patient not seen today secondary to undergoing imaging and ortho consult. Communicated with orthopedic MD regarding pt WB status. He would like L LE NWB for now until further notice. Pt unavailable in the afternoon. Will follow-up tomorrow.    ZACHARIAH Miller  12/13/2018

## 2018-12-13 NOTE — HOSPITAL COURSE
12/12/2018: Admitted to , started on vanc, MRI ordered along with ID and podiatry consultation  12/13/2018: MRI showed osteo, abscess. ID recommended adding CTX. Podiatry deferring to ortho. Brought on cardiology given DAPT and high risk lesion  12/14/2018: ID broadened antibiotics to vanc + cefepime + flagyl; ortho recommending AKA but patient undecided  12/15/2018: Stable examination on antibiotics, ongoing discussions regarding ultimate plan  12/16/2018: Renal function stable, holding vanc due to markedly elevated trough, 1 unit pRBCs ordered for anemia  12/17/2018: Excellent response to transfusion, continuing to hold vanc due to elevated trough

## 2018-12-13 NOTE — ASSESSMENT & PLAN NOTE
79 yo female with recent GBS bacteremia, recurrent LLE cellulitis, PVD now found to have osteomyelitis of the hindfoot including the distal tibia and tarsal bones with associated cellulitis, myositis, and multiloculated abscesses in the deep soft tissues contiguous with lateral distal foreleg ulceration. On vanc and ceftriaxone.  Ortho Sx following and planning surgery - washout vs BKA.  BCXs NGTD    Plan   - Continue Vanc and Ceftriaxone  - Vanc trough prior to 4th dose - trough goal 15-20  - Consider vasc sx consult to assess PVD and adequate blood flow prior to sx  - Please send cultures from surgery  - will follow  - seen with ID staff and discussed with Orhto Sx and primary team

## 2018-12-13 NOTE — HPI
Krissy Marino is a 78 y.o. female with bilateral TKAs (done many years ago), DMII, CAD s/p PCI in7/18 and PVD who is admitted for left ankle osteomyelitis and multiple abscess.  Patient had been admittted to [facility] for cellulitis with group B strep bacteremia and a PICC line was placed and she was discharged on IV ceftriaxone which she completed 2 days ago.  She continued to have purulent drainage from the lateral left ankle and presented to the ED yesterday.  In the emergency department, labs were notable for a white blood cell count 10, sed rate 56, and CRP 67.  MRI shows osteomyelitis of the hindfoot and distal tibia with multiple abscess that appear to communicate with the OM.  Per the patient she ambulates without any assistance and she still works.  She is on dual antiplatelet therapy for her cardiac stents.  Of note she is a poor historian.

## 2018-12-14 PROBLEM — L89.302 PRESSURE INJURY OF BUTTOCK, STAGE 2: Status: ACTIVE | Noted: 2018-12-14

## 2018-12-14 LAB
ALBUMIN SERPL BCP-MCNC: 1.4 G/DL
ALP SERPL-CCNC: 161 U/L
ALT SERPL W/O P-5'-P-CCNC: 24 U/L
ANION GAP SERPL CALC-SCNC: 7 MMOL/L
AST SERPL-CCNC: 14 U/L
BASOPHILS # BLD AUTO: 0.02 K/UL
BASOPHILS NFR BLD: 0.3 %
BILIRUB SERPL-MCNC: 0.3 MG/DL
BUN SERPL-MCNC: 28 MG/DL
CALCIUM SERPL-MCNC: 8.5 MG/DL
CHLORIDE SERPL-SCNC: 106 MMOL/L
CO2 SERPL-SCNC: 26 MMOL/L
CREAT SERPL-MCNC: 0.8 MG/DL
DIFFERENTIAL METHOD: ABNORMAL
EOSINOPHIL # BLD AUTO: 0.2 K/UL
EOSINOPHIL NFR BLD: 3.7 %
ERYTHROCYTE [DISTWIDTH] IN BLOOD BY AUTOMATED COUNT: 14.3 %
EST. GFR  (AFRICAN AMERICAN): >60 ML/MIN/1.73 M^2
EST. GFR  (NON AFRICAN AMERICAN): >60 ML/MIN/1.73 M^2
GLUCOSE SERPL-MCNC: 185 MG/DL
HCT VFR BLD AUTO: 23.6 %
HGB BLD-MCNC: 7.2 G/DL
IMM GRANULOCYTES # BLD AUTO: 0.06 K/UL
IMM GRANULOCYTES NFR BLD AUTO: 1 %
LYMPHOCYTES # BLD AUTO: 0.6 K/UL
LYMPHOCYTES NFR BLD: 9.8 %
MAGNESIUM SERPL-MCNC: 1.5 MG/DL
MCH RBC QN AUTO: 31.9 PG
MCHC RBC AUTO-ENTMCNC: 30.5 G/DL
MCV RBC AUTO: 104 FL
MONOCYTES # BLD AUTO: 0.4 K/UL
MONOCYTES NFR BLD: 6.8 %
NEUTROPHILS # BLD AUTO: 4.9 K/UL
NEUTROPHILS NFR BLD: 78.4 %
NRBC BLD-RTO: 0 /100 WBC
PLATELET # BLD AUTO: 251 K/UL
PMV BLD AUTO: 10.2 FL
POCT GLUCOSE: 196 MG/DL (ref 70–110)
POCT GLUCOSE: 203 MG/DL (ref 70–110)
POCT GLUCOSE: 204 MG/DL (ref 70–110)
POCT GLUCOSE: 235 MG/DL (ref 70–110)
POTASSIUM SERPL-SCNC: 4.4 MMOL/L
PROT SERPL-MCNC: 4.7 G/DL
RBC # BLD AUTO: 2.26 M/UL
SODIUM SERPL-SCNC: 139 MMOL/L
VANCOMYCIN TROUGH SERPL-MCNC: 28.2 UG/ML
WBC # BLD AUTO: 6.22 K/UL

## 2018-12-14 PROCEDURE — 80053 COMPREHEN METABOLIC PANEL: CPT

## 2018-12-14 PROCEDURE — 25000242 PHARM REV CODE 250 ALT 637 W/ HCPCS: Performed by: HOSPITALIST

## 2018-12-14 PROCEDURE — 97162 PT EVAL MOD COMPLEX 30 MIN: CPT

## 2018-12-14 PROCEDURE — 97530 THERAPEUTIC ACTIVITIES: CPT

## 2018-12-14 PROCEDURE — 97166 OT EVAL MOD COMPLEX 45 MIN: CPT

## 2018-12-14 PROCEDURE — 25000003 PHARM REV CODE 250: Performed by: HOSPITALIST

## 2018-12-14 PROCEDURE — 99232 SBSQ HOSP IP/OBS MODERATE 35: CPT | Mod: ,,, | Performed by: PHYSICIAN ASSISTANT

## 2018-12-14 PROCEDURE — 63600175 PHARM REV CODE 636 W HCPCS: Performed by: INTERNAL MEDICINE

## 2018-12-14 PROCEDURE — 36415 COLL VENOUS BLD VENIPUNCTURE: CPT

## 2018-12-14 PROCEDURE — 80202 ASSAY OF VANCOMYCIN: CPT

## 2018-12-14 PROCEDURE — 11000001 HC ACUTE MED/SURG PRIVATE ROOM

## 2018-12-14 PROCEDURE — 25000003 PHARM REV CODE 250: Performed by: STUDENT IN AN ORGANIZED HEALTH CARE EDUCATION/TRAINING PROGRAM

## 2018-12-14 PROCEDURE — 85025 COMPLETE CBC W/AUTO DIFF WBC: CPT

## 2018-12-14 PROCEDURE — 25000003 PHARM REV CODE 250: Performed by: PHYSICIAN ASSISTANT

## 2018-12-14 PROCEDURE — 83735 ASSAY OF MAGNESIUM: CPT

## 2018-12-14 PROCEDURE — 25000003 PHARM REV CODE 250: Performed by: INTERNAL MEDICINE

## 2018-12-14 PROCEDURE — 99232 SBSQ HOSP IP/OBS MODERATE 35: CPT | Mod: ,,, | Performed by: INTERNAL MEDICINE

## 2018-12-14 PROCEDURE — 63600175 PHARM REV CODE 636 W HCPCS: Performed by: HOSPITALIST

## 2018-12-14 PROCEDURE — 63600175 PHARM REV CODE 636 W HCPCS: Performed by: PHYSICIAN ASSISTANT

## 2018-12-14 RX ORDER — VANCOMYCIN HCL IN 5 % DEXTROSE 1.5G/250ML
15 PLASTIC BAG, INJECTION (ML) INTRAVENOUS
Status: DISCONTINUED | OUTPATIENT
Start: 2018-12-15 | End: 2018-12-14

## 2018-12-14 RX ORDER — CEFEPIME HYDROCHLORIDE 2 G/1
2 INJECTION, POWDER, FOR SOLUTION INTRAVENOUS
Status: DISCONTINUED | OUTPATIENT
Start: 2018-12-14 | End: 2018-12-19

## 2018-12-14 RX ORDER — METRONIDAZOLE 500 MG/1
500 TABLET ORAL EVERY 8 HOURS
Status: DISCONTINUED | OUTPATIENT
Start: 2018-12-14 | End: 2018-12-26 | Stop reason: HOSPADM

## 2018-12-14 RX ADMIN — CEFTRIAXONE 2 G: 2 INJECTION, SOLUTION INTRAVENOUS at 11:12

## 2018-12-14 RX ADMIN — VANCOMYCIN HYDROCHLORIDE 1250 MG: 10 INJECTION, POWDER, LYOPHILIZED, FOR SOLUTION INTRAVENOUS at 05:12

## 2018-12-14 RX ADMIN — LOSARTAN POTASSIUM 100 MG: 50 TABLET, FILM COATED ORAL at 10:12

## 2018-12-14 RX ADMIN — INSULIN ASPART 2 UNITS: 100 INJECTION, SOLUTION INTRAVENOUS; SUBCUTANEOUS at 07:12

## 2018-12-14 RX ADMIN — ATORVASTATIN CALCIUM 40 MG: 20 TABLET, FILM COATED ORAL at 10:12

## 2018-12-14 RX ADMIN — CETIRIZINE HYDROCHLORIDE 10 MG: 10 TABLET, FILM COATED ORAL at 10:12

## 2018-12-14 RX ADMIN — VANCOMYCIN HYDROCHLORIDE 1500 MG: 10 INJECTION, POWDER, LYOPHILIZED, FOR SOLUTION INTRAVENOUS at 06:12

## 2018-12-14 RX ADMIN — FLUTICASONE FUROATE AND VILANTEROL TRIFENATATE 1 PUFF: 100; 25 POWDER RESPIRATORY (INHALATION) at 10:12

## 2018-12-14 RX ADMIN — CEFEPIME 2 G: 2 INJECTION, POWDER, FOR SOLUTION INTRAVENOUS at 02:12

## 2018-12-14 RX ADMIN — METRONIDAZOLE 500 MG: 500 TABLET ORAL at 02:12

## 2018-12-14 RX ADMIN — ASPIRIN 81 MG: 81 TABLET, COATED ORAL at 10:12

## 2018-12-14 RX ADMIN — METOPROLOL SUCCINATE 50 MG: 50 TABLET, EXTENDED RELEASE ORAL at 10:12

## 2018-12-14 RX ADMIN — ALLOPURINOL 300 MG: 300 TABLET ORAL at 10:12

## 2018-12-14 RX ADMIN — INSULIN ASPART 2 UNITS: 100 INJECTION, SOLUTION INTRAVENOUS; SUBCUTANEOUS at 02:12

## 2018-12-14 RX ADMIN — METRONIDAZOLE 500 MG: 500 TABLET ORAL at 09:12

## 2018-12-14 RX ADMIN — AMLODIPINE BESYLATE 5 MG: 5 TABLET ORAL at 10:12

## 2018-12-14 RX ADMIN — INSULIN ASPART 2 UNITS: 100 INJECTION, SOLUTION INTRAVENOUS; SUBCUTANEOUS at 10:12

## 2018-12-14 RX ADMIN — ENOXAPARIN SODIUM 40 MG: 100 INJECTION SUBCUTANEOUS at 05:12

## 2018-12-14 RX ADMIN — CLOPIDOGREL 75 MG: 75 TABLET, FILM COATED ORAL at 10:12

## 2018-12-14 RX ADMIN — FAMOTIDINE 20 MG: 20 TABLET ORAL at 09:12

## 2018-12-14 RX ADMIN — GABAPENTIN 300 MG: 300 CAPSULE ORAL at 02:12

## 2018-12-14 RX ADMIN — POLYETHYLENE GLYCOL 3350 17 G: 17 POWDER, FOR SOLUTION ORAL at 10:12

## 2018-12-14 RX ADMIN — LEVOTHYROXINE SODIUM 75 MCG: 75 TABLET ORAL at 06:12

## 2018-12-14 RX ADMIN — GABAPENTIN 300 MG: 300 CAPSULE ORAL at 10:12

## 2018-12-14 RX ADMIN — GABAPENTIN 300 MG: 300 CAPSULE ORAL at 09:12

## 2018-12-14 RX ADMIN — FAMOTIDINE 20 MG: 20 TABLET ORAL at 10:12

## 2018-12-14 NOTE — ASSESSMENT & PLAN NOTE
"  - Stable  - Complicated by abscess formation and overlying cellulitis  - Demonstrated on MRI 12/13, which showed "osteomyelitis of the hindfoot including the distal tibia and tarsal bones with associated cellulitis, myositis, and multiloculated abscesses in the deep soft tissues"  - f/u CT showed osteolytic lesions and extensive abscess  - No leukocytosis. ESR and CRP both elevated  - Cardiology re: DAPT; would prefer to continue if at all possible  - Blood cultures NGTD, hemodynamically stable  - ID broadened antibiotics to vanc + cefepime + flagyl  - Ortho following, appreciate assistance, recommending AKA but patient hesitant    Appreciate continued assistance from multiple consulting services  "

## 2018-12-14 NOTE — PLAN OF CARE
Problem: Physical Therapy Goal  Goal: Physical Therapy Goal  Goals to be met by: 10 days ()    Patient will increase functional independence with mobility by performin. Supine to sit with Stand-by Assistance  2. Sit to supine with Stand-by Assistance  3. Sit to stand transfer with Minimal Assistance using RW while maintaining NWB L LE status  4. Bed to chair with Moderate Assistance using RW while maintaining NWB L LE  5. Lower extremity exercise program x30 reps per handout, with independence to improve muscular strength and endurance.     Outcome: Ongoing (interventions implemented as appropriate)  PT evaluation completed. POC initiated.    Anjelica Saavedra, PT  2018

## 2018-12-14 NOTE — ASSESSMENT & PLAN NOTE
- Stable  - Continue toprol, losartan, and norvasc  - Will restart lasix as indicated, possibly tomorrow depending on her examination

## 2018-12-14 NOTE — ASSESSMENT & PLAN NOTE
- Stable  - Will tread underlying causes: cellulitis, OM, and CHF  - Will continue to follow with serial examinations; BUN:Cr still elevated

## 2018-12-14 NOTE — SUBJECTIVE & OBJECTIVE
Interval History:     Ms. Marino felt well this morning, denying chest pain and shortness of breath.     Her MRI last night showed abscess and osteomyelitis extending proximally above her ankle prompting orthopedic surgery consultation. She is on DAPT following OSMEL to bifurcation lesion in July, for which cardiology has been consulted in anticipation of upcoming surgery. Added rocephin at ID's recommendation. CT scan in process.    Review of Systems   Constitutional: Negative for chills and fever.   Respiratory: Negative for cough and shortness of breath.    Cardiovascular: Positive for leg swelling. Negative for chest pain.   Gastrointestinal: Negative for abdominal pain, constipation, diarrhea, nausea and vomiting.   Genitourinary: Negative for difficulty urinating.   Musculoskeletal: Negative for myalgias.   Skin: Positive for rash and wound.   Neurological: Negative for weakness and numbness.   Psychiatric/Behavioral: Negative for dysphoric mood. The patient is not nervous/anxious.    Objective:     Vital Signs (Most Recent):  Temp: 97.2 °F (36.2 °C) (12/14/18 1131)  Pulse: 97 (12/14/18 1131)  Resp: 17 (12/14/18 1131)  BP: 131/63 (12/14/18 1131)  SpO2: 97 % (12/14/18 1131) Vital Signs (24h Range):  Temp:  [97.1 °F (36.2 °C)-98.9 °F (37.2 °C)] 97.2 °F (36.2 °C)  Pulse:  [73-97] 97  Resp:  [16-17] 17  SpO2:  [95 %-99 %] 97 %  BP: (131-150)/(51-65) 131/63     Weight: 106.6 kg (235 lb 0.2 oz)  Body mass index is 39.11 kg/m².    Intake/Output Summary (Last 24 hours) at 12/14/2018 1443  Last data filed at 12/14/2018 0600  Gross per 24 hour   Intake 370 ml   Output --   Net 370 ml      Physical Exam   Constitutional: She is oriented to person, place, and time. No distress.   Eyes: Pupils are equal, round, and reactive to light.   Cardiovascular: Normal rate and regular rhythm.   No murmur heard.  Pulmonary/Chest: Effort normal and breath sounds normal. No respiratory distress. She has no wheezes.   Abdominal: Soft.  "Bowel sounds are normal. She exhibits no distension. There is no tenderness.   Musculoskeletal: She exhibits edema (bilateral lower extremities worse on the left). She exhibits no tenderness.   Neurological: She is alert and oriented to person, place, and time. She displays normal reflexes. No cranial nerve deficit.   Skin: Skin is warm and dry. She is not diaphoretic. There is erythema (LLE erythema, appears improved relative to admission borders).   Large ulceration present at lateral aspect of left foot with surrounding erythema extending up leg proximally, appears improved relative to border marking from last night   Psychiatric: She has a normal mood and affect. Her behavior is normal.       Significant Labs:     CBC:    Recent Labs   Lab 12/12/18 1852 12/13/18  0340 12/14/18  0534   WBC 10.72 7.65 6.22   GRAN 83.6*  9.0* 82.2*  6.3 78.4*  4.9   HGB 8.8* 7.1* 7.2*   HCT 27.0* 23.2* 23.6*    249 251       Chem 10:  Recent Labs   Lab 12/12/18 1852 12/13/18  0340 12/14/18  0534    137 139   K 4.4 4.0 4.4   CL 97 104 106   CO2 28 25 26   BUN 41* 35* 28*   CREATININE 0.9 0.7 0.8   * 207* 185*   CALCIUM 8.9 8.3* 8.5*   MG  --  1.6 1.5*   PHOS  --  3.1  --        LFTs:  Recent Labs   Lab 12/12/18 1852 12/13/18  0340 12/14/18  0534   ALKPHOS 224* 173* 161*   BILITOT 0.3 0.4 0.3   AST 21 16 14   ALT 35 28 24   ALBUMIN 1.8* 1.5* 1.4*       Significant Imaging:     CT leg  "Rim enhancing multiloculated abscess extends at least to the level of the fibular head noting there are separate fluid collections posterior and medial to the knee (axial series 4, image 137) which possibly communicates although extensive beam hardening artifact from metallic knee prosthesis significantly limits assessment.    Multifocal osteolytic lesions with sclerotic rim in the foot and distal tibia likely representing components of acute and chronic osteomyelitis.  No definite calcification within a lucent lesion to " "suggest sequestrum although cannot be completely excluded.    Ulceration in the subcutaneous gas at the lateral distal foreleg is concerning for gas gangrene." - per interpreting radiologist    MRI left ankle with and without contrast  "Findings concerning for osteomyelitis of the hindfoot including the distal tibia and tarsal bones with associated cellulitis, myositis, and multiloculated abscesses in the deep soft tissues contiguous with lateral distal foreleg ulceration.  Component of abscess extends cranially outside the field of view, possibly more proximal portions of the left lower extremity." - per interpreting radiologist    X-ray tib fib left  "Status post total knee arthroplasty without evidence for complication.  Degenerative changes are noted at the ankle.  Vascular calcifications are present.  There is soft tissue gas at the lateral aspect of the ankle." - per interpreting radiologist  "

## 2018-12-14 NOTE — ASSESSMENT & PLAN NOTE
- Stable, but likely contributing to poor wound healing and increased infection risk  - Previously evaluated in an outpatient setting with recommendations for stent placement; discussed the possibility of vascular surg consult with ortho  - Continue cardiac regimen with asa, plavix, and lipitor

## 2018-12-14 NOTE — NURSING
BS 63, rechecked 61, pt declined glucose tabs, and stated she was about to eat dinner, pt asymptomatic, will continue to monitor.

## 2018-12-14 NOTE — PT/OT/SLP EVAL
Physical Therapy Evaluation    Patient Name:  Krissy Marino   MRN:  [id number]    Recommendations:     Discharge Recommendations:  Skilled nursing facility  Discharge Equipment Recommendations: none   Barriers to discharge: None    Assessment:     Krissy Marino is a 78 y.o. female admitted with a medical diagnosis of Osteomyelitis of left tibia.  She presents with the following impairments/functional limitations:  weakness, impaired endurance, gait instability, decreased coordination, decreased safety awareness, impaired balance, decreased lower extremity function, impaired cardiopulmonary response to activity, orthopedic precautions.  During today's session, pt with inability to maintain NWB status on L LE in compliance with orthopedic precautions due to poor wound healing. Prior to recent SNF placement, pt ambulatory with [employer] usage and still working. Pt with need of amputation which she is heavily resistant to; this may affect her overall progression with mobility at current time. Pt would benefit from continued skilled services to address deficits and to improve overall mobility and (I) with activity.     Rehab Prognosis: Good; patient would benefit from acute skilled PT services to address these deficits and reach maximum level of function.    Recent Surgery: * No surgery found *      Plan:     During this hospitalization, patient to be seen 4 x/week to address the identified rehab impairments via gait training, therapeutic activities, therapeutic exercises and progress toward the following goals:    GOALS:   Multidisciplinary Problems     Physical Therapy Goals        Problem: Physical Therapy Goal    Goal Priority Disciplines Outcome Goal Variances Interventions   Physical Therapy Goal     PT, PT/OT Ongoing (interventions implemented as appropriate)     Description:  Goals to be met by: 10 days ()    Patient will increase functional independence with mobility by performin. Supine to sit with Stand-by  "Assistance  2. Sit to supine with Stand-by Assistance  3. Sit to stand transfer with Minimal Assistance using RW while maintaining NWB L LE status  4. Bed to chair with Moderate Assistance using RW while maintaining NWB L LE  5. Lower extremity exercise program x30 reps per handout, with independence to improve muscular strength and endurance.                       · Plan of Care Expires:  01/13/19    Subjective     Chief Complaint: request of amputation  Patient/Family Comments/goals: "I have lived a long life" per pt during discussion.  Pain/Comfort:  · Pain Rating 1: 9/10  · Location - Side 1: Left  · Location - Orientation 1: distal  · Location 1: leg  · Pain Addressed 1: Reposition, Distraction  · Pain Rating Post-Intervention 1: 9/10    Patients cultural, spiritual, Scientologist conflicts given the current situation:      Living Environment:  Pt recently admitted to SNF for needs. Prior to this, pt lived with adult son in one story home c/ no home barriers. 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     Objective:     Communicated with nsg prior to session.  Patient found all lines intact and call button in reach peripheral IV, telemetry  upon PT entry to room.    General Precautions: Standard, fall   Orthopedic Precautions:LLE non weight bearing   Braces: N/A       Exams:  Cognitive Exam  Patient is oriented to Person, Place, Time and Situation and follows 100% of multi-step commands    Fine Motor Coordination    -       Intact  RLE heel shin   Postural Exam Patient presented with the following abnormalities:    -       Rounded shoulders  -       Forward head  -       Posterior pelvic tilt   Sensation    -       Intact  light/touch (B) LE   Skin Integrity/Edema     -       Skin integrity: Wound L lateral calf; drainage present  -       Edema: Moderate to L LE   R LE ROM WFL   R LE Strength  4-/5 hip flexion, 4/5 knee ext/flex, and ankle DF   L LE ROM WFL   L LE Strength  4-/5 hip flexion, knee " ext/flex, did not asses DF       Functional Mobility  Bed Mobility  Supine to Sit: moderate assistance   Sit to Supine: moderate assistance   Transfers Sit to Stand:  maximal assistance with rolling walker for 2 trials; unable to perform NWB status on L LE initially; able to perform on 2nd trial but unable to sustain     Gait Pt unable to perform lateral steps while maintaining NWB status on L LE         Balance   Static Sitting contact guard assistance   Dynamic Sitting contact guard assistance   Static Standing moderate assistance   Dynamic Standing moderate assistance           Therapeutic Activities and Exercises:    PT educated pt on the following  - role of PT  - PT POC (including frequency and duration while in hospital)  - discharge recommendation (SNF) and equipment needs (TBD)  - level of assistance currently req (1-2 person )and safety precautions with ns staff   All questions and concerns answered and addressed. White board updated with pertinent information. Nsg notified.       AM-PAC 6 CLICK MOBILITY  Total Score:12     Patient left supine with all lines intact and call button in reach.    GOALS:   Multidisciplinary Problems     Physical Therapy Goals        Problem: Physical Therapy Goal    Goal Priority Disciplines Outcome Goal Variances Interventions   Physical Therapy Goal     PT, PT/OT Ongoing (interventions implemented as appropriate)     Description:  Goals to be met by: 10 days ()    Patient will increase functional independence with mobility by performin. Supine to sit with Stand-by Assistance  2. Sit to supine with Stand-by Assistance  3. Sit to stand transfer with Minimal Assistance using RW while maintaining NWB L LE status  4. Bed to chair with Moderate Assistance using RW while maintaining NWB L LE  5. Lower extremity exercise program x30 reps per handout, with independence to improve muscular strength and endurance.                       History:     Past Medical History:    Diagnosis Date    Adverse effect of glucocorticoid or synthetic analogue     Allergy     Arthritis     Cancer     CHF (congestive heart failure)     Chronic kidney disease     Coronary artery disease involving native coronary artery of native heart without angina pectoris 10/11/2016    Diabetic neuropathy 10/6/2014    Giant cell arteritis 9/24/2012    Hyperlipidemia     Hypertension     Hypothyroid     Obesity (BMI 30-39.9) 10/6/2014    Osteopenia     Primary osteoarthritis of both knees 9/24/2012    Type II or unspecified type diabetes mellitus with renal manifestations, uncontrolled(250.42)        Past Surgical History:   Procedure Laterality Date    APPENDECTOMY      CATARACT EXTRACTION      CATARACT EXTRACTION, BILATERAL      CHOLECYSTECTOMY      COLONOSCOPY N/A 12/27/2013    Performed by [facility] at [facility] (4TH FLR)    EYE SURGERY      HYSTERECTOMY      JOINT REPLACEMENT      bilateral total knee    SKIN BIOPSY      STRIPPING-TARSAL Bilateral 3/7/2018    Performed by Anastasia Nieto [facility] at [facility] OR 2ND FLR    TONSILLECTOMY         Clinical Decision Making:     History  Co-morbidities and personal factors that may impact the plan of care Examination  Body Structures and Functions, activity limitations and participation restrictions that may impact the plan of care Clinical Presentation   Decision Making/ Complexity Score   Co-morbidities:   [] Time since onset of injury / illness / exacerbation  [x] Status of current condition  []Patient's cognitive status and safety concerns    [x] Multiple Medical Problems (see med hx)  Personal Factors:   [] Patient's age  [] Prior Level of function   [x] Patient's home situation (environment and family support)  [] Patient's level of motivation  [] Expected progression of patient      HISTORY:(criteria)    [] 38720 - no personal factors/history    [] 42525 - has 1-2 personal factor/comorbidity     [x] 01925 - has >3 personal  factor/comorbidity     Body Regions:  [] Objective examination findings  [] Head     []  Neck  [] Trunk   [] Upper Extremity  [x] Lower Extremity    Body Systems:  [x] For communication ability, affect, cognition, language, and learning style: the assessment of the ability to make needs known, consciousness, orientation (person, place, and time), expected emotional /behavioral responses, and learning preferences (eg, learning barriers, education  needs)  [x] For the neuromuscular system: a general assessment of gross coordinated movement (eg, balance, gait, locomotion, transfers, and transitions) and motor function  (motor control and motor learning)  [] For the musculoskeletal system: the assessment of gross symmetry, gross range of motion, gross strength, height, and weight  [] For the integumentary system: the assessment of pliability(texture), presence of scar formation, skin color, and skin integrity  [] For cardiovascular/pulmonary system: the assessment of heart rate, respiratory rate, blood pressure, and edema     Activity limitations:    [x] Patient's cognitive status and saf ety concerns          [x] Status of current condition      [] Weight bearing restriction  [] Cardiopulmunary Restriction    Participation Restrictions:   [] Goals and goal agreement with the patient     [] Rehab potential (prognosis) and probable outcome      Examination of Body System: (criteria)    [] 66055 - addressing 1-2 elements    [x] 10223 - addressing a total of 3 or more elements     [] 25431 -  Addressing a total of 4 or more elements         Clinical Presentation: (criteria)  Evolving - 19843     On examination of body system using standardized tests and measures patient presents with 3 or more elements from any of the following: body structures and functions, activity limitations, and/or participation restrictions.  Leading to a clinical presentation that is considered evolving with changing  characteristics                              Clinical Decision Making  (Eval Complexity):  Moderate - 26020     Time Tracking:     PT Received On: 12/14/18  PT Start Time: 0840     PT Stop Time: 0914  PT Total Time (min): 34 min     Billable Minutes: Evaluation 20 and Therapeutic Activity 14      Anjelica Saavedra, PT  12/14/2018

## 2018-12-14 NOTE — PT/OT/SLP EVAL
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   Equipment Used at Home:     Assistance upon Discharge: son available to assist    Past Medical History:   Diagnosis Date    Adverse effect of glucocorticoid or synthetic analogue     Allergy     Arthritis     Cancer     CHF (congestive heart failure)     Chronic kidney disease     Coronary artery disease involving native coronary artery of native heart without angina pectoris 10/11/2016    Diabetic neuropathy 10/6/2014    Giant cell arteritis 9/24/2012    Hyperlipidemia     Hypertension     Hypothyroid     Obesity (BMI 30-39.9) 10/6/2014    Osteopenia     Primary osteoarthritis of both knees 9/24/2012    Type II or unspecified type diabetes mellitus with renal manifestations, uncontrolled(250.42)        Past Surgical History:   Procedure Laterality Date    APPENDECTOMY      CATARACT EXTRACTION      CATARACT EXTRACTION, BILATERAL      CHOLECYSTECTOMY      COLONOSCOPY N/A 12/27/2013    Performed by Sh[facility]ow MD at [facility] ENDO (4TH FLR)    EYE SURGERY      HYSTERECTOMY      JOINT REPLACEMENT      bilateral total knee    SKIN BIOPSY      STRIPPING-TARSAL Bilateral 3/7/2018    Performed by Anastasia Nieto MD at [facility] OR 2ND FLR    TONSILLECTOMY         Subjective     Chief Complaint: pain, difficulty with wound management   Patient/Family Comments/goals: return to home and work     Pain/Comfort:  · Pain Rating 1: 9/10  · Location - Side 1: Left  · Location - Orientation 1: distal  · Location 1: leg    Patients cultural,  spiritual, Evangelical conflicts given the current situation: no    Objective:     Communicated with: RN prior to session.  Patient found with: all lines intact and (all lines intact ) upon OT entry to room.    General Precautions: Standard, fall   Orthopedic Precautions:LLE non weight bearing   Braces: N/A     Occupational Performance:    Bed Mobility:    · Pt max A for bed mobility     Activities of Daily Living:  · Pt with mod A ofr self care completion     Cognitive/Visual Perceptual:  Cognitive/Psychosocial Skills:     -       Oriented to: Person, Place, Time and Situation   -       Follows Commands/attention:Follows two-step commands  -       Communication: clear/fluent  -       Memory: No Deficits noted  -       Safety awareness/insight to disability: intact   -       Mood/Affect/Coping skills/emotional control: Appropriate to situation    Physical Exam:  Upper Extremity Range of Motion:     -       Right Upper Extremity: nearly no active ROM from previous rotator cuff   -       Left Upper Extremity: WFL    AMPAC 6 Click ADL:  AMPAC Total Score: 14    Treatment & Education:  Evaluation complete and goals set.  Pt educated on safety, role of OT, importance of increased participation in self care for gains , expectations for participation, expectations for gains, POC, energy conservation, caregiver strain. White board updated.   - extensive training for weight bearing status  - education for benefits of functional gains with/without amputation  - bed mobility and positioning   Education:    Patient left supine with all lines intact    Assessment:     Krissy Marino is a 78 y.o. female with a medical diagnosis of Osteomyelitis of left tibia.  She presents with the following performance deficits affecting function: weakness, impaired endurance, impaired self care skills, impaired functional mobilty, gait instability, impaired balance, pain, impaired skin.      Rehab Prognosis: Good; patient would benefit from acute  "skilled OT services to address these deficits and reach maximum level of function.         Clinical Decision Makin.  OT Mod:  "Pt evaluation falls under moderate complexity for evaluation coding due to identification of 3-5 performance deficits noted as stated above. Eval required Min/Mod assistance to complete on this date and detailed assessment(s) were utilized. Moreover, an expanded review of history and occupational profile obtained with additional review of cognitive, physical and psychosocial hx."     Plan:     Patient to be seen 4 x/week to address the above listed problems via self-care/home management, therapeutic activities, therapeutic exercises  · Plan of Care Expires: 19  · Plan of Care Reviewed with: patient    This Plan of care has been discussed with the patient who was involved in its development and understands and is in agreement with the identified goals and treatment plan    GOALS:   Multidisciplinary Problems     Occupational Therapy Goals        Problem: Occupational Therapy Goal    Goal Priority Disciplines Outcome Interventions   Occupational Therapy Goal     OT, PT/OT Ongoing (interventions implemented as appropriate)    Description:  Goals to be met by:      Patient will increase functional independence with ADLs by performing:    UE Dressing with [place].  LE Dressing with Minimal Assistance.  Grooming while seated with Set-up Assistance.  Toileting from bedside commode with Minimal Assistance for hygiene and clothing management.   Bathing from  edge of bed with Moderate Assistance.                      Time Tracking:     OT Date of Treatment: 18  OT Start Time: 835  OT Stop Time: 910  OT Total Time (min): 35 min    Billable Minutes:Evaluation 10  Therapeutic Activity 25    Farrah Flower, OT  2018    "

## 2018-12-14 NOTE — PLAN OF CARE
Problem: Occupational Therapy Goal  Goal: Occupational Therapy Goal  Goals to be met by: 12/31     Patient will increase functional independence with ADLs by performing:    UE Dressing with [place].  LE Dressing with Minimal Assistance.  Grooming while seated with Set-up Assistance.  Toileting from bedside commode with Minimal Assistance for hygiene and clothing management.   Bathing from  edge of bed with Moderate Assistance.    Outcome: Ongoing (interventions implemented as appropriate)  Evaluation complete and goals set.  Cont with POC  Farrah Flower OT  12/14/2018

## 2018-12-14 NOTE — SUBJECTIVE & OBJECTIVE
Interval History: No AEON.  Afebrile and WBC WNL.  CT LLE shows extensive infection up to knee.  Ortho Sx rec AKA but patient declines and desires washout and abx.  The patient denies any recent fever, chills, or sweats.      Review of Systems   Constitutional: Negative for activity change, chills, diaphoresis and fever.   Respiratory: Negative for cough, shortness of breath and wheezing.    Cardiovascular: Negative for chest pain.   Gastrointestinal: Negative for abdominal pain, constipation, diarrhea, nausea and vomiting.   Genitourinary: Negative for dysuria, frequency and urgency.   Skin: Positive for wound.   Neurological: Negative for dizziness.   Hematological: Does not bruise/bleed easily.     Objective:     Vital Signs (Most Recent):  Temp: 97.2 °F (36.2 °C) (12/14/18 1131)  Pulse: 97 (12/14/18 1131)  Resp: 17 (12/14/18 1131)  BP: 131/63 (12/14/18 1131)  SpO2: 97 % (12/14/18 1131) Vital Signs (24h Range):  Temp:  [97.1 °F (36.2 °C)-98.9 °F (37.2 °C)] 97.2 °F (36.2 °C)  Pulse:  [73-97] 97  Resp:  [16-17] 17  SpO2:  [95 %-99 %] 97 %  BP: (131-150)/(51-65) 131/63     Weight: 106.6 kg (235 lb 0.2 oz)  Body mass index is 39.11 kg/m².    Estimated Creatinine Clearance: 68 mL/min (based on SCr of 0.8 mg/dL).    Physical Exam   Constitutional: She is oriented to person, place, and time. She appears well-developed and well-nourished. No distress.   HENT:   Head: Normocephalic and atraumatic.   Cardiovascular: Normal rate, regular rhythm and normal heart sounds. Exam reveals no gallop and no friction rub.   No murmur heard.  Pulmonary/Chest: Effort normal and breath sounds normal. No respiratory distress. She has no wheezes. She has no rales.   Abdominal: Soft. Bowel sounds are normal. She exhibits no distension and no mass. There is no tenderness. There is no rebound and no guarding.   Musculoskeletal: She exhibits edema (BL LE pitting).   Neurological: She is alert and oriented to person, place, and time.   Skin:  Skin is warm and dry. She is not diaphoretic.   Psychiatric: She has a normal mood and affect. Her behavior is normal.                   Significant Labs:   Blood Culture:   Recent Labs   Lab 11/26/18  0810 11/27/18  0858 11/27/18  0859 12/12/18  1849 12/12/18  1907   LABBLOO Gram stain anais bottle: Gram positive cocci in chains resembling Strep  Results called to and read back by:James Hooker RN  STREPTOCOCCUS AGALACTIAE (GROUP B)  Beta-hemolytic streptococci are routinely susceptible to   penicillins,cephalosporins and carbapenems.   No growth after 5 days. No growth after 5 days. No Growth to date  No Growth to date No Growth to date  No Growth to date     CBC:   Recent Labs   Lab 12/12/18  1852 12/13/18  0340 12/14/18  0534   WBC 10.72 7.65 6.22   HGB 8.8* 7.1* 7.2*   HCT 27.0* 23.2* 23.6*    249 251     CMP:   Recent Labs   Lab 12/12/18  1852 12/13/18  0340 12/14/18  0534    137 139   K 4.4 4.0 4.4   CL 97 104 106   CO2 28 25 26   * 207* 185*   BUN 41* 35* 28*   CREATININE 0.9 0.7 0.8   CALCIUM 8.9 8.3* 8.5*   PROT 5.9* 5.0* 4.7*   ALBUMIN 1.8* 1.5* 1.4*   BILITOT 0.3 0.4 0.3   ALKPHOS 224* 173* 161*   AST 21 16 14   ALT 35 28 24   ANIONGAP 11 8 7*   EGFRNONAA >60.0 >60.0 >60.0     Wound Culture: No results for input(s): LABAERO in the last 4320 hours.  All pertinent labs within the past 24 hours have been reviewed.    Significant Imaging: I have reviewed all pertinent imaging results/findings within the past 24 hours.   X-Ray Chest 1 View Pre-OP [[id number]] Resulted: 12/13/18 2020   Order Status: Completed Updated: 12/13/18 2022   Narrative:     EXAMINATION:  XR CHEST 1 VIEW PRE-OP    CLINICAL HISTORY:  pre op;    TECHNIQUE:  Single frontal view of the chest was performed.    COMPARISON:  Chest radiograph 12/12/2018.    FINDINGS:  Redemonstration of multiple radiopaque structures overlying the chest the limiting evaluation.  The stable position of a right PICC with catheter tip  overlying the brachiocephalic/SVC junction.    The cardiac silhouette and pulmonary vasculature is stable.    Lungs show no large focal consolidation, large pleural effusion or pneumothorax.    Bones are intact, with stable degenerative changes at the shoulders.   Impression:       No acute radiographic findings.    Electronically signed by resident: Eliazar Ordonez  Date: 12/13/2018  Time: 19:54    Electronically signed by: Toñito Flower MD  Date: 12/13/2018  Time: 20:20   CT Ankle (Including Hindfoot) W W/O Contrast Left [[id number]] (Abnormal) Resulted: 12/13/18 1816   Order Status: Completed Updated: 12/13/18 1819   Narrative:     EXAMINATION:  CT LEG (TIBIA-FIBULA) W W/O CONTRAST LEFT; CT ANKLE (INCLUDING HINDFOOT) W W/O CONTRAST LEFT; CT FOOT W W/O CONTRAST LEFT    CLINICAL HISTORY:  osteomyelitis and abscess  Osteomyelitis, unspecified    TECHNIQUE:  Axial images of were acquired from the distal femur to the forefoot before and after the administration of Omnipaque 350 IV contrast.  Sagittal and coronal reformats were provided.    COMPARISON:  Knee and tibia radiograph 12/13/2018, MRI ankle 12/12/2018    FINDINGS:  Small rim enhancing multiloculated air fluid collections extends into the interosseous membrane cranially at least to the level of the fibular head.  There are small fluid collections posterior and medial to the knee which possibly communicate (axial series 4, image 137) and possibly enhance although extensive beam hardening artifact from the metallic knee prosthesis limits assessment of that area.  There is ulceration and subcutaneous gas at the lateral distal foreleg.  There is prominent surrounding skin thickening and diffuse subcutaneous fluid attenuation predominantly through the forefoot and hindfoot as well as the distal lower extremity.  Multiple lucent lesions are identified throughout the talus and midfoot which contain may sclerotic rim suggesting chronic osteomyelitis.  Lucency within the  lateral talus contains air without sadia cortical disruption..  No fracture identified.  Vascular calcifications are present.   Impression:       Rim enhancing multiloculated abscess extends at least to the level of the fibular head noting there are separate fluid collections posterior and medial to the knee (axial series 4, image 137) which possibly communicates although extensive beam hardening artifact from metallic knee prosthesis significantly limits assessment.    Multifocal osteolytic lesions with sclerotic rim in the foot and distal tibia likely representing components of acute and chronic osteomyelitis.  No definite calcification within a lucent lesion to suggest sequestrum although cannot be completely excluded.    Ulceration in the subcutaneous gas at the lateral distal foreleg is concerning for gas gangrene.    This report was flagged in Epic as abnormal.    Electronically signed by resident: Raymond Vidales  Date: 12/13/2018  Time: 17:25    Electronically signed by: Germán Flowers MD  Date: 12/13/2018  Time: 18:16   CT Foot W W/O Contrast Left [[id number]] (Abnormal) Resulted: 12/13/18 1816   Order Status: Completed Updated: 12/13/18 1819   Narrative:     EXAMINATION:  CT LEG (TIBIA-FIBULA) W W/O CONTRAST LEFT; CT ANKLE (INCLUDING HINDFOOT) W W/O CONTRAST LEFT; CT FOOT W W/O CONTRAST LEFT    CLINICAL HISTORY:  osteomyelitis and abscess  Osteomyelitis, unspecified    TECHNIQUE:  Axial images of were acquired from the distal femur to the forefoot before and after the administration of Omnipaque 350 IV contrast.  Sagittal and coronal reformats were provided.    COMPARISON:  Knee and tibia radiograph 12/13/2018, MRI ankle 12/12/2018    FINDINGS:  Small rim enhancing multiloculated air fluid collections extends into the interosseous membrane cranially at least to the level of the fibular head.  There are small fluid collections posterior and medial to the knee which possibly communicate (axial series 4, image  137) and possibly enhance although extensive beam hardening artifact from the metallic knee prosthesis limits assessment of that area.  There is ulceration and subcutaneous gas at the lateral distal foreleg.  There is prominent surrounding skin thickening and diffuse subcutaneous fluid attenuation predominantly through the forefoot and hindfoot as well as the distal lower extremity.  Multiple lucent lesions are identified throughout the talus and midfoot which contain may sclerotic rim suggesting chronic osteomyelitis.  Lucency within the lateral talus contains air without sadia cortical disruption..  No fracture identified.  Vascular calcifications are present.   Impression:       Rim enhancing multiloculated abscess extends at least to the level of the fibular head noting there are separate fluid collections posterior and medial to the knee (axial series 4, image 137) which possibly communicates although extensive beam hardening artifact from metallic knee prosthesis significantly limits assessment.    Multifocal osteolytic lesions with sclerotic rim in the foot and distal tibia likely representing components of acute and chronic osteomyelitis.  No definite calcification within a lucent lesion to suggest sequestrum although cannot be completely excluded.    Ulceration in the subcutaneous gas at the lateral distal foreleg is concerning for gas gangrene.    This report was flagged in Epic as abnormal.    Electronically signed by resident: Raymond Vidales  Date: 12/13/2018  Time: 17:25    Electronically signed by: Germán Flowers MD  Date: 12/13/2018  Time: 18:16   CT Leg (Tibia-Fibula) W W/O Contrast Left [[id number]] (Abnormal) Resulted: 12/13/18 1816   Order Status: Completed Updated: 12/13/18 1819   Narrative:     EXAMINATION:  CT LEG (TIBIA-FIBULA) W W/O CONTRAST LEFT; CT ANKLE (INCLUDING HINDFOOT) W W/O CONTRAST LEFT; CT FOOT W W/O CONTRAST LEFT    CLINICAL HISTORY:  osteomyelitis and abscess  Osteomyelitis,  unspecified    TECHNIQUE:  Axial images of were acquired from the distal femur to the forefoot before and after the administration of Omnipaque 350 IV contrast.  Sagittal and coronal reformats were provided.    COMPARISON:  Knee and tibia radiograph 12/13/2018, MRI ankle 12/12/2018    FINDINGS:  Small rim enhancing multiloculated air fluid collections extends into the interosseous membrane cranially at least to the level of the fibular head.  There are small fluid collections posterior and medial to the knee which possibly communicate (axial series 4, image 137) and possibly enhance although extensive beam hardening artifact from the metallic knee prosthesis limits assessment of that area.  There is ulceration and subcutaneous gas at the lateral distal foreleg.  There is prominent surrounding skin thickening and diffuse subcutaneous fluid attenuation predominantly through the forefoot and hindfoot as well as the distal lower extremity.  Multiple lucent lesions are identified throughout the talus and midfoot which contain may sclerotic rim suggesting chronic osteomyelitis.  Lucency within the lateral talus contains air without sadia cortical disruption..  No fracture identified.  Vascular calcifications are present.   Impression:       Rim enhancing multiloculated abscess extends at least to the level of the fibular head noting there are separate fluid collections posterior and medial to the knee (axial series 4, image 137) which possibly communicates although extensive beam hardening artifact from metallic knee prosthesis significantly limits assessment.    Multifocal osteolytic lesions with sclerotic rim in the foot and distal tibia likely representing components of acute and chronic osteomyelitis.  No definite calcification within a lucent lesion to suggest sequestrum although cannot be completely excluded.    Ulceration in the subcutaneous gas at the lateral distal foreleg is concerning for gas gangrene.    This  report was flagged in Epic as abnormal.    Electronically signed by resident: Raymond Vidales  Date: 12/13/2018  Time: 17:25    Electronically signed by: Germán Flowers MD  Date: 12/13/2018  Time: 18:16   X-Ray Knee 1 or 2 View Bilateral [[id number]] Resulted: 12/13/18 1432   Order Status: Completed Updated: 12/13/18 1434   Narrative:     EXAMINATION:  XR KNEE 1 OR 2 VIEW BILATERAL    CLINICAL HISTORY:  hx of tka;  Osteomyelitis, unspecified    COMPARISON:  None 08/10/2016    FINDINGS:  Bilateral knee arthroplasties are identified.  Position alignment is satisfactory and unchanged as compared to the previous study.  No fracture or bone destruction identified   Impression:       See above      Electronically signed by: Hector Escamilla MD  Date: 12/13/2018  Time: 14:32   X-Ray Tibia Fibula 2 View Left [[id number]] Resulted: 12/13/18 1212   Order Status: Completed Updated: 12/13/18 1215   Narrative:     EXAMINATION:  XR TIBIA FIBULA 2 VIEW LEFT    CLINICAL HISTORY:  osteomyelitis;  Osteomyelitis, unspecified    TECHNIQUE:  AP and lateral views of the left tibia and fibula were performed.    COMPARISON:  None.    FINDINGS:  Status post total knee arthroplasty without evidence for complication.  Degenerative changes are noted at the ankle.  Vascular calcifications are present.  There is soft tissue gas at the lateral aspect of the ankle.   Impression:       Soft tissue gas at the lateral ankle.      Electronically signed by: Kevin Newby MD  Date: 12/13/2018  Time: 12:12   MRI Ankle W WO Contrast Left [[id number]] Resulted: 12/12/18 2255   Order Status: Completed Updated: 12/12/18 8037   Narrative:     EXAMINATION:  MRI ANKLE W WO CONTRAST LEFT    CLINICAL HISTORY:  Ankle erythema, swelling, cellulitis suspected    TECHNIQUE:  MRI ankle performed before and after the administration 10 mL Gadavist IV contrast.    COMPARISON:  Foot radiograph 12/12/2018, foot radiograph 12/22/2017.    FINDINGS:  There is a lateral malleolar  ulcer and circumferential subcutaneous edema with skin thickening and enhancement of the hindfoot.  Large multiloculated peripherally enhancing fluid collections throughout the visualized soft tissues of the lateral distal foreleg at least 10 cm craniocaudad dimension contiguous with ulceration and extending proximal outside the field of view which are concerning for soft tissue abscesses.  There is diffuse muscular edema and enhancement suggesting significant myositis.    There is abnormal marrow edema and extensive irregular T1 marrow replacement in the distal tibia, calcaneus, talus, and navicular suggesting osteomyelitis.   Impression:       Findings concerning for osteomyelitis of the hindfoot including the distal tibia and tarsal bones with associated cellulitis, myositis, and multiloculated abscesses in the deep soft tissues contiguous with lateral distal foreleg ulceration.  Component of abscess extends cranially outside the field of view, possibly more proximal portions of the left lower extremity.    COMMUNICATION  This critical result was discovered/received at 12/12/2018 at 22:00. The critical information above was relayed directly by me by telephone to Dr. Guzman On 12/12/2018 at 22:05.    Electronically signed by resident: Raymond Vidales  Date: 12/12/2018  Time: 21:35    Electronically signed by: Toñito Flower MD  Date: 12/12/2018  Time: 22:55   US Lower Extremity Veins Left [[id number]] Resulted: 12/12/18 2232   Order Status: Completed Updated: 12/12/18 2234   Narrative:     EXAMINATION:  US LOWER EXTREMITY VEINS LEFT    CLINICAL HISTORY:  Acute embolism and thrombosis of unspecified deep veins of unspecified lower extremity    TECHNIQUE:  Duplex and color flow Doppler evaluation and graded compression of the left lower extremity veins was performed.    COMPARISON:  None.    FINDINGS:  Left thigh veins: The common femoral, femoral, popliteal, upper greater saphenous, and deep femoral veins are  "patent and free of thrombus. The veins are normally compressible and have normal phasic flow and augmentation response.    Left calf veins: The visualized calf veins are patent.    Contralateral CFV: The contralateral (right) common femoral vein is patent and free of thrombus.    Miscellaneous: Soft tissue edema and abnormal fluid about the left ankle, better characterized on recent MRI.   Impression:       No evidence of DVT in the left lower extremity.    Electronically signed by resident: Eliazar Ordonez  Date: 12/12/2018  Time: 22:22    Electronically signed by: Toñito Flower MD  Date: 12/12/2018  Time: 22:32   X-Ray Foot Complete Left [[id number]] Resulted: 12/12/18 1922   Order Status: Completed Updated: 12/12/18 1925   Narrative:     EXAMINATION:  XR FOOT COMPLETE 3 VIEW LEFT    CLINICAL HISTORY:  Cellulitis, unspecified    TECHNIQUE:  Three views of the left foot.    COMPARISON:  11/26/2018.    FINDINGS:  Stable degenerative changes in the foot.  No acute fracture or dislocation identified.  No bony erosion.  Vascular calcifications noted.  There is persistent soft tissue edema at the anterior aspect of the ankle and over the dorsum of the foot.  No distinct soft tissue emphysema.  No radiopaque foreign body.   Impression:       Persistent soft tissue edema at the dorsum of the foot, possibly cellulitis in the correct clinical setting.      Electronically signed by: Toñito Flower MD  Date: 12/12/2018  Time: 19:22   X-Ray Chest AP Portable [[id number]] Resulted: 12/12/18 1919   Order Status: Completed Updated: 12/12/18 1922   Narrative:     EXAMINATION:  XR CHEST AP PORTABLE    CLINICAL HISTORY:  Provided history is "Sepsis;  ".    TECHNIQUE:  One view of the chest.    COMPARISON:  11/29/2018.    FINDINGS:  Multiple radiopaque structures overlie the chest and significantly limit evaluation, including an electronic device which may represent a cell phone.   Per technologist note, the patient refused to comply with " exam instructions to remove any overlying material.   Cardiac silhouette is stable.  Right-sided PICC has been slightly retracted, with the tip now overlying the brachiocephalic/SVC junction.  No large focal area of consolidation or detrimental change in lung aeration.   Impression:       Limited examination, without obvious detrimental change.  Follow-up with upright PA and lateral views with removal of overlying structures as indicated.      Electronically signed by: Toñito Flower MD  Date: 12/12/2018  Time: 19:19

## 2018-12-14 NOTE — ASSESSMENT & PLAN NOTE
"  - Stable  - Asymptomatic  - Underwent LHC and subsequent PCI in 7/2018:    · "Bifurcation of the LAD:  The lesion was successfully intervened. Post-stenosis of 0%, post-DONNY 3 flow and TMP grade 3. The vessel was accessed natively.  The following items were used: 2.5MM 12MM [place] Balloon.  · D1:  The lesion was successfully intervened. Post-stenosis of 0%, post-DONNY 3 flow and TMP grade 3. The vessel was accessed natively.  The following items were used: 2.5MM 15MM [place] Balloon and Stent Resolute Rx 2.50x14 (OSMEL)."    - Cardiology recommending to continue DAPT perioperatively if at all possible, otherwise hold the plavix for as short a duration possible  - Otherwise continue aspirin, plavix, toprol, losartan, and lipitor  - Diabetic + cardiac diet  "

## 2018-12-14 NOTE — PROGRESS NOTES
[facility] Hospital Medicine  Progress Note    Patient Name: Krissy Marino  MRN: [id number]  Patient Class: IP- Inpatient   Admission Date: 12/12/2018  Length of Stay: 2 days  Attending Physician: Dc Liao MD  Primary Care Provider: Emily Mcpherson MD    [facility] Medicine Team: [facility] O Dc Liao MD    Subjective:     Principal Problem:Osteomyelitis of left tibia    HPI:  Ms. Krissy Marino is a 78 y.o. female with type 2 diabetes, CAD status post PCI (7/2018), and peripheral vascular disease who presents to the emergency department from [facility] for evaluation of left lower extremity cellulitis.  She was just admitted to [facility] from 11/26-12/5 for left lower extremity cellulitis s/p a left ankle sprain, where she was found to have group B strep bacteremia.  During that admission, a PICC line was placed and she was discharged on Ceftriaxone to complete a 14 day course that ended on 12/11.  Since her discharge, family mentions that her left lower extremity cellulitis has not been improving.  They endorse persistent erythema as well as occasional yellow drainage from the left lateral malleolus.  At baseline, the patient is fairly bed-bound and spends most of her time with her lower extremities dangling down.  Labs were done at her facility on 12/07 which showed a white blood cell count 20.  She was told to come to the emergency department for further evaluation.  She continues to endorse pain in her left as well as pain with movement.  She has never had any debridement of her wound, but mentions that it has been cleaned with Clorox by [facility].  She denies any fevers or chills.  She does endorse constipation which she attributes to her pain medication.  Of note, the patient was told that because she has such severe peripheral vascular disease, she would require stent placement.  However, because of her recent cardiac stent placement, she is  unable to get leg stents placed for at least 1 year, which will continue to prevent good wound healing.     In the emergency department, labs were notable for a white blood cell count 10, sed rate 56, and CRP 67.  X-rays were ordered which showed edema, but no cellulitis.  She was given IV vancomycin and was admitted to Hospital Medicine for further management.    Hospital Course:  12/12/2018: Admitted to , started on vanc, MRI ordered along with ID and podiatry consultation  12/13/2018: MRI showed osteo, abscess. ID recommended adding CTX. Podiatry deferring to ortho. Brought on cardiology given DAPT and high risk lesion  12/14/2018: ID broadened antibiotics to vanc + cefepime + flagyl; ortho recommending AKA but patient undecided    Interval History:     Ms. Marino felt well this morning, denying chest pain and shortness of breath.     Her MRI last night showed abscess and osteomyelitis extending proximally above her ankle prompting orthopedic surgery consultation. She is on DAPT following OSMEL to bifurcation lesion in July, for which cardiology has been consulted in anticipation of upcoming surgery. Added rocephin at ID's recommendation. CT scan in process.    Review of Systems   Constitutional: Negative for chills and fever.   Respiratory: Negative for cough and shortness of breath.    Cardiovascular: Positive for leg swelling. Negative for chest pain.   Gastrointestinal: Negative for abdominal pain, constipation, diarrhea, nausea and vomiting.   Genitourinary: Negative for difficulty urinating.   Musculoskeletal: Negative for myalgias.   Skin: Positive for rash and wound.   Neurological: Negative for weakness and numbness.   Psychiatric/Behavioral: Negative for dysphoric mood. The patient is not nervous/anxious.    Objective:     Vital Signs (Most Recent):  Temp: 97.2 °F (36.2 °C) (12/14/18 1131)  Pulse: 97 (12/14/18 1131)  Resp: 17 (12/14/18 1131)  BP: 131/63 (12/14/18 1131)  SpO2: 97 % (12/14/18 1131) Vital  Signs (24h Range):  Temp:  [97.1 °F (36.2 °C)-98.9 °F (37.2 °C)] 97.2 °F (36.2 °C)  Pulse:  [73-97] 97  Resp:  [16-17] 17  SpO2:  [95 %-99 %] 97 %  BP: (131-150)/(51-65) 131/63     Weight: 106.6 kg (235 lb 0.2 oz)  Body mass index is 39.11 kg/m².    Intake/Output Summary (Last 24 hours) at 12/14/2018 1443  Last data filed at 12/14/2018 0600  Gross per 24 hour   Intake 370 ml   Output --   Net 370 ml      Physical Exam   Constitutional: She is oriented to person, place, and time. No distress.   Eyes: Pupils are equal, round, and reactive to light.   Cardiovascular: Normal rate and regular rhythm.   No murmur heard.  Pulmonary/Chest: Effort normal and breath sounds normal. No respiratory distress. She has no wheezes.   Abdominal: Soft. Bowel sounds are normal. She exhibits no distension. There is no tenderness.   Musculoskeletal: She exhibits edema (bilateral lower extremities worse on the left). She exhibits no tenderness.   Neurological: She is alert and oriented to person, place, and time. She displays normal reflexes. No cranial nerve deficit.   Skin: Skin is warm and dry. She is not diaphoretic. There is erythema (LLE erythema, appears improved relative to admission borders).   Large ulceration present at lateral aspect of left foot with surrounding erythema extending up leg proximally, appears improved relative to border marking from last night   Psychiatric: She has a normal mood and affect. Her behavior is normal.       Significant Labs:     CBC:    Recent Labs   Lab 12/12/18 1852 12/13/18  0340 12/14/18  0534   WBC 10.72 7.65 6.22   GRAN 83.6*  9.0* 82.2*  6.3 78.4*  4.9   HGB 8.8* 7.1* 7.2*   HCT 27.0* 23.2* 23.6*    249 251       Chem 10:  Recent Labs   Lab 12/12/18  1852 12/13/18  0340 12/14/18  0534    137 139   K 4.4 4.0 4.4   CL 97 104 106   CO2 28 25 26   BUN 41* 35* 28*   CREATININE 0.9 0.7 0.8   * 207* 185*   CALCIUM 8.9 8.3* 8.5*   MG  --  1.6 1.5*   PHOS  --  3.1  --   "      LFTs:  Recent Labs   Lab 12/12/18  1852 12/13/18  0340 12/14/18  0534   ALKPHOS 224* 173* 161*   BILITOT 0.3 0.4 0.3   AST 21 16 14   ALT 35 28 24   ALBUMIN 1.8* 1.5* 1.4*       Significant Imaging:     CT leg  "Rim enhancing multiloculated abscess extends at least to the level of the fibular head noting there are separate fluid collections posterior and medial to the knee (axial series 4, image 137) which possibly communicates although extensive beam hardening artifact from metallic knee prosthesis significantly limits assessment.    Multifocal osteolytic lesions with sclerotic rim in the foot and distal tibia likely representing components of acute and chronic osteomyelitis.  No definite calcification within a lucent lesion to suggest sequestrum although cannot be completely excluded.    Ulceration in the subcutaneous gas at the lateral distal foreleg is concerning for gas gangrene." - per interpreting radiologist    MRI left ankle with and without contrast  "Findings concerning for osteomyelitis of the hindfoot including the distal tibia and tarsal bones with associated cellulitis, myositis, and multiloculated abscesses in the deep soft tissues contiguous with lateral distal foreleg ulceration.  Component of abscess extends cranially outside the field of view, possibly more proximal portions of the left lower extremity." - per interpreting radiologist    X-ray tib fib left  "Status post total knee arthroplasty without evidence for complication.  Degenerative changes are noted at the ankle.  Vascular calcifications are present.  There is soft tissue gas at the lateral aspect of the ankle." - per interpreting radiologist    Assessment/Plan:      * Osteomyelitis of left tibia      - Stable  - Complicated by abscess formation and overlying cellulitis  - Demonstrated on MRI 12/13, which showed "osteomyelitis of the hindfoot including the distal tibia and tarsal bones with associated cellulitis, myositis, and " "multiloculated abscesses in the deep soft tissues"  - f/u CT showed osteolytic lesions and extensive abscess  - No leukocytosis. ESR and CRP both elevated  - Cardiology re: DAPT; would prefer to continue if at all possible  - Blood cultures NGTD, hemodynamically stable  - ID broadened antibiotics to vanc + cefepime + flagyl  - Ortho following, appreciate assistance, recommending AKA but patient hesitant    Appreciate continued assistance from multiple consulting services     Cellulitis of left lower extremity      - Stable  - See above for more details     Coronary artery disease involving native coronary artery of native heart with unstable angina pectoris      - Stable  - Asymptomatic  - Underwent LHC and subsequent PCI in 7/2018:    · "Bifurcation of the LAD:  The lesion was successfully intervened. Post-stenosis of 0%, post-DONNY 3 flow and TMP grade 3. The vessel was accessed natively.  The following items were used: 2.5MM 12MM [place] Balloon.  · D1:  The lesion was successfully intervened. Post-stenosis of 0%, post-DONNY 3 flow and TMP grade 3. The vessel was accessed natively.  The following items were used: 2.5MM 15MM [place] Balloon and Stent Resolute Rx 2.50x14 (OSMEL)."    - Cardiology recommending to continue DAPT perioperatively if at all possible, otherwise hold the plavix for as short a duration possible  - Otherwise continue aspirin, plavix, toprol, losartan, and lipitor  - Diabetic + cardiac diet     Pressure injury of buttock, stage 2      - Wound care following, appreciate assistance       Preoperative cardiovascular examination      - RCRI class II, no further testing necessary  - See discussion of DAPT above     Peripheral arterial disease      - Stable, but likely contributing to poor wound healing and increased infection risk  - Previously evaluated in an outpatient setting with recommendations for stent placement; discussed the possibility of vascular surg consult with ortho  - Continue cardiac " regimen with asa, plavix, and lipitor     Chronic combined systolic and diastolic heart failure      - Stable, clear chest examination though with bilateral lower extremity edema  - Most recent TTE, a stress echo in July 2018, showed LVEF 50% and grade I diastolic dysfunction  - Continue asa, plavix, toprol, losartan, and lipitor  - Anticipate resuming lasix tomorrow depending on BUN:Cr and examination (still elevated today)  - Goal K 4-5 and Mg 2-3     Obesity (BMI 30-39.9)      - Stable  - Body mass index is 38.12 kg/m².  - Encourage diet, weight loss, exercise  - Limited by PVD and cellulitis     Bilateral leg edema      - Stable  - Will tread underlying causes: cellulitis, OM, and CHF  - Will continue to follow with serial examinations; BUN:Cr still elevated     Dyslipidemia      - Stable  - Continue home lipitor     Hypothyroidism      - Stable  - Continue home synthroid     Hypertension      - Stable  - Continue toprol, losartan, and norvasc  - Will restart lasix as indicated, possibly tomorrow depending on her examination     Type 2 diabetes mellitus, without long-term current use of insulin      - Stable  - HbA1c 6.7 in May  - Will hold home oral antihyperglycemic agents in favor of low-dose aspart SSI + POCT glucose checks  - Diabetic + cardiac diet when not NPO       VTE Risk Mitigation (From admission, onward)        Ordered     enoxaparin injection 40 mg  Daily      12/12/18 1952     IP VTE HIGH RISK PATIENT  Once      12/12/18 1952              Dc Liao MD  Department of Hospital Medicine   [facility]

## 2018-12-14 NOTE — ASSESSMENT & PLAN NOTE
77 yo female with recent GBS bacteremia, recurrent LLE cellulitis, PVD now found to have osteomyelitis of the hindfoot including the distal tibia and tarsal bones with associated cellulitis, myositis, and multiloculated abscesses in the deep soft tissues contiguous with lateral distal foreleg ulceration. Repeat CT shows infection to knee level and gas near ankle.  On vanc and ceftriaxone.  Ortho Sx following and rec AKA but patient desires washout and abx 1st.  BCXs NGTD. Stable non septic.    Plan   - Continue Vanc and DC Ceftriaxone  - Start Cefepime 2g IV q8 to cover pseudomonas and Flagyl 500mg po tid as gas seen on CT scane  - Vanc trough prior to 4th dose - trough goal 15-20  - FU further ortho sx recs  - Please send cultures from surgery if taken to the OR  - will follow

## 2018-12-14 NOTE — PROGRESS NOTES
Consult received on patient. Stage 2 pressure injury noted over patient's coccyx. Blanchable redness and blanchable purple discoloration noted to buttocks. Educated patient importance of turning every 2hrs. Patient verbalized understanding. Patient on EHOB waffle overlay.  Recommend nursing to apply clear barrier cream BID/prn. Discussed with Dr. Liao, orders placed for nursing. Nursing to continue care. Wound care to follow prn.     12/14/18 1004       Pressure Injury 12/14/18 1004 Coccyx Stage 2   Date First Assessed/Time First Assessed: 12/14/18 1004   Pressure Injury Present on Admission: yes  Location: Coccyx  Staging: Stage 2   Wound Image    Staging 2   Drainage Amount None   Drainage Characteristics/Odor No odor   Appearance Moist;Pink   Tissue loss description Partial thickness   Periwound Area Redness  (blanchable redness and blanchable purple discoloration)   Wound Edges Open   Wound Length (cm) 1 cm   Wound Width (cm) 0.5 cm   Wound Depth (cm) 0.1 cm   Wound Volume (cm^3) 0.05 cm^3   Wound Surface Area (cm^2) 0.5 cm^2

## 2018-12-14 NOTE — PLAN OF CARE
12/14/18 0927   Discharge Assessment   Assessment Type Discharge Planning Assessment   Confirmed/corrected address and phone number on facesheet? Yes   Assessment information obtained from? Patient   Communicated expected length of stay with patient/caregiver yes   Prior to hospitilization cognitive status: Alert/Oriented   Prior to hospitalization functional status: Independent   Current cognitive status: Alert/Oriented   Current Functional Status: Independent   Lives With child(cayla), adult   Able to Return to Prior Arrangements yes   Is patient able to care for self after discharge? Yes   Readmission Within the Last 30 Days no previous admission in last 30 days   Equipment Currently Used at Home none   Do you have any problems affording any of your prescribed medications? No   Is the patient taking medications as prescribed? yes   Does the patient have transportation home? Yes   Transportation Anticipated car, drives self   Does the patient receive services at the [facility]? No   Discharge Plan A Home   Discharge Plan B Home with family   Patient/Family in Agreement with Plan yes

## 2018-12-14 NOTE — ASSESSMENT & PLAN NOTE
- Stable  - HbA1c 6.7 in May  - Will hold home oral antihyperglycemic agents in favor of low-dose aspart SSI + POCT glucose checks  - Diabetic + cardiac diet when not NPO

## 2018-12-14 NOTE — ASSESSMENT & PLAN NOTE
- Stable, clear chest examination though with bilateral lower extremity edema  - Most recent TTE, a stress echo in July 2018, showed LVEF 50% and grade I diastolic dysfunction  - Continue asa, plavix, toprol, losartan, and lipitor  - Anticipate resuming lasix tomorrow depending on BUN:Cr and examination (still elevated today)  - Goal K 4-5 and Mg 2-3

## 2018-12-14 NOTE — PLAN OF CARE
Problem: Adult Inpatient Plan of Care  Goal: Plan of Care Review  Outcome: Ongoing (interventions implemented as appropriate)  Pt alert and oriented, able to make her needs known. ABT IV Vanco and rocephin in progress w/o A/R. BG management continues with ACHS BG monitoring. No episode of hypoglycemia on this shift. Slept well. Call light within easy reach. All needs attended. No apparent distress noted.

## 2018-12-14 NOTE — CONSULTS
[facility]  Infectious Disease  Consult Note    Patient Name: Krissy Marino  MRN: [id number]  Admission Date: 12/12/2018  Hospital Length of Stay: 1 days  Attending Physician: Dc Liao MD  Primary Care Provider: Emily Mcpherson MD     Isolation Status: No active isolations    Patient information was obtained from patient and records.      Consults  Assessment/Plan:     Cellulitis of left lower extremity    77 yo female with recent GBS bacteremia, recurrent LLE cellulitis, PVD now found to have osteomyelitis of the hindfoot including the distal tibia and tarsal bones with associated cellulitis, myositis, and multiloculated abscesses in the deep soft tissues contiguous with lateral distal foreleg ulceration. On vanc and ceftriaxone.  Ortho Sx following and planning surgery - washout vs BKA.  BCXs NGTD    Plan   - Continue Vanc and Ceftriaxone  - Vanc trough prior to 4th dose - trough goal 15-20  - Consider vasc sx consult to assess PVD and adequate blood flow prior to sx  - Please send cultures from surgery  - will follow  - seen with ID staff and discussed with Orhto Sx and primary team           Thank you for your consult. I will follow-up with patient. Please contact us if you have any additional questions.    JILLIAN Sheridan  Infectious Disease  [facility]    Subjective:     Principal Problem: Osteomyelitis of left tibia    HPI: Ms. Krissy Marino is a 78 y.o. female with type 2 diabetes, CAD status post PCI (7/2018), and peripheral vascular disease who presents to the emergency department from [facility] for evaluation of left lower extremity cellulitis.  She was just admitted to [facility] from 11/26-12/5 for left lower extremity cellulitis s/p a left ankle sprain, where she was found to have group B strep bacteremia.  During that admission, a PICC line was placed and she was discharged on Ceftriaxone to complete a 14 day course  that ended on 12/11.  Since her discharge, family mentions that her left lower extremity cellulitis has not been improving.  They endorse persistent erythema as well as occasional yellow drainage from the left lateral malleolus.  At baseline, the patient is fairly bed-bound and spends most of her time with her lower extremities dangling down.  Labs were done at her facility on 12/07 which showed a white blood cell count 20.  She was told to come to the emergency department for further evaluation.  She continues to endorse pain in her left as well as pain with movement.  She has never had any debridement of her wound, but mentions that it has been cleaned with Clorox by [facility].  She denies any fevers or chills.  Of note, the patient was told that because she has such severe peripheral vascular disease, she would require stent placement.  However, because of her recent cardiac stent placement, she is unable to get leg stents placed for at least 1 year, which will continue to prevent good wound healing.     In the emergency department, labs were notable for a white blood cell count 10, sed rate 56, and CRP 67.  X-rays were ordered which showed edema, but no cellulitis.  She was given IV vancomycin and was admitted to Hospital Medicine for further management.  MRI of LLE c/f large abscess and osteomyelitis of distal tibia and tarsal bones.  She has been placed on Vancomycin.  Blood cultures 12/12 are NGTD.        Past Medical History:   Diagnosis Date    Adverse effect of glucocorticoid or synthetic analogue     Allergy     Arthritis     Cancer     CHF (congestive heart failure)     Chronic kidney disease     Coronary artery disease involving native coronary artery of native heart without angina pectoris 10/11/2016    Diabetic neuropathy 10/6/2014    Giant cell arteritis 9/24/2012    Hyperlipidemia     Hypertension     Hypothyroid     Obesity (BMI 30-39.9) 10/6/2014    Osteopenia     Primary  osteoarthritis of both knees 9/24/2012    Type II or unspecified type diabetes mellitus with renal manifestations, uncontrolled(250.42)        Past Surgical History:   Procedure Laterality Date    APPENDECTOMY      CATARACT EXTRACTION      CATARACT EXTRACTION, BILATERAL      CHOLECYSTECTOMY      COLONOSCOPY N/A 12/27/2013    Performed by Shamar Sow MD at [facility] ENDO (4TH FLR)    EYE SURGERY      HYSTERECTOMY      JOINT REPLACEMENT      bilateral total knee    SKIN BIOPSY      STRIPPING-TARSAL Bilateral 3/7/2018    Performed by Anastasia Nieto MD at [facility] OR 2ND FLR    TONSILLECTOMY         Review of patient's allergies indicates:   Allergen Reactions    Sulfamethoxazole-trimethoprim Nausea And Vomiting    Latex, natural rubber Rash    Pcn [penicillins] Rash       Medications:  Medications Prior to Admission   Medication Sig    acetaminophen (TYLENOL) 325 MG tablet Take 2 tablets (650 mg total) by mouth every 6 (six) hours as needed.    albuterol 90 mcg/actuation inhaler Inhale 2 puffs into the lungs every 6 (six) hours as needed for Wheezing.    alendronate (FOSAMAX) 70 MG tablet Take 1 tablet (70 mg total) by mouth every 7 days.    allopurinol (ZYLOPRIM) 300 MG tablet TAKE 1 TABLET(300 MG) BY MOUTH EVERY DAY    amLODIPine (NORVASC) 5 MG tablet Take 1 tablet (5 mg total) by mouth once daily.    aspirin (ECOTRIN) 81 MG EC tablet Take 1 tablet (81 mg total) by mouth once daily.    atorvastatin (LIPITOR) 40 MG tablet TAKE 1 TABLET(40 MG) BY MOUTH EVERY DAY    azelastine (ASTELIN) 137 mcg (0.1 %) nasal spray USE 1 SPRAY(137 MCG) IN EACH NOSTRIL TWICE DAILY    blood sugar diagnostic (ACCU-CHEK AMELIA PLUS TEST STRP) Strp Checks bg 2 x day before meals    cetirizine (ZYRTEC) 10 MG tablet Take 1 tablet (10 mg total) by mouth once daily.    clopidogrel (PLAVIX) 75 mg tablet Take 1 tablet (75 mg total) by mouth once daily.    collagenase (SANTYL) ointment Apply topically once daily.    FERROUS  GLUCONATE (FERATE ORAL) Take by mouth once daily at 6am.     furosemide (LASIX) 40 MG tablet Take 1 tablet (40 mg total) by mouth 2 (two) times daily.    glipiZIDE (GLUCOTROL) 2.5 MG TR24 Take 1 tablet (2.5 mg total) by mouth daily with breakfast.    HYDROcodone-acetaminophen (NORCO) 5-325 mg per tablet Take 1 tablet by mouth every 8 (eight) hours as needed for Pain. (Patient taking differently: Take 1 tablet by mouth every 6 (six) hours as needed for Pain. )    hydrocortisone butyrate (LOCOID) 0.1 % Crea cream AAA buttock bid    KRILL/OM3/DHA/EPA/OM6/LIP/ASTX (KRILL OIL, OMEGA 3 & 6, ORAL) Take by mouth once daily at 6am.     levothyroxine (SYNTHROID) 75 MCG tablet Take 1 tablet (75 mcg total) by mouth before breakfast.    losartan (COZAAR) 100 MG tablet Take 1 tablet (100 mg total) by mouth once daily.    magnesium oxide (MAG-OX) 400 mg tablet Take 1 tablet (400 mg total) by mouth once daily.    metoprolol succinate (TOPROL-XL) 50 MG 24 hr tablet Take 1 tablet (50 mg total) by mouth once daily.    miconazole NITRATE 2 % (ZEASORB AF) 2 % top powder Apply topically as needed for Itching.    ranitidine (ZANTAC) 150 MG tablet Take 1 tablet (150 mg total) by mouth 2 (two) times daily as needed for Heartburn.    SYMBICORT 160-4.5 mcg/actuation HFAA INHALE TWO PUFFS INTO THE LUNGS EVERY 12 HOURS    loperamide (IMODIUM) 2 mg capsule Take 2 mg by mouth 4 (four) times daily as needed for Diarrhea.     Antibiotics (From admission, onward)    Start     Stop Route Frequency Ordered    12/13/18 0730  vancomycin 1.5 g in 5 % dextrose 250 mL IVPB  (Vancomycin IVPB with levels panel)      -- IV Every 12 hours (non-standard times) 12/12/18 2009        Antifungals (From admission, onward)    None        Antivirals (From admission, onward)    None           Immunization History   Administered Date(s) Administered    Influenza 10/13/2009, 11/11/2010    Influenza - High Dose 09/13/2011, 10/08/2012, 11/11/2013,  10/16/2014, 10/12/2015, 10/25/2016, 10/10/2018    Pneumococcal Polysaccharide - 23 Valent 10/25/2016    Tdap 07/10/2018       Family History     Problem Relation (Age of Onset)    Diabetes Mother    Hypertension Mother, Father        Social History     Socioeconomic History    Marital status:      Spouse name:     Number of children: None    Years of education: None    Highest education level: None   Social Needs    Financial resource strain: None    Food insecurity - worry: None    Food insecurity - inability: None    Transportation needs - medical: None    Transportation needs - non-medical: None   Occupational History    Occupation:      Employer: [employer]   Tobacco Use    Smoking status: Never Smoker    Smokeless tobacco: Never Used   Substance and Sexual Activity    Alcohol use: No    Drug use: No    Sexual activity: No   Other Topics Concern    Are you pregnant or think you may be? No    Breast-feeding No   Social History Narrative    None     Review of Systems   Constitutional: Negative for appetite change, chills, diaphoresis, fatigue, fever and unexpected weight change.   HENT: Negative for congestion, ear pain, hearing loss, sore throat and tinnitus.    Eyes: Negative for pain, redness and visual disturbance.   Respiratory: Negative for cough, chest tightness, shortness of breath and wheezing.    Cardiovascular: Negative for chest pain.   Gastrointestinal: Negative for abdominal pain, constipation, diarrhea, nausea and vomiting.   Endocrine: Negative for cold intolerance and heat intolerance.   Genitourinary: Negative for decreased urine volume, difficulty urinating, dysuria, flank pain, frequency, hematuria and urgency.   Musculoskeletal: Negative for arthralgias, back pain, myalgias and neck pain.        Left leg pain    Skin: Positive for wound. Negative for rash.   Allergic/Immunologic: Negative for environmental allergies, food allergies and  immunocompromised state.   Neurological: Negative for dizziness, facial asymmetry, weakness, light-headedness, numbness and headaches.   Hematological: Negative for adenopathy. Does not bruise/bleed easily.   Psychiatric/Behavioral: Negative for agitation, behavioral problems and confusion.     Objective:     Vital Signs (Most Recent):  Temp: 98.5 °F (36.9 °C) (12/13/18 0748)  Pulse: 92 (12/13/18 0753)  Resp: 17 (12/13/18 0748)  BP: 139/60 (12/13/18 0748)  SpO2: 96 % (12/13/18 0748) Vital Signs (24h Range):  Temp:  [97.2 °F (36.2 °C)-98.6 °F (37 °C)] 98.5 °F (36.9 °C)  Pulse:  [] 92  Resp:  [16-19] 17  SpO2:  [94 %-100 %] 96 %  BP: (127-151)/(58-76) 139/60     Weight: 106.6 kg (235 lb 0.2 oz)  Body mass index is 39.11 kg/m².    Estimated Creatinine Clearance: 77.7 mL/min (based on SCr of 0.7 mg/dL).    Physical Exam   Constitutional: She is oriented to person, place, and time. She appears well-developed and well-nourished. No distress.   HENT:   Head: Normocephalic and atraumatic.   Cardiovascular: Normal rate, regular rhythm and normal heart sounds. Exam reveals no gallop and no friction rub.   No murmur heard.  Pulmonary/Chest: Effort normal and breath sounds normal. No respiratory distress. She has no wheezes. She has no rales.   Abdominal: Soft. Bowel sounds are normal. She exhibits no distension and no mass. There is no tenderness. There is no rebound and no guarding.   Musculoskeletal: She exhibits no edema.   Neurological: She is alert and oriented to person, place, and time.   Skin: Skin is warm and dry. She is not diaphoretic.   Psychiatric: She has a normal mood and affect. Her behavior is normal.                 Significant Labs:   Blood Culture:   Recent Labs   Lab 11/26/18  0810 11/27/18  0858 11/27/18  0859 12/12/18  1849 12/12/18  1907   LABBLOO Gram stain anais bottle: Gram positive cocci in chains resembling Strep  Results called to and read back by:James Hooker RN  STREPTOCOCCUS AGALACTIAE  (GROUP B)  Beta-hemolytic streptococci are routinely susceptible to   penicillins,cephalosporins and carbapenems.   No growth after 5 days. No growth after 5 days. No Growth to date No Growth to date     CBC:   Recent Labs   Lab 12/12/18 1852 12/13/18  0340   WBC 10.72 7.65   HGB 8.8* 7.1*   HCT 27.0* 23.2*    249     CMP:   Recent Labs   Lab 12/12/18 1852 12/13/18  0340    137   K 4.4 4.0   CL 97 104   CO2 28 25   * 207*   BUN 41* 35*   CREATININE 0.9 0.7   CALCIUM 8.9 8.3*   PROT 5.9* 5.0*   ALBUMIN 1.8* 1.5*   BILITOT 0.3 0.4   ALKPHOS 224* 173*   AST 21 16   ALT 35 28   ANIONGAP 11 8   EGFRNONAA >60.0 >60.0     Wound Culture: No results for input(s): LABAERO in the last 4320 hours.  All pertinent labs within the past 24 hours have been reviewed.    Significant Imaging: I have reviewed all pertinent imaging results/findings within the past 24 hours.   MRI Ankle W WO Contrast Left [[id number]] Resulted: 12/12/18 2255   Order Status: Completed Updated: 12/12/18 2257   Narrative:     EXAMINATION:  MRI ANKLE W WO CONTRAST LEFT    CLINICAL HISTORY:  Ankle erythema, swelling, cellulitis suspected    TECHNIQUE:  MRI ankle performed before and after the administration 10 mL Gadavist IV contrast.    COMPARISON:  Foot radiograph 12/12/2018, foot radiograph 12/22/2017.    FINDINGS:  There is a lateral malleolar ulcer and circumferential subcutaneous edema with skin thickening and enhancement of the hindfoot.  Large multiloculated peripherally enhancing fluid collections throughout the visualized soft tissues of the lateral distal foreleg at least 10 cm craniocaudad dimension contiguous with ulceration and extending proximal outside the field of view which are concerning for soft tissue abscesses.  There is diffuse muscular edema and enhancement suggesting significant myositis.    There is abnormal marrow edema and extensive irregular T1 marrow replacement in the distal tibia, calcaneus, talus, and  navicular suggesting osteomyelitis.   Impression:       Findings concerning for osteomyelitis of the hindfoot including the distal tibia and tarsal bones with associated cellulitis, myositis, and multiloculated abscesses in the deep soft tissues contiguous with lateral distal foreleg ulceration.  Component of abscess extends cranially outside the field of view, possibly more proximal portions of the left lower extremity.    COMMUNICATION  This critical result was discovered/received at 12/12/2018 at 22:00. The critical information above was relayed directly by me by telephone to Dr. Guzman On 12/12/2018 at 22:05.    Electronically signed by resident: Raymond Vidales  Date: 12/12/2018  Time: 21:35    Electronically signed by: Toñito Flower MD  Date: 12/12/2018  Time: 22:55   US Lower Extremity Veins Left [[id number]] Resulted: 12/12/18 2232   Order Status: Completed Updated: 12/12/18 2234   Narrative:     EXAMINATION:  US LOWER EXTREMITY VEINS LEFT    CLINICAL HISTORY:  Acute embolism and thrombosis of unspecified deep veins of unspecified lower extremity    TECHNIQUE:  Duplex and color flow Doppler evaluation and graded compression of the left lower extremity veins was performed.    COMPARISON:  None.    FINDINGS:  Left thigh veins: The common femoral, femoral, popliteal, upper greater saphenous, and deep femoral veins are patent and free of thrombus. The veins are normally compressible and have normal phasic flow and augmentation response.    Left calf veins: The visualized calf veins are patent.    Contralateral CFV: The contralateral (right) common femoral vein is patent and free of thrombus.    Miscellaneous: Soft tissue edema and abnormal fluid about the left ankle, better characterized on recent MRI.   Impression:       No evidence of DVT in the left lower extremity.    Electronically signed by resident: Eliazar Ordonez  Date: 12/12/2018  Time: 22:22    Electronically signed by: Toñito Flower MD  Date:  "12/12/2018  Time: 22:32   X-Ray Foot Complete Left [[id number]] Resulted: 12/12/18 1922   Order Status: Completed Updated: 12/12/18 1925   Narrative:     EXAMINATION:  XR FOOT COMPLETE 3 VIEW LEFT    CLINICAL HISTORY:  Cellulitis, unspecified    TECHNIQUE:  Three views of the left foot.    COMPARISON:  11/26/2018.    FINDINGS:  Stable degenerative changes in the foot.  No acute fracture or dislocation identified.  No bony erosion.  Vascular calcifications noted.  There is persistent soft tissue edema at the anterior aspect of the ankle and over the dorsum of the foot.  No distinct soft tissue emphysema.  No radiopaque foreign body.   Impression:       Persistent soft tissue edema at the dorsum of the foot, possibly cellulitis in the correct clinical setting.      Electronically signed by: Toñito Flower MD  Date: 12/12/2018  Time: 19:22   X-Ray Chest AP Portable [[id number]] Resulted: 12/12/18 1919   Order Status: Completed Updated: 12/12/18 1922   Narrative:     EXAMINATION:  XR CHEST AP PORTABLE    CLINICAL HISTORY:  Provided history is "Sepsis;  ".    TECHNIQUE:  One view of the chest.    COMPARISON:  11/29/2018.    FINDINGS:  Multiple radiopaque structures overlie the chest and significantly limit evaluation, including an electronic device which may represent a cell phone.   Per technologist note, the patient refused to comply with exam instructions to remove any overlying material.   Cardiac silhouette is stable.  Right-sided PICC has been slightly retracted, with the tip now overlying the brachiocephalic/SVC junction.  No large focal area of consolidation or detrimental change in lung aeration.   Impression:       Limited examination, without obvious detrimental change.  Follow-up with upright PA and lateral views with removal of overlying structures as indicated.      Electronically signed by: Toñito Flower MD  Date: 12/12/2018  Time: 19:19                   "

## 2018-12-15 PROBLEM — E87.5 HYPERKALEMIA: Status: ACTIVE | Noted: 2018-12-15

## 2018-12-15 PROBLEM — E83.42 HYPOMAGNESEMIA: Status: ACTIVE | Noted: 2018-12-15

## 2018-12-15 LAB
ALBUMIN SERPL BCP-MCNC: 1.4 G/DL
ALP SERPL-CCNC: 151 U/L
ALT SERPL W/O P-5'-P-CCNC: 20 U/L
ANION GAP SERPL CALC-SCNC: 6 MMOL/L
ANION GAP SERPL CALC-SCNC: 6 MMOL/L
AST SERPL-CCNC: 11 U/L
BASOPHILS # BLD AUTO: 0.02 K/UL
BASOPHILS NFR BLD: 0.4 %
BILIRUB SERPL-MCNC: 0.3 MG/DL
BUN SERPL-MCNC: 35 MG/DL
BUN SERPL-MCNC: 35 MG/DL
CALCIUM SERPL-MCNC: 8.2 MG/DL
CALCIUM SERPL-MCNC: 8.5 MG/DL
CHLORIDE SERPL-SCNC: 103 MMOL/L
CHLORIDE SERPL-SCNC: 105 MMOL/L
CO2 SERPL-SCNC: 25 MMOL/L
CO2 SERPL-SCNC: 26 MMOL/L
CREAT SERPL-MCNC: 0.9 MG/DL
CREAT SERPL-MCNC: 1 MG/DL
DIFFERENTIAL METHOD: ABNORMAL
EOSINOPHIL # BLD AUTO: 0.2 K/UL
EOSINOPHIL NFR BLD: 3.5 %
ERYTHROCYTE [DISTWIDTH] IN BLOOD BY AUTOMATED COUNT: 14.2 %
EST. GFR  (AFRICAN AMERICAN): >60 ML/MIN/1.73 M^2
EST. GFR  (AFRICAN AMERICAN): >60 ML/MIN/1.73 M^2
EST. GFR  (NON AFRICAN AMERICAN): 53.3 ML/MIN/1.73 M^2
EST. GFR  (NON AFRICAN AMERICAN): >60 ML/MIN/1.73 M^2
GLUCOSE SERPL-MCNC: 136 MG/DL
GLUCOSE SERPL-MCNC: 196 MG/DL
HCT VFR BLD AUTO: 23.1 %
HGB BLD-MCNC: 7.1 G/DL
IMM GRANULOCYTES # BLD AUTO: 0.07 K/UL
IMM GRANULOCYTES NFR BLD AUTO: 1.3 %
LYMPHOCYTES # BLD AUTO: 0.5 K/UL
LYMPHOCYTES NFR BLD: 9.4 %
MAGNESIUM SERPL-MCNC: 1.3 MG/DL
MCH RBC QN AUTO: 32.1 PG
MCHC RBC AUTO-ENTMCNC: 30.7 G/DL
MCV RBC AUTO: 105 FL
MONOCYTES # BLD AUTO: 0.4 K/UL
MONOCYTES NFR BLD: 6.4 %
NEUTROPHILS # BLD AUTO: 4.3 K/UL
NEUTROPHILS NFR BLD: 79 %
NRBC BLD-RTO: 0 /100 WBC
PLATELET # BLD AUTO: 205 K/UL
PMV BLD AUTO: 10.1 FL
POCT GLUCOSE: 140 MG/DL (ref 70–110)
POCT GLUCOSE: 185 MG/DL (ref 70–110)
POCT GLUCOSE: 196 MG/DL (ref 70–110)
POCT GLUCOSE: 204 MG/DL (ref 70–110)
POTASSIUM SERPL-SCNC: 5.1 MMOL/L
POTASSIUM SERPL-SCNC: 5.4 MMOL/L
PROT SERPL-MCNC: 4.7 G/DL
RBC # BLD AUTO: 2.21 M/UL
SODIUM SERPL-SCNC: 135 MMOL/L
SODIUM SERPL-SCNC: 136 MMOL/L
WBC # BLD AUTO: 5.45 K/UL

## 2018-12-15 PROCEDURE — 80048 BASIC METABOLIC PNL TOTAL CA: CPT

## 2018-12-15 PROCEDURE — 80053 COMPREHEN METABOLIC PANEL: CPT

## 2018-12-15 PROCEDURE — 63600175 PHARM REV CODE 636 W HCPCS: Performed by: INTERNAL MEDICINE

## 2018-12-15 PROCEDURE — 11000001 HC ACUTE MED/SURG PRIVATE ROOM

## 2018-12-15 PROCEDURE — 83735 ASSAY OF MAGNESIUM: CPT

## 2018-12-15 PROCEDURE — 99232 SBSQ HOSP IP/OBS MODERATE 35: CPT | Mod: ,,, | Performed by: INTERNAL MEDICINE

## 2018-12-15 PROCEDURE — 25000003 PHARM REV CODE 250: Performed by: PHYSICIAN ASSISTANT

## 2018-12-15 PROCEDURE — 85025 COMPLETE CBC W/AUTO DIFF WBC: CPT

## 2018-12-15 PROCEDURE — 63600175 PHARM REV CODE 636 W HCPCS: Performed by: HOSPITALIST

## 2018-12-15 PROCEDURE — 25000003 PHARM REV CODE 250: Performed by: HOSPITALIST

## 2018-12-15 PROCEDURE — 25000003 PHARM REV CODE 250: Performed by: STUDENT IN AN ORGANIZED HEALTH CARE EDUCATION/TRAINING PROGRAM

## 2018-12-15 PROCEDURE — 36415 COLL VENOUS BLD VENIPUNCTURE: CPT

## 2018-12-15 PROCEDURE — 63600175 PHARM REV CODE 636 W HCPCS: Performed by: PHYSICIAN ASSISTANT

## 2018-12-15 PROCEDURE — 25000003 PHARM REV CODE 250: Performed by: INTERNAL MEDICINE

## 2018-12-15 RX ORDER — MAGNESIUM SULFATE HEPTAHYDRATE 40 MG/ML
2 INJECTION, SOLUTION INTRAVENOUS ONCE
Status: COMPLETED | OUTPATIENT
Start: 2018-12-15 | End: 2018-12-15

## 2018-12-15 RX ADMIN — GABAPENTIN 300 MG: 300 CAPSULE ORAL at 09:12

## 2018-12-15 RX ADMIN — ASPIRIN 81 MG: 81 TABLET, COATED ORAL at 09:12

## 2018-12-15 RX ADMIN — MAGNESIUM SULFATE IN WATER 2 G: 40 INJECTION, SOLUTION INTRAVENOUS at 09:12

## 2018-12-15 RX ADMIN — GABAPENTIN 300 MG: 300 CAPSULE ORAL at 03:12

## 2018-12-15 RX ADMIN — CLOPIDOGREL 75 MG: 75 TABLET, FILM COATED ORAL at 09:12

## 2018-12-15 RX ADMIN — CEFEPIME 2 G: 2 INJECTION, POWDER, FOR SOLUTION INTRAVENOUS at 10:12

## 2018-12-15 RX ADMIN — CEFEPIME 2 G: 2 INJECTION, POWDER, FOR SOLUTION INTRAVENOUS at 12:12

## 2018-12-15 RX ADMIN — SODIUM CHLORIDE, SODIUM LACTATE, POTASSIUM CHLORIDE, AND CALCIUM CHLORIDE 500 ML: .6; .31; .03; .02 INJECTION, SOLUTION INTRAVENOUS at 09:12

## 2018-12-15 RX ADMIN — LOSARTAN POTASSIUM 100 MG: 50 TABLET, FILM COATED ORAL at 09:12

## 2018-12-15 RX ADMIN — ATORVASTATIN CALCIUM 40 MG: 20 TABLET, FILM COATED ORAL at 09:12

## 2018-12-15 RX ADMIN — METRONIDAZOLE 500 MG: 500 TABLET ORAL at 08:12

## 2018-12-15 RX ADMIN — LEVOTHYROXINE SODIUM 75 MCG: 75 TABLET ORAL at 08:12

## 2018-12-15 RX ADMIN — FLUTICASONE FUROATE AND VILANTEROL TRIFENATATE 1 PUFF: 100; 25 POWDER RESPIRATORY (INHALATION) at 09:12

## 2018-12-15 RX ADMIN — ENOXAPARIN SODIUM 40 MG: 100 INJECTION SUBCUTANEOUS at 05:12

## 2018-12-15 RX ADMIN — POLYETHYLENE GLYCOL 3350 17 G: 17 POWDER, FOR SOLUTION ORAL at 09:12

## 2018-12-15 RX ADMIN — AMLODIPINE BESYLATE 5 MG: 5 TABLET ORAL at 09:12

## 2018-12-15 RX ADMIN — CEFEPIME 2 G: 2 INJECTION, POWDER, FOR SOLUTION INTRAVENOUS at 03:12

## 2018-12-15 RX ADMIN — CETIRIZINE HYDROCHLORIDE 10 MG: 10 TABLET, FILM COATED ORAL at 09:12

## 2018-12-15 RX ADMIN — FAMOTIDINE 20 MG: 20 TABLET ORAL at 10:12

## 2018-12-15 RX ADMIN — VANCOMYCIN HYDROCHLORIDE 1250 MG: 10 INJECTION, POWDER, LYOPHILIZED, FOR SOLUTION INTRAVENOUS at 05:12

## 2018-12-15 RX ADMIN — METRONIDAZOLE 500 MG: 500 TABLET ORAL at 01:12

## 2018-12-15 RX ADMIN — FAMOTIDINE 20 MG: 20 TABLET ORAL at 09:12

## 2018-12-15 RX ADMIN — ALLOPURINOL 300 MG: 300 TABLET ORAL at 09:12

## 2018-12-15 RX ADMIN — METRONIDAZOLE 500 MG: 500 TABLET ORAL at 10:12

## 2018-12-15 RX ADMIN — CEFEPIME 2 G: 2 INJECTION, POWDER, FOR SOLUTION INTRAVENOUS at 08:12

## 2018-12-15 RX ADMIN — METOPROLOL SUCCINATE 50 MG: 50 TABLET, EXTENDED RELEASE ORAL at 09:12

## 2018-12-15 NOTE — PROGRESS NOTES
[facility] Hospital Medicine  Progress Note    Patient Name: Krissy Marino  MRN: [id number]  Patient Class: IP- Inpatient   Admission Date: 12/12/2018  Length of Stay: 3 days  Attending Physician: Dc Liao MD  Primary Care Provider: Emily Mcpherson MD    [facility] Medicine Team: [facility] HOSP MED O Dc Liao MD    Subjective:     Principal Problem:Osteomyelitis of left tibia    HPI:  Ms. Krissy Marino is a 78 y.o. female with type 2 diabetes, CAD status post PCI (7/2018), and peripheral vascular disease who presents to the emergency department from [facility] for evaluation of left lower extremity cellulitis.  She was just admitted to [facility] from 11/26-12/5 for left lower extremity cellulitis s/p a left ankle sprain, where she was found to have group B strep bacteremia.  During that admission, a PICC line was placed and she was discharged on Ceftriaxone to complete a 14 day course that ended on 12/11.  Since her discharge, family mentions that her left lower extremity cellulitis has not been improving.  They endorse persistent erythema as well as occasional yellow drainage from the left lateral malleolus.  At baseline, the patient is fairly bed-bound and spends most of her time with her lower extremities dangling down.  Labs were done at her facility on 12/07 which showed a white blood cell count 20.  She was told to come to the emergency department for further evaluation.  She continues to endorse pain in her left as well as pain with movement.  She has never had any debridement of her wound, but mentions that it has been cleaned with Clorox by [facility].  She denies any fevers or chills.  She does endorse constipation which she attributes to her pain medication.  Of note, the patient was told that because she has such severe peripheral vascular disease, she would require stent placement.  However, because of her recent cardiac stent placement, she is  unable to get leg stents placed for at least 1 year, which will continue to prevent good wound healing.     In the emergency department, labs were notable for a white blood cell count 10, sed rate 56, and CRP 67.  X-rays were ordered which showed edema, but no cellulitis.  She was given IV vancomycin and was admitted to Hospital Medicine for further management.    Hospital Course:  12/12/2018: Admitted to , started on vanc, MRI ordered along with ID and podiatry consultation  12/13/2018: MRI showed osteo, abscess. ID recommended adding CTX. Podiatry deferring to ortho. Brought on cardiology given DAPT and high risk lesion  12/14/2018: ID broadened antibiotics to vanc + cefepime + flagyl; ortho recommending AKA but patient undecided  12/15/2018: Stable examination on antibiotics, ongoing discussions regarding ultimate plan    Interval History:     Stable exam, no complaints on broad-spectrum antibiotics.    There are ongoing discussions regarding her ultimate plan as she wants to avoid AKA.    Review of Systems   Constitutional: Negative for chills and fever.   Respiratory: Negative for cough and shortness of breath.    Cardiovascular: Positive for leg swelling. Negative for chest pain.   Gastrointestinal: Negative for constipation, diarrhea, nausea and vomiting.   Musculoskeletal: Negative for myalgias.   Skin: Positive for rash and wound.   Psychiatric/Behavioral: Negative for dysphoric mood. The patient is not nervous/anxious.      Objective:     Vital Signs (Most Recent):  Temp: 99.6 °F (37.6 °C) (12/15/18 0757)  Pulse: 77 (12/15/18 0935)  Resp: 18 (12/15/18 0935)  BP: (!) 130/58 (12/15/18 0757)  SpO2: 96 % (12/15/18 0757) Vital Signs (24h Range):  Temp:  [97.2 °F (36.2 °C)-99.6 °F (37.6 °C)] 99.6 °F (37.6 °C)  Pulse:  [77-97] 77  Resp:  [16-18] 18  SpO2:  [94 %-98 %] 96 %  BP: (116-142)/(53-66) 130/58     Weight: 106.6 kg (235 lb 0.2 oz)  Body mass index is 39.11 kg/m².    Intake/Output Summary (Last 24 hours)  "at 12/15/2018 1056  Last data filed at 12/14/2018 1724  Gross per 24 hour   Intake 750 ml   Output --   Net 750 ml      Physical Exam   Constitutional: She is oriented to person, place, and time. No distress.   Eyes: Pupils are equal, round, and reactive to light.   Cardiovascular: Normal rate and regular rhythm.   No murmur heard.  Pulmonary/Chest: Effort normal and breath sounds normal. No respiratory distress. She has no wheezes.   Abdominal: Soft. Bowel sounds are normal. She exhibits no distension. There is no tenderness.   Musculoskeletal: She exhibits edema (bilateral lower extremities worse on the left). She exhibits no tenderness.   Neurological: She is alert and oriented to person, place, and time.   Skin: She is not diaphoretic. There is erythema (LLE erythema, appears improved relative to admission borders).   Large ulceration present at lateral aspect of left foot with surrounding erythema extending up leg proximally, appears improved relative to admission  Psychiatric: She has a normal mood and affect. Her behavior is normal.       Significant Labs:     CBC:    Recent Labs   Lab 12/14/18  0534 12/15/18  0539   WBC 6.22 5.45   GRAN 78.4*  4.9 79.0*  4.3   HGB 7.2* 7.1*   HCT 23.6* 23.1*    205       Chem 10:  Recent Labs   Lab 12/14/18  0534 12/15/18  0539    136   K 4.4 5.4*    105   CO2 26 25   BUN 28* 35*   CREATININE 0.8 0.9   * 196*   CALCIUM 8.5* 8.2*   MG 1.5* 1.3*       LFTs:  Recent Labs   Lab 12/14/18  0534 12/15/18  0539   ALKPHOS 161* 151*   BILITOT 0.3 0.3   AST 14 11   ALT 24 20   ALBUMIN 1.4* 1.4*       Significant Imaging:     CT leg  "Rim enhancing multiloculated abscess extends at least to the level of the fibular head noting there are separate fluid collections posterior and medial to the knee (axial series 4, image 137) which possibly communicates although extensive beam hardening artifact from metallic knee prosthesis significantly limits " "assessment.    Multifocal osteolytic lesions with sclerotic rim in the foot and distal tibia likely representing components of acute and chronic osteomyelitis.  No definite calcification within a lucent lesion to suggest sequestrum although cannot be completely excluded.    Ulceration in the subcutaneous gas at the lateral distal foreleg is concerning for gas gangrene." - per interpreting radiologist    MRI left ankle with and without contrast  "Findings concerning for osteomyelitis of the hindfoot including the distal tibia and tarsal bones with associated cellulitis, myositis, and multiloculated abscesses in the deep soft tissues contiguous with lateral distal foreleg ulceration.  Component of abscess extends cranially outside the field of view, possibly more proximal portions of the left lower extremity." - per interpreting radiologist    X-ray tib fib left  "Status post total knee arthroplasty without evidence for complication.  Degenerative changes are noted at the ankle.  Vascular calcifications are present.  There is soft tissue gas at the lateral aspect of the ankle." - per interpreting radiologist    Assessment/Plan:      * Osteomyelitis of left tibia      - Stable  - Complicated by abscess formation and overlying cellulitis  - Demonstrated on MRI 12/13, which showed "osteomyelitis of the hindfoot including the distal tibia and tarsal bones with associated cellulitis, myositis, and multiloculated abscesses in the deep soft tissues"  - f/u CT showed osteolytic lesions and extensive abscess  - No leukocytosis. ESR and CRP both elevated  - Cardiology re: DAPT; would prefer to continue if at all possible  - Blood cultures NGTD, hemodynamically stable  - Continue vanc + cefepime + flagyl  - Ortho following, appreciate assistance, recommending AKA but patient remains hesitant. ID suggesting joint aspiration + trial of possible abscess drainage; will discuss with ortho     Appreciate continued assistance from " "multiple consulting services     Cellulitis of left lower extremity      - Stable  - See above for more details     Coronary artery disease involving native coronary artery of native heart with unstable angina pectoris      - Stable  - Asymptomatic  - Underwent LHC and subsequent PCI in 7/2018:    · "Bifurcation of the LAD:  The lesion was successfully intervened. Post-stenosis of 0%, post-DONNY 3 flow and TMP grade 3. The vessel was accessed natively.  The following items were used: 2.5MM 12MM [place] Balloon.  · D1:  The lesion was successfully intervened. Post-stenosis of 0%, post-DONNY 3 flow and TMP grade 3. The vessel was accessed natively.  The following items were used: 2.5MM 15MM [place] Balloon and Stent Resolute Rx 2.50x14 (OSMEL)."    - Cardiology recommending to continue DAPT perioperatively if at all possible, otherwise hold the plavix for as short a duration possible  - Otherwise continue aspirin, plavix, toprol, losartan, and lipitor  - Diabetic + cardiac diet     Hypomagnesemia      - Replaced today  - Will follow and replace as needed     Hyperkalemia      - 5.4 today, previously normal  - Repeat ordered for this afternoon  - 500 cc LR bolus given persistently abnormal BUN:Cr     Pressure injury of buttock, stage 2      - Wound care following, appreciate assistance       Preoperative cardiovascular examination      - RCRI class II, no further testing necessary  - See discussion of DAPT above     Peripheral arterial disease      - Stable, but likely contributing to poor wound healing and increased infection risk  - Previously evaluated in an outpatient setting with recommendations for stent placement; discussed the possibility of vascular surg consult with ortho  - Continue cardiac regimen with asa, plavix, and lipitor     Chronic combined systolic and diastolic heart failure      - Stable, clear chest examination though with bilateral lower extremity edema  - Most recent TTE, a stress echo in July 2018, " showed LVEF 50% and grade I diastolic dysfunction  - Continue asa, plavix, toprol, losartan, and lipitor  - Anticipate resuming lasix depending on BUN:Cr and examination (still elevated today)  - Goal K 4-5 and Mg 2-3     Obesity (BMI 30-39.9)      - Stable  - Body mass index is 38.12 kg/m².  - Encourage diet, weight loss, exercise  - Limited by PVD and cellulitis     Bilateral leg edema      - Stable  - Will tread underlying causes: cellulitis, OM, and CHF  - Will continue to follow with serial examinations; BUN:Cr still elevated     Dyslipidemia      - Stable  - Continue home lipitor     Hypothyroidism      - Stable  - Continue home synthroid     Hypertension      - Stable  - Continue toprol, losartan, and norvasc  - Will restart lasix as indicated, possibly tomorrow depending on her examination     Type 2 diabetes mellitus, without long-term current use of insulin      - Stable  - HbA1c 6.7 in May  - Will hold home oral antihyperglycemic agents in favor of low-dose aspart SSI + POCT glucose checks  - Diabetic + cardiac diet when not NPO       VTE Risk Mitigation (From admission, onward)        Ordered     enoxaparin injection 40 mg  Daily      12/12/18 1952     IP VTE HIGH RISK PATIENT  Once      12/12/18 1952              Dc Liao MD  Department of Hospital Medicine   [facility]

## 2018-12-15 NOTE — ASSESSMENT & PLAN NOTE
"  - Stable  - Complicated by abscess formation and overlying cellulitis  - Demonstrated on MRI 12/13, which showed "osteomyelitis of the hindfoot including the distal tibia and tarsal bones with associated cellulitis, myositis, and multiloculated abscesses in the deep soft tissues"  - f/u CT showed osteolytic lesions and extensive abscess  - No leukocytosis. ESR and CRP both elevated  - Cardiology re: DAPT; would prefer to continue if at all possible  - Blood cultures NGTD, hemodynamically stable  - Continue vanc + cefepime + flagyl  - Ortho following, appreciate assistance, recommending AKA but patient remains hesitant. ID suggesting joint aspiration + trial of possible abscess drainage; will discuss with ortho     Appreciate continued assistance from multiple consulting services  "

## 2018-12-15 NOTE — ASSESSMENT & PLAN NOTE
- 5.4 today, previously normal  - Repeat ordered for this afternoon  - 500 cc LR bolus given persistently abnormal BUN:Cr

## 2018-12-15 NOTE — PLAN OF CARE
Problem: Fall Injury Risk  Goal: Absence of Fall and Fall-Related Injury  Outcome: Ongoing (interventions implemented as appropriate)  Intervention: Identify and Manage Contributors to Fall Injury Risk   12/14/18 1823   Manage Acute Allergic Reaction   Medication Review/Management medications reviewed   Identify and Manage Contributors to Fall Injury Risk   Self-Care Promotion independence encouraged         Problem: Adult Inpatient Plan of Care  Goal: Plan of Care Review  Outcome: Ongoing (interventions implemented as appropriate)  Pt continues on IV Abx, able to make needs known, telemetry leads on and intact, wound to L foot open to air, will continue to monitor.

## 2018-12-15 NOTE — PLAN OF CARE
Problem: Adult Inpatient Plan of Care  Goal: Plan of Care Review  Outcome: Ongoing (interventions implemented as appropriate)  Pt AAOx4, showed the understanding of POC. ABT therapy continues with Vanco, cefepime, and flagyl w/o A/R. All due medication taken with well-tolerated. Slept well. NPO was started from midnight and maintained. No apparent distress noted.

## 2018-12-15 NOTE — PROGRESS NOTES
[facility]  Infectious Disease  Progress Note    Patient Name: Krissy Marino  MRN: [id number]  Admission Date: 12/12/2018  Length of Stay: 3 days  Attending Physician: Dc Liao MD  Primary Care Provider: Emily Mcpherson MD    Isolation Status: No active isolations  Assessment/Plan:      Cellulitis of left lower extremity    79 yo female with recent GBS bacteremia, recurrent LLE cellulitis, PVD now found to have osteomyelitis of the hindfoot including the distal tibia and tarsal bones with associated cellulitis, myositis, and multiloculated abscesses in the deep soft tissues contiguous with lateral distal foreleg ulceration. Repeat CT shows infection to knee level and gas near ankle.  On vanc and ceftriaxone.  Ortho Sx following and rec AKA but patient desires washout and abx 1st.  BCXs NGTD. Stable non septic.    Plan   - Continue Vanc and DC Ceftriaxone  - Start Cefepime 2g IV q8 to cover pseudomonas and Flagyl 500mg po tid as gas seen on CT scane  - Vanc trough prior to 4th dose - trough goal 15-20  - FU further ortho sx recs  - Please send cultures from surgery if taken to the OR  - will follow             Anticipated Disposition: tbd    Thank you for your consult. I will follow-up with patient. Please contact us if you have any additional questions.    JILLIAN Sheridan  Infectious Disease  [facility]    Subjective:     Principal Problem:Osteomyelitis of left tibia    HPI: Ms. Krissy Marino is a 78 y.o. female with type 2 diabetes, CAD status post PCI (7/2018), and peripheral vascular disease who presents to the emergency department from [facility] for evaluation of left lower extremity cellulitis.  She was just admitted to [facility] from 11/26-12/5 for left lower extremity cellulitis s/p a left ankle sprain, where she was found to have group B strep bacteremia.  During that admission, a PICC line was placed and she was discharged  on Ceftriaxone to complete a 14 day course that ended on 12/11.  Since her discharge, family mentions that her left lower extremity cellulitis has not been improving.  They endorse persistent erythema as well as occasional yellow drainage from the left lateral malleolus.  At baseline, the patient is fairly bed-bound and spends most of her time with her lower extremities dangling down.  Labs were done at her facility on 12/07 which showed a white blood cell count 20.  She was told to come to the emergency department for further evaluation.  She continues to endorse pain in her left as well as pain with movement.  She has never had any debridement of her wound, but mentions that it has been cleaned with Clorox by [facility].  She denies any fevers or chills.  Of note, the patient was told that because she has such severe peripheral vascular disease, she would require stent placement.  However, because of her recent cardiac stent placement, she is unable to get leg stents placed for at least 1 year, which will continue to prevent good wound healing.     In the emergency department, labs were notable for a white blood cell count 10, sed rate 56, and CRP 67.  X-rays were ordered which showed edema, but no cellulitis.  She was given IV vancomycin and was admitted to Hospital Medicine for further management.  MRI of LLE c/f large abscess and osteomyelitis of distal tibia and tarsal bones.  She has been placed on Vancomycin.  Blood cultures 12/12 are NGTD.      Interval History: No AEON.  Afebrile and WBC WNL.  CT LLE shows extensive infection up to knee.  Ortho Sx rec AKA but patient declines and desires washout and abx.  The patient denies any recent fever, chills, or sweats.      Review of Systems   Constitutional: Negative for activity change, chills, diaphoresis and fever.   Respiratory: Negative for cough, shortness of breath and wheezing.    Cardiovascular: Negative for chest pain.   Gastrointestinal: Negative for  abdominal pain, constipation, diarrhea, nausea and vomiting.   Genitourinary: Negative for dysuria, frequency and urgency.   Skin: Positive for wound.   Neurological: Negative for dizziness.   Hematological: Does not bruise/bleed easily.     Objective:     Vital Signs (Most Recent):  Temp: 97.2 °F (36.2 °C) (12/14/18 1131)  Pulse: 97 (12/14/18 1131)  Resp: 17 (12/14/18 1131)  BP: 131/63 (12/14/18 1131)  SpO2: 97 % (12/14/18 1131) Vital Signs (24h Range):  Temp:  [97.1 °F (36.2 °C)-98.9 °F (37.2 °C)] 97.2 °F (36.2 °C)  Pulse:  [73-97] 97  Resp:  [16-17] 17  SpO2:  [95 %-99 %] 97 %  BP: (131-150)/(51-65) 131/63     Weight: 106.6 kg (235 lb 0.2 oz)  Body mass index is 39.11 kg/m².    Estimated Creatinine Clearance: 68 mL/min (based on SCr of 0.8 mg/dL).    Physical Exam   Constitutional: She is oriented to person, place, and time. She appears well-developed and well-nourished. No distress.   HENT:   Head: Normocephalic and atraumatic.   Cardiovascular: Normal rate, regular rhythm and normal heart sounds. Exam reveals no gallop and no friction rub.   No murmur heard.  Pulmonary/Chest: Effort normal and breath sounds normal. No respiratory distress. She has no wheezes. She has no rales.   Abdominal: Soft. Bowel sounds are normal. She exhibits no distension and no mass. There is no tenderness. There is no rebound and no guarding.   Musculoskeletal: She exhibits edema (BL LE pitting).   Neurological: She is alert and oriented to person, place, and time.   Skin: Skin is warm and dry. She is not diaphoretic.   Psychiatric: She has a normal mood and affect. Her behavior is normal.                   Significant Labs:   Blood Culture:   Recent Labs   Lab 11/26/18  0810 11/27/18  0858 11/27/18  0859 12/12/18  1849 12/12/18  1907   LABBLOO Gram stain anais bottle: Gram positive cocci in chains resembling Strep  Results called to and read back by:James Hooker RN  STREPTOCOCCUS AGALACTIAE (GROUP B)  Beta-hemolytic streptococci  are routinely susceptible to   penicillins,cephalosporins and carbapenems.   No growth after 5 days. No growth after 5 days. No Growth to date  No Growth to date No Growth to date  No Growth to date     CBC:   Recent Labs   Lab 12/12/18  1852 12/13/18  0340 12/14/18  0534   WBC 10.72 7.65 6.22   HGB 8.8* 7.1* 7.2*   HCT 27.0* 23.2* 23.6*    249 251     CMP:   Recent Labs   Lab 12/12/18  1852 12/13/18  0340 12/14/18  0534    137 139   K 4.4 4.0 4.4   CL 97 104 106   CO2 28 25 26   * 207* 185*   BUN 41* 35* 28*   CREATININE 0.9 0.7 0.8   CALCIUM 8.9 8.3* 8.5*   PROT 5.9* 5.0* 4.7*   ALBUMIN 1.8* 1.5* 1.4*   BILITOT 0.3 0.4 0.3   ALKPHOS 224* 173* 161*   AST 21 16 14   ALT 35 28 24   ANIONGAP 11 8 7*   EGFRNONAA >60.0 >60.0 >60.0     Wound Culture: No results for input(s): LABAERO in the last 4320 hours.  All pertinent labs within the past 24 hours have been reviewed.    Significant Imaging: I have reviewed all pertinent imaging results/findings within the past 24 hours.   X-Ray Chest 1 View Pre-OP [[id number]] Resulted: 12/13/18 2020   Order Status: Completed Updated: 12/13/18 2022   Narrative:     EXAMINATION:  XR CHEST 1 VIEW PRE-OP    CLINICAL HISTORY:  pre op;    TECHNIQUE:  Single frontal view of the chest was performed.    COMPARISON:  Chest radiograph 12/12/2018.    FINDINGS:  Redemonstration of multiple radiopaque structures overlying the chest the limiting evaluation.  The stable position of a right PICC with catheter tip overlying the brachiocephalic/SVC junction.    The cardiac silhouette and pulmonary vasculature is stable.    Lungs show no large focal consolidation, large pleural effusion or pneumothorax.    Bones are intact, with stable degenerative changes at the shoulders.   Impression:       No acute radiographic findings.    Electronically signed by resident: Eliazar Ordonez  Date: 12/13/2018  Time: 19:54    Electronically signed by: Toñito Flower MD  Date: 12/13/2018  Time: 20:20    CT Ankle (Including Hindfoot) W W/O Contrast Left [[id number]] (Abnormal) Resulted: 12/13/18 1816   Order Status: Completed Updated: 12/13/18 1819   Narrative:     EXAMINATION:  CT LEG (TIBIA-FIBULA) W W/O CONTRAST LEFT; CT ANKLE (INCLUDING HINDFOOT) W W/O CONTRAST LEFT; CT FOOT W W/O CONTRAST LEFT    CLINICAL HISTORY:  osteomyelitis and abscess  Osteomyelitis, unspecified    TECHNIQUE:  Axial images of were acquired from the distal femur to the forefoot before and after the administration of Omnipaque 350 IV contrast.  Sagittal and coronal reformats were provided.    COMPARISON:  Knee and tibia radiograph 12/13/2018, MRI ankle 12/12/2018    FINDINGS:  Small rim enhancing multiloculated air fluid collections extends into the interosseous membrane cranially at least to the level of the fibular head.  There are small fluid collections posterior and medial to the knee which possibly communicate (axial series 4, image 137) and possibly enhance although extensive beam hardening artifact from the metallic knee prosthesis limits assessment of that area.  There is ulceration and subcutaneous gas at the lateral distal foreleg.  There is prominent surrounding skin thickening and diffuse subcutaneous fluid attenuation predominantly through the forefoot and hindfoot as well as the distal lower extremity.  Multiple lucent lesions are identified throughout the talus and midfoot which contain may sclerotic rim suggesting chronic osteomyelitis.  Lucency within the lateral talus contains air without sadia cortical disruption..  No fracture identified.  Vascular calcifications are present.   Impression:       Rim enhancing multiloculated abscess extends at least to the level of the fibular head noting there are separate fluid collections posterior and medial to the knee (axial series 4, image 137) which possibly communicates although extensive beam hardening artifact from metallic knee prosthesis significantly limits  assessment.    Multifocal osteolytic lesions with sclerotic rim in the foot and distal tibia likely representing components of acute and chronic osteomyelitis.  No definite calcification within a lucent lesion to suggest sequestrum although cannot be completely excluded.    Ulceration in the subcutaneous gas at the lateral distal foreleg is concerning for gas gangrene.    This report was flagged in Epic as abnormal.    Electronically signed by resident: Raymond Vidales  Date: 12/13/2018  Time: 17:25    Electronically signed by: Germán Flowers MD  Date: 12/13/2018  Time: 18:16   CT Foot W W/O Contrast Left [[id number]] (Abnormal) Resulted: 12/13/18 1816   Order Status: Completed Updated: 12/13/18 1819   Narrative:     EXAMINATION:  CT LEG (TIBIA-FIBULA) W W/O CONTRAST LEFT; CT ANKLE (INCLUDING HINDFOOT) W W/O CONTRAST LEFT; CT FOOT W W/O CONTRAST LEFT    CLINICAL HISTORY:  osteomyelitis and abscess  Osteomyelitis, unspecified    TECHNIQUE:  Axial images of were acquired from the distal femur to the forefoot before and after the administration of Omnipaque 350 IV contrast.  Sagittal and coronal reformats were provided.    COMPARISON:  Knee and tibia radiograph 12/13/2018, MRI ankle 12/12/2018    FINDINGS:  Small rim enhancing multiloculated air fluid collections extends into the interosseous membrane cranially at least to the level of the fibular head.  There are small fluid collections posterior and medial to the knee which possibly communicate (axial series 4, image 137) and possibly enhance although extensive beam hardening artifact from the metallic knee prosthesis limits assessment of that area.  There is ulceration and subcutaneous gas at the lateral distal foreleg.  There is prominent surrounding skin thickening and diffuse subcutaneous fluid attenuation predominantly through the forefoot and hindfoot as well as the distal lower extremity.  Multiple lucent lesions are identified throughout the talus and midfoot  which contain may sclerotic rim suggesting chronic osteomyelitis.  Lucency within the lateral talus contains air without sadia cortical disruption..  No fracture identified.  Vascular calcifications are present.   Impression:       Rim enhancing multiloculated abscess extends at least to the level of the fibular head noting there are separate fluid collections posterior and medial to the knee (axial series 4, image 137) which possibly communicates although extensive beam hardening artifact from metallic knee prosthesis significantly limits assessment.    Multifocal osteolytic lesions with sclerotic rim in the foot and distal tibia likely representing components of acute and chronic osteomyelitis.  No definite calcification within a lucent lesion to suggest sequestrum although cannot be completely excluded.    Ulceration in the subcutaneous gas at the lateral distal foreleg is concerning for gas gangrene.    This report was flagged in Epic as abnormal.    Electronically signed by resident: Raymond Vidales  Date: 12/13/2018  Time: 17:25    Electronically signed by: Germán Flowers MD  Date: 12/13/2018  Time: 18:16   CT Leg (Tibia-Fibula) W W/O Contrast Left [[id number]] (Abnormal) Resulted: 12/13/18 1816   Order Status: Completed Updated: 12/13/18 1819   Narrative:     EXAMINATION:  CT LEG (TIBIA-FIBULA) W W/O CONTRAST LEFT; CT ANKLE (INCLUDING HINDFOOT) W W/O CONTRAST LEFT; CT FOOT W W/O CONTRAST LEFT    CLINICAL HISTORY:  osteomyelitis and abscess  Osteomyelitis, unspecified    TECHNIQUE:  Axial images of were acquired from the distal femur to the forefoot before and after the administration of Omnipaque 350 IV contrast.  Sagittal and coronal reformats were provided.    COMPARISON:  Knee and tibia radiograph 12/13/2018, MRI ankle 12/12/2018    FINDINGS:  Small rim enhancing multiloculated air fluid collections extends into the interosseous membrane cranially at least to the level of the fibular head.  There are small  fluid collections posterior and medial to the knee which possibly communicate (axial series 4, image 137) and possibly enhance although extensive beam hardening artifact from the metallic knee prosthesis limits assessment of that area.  There is ulceration and subcutaneous gas at the lateral distal foreleg.  There is prominent surrounding skin thickening and diffuse subcutaneous fluid attenuation predominantly through the forefoot and hindfoot as well as the distal lower extremity.  Multiple lucent lesions are identified throughout the talus and midfoot which contain may sclerotic rim suggesting chronic osteomyelitis.  Lucency within the lateral talus contains air without sadia cortical disruption..  No fracture identified.  Vascular calcifications are present.   Impression:       Rim enhancing multiloculated abscess extends at least to the level of the fibular head noting there are separate fluid collections posterior and medial to the knee (axial series 4, image 137) which possibly communicates although extensive beam hardening artifact from metallic knee prosthesis significantly limits assessment.    Multifocal osteolytic lesions with sclerotic rim in the foot and distal tibia likely representing components of acute and chronic osteomyelitis.  No definite calcification within a lucent lesion to suggest sequestrum although cannot be completely excluded.    Ulceration in the subcutaneous gas at the lateral distal foreleg is concerning for gas gangrene.    This report was flagged in Epic as abnormal.    Electronically signed by resident: Raymond Vidales  Date: 12/13/2018  Time: 17:25    Electronically signed by: Germán Flowers MD  Date: 12/13/2018  Time: 18:16   X-Ray Knee 1 or 2 View Bilateral [[id number]] Resulted: 12/13/18 1432   Order Status: Completed Updated: 12/13/18 1434   Narrative:     EXAMINATION:  XR KNEE 1 OR 2 VIEW BILATERAL    CLINICAL HISTORY:  hx of tka;  Osteomyelitis, unspecified    COMPARISON:  None  08/10/2016    FINDINGS:  Bilateral knee arthroplasties are identified.  Position alignment is satisfactory and unchanged as compared to the previous study.  No fracture or bone destruction identified   Impression:       See above      Electronically signed by: Hector Escamilla MD  Date: 12/13/2018  Time: 14:32   X-Ray Tibia Fibula 2 View Left [[id number]] Resulted: 12/13/18 1212   Order Status: Completed Updated: 12/13/18 1215   Narrative:     EXAMINATION:  XR TIBIA FIBULA 2 VIEW LEFT    CLINICAL HISTORY:  osteomyelitis;  Osteomyelitis, unspecified    TECHNIQUE:  AP and lateral views of the left tibia and fibula were performed.    COMPARISON:  None.    FINDINGS:  Status post total knee arthroplasty without evidence for complication.  Degenerative changes are noted at the ankle.  Vascular calcifications are present.  There is soft tissue gas at the lateral aspect of the ankle.   Impression:       Soft tissue gas at the lateral ankle.      Electronically signed by: Kevin Newby MD  Date: 12/13/2018  Time: 12:12   MRI Ankle W WO Contrast Left [[id number]] Resulted: 12/12/18 2255   Order Status: Completed Updated: 12/12/18 2257   Narrative:     EXAMINATION:  MRI ANKLE W WO CONTRAST LEFT    CLINICAL HISTORY:  Ankle erythema, swelling, cellulitis suspected    TECHNIQUE:  MRI ankle performed before and after the administration 10 mL Gadavist IV contrast.    COMPARISON:  Foot radiograph 12/12/2018, foot radiograph 12/22/2017.    FINDINGS:  There is a lateral malleolar ulcer and circumferential subcutaneous edema with skin thickening and enhancement of the hindfoot.  Large multiloculated peripherally enhancing fluid collections throughout the visualized soft tissues of the lateral distal foreleg at least 10 cm craniocaudad dimension contiguous with ulceration and extending proximal outside the field of view which are concerning for soft tissue abscesses.  There is diffuse muscular edema and enhancement suggesting significant  myositis.    There is abnormal marrow edema and extensive irregular T1 marrow replacement in the distal tibia, calcaneus, talus, and navicular suggesting osteomyelitis.   Impression:       Findings concerning for osteomyelitis of the hindfoot including the distal tibia and tarsal bones with associated cellulitis, myositis, and multiloculated abscesses in the deep soft tissues contiguous with lateral distal foreleg ulceration.  Component of abscess extends cranially outside the field of view, possibly more proximal portions of the left lower extremity.    COMMUNICATION  This critical result was discovered/received at 12/12/2018 at 22:00. The critical information above was relayed directly by me by telephone to Dr. Guzman On 12/12/2018 at 22:05.    Electronically signed by resident: Raymond Vidales  Date: 12/12/2018  Time: 21:35    Electronically signed by: Toñito Flower MD  Date: 12/12/2018  Time: 22:55   US Lower Extremity Veins Left [[id number]] Resulted: 12/12/18 2232   Order Status: Completed Updated: 12/12/18 2234   Narrative:     EXAMINATION:  US LOWER EXTREMITY VEINS LEFT    CLINICAL HISTORY:  Acute embolism and thrombosis of unspecified deep veins of unspecified lower extremity    TECHNIQUE:  Duplex and color flow Doppler evaluation and graded compression of the left lower extremity veins was performed.    COMPARISON:  None.    FINDINGS:  Left thigh veins: The common femoral, femoral, popliteal, upper greater saphenous, and deep femoral veins are patent and free of thrombus. The veins are normally compressible and have normal phasic flow and augmentation response.    Left calf veins: The visualized calf veins are patent.    Contralateral CFV: The contralateral (right) common femoral vein is patent and free of thrombus.    Miscellaneous: Soft tissue edema and abnormal fluid about the left ankle, better characterized on recent MRI.   Impression:       No evidence of DVT in the left lower  "extremity.    Electronically signed by resident: Eliazar Ordonez  Date: 12/12/2018  Time: 22:22    Electronically signed by: Toñito Flower MD  Date: 12/12/2018  Time: 22:32   X-Ray Foot Complete Left [[id number]] Resulted: 12/12/18 1922   Order Status: Completed Updated: 12/12/18 1925   Narrative:     EXAMINATION:  XR FOOT COMPLETE 3 VIEW LEFT    CLINICAL HISTORY:  Cellulitis, unspecified    TECHNIQUE:  Three views of the left foot.    COMPARISON:  11/26/2018.    FINDINGS:  Stable degenerative changes in the foot.  No acute fracture or dislocation identified.  No bony erosion.  Vascular calcifications noted.  There is persistent soft tissue edema at the anterior aspect of the ankle and over the dorsum of the foot.  No distinct soft tissue emphysema.  No radiopaque foreign body.   Impression:       Persistent soft tissue edema at the dorsum of the foot, possibly cellulitis in the correct clinical setting.      Electronically signed by: Toñito Flower MD  Date: 12/12/2018  Time: 19:22   X-Ray Chest AP Portable [[id number]] Resulted: 12/12/18 1919   Order Status: Completed Updated: 12/12/18 1922   Narrative:     EXAMINATION:  XR CHEST AP PORTABLE    CLINICAL HISTORY:  Provided history is "Sepsis;  ".    TECHNIQUE:  One view of the chest.    COMPARISON:  11/29/2018.    FINDINGS:  Multiple radiopaque structures overlie the chest and significantly limit evaluation, including an electronic device which may represent a cell phone.   Per technologist note, the patient refused to comply with exam instructions to remove any overlying material.   Cardiac silhouette is stable.  Right-sided PICC has been slightly retracted, with the tip now overlying the brachiocephalic/SVC junction.  No large focal area of consolidation or detrimental change in lung aeration.   Impression:       Limited examination, without obvious detrimental change.  Follow-up with upright PA and lateral views with removal of overlying structures as " indicated.      Electronically signed by: Toñito Flower MD  Date: 12/12/2018  Time: 19:19

## 2018-12-15 NOTE — SUBJECTIVE & OBJECTIVE
Interval History:     Stable exam, no complaints on broad-spectrum antibiotics.    There are ongoing discussions regarding her ultimate plan as she wants to avoid AKA.    Review of Systems   Constitutional: Negative for chills and fever.   Respiratory: Negative for cough and shortness of breath.    Cardiovascular: Positive for leg swelling. Negative for chest pain.   Gastrointestinal: Negative for constipation, diarrhea, nausea and vomiting.   Musculoskeletal: Negative for myalgias.   Skin: Positive for rash and wound.   Psychiatric/Behavioral: Negative for dysphoric mood. The patient is not nervous/anxious.      Objective:     Vital Signs (Most Recent):  Temp: 99.6 °F (37.6 °C) (12/15/18 0757)  Pulse: 77 (12/15/18 0935)  Resp: 18 (12/15/18 0935)  BP: (!) 130/58 (12/15/18 0757)  SpO2: 96 % (12/15/18 0757) Vital Signs (24h Range):  Temp:  [97.2 °F (36.2 °C)-99.6 °F (37.6 °C)] 99.6 °F (37.6 °C)  Pulse:  [77-97] 77  Resp:  [16-18] 18  SpO2:  [94 %-98 %] 96 %  BP: (116-142)/(53-66) 130/58     Weight: 106.6 kg (235 lb 0.2 oz)  Body mass index is 39.11 kg/m².    Intake/Output Summary (Last 24 hours) at 12/15/2018 1056  Last data filed at 12/14/2018 1724  Gross per 24 hour   Intake 750 ml   Output --   Net 750 ml      Physical Exam   Constitutional: She is oriented to person, place, and time. No distress.   Eyes: Pupils are equal, round, and reactive to light.   Cardiovascular: Normal rate and regular rhythm.   No murmur heard.  Pulmonary/Chest: Effort normal and breath sounds normal. No respiratory distress. She has no wheezes.   Abdominal: Soft. Bowel sounds are normal. She exhibits no distension. There is no tenderness.   Musculoskeletal: She exhibits edema (bilateral lower extremities worse on the left). She exhibits no tenderness.   Neurological: She is alert and oriented to person, place, and time.   Skin: She is not diaphoretic. There is erythema (LLE erythema, appears improved relative to admission borders).  "  Large ulceration present at lateral aspect of left foot with surrounding erythema extending up leg proximally, appears improved relative to border marking from last night   Psychiatric: She has a normal mood and affect. Her behavior is normal.       Significant Labs:     CBC:    Recent Labs   Lab 12/14/18  0534 12/15/18  0539   WBC 6.22 5.45   GRAN 78.4*  4.9 79.0*  4.3   HGB 7.2* 7.1*   HCT 23.6* 23.1*    205       Chem 10:  Recent Labs   Lab 12/14/18  0534 12/15/18  0539    136   K 4.4 5.4*    105   CO2 26 25   BUN 28* 35*   CREATININE 0.8 0.9   * 196*   CALCIUM 8.5* 8.2*   MG 1.5* 1.3*       LFTs:  Recent Labs   Lab 12/14/18  0534 12/15/18  0539   ALKPHOS 161* 151*   BILITOT 0.3 0.3   AST 14 11   ALT 24 20   ALBUMIN 1.4* 1.4*       Significant Imaging:     CT leg  "Rim enhancing multiloculated abscess extends at least to the level of the fibular head noting there are separate fluid collections posterior and medial to the knee (axial series 4, image 137) which possibly communicates although extensive beam hardening artifact from metallic knee prosthesis significantly limits assessment.    Multifocal osteolytic lesions with sclerotic rim in the foot and distal tibia likely representing components of acute and chronic osteomyelitis.  No definite calcification within a lucent lesion to suggest sequestrum although cannot be completely excluded.    Ulceration in the subcutaneous gas at the lateral distal foreleg is concerning for gas gangrene." - per interpreting radiologist    MRI left ankle with and without contrast  "Findings concerning for osteomyelitis of the hindfoot including the distal tibia and tarsal bones with associated cellulitis, myositis, and multiloculated abscesses in the deep soft tissues contiguous with lateral distal foreleg ulceration.  Component of abscess extends cranially outside the field of view, possibly more proximal portions of the left lower extremity." - per " "interpreting radiologist    X-ray tib fib left  "Status post total knee arthroplasty without evidence for complication.  Degenerative changes are noted at the ankle.  Vascular calcifications are present.  There is soft tissue gas at the lateral aspect of the ankle." - per interpreting radiologist  "

## 2018-12-15 NOTE — ASSESSMENT & PLAN NOTE
- Stable, clear chest examination though with bilateral lower extremity edema  - Most recent TTE, a stress echo in July 2018, showed LVEF 50% and grade I diastolic dysfunction  - Continue asa, plavix, toprol, losartan, and lipitor  - Anticipate resuming lasix depending on BUN:Cr and examination (still elevated today)  - Goal K 4-5 and Mg 2-3

## 2018-12-16 PROBLEM — D64.9 ANEMIA: Status: ACTIVE | Noted: 2018-12-16

## 2018-12-16 LAB
ABO GROUP BLD: NORMAL
ALBUMIN SERPL BCP-MCNC: 1.4 G/DL
ALP SERPL-CCNC: 139 U/L
ALT SERPL W/O P-5'-P-CCNC: 21 U/L
ANION GAP SERPL CALC-SCNC: 6 MMOL/L
AST SERPL-CCNC: 19 U/L
BASOPHILS # BLD AUTO: 0.02 K/UL
BASOPHILS NFR BLD: 0.4 %
BILIRUB SERPL-MCNC: 0.3 MG/DL
BLD GP AB SCN CELLS X3 SERPL QL: NORMAL
BLD PROD TYP BPU: NORMAL
BLOOD UNIT EXPIRATION DATE: NORMAL
BLOOD UNIT TYPE CODE: 6200
BLOOD UNIT TYPE: NORMAL
BUN SERPL-MCNC: 41 MG/DL
CALCIUM SERPL-MCNC: 8.1 MG/DL
CHLORIDE SERPL-SCNC: 103 MMOL/L
CO2 SERPL-SCNC: 24 MMOL/L
CODING SYSTEM: NORMAL
CREAT SERPL-MCNC: 0.9 MG/DL
DIFFERENTIAL METHOD: ABNORMAL
DISPENSE STATUS: NORMAL
EOSINOPHIL # BLD AUTO: 0.2 K/UL
EOSINOPHIL NFR BLD: 4 %
ERYTHROCYTE [DISTWIDTH] IN BLOOD BY AUTOMATED COUNT: 14.1 %
EST. GFR  (AFRICAN AMERICAN): >60 ML/MIN/1.73 M^2
EST. GFR  (NON AFRICAN AMERICAN): >60 ML/MIN/1.73 M^2
FERRITIN SERPL-MCNC: 810 NG/ML
GLUCOSE SERPL-MCNC: 113 MG/DL
HCT VFR BLD AUTO: 22.5 %
HGB BLD-MCNC: 6.9 G/DL
IMM GRANULOCYTES # BLD AUTO: 0.07 K/UL
IMM GRANULOCYTES NFR BLD AUTO: 1.4 %
IRON SERPL-MCNC: 37 UG/DL
LYMPHOCYTES # BLD AUTO: 0.6 K/UL
LYMPHOCYTES NFR BLD: 11.4 %
MAGNESIUM SERPL-MCNC: 1.8 MG/DL
MCH RBC QN AUTO: 31.5 PG
MCHC RBC AUTO-ENTMCNC: 30.7 G/DL
MCV RBC AUTO: 103 FL
MONOCYTES # BLD AUTO: 0.3 K/UL
MONOCYTES NFR BLD: 6.4 %
NEUTROPHILS # BLD AUTO: 3.8 K/UL
NEUTROPHILS NFR BLD: 76.4 %
NRBC BLD-RTO: 0 /100 WBC
PLATELET # BLD AUTO: 176 K/UL
PMV BLD AUTO: 10.2 FL
POCT GLUCOSE: 120 MG/DL (ref 70–110)
POCT GLUCOSE: 198 MG/DL (ref 70–110)
POCT GLUCOSE: 232 MG/DL (ref 70–110)
POCT GLUCOSE: 297 MG/DL (ref 70–110)
POTASSIUM SERPL-SCNC: 4.9 MMOL/L
PROT SERPL-MCNC: 4.6 G/DL
RBC # BLD AUTO: 2.19 M/UL
RH BLD: NORMAL
SATURATED IRON: 25 %
SODIUM SERPL-SCNC: 133 MMOL/L
TOTAL IRON BINDING CAPACITY: 149 UG/DL
TRANS ERYTHROCYTES VOL PATIENT: NORMAL ML
TRANSFERRIN SERPL-MCNC: 101 MG/DL
VANCOMYCIN SERPL-MCNC: 40.2 UG/ML
WBC # BLD AUTO: 5.02 K/UL

## 2018-12-16 PROCEDURE — 63600175 PHARM REV CODE 636 W HCPCS: Performed by: HOSPITALIST

## 2018-12-16 PROCEDURE — 80053 COMPREHEN METABOLIC PANEL: CPT

## 2018-12-16 PROCEDURE — 86850 RBC ANTIBODY SCREEN: CPT

## 2018-12-16 PROCEDURE — 99232 SBSQ HOSP IP/OBS MODERATE 35: CPT | Mod: ,,, | Performed by: INTERNAL MEDICINE

## 2018-12-16 PROCEDURE — 36430 TRANSFUSION BLD/BLD COMPNT: CPT

## 2018-12-16 PROCEDURE — 86901 BLOOD TYPING SEROLOGIC RH(D): CPT

## 2018-12-16 PROCEDURE — 83540 ASSAY OF IRON: CPT

## 2018-12-16 PROCEDURE — P9021 RED BLOOD CELLS UNIT: HCPCS

## 2018-12-16 PROCEDURE — 99499 UNLISTED E&M SERVICE: CPT | Mod: ,,, | Performed by: PHYSICIAN ASSISTANT

## 2018-12-16 PROCEDURE — 25000003 PHARM REV CODE 250: Performed by: INTERNAL MEDICINE

## 2018-12-16 PROCEDURE — 25000003 PHARM REV CODE 250: Performed by: HOSPITALIST

## 2018-12-16 PROCEDURE — 80202 ASSAY OF VANCOMYCIN: CPT

## 2018-12-16 PROCEDURE — 11000001 HC ACUTE MED/SURG PRIVATE ROOM

## 2018-12-16 PROCEDURE — 36415 COLL VENOUS BLD VENIPUNCTURE: CPT

## 2018-12-16 PROCEDURE — 63600175 PHARM REV CODE 636 W HCPCS: Performed by: PHYSICIAN ASSISTANT

## 2018-12-16 PROCEDURE — 82728 ASSAY OF FERRITIN: CPT

## 2018-12-16 PROCEDURE — 25000003 PHARM REV CODE 250: Performed by: PHYSICIAN ASSISTANT

## 2018-12-16 PROCEDURE — 63600175 PHARM REV CODE 636 W HCPCS: Performed by: INTERNAL MEDICINE

## 2018-12-16 PROCEDURE — 83735 ASSAY OF MAGNESIUM: CPT

## 2018-12-16 PROCEDURE — 86922 COMPATIBILITY TEST ANTIGLOB: CPT

## 2018-12-16 PROCEDURE — 85025 COMPLETE CBC W/AUTO DIFF WBC: CPT

## 2018-12-16 PROCEDURE — 86900 BLOOD TYPING SEROLOGIC ABO: CPT

## 2018-12-16 RX ORDER — HYDROCODONE BITARTRATE AND ACETAMINOPHEN 500; 5 MG/1; MG/1
TABLET ORAL
Status: DISCONTINUED | OUTPATIENT
Start: 2018-12-16 | End: 2018-12-26 | Stop reason: HOSPADM

## 2018-12-16 RX ADMIN — POLYETHYLENE GLYCOL 3350 17 G: 17 POWDER, FOR SOLUTION ORAL at 08:12

## 2018-12-16 RX ADMIN — FAMOTIDINE 20 MG: 20 TABLET ORAL at 09:12

## 2018-12-16 RX ADMIN — CETIRIZINE HYDROCHLORIDE 10 MG: 10 TABLET, FILM COATED ORAL at 08:12

## 2018-12-16 RX ADMIN — METRONIDAZOLE 500 MG: 500 TABLET ORAL at 02:12

## 2018-12-16 RX ADMIN — AMLODIPINE BESYLATE 5 MG: 5 TABLET ORAL at 08:12

## 2018-12-16 RX ADMIN — METRONIDAZOLE 500 MG: 500 TABLET ORAL at 06:12

## 2018-12-16 RX ADMIN — ENOXAPARIN SODIUM 40 MG: 100 INJECTION SUBCUTANEOUS at 05:12

## 2018-12-16 RX ADMIN — INSULIN ASPART 2 UNITS: 100 INJECTION, SOLUTION INTRAVENOUS; SUBCUTANEOUS at 05:12

## 2018-12-16 RX ADMIN — ASPIRIN 81 MG: 81 TABLET, COATED ORAL at 08:12

## 2018-12-16 RX ADMIN — ATORVASTATIN CALCIUM 40 MG: 20 TABLET, FILM COATED ORAL at 08:12

## 2018-12-16 RX ADMIN — METRONIDAZOLE 500 MG: 500 TABLET ORAL at 09:12

## 2018-12-16 RX ADMIN — CLOPIDOGREL 75 MG: 75 TABLET, FILM COATED ORAL at 08:12

## 2018-12-16 RX ADMIN — VANCOMYCIN HYDROCHLORIDE 1250 MG: 10 INJECTION, POWDER, LYOPHILIZED, FOR SOLUTION INTRAVENOUS at 06:12

## 2018-12-16 RX ADMIN — CEFEPIME 2 G: 2 INJECTION, POWDER, FOR SOLUTION INTRAVENOUS at 08:12

## 2018-12-16 RX ADMIN — ALLOPURINOL 300 MG: 300 TABLET ORAL at 08:12

## 2018-12-16 RX ADMIN — FLUTICASONE FUROATE AND VILANTEROL TRIFENATATE 1 PUFF: 100; 25 POWDER RESPIRATORY (INHALATION) at 08:12

## 2018-12-16 RX ADMIN — CEFEPIME 2 G: 2 INJECTION, POWDER, FOR SOLUTION INTRAVENOUS at 11:12

## 2018-12-16 RX ADMIN — METOPROLOL SUCCINATE 50 MG: 50 TABLET, EXTENDED RELEASE ORAL at 08:12

## 2018-12-16 RX ADMIN — CEFEPIME 2 G: 2 INJECTION, POWDER, FOR SOLUTION INTRAVENOUS at 03:12

## 2018-12-16 RX ADMIN — LOSARTAN POTASSIUM 100 MG: 50 TABLET, FILM COATED ORAL at 08:12

## 2018-12-16 RX ADMIN — INSULIN ASPART 1 UNITS: 100 INJECTION, SOLUTION INTRAVENOUS; SUBCUTANEOUS at 09:12

## 2018-12-16 RX ADMIN — FAMOTIDINE 20 MG: 20 TABLET ORAL at 08:12

## 2018-12-16 RX ADMIN — LEVOTHYROXINE SODIUM 75 MCG: 75 TABLET ORAL at 06:12

## 2018-12-16 NOTE — PROGRESS NOTES
Pt completed blood transfusion of 1 unit successfully , no reactions . Stable V/S. Line flushed . Will keep monitoring .

## 2018-12-16 NOTE — SUBJECTIVE & OBJECTIVE
"Principal Problem:Osteomyelitis of left tibia    Principal Orthopedic Problem: Same    Interval History: Patient seen and examined at bedside.  No acute events overnight.  She still is adamantly refusing AKA.  Still blood cultures negative, WBC wnl, and afebrile.      Review of patient's allergies indicates:   Allergen Reactions    Sulfamethoxazole-trimethoprim Nausea And Vomiting    Latex, natural rubber Rash    Pcn [penicillins] Rash       Current Facility-Administered Medications   Medication    acetaminophen tablet 650 mg    allopurinol tablet 300 mg    amLODIPine tablet 5 mg    aspirin EC tablet 81 mg    atorvastatin tablet 40 mg    ceFEPIme injection 2 g    cetirizine tablet 10 mg    clopidogrel tablet 75 mg    dextrose 50% injection 12.5 g    dextrose 50% injection 25 g    enoxaparin injection 40 mg    famotidine tablet 20 mg    fluticasone-vilanterol 100-25 mcg/dose diskus inhaler 1 puff    glucagon (human recombinant) injection 1 mg    glucose chewable tablet 16 g    glucose chewable tablet 24 g    insulin aspart U-100 pen 0-5 Units    levothyroxine tablet 75 mcg    losartan tablet 100 mg    metoprolol succinate (TOPROL-XL) 24 hr tablet 50 mg    metroNIDAZOLE tablet 500 mg    ondansetron disintegrating tablet 8 mg    polyethylene glycol packet 17 g    senna-docusate 8.6-50 mg per tablet 1 tablet    sodium chloride 0.9% flush 5 mL    vancomycin (VANCOCIN) 1,250 mg in dextrose 5 % 250 mL IVPB     Objective:     Vital Signs (Most Recent):  Temp: 98.6 °F (37 °C) (12/16/18 0526)  Pulse: 71 (12/16/18 0526)  Resp: 16 (12/16/18 0526)  BP: (!) 119/58 (12/16/18 0526)  SpO2: 97 % (12/16/18 0526) Vital Signs (24h Range):  Temp:  [98.2 °F (36.8 °C)-99.8 °F (37.7 °C)] 98.6 °F (37 °C)  Pulse:  [71-83] 71  Resp:  [16-18] 16  SpO2:  [92 %-97 %] 97 %  BP: (119-160)/() 119/58     Weight: 106.6 kg (235 lb 0.2 oz)  Height: 5' 5" (165.1 cm)  Body mass index is 39.11 kg/m².      Intake/Output " Summary (Last 24 hours) at 12/16/2018 0731  Last data filed at 12/15/2018 1800  Gross per 24 hour   Intake --   Output 2000 ml   Net -2000 ml       Ortho/SPM Exam       RLE:  -chronic stasis dermatits appearance with darkening of the skin over the lower leg,  There is no ttp and no erythema or signs of infection   - AROM and PROM of hip knee and ankle is intact.    - TA/EHL/Gastroc/FHL assessed in isolation without deficit  - SILT throughout  - DP and PT palpated  2+  - Capillary Refill <3s      Significant Labs: All pertinent labs within the past 24 hours have been reviewed.    Significant Imaging: I have reviewed and interpreted all pertinent imaging results/findings.

## 2018-12-16 NOTE — PLAN OF CARE
Problem: Adult Inpatient Plan of Care  Goal: Plan of Care Review  Outcome: Ongoing (interventions implemented as appropriate)   12/16/18 5037   Plan of Care Review   Plan of Care Reviewed With patient     Wound to Lt ankle noted open to air, brown drainage, yellow and black color noted to wound, wound cleansed with sterile normal saline   Mark heels elevated off bed and remains elevated, strict I&O maintained,   Pt turned q2h, pericare provided, burnham care provided, no blood return noted to PICC line lumen x2, MD aware, wound care consult done to Lt foot wound,   Coccyx wound cleaned as ordered and clear barrier cream applied as ordered, blood glucose monitored as ordered,  No distress/discomfort noted in pt, all precautions maintained, will continue to monitor pt.

## 2018-12-16 NOTE — PROGRESS NOTES
[facility]  Orthopedics  Progress Note    Patient Name: Krissy Marino  MRN: [id number]  Admission Date: 12/12/2018  Hospital Length of Stay: 4 days  Attending Provider: Dc Liao MD  Primary Care Provider: Emily Mcpherson MD    Subjective:     Principal Problem:Osteomyelitis of left tibia    Principal Orthopedic Problem: Same    Interval History: Patient seen and examined at bedside.  No acute events overnight.  She still is adamantly refusing AKA.  Still blood cultures negative, WBC wnl, and afebrile.      Review of patient's allergies indicates:   Allergen Reactions    Sulfamethoxazole-trimethoprim Nausea And Vomiting    Latex, natural rubber Rash    Pcn [penicillins] Rash       Current Facility-Administered Medications   Medication    acetaminophen tablet 650 mg    allopurinol tablet 300 mg    amLODIPine tablet 5 mg    aspirin EC tablet 81 mg    atorvastatin tablet 40 mg    ceFEPIme injection 2 g    cetirizine tablet 10 mg    clopidogrel tablet 75 mg    dextrose 50% injection 12.5 g    dextrose 50% injection 25 g    enoxaparin injection 40 mg    famotidine tablet 20 mg    fluticasone-vilanterol 100-25 mcg/dose diskus inhaler 1 puff    glucagon (human recombinant) injection 1 mg    glucose chewable tablet 16 g    glucose chewable tablet 24 g    insulin aspart U-100 pen 0-5 Units    levothyroxine tablet 75 mcg    losartan tablet 100 mg    metoprolol succinate (TOPROL-XL) 24 hr tablet 50 mg    metroNIDAZOLE tablet 500 mg    ondansetron disintegrating tablet 8 mg    polyethylene glycol packet 17 g    senna-docusate 8.6-50 mg per tablet 1 tablet    sodium chloride 0.9% flush 5 mL    vancomycin (VANCOCIN) 1,250 mg in dextrose 5 % 250 mL IVPB     Objective:     Vital Signs (Most Recent):  Temp: 98.6 °F (37 °C) (12/16/18 0526)  Pulse: 71 (12/16/18 0526)  Resp: 16 (12/16/18 0526)  BP: (!) 119/58 (12/16/18 0526)  SpO2: 97 % (12/16/18 0526) Vital Signs (24h  "Range):  Temp:  [98.2 °F (36.8 °C)-99.8 °F (37.7 °C)] 98.6 °F (37 °C)  Pulse:  [71-83] 71  Resp:  [16-18] 16  SpO2:  [92 %-97 %] 97 %  BP: (119-160)/() 119/58     Weight: 106.6 kg (235 lb 0.2 oz)  Height: 5' 5" (165.1 cm)  Body mass index is 39.11 kg/m².      Intake/Output Summary (Last 24 hours) at 12/16/2018 0731  Last data filed at 12/15/2018 1800  Gross per 24 hour   Intake --   Output 2000 ml   Net -2000 ml       Ortho/SPM Exam       RLE:  -chronic stasis dermatits appearance with darkening of the skin over the lower leg,  There is no ttp and no erythema or signs of infection   - AROM and PROM of hip knee and ankle is intact.    - TA/EHL/Gastroc/FHL assessed in isolation without deficit  - SILT throughout  - DP and PT palpated  2+  - Capillary Refill <3s      Significant Labs: All pertinent labs within the past 24 hours have been reviewed.    Significant Imaging: I have reviewed and interpreted all pertinent imaging results/findings.    Assessment/Plan:     Chronic osteomyelitis of left tibia with draining sinus    Krissy Marino is a 78 y.o. female with bilateral TKAs (done many years ago), DMII, CAD s/p PCI in7/18 and PVD who is admitted for left ankle osteomyelitis and multiple abscess.     -  She still is adamantly refusing AKA.  Still blood cultures negative, WBC wnl, and afebrile no need for emergent surgical debridement.      -MRI shows osteomyelitis of the hindfoot and distal tibia with multiple abscess that appear to communicate with the OM.  CT scan shows multiple abscesses with the most proximal extent to just distal to the fibular head.  It is unlikely that BKA with I&D would be successful especially given that patient has extensive medical hx including DM, PVD, and CAD still on dual platelet anticoagulation.  Patient will likely need an AKA.  Discussed this extensively with the patient.  Patient would like to consider this option further and as she is not acutely septic can wait while patient " thinks about things.  She can have a diet.  Continue neurontin as this can help with phantom pain in the future.  Continue abx treatment.           Abram Way MD  Orthopedics  [facility]

## 2018-12-16 NOTE — ASSESSMENT & PLAN NOTE
79 yo female with recent GBS bacteremia, recurrent LLE cellulitis, PVD now found to have osteomyelitis of the hindfoot including the distal tibia and tarsal bones with associated cellulitis, myositis, and multiloculated abscesses in the deep soft tissues contiguous with lateral distal foreleg ulceration. Repeat CT shows infection to knee level and gas near ankle.  On vanc and ceftriaxone.  Ortho Sx following and rec AKA but patient desires washout and abx 1st.  BCXs NGTD. Stable non septic.    Plan   - Continue Vanc, Cefepime 2g IV q8 to cover pseudomonas and Flagyl 500mg po tid as gas seen on CT scane  - Vanc trough prior to 4th dose - trough goal 15-20  - Consider aspiration of L knee for cell count and culture and if negative then consider leg washout.  If organism identified then can go on suppression after leg washed out and completes abx - recommendations discussed in detail with primary team to be communicated to Ortho Sx  - Please send cultures from surgery if taken to the OR  - will follow

## 2018-12-16 NOTE — ASSESSMENT & PLAN NOTE
Krissy Marino is a 78 y.o. female with bilateral TKAs (done many years ago), DMII, CAD s/p PCI in7/18 and PVD who is admitted for left ankle osteomyelitis and multiple abscess.     -  She still is adamantly refusing AKA.  Still blood cultures negative, WBC wnl, and afebrile no need for emergent surgical debridement.      -MRI shows osteomyelitis of the hindfoot and distal tibia with multiple abscess that appear to communicate with the OM.  CT scan shows multiple abscesses with the most proximal extent to just distal to the fibular head.  It is unlikely that BKA with I&D would be successful especially given that patient has extensive medical hx including DM, PVD, and CAD still on dual platelet anticoagulation.  Patient will likely need an AKA.  Discussed this extensively with the patient.  Patient would like to consider this option further and as she is not acutely septic can wait while patient thinks about things.  She can have a diet.  Continue neurontin as this can help with phantom pain in the future.  Continue abx treatment.

## 2018-12-16 NOTE — SUBJECTIVE & OBJECTIVE
Interval History: No AEON.  AFebrile and WBC WNL.  BCXs NGTD. The patient denies any recent fever, chills, or sweats.      Review of Systems   Constitutional: Negative for activity change, chills, diaphoresis and fever.   Respiratory: Negative for cough, shortness of breath and wheezing.    Cardiovascular: Negative for chest pain.   Gastrointestinal: Negative for abdominal pain, constipation, diarrhea, nausea and vomiting.   Genitourinary: Negative for dysuria, frequency and urgency.   Skin: Positive for wound.   Neurological: Negative for dizziness.   Hematological: Does not bruise/bleed easily.     Objective:     Vital Signs (Most Recent):  Temp: 99.8 °F (37.7 °C) (12/15/18 1702)  Pulse: 82 (12/15/18 1702)  Resp: 18 (12/15/18 1702)  BP: (!) 129/58 (12/15/18 1702)  SpO2: 95 % (12/15/18 1702) Vital Signs (24h Range):  Temp:  [97.3 °F (36.3 °C)-99.8 °F (37.7 °C)] 99.8 °F (37.7 °C)  Pulse:  [77-84] 82  Resp:  [18] 18  SpO2:  [94 %-97 %] 95 %  BP: (123-160)/() 129/58     Weight: 106.6 kg (235 lb 0.2 oz)  Body mass index is 39.11 kg/m².    Estimated Creatinine Clearance: 54.4 mL/min (based on SCr of 1 mg/dL).    Physical Exam   Constitutional: She is oriented to person, place, and time. She appears well-developed and well-nourished. No distress.   HENT:   Head: Normocephalic and atraumatic.   Cardiovascular: Normal rate, regular rhythm and normal heart sounds. Exam reveals no gallop and no friction rub.   No murmur heard.  Pulmonary/Chest: Effort normal and breath sounds normal. No respiratory distress. She has no wheezes. She has no rales.   Abdominal: Soft. Bowel sounds are normal. She exhibits no distension and no mass. There is no tenderness. There is no rebound and no guarding.   Musculoskeletal: She exhibits edema (BL LE pitting though decreased).   Neurological: She is alert and oriented to person, place, and time.   Skin: Skin is warm and dry. She is not diaphoretic.   Psychiatric: She has a normal mood  and affect. Her behavior is normal.       Significant Labs:   Blood Culture:   Recent Labs   Lab 11/26/18  0810 11/27/18  0858 11/27/18  0859 12/12/18  1849 12/12/18  1907   LABBLOO Gram stain anais bottle: Gram positive cocci in chains resembling Strep  Results called to and read back by:James Hooker RN  STREPTOCOCCUS AGALACTIAE (GROUP B)  Beta-hemolytic streptococci are routinely susceptible to   penicillins,cephalosporins and carbapenems.   No growth after 5 days. No growth after 5 days. No Growth to date  No Growth to date  No Growth to date No Growth to date  No Growth to date  No Growth to date     CBC:   Recent Labs   Lab 12/14/18  0534 12/15/18  0539   WBC 6.22 5.45   HGB 7.2* 7.1*   HCT 23.6* 23.1*    205     CMP:   Recent Labs   Lab 12/14/18  0534 12/15/18  0539 12/15/18  1516    136 135*   K 4.4 5.4* 5.1    105 103   CO2 26 25 26   * 196* 136*   BUN 28* 35* 35*   CREATININE 0.8 0.9 1.0   CALCIUM 8.5* 8.2* 8.5*   PROT 4.7* 4.7*  --    ALBUMIN 1.4* 1.4*  --    BILITOT 0.3 0.3  --    ALKPHOS 161* 151*  --    AST 14 11  --    ALT 24 20  --    ANIONGAP 7* 6* 6*   EGFRNONAA >60.0 >60.0 53.3*     Wound Culture: No results for input(s): LABAERO in the last 4320 hours.  All pertinent labs within the past 24 hours have been reviewed.    Significant Imaging: I have reviewed all pertinent imaging results/findings within the past 24 hours.   X-Ray Chest 1 View Pre-OP [[id number]] Resulted: 12/13/18 2020   Order Status: Completed Updated: 12/13/18 2022   Narrative:     EXAMINATION:  XR CHEST 1 VIEW PRE-OP    CLINICAL HISTORY:  pre op;    TECHNIQUE:  Single frontal view of the chest was performed.    COMPARISON:  Chest radiograph 12/12/2018.    FINDINGS:  Redemonstration of multiple radiopaque structures overlying the chest the limiting evaluation.  The stable position of a right PICC with catheter tip overlying the brachiocephalic/SVC junction.    The cardiac silhouette and pulmonary  vasculature is stable.    Lungs show no large focal consolidation, large pleural effusion or pneumothorax.    Bones are intact, with stable degenerative changes at the shoulders.   Impression:       No acute radiographic findings.    Electronically signed by resident: Eliazar Ordonez  Date: 12/13/2018  Time: 19:54    Electronically signed by: oTñito Flower MD  Date: 12/13/2018  Time: 20:20   CT Ankle (Including Hindfoot) W W/O Contrast Left [[id number]] (Abnormal) Resulted: 12/13/18 1816   Order Status: Completed Updated: 12/13/18 1819   Narrative:     EXAMINATION:  CT LEG (TIBIA-FIBULA) W W/O CONTRAST LEFT; CT ANKLE (INCLUDING HINDFOOT) W W/O CONTRAST LEFT; CT FOOT W W/O CONTRAST LEFT    CLINICAL HISTORY:  osteomyelitis and abscess  Osteomyelitis, unspecified    TECHNIQUE:  Axial images of were acquired from the distal femur to the forefoot before and after the administration of Omnipaque 350 IV contrast.  Sagittal and coronal reformats were provided.    COMPARISON:  Knee and tibia radiograph 12/13/2018, MRI ankle 12/12/2018    FINDINGS:  Small rim enhancing multiloculated air fluid collections extends into the interosseous membrane cranially at least to the level of the fibular head.  There are small fluid collections posterior and medial to the knee which possibly communicate (axial series 4, image 137) and possibly enhance although extensive beam hardening artifact from the metallic knee prosthesis limits assessment of that area.  There is ulceration and subcutaneous gas at the lateral distal foreleg.  There is prominent surrounding skin thickening and diffuse subcutaneous fluid attenuation predominantly through the forefoot and hindfoot as well as the distal lower extremity.  Multiple lucent lesions are identified throughout the talus and midfoot which contain may sclerotic rim suggesting chronic osteomyelitis.  Lucency within the lateral talus contains air without sadia cortical disruption..  No fracture  identified.  Vascular calcifications are present.   Impression:       Rim enhancing multiloculated abscess extends at least to the level of the fibular head noting there are separate fluid collections posterior and medial to the knee (axial series 4, image 137) which possibly communicates although extensive beam hardening artifact from metallic knee prosthesis significantly limits assessment.    Multifocal osteolytic lesions with sclerotic rim in the foot and distal tibia likely representing components of acute and chronic osteomyelitis.  No definite calcification within a lucent lesion to suggest sequestrum although cannot be completely excluded.    Ulceration in the subcutaneous gas at the lateral distal foreleg is concerning for gas gangrene.    This report was flagged in Epic as abnormal.    Electronically signed by resident: Raymond Vidales  Date: 12/13/2018  Time: 17:25    Electronically signed by: Germán Flowers MD  Date: 12/13/2018  Time: 18:16   CT Foot W W/O Contrast Left [[id number]] (Abnormal) Resulted: 12/13/18 1816   Order Status: Completed Updated: 12/13/18 1819   Narrative:     EXAMINATION:  CT LEG (TIBIA-FIBULA) W W/O CONTRAST LEFT; CT ANKLE (INCLUDING HINDFOOT) W W/O CONTRAST LEFT; CT FOOT W W/O CONTRAST LEFT    CLINICAL HISTORY:  osteomyelitis and abscess  Osteomyelitis, unspecified    TECHNIQUE:  Axial images of were acquired from the distal femur to the forefoot before and after the administration of Omnipaque 350 IV contrast.  Sagittal and coronal reformats were provided.    COMPARISON:  Knee and tibia radiograph 12/13/2018, MRI ankle 12/12/2018    FINDINGS:  Small rim enhancing multiloculated air fluid collections extends into the interosseous membrane cranially at least to the level of the fibular head.  There are small fluid collections posterior and medial to the knee which possibly communicate (axial series 4, image 137) and possibly enhance although extensive beam hardening artifact from the  metallic knee prosthesis limits assessment of that area.  There is ulceration and subcutaneous gas at the lateral distal foreleg.  There is prominent surrounding skin thickening and diffuse subcutaneous fluid attenuation predominantly through the forefoot and hindfoot as well as the distal lower extremity.  Multiple lucent lesions are identified throughout the talus and midfoot which contain may sclerotic rim suggesting chronic osteomyelitis.  Lucency within the lateral talus contains air without sadia cortical disruption..  No fracture identified.  Vascular calcifications are present.   Impression:       Rim enhancing multiloculated abscess extends at least to the level of the fibular head noting there are separate fluid collections posterior and medial to the knee (axial series 4, image 137) which possibly communicates although extensive beam hardening artifact from metallic knee prosthesis significantly limits assessment.    Multifocal osteolytic lesions with sclerotic rim in the foot and distal tibia likely representing components of acute and chronic osteomyelitis.  No definite calcification within a lucent lesion to suggest sequestrum although cannot be completely excluded.    Ulceration in the subcutaneous gas at the lateral distal foreleg is concerning for gas gangrene.    This report was flagged in Epic as abnormal.    Electronically signed by resident: Raymond Vidales  Date: 12/13/2018  Time: 17:25    Electronically signed by: Germán Flowers MD  Date: 12/13/2018  Time: 18:16   CT Leg (Tibia-Fibula) W W/O Contrast Left [[id number]] (Abnormal) Resulted: 12/13/18 1816   Order Status: Completed Updated: 12/13/18 1819   Narrative:     EXAMINATION:  CT LEG (TIBIA-FIBULA) W W/O CONTRAST LEFT; CT ANKLE (INCLUDING HINDFOOT) W W/O CONTRAST LEFT; CT FOOT W W/O CONTRAST LEFT    CLINICAL HISTORY:  osteomyelitis and abscess  Osteomyelitis, unspecified    TECHNIQUE:  Axial images of were acquired from the distal femur to  the forefoot before and after the administration of Omnipaque 350 IV contrast.  Sagittal and coronal reformats were provided.    COMPARISON:  Knee and tibia radiograph 12/13/2018, MRI ankle 12/12/2018    FINDINGS:  Small rim enhancing multiloculated air fluid collections extends into the interosseous membrane cranially at least to the level of the fibular head.  There are small fluid collections posterior and medial to the knee which possibly communicate (axial series 4, image 137) and possibly enhance although extensive beam hardening artifact from the metallic knee prosthesis limits assessment of that area.  There is ulceration and subcutaneous gas at the lateral distal foreleg.  There is prominent surrounding skin thickening and diffuse subcutaneous fluid attenuation predominantly through the forefoot and hindfoot as well as the distal lower extremity.  Multiple lucent lesions are identified throughout the talus and midfoot which contain may sclerotic rim suggesting chronic osteomyelitis.  Lucency within the lateral talus contains air without sadia cortical disruption..  No fracture identified.  Vascular calcifications are present.   Impression:       Rim enhancing multiloculated abscess extends at least to the level of the fibular head noting there are separate fluid collections posterior and medial to the knee (axial series 4, image 137) which possibly communicates although extensive beam hardening artifact from metallic knee prosthesis significantly limits assessment.    Multifocal osteolytic lesions with sclerotic rim in the foot and distal tibia likely representing components of acute and chronic osteomyelitis.  No definite calcification within a lucent lesion to suggest sequestrum although cannot be completely excluded.    Ulceration in the subcutaneous gas at the lateral distal foreleg is concerning for gas gangrene.    This report was flagged in Epic as abnormal.    Electronically signed by resident:  Raymond Vidales  Date: 12/13/2018  Time: 17:25    Electronically signed by: Germán Flowers MD  Date: 12/13/2018  Time: 18:16   X-Ray Knee 1 or 2 View Bilateral [[id number]] Resulted: 12/13/18 1432   Order Status: Completed Updated: 12/13/18 1434   Narrative:     EXAMINATION:  XR KNEE 1 OR 2 VIEW BILATERAL    CLINICAL HISTORY:  hx of tka;  Osteomyelitis, unspecified    COMPARISON:  None 08/10/2016    FINDINGS:  Bilateral knee arthroplasties are identified.  Position alignment is satisfactory and unchanged as compared to the previous study.  No fracture or bone destruction identified   Impression:       See above      Electronically signed by: Hector Escamilla MD  Date: 12/13/2018  Time: 14:32   X-Ray Tibia Fibula 2 View Left [[id number]] Resulted: 12/13/18 1212   Order Status: Completed Updated: 12/13/18 1215   Narrative:     EXAMINATION:  XR TIBIA FIBULA 2 VIEW LEFT    CLINICAL HISTORY:  osteomyelitis;  Osteomyelitis, unspecified    TECHNIQUE:  AP and lateral views of the left tibia and fibula were performed.    COMPARISON:  None.    FINDINGS:  Status post total knee arthroplasty without evidence for complication.  Degenerative changes are noted at the ankle.  Vascular calcifications are present.  There is soft tissue gas at the lateral aspect of the ankle.   Impression:       Soft tissue gas at the lateral ankle.      Electronically signed by: Kevin Newby MD  Date: 12/13/2018  Time: 12:12   MRI Ankle W WO Contrast Left [[id number]] Resulted: 12/12/18 2255   Order Status: Completed Updated: 12/12/18 2257   Narrative:     EXAMINATION:  MRI ANKLE W WO CONTRAST LEFT    CLINICAL HISTORY:  Ankle erythema, swelling, cellulitis suspected    TECHNIQUE:  MRI ankle performed before and after the administration 10 mL Gadavist IV contrast.    COMPARISON:  Foot radiograph 12/12/2018, foot radiograph 12/22/2017.    FINDINGS:  There is a lateral malleolar ulcer and circumferential subcutaneous edema with skin thickening and enhancement  of the hindfoot.  Large multiloculated peripherally enhancing fluid collections throughout the visualized soft tissues of the lateral distal foreleg at least 10 cm craniocaudad dimension contiguous with ulceration and extending proximal outside the field of view which are concerning for soft tissue abscesses.  There is diffuse muscular edema and enhancement suggesting significant myositis.    There is abnormal marrow edema and extensive irregular T1 marrow replacement in the distal tibia, calcaneus, talus, and navicular suggesting osteomyelitis.   Impression:       Findings concerning for osteomyelitis of the hindfoot including the distal tibia and tarsal bones with associated cellulitis, myositis, and multiloculated abscesses in the deep soft tissues contiguous with lateral distal foreleg ulceration.  Component of abscess extends cranially outside the field of view, possibly more proximal portions of the left lower extremity.    COMMUNICATION  This critical result was discovered/received at 12/12/2018 at 22:00. The critical information above was relayed directly by me by telephone to Dr. Guzman On 12/12/2018 at 22:05.    Electronically signed by resident: Raymond Vidales  Date: 12/12/2018  Time: 21:35    Electronically signed by: Toñito Flower MD  Date: 12/12/2018  Time: 22:55   US Lower Extremity Veins Left [[id number]] Resulted: 12/12/18 2232   Order Status: Completed Updated: 12/12/18 2234   Narrative:     EXAMINATION:  US LOWER EXTREMITY VEINS LEFT    CLINICAL HISTORY:  Acute embolism and thrombosis of unspecified deep veins of unspecified lower extremity    TECHNIQUE:  Duplex and color flow Doppler evaluation and graded compression of the left lower extremity veins was performed.    COMPARISON:  None.    FINDINGS:  Left thigh veins: The common femoral, femoral, popliteal, upper greater saphenous, and deep femoral veins are patent and free of thrombus. The veins are normally compressible and have normal  "phasic flow and augmentation response.    Left calf veins: The visualized calf veins are patent.    Contralateral CFV: The contralateral (right) common femoral vein is patent and free of thrombus.    Miscellaneous: Soft tissue edema and abnormal fluid about the left ankle, better characterized on recent MRI.   Impression:       No evidence of DVT in the left lower extremity.    Electronically signed by resident: Eliazar Ordonez  Date: 12/12/2018  Time: 22:22    Electronically signed by: Toñito Flower MD  Date: 12/12/2018  Time: 22:32   X-Ray Foot Complete Left [[id number]] Resulted: 12/12/18 1922   Order Status: Completed Updated: 12/12/18 1925   Narrative:     EXAMINATION:  XR FOOT COMPLETE 3 VIEW LEFT    CLINICAL HISTORY:  Cellulitis, unspecified    TECHNIQUE:  Three views of the left foot.    COMPARISON:  11/26/2018.    FINDINGS:  Stable degenerative changes in the foot.  No acute fracture or dislocation identified.  No bony erosion.  Vascular calcifications noted.  There is persistent soft tissue edema at the anterior aspect of the ankle and over the dorsum of the foot.  No distinct soft tissue emphysema.  No radiopaque foreign body.   Impression:       Persistent soft tissue edema at the dorsum of the foot, possibly cellulitis in the correct clinical setting.      Electronically signed by: Toñito Flower MD  Date: 12/12/2018  Time: 19:22   X-Ray Chest AP Portable [[id number]] Resulted: 12/12/18 1919   Order Status: Completed Updated: 12/12/18 1922   Narrative:     EXAMINATION:  XR CHEST AP PORTABLE    CLINICAL HISTORY:  Provided history is "Sepsis;  ".    TECHNIQUE:  One view of the chest.    COMPARISON:  11/29/2018.    FINDINGS:  Multiple radiopaque structures overlie the chest and significantly limit evaluation, including an electronic device which may represent a cell phone.   Per technologist note, the patient refused to comply with exam instructions to remove any overlying material.   Cardiac silhouette is " stable.  Right-sided PICC has been slightly retracted, with the tip now overlying the brachiocephalic/SVC junction.  No large focal area of consolidation or detrimental change in lung aeration.   Impression:       Limited examination, without obvious detrimental change.  Follow-up with upright PA and lateral views with removal of overlying structures as indicated.      Electronically signed by: Toñito Flower MD  Date: 12/12/2018  Time: 19:19

## 2018-12-16 NOTE — ASSESSMENT & PLAN NOTE
- Suspect inflammatory etiology  - Ordered iron studies + ferritin as add-on  - Consented for pRBC transfusion, T&S + 1 unit ordered  - Will follow with CBC daily

## 2018-12-16 NOTE — PLAN OF CARE
Problem: Adult Inpatient Plan of Care  Goal: Plan of Care Review  Outcome: Ongoing (interventions implemented as appropriate)   12/16/18 1705   Plan of Care Review   Plan of Care Reviewed With patient;son   Pt is A,A,O x 3 . Stable V/S . No C/O pain during the day . Pt slept between care . Pt has a good appetite during the day . Pt is free of falls and injuries per day . Fall precautions maintained and pt's son at the bedside . Pt assisted with turning in bed using the wedge and pillows , pt's BLE elevated on pillows . Pt received 1 unit of blood today . [place] catheter care done .

## 2018-12-16 NOTE — ASSESSMENT & PLAN NOTE
"  - Stable  - Complicated by abscess formation and overlying cellulitis  - Demonstrated on MRI 12/13, which showed "osteomyelitis of the hindfoot including the distal tibia and tarsal bones with associated cellulitis, myositis, and multiloculated abscesses in the deep soft tissues"  - f/u CT showed osteolytic lesions and extensive abscess  - No leukocytosis. ESR and CRP both elevated  - Cardiology re: DAPT; would prefer to continue if at all possible  - Blood cultures NGTD, hemodynamically stable  - Continue vanc (not administering currently given supratherapeutic) + cefepime + flagyl  - Ortho following, appreciate assistance, recommending AKA but patient remains hesitant    Appreciate continued assistance from multiple consulting services  "

## 2018-12-16 NOTE — PROGRESS NOTES
Chart reviewed:    Patient afebrile and WBC WNL.  Vanc level supratherapeutic at 40.2  Vanc DC'd  Patient refusing AKA    Plan:  DC vanc   Check random levels qam until WNL then restart  FU progress with surgical planning    Will follow.  Discussed with primary team    Fracisco Polanco PA-C

## 2018-12-16 NOTE — PROGRESS NOTES
[facility]  Infectious Disease  Progress Note    Patient Name: Krissy Marino  MRN: [id number]  Admission Date: 12/12/2018  Length of Stay: 3 days  Attending Physician: Dc Liao MD  Primary Care Provider: Emily Mcpherson MD    Isolation Status: No active isolations  Assessment/Plan:      Cellulitis of left lower extremity    77 yo female with recent GBS bacteremia, recurrent LLE cellulitis, PVD now found to have osteomyelitis of the hindfoot including the distal tibia and tarsal bones with associated cellulitis, myositis, and multiloculated abscesses in the deep soft tissues contiguous with lateral distal foreleg ulceration. Repeat CT shows infection to knee level and gas near ankle.  On vanc and ceftriaxone.  Ortho Sx following and rec AKA but patient desires washout and abx 1st.  BCXs NGTD. Stable non septic.    Plan   - Continue Vanc, Cefepime 2g IV q8 to cover pseudomonas and Flagyl 500mg po tid as gas seen on CT scane  - Vanc trough prior to 4th dose - trough goal 15-20  - Consider aspiration of L knee for cell count and culture and if negative then consider leg washout.  If organism identified then can go on suppression after leg washed out and completes abx - recommendations discussed in detail with primary team to be communicated to Ortho Sx  - Please send cultures from surgery if taken to the OR  - will follow             Anticipated Disposition: tbd    Thank you for your consult. I will follow-up with patient. Please contact us if you have any additional questions.    JILLIAN Sheridan  Infectious Disease  [facility]    Subjective:     Principal Problem:Osteomyelitis of left tibia    HPI: Ms. Krissy Marino is a 78 y.o. female with type 2 diabetes, CAD status post PCI (7/2018), and peripheral vascular disease who presents to the emergency department from [facility] for evaluation of left lower extremity cellulitis.  She was just admitted to [facility] from 11/26-12/5 for left lower extremity cellulitis s/p a left ankle sprain, where she was found to have group B strep bacteremia.  During that admission, a PICC line was placed and she was discharged on Ceftriaxone to complete a 14 day course that ended on 12/11.  Since her discharge, family mentions that her left lower extremity cellulitis has not been improving.  They endorse persistent erythema as well as occasional yellow drainage from the left lateral malleolus.  At baseline, the patient is fairly bed-bound and spends most of her time with her lower extremities dangling down.  Labs were done at her facility on 12/07 which showed a white blood cell count 20.  She was told to come to the emergency department for further evaluation.  She continues to endorse pain in her left as well as pain with movement.  She has never had any debridement of her wound, but mentions that it has been cleaned with Clorox by [facility].  She denies any fevers or chills.  Of note, the patient was told that because she has such severe peripheral vascular disease, she would require stent placement.  However, because of her recent cardiac stent placement, she is unable to get leg stents placed for at least 1 year, which will continue to prevent good wound healing.     In the emergency department, labs were notable for a white blood cell count 10, sed rate 56, and CRP 67.  X-rays were ordered which showed edema, but no cellulitis.  She was given IV vancomycin and was admitted to Hospital Medicine for further management.  MRI of LLE c/f large abscess and osteomyelitis of distal tibia and tarsal bones.  She has been placed on Vancomycin.  Blood cultures 12/12 are NGTD.      Interval History: No AEON.  AFebrile and WBC WNL.  BCXs NGTD. The patient denies any recent fever, chills, or sweats.      Review of Systems   Constitutional: Negative for activity change, chills, diaphoresis and fever.   Respiratory: Negative  for cough, shortness of breath and wheezing.    Cardiovascular: Negative for chest pain.   Gastrointestinal: Negative for abdominal pain, constipation, diarrhea, nausea and vomiting.   Genitourinary: Negative for dysuria, frequency and urgency.   Skin: Positive for wound.   Neurological: Negative for dizziness.   Hematological: Does not bruise/bleed easily.     Objective:     Vital Signs (Most Recent):  Temp: 99.8 °F (37.7 °C) (12/15/18 1702)  Pulse: 82 (12/15/18 1702)  Resp: 18 (12/15/18 1702)  BP: (!) 129/58 (12/15/18 1702)  SpO2: 95 % (12/15/18 1702) Vital Signs (24h Range):  Temp:  [97.3 °F (36.3 °C)-99.8 °F (37.7 °C)] 99.8 °F (37.7 °C)  Pulse:  [77-84] 82  Resp:  [18] 18  SpO2:  [94 %-97 %] 95 %  BP: (123-160)/() 129/58     Weight: 106.6 kg (235 lb 0.2 oz)  Body mass index is 39.11 kg/m².    Estimated Creatinine Clearance: 54.4 mL/min (based on SCr of 1 mg/dL).    Physical Exam   Constitutional: She is oriented to person, place, and time. She appears well-developed and well-nourished. No distress.   HENT:   Head: Normocephalic and atraumatic.   Cardiovascular: Normal rate, regular rhythm and normal heart sounds. Exam reveals no gallop and no friction rub.   No murmur heard.  Pulmonary/Chest: Effort normal and breath sounds normal. No respiratory distress. She has no wheezes. She has no rales.   Abdominal: Soft. Bowel sounds are normal. She exhibits no distension and no mass. There is no tenderness. There is no rebound and no guarding.   Musculoskeletal: She exhibits edema (BL LE pitting though decreased).   Neurological: She is alert and oriented to person, place, and time.   Skin: Skin is warm and dry. She is not diaphoretic.   Psychiatric: She has a normal mood and affect. Her behavior is normal.       Significant Labs:   Blood Culture:   Recent Labs   Lab 11/26/18  0810 11/27/18  0858 11/27/18  0859 12/12/18  1849 12/12/18  1907   LABBLOO Gram stain anais bottle: Gram positive cocci in chains resembling  Strep  Results called to and read back by:James Hooker,URVASHI  STREPTOCOCCUS AGALACTIAE (GROUP B)  Beta-hemolytic streptococci are routinely susceptible to   penicillins,cephalosporins and carbapenems.   No growth after 5 days. No growth after 5 days. No Growth to date  No Growth to date  No Growth to date No Growth to date  No Growth to date  No Growth to date     CBC:   Recent Labs   Lab 12/14/18  0534 12/15/18  0539   WBC 6.22 5.45   HGB 7.2* 7.1*   HCT 23.6* 23.1*    205     CMP:   Recent Labs   Lab 12/14/18  0534 12/15/18  0539 12/15/18  1516    136 135*   K 4.4 5.4* 5.1    105 103   CO2 26 25 26   * 196* 136*   BUN 28* 35* 35*   CREATININE 0.8 0.9 1.0   CALCIUM 8.5* 8.2* 8.5*   PROT 4.7* 4.7*  --    ALBUMIN 1.4* 1.4*  --    BILITOT 0.3 0.3  --    ALKPHOS 161* 151*  --    AST 14 11  --    ALT 24 20  --    ANIONGAP 7* 6* 6*   EGFRNONAA >60.0 >60.0 53.3*     Wound Culture: No results for input(s): LABAERO in the last 4320 hours.  All pertinent labs within the past 24 hours have been reviewed.    Significant Imaging: I have reviewed all pertinent imaging results/findings within the past 24 hours.   X-Ray Chest 1 View Pre-OP [[id number]] Resulted: 12/13/18 2020   Order Status: Completed Updated: 12/13/18 2022   Narrative:     EXAMINATION:  XR CHEST 1 VIEW PRE-OP    CLINICAL HISTORY:  pre op;    TECHNIQUE:  Single frontal view of the chest was performed.    COMPARISON:  Chest radiograph 12/12/2018.    FINDINGS:  Redemonstration of multiple radiopaque structures overlying the chest the limiting evaluation.  The stable position of a right PICC with catheter tip overlying the brachiocephalic/SVC junction.    The cardiac silhouette and pulmonary vasculature is stable.    Lungs show no large focal consolidation, large pleural effusion or pneumothorax.    Bones are intact, with stable degenerative changes at the shoulders.   Impression:       No acute radiographic findings.    Electronically  signed by resident: Eliazar rOdonez  Date: 12/13/2018  Time: 19:54    Electronically signed by: Toñito Flower MD  Date: 12/13/2018  Time: 20:20   CT Ankle (Including Hindfoot) W W/O Contrast Left [[id number]] (Abnormal) Resulted: 12/13/18 1816   Order Status: Completed Updated: 12/13/18 1819   Narrative:     EXAMINATION:  CT LEG (TIBIA-FIBULA) W W/O CONTRAST LEFT; CT ANKLE (INCLUDING HINDFOOT) W W/O CONTRAST LEFT; CT FOOT W W/O CONTRAST LEFT    CLINICAL HISTORY:  osteomyelitis and abscess  Osteomyelitis, unspecified    TECHNIQUE:  Axial images of were acquired from the distal femur to the forefoot before and after the administration of Omnipaque 350 IV contrast.  Sagittal and coronal reformats were provided.    COMPARISON:  Knee and tibia radiograph 12/13/2018, MRI ankle 12/12/2018    FINDINGS:  Small rim enhancing multiloculated air fluid collections extends into the interosseous membrane cranially at least to the level of the fibular head.  There are small fluid collections posterior and medial to the knee which possibly communicate (axial series 4, image 137) and possibly enhance although extensive beam hardening artifact from the metallic knee prosthesis limits assessment of that area.  There is ulceration and subcutaneous gas at the lateral distal foreleg.  There is prominent surrounding skin thickening and diffuse subcutaneous fluid attenuation predominantly through the forefoot and hindfoot as well as the distal lower extremity.  Multiple lucent lesions are identified throughout the talus and midfoot which contain may sclerotic rim suggesting chronic osteomyelitis.  Lucency within the lateral talus contains air without sadia cortical disruption..  No fracture identified.  Vascular calcifications are present.   Impression:       Rim enhancing multiloculated abscess extends at least to the level of the fibular head noting there are separate fluid collections posterior and medial to the knee (axial series 4, image  137) which possibly communicates although extensive beam hardening artifact from metallic knee prosthesis significantly limits assessment.    Multifocal osteolytic lesions with sclerotic rim in the foot and distal tibia likely representing components of acute and chronic osteomyelitis.  No definite calcification within a lucent lesion to suggest sequestrum although cannot be completely excluded.    Ulceration in the subcutaneous gas at the lateral distal foreleg is concerning for gas gangrene.    This report was flagged in Epic as abnormal.    Electronically signed by resident: Raymond Vidales  Date: 12/13/2018  Time: 17:25    Electronically signed by: Germán Flowers MD  Date: 12/13/2018  Time: 18:16   CT Foot W W/O Contrast Left [[id number]] (Abnormal) Resulted: 12/13/18 1816   Order Status: Completed Updated: 12/13/18 1819   Narrative:     EXAMINATION:  CT LEG (TIBIA-FIBULA) W W/O CONTRAST LEFT; CT ANKLE (INCLUDING HINDFOOT) W W/O CONTRAST LEFT; CT FOOT W W/O CONTRAST LEFT    CLINICAL HISTORY:  osteomyelitis and abscess  Osteomyelitis, unspecified    TECHNIQUE:  Axial images of were acquired from the distal femur to the forefoot before and after the administration of Omnipaque 350 IV contrast.  Sagittal and coronal reformats were provided.    COMPARISON:  Knee and tibia radiograph 12/13/2018, MRI ankle 12/12/2018    FINDINGS:  Small rim enhancing multiloculated air fluid collections extends into the interosseous membrane cranially at least to the level of the fibular head.  There are small fluid collections posterior and medial to the knee which possibly communicate (axial series 4, image 137) and possibly enhance although extensive beam hardening artifact from the metallic knee prosthesis limits assessment of that area.  There is ulceration and subcutaneous gas at the lateral distal foreleg.  There is prominent surrounding skin thickening and diffuse subcutaneous fluid attenuation predominantly through the forefoot  and hindfoot as well as the distal lower extremity.  Multiple lucent lesions are identified throughout the talus and midfoot which contain may sclerotic rim suggesting chronic osteomyelitis.  Lucency within the lateral talus contains air without sadia cortical disruption..  No fracture identified.  Vascular calcifications are present.   Impression:       Rim enhancing multiloculated abscess extends at least to the level of the fibular head noting there are separate fluid collections posterior and medial to the knee (axial series 4, image 137) which possibly communicates although extensive beam hardening artifact from metallic knee prosthesis significantly limits assessment.    Multifocal osteolytic lesions with sclerotic rim in the foot and distal tibia likely representing components of acute and chronic osteomyelitis.  No definite calcification within a lucent lesion to suggest sequestrum although cannot be completely excluded.    Ulceration in the subcutaneous gas at the lateral distal foreleg is concerning for gas gangrene.    This report was flagged in Epic as abnormal.    Electronically signed by resident: Raymond Vidales  Date: 12/13/2018  Time: 17:25    Electronically signed by: Germán Flowers MD  Date: 12/13/2018  Time: 18:16   CT Leg (Tibia-Fibula) W W/O Contrast Left [[id number]] (Abnormal) Resulted: 12/13/18 1816   Order Status: Completed Updated: 12/13/18 1819   Narrative:     EXAMINATION:  CT LEG (TIBIA-FIBULA) W W/O CONTRAST LEFT; CT ANKLE (INCLUDING HINDFOOT) W W/O CONTRAST LEFT; CT FOOT W W/O CONTRAST LEFT    CLINICAL HISTORY:  osteomyelitis and abscess  Osteomyelitis, unspecified    TECHNIQUE:  Axial images of were acquired from the distal femur to the forefoot before and after the administration of Omnipaque 350 IV contrast.  Sagittal and coronal reformats were provided.    COMPARISON:  Knee and tibia radiograph 12/13/2018, MRI ankle 12/12/2018    FINDINGS:  Small rim enhancing multiloculated air  fluid collections extends into the interosseous membrane cranially at least to the level of the fibular head.  There are small fluid collections posterior and medial to the knee which possibly communicate (axial series 4, image 137) and possibly enhance although extensive beam hardening artifact from the metallic knee prosthesis limits assessment of that area.  There is ulceration and subcutaneous gas at the lateral distal foreleg.  There is prominent surrounding skin thickening and diffuse subcutaneous fluid attenuation predominantly through the forefoot and hindfoot as well as the distal lower extremity.  Multiple lucent lesions are identified throughout the talus and midfoot which contain may sclerotic rim suggesting chronic osteomyelitis.  Lucency within the lateral talus contains air without sadia cortical disruption..  No fracture identified.  Vascular calcifications are present.   Impression:       Rim enhancing multiloculated abscess extends at least to the level of the fibular head noting there are separate fluid collections posterior and medial to the knee (axial series 4, image 137) which possibly communicates although extensive beam hardening artifact from metallic knee prosthesis significantly limits assessment.    Multifocal osteolytic lesions with sclerotic rim in the foot and distal tibia likely representing components of acute and chronic osteomyelitis.  No definite calcification within a lucent lesion to suggest sequestrum although cannot be completely excluded.    Ulceration in the subcutaneous gas at the lateral distal foreleg is concerning for gas gangrene.    This report was flagged in Epic as abnormal.    Electronically signed by resident: Raymond Vidales  Date: 12/13/2018  Time: 17:25    Electronically signed by: Germán Flowers MD  Date: 12/13/2018  Time: 18:16   X-Ray Knee 1 or 2 View Bilateral [[id number]] Resulted: 12/13/18 1432   Order Status: Completed Updated: 12/13/18 1434   Narrative:      EXAMINATION:  XR KNEE 1 OR 2 VIEW BILATERAL    CLINICAL HISTORY:  hx of tka;  Osteomyelitis, unspecified    COMPARISON:  None 08/10/2016    FINDINGS:  Bilateral knee arthroplasties are identified.  Position alignment is satisfactory and unchanged as compared to the previous study.  No fracture or bone destruction identified   Impression:       See above      Electronically signed by: Hector Escamilla MD  Date: 12/13/2018  Time: 14:32   X-Ray Tibia Fibula 2 View Left [[id number]] Resulted: 12/13/18 1212   Order Status: Completed Updated: 12/13/18 1215   Narrative:     EXAMINATION:  XR TIBIA FIBULA 2 VIEW LEFT    CLINICAL HISTORY:  osteomyelitis;  Osteomyelitis, unspecified    TECHNIQUE:  AP and lateral views of the left tibia and fibula were performed.    COMPARISON:  None.    FINDINGS:  Status post total knee arthroplasty without evidence for complication.  Degenerative changes are noted at the ankle.  Vascular calcifications are present.  There is soft tissue gas at the lateral aspect of the ankle.   Impression:       Soft tissue gas at the lateral ankle.      Electronically signed by: Kevin Newby MD  Date: 12/13/2018  Time: 12:12   MRI Ankle W WO Contrast Left [[id number]] Resulted: 12/12/18 2255   Order Status: Completed Updated: 12/12/18 2257   Narrative:     EXAMINATION:  MRI ANKLE W WO CONTRAST LEFT    CLINICAL HISTORY:  Ankle erythema, swelling, cellulitis suspected    TECHNIQUE:  MRI ankle performed before and after the administration 10 mL Gadavist IV contrast.    COMPARISON:  Foot radiograph 12/12/2018, foot radiograph 12/22/2017.    FINDINGS:  There is a lateral malleolar ulcer and circumferential subcutaneous edema with skin thickening and enhancement of the hindfoot.  Large multiloculated peripherally enhancing fluid collections throughout the visualized soft tissues of the lateral distal foreleg at least 10 cm craniocaudad dimension contiguous with ulceration and extending proximal outside the field of  view which are concerning for soft tissue abscesses.  There is diffuse muscular edema and enhancement suggesting significant myositis.    There is abnormal marrow edema and extensive irregular T1 marrow replacement in the distal tibia, calcaneus, talus, and navicular suggesting osteomyelitis.   Impression:       Findings concerning for osteomyelitis of the hindfoot including the distal tibia and tarsal bones with associated cellulitis, myositis, and multiloculated abscesses in the deep soft tissues contiguous with lateral distal foreleg ulceration.  Component of abscess extends cranially outside the field of view, possibly more proximal portions of the left lower extremity.    COMMUNICATION  This critical result was discovered/received at 12/12/2018 at 22:00. The critical information above was relayed directly by me by telephone to Dr. Guzman On 12/12/2018 at 22:05.    Electronically signed by resident: Raymond Vidales  Date: 12/12/2018  Time: 21:35    Electronically signed by: Toñito Flower MD  Date: 12/12/2018  Time: 22:55   US Lower Extremity Veins Left [[id number]] Resulted: 12/12/18 2232   Order Status: Completed Updated: 12/12/18 2234   Narrative:     EXAMINATION:  US LOWER EXTREMITY VEINS LEFT    CLINICAL HISTORY:  Acute embolism and thrombosis of unspecified deep veins of unspecified lower extremity    TECHNIQUE:  Duplex and color flow Doppler evaluation and graded compression of the left lower extremity veins was performed.    COMPARISON:  None.    FINDINGS:  Left thigh veins: The common femoral, femoral, popliteal, upper greater saphenous, and deep femoral veins are patent and free of thrombus. The veins are normally compressible and have normal phasic flow and augmentation response.    Left calf veins: The visualized calf veins are patent.    Contralateral CFV: The contralateral (right) common femoral vein is patent and free of thrombus.    Miscellaneous: Soft tissue edema and abnormal fluid about the  "left ankle, better characterized on recent MRI.   Impression:       No evidence of DVT in the left lower extremity.    Electronically signed by resident: Eliazar Ordonez  Date: 12/12/2018  Time: 22:22    Electronically signed by: Toñito Flower MD  Date: 12/12/2018  Time: 22:32   X-Ray Foot Complete Left [[id number]] Resulted: 12/12/18 1922   Order Status: Completed Updated: 12/12/18 1925   Narrative:     EXAMINATION:  XR FOOT COMPLETE 3 VIEW LEFT    CLINICAL HISTORY:  Cellulitis, unspecified    TECHNIQUE:  Three views of the left foot.    COMPARISON:  11/26/2018.    FINDINGS:  Stable degenerative changes in the foot.  No acute fracture or dislocation identified.  No bony erosion.  Vascular calcifications noted.  There is persistent soft tissue edema at the anterior aspect of the ankle and over the dorsum of the foot.  No distinct soft tissue emphysema.  No radiopaque foreign body.   Impression:       Persistent soft tissue edema at the dorsum of the foot, possibly cellulitis in the correct clinical setting.      Electronically signed by: Toñito Flower MD  Date: 12/12/2018  Time: 19:22   X-Ray Chest AP Portable [[id number]] Resulted: 12/12/18 1919   Order Status: Completed Updated: 12/12/18 1922   Narrative:     EXAMINATION:  XR CHEST AP PORTABLE    CLINICAL HISTORY:  Provided history is "Sepsis;  ".    TECHNIQUE:  One view of the chest.    COMPARISON:  11/29/2018.    FINDINGS:  Multiple radiopaque structures overlie the chest and significantly limit evaluation, including an electronic device which may represent a cell phone.   Per technologist note, the patient refused to comply with exam instructions to remove any overlying material.   Cardiac silhouette is stable.  Right-sided PICC has been slightly retracted, with the tip now overlying the brachiocephalic/SVC junction.  No large focal area of consolidation or detrimental change in lung aeration.   Impression:       Limited examination, without obvious detrimental " change.  Follow-up with upright PA and lateral views with removal of overlying structures as indicated.      Electronically signed by: Toñito Flower MD  Date: 12/12/2018  Time: 19:19

## 2018-12-16 NOTE — SUBJECTIVE & OBJECTIVE
Interval History:     Stable exam, no complaints on broad-spectrum antibiotics.    Continuing discussions regarding her ultimate plan as she wants to avoid AKA    Administering 1 unit pRBCs this morning, holding vanc due to supratherapeutic tough levels    Review of Systems   Constitutional: Negative for chills and fever.   Respiratory: Negative for cough and shortness of breath.    Cardiovascular: Positive for leg swelling. Negative for chest pain.   Gastrointestinal: Negative for constipation, diarrhea, nausea and vomiting.   Musculoskeletal: Negative for myalgias.   Skin: Positive for rash and wound.   Psychiatric/Behavioral: Negative for dysphoric mood. The patient is not nervous/anxious.      Objective:     Vital Signs (Most Recent):  Temp: 98 °F (36.7 °C) (12/16/18 0839)  Pulse: 74 (12/16/18 0839)  Resp: 18 (12/16/18 0839)  BP: (!) 130/57 (12/16/18 0839)  SpO2: 96 % (12/16/18 0839) Vital Signs (24h Range):  Temp:  [98 °F (36.7 °C)-99.8 °F (37.7 °C)] 98 °F (36.7 °C)  Pulse:  [71-83] 74  Resp:  [16-18] 18  SpO2:  [92 %-97 %] 96 %  BP: (119-160)/() 130/57     Weight: 106.6 kg (235 lb 0.2 oz)  Body mass index is 39.11 kg/m².    Intake/Output Summary (Last 24 hours) at 12/16/2018 1114  Last data filed at 12/16/2018 0900  Gross per 24 hour   Intake 740 ml   Output 3360 ml   Net -2620 ml      Physical Exam   Constitutional: She is oriented to person, place, and time. No distress.   Eyes: Pupils are equal, round, and reactive to light.   Cardiovascular: Normal rate and regular rhythm.   No murmur heard.  Pulmonary/Chest: Effort normal and breath sounds normal. No respiratory distress. She has no wheezes.   Abdominal: Soft. Bowel sounds are normal. She exhibits no distension. There is no tenderness.   Musculoskeletal: She exhibits edema (bilateral lower extremities worse on the left). She exhibits no tenderness.   Neurological: She is alert and oriented to person, place, and time.   Skin: She is not diaphoretic.  "There is erythema (LLE erythema, appears improved relative to admission borders).   Large ulceration present at lateral aspect of left foot with surrounding erythema extending up leg proximally, appears improved relative to border marking from last night   Psychiatric: She has a normal mood and affect. Her behavior is normal.       Significant Labs:     CBC:    Recent Labs   Lab 12/15/18  0539 12/16/18  0406   WBC 5.45 5.02   GRAN 79.0*  4.3 76.4*  3.8   HGB 7.1* 6.9*   HCT 23.1* 22.5*    176       Chem 10:  Recent Labs   Lab 12/15/18  0539 12/15/18  1516 12/16/18  0406    135* 133*   K 5.4* 5.1 4.9    103 103   CO2 25 26 24   BUN 35* 35* 41*   CREATININE 0.9 1.0 0.9   * 136* 113*   CALCIUM 8.2* 8.5* 8.1*   MG 1.3*  --  1.8       LFTs:  Recent Labs   Lab 12/15/18  0539 12/16/18  0406   ALKPHOS 151* 139*   BILITOT 0.3 0.3   AST 11 19   ALT 20 21   ALBUMIN 1.4* 1.4*       Significant Imaging:     CT leg  "Rim enhancing multiloculated abscess extends at least to the level of the fibular head noting there are separate fluid collections posterior and medial to the knee (axial series 4, image 137) which possibly communicates although extensive beam hardening artifact from metallic knee prosthesis significantly limits assessment.    Multifocal osteolytic lesions with sclerotic rim in the foot and distal tibia likely representing components of acute and chronic osteomyelitis.  No definite calcification within a lucent lesion to suggest sequestrum although cannot be completely excluded.    Ulceration in the subcutaneous gas at the lateral distal foreleg is concerning for gas gangrene." - per interpreting radiologist    MRI left ankle with and without contrast  "Findings concerning for osteomyelitis of the hindfoot including the distal tibia and tarsal bones with associated cellulitis, myositis, and multiloculated abscesses in the deep soft tissues contiguous with lateral distal foreleg ulceration. " " Component of abscess extends cranially outside the field of view, possibly more proximal portions of the left lower extremity." - per interpreting radiologist    X-ray tib fib left  "Status post total knee arthroplasty without evidence for complication.  Degenerative changes are noted at the ankle.  Vascular calcifications are present.  There is soft tissue gas at the lateral aspect of the ankle." - per interpreting radiologist  "

## 2018-12-16 NOTE — PROGRESS NOTES
[facility] Hospital Medicine  Progress Note    Patient Name: Krissy Marino  MRN: [id number]  Patient Class: IP- Inpatient   Admission Date: 12/12/2018  Length of Stay: 4 days  Attending Physician: Dc Liao MD  Primary Care Provider: Emily Mcpherson MD    [facility] Medicine Team: [facility] O Dc Liao MD    Subjective:     Principal Problem:Osteomyelitis of left tibia    HPI:  Ms. Krissy Marino is a 78 y.o. female with type 2 diabetes, CAD status post PCI (7/2018), and peripheral vascular disease who presents to the emergency department from [facility] for evaluation of left lower extremity cellulitis.  She was just admitted to [facility] from 11/26-12/5 for left lower extremity cellulitis s/p a left ankle sprain, where she was found to have group B strep bacteremia.  During that admission, a PICC line was placed and she was discharged on Ceftriaxone to complete a 14 day course that ended on 12/11.  Since her discharge, family mentions that her left lower extremity cellulitis has not been improving.  They endorse persistent erythema as well as occasional yellow drainage from the left lateral malleolus.  At baseline, the patient is fairly bed-bound and spends most of her time with her lower extremities dangling down.  Labs were done at her facility on 12/07 which showed a white blood cell count 20.  She was told to come to the emergency department for further evaluation.  She continues to endorse pain in her left as well as pain with movement.  She has never had any debridement of her wound, but mentions that it has been cleaned with Clorox by [facility]ound [facility].  She denies any fevers or chills.  She does endorse constipation which she attributes to her pain medication.  Of note, the patient was told that because she has such severe peripheral vascular disease, she would require stent placement.  However, because of her recent cardiac stent placement, she is  unable to get leg stents placed for at least 1 year, which will continue to prevent good wound healing.     In the emergency department, labs were notable for a white blood cell count 10, sed rate 56, and CRP 67.  X-rays were ordered which showed edema, but no cellulitis.  She was given IV vancomycin and was admitted to Hospital Medicine for further management.    Hospital Course:  12/12/2018: Admitted to , started on vanc, MRI ordered along with ID and podiatry consultation  12/13/2018: MRI showed osteo, abscess. ID recommended adding CTX. Podiatry deferring to ortho. Brought on cardiology given DAPT and high risk lesion  12/14/2018: ID broadened antibiotics to vanc + cefepime + flagyl; ortho recommending AKA but patient undecided  12/15/2018: Stable examination on antibiotics, ongoing discussions regarding ultimate plan  12/16/2018: Renal function stable, holding vanc due to markedly elevated trough, 1 unit pRBCs ordered for anemia    Interval History:     Stable exam, no complaints on broad-spectrum antibiotics.    Continuing discussions regarding her ultimate plan as she wants to avoid AKA    Administering 1 unit pRBCs this morning, holding vanc due to supratherapeutic tough levels    Review of Systems   Constitutional: Negative for chills and fever.   Respiratory: Negative for cough and shortness of breath.    Cardiovascular: Positive for leg swelling. Negative for chest pain.   Gastrointestinal: Negative for constipation, diarrhea, nausea and vomiting.   Musculoskeletal: Negative for myalgias.   Skin: Positive for rash and wound.   Psychiatric/Behavioral: Negative for dysphoric mood. The patient is not nervous/anxious.      Objective:     Vital Signs (Most Recent):  Temp: 98 °F (36.7 °C) (12/16/18 0839)  Pulse: 74 (12/16/18 0839)  Resp: 18 (12/16/18 0839)  BP: (!) 130/57 (12/16/18 0839)  SpO2: 96 % (12/16/18 0839) Vital Signs (24h Range):  Temp:  [98 °F (36.7 °C)-99.8 °F (37.7 °C)] 98 °F (36.7 °C)  Pulse:   "[71-83] 74  Resp:  [16-18] 18  SpO2:  [92 %-97 %] 96 %  BP: (119-160)/() 130/57     Weight: 106.6 kg (235 lb 0.2 oz)  Body mass index is 39.11 kg/m².    Intake/Output Summary (Last 24 hours) at 12/16/2018 1114  Last data filed at 12/16/2018 0900  Gross per 24 hour   Intake 740 ml   Output 3360 ml   Net -2620 ml      Physical Exam   Constitutional: She is oriented to person, place, and time. No distress.   Eyes: Pupils are equal, round, and reactive to light.   Cardiovascular: Normal rate and regular rhythm.   No murmur heard.  Pulmonary/Chest: Effort normal and breath sounds normal. No respiratory distress. She has no wheezes.   Abdominal: Soft. Bowel sounds are normal. She exhibits no distension. There is no tenderness.   Musculoskeletal: She exhibits edema (bilateral lower extremities worse on the left). She exhibits no tenderness.   Neurological: She is alert and oriented to person, place, and time.   Skin: She is not diaphoretic. There is erythema (LLE erythema, appears improved relative to admission borders).   Large ulceration present at lateral aspect of left foot with surrounding erythema extending up leg proximally, appears improved relative to border marking from last night   Psychiatric: She has a normal mood and affect. Her behavior is normal.       Significant Labs:     CBC:    Recent Labs   Lab 12/15/18  0539 12/16/18  0406   WBC 5.45 5.02   GRAN 79.0*  4.3 76.4*  3.8   HGB 7.1* 6.9*   HCT 23.1* 22.5*    176       Chem 10:  Recent Labs   Lab 12/15/18  0539 12/15/18  1516 12/16/18  0406    135* 133*   K 5.4* 5.1 4.9    103 103   CO2 25 26 24   BUN 35* 35* 41*   CREATININE 0.9 1.0 0.9   * 136* 113*   CALCIUM 8.2* 8.5* 8.1*   MG 1.3*  --  1.8       LFTs:  Recent Labs   Lab 12/15/18  0539 12/16/18  0406   ALKPHOS 151* 139*   BILITOT 0.3 0.3   AST 11 19   ALT 20 21   ALBUMIN 1.4* 1.4*       Significant Imaging:     CT leg  "Rim enhancing multiloculated abscess extends at " "least to the level of the fibular head noting there are separate fluid collections posterior and medial to the knee (axial series 4, image 137) which possibly communicates although extensive beam hardening artifact from metallic knee prosthesis significantly limits assessment.    Multifocal osteolytic lesions with sclerotic rim in the foot and distal tibia likely representing components of acute and chronic osteomyelitis.  No definite calcification within a lucent lesion to suggest sequestrum although cannot be completely excluded.    Ulceration in the subcutaneous gas at the lateral distal foreleg is concerning for gas gangrene." - per interpreting radiologist    MRI left ankle with and without contrast  "Findings concerning for osteomyelitis of the hindfoot including the distal tibia and tarsal bones with associated cellulitis, myositis, and multiloculated abscesses in the deep soft tissues contiguous with lateral distal foreleg ulceration.  Component of abscess extends cranially outside the field of view, possibly more proximal portions of the left lower extremity." - per interpreting radiologist    X-ray tib fib left  "Status post total knee arthroplasty without evidence for complication.  Degenerative changes are noted at the ankle.  Vascular calcifications are present.  There is soft tissue gas at the lateral aspect of the ankle." - per interpreting radiologist    Assessment/Plan:      * Osteomyelitis of left tibia      - Stable  - Complicated by abscess formation and overlying cellulitis  - Demonstrated on MRI 12/13, which showed "osteomyelitis of the hindfoot including the distal tibia and tarsal bones with associated cellulitis, myositis, and multiloculated abscesses in the deep soft tissues"  - f/u CT showed osteolytic lesions and extensive abscess  - No leukocytosis. ESR and CRP both elevated  - Cardiology re: DAPT; would prefer to continue if at all possible  - Blood cultures NGTD, hemodynamically " "stable  - Continue vanc (not administering currently given supratherapeutic) + cefepime + flagyl  - Ortho following, appreciate assistance, recommending AKA but patient remains hesitant    Appreciate continued assistance from multiple consulting services     Cellulitis of left lower extremity      - Stable  - See above for more details     Coronary artery disease involving native coronary artery of native heart with unstable angina pectoris      - Stable  - Asymptomatic  - Underwent LHC and subsequent PCI in 7/2018:    · "Bifurcation of the LAD:  The lesion was successfully intervened. Post-stenosis of 0%, post-DONNY 3 flow and TMP grade 3. The vessel was accessed natively.  The following items were used: 2.5MM 12MM Silver Balloon.  · D1:  The lesion was successfully intervened. Post-stenosis of 0%, post-DONNY 3 flow and TMP grade 3. The vessel was accessed natively.  The following items were used: 2.5MM 15MM Silver Balloon and Stent Resolute Rx 2.50x14 (OSMEL)."    - Cardiology recommending to continue DAPT perioperatively if at all possible, otherwise hold the plavix for as short a duration possible  - Otherwise continue aspirin, plavix, toprol, losartan, and lipitor  - Diabetic + cardiac diet     Anemia      - Suspect inflammatory etiology  - Ordered iron studies + ferritin as add-on  - Consented for pRBC transfusion, T&S + 1 unit ordered  - Will follow with CBC daily     Hypomagnesemia      - Replaced today  - Will follow and replace as needed     Hyperkalemia      - 5.4 today, previously normal  - Repeat ordered for this afternoon  - 500 cc LR bolus given persistently abnormal BUN:Cr     Pressure injury of buttock, stage 2      - Wound care following, appreciate assistance       Preoperative cardiovascular examination      - RCRI class II, no further testing necessary  - See discussion of DAPT above     Peripheral arterial disease      - Stable, but likely contributing to poor wound healing and increased infection " risk  - Previously evaluated in an outpatient setting with recommendations for stent placement; discussed the possibility of vascular surg consult with ortho  - Continue cardiac regimen with asa, plavix, and lipitor     Chronic combined systolic and diastolic heart failure      - Stable, clear chest examination though with bilateral lower extremity edema  - Most recent TTE, a stress echo in July 2018, showed LVEF 50% and grade I diastolic dysfunction  - Continue asa, plavix, toprol, losartan, and lipitor  - Anticipate resuming lasix depending on BUN:Cr and examination (still elevated today)  - Goal K 4-5 and Mg 2-3     Obesity (BMI 30-39.9)      - Stable  - Body mass index is 38.12 kg/m².  - Encourage diet, weight loss, exercise  - Limited by PVD and cellulitis     Bilateral leg edema      - Stable  - Will tread underlying causes: cellulitis, OM, and CHF  - Will continue to follow with serial examinations; BUN:Cr still elevated     Dyslipidemia      - Stable  - Continue home lipitor     Hypothyroidism      - Stable  - Continue home synthroid     Hypertension      - Stable  - Continue toprol, losartan, and norvasc  - Will restart lasix as indicated, possibly tomorrow depending on her examination     Type 2 diabetes mellitus, without long-term current use of insulin      - Stable  - HbA1c 6.7 in May  - Will hold home oral antihyperglycemic agents in favor of low-dose aspart SSI + POCT glucose checks  - Diabetic + cardiac diet when not NPO       VTE Risk Mitigation (From admission, onward)        Ordered     enoxaparin injection 40 mg  Daily      12/12/18 1952     IP VTE HIGH RISK PATIENT  Once      12/12/18 1952              Dc Liao MD  Department of Hospital Medicine   [facility]

## 2018-12-17 DIAGNOSIS — M85.80 OSTEOPENIA, UNSPECIFIED LOCATION: ICD-10-CM

## 2018-12-17 PROBLEM — E87.5 HYPERKALEMIA: Status: RESOLVED | Noted: 2018-12-15 | Resolved: 2018-12-17

## 2018-12-17 LAB
ALBUMIN SERPL BCP-MCNC: 1.4 G/DL
ALP SERPL-CCNC: 155 U/L
ALT SERPL W/O P-5'-P-CCNC: 33 U/L
ANION GAP SERPL CALC-SCNC: 6 MMOL/L
AST SERPL-CCNC: 38 U/L
BACTERIA BLD CULT: NORMAL
BACTERIA BLD CULT: NORMAL
BASOPHILS # BLD AUTO: 0.03 K/UL
BASOPHILS NFR BLD: 0.6 %
BILIRUB SERPL-MCNC: 0.3 MG/DL
BUN SERPL-MCNC: 36 MG/DL
CALCIUM SERPL-MCNC: 8.2 MG/DL
CHLORIDE SERPL-SCNC: 107 MMOL/L
CO2 SERPL-SCNC: 24 MMOL/L
CREAT SERPL-MCNC: 0.9 MG/DL
DIFFERENTIAL METHOD: ABNORMAL
EOSINOPHIL # BLD AUTO: 0.2 K/UL
EOSINOPHIL NFR BLD: 3.7 %
ERYTHROCYTE [DISTWIDTH] IN BLOOD BY AUTOMATED COUNT: 15 %
EST. GFR  (AFRICAN AMERICAN): >60 ML/MIN/1.73 M^2
EST. GFR  (NON AFRICAN AMERICAN): >60 ML/MIN/1.73 M^2
GLUCOSE SERPL-MCNC: 117 MG/DL
HCT VFR BLD AUTO: 25.9 %
HGB BLD-MCNC: 8 G/DL
IMM GRANULOCYTES # BLD AUTO: 0.05 K/UL
IMM GRANULOCYTES NFR BLD AUTO: 0.9 %
LYMPHOCYTES # BLD AUTO: 0.7 K/UL
LYMPHOCYTES NFR BLD: 12.5 %
MAGNESIUM SERPL-MCNC: 1.8 MG/DL
MCH RBC QN AUTO: 31.7 PG
MCHC RBC AUTO-ENTMCNC: 30.9 G/DL
MCV RBC AUTO: 103 FL
MONOCYTES # BLD AUTO: 0.3 K/UL
MONOCYTES NFR BLD: 6.3 %
NEUTROPHILS # BLD AUTO: 4.1 K/UL
NEUTROPHILS NFR BLD: 76 %
NRBC BLD-RTO: 0 /100 WBC
PLATELET # BLD AUTO: 157 K/UL
PMV BLD AUTO: 10.4 FL
POCT GLUCOSE: 132 MG/DL (ref 70–110)
POCT GLUCOSE: 144 MG/DL (ref 70–110)
POCT GLUCOSE: 179 MG/DL (ref 70–110)
POCT GLUCOSE: 185 MG/DL (ref 70–110)
POTASSIUM SERPL-SCNC: 4.9 MMOL/L
PROT SERPL-MCNC: 4.8 G/DL
RBC # BLD AUTO: 2.52 M/UL
SODIUM SERPL-SCNC: 137 MMOL/L
VANCOMYCIN SERPL-MCNC: 36.2 UG/ML
WBC # BLD AUTO: 5.44 K/UL

## 2018-12-17 PROCEDURE — 63600175 PHARM REV CODE 636 W HCPCS: Performed by: PHYSICIAN ASSISTANT

## 2018-12-17 PROCEDURE — 85025 COMPLETE CBC W/AUTO DIFF WBC: CPT

## 2018-12-17 PROCEDURE — 99232 SBSQ HOSP IP/OBS MODERATE 35: CPT | Mod: ,,, | Performed by: INTERNAL MEDICINE

## 2018-12-17 PROCEDURE — 80202 ASSAY OF VANCOMYCIN: CPT

## 2018-12-17 PROCEDURE — 25000003 PHARM REV CODE 250: Performed by: INTERNAL MEDICINE

## 2018-12-17 PROCEDURE — 99233 SBSQ HOSP IP/OBS HIGH 50: CPT | Mod: ,,, | Performed by: PHYSICIAN ASSISTANT

## 2018-12-17 PROCEDURE — 97530 THERAPEUTIC ACTIVITIES: CPT

## 2018-12-17 PROCEDURE — 25000003 PHARM REV CODE 250: Performed by: HOSPITALIST

## 2018-12-17 PROCEDURE — 83735 ASSAY OF MAGNESIUM: CPT

## 2018-12-17 PROCEDURE — 97535 SELF CARE MNGMENT TRAINING: CPT

## 2018-12-17 PROCEDURE — 92610 EVALUATE SWALLOWING FUNCTION: CPT

## 2018-12-17 PROCEDURE — 63600175 PHARM REV CODE 636 W HCPCS: Performed by: HOSPITALIST

## 2018-12-17 PROCEDURE — 80053 COMPREHEN METABOLIC PANEL: CPT

## 2018-12-17 PROCEDURE — 25000003 PHARM REV CODE 250: Performed by: PHYSICIAN ASSISTANT

## 2018-12-17 PROCEDURE — 11000001 HC ACUTE MED/SURG PRIVATE ROOM

## 2018-12-17 RX ORDER — FUROSEMIDE 40 MG/1
40 TABLET ORAL 2 TIMES DAILY
Status: DISCONTINUED | OUTPATIENT
Start: 2018-12-17 | End: 2018-12-18

## 2018-12-17 RX ADMIN — FLUTICASONE FUROATE AND VILANTEROL TRIFENATATE 1 PUFF: 100; 25 POWDER RESPIRATORY (INHALATION) at 09:12

## 2018-12-17 RX ADMIN — ALLOPURINOL 300 MG: 300 TABLET ORAL at 09:12

## 2018-12-17 RX ADMIN — CEFEPIME 2 G: 2 INJECTION, POWDER, FOR SOLUTION INTRAVENOUS at 07:12

## 2018-12-17 RX ADMIN — LEVOTHYROXINE SODIUM 75 MCG: 75 TABLET ORAL at 06:12

## 2018-12-17 RX ADMIN — ASPIRIN 81 MG: 81 TABLET, COATED ORAL at 09:12

## 2018-12-17 RX ADMIN — METRONIDAZOLE 500 MG: 500 TABLET ORAL at 09:12

## 2018-12-17 RX ADMIN — CLOPIDOGREL 75 MG: 75 TABLET, FILM COATED ORAL at 09:12

## 2018-12-17 RX ADMIN — CEFEPIME 2 G: 2 INJECTION, POWDER, FOR SOLUTION INTRAVENOUS at 03:12

## 2018-12-17 RX ADMIN — METOPROLOL SUCCINATE 50 MG: 50 TABLET, EXTENDED RELEASE ORAL at 09:12

## 2018-12-17 RX ADMIN — ENOXAPARIN SODIUM 40 MG: 100 INJECTION SUBCUTANEOUS at 06:12

## 2018-12-17 RX ADMIN — ATORVASTATIN CALCIUM 40 MG: 20 TABLET, FILM COATED ORAL at 09:12

## 2018-12-17 RX ADMIN — CEFEPIME 2 G: 2 INJECTION, POWDER, FOR SOLUTION INTRAVENOUS at 11:12

## 2018-12-17 RX ADMIN — METRONIDAZOLE 500 MG: 500 TABLET ORAL at 02:12

## 2018-12-17 RX ADMIN — FUROSEMIDE 40 MG: 40 TABLET ORAL at 09:12

## 2018-12-17 RX ADMIN — LOSARTAN POTASSIUM 100 MG: 50 TABLET, FILM COATED ORAL at 09:12

## 2018-12-17 RX ADMIN — AMLODIPINE BESYLATE 5 MG: 5 TABLET ORAL at 09:12

## 2018-12-17 RX ADMIN — METRONIDAZOLE 500 MG: 500 TABLET ORAL at 06:12

## 2018-12-17 RX ADMIN — CETIRIZINE HYDROCHLORIDE 10 MG: 10 TABLET, FILM COATED ORAL at 09:12

## 2018-12-17 RX ADMIN — FAMOTIDINE 20 MG: 20 TABLET ORAL at 09:12

## 2018-12-17 NOTE — ASSESSMENT & PLAN NOTE
- Improving  - Suspect inflammatory etiology  - Ordered iron studies + ferritin as add-ons 12/16, do not appear to have been run by the lab  - Transfused 12/16 with good response  - Will follow with CBC daily

## 2018-12-17 NOTE — PLAN OF CARE
Problem: SLP Goal  Goal: SLP Goal  Bedside swallow study completed. No further ST recommended. Recommend regular diet/thin liquids at this time.   Jennifer Warren CCC-SLP  12/17/2018

## 2018-12-17 NOTE — ASSESSMENT & PLAN NOTE
- Stable, clear chest examination though with bilateral lower extremity edema  - Most recent TTE, a stress echo in July 2018, showed LVEF 50% and grade I diastolic dysfunction  - Continue asa, plavix, toprol, losartan, and lipitor  - Resuming home lasix (BUN:Cr chronically elevated, up 6 pounds since admission)  - Goal K 4-5 and Mg 2-3

## 2018-12-17 NOTE — ASSESSMENT & PLAN NOTE
77 yo female with hx of bilateral TKA,  recent GBS bacteremia, recurrent LLE cellulitis, PVD now found to have osteomyelitis of the hindfoot including the distal tibia and tarsal bones with associated cellulitis, myositis, and multiloculated abscesses in the deep soft tissues contiguous with lateral distal foreleg ulceration. Repeat CT shows infection to knee level and gas near ankle.  On vanc, cefepime and flagyl.  Ortho Sx following and rec AKA but patient desires washout and abx 1st.  BCXs NGTD. Stable non septic.    Plan   - Continue Vanc, Cefepime 2g IV q8 to cover pseudomonas and Flagyl 500mg po tid as gas seen on CT scan  - Vanc supratherapeutic, contniue to hold.  Random ordered for tomorrow morning. Restart once pt levels WNL.  - F/u ortho plan as family wished to further discuss options..  - Please send cultures from surgery if taken to the OR  - ID will continue to follow

## 2018-12-17 NOTE — PT/OT/SLP PROGRESS
"Physical Therapy Treatment    Patient Name:  Krissy Marino   MRN:  [id number]    Recommendations:     Discharge Recommendations:  nursing facility, skilled   Discharge Equipment Recommendations: none   Barriers to discharge: None    Assessment:     Krissy Marino is a 78 y.o. female admitted with a medical diagnosis of Osteomyelitis of left tibia.  She presents with the following impairments/functional limitations:  weakness, impaired endurance, impaired self care skills, impaired functional mobilty, gait instability, impaired balance, decreased safety awareness, orthopedic precautions, pain, impaired skin. Pt tolerates session fairly with focus on bed mobility and transfers. Pt progressing slowly and remains limited by inability to maintain her LLE NWB status. Pt verbalizes during session her adamant opposition to any surgical intervention and is poorly motivated to participate in OOB mobility d/t difficulty and pain associated with poor LLE mgmt. Pt will continue to benefit from therapy services to improve impairments listed above.     Rehab Prognosis:  Good ; patient would benefit from acute skilled PT services to address these deficits and reach maximum level of function.      Recent Surgery: * No surgery found *      Plan:     During this hospitalization, patient to be seen 4 x/week to address the above listed problems via gait training, therapeutic activities, therapeutic exercises  · Plan of Care Expires:  01/13/19   Plan of Care Reviewed with: patient, daughter    Subjective     Communicated with NSG prior to session.  Patient found supine upon PTA entry to room, agreeable to treatment.      Chief Complaint: no c/o  Patient comments/goals: "I would rather die than let them take my leg off at 78 years old."   Pain/Comfort:  · Pain Rating 1: other (see comments)(does not rate)  · Location - Side 1: Left  · Location - Orientation 1: distal  · Location 1: leg  · Pain Addressed 1: Pre-medicate for activity, " Reposition, Distraction, Cessation of Activity  · Pain Rating Post-Intervention 1: other (see comments)(not rated)    Patients cultural, spiritual, Mosque conflicts given the current situation:      Objective:     Patient found with: peripheral IV, telemetry     General Precautions: Standard, fall   Orthopedic Precautions:LLE non weight bearing   Braces: N/A     Functional Mobility:  · Bed Mobility:     · Rolling Left:  minimum assistance  · Rolling Right: minimum assistance  · Scooting: minimum assistance  · Supine to Sit: moderate assistance  · Sit to Supine: moderate assistance  · Transfers:     · Sit to Stand:  maximal assistance with rolling walker  · Balance: Pt sits with SBA. Pt stands with Max A and RW support, unable to maintain WB status on LLE.       AM-PAC 6 CLICK MOBILITY  Turning over in bed (including adjusting bedclothes, sheets and blankets)?: 3  Sitting down on and standing up from a chair with arms (e.g., wheelchair, bedside commode, etc.): 2  Moving from lying on back to sitting on the side of the bed?: 3  Moving to and from a bed to a chair (including a wheelchair)?: 2  Need to walk in hospital room?: 1  Climbing 3-5 steps with a railing?: 1  Basic Mobility Total Score: 12       Therapeutic Activities and Exercises:  Pt assisted with functional mobility as noted above.   Pt performs single successful stand out of 4 attempts. Pt unable to maintain LLE NWB and is quickly returned to sitting for safety.   Pt educated on importance of participation with therapy and attempting to improve pts ability to maintain NWB on LLE.     Patient left supine with all lines intact and call button in reach..    GOALS:   Multidisciplinary Problems     Physical Therapy Goals        Problem: Physical Therapy Goal    Goal Priority Disciplines Outcome Goal Variances Interventions   Physical Therapy Goal     PT, PT/OT Ongoing (interventions implemented as appropriate)     Description:  Goals to be met by: 10 days  ()    Patient will increase functional independence with mobility by performin. Supine to sit with Stand-by Assistance  2. Sit to supine with Stand-by Assistance  3. Sit to stand transfer with Minimal Assistance using RW while maintaining NWB L LE status  4. Bed to chair with Moderate Assistance using RW while maintaining NWB L LE  5. Lower extremity exercise program x30 reps per handout, with independence to improve muscular strength and endurance.                       Time Tracking:     PT Received On: 18  PT Start Time: 1455     PT Stop Time: 1519  PT Total Time (min): 24 min     Billable Minutes: Therapeutic Activity 24    Treatment Type: Treatment  PT/PTA: PTA     PTA Visit Number: 1     John Pizarro PTA  2018

## 2018-12-17 NOTE — ASSESSMENT & PLAN NOTE
- Stable  - Will tread underlying causes: cellulitis, OM, and CHF  - Resuming home lasix (BUN:Cr chronically elevated, up 6 pounds since admission)

## 2018-12-17 NOTE — PHYSICIAN QUERY
"PT Name: Krissy Mraino  MR #: [id number]     Physician Query Form - Diagnosis Clarification      CDS: Shena Dumont RN, CCDS         Contact information :[phone]  [email or web address]       This form is a permanent document in the medical record.     Query Date: December 17, 2018    By submitting this query, we are merely seeking further clarification of documentation.  Please utilize your independent clinical judgment when addressing the question(s) below.     The medical record contains the following:      Findings Supporting Clinical Information Location in Medical Record     "Sepsis 2/2 Cellulitis"           "Reportedly with a WBC 20 5 days prior to admission (12/7)  "2/4 SIRS criteria:  Tachycardia without leukocytosis but with a left shift, ESR 56 and CRP 67"    -98    "recent GBS bacteremia, recurrent LLE cellulitis, PVD now found to have osteomyelitis of the hindfoot including the distal tibia and tarsal bones with associated cellulitis, myositis, and multiloculated abscesses in the deep soft tissues contiguous with lateral distal foreleg ulceration. On vanc and ceftriaxone.  Ortho Sx following and planning surgery - washout vs BKA.  BCXs NGTD"    "Patient will likely need an AKA.  Discussed this extensively with the patient.  Patient would like to consider this option further and as she is not acutely septic can wait while patient thinks about things.    "Stable non septic"   H&P 12/12/18    H&P 12/12/18            VS record 12/12/18    ID Consult 12/13/18                      Ortho Consult 12/13/18            ID progress note 12/15/18       Please clarify if the Sepsis  diagnosis has been:    [  ] Ruled In   [x ] Ruled In, Now Resolved   [  ] Ruled Out   [  ] Other/Clarification of findings (please specify):     [  ] Clinically undetermined     Please document in your progress notes daily for the duration of treatment, until resolved, and include in your discharge summary.                 "

## 2018-12-17 NOTE — PROGRESS NOTES
[facility]  Infectious Disease  Progress Note    Patient Name: Krissy Marino  MRN: [id number]  Admission Date: 12/12/2018  Length of Stay: 5 days  Attending Physician: Dc Liao MD  Primary Care Provider: Emily Mcpherson MD    Isolation Status: No active isolations  Assessment/Plan:      Cellulitis of left lower extremity    77 yo female with hx of bilateral TKA,  recent GBS bacteremia, recurrent LLE cellulitis, PVD now found to have osteomyelitis of the hindfoot including the distal tibia and tarsal bones with associated cellulitis, myositis, and multiloculated abscesses in the deep soft tissues contiguous with lateral distal foreleg ulceration. Repeat CT shows infection to knee level and gas near ankle.  On vanc, cefepime and flagyl.  Ortho Sx following and rec AKA but patient desires washout and abx 1st.  BCXs NGTD. Stable non septic.    Plan   - Continue Vanc, Cefepime 2g IV q8 to cover pseudomonas and Flagyl 500mg po tid as gas seen on CT scan  - Vanc supratherapeutic, contniue to hold.  Random ordered for tomorrow morning. Restart once pt levels WNL.  - F/u ortho plan as family wished to further discuss options..  - Please send cultures from surgery if taken to the OR  - ID will continue to follow             Thank you for your consult. I will follow-up with patient. Please contact us if you have any additional questions.    Jesus Lantigua PA-C  Infectious Disease  [facility]    Subjective:     Principal Problem:Osteomyelitis of left tibia    HPI: Ms. Krissy Marino is a 78 y.o. female with type 2 diabetes, CAD status post PCI (7/2018), and peripheral vascular disease who presents to the emergency department from [facility] for evaluation of left lower extremity cellulitis.  She was just admitted to [facility] from 11/26-12/5 for left lower extremity cellulitis s/p a left ankle sprain, where she was found to have group B strep  bacteremia.  During that admission, a PICC line was placed and she was discharged on Ceftriaxone to complete a 14 day course that ended on 12/11.  Since her discharge, family mentions that her left lower extremity cellulitis has not been improving.  They endorse persistent erythema as well as occasional yellow drainage from the left lateral malleolus.  At baseline, the patient is fairly bed-bound and spends most of her time with her lower extremities dangling down.  Labs were done at her facility on 12/07 which showed a white blood cell count 20.  She was told to come to the emergency department for further evaluation.  She continues to endorse pain in her left as well as pain with movement.  She has never had any debridement of her wound, but mentions that it has been cleaned with Clorox by [facility].  She denies any fevers or chills.  Of note, the patient was told that because she has such severe peripheral vascular disease, she would require stent placement.  However, because of her recent cardiac stent placement, she is unable to get leg stents placed for at least 1 year, which will continue to prevent good wound healing.     In the emergency department, labs were notable for a white blood cell count 10, sed rate 56, and CRP 67.  X-rays were ordered which showed edema, but no cellulitis.  She was given IV vancomycin and was admitted to Hospital Medicine for further management.  MRI of LLE c/f large abscess and osteomyelitis of distal tibia and tarsal bones.  She has been placed on Vancomycin.  Blood cultures 12/12 are NGTD.      Interval History: Pt without complaints today.  Afebrile w/o leukocytosis.  Vanc levels continue to be supratherapeutic.  Vanc held again today.    Review of Systems   Constitutional: Negative for activity change, chills, diaphoresis and fever.   Respiratory: Negative for cough, shortness of breath and wheezing.    Cardiovascular: Negative for chest pain.   Gastrointestinal: Negative  for abdominal pain, constipation, diarrhea, nausea and vomiting.   Genitourinary: Negative for dysuria, frequency and urgency.   Skin: Positive for wound.   Neurological: Negative for dizziness.   Hematological: Does not bruise/bleed easily.     Objective:     Vital Signs (Most Recent):  Temp: 98.2 °F (36.8 °C) (12/17/18 1248)  Pulse: 77 (12/17/18 1248)  Resp: 20 (12/17/18 1248)  BP: 129/60 (12/17/18 1248)  SpO2: 98 % (12/17/18 1248) Vital Signs (24h Range):  Temp:  [98.2 °F (36.8 °C)-99.2 °F (37.3 °C)] 98.2 °F (36.8 °C)  Pulse:  [72-77] 77  Resp:  [16-20] 20  SpO2:  [94 %-98 %] 98 %  BP: (115-139)/(54-63) 129/60     Weight: 106.6 kg (235 lb 0.2 oz)  Body mass index is 39.11 kg/m².    Estimated Creatinine Clearance: 60.4 mL/min (based on SCr of 0.9 mg/dL).    Physical Exam   Constitutional: She is oriented to person, place, and time. No distress.   Eyes: Pupils are equal, round, and reactive to light.   Cardiovascular: Normal rate and regular rhythm.   No murmur heard.  Pulmonary/Chest: Effort normal and breath sounds normal. No respiratory distress. She has no wheezes.   Abdominal: Soft. Bowel sounds are normal. She exhibits no distension. There is no tenderness.   Musculoskeletal: She exhibits edema (BLE). She exhibits no tenderness.   Neurological: She is alert and oriented to person, place, and time.   Skin: She is not diaphoretic. There is erythema (LLE).   Ulcers present at lateral aspect of left foot   Psychiatric: She has a normal mood and affect. Her behavior is normal.       Significant Labs:   Blood Culture:   Recent Labs   Lab 11/26/18  0810 11/27/18  0858 11/27/18  0859 12/12/18  1849 12/12/18  1907   LABBLOO Gram stain anais bottle: Gram positive cocci in chains resembling Strep  Results called to and read back by:James Hooker RN  STREPTOCOCCUS AGALACTIAE (GROUP B)  Beta-hemolytic streptococci are routinely susceptible to   penicillins,cephalosporins and carbapenems.   No growth after 5 days. No  growth after 5 days. No Growth to date  No Growth to date  No Growth to date  No Growth to date  No Growth to date No Growth to date  No Growth to date  No Growth to date  No Growth to date  No Growth to date     BMP:   Recent Labs   Lab 12/17/18  0525   *      K 4.9      CO2 24   BUN 36*   CREATININE 0.9   CALCIUM 8.2*   MG 1.8     CBC:   Recent Labs   Lab 12/16/18  0406 12/17/18  0525   WBC 5.02 5.44   HGB 6.9* 8.0*   HCT 22.5* 25.9*    157     CMP:   Recent Labs   Lab 12/16/18  0406 12/17/18  0525   * 137   K 4.9 4.9    107   CO2 24 24   * 117*   BUN 41* 36*   CREATININE 0.9 0.9   CALCIUM 8.1* 8.2*   PROT 4.6* 4.8*   ALBUMIN 1.4* 1.4*   BILITOT 0.3 0.3   ALKPHOS 139* 155*   AST 19 38   ALT 21 33   ANIONGAP 6* 6*   EGFRNONAA >60.0 >60.0     Microbiology Results (last 7 days)     Procedure Component Value Units Date/Time    Blood culture x two cultures. Draw prior to antibiotics. [[id number]] Collected:  12/12/18 1849    Order Status:  Completed Specimen:  Blood from Peripheral, Antecubital, Left Updated:  12/16/18 2012     Blood Culture, Routine No Growth to date     Blood Culture, Routine No Growth to date     Blood Culture, Routine No Growth to date     Blood Culture, Routine No Growth to date     Blood Culture, Routine No Growth to date    Narrative:       Aerobic and anaerobic    Blood culture x two cultures. Draw prior to antibiotics. [[id number]] Collected:  12/12/18 1907    Order Status:  Completed Specimen:  Blood from Line, PICC Right Cephalic Updated:  12/16/18 2012     Blood Culture, Routine No Growth to date     Blood Culture, Routine No Growth to date     Blood Culture, Routine No Growth to date     Blood Culture, Routine No Growth to date     Blood Culture, Routine No Growth to date    Narrative:       Aerobic and anaerobic        All pertinent labs within the past 24 hours have been reviewed.  Recent Lab Results       12/17/18  1242   12/17/18  4796    12/17/18  0525   12/16/18  2131   12/16/18  1721        Immature Granulocytes     0.9         Immature Grans (Abs)     0.05  Comment:  Mild elevation in immature granulocytes is non specific and   can be seen in a variety of conditions including stress response,   acute inflammation, trauma and pregnancy. Correlation with other   laboratory and clinical findings is essential.           Albumin     1.4         Alkaline Phosphatase     155         ALT     33         Anion Gap     6         AST     38         Baso #     0.03         Basophil%     0.6         Total Bilirubin     0.3  Comment:  For infants and newborns, interpretation of results should be based  on gestational age, weight and in agreement with clinical  observations.  Premature Infant recommended reference ranges:  Up to 24 hours.............<8.0 mg/dL  Up to 48 hours............<12.0 mg/dL  3-5 days..................<15.0 mg/dL  6-29 days.................<15.0 mg/dL           BUN, Bld     36         Calcium     8.2         Chloride     107         CO2     24         Creatinine     0.9         Differential Method     Automated         eGFR if      >60.0         eGFR if non      >60.0  Comment:  Calculation used to obtain the estimated glomerular filtration  rate (eGFR) is the CKD-EPI equation.            Eos #     0.2         Eosinophil%     3.7         Glucose     117         Gran # (ANC)     4.1         Gran%     76.0         Hematocrit     25.9         Hemoglobin     8.0         Lymph #     0.7         Lymph%     12.5         Magnesium     1.8         MCH     31.7         MCHC     30.9         MCV     103         Mono #     0.3         Mono%     6.3         MPV     10.4         nRBC     0         Platelets     157         POCT Glucose 132 144   297 232     Potassium     4.9         Total Protein     4.8         RBC     2.52         RDW     15.0         Sodium     137         Vancomycin, Random     36.2         WBC      5.44                              Significant Imaging: I have reviewed all pertinent imaging results/findings within the past 24 hours.

## 2018-12-17 NOTE — PLAN OF CARE
Problem: Occupational Therapy Goal  Goal: Occupational Therapy Goal  Goals to be met by: 12/31     Patient will increase functional independence with ADLs by performing:    UE Dressing with [place].   LE Dressing with Minimal Assistance.  Grooming while seated with Set-up Assistance. Goal met  Toileting from bedside commode with Minimal Assistance for hygiene and clothing management.   Bathing from  edge of bed with Moderate Assistance.     Outcome: Ongoing (interventions implemented as appropriate)  Goals remain appropriate, continue with OT POC.    ZACHARIAH Braden  12/17/2018  Rehab Services

## 2018-12-17 NOTE — SUBJECTIVE & OBJECTIVE
Interval History:     Stable exam, no complaints on broad-spectrum antibiotics. She was transfused yesterday and showed a good response on AM labs. Family at the bedside had numerous concerns and questions which were answered.    Review of Systems   Constitutional: Negative for chills and fever.   Respiratory: Negative for cough and shortness of breath.    Cardiovascular: Positive for leg swelling. Negative for chest pain.   Gastrointestinal: Negative for constipation, diarrhea, nausea and vomiting.   Musculoskeletal: Negative for myalgias.   Skin: Positive for rash and wound.   Psychiatric/Behavioral: Negative for dysphoric mood. The patient is not nervous/anxious.      Objective:     Vital Signs (Most Recent):  Temp: 98.5 °F (36.9 °C) (12/17/18 0730)  Pulse: 72 (12/17/18 0900)  Resp: 16 (12/17/18 0900)  BP: (!) 121/57 (12/17/18 0730)  SpO2: 95 % (12/17/18 0730) Vital Signs (24h Range):  Temp:  [97.7 °F (36.5 °C)-99.2 °F (37.3 °C)] 98.5 °F (36.9 °C)  Pulse:  [72-76] 72  Resp:  [16-18] 16  SpO2:  [94 %-98 %] 95 %  BP: (115-139)/(54-63) 121/57     Weight: 106.6 kg (235 lb 0.2 oz)  Body mass index is 39.11 kg/m².    Intake/Output Summary (Last 24 hours) at 12/17/2018 1159  Last data filed at 12/17/2018 0650  Gross per 24 hour   Intake 1387.5 ml   Output 3125 ml   Net -1737.5 ml      Physical Exam   Constitutional: She is oriented to person, place, and time. No distress.   Eyes: Pupils are equal, round, and reactive to light.   Cardiovascular: Normal rate and regular rhythm.   No murmur heard.  Pulmonary/Chest: Effort normal and breath sounds normal. No respiratory distress. She has no wheezes.   Abdominal: Soft. Bowel sounds are normal. She exhibits no distension. There is no tenderness.   Musculoskeletal: She exhibits edema (bilateral lower extremities worse on the left). She exhibits no tenderness.   Neurological: She is alert and oriented to person, place, and time.   Skin: She is not diaphoretic. There is erythema  "(LLE erythema, stable from yesterday).   Large ulceration present at lateral aspect of left foot with stable surrounding erythema   Psychiatric: She has a normal mood and affect. Her behavior is normal.       Significant Labs:     CBC:    Recent Labs   Lab 12/16/18  0406 12/17/18  0525   WBC 5.02 5.44   GRAN 76.4*  3.8 76.0*  4.1   HGB 6.9* 8.0*   HCT 22.5* 25.9*    157       Chem 10:  Recent Labs   Lab 12/15/18  1516 12/16/18  0406 12/17/18  0525   * 133* 137   K 5.1 4.9 4.9    103 107   CO2 26 24 24   BUN 35* 41* 36*   CREATININE 1.0 0.9 0.9   * 113* 117*   CALCIUM 8.5* 8.1* 8.2*   MG  --  1.8 1.8       LFTs:  Recent Labs   Lab 12/16/18  0406 12/17/18  0525   ALKPHOS 139* 155*   BILITOT 0.3 0.3   AST 19 38   ALT 21 33   ALBUMIN 1.4* 1.4*       Significant Imaging:     CT leg  "Rim enhancing multiloculated abscess extends at least to the level of the fibular head noting there are separate fluid collections posterior and medial to the knee (axial series 4, image 137) which possibly communicates although extensive beam hardening artifact from metallic knee prosthesis significantly limits assessment.    Multifocal osteolytic lesions with sclerotic rim in the foot and distal tibia likely representing components of acute and chronic osteomyelitis.  No definite calcification within a lucent lesion to suggest sequestrum although cannot be completely excluded.    Ulceration in the subcutaneous gas at the lateral distal foreleg is concerning for gas gangrene." - per interpreting radiologist    MRI left ankle with and without contrast  "Findings concerning for osteomyelitis of the hindfoot including the distal tibia and tarsal bones with associated cellulitis, myositis, and multiloculated abscesses in the deep soft tissues contiguous with lateral distal foreleg ulceration.  Component of abscess extends cranially outside the field of view, possibly more proximal portions of the left lower " "extremity." - per interpreting radiologist    X-ray tib fib left  "Status post total knee arthroplasty without evidence for complication.  Degenerative changes are noted at the ankle.  Vascular calcifications are present.  There is soft tissue gas at the lateral aspect of the ankle." - per interpreting radiologist  "

## 2018-12-17 NOTE — ASSESSMENT & PLAN NOTE
"  - Stable  - Asymptomatic  - Underwent LHC and subsequent PCI in 7/2018:    · "Bifurcation of the LAD:  The lesion was successfully intervened. Post-stenosis of 0%, post-DONNY 3 flow and TMP grade 3. The vessel was accessed natively.  The following items were used: 2.5MM 12MM Hubertus Balloon.  · D1:  The lesion was successfully intervened. Post-stenosis of 0%, post-DONNY 3 flow and TMP grade 3. The vessel was accessed natively.  The following items were used: 2.5MM 15MM Hubertus Balloon and Stent Resolute Rx 2.50x14 (OSMEL)."    - Cardiology recommending to continue DAPT perioperatively if at all possible, otherwise hold the plavix for as short a duration possible  - Otherwise continue aspirin, plavix, toprol, losartan, and lipitor  - Diabetic + cardiac diet  "

## 2018-12-17 NOTE — PROGRESS NOTES
[facility] Hospital Medicine  Progress Note    Patient Name: Krissy Marino  MRN: [id number]  Patient Class: IP- Inpatient   Admission Date: 12/12/2018  Length of Stay: 5 days  Attending Physician: Dc Liao MD  Primary Care Provider: Emily Mcpherson MD    [facility] Medicine Team: [facility]OSP MED O Dc Liao MD    Subjective:     Principal Problem:Osteomyelitis of left tibia    HPI:  Ms. Krissy Marino is a 78 y.o. female with type 2 diabetes, CAD status post PCI (7/2018), and peripheral vascular disease who presents to the emergency department from [facility] for evaluation of left lower extremity cellulitis.  She was just admitted to [facility] from 11/26-12/5 for left lower extremity cellulitis s/p a left ankle sprain, where she was found to have group B strep bacteremia.  During that admission, a PICC line was placed and she was discharged on Ceftriaxone to complete a 14 day course that ended on 12/11.  Since her discharge, family mentions that her left lower extremity cellulitis has not been improving.  They endorse persistent erythema as well as occasional yellow drainage from the left lateral malleolus.  At baseline, the patient is fairly bed-bound and spends most of her time with her lower extremities dangling down.  Labs were done at her facility on 12/07 which showed a white blood cell count 20.  She was told to come to the emergency department for further evaluation.  She continues to endorse pain in her left as well as pain with movement.  She has never had any debridement of her wound, but mentions that it has been cleaned with Clorox by [facility].  She denies any fevers or chills.  She does endorse constipation which she attributes to her pain medication.  Of note, the patient was told that because she has such severe peripheral vascular disease, she would require stent placement.  However, because of her recent cardiac stent placement, she is  unable to get leg stents placed for at least 1 year, which will continue to prevent good wound healing.     In the emergency department, labs were notable for a white blood cell count 10, sed rate 56, and CRP 67.  X-rays were ordered which showed edema, but no cellulitis.  She was given IV vancomycin and was admitted to Hospital Medicine for further management.    Hospital Course:  12/12/2018: Admitted to , started on vanc, MRI ordered along with ID and podiatry consultation  12/13/2018: MRI showed osteo, abscess. ID recommended adding CTX. Podiatry deferring to ortho. Brought on cardiology given DAPT and high risk lesion  12/14/2018: ID broadened antibiotics to vanc + cefepime + flagyl; ortho recommending AKA but patient undecided  12/15/2018: Stable examination on antibiotics, ongoing discussions regarding ultimate plan  12/16/2018: Renal function stable, holding vanc due to markedly elevated trough, 1 unit pRBCs ordered for anemia  12/17/2018: Excellent response to transfusion, continuing to hold vanc due to elevated trough    Interval History:     Stable exam, no complaints on broad-spectrum antibiotics. She was transfused yesterday and showed a good response on AM labs. Family at the bedside had numerous concerns and questions which were answered.    Review of Systems   Constitutional: Negative for chills and fever.   Respiratory: Negative for cough and shortness of breath.    Cardiovascular: Positive for leg swelling. Negative for chest pain.   Gastrointestinal: Negative for constipation, diarrhea, nausea and vomiting.   Musculoskeletal: Negative for myalgias.   Skin: Positive for rash and wound.   Psychiatric/Behavioral: Negative for dysphoric mood. The patient is not nervous/anxious.      Objective:     Vital Signs (Most Recent):  Temp: 98.5 °F (36.9 °C) (12/17/18 0730)  Pulse: 72 (12/17/18 0900)  Resp: 16 (12/17/18 0900)  BP: (!) 121/57 (12/17/18 0730)  SpO2: 95 % (12/17/18 0730) Vital Signs (24h  "Range):  Temp:  [97.7 °F (36.5 °C)-99.2 °F (37.3 °C)] 98.5 °F (36.9 °C)  Pulse:  [72-76] 72  Resp:  [16-18] 16  SpO2:  [94 %-98 %] 95 %  BP: (115-139)/(54-63) 121/57     Weight: 106.6 kg (235 lb 0.2 oz)  Body mass index is 39.11 kg/m².    Intake/Output Summary (Last 24 hours) at 12/17/2018 1159  Last data filed at 12/17/2018 0650  Gross per 24 hour   Intake 1387.5 ml   Output 3125 ml   Net -1737.5 ml      Physical Exam   Constitutional: She is oriented to person, place, and time. No distress.   Eyes: Pupils are equal, round, and reactive to light.   Cardiovascular: Normal rate and regular rhythm.   No murmur heard.  Pulmonary/Chest: Effort normal and breath sounds normal. No respiratory distress. She has no wheezes.   Abdominal: Soft. Bowel sounds are normal. She exhibits no distension. There is no tenderness.   Musculoskeletal: She exhibits edema (bilateral lower extremities worse on the left). She exhibits no tenderness.   Neurological: She is alert and oriented to person, place, and time.   Skin: She is not diaphoretic. There is erythema (LLE erythema, stable from yesterday).   Large ulceration present at lateral aspect of left foot with stable surrounding erythema   Psychiatric: She has a normal mood and affect. Her behavior is normal.       Significant Labs:     CBC:    Recent Labs   Lab 12/16/18  0406 12/17/18  0525   WBC 5.02 5.44   GRAN 76.4*  3.8 76.0*  4.1   HGB 6.9* 8.0*   HCT 22.5* 25.9*    157       Chem 10:  Recent Labs   Lab 12/15/18  1516 12/16/18  0406 12/17/18  0525   * 133* 137   K 5.1 4.9 4.9    103 107   CO2 26 24 24   BUN 35* 41* 36*   CREATININE 1.0 0.9 0.9   * 113* 117*   CALCIUM 8.5* 8.1* 8.2*   MG  --  1.8 1.8       LFTs:  Recent Labs   Lab 12/16/18  0406 12/17/18  0525   ALKPHOS 139* 155*   BILITOT 0.3 0.3   AST 19 38   ALT 21 33   ALBUMIN 1.4* 1.4*       Significant Imaging:     CT leg  "Rim enhancing multiloculated abscess extends at least to the level of the " "fibular head noting there are separate fluid collections posterior and medial to the knee (axial series 4, image 137) which possibly communicates although extensive beam hardening artifact from metallic knee prosthesis significantly limits assessment.    Multifocal osteolytic lesions with sclerotic rim in the foot and distal tibia likely representing components of acute and chronic osteomyelitis.  No definite calcification within a lucent lesion to suggest sequestrum although cannot be completely excluded.    Ulceration in the subcutaneous gas at the lateral distal foreleg is concerning for gas gangrene." - per interpreting radiologist    MRI left ankle with and without contrast  "Findings concerning for osteomyelitis of the hindfoot including the distal tibia and tarsal bones with associated cellulitis, myositis, and multiloculated abscesses in the deep soft tissues contiguous with lateral distal foreleg ulceration.  Component of abscess extends cranially outside the field of view, possibly more proximal portions of the left lower extremity." - per interpreting radiologist    X-ray tib fib left  "Status post total knee arthroplasty without evidence for complication.  Degenerative changes are noted at the ankle.  Vascular calcifications are present.  There is soft tissue gas at the lateral aspect of the ankle." - per interpreting radiologist    Assessment/Plan:      * Osteomyelitis of left tibia      - Stable  - Complicated by abscess formation and overlying cellulitis  - Demonstrated on MRI 12/13, which showed "osteomyelitis of the hindfoot including the distal tibia and tarsal bones with associated cellulitis, myositis, and multiloculated abscesses in the deep soft tissues"  - f/u CT showed osteolytic lesions and extensive abscess  - No leukocytosis. ESR and CRP both elevated  - Cardiology re: DAPT; would prefer to continue if at all possible  - Blood cultures NGTD, hemodynamically stable  - Continue vanc (not " "administering currently given supratherapeutic) + cefepime + flagyl  - Ortho following, appreciate assistance, recommending AKA but patient remains hesitant    Appreciate continued assistance from multiple consulting services     Cellulitis of left lower extremity      - Stable  - See above for more details     Coronary artery disease involving native coronary artery of native heart with unstable angina pectoris      - Stable  - Asymptomatic  - Underwent LHC and subsequent PCI in 7/2018:    · "Bifurcation of the LAD:  The lesion was successfully intervened. Post-stenosis of 0%, post-DONNY 3 flow and TMP grade 3. The vessel was accessed natively.  The following items were used: 2.5MM 12MM [place] Balloon.  · D1:  The lesion was successfully intervened. Post-stenosis of 0%, post-DONNY 3 flow and TMP grade 3. The vessel was accessed natively.  The following items were used: 2.5MM 15MM [place] Balloon and Stent Resolute Rx 2.50x14 (OSMEL)."    - Cardiology recommending to continue DAPT perioperatively if at all possible, otherwise hold the plavix for as short a duration possible  - Otherwise continue aspirin, plavix, toprol, losartan, and lipitor  - Diabetic + cardiac diet     Anemia      - Improving  - Suspect inflammatory etiology  - Ordered iron studies + ferritin as add-ons 12/16, do not appear to have been run by the lab  - Transfused 12/16 with good response  - Will follow with CBC daily     Hypomagnesemia      - Stable with most recent measurement  - Will follow and replace as needed     Pressure injury of buttock, stage 2      - Wound care following, appreciate assistance       Preoperative cardiovascular examination      - RCRI class II, no further testing necessary  - See discussion of DAPT above     Peripheral arterial disease      - Stable, but likely contributing to poor wound healing and increased infection risk  - Previously evaluated in an outpatient setting with recommendations for stent placement; discussed the " possibility of vascular surg consult with ortho  - Continue cardiac regimen with asa, plavix, and lipitor     Chronic combined systolic and diastolic heart failure      - Stable, clear chest examination though with bilateral lower extremity edema  - Most recent TTE, a stress echo in July 2018, showed LVEF 50% and grade I diastolic dysfunction  - Continue asa, plavix, toprol, losartan, and lipitor  - Resuming home lasix (BUN:Cr chronically elevated, up 6 pounds since admission)  - Goal K 4-5 and Mg 2-3     Obesity (BMI 30-39.9)      - Stable  - Body mass index is 38.12 kg/m².  - Encourage diet, weight loss, exercise  - Limited by PVD and cellulitis     Bilateral leg edema      - Stable  - Will tread underlying causes: cellulitis, OM, and CHF  - Resuming home lasix (BUN:Cr chronically elevated, up 6 pounds since admission)     Dyslipidemia      - Stable  - Continue home lipitor     Hypothyroidism      - Stable  - Continue home synthroid     Hypertension      - Stable  - Continue toprol, losartan, and norvasc  - Will restart lasix as indicated, possibly tomorrow depending on her examination     Type 2 diabetes mellitus, without long-term current use of insulin      - Stable  - HbA1c 6.7 in May  - Will hold home oral antihyperglycemic agents in favor of low-dose aspart SSI + POCT glucose checks  - Diabetic + cardiac diet when not NPO       VTE Risk Mitigation (From admission, onward)        Ordered     enoxaparin injection 40 mg  Daily      12/12/18 1952     IP VTE HIGH RISK PATIENT  Once      12/12/18 1952              Dc Liao MD  Department of Hospital Medicine   [facility]

## 2018-12-17 NOTE — PLAN OF CARE
Problem: Adult Inpatient Plan of Care  Goal: Plan of Care Review  Outcome: Ongoing (interventions implemented as appropriate)   12/17/18 3798   Plan of Care Review   Plan of Care Reviewed With patient   Wound to Lt ankle noted open to air, brown drainage, yellow and black color noted to wound, wound cleansed with sterile normal saline   Mark heels elevated off bed and remains elevated, strict I&O maintained,   Pt turned q2h, pericare provided, burnham care provided, Coccyx wound cleaned as ordered and clear barrier cream applied as ordered, blood glucose monitored as ordered,  No distress/discomfort noted in pt, all precautions maintained, will continue to monitor pt.

## 2018-12-17 NOTE — PHYSICIAN QUERY
"PT Name: Krissy Marino  MR #: [id number]     Physician Query Form - Documentation Clarification      CDS: Shena Dumont RN, CCDS         Contact information :[phone]  [email or web address]       This form is a permanent document in the medical record.     Query Date: December 17, 2018    By submitting this query, we are merely seeking further clarification of documentation. Please utilize your independent clinical judgment when addressing the question(s) below.    The Medical record reflects the following:    Supporting Clinical Findings Location in Medical Record     "On the lateral aspect of the ankle there is a large 4x7cm area of ulceration with purulent drainage, she is ttp from the dorsum of the foot up to the mid leg,  The tka incision is well healed and without erythema or drainage"      LLE cellulitis, PVD now found to have osteomyelitis of the hindfoot including the distal tibia and tarsal bones with associated cellulitis, myositis, and multiloculated abscesses in the deep soft tissues contiguous with lateral distal foreleg ulceration. On vanc and ceftriaxone.  Ortho Sx following and planning surgery - washout vs BKA.  BCXs NGTD"         Ortho Consult 12/13/18                ID consult 12/13/18           "now found to have osteomyelitis of the hindfoot including the distal tibia and tarsal bones with associated cellulitis, myositis, and multiloculated abscesses in the deep soft tissues contiguous with lateral distal foreleg ulceration."       ID Progress note 12/16/18                                                                            Doctor, Please specify diagnosis or diagnoses associated with above clinical findings.  Please clarify type of ulcer and please document the ulcer severity/depth.    Provider Use Only        [  ] non-pressure ulcer related to diabetes, please specify severity/depth:  ___Skin breakdown only  ___Fat layer exposed  ___Muscle involvement without evidence of " necrosis  ___Muscle necrosis  ___Bone involvement without evidence of necrosis  __x_Bone necrosis  ___other, ___            [  ] Other type ulcer please specify, ____________________,   please specify severity/depth:  ___Fat layer exposed  ___Muscle involvement without evidence of necrosis  ___Muscle necrosis  ___Bone involvement without evidence of necrosis  ___Bone necrosis  ___other, ___                                                                                                                         [  ] Clinically Undetermined

## 2018-12-17 NOTE — PT/OT/SLP PROGRESS
Occupational Therapy   Treatment      Name: Krissy Marino  MRN: [id number]  Admitting Diagnosis:  Osteomyelitis of left tibia       Recommendations:     Discharge Recommendations: nursing facility, skilled  Discharge Equipment Recommendations:  none  Barriers to discharge:  None    Subjective     Pain/Comfort:  · Pain Rating 1: 0/10  · Location - Side 1: Left  · Location - Orientation 1: distal  · Location 1: leg  · Pain Addressed 1: Reposition, Distraction  · Pain Rating Post-Intervention 1: 0/10    Objective:     Communicated with: RN prior to session.  Patient found with supine and peripheral IV, telemetry upon OT entry to room.    General Precautions: Standard, fall   Orthopedic Precautions:LLE non weight bearing   Braces: N/A     Occupational Performance:    Bed Mobility:    · Patient completed Rolling/Turning to Left with  moderate assistance  · Patient completed Scooting/Bridging with maximal assistance  · Patient completed Supine to Sit with moderate assistance  · Patient completed Sit to Supine with maximal assistance     Functional Mobility/Transfers:  · Patient completed Sit <> Stand Transfer with maximal assistance  with  2 person assist (one on each side)   · Functional Mobility: Pt performed one sit to stand for ~30 second hold -gradual lowering noted     Activities of Daily Living:  · Grooming: stand by assistance for oral care and washing face   · Toileting: total assist bedlevel    [facility] 6 Click ADL: 14    Treatment & Education:  · Pt educated on role of OT in acute care setting.   · Assisted with ADLs and functional mobility with assist levels noted above  · Pt completed 1x20 shoulder flexion AROM    Patient left with bed in chair position with all lines intact and call button in reach  Education:    Assessment:     Krissy Marino is a 78 y.o. female with a medical diagnosis of Osteomyelitis of left tibia.  Pt is highly motivated to participate and meet goals. Talked with her about realistic goal  setting as her number one goal is to walk right now. Due to B UE shoulder impairments and overall weakness pt would be more appropriate to progress with w/c mobility and ADLs at w/c level.  Pt has co morbidities limiting rate of progress therefore recommending SNF.  Pt does not live alone but is home alone often. She presents with the following performance deficits affecting function are weakness, impaired functional mobilty, impaired endurance, impaired self care skills, gait instability, impaired balance, decreased lower extremity function, decreased upper extremity function, decreased safety awareness, orthopedic precautions, pain, impaired skin.     Rehab Prognosis:  Good; patient would benefit from acute skilled OT services to address these deficits and reach maximum level of function.       Plan:     Patient to be seen 4 x/week to address the above listed problems via self-care/home management, therapeutic activities, therapeutic exercises, neuromuscular re-education, wheelchair management/training  · Plan of Care Expires: 01/14/18  · Plan of Care Reviewed with: patient, daughter    This Plan of care has been discussed with the patient who was involved in its development and understands and is in agreement with the identified goals and treatment plan    GOALS:   Multidisciplinary Problems     Occupational Therapy Goals        Problem: Occupational Therapy Goal    Goal Priority Disciplines Outcome Interventions   Occupational Therapy Goal     OT, PT/OT Ongoing (interventions implemented as appropriate)    Description:  Goals to be met by: 12/31     Patient will increase functional independence with ADLs by performing:    UE Dressing with [place].   LE Dressing with Minimal Assistance.  Grooming while seated with Set-up Assistance. Goal met  Toileting from bedside commode with Minimal Assistance for hygiene and clothing management.   Bathing from  edge of bed with Moderate Assistance.                        Time Tracking:     OT Date of Treatment: 12/17/18  OT Start Time: 1345  OT Stop Time: 1408  OT Total Time (min): 23 min    Billable Minutes:Self Care/Home Management 23    ZACHARIAH Miller  12/17/2018

## 2018-12-17 NOTE — SUBJECTIVE & OBJECTIVE
Interval History: Pt without complaints today.  Afebrile w/o leukocytosis.  Vanc levels continue to be supratherapeutic.  Vanc held again today.    Review of Systems   Constitutional: Negative for activity change, chills, diaphoresis and fever.   Respiratory: Negative for cough, shortness of breath and wheezing.    Cardiovascular: Negative for chest pain.   Gastrointestinal: Negative for abdominal pain, constipation, diarrhea, nausea and vomiting.   Genitourinary: Negative for dysuria, frequency and urgency.   Skin: Positive for wound.   Neurological: Negative for dizziness.   Hematological: Does not bruise/bleed easily.     Objective:     Vital Signs (Most Recent):  Temp: 98.2 °F (36.8 °C) (12/17/18 1248)  Pulse: 77 (12/17/18 1248)  Resp: 20 (12/17/18 1248)  BP: 129/60 (12/17/18 1248)  SpO2: 98 % (12/17/18 1248) Vital Signs (24h Range):  Temp:  [98.2 °F (36.8 °C)-99.2 °F (37.3 °C)] 98.2 °F (36.8 °C)  Pulse:  [72-77] 77  Resp:  [16-20] 20  SpO2:  [94 %-98 %] 98 %  BP: (115-139)/(54-63) 129/60     Weight: 106.6 kg (235 lb 0.2 oz)  Body mass index is 39.11 kg/m².    Estimated Creatinine Clearance: 60.4 mL/min (based on SCr of 0.9 mg/dL).    Physical Exam   Constitutional: She is oriented to person, place, and time. No distress.   Eyes: Pupils are equal, round, and reactive to light.   Cardiovascular: Normal rate and regular rhythm.   No murmur heard.  Pulmonary/Chest: Effort normal and breath sounds normal. No respiratory distress. She has no wheezes.   Abdominal: Soft. Bowel sounds are normal. She exhibits no distension. There is no tenderness.   Musculoskeletal: She exhibits edema (BLE). She exhibits no tenderness.   Neurological: She is alert and oriented to person, place, and time.   Skin: She is not diaphoretic. There is erythema (LLE).   Ulcers present at lateral aspect of left foot   Psychiatric: She has a normal mood and affect. Her behavior is normal.       Significant Labs:   Blood Culture:   Recent Labs    Lab 11/26/18  0810 11/27/18  0858 11/27/18  0859 12/12/18 1849 12/12/18 1907   LABBLOO Gram stain anais bottle: Gram positive cocci in chains resembling Strep  Results called to and read back by:James Hooker RN  STREPTOCOCCUS AGALACTIAE (GROUP B)  Beta-hemolytic streptococci are routinely susceptible to   penicillins,cephalosporins and carbapenems.   No growth after 5 days. No growth after 5 days. No Growth to date  No Growth to date  No Growth to date  No Growth to date  No Growth to date No Growth to date  No Growth to date  No Growth to date  No Growth to date  No Growth to date     BMP:   Recent Labs   Lab 12/17/18  0525   *      K 4.9      CO2 24   BUN 36*   CREATININE 0.9   CALCIUM 8.2*   MG 1.8     CBC:   Recent Labs   Lab 12/16/18  0406 12/17/18  0525   WBC 5.02 5.44   HGB 6.9* 8.0*   HCT 22.5* 25.9*    157     CMP:   Recent Labs   Lab 12/16/18  0406 12/17/18  0525   * 137   K 4.9 4.9    107   CO2 24 24   * 117*   BUN 41* 36*   CREATININE 0.9 0.9   CALCIUM 8.1* 8.2*   PROT 4.6* 4.8*   ALBUMIN 1.4* 1.4*   BILITOT 0.3 0.3   ALKPHOS 139* 155*   AST 19 38   ALT 21 33   ANIONGAP 6* 6*   EGFRNONAA >60.0 >60.0     Microbiology Results (last 7 days)     Procedure Component Value Units Date/Time    Blood culture x two cultures. Draw prior to antibiotics. [[id number]] Collected:  12/12/18 1849    Order Status:  Completed Specimen:  Blood from Peripheral, Antecubital, Left Updated:  12/16/18 2012     Blood Culture, Routine No Growth to date     Blood Culture, Routine No Growth to date     Blood Culture, Routine No Growth to date     Blood Culture, Routine No Growth to date     Blood Culture, Routine No Growth to date    Narrative:       Aerobic and anaerobic    Blood culture x two cultures. Draw prior to antibiotics. [[id number]] Collected:  12/12/18 1907    Order Status:  Completed Specimen:  Blood from Line, PICC Right Cephalic Updated:  12/16/18 2012     Blood  Culture, Routine No Growth to date     Blood Culture, Routine No Growth to date     Blood Culture, Routine No Growth to date     Blood Culture, Routine No Growth to date     Blood Culture, Routine No Growth to date    Narrative:       Aerobic and anaerobic        All pertinent labs within the past 24 hours have been reviewed.  Recent Lab Results       12/17/18  1242   12/17/18  0756   12/17/18  0525   12/16/18  2131   12/16/18  1721        Immature Granulocytes     0.9         Immature Grans (Abs)     0.05  Comment:  Mild elevation in immature granulocytes is non specific and   can be seen in a variety of conditions including stress response,   acute inflammation, trauma and pregnancy. Correlation with other   laboratory and clinical findings is essential.           Albumin     1.4         Alkaline Phosphatase     155         ALT     33         Anion Gap     6         AST     38         Baso #     0.03         Basophil%     0.6         Total Bilirubin     0.3  Comment:  For infants and newborns, interpretation of results should be based  on gestational age, weight and in agreement with clinical  observations.  Premature Infant recommended reference ranges:  Up to 24 hours.............<8.0 mg/dL  Up to 48 hours............<12.0 mg/dL  3-5 days..................<15.0 mg/dL  6-29 days.................<15.0 mg/dL           BUN, Bld     36         Calcium     8.2         Chloride     107         CO2     24         Creatinine     0.9         Differential Method     Automated         eGFR if      >60.0         eGFR if non      >60.0  Comment:  Calculation used to obtain the estimated glomerular filtration  rate (eGFR) is the CKD-EPI equation.            Eos #     0.2         Eosinophil%     3.7         Glucose     117         Gran # (ANC)     4.1         Gran%     76.0         Hematocrit     25.9         Hemoglobin     8.0         Lymph #     0.7         Lymph%     12.5         Magnesium      1.8         MCH     31.7         MCHC     30.9         MCV     103         Mono #     0.3         Mono%     6.3         MPV     10.4         nRBC     0         Platelets     157         POCT Glucose 132 144   297 232     Potassium     4.9         Total Protein     4.8         RBC     2.52         RDW     15.0         Sodium     137         Vancomycin, Random     36.2         WBC     5.44                              Significant Imaging: I have reviewed all pertinent imaging results/findings within the past 24 hours.

## 2018-12-17 NOTE — PHYSICIAN QUERY
"PT Name: Krissy Marino  MR #: [id number]     Physician Query Form - Documentation Clarification      CDS: Shena Dumont RN, CCDS         Contact information :[phone]  [email or web address]       This form is a permanent document in the medical record.     Query Date: December 17, 2018    By submitting this query, we are merely seeking further clarification of documentation. Please utilize your independent clinical judgment when addressing the question(s) below.    The Medical record reflects the following:    Supporting Clinical Findings Location in Medical Record     "Stage 2 pressure injury noted over patient's coccyx. Blanchable redness and blanchable purple discoloration noted to buttocks. Educated patient importance of turning every 2hrs. Patient verbalized understanding. Patient on EHOB waffle overlay.  Recommend nursing to apply clear barrier cream BID/prn. Discussed with Dr. Liao, orders placed for nursing. Nursing to continue care. Wound care to follow prn."    "Date First Assessed/Time First Assessed: 12/14/18 1004   Pressure Injury Present on Admission: yes  Location: Coccyx  Staging: Stage 2"           Wound Care progress note 12/14/18                    Wound Care progress note 12/14/18                                                                                Doctor, Please specify diagnosis or diagnoses associated with above clinical findings.    Provider Use Only      [x  ] Stage 2 pressure injury Coccyx, present on admission    [  ] Stage 2 pressure injury Coccyx, not present on admission    [  ] Other clarification of integumentary diagnosis(es), ____________                                                                                                               [  ] Clinically Undetermined               "

## 2018-12-17 NOTE — PLAN OF CARE
Problem: Physical Therapy Goal  Goal: Physical Therapy Goal  Goals to be met by: 10 days ()    Patient will increase functional independence with mobility by performin. Supine to sit with Stand-by Assistance  2. Sit to supine with Stand-by Assistance  3. Sit to stand transfer with Minimal Assistance using RW while maintaining NWB L LE status  4. Bed to chair with Moderate Assistance using RW while maintaining NWB L LE  5. Lower extremity exercise program x30 reps per handout, with independence to improve muscular strength and endurance.      Outcome: Ongoing (interventions implemented as appropriate)  Goals remain appropriate.

## 2018-12-17 NOTE — PT/OT/SLP EVAL
Speech Language Pathology Evaluation  Bedside Swallow    Patient Name:  Krissy Marino   MRN:  [id number]  Admitting Diagnosis: Osteomyelitis of left tibia    Recommendations:                 General Recommendations:  Follow-up not indicated  Diet recommendations:  Regular, Thin   Aspiration Precautions: Standard aspiration precautions   General Precautions: Standard, fall  Communication strategies:  none    History:     Past Medical History:   Diagnosis Date    Adverse effect of glucocorticoid or synthetic analogue     Allergy     Arthritis     Cancer     CHF (congestive heart failure)     Chronic kidney disease     Coronary artery disease involving native coronary artery of native heart without angina pectoris 10/11/2016    Diabetic neuropathy 10/6/2014    Giant cell arteritis 9/24/2012    Hyperlipidemia     Hypertension     Hypothyroid     Obesity (BMI 30-39.9) 10/6/2014    Osteopenia     Primary osteoarthritis of both knees 9/24/2012    Type II or unspecified type diabetes mellitus with renal manifestations, uncontrolled(250.42)        Past Surgical History:   Procedure Laterality Date    APPENDECTOMY      CATARACT EXTRACTION      CATARACT EXTRACTION, BILATERAL      CHOLECYSTECTOMY      COLONOSCOPY N/A 12/27/2013    Performed by Shamar Sow MD at [facility] (4TH FLR)    EYE SURGERY      HYSTERECTOMY      JOINT REPLACEMENT      bilateral total knee    SKIN BIOPSY      STRIPPING-TARSAL Bilateral 3/7/2018    Performed by Anastasia Nieto MD at [facility] OR 2ND FLR    TONSILLECTOMY         Chest X-Rays: 12/13The cardiac silhouette and pulmonary vasculature is stable.  Lungs show no large focal consolidation, large pleural effusion or pneumothorax.  Bones are intact, with stable degenerative changes at the shoulders.    Prior diet: Regular/thin.        Subjective     Awake/alert    Pain/Comfort:  · Pain Rating 1: 0/10  · Pain Rating Post-Intervention 1: 0/10    Objective:     Oral Musculature  Evaluation  · Oral Musculature: WFL  · Dentition: present and adequate  · Oral Labial Strength and Mobility: WFL  · Lingual Strength and Mobility: WFL  · Volitional Swallow: timely  · Voice Prior to PO Intake: clear    Bedside Swallow Eval:   Consistencies Assessed:  · Thin liquids x6 cup/straw  · Puree x2  · Solids x1/2 merna cracker     Oral Phase:   · WFL    Pharyngeal Phase:   · no overt clinical signs/symptoms of aspiration    Compensatory Strategies  · None      Assessment:     Krissy Marino is a 78 y.o. female with oral/phayrngeal phases of swallow deemed WFL. No further ST recommended.     Goals:   Multidisciplinary Problems     SLP Goals        Problem: SLP Goal    Goal Priority Disciplines Outcome   SLP Goal     SLP                    Plan:     · Plan of Care reviewed with:  patient, daughter   · SLP Follow-Up:  No       Discharge recommendations:    No St f/u      Time Tracking:     SLP Treatment Date:   12/17/18  Speech Start Time:  0837  Speech Stop Time:  0845     Speech Total Time (min):  8 min    Billable Minutes: Eval Swallow and Oral Function 8    Jennifer Warren CCC-SLP  12/17/2018

## 2018-12-18 PROBLEM — L03.90 SEPSIS DUE TO CELLULITIS: Status: ACTIVE | Noted: 2018-11-26

## 2018-12-18 PROBLEM — E88.09 HYPOALBUMINEMIA DUE TO PROTEIN-CALORIE MALNUTRITION: Status: ACTIVE | Noted: 2018-12-18

## 2018-12-18 PROBLEM — A48.0 GAS GANGRENE OF LOWER EXTREMITY: Status: ACTIVE | Noted: 2018-12-18

## 2018-12-18 PROBLEM — E44.0 MODERATE PROTEIN-CALORIE MALNUTRITION: Status: ACTIVE | Noted: 2018-12-18

## 2018-12-18 PROBLEM — E46 HYPOALBUMINEMIA DUE TO PROTEIN-CALORIE MALNUTRITION: Status: ACTIVE | Noted: 2018-12-18

## 2018-12-18 PROBLEM — R41.0 DELIRIUM: Status: ACTIVE | Noted: 2018-12-18

## 2018-12-18 LAB
ALBUMIN SERPL BCP-MCNC: 1.6 G/DL
ALP SERPL-CCNC: 193 U/L
ALT SERPL W/O P-5'-P-CCNC: 52 U/L
ANION GAP SERPL CALC-SCNC: 8 MMOL/L
AST SERPL-CCNC: 71 U/L
BASOPHILS # BLD AUTO: 0.05 K/UL
BASOPHILS NFR BLD: 0.9 %
BILIRUB SERPL-MCNC: 0.5 MG/DL
BUN SERPL-MCNC: 35 MG/DL
CALCIUM SERPL-MCNC: 9 MG/DL
CHLORIDE SERPL-SCNC: 108 MMOL/L
CO2 SERPL-SCNC: 21 MMOL/L
CREAT SERPL-MCNC: 0.9 MG/DL
DIFFERENTIAL METHOD: ABNORMAL
EOSINOPHIL # BLD AUTO: 0.2 K/UL
EOSINOPHIL NFR BLD: 3.9 %
ERYTHROCYTE [DISTWIDTH] IN BLOOD BY AUTOMATED COUNT: 14.9 %
EST. GFR  (AFRICAN AMERICAN): >60 ML/MIN/1.73 M^2
EST. GFR  (NON AFRICAN AMERICAN): >60 ML/MIN/1.73 M^2
FOLATE SERPL-MCNC: 11.9 NG/ML
GLUCOSE SERPL-MCNC: 116 MG/DL
HCT VFR BLD AUTO: 28.5 %
HGB BLD-MCNC: 8.8 G/DL
IMM GRANULOCYTES # BLD AUTO: 0.06 K/UL
IMM GRANULOCYTES NFR BLD AUTO: 1.1 %
LYMPHOCYTES # BLD AUTO: 0.8 K/UL
LYMPHOCYTES NFR BLD: 13.9 %
MAGNESIUM SERPL-MCNC: 1.7 MG/DL
MCH RBC QN AUTO: 31 PG
MCHC RBC AUTO-ENTMCNC: 30.9 G/DL
MCV RBC AUTO: 100 FL
MONOCYTES # BLD AUTO: 0.4 K/UL
MONOCYTES NFR BLD: 7.7 %
NEUTROPHILS # BLD AUTO: 4.1 K/UL
NEUTROPHILS NFR BLD: 72.5 %
NRBC BLD-RTO: 0 /100 WBC
PLATELET # BLD AUTO: 154 K/UL
PMV BLD AUTO: 10.6 FL
POCT GLUCOSE: 133 MG/DL (ref 70–110)
POCT GLUCOSE: 193 MG/DL (ref 70–110)
POCT GLUCOSE: 262 MG/DL (ref 70–110)
POTASSIUM SERPL-SCNC: 5 MMOL/L
PROT SERPL-MCNC: 5.4 G/DL
RBC # BLD AUTO: 2.84 M/UL
SODIUM SERPL-SCNC: 137 MMOL/L
VANCOMYCIN SERPL-MCNC: 29.7 UG/ML
WBC # BLD AUTO: 5.61 K/UL

## 2018-12-18 PROCEDURE — 85025 COMPLETE CBC W/AUTO DIFF WBC: CPT

## 2018-12-18 PROCEDURE — 36415 COLL VENOUS BLD VENIPUNCTURE: CPT

## 2018-12-18 PROCEDURE — 11000001 HC ACUTE MED/SURG PRIVATE ROOM

## 2018-12-18 PROCEDURE — 80053 COMPREHEN METABOLIC PANEL: CPT

## 2018-12-18 PROCEDURE — 80202 ASSAY OF VANCOMYCIN: CPT

## 2018-12-18 PROCEDURE — 82746 ASSAY OF FOLIC ACID SERUM: CPT

## 2018-12-18 PROCEDURE — 25000003 PHARM REV CODE 250: Performed by: HOSPITALIST

## 2018-12-18 PROCEDURE — 25000003 PHARM REV CODE 250: Performed by: PHYSICIAN ASSISTANT

## 2018-12-18 PROCEDURE — 83735 ASSAY OF MAGNESIUM: CPT

## 2018-12-18 PROCEDURE — 25000003 PHARM REV CODE 250: Performed by: INTERNAL MEDICINE

## 2018-12-18 PROCEDURE — 63600175 PHARM REV CODE 636 W HCPCS: Performed by: PHYSICIAN ASSISTANT

## 2018-12-18 PROCEDURE — 93922 UPR/L XTREMITY ART 2 LEVELS: CPT | Performed by: SURGERY

## 2018-12-18 PROCEDURE — 99232 SBSQ HOSP IP/OBS MODERATE 35: CPT | Mod: ,,, | Performed by: PHYSICIAN ASSISTANT

## 2018-12-18 PROCEDURE — 63600175 PHARM REV CODE 636 W HCPCS: Performed by: HOSPITALIST

## 2018-12-18 PROCEDURE — 99233 SBSQ HOSP IP/OBS HIGH 50: CPT | Mod: ,,, | Performed by: HOSPITALIST

## 2018-12-18 RX ORDER — LANOLIN ALCOHOL/MO/W.PET/CERES
400 CREAM (GRAM) TOPICAL 2 TIMES DAILY
Status: DISCONTINUED | OUTPATIENT
Start: 2018-12-18 | End: 2018-12-19

## 2018-12-18 RX ORDER — ACETAMINOPHEN 325 MG/1
325 TABLET ORAL EVERY 8 HOURS PRN
Status: DISCONTINUED | OUTPATIENT
Start: 2018-12-18 | End: 2018-12-26 | Stop reason: HOSPADM

## 2018-12-18 RX ORDER — FUROSEMIDE 40 MG/1
40 TABLET ORAL DAILY
Status: DISCONTINUED | OUTPATIENT
Start: 2018-12-19 | End: 2018-12-18

## 2018-12-18 RX ORDER — FUROSEMIDE 40 MG/1
40 TABLET ORAL DAILY
Status: DISCONTINUED | OUTPATIENT
Start: 2018-12-19 | End: 2018-12-21

## 2018-12-18 RX ORDER — ALENDRONATE SODIUM 70 MG/1
70 TABLET ORAL
Qty: 12 TABLET | Refills: 3 | OUTPATIENT
Start: 2018-12-18

## 2018-12-18 RX ORDER — METOPROLOL SUCCINATE 50 MG/1
50 TABLET, EXTENDED RELEASE ORAL DAILY
Status: DISCONTINUED | OUTPATIENT
Start: 2018-12-19 | End: 2018-12-26 | Stop reason: HOSPADM

## 2018-12-18 RX ORDER — LOSARTAN POTASSIUM 50 MG/1
50 TABLET ORAL DAILY
Status: DISCONTINUED | OUTPATIENT
Start: 2018-12-19 | End: 2018-12-21

## 2018-12-18 RX ORDER — LOSARTAN POTASSIUM 50 MG/1
50 TABLET ORAL DAILY
Status: DISCONTINUED | OUTPATIENT
Start: 2018-12-19 | End: 2018-12-18

## 2018-12-18 RX ADMIN — CEFEPIME 2 G: 2 INJECTION, POWDER, FOR SOLUTION INTRAVENOUS at 11:12

## 2018-12-18 RX ADMIN — CLOPIDOGREL 75 MG: 75 TABLET, FILM COATED ORAL at 09:12

## 2018-12-18 RX ADMIN — ATORVASTATIN CALCIUM 40 MG: 20 TABLET, FILM COATED ORAL at 09:12

## 2018-12-18 RX ADMIN — METRONIDAZOLE 500 MG: 500 TABLET ORAL at 11:12

## 2018-12-18 RX ADMIN — INSULIN ASPART 3 UNITS: 100 INJECTION, SOLUTION INTRAVENOUS; SUBCUTANEOUS at 07:12

## 2018-12-18 RX ADMIN — FUROSEMIDE 40 MG: 40 TABLET ORAL at 09:12

## 2018-12-18 RX ADMIN — ASPIRIN 81 MG: 81 TABLET, COATED ORAL at 09:12

## 2018-12-18 RX ADMIN — LOSARTAN POTASSIUM 100 MG: 50 TABLET, FILM COATED ORAL at 09:12

## 2018-12-18 RX ADMIN — AMLODIPINE BESYLATE 5 MG: 5 TABLET ORAL at 09:12

## 2018-12-18 RX ADMIN — ACETAMINOPHEN 325 MG: 325 TABLET ORAL at 11:12

## 2018-12-18 RX ADMIN — CEFEPIME 2 G: 2 INJECTION, POWDER, FOR SOLUTION INTRAVENOUS at 02:12

## 2018-12-18 RX ADMIN — CETIRIZINE HYDROCHLORIDE 10 MG: 10 TABLET, FILM COATED ORAL at 09:12

## 2018-12-18 RX ADMIN — METOPROLOL SUCCINATE 50 MG: 50 TABLET, EXTENDED RELEASE ORAL at 09:12

## 2018-12-18 RX ADMIN — ALLOPURINOL 300 MG: 300 TABLET ORAL at 09:12

## 2018-12-18 RX ADMIN — FAMOTIDINE 20 MG: 20 TABLET ORAL at 09:12

## 2018-12-18 RX ADMIN — MAGNESIUM OXIDE TAB 400 MG (241.3 MG ELEMENTAL MG) 400 MG: 400 (241.3 MG) TAB at 11:12

## 2018-12-18 RX ADMIN — METRONIDAZOLE 500 MG: 500 TABLET ORAL at 02:12

## 2018-12-18 RX ADMIN — CEFEPIME 2 G: 2 INJECTION, POWDER, FOR SOLUTION INTRAVENOUS at 07:12

## 2018-12-18 RX ADMIN — INSULIN ASPART 1 UNITS: 100 INJECTION, SOLUTION INTRAVENOUS; SUBCUTANEOUS at 11:12

## 2018-12-18 RX ADMIN — ENOXAPARIN SODIUM 40 MG: 100 INJECTION SUBCUTANEOUS at 05:12

## 2018-12-18 RX ADMIN — FAMOTIDINE 20 MG: 20 TABLET ORAL at 11:12

## 2018-12-18 RX ADMIN — LEVOTHYROXINE SODIUM 75 MCG: 75 TABLET ORAL at 06:12

## 2018-12-18 RX ADMIN — FLUTICASONE FUROATE AND VILANTEROL TRIFENATATE 1 PUFF: 100; 25 POWDER RESPIRATORY (INHALATION) at 09:12

## 2018-12-18 RX ADMIN — METRONIDAZOLE 500 MG: 500 TABLET ORAL at 06:12

## 2018-12-18 NOTE — PLAN OF CARE
Patient medical plan continues, Ortho recs Vascular Consult for opinion.  Patient may need AKA as wound healing may be unsuccessful.  Medical and discharge plan pending.  Assigned CM will continue to follow for needs.       12/18/18 1044   Discharge Reassessment   Assessment Type Discharge Planning Reassessment   Discharge Plan A Home with family;Home Health   Discharge Plan B Skilled Nursing Facility

## 2018-12-18 NOTE — PROGRESS NOTES
Progress Note  Hospital Medicine  [facility]       Patient Name: Krissy Marino  MRN:  [id number]  Hospital Medicine Team: [facility] [place] HOSP MED S Livia Hernandez MD  Date of Admission:  12/12/2018     Length of Stay:  LOS: 6 days   Expected Discharge Date: 12/20/2018  Principal Problem:  Osteomyelitis of left tibia     Subjective:     Interval History/Overnight Events:    Met with patient this afternoon. She was doing well with no pain. Was able to answer questions appropriately. Her options were discussed. She refused AKA. Wanted to speak with family    Met this family at a later time. See below. Extent of Current infection was explained. All options were discussed in great detail. Family will discuss with pt    Pt was more somnolent during meeting with family. No fevers, WBC wnl. BP on lower side and anti-HTN meds were readjusted. No longer on gabapentin. Not on narcotics, as cannot feel pain. Pendleton in place and will d/c./  Delirium precautions placed      allopurinol  300 mg Oral Daily    aspirin  81 mg Oral Daily    atorvastatin  40 mg Oral Daily    ceFEPime (MAXIPIME) IVPB  2 g Intravenous Q8H    clopidogrel  75 mg Oral Daily    enoxaparin  40 mg Subcutaneous Daily    famotidine  20 mg Oral BID    fluticasone-vilanterol  1 puff Inhalation Daily    [START ON 12/19/2018] furosemide  40 mg Oral Daily    levothyroxine  75 mcg Oral Before breakfast    [START ON 12/19/2018] losartan  50 mg Oral Daily    metoprolol succinate  50 mg Oral Daily    metroNIDAZOLE  500 mg Oral Q8H    [START ON 12/19/2018] multivitamin  1 tablet Oral Daily    polyethylene glycol  17 g Oral Daily           sodium chloride, acetaminophen, dextrose 50%, dextrose 50%, glucagon (human recombinant), glucose, glucose, insulin aspart U-100, ondansetron, senna-docusate 8.6-50 mg, sodium chloride 0.9%    Review of Systems   Constitutional: Negative for chills, fatigue, fever.   HENT: Negative for sore throat,  trouble swallowing.    Eyes: Negative for photophobia, visual disturbance.   Respiratory: Negative for cough, shortness of breath.    Cardiovascular: Negative for chest pain, palpitations, leg swelling.   Gastrointestinal: Negative for abdominal pain, constipation, diarrhea, nausea, vomiting.   Endocrine: Negative for cold intolerance, heat intolerance.   Genitourinary: Negative for dysuria, frequency.   Musculoskeletal: Negative for arthralgias, myalgias.   Skin: L LE wound- stable   Neurological: confusion   Psychiatric/Behavioral: Negative for confusion, hallucinations, anxiety  All other systems reviewed and are negative.    Objective:     Temp:  [97.9 °F (36.6 °C)-98.6 °F (37 °C)]   Pulse:  [74-84]   Resp:  [16-20]   BP: (106-138)/(55-66)   SpO2:  [95 %-97 %]       Physical Exam:  Constitutional: elderly, appears chronically ill. NAD  Eyes: Pupils are equal, round, and reactive to light.   Cardiovascular: Normal rate and regular rhythm.   No murmur heard.  Pulmonary/Chest: Effort normal and breath sounds normal. No respiratory distress. She has no wheezes.   Abdominal: Soft. Bowel sounds are normal. She exhibits no distension. There is no tenderness.   Musculoskeletal: She exhibits mild edema (bilateral lower extremities worse on the left). She exhibits no tenderness.   Neurological: She is alert and oriented to person, place, and time.   Skin: She is not diaphoretic. There is erythema (LLE erythema, stable from yesterday).   Large ulceration present at lateral aspect of left foot with stable surrounding erythema   Psychiatric: She has a normal mood and affect. Her behavior is normal.             Labs:    Chemistries:   Recent Labs   Lab 12/13/18  0340  12/16/18  0406 12/17/18  0525 12/18/18  0457      < > 133* 137 137   K 4.0   < > 4.9 4.9 5.0      < > 103 107 108   CO2 25   < > 24 24 21*   BUN 35*   < > 41* 36* 35*   CREATININE 0.7   < > 0.9 0.9 0.9   CALCIUM 8.3*   < > 8.1* 8.2* 9.0   PROT 5.0*    < > 4.6* 4.8* 5.4*   BILITOT 0.4   < > 0.3 0.3 0.5   ALKPHOS 173*   < > 139* 155* 193*   ALT 28   < > 21 33 52*   AST 16   < > 19 38 71*   MG 1.6   < > 1.8 1.8 1.7   PHOS 3.1  --   --   --   --     < > = values in this interval not displayed.        WBC:   Recent Labs   Lab 12/14/18  0534 12/15/18  0539 12/16/18  0406 12/17/18  0525 12/18/18  0457   WBC 6.22 5.45 5.02 5.44 5.61     Bands:     CBC/Anemia Labs: Coags:    Recent Labs   Lab 12/16/18  0406 12/17/18  0525 12/18/18  0457   WBC 5.02 5.44 5.61   HGB 6.9* 8.0* 8.8*   HCT 22.5* 25.9* 28.5*    157 154   * 103* 100*   RDW 14.1 15.0* 14.9*   IRON 37  --   --    FERRITIN 810*  --   --     No results for input(s): PT, INR, APTT in the last 168 hours.     POCT Glucose: HbA1c:    Recent Labs   Lab 12/17/18  0756 12/17/18  1242 12/17/18  1821 12/17/18  2147 12/18/18  0749 12/18/18  1342   POCTGLUCOSE 144* 132* 185* 179* 133* 193*    Hemoglobin A1C   Date Value Ref Range Status   12/12/2018 6.9 (H) 4.0 - 5.6 % Final     Comment:   05/30/2018 6.7 (H) 4.0 - 5.6 % Final     Comment:   11/21/2017 7.2 (H) 4.0 - 5.6 % Final     Comment:        Diagnostic Results:    MRI L ankle 12/0/18  Findings concerning for osteomyelitis of the hindfoot including the distal tibia and tarsal bones with associated cellulitis, myositis, and multiloculated abscesses in the deep soft tissues contiguous with lateral distal foreleg ulceration.  Component of abscess extends cranially outside the field of view, possibly more proximal portions of the left lower extremity.    CT L Leg 12/13  Rim enhancing multiloculated abscess extends at least to the level of the fibular head noting there are separate fluid collections posterior and medial to the knee (axial series 4, image 137) which possibly communicates although extensive beam hardening artifact from metallic knee prosthesis significantly limits assessment.    Multifocal osteolytic lesions with sclerotic rim in the foot and distal  tibia likely representing components of acute and chronic osteomyelitis.  No definite calcification within a lucent lesion to suggest sequestrum although cannot be completely excluded.    Ulceration in the subcutaneous gas at the lateral distal foreleg is concerning for gas gangrene.    Assessment and Plan     Hospital Course:    Ms. Krissy Marino is a 78 y.o. female with T2DM, CAD status post PCI (7/2018), and peripheral vascular disease on DAPT who was admitted on 12/12  from [facility] for evaluation of left lower extremity diabetic wound and was found to have L tibia osteomyelitis. f/u CT showed osteolytic lesions and extensive abscess, including infection to knee level and gas near ankle. Placed on IV vancomycin, PO flagyl and IV cefepime , ID following. blood cx during this hospitalization remain NGTD. Orthopedics and vascular surgery recommend AKA . However, patient is reluctant to undergo amputation.     Active Hospital Problems    Diagnosis  POA    *Osteomyelitis of left tibia [M86.9]  Yes     Priority: 1 - High    Gas gangrene of Left lower extremity [A48.0]  Yes     Priority: 1 - High    Chronic osteomyelitis of left tibia with draining sinus [M86.462]  Yes     Priority: 1 - High    Cellulitis of left lower extremity [L03.116]  Yes     Priority: 1 - High    Sepsis due to cellulitis [L03.90, A41.9]  Yes     Priority: 1 - High    Peripheral arterial disease [I73.9]  Yes     Priority: 2     Coronary artery disease involving native coronary artery of native heart with unstable angina pectoris [I25.110]  Yes     Priority: 2     Anemia [D64.9]  Yes     Priority: 3     Chronic diastolic heart failure [I50.32]  Yes     Priority: 4     Hypertension [I10]  Yes     Priority: 6     Delirium [R41.0]  No    Moderate protein-calorie malnutrition [E44.0]  Yes    Hypoalbuminemia due to protein-calorie malnutrition [E46]  Yes    Hypomagnesemia [E83.42]  No    Pressure injury of buttock, stage 2  [L89.302]  Yes    Benign hypertensive heart disease with HF (heart failure) [I11.0]  Yes    Macrocytic anemia [D53.9]  Yes    Preoperative cardiovascular examination [Z01.810]  Not Applicable    Obesity (BMI 30-39.9) [E66.9]  Yes    Bilateral leg edema [R60.0]  Yes    Hypothyroidism [E03.9]  Yes    Dyslipidemia [E78.5]  Yes    Type 2 diabetes mellitus, without long-term current use of insulin [E11.9]  Yes      Resolved Hospital Problems    Diagnosis Date Resolved POA    Hyperkalemia [E87.5] 12/17/2018 No     Osteomyelitis of left tibia  Gas gangrene of Left lower extremity  Cellulitis of left lower extremity  - imaging findings as above  - Ortho and Vascular surgery recommend AKA. Patient is deferring at this time. Ongoing family discussions  - Continue vanc (not administering currently given supratherapeutic) + cefepime + flagyl  - PICC line in place  - ID following     Peripheral arterial disease  - non-compressible MARIMAR with normal doppler waveforms and palpable pedal pulses  - Vascular surgery recommend AKA. Patient is deferring at this time. Ongoing family discussions  - cont DAPT  - cont statin     Delirium   - noted on 12/18 PM  -No fevers, WBC wnl. BP on lower side and anti-HTN meds were readjusted. No longer on gabapentin. Not on narcotics, as cannot feel pain. Stop cetirizine   -Pendleton in place and will d/c  - Delirium precautions placed  - monitor mental status and infection     Coronary artery disease involving native coronary artery of native heart with unstable angina pectoris  Chronic diastolic heart failure   Dyslipidemia   -Underwent LHC and subsequent PCI in 7/2018 and intervention at Bifurcation of the LAD and D1  - cont DAPT  - cont statin   - cont CAD/ HF meds as below: toprol 50 mg daily, amlodipine stopped, losartan decreased to 50mg (from 100, also K+ on higher limit of normal), lasix changed from 40 PO bid to daily and may actually stop given malnutrition and severe hypoalbuminemia      Hypertension   -lower BPs noted on 12/18, meds readjusted  -conttoprol 50 mg daily  - amlodipine stopped  - losartan decreased to 50mg (from 100, also K+ on higher limit of normal)  -lasix changed from 40 PO bid to daily and may actually stop given malnutrition and severe hypoalbuminemia     Type 2 diabetes mellitus, without long-term current use of insulin    -Will hold home oral antihyperglycemic agents in favor of low-dose aspart SSI + POCT glucose checks    Hypothyroidism   - cont synthroid     Macrocytic Anemia  -anemia of chronic inflammation   -Transfused 12/16 with good response  - monitor hbg, has been stable at 8s  - check B12 and folate  - multivit started    Moderate protein calorie malnutrition  Severe hypoalbuminemia  - PAB of 11, albumin of 1.6  - recheck PAB  - boost TID  - beneprotein TID  - multivit started  - nutrition consult    Diet:  cardiac  GI PPx:  famotidine  DVT PPx:  lovenox  Goals of Care:  full    High Risk Conditions:  OM    Disposition:      Met with son and daughter and patient today to discuss the options. Went in great detail on vascular surgery recommendations in regards to AKA as well as other (less favorable )options - such as debridement + IV abx as well as local wound care + Abx.     Explained that SNF will likely be the plan on d/c regardless of whether pt undergoes surgery.  Family and patient will discuss their options and come up with a finalized decision tomorrow.     Daughter states that her mother cannot remember these recommendations, no prior hx of dementia. Patient was very functional prior to this hospitalization. Explained that patient will need more PT OT regardless of surgical status in SNF post this hospitalization. In addition, noted that pt may not regain her prior functional status .     AKA vs SNF with PT OT and IV abx    Signing Physician:     Livia Hernandez MD  Department of Hospital Medicine   [facility]  Pager  [phone]  [facility] [id number]  12/18/2018

## 2018-12-18 NOTE — SUBJECTIVE & OBJECTIVE
Interval History: No AEON.  Afebrile and WBC WNL.  Per [facility], Dr. Renee, there is no ideal surgical option but AKA still recommended given all known variables.  The patient denies any recent fever, chills, or sweats.      Review of Systems   Constitutional: Negative for activity change, chills, diaphoresis and fever.   Respiratory: Negative for cough, shortness of breath and wheezing.    Cardiovascular: Negative for chest pain.   Gastrointestinal: Negative for abdominal pain, constipation, diarrhea, nausea and vomiting.   Genitourinary: Negative for dysuria, frequency and urgency.   Skin: Positive for wound.   Neurological: Negative for dizziness.   Hematological: Does not bruise/bleed easily.     Objective:     Vital Signs (Most Recent):  Temp: 98.5 °F (36.9 °C) (12/18/18 0745)  Pulse: 74 (12/18/18 0919)  Resp: 16 (12/18/18 0919)  BP: (!) 118/58 (12/18/18 0745)  SpO2: 96 % (12/18/18 0745) Vital Signs (24h Range):  Temp:  [98.2 °F (36.8 °C)-98.9 °F (37.2 °C)] 98.5 °F (36.9 °C)  Pulse:  [74-82] 74  Resp:  [16-20] 16  SpO2:  [96 %-98 %] 96 %  BP: (118-138)/(57-66) 118/58     Weight: 106.6 kg (235 lb 0.2 oz)  Body mass index is 39.11 kg/m².    Estimated Creatinine Clearance: 60.4 mL/min (based on SCr of 0.9 mg/dL).    Physical Exam   Constitutional: She is oriented to person, place, and time. No distress.   Eyes: Pupils are equal, round, and reactive to light.   Cardiovascular: Normal rate and regular rhythm.   No murmur heard.  Pulmonary/Chest: Effort normal and breath sounds normal. No respiratory distress. She has no wheezes.   Abdominal: Soft. Bowel sounds are normal. She exhibits no distension. There is no tenderness.   Musculoskeletal: She exhibits edema (BLE). She exhibits no tenderness.   Neurological: She is alert and oriented to person, place, and time.   Skin: She is not diaphoretic. There is erythema (LLE).   Ulcers present at lateral aspect of left foot - edema and erythema improved   Psychiatric:  She has a normal mood and affect. Her behavior is normal.       Significant Labs:   Blood Culture:   Recent Labs   Lab 11/26/18  0810 11/27/18  0858 11/27/18  0859 12/12/18  1849 12/12/18  1907   LABBLOO Gram stain anais bottle: Gram positive cocci in chains resembling Strep  Results called to and read back by:James HookerRN  STREPTOCOCCUS AGALACTIAE (GROUP B)  Beta-hemolytic streptococci are routinely susceptible to   penicillins,cephalosporins and carbapenems.   No growth after 5 days. No growth after 5 days. No growth after 5 days. No growth after 5 days.     CBC:   Recent Labs   Lab 12/17/18  0525 12/18/18  0457   WBC 5.44 5.61   HGB 8.0* 8.8*   HCT 25.9* 28.5*    154     CMP:   Recent Labs   Lab 12/17/18  0525 12/18/18  0457    137   K 4.9 5.0    108   CO2 24 21*   * 116*   BUN 36* 35*   CREATININE 0.9 0.9   CALCIUM 8.2* 9.0   PROT 4.8* 5.4*   ALBUMIN 1.4* 1.6*   BILITOT 0.3 0.5   ALKPHOS 155* 193*   AST 38 71*   ALT 33 52*   ANIONGAP 6* 8   EGFRNONAA >60.0 >60.0     Wound Culture: No results for input(s): LABAERO in the last 4320 hours.  All pertinent labs within the past 24 hours have been reviewed.    Significant Imaging: I have reviewed all pertinent imaging results/findings within the past 24 hours.   X-Ray Chest 1 View Pre-OP [[id number]] Resulted: 12/13/18 2020   Order Status: Completed Updated: 12/13/18 2022   Narrative:     EXAMINATION:  XR CHEST 1 VIEW PRE-OP    CLINICAL HISTORY:  pre op;    TECHNIQUE:  Single frontal view of the chest was performed.    COMPARISON:  Chest radiograph 12/12/2018.    FINDINGS:  Redemonstration of multiple radiopaque structures overlying the chest the limiting evaluation.  The stable position of a right PICC with catheter tip overlying the brachiocephalic/SVC junction.    The cardiac silhouette and pulmonary vasculature is stable.    Lungs show no large focal consolidation, large pleural effusion or pneumothorax.    Bones are intact, with  stable degenerative changes at the shoulders.   Impression:       No acute radiographic findings.    Electronically signed by resident: Eliazar Ordonez  Date: 12/13/2018  Time: 19:54    Electronically signed by: Toñito Flower MD  Date: 12/13/2018  Time: 20:20   CT Ankle (Including Hindfoot) W W/O Contrast Left [[id number]] (Abnormal) Resulted: 12/13/18 1816   Order Status: Completed Updated: 12/13/18 1819   Narrative:     EXAMINATION:  CT LEG (TIBIA-FIBULA) W W/O CONTRAST LEFT; CT ANKLE (INCLUDING HINDFOOT) W W/O CONTRAST LEFT; CT FOOT W W/O CONTRAST LEFT    CLINICAL HISTORY:  osteomyelitis and abscess  Osteomyelitis, unspecified    TECHNIQUE:  Axial images of were acquired from the distal femur to the forefoot before and after the administration of Omnipaque 350 IV contrast.  Sagittal and coronal reformats were provided.    COMPARISON:  Knee and tibia radiograph 12/13/2018, MRI ankle 12/12/2018    FINDINGS:  Small rim enhancing multiloculated air fluid collections extends into the interosseous membrane cranially at least to the level of the fibular head.  There are small fluid collections posterior and medial to the knee which possibly communicate (axial series 4, image 137) and possibly enhance although extensive beam hardening artifact from the metallic knee prosthesis limits assessment of that area.  There is ulceration and subcutaneous gas at the lateral distal foreleg.  There is prominent surrounding skin thickening and diffuse subcutaneous fluid attenuation predominantly through the forefoot and hindfoot as well as the distal lower extremity.  Multiple lucent lesions are identified throughout the talus and midfoot which contain may sclerotic rim suggesting chronic osteomyelitis.  Lucency within the lateral talus contains air without sadia cortical disruption..  No fracture identified.  Vascular calcifications are present.   Impression:       Rim enhancing multiloculated abscess extends at least to the level of the  fibular head noting there are separate fluid collections posterior and medial to the knee (axial series 4, image 137) which possibly communicates although extensive beam hardening artifact from metallic knee prosthesis significantly limits assessment.    Multifocal osteolytic lesions with sclerotic rim in the foot and distal tibia likely representing components of acute and chronic osteomyelitis.  No definite calcification within a lucent lesion to suggest sequestrum although cannot be completely excluded.    Ulceration in the subcutaneous gas at the lateral distal foreleg is concerning for gas gangrene.    This report was flagged in Epic as abnormal.    Electronically signed by resident: Raymond Vidales  Date: 12/13/2018  Time: 17:25    Electronically signed by: Germán Flowers MD  Date: 12/13/2018  Time: 18:16   CT Foot W W/O Contrast Left [[id number]] (Abnormal) Resulted: 12/13/18 1816   Order Status: Completed Updated: 12/13/18 1819   Narrative:     EXAMINATION:  CT LEG (TIBIA-FIBULA) W W/O CONTRAST LEFT; CT ANKLE (INCLUDING HINDFOOT) W W/O CONTRAST LEFT; CT FOOT W W/O CONTRAST LEFT    CLINICAL HISTORY:  osteomyelitis and abscess  Osteomyelitis, unspecified    TECHNIQUE:  Axial images of were acquired from the distal femur to the forefoot before and after the administration of Omnipaque 350 IV contrast.  Sagittal and coronal reformats were provided.    COMPARISON:  Knee and tibia radiograph 12/13/2018, MRI ankle 12/12/2018    FINDINGS:  Small rim enhancing multiloculated air fluid collections extends into the interosseous membrane cranially at least to the level of the fibular head.  There are small fluid collections posterior and medial to the knee which possibly communicate (axial series 4, image 137) and possibly enhance although extensive beam hardening artifact from the metallic knee prosthesis limits assessment of that area.  There is ulceration and subcutaneous gas at the lateral distal foreleg.  There is  prominent surrounding skin thickening and diffuse subcutaneous fluid attenuation predominantly through the forefoot and hindfoot as well as the distal lower extremity.  Multiple lucent lesions are identified throughout the talus and midfoot which contain may sclerotic rim suggesting chronic osteomyelitis.  Lucency within the lateral talus contains air without sadia cortical disruption..  No fracture identified.  Vascular calcifications are present.   Impression:       Rim enhancing multiloculated abscess extends at least to the level of the fibular head noting there are separate fluid collections posterior and medial to the knee (axial series 4, image 137) which possibly communicates although extensive beam hardening artifact from metallic knee prosthesis significantly limits assessment.    Multifocal osteolytic lesions with sclerotic rim in the foot and distal tibia likely representing components of acute and chronic osteomyelitis.  No definite calcification within a lucent lesion to suggest sequestrum although cannot be completely excluded.    Ulceration in the subcutaneous gas at the lateral distal foreleg is concerning for gas gangrene.    This report was flagged in Epic as abnormal.    Electronically signed by resident: Raymond Vidales  Date: 12/13/2018  Time: 17:25    Electronically signed by: Germán Flowers MD  Date: 12/13/2018  Time: 18:16   CT Leg (Tibia-Fibula) W W/O Contrast Left [[id number]] (Abnormal) Resulted: 12/13/18 1816   Order Status: Completed Updated: 12/13/18 1819   Narrative:     EXAMINATION:  CT LEG (TIBIA-FIBULA) W W/O CONTRAST LEFT; CT ANKLE (INCLUDING HINDFOOT) W W/O CONTRAST LEFT; CT FOOT W W/O CONTRAST LEFT    CLINICAL HISTORY:  osteomyelitis and abscess  Osteomyelitis, unspecified    TECHNIQUE:  Axial images of were acquired from the distal femur to the forefoot before and after the administration of Omnipaque 350 IV contrast.  Sagittal and coronal reformats were  provided.    COMPARISON:  Knee and tibia radiograph 12/13/2018, MRI ankle 12/12/2018    FINDINGS:  Small rim enhancing multiloculated air fluid collections extends into the interosseous membrane cranially at least to the level of the fibular head.  There are small fluid collections posterior and medial to the knee which possibly communicate (axial series 4, image 137) and possibly enhance although extensive beam hardening artifact from the metallic knee prosthesis limits assessment of that area.  There is ulceration and subcutaneous gas at the lateral distal foreleg.  There is prominent surrounding skin thickening and diffuse subcutaneous fluid attenuation predominantly through the forefoot and hindfoot as well as the distal lower extremity.  Multiple lucent lesions are identified throughout the talus and midfoot which contain may sclerotic rim suggesting chronic osteomyelitis.  Lucency within the lateral talus contains air without sadia cortical disruption..  No fracture identified.  Vascular calcifications are present.   Impression:       Rim enhancing multiloculated abscess extends at least to the level of the fibular head noting there are separate fluid collections posterior and medial to the knee (axial series 4, image 137) which possibly communicates although extensive beam hardening artifact from metallic knee prosthesis significantly limits assessment.    Multifocal osteolytic lesions with sclerotic rim in the foot and distal tibia likely representing components of acute and chronic osteomyelitis.  No definite calcification within a lucent lesion to suggest sequestrum although cannot be completely excluded.    Ulceration in the subcutaneous gas at the lateral distal foreleg is concerning for gas gangrene.    This report was flagged in Epic as abnormal.    Electronically signed by resident: Raymond Vidales  Date: 12/13/2018  Time: 17:25    Electronically signed by: Germán Flowers MD  Date: 12/13/2018  Time:  18:16   X-Ray Knee 1 or 2 View Bilateral [[id number]] Resulted: 12/13/18 1432   Order Status: Completed Updated: 12/13/18 1434   Narrative:     EXAMINATION:  XR KNEE 1 OR 2 VIEW BILATERAL    CLINICAL HISTORY:  hx of tka;  Osteomyelitis, unspecified    COMPARISON:  None 08/10/2016    FINDINGS:  Bilateral knee arthroplasties are identified.  Position alignment is satisfactory and unchanged as compared to the previous study.  No fracture or bone destruction identified   Impression:       See above      Electronically signed by: Hector Escamilla MD  Date: 12/13/2018  Time: 14:32   X-Ray Tibia Fibula 2 View Left [[id number]] Resulted: 12/13/18 1212   Order Status: Completed Updated: 12/13/18 1215   Narrative:     EXAMINATION:  XR TIBIA FIBULA 2 VIEW LEFT    CLINICAL HISTORY:  osteomyelitis;  Osteomyelitis, unspecified    TECHNIQUE:  AP and lateral views of the left tibia and fibula were performed.    COMPARISON:  None.    FINDINGS:  Status post total knee arthroplasty without evidence for complication.  Degenerative changes are noted at the ankle.  Vascular calcifications are present.  There is soft tissue gas at the lateral aspect of the ankle.   Impression:       Soft tissue gas at the lateral ankle.      Electronically signed by: Kevin Newby MD  Date: 12/13/2018  Time: 12:12   MRI Ankle W WO Contrast Left [[id number]] Resulted: 12/12/18 2255   Order Status: Completed Updated: 12/12/18 2257   Narrative:     EXAMINATION:  MRI ANKLE W WO CONTRAST LEFT    CLINICAL HISTORY:  Ankle erythema, swelling, cellulitis suspected    TECHNIQUE:  MRI ankle performed before and after the administration 10 mL Gadavist IV contrast.    COMPARISON:  Foot radiograph 12/12/2018, foot radiograph 12/22/2017.    FINDINGS:  There is a lateral malleolar ulcer and circumferential subcutaneous edema with skin thickening and enhancement of the hindfoot.  Large multiloculated peripherally enhancing fluid collections throughout the visualized soft  tissues of the lateral distal foreleg at least 10 cm craniocaudad dimension contiguous with ulceration and extending proximal outside the field of view which are concerning for soft tissue abscesses.  There is diffuse muscular edema and enhancement suggesting significant myositis.    There is abnormal marrow edema and extensive irregular T1 marrow replacement in the distal tibia, calcaneus, talus, and navicular suggesting osteomyelitis.   Impression:       Findings concerning for osteomyelitis of the hindfoot including the distal tibia and tarsal bones with associated cellulitis, myositis, and multiloculated abscesses in the deep soft tissues contiguous with lateral distal foreleg ulceration.  Component of abscess extends cranially outside the field of view, possibly more proximal portions of the left lower extremity.    COMMUNICATION  This critical result was discovered/received at 12/12/2018 at 22:00. The critical information above was relayed directly by me by telephone to Dr. Guzman On 12/12/2018 at 22:05.    Electronically signed by resident: Raymond Vidales  Date: 12/12/2018  Time: 21:35    Electronically signed by: Toñito Flower MD  Date: 12/12/2018  Time: 22:55   US Lower Extremity Veins Left [[id number]] Resulted: 12/12/18 2232   Order Status: Completed Updated: 12/12/18 2234   Narrative:     EXAMINATION:  US LOWER EXTREMITY VEINS LEFT    CLINICAL HISTORY:  Acute embolism and thrombosis of unspecified deep veins of unspecified lower extremity    TECHNIQUE:  Duplex and color flow Doppler evaluation and graded compression of the left lower extremity veins was performed.    COMPARISON:  None.    FINDINGS:  Left thigh veins: The common femoral, femoral, popliteal, upper greater saphenous, and deep femoral veins are patent and free of thrombus. The veins are normally compressible and have normal phasic flow and augmentation response.    Left calf veins: The visualized calf veins are patent.    Contralateral  "CFV: The contralateral (right) common femoral vein is patent and free of thrombus.    Miscellaneous: Soft tissue edema and abnormal fluid about the left ankle, better characterized on recent MRI.   Impression:       No evidence of DVT in the left lower extremity.    Electronically signed by resident: Eliazar Ordonez  Date: 12/12/2018  Time: 22:22    Electronically signed by: Toñito Flower MD  Date: 12/12/2018  Time: 22:32   X-Ray Foot Complete Left [[id number]] Resulted: 12/12/18 1922   Order Status: Completed Updated: 12/12/18 1925   Narrative:     EXAMINATION:  XR FOOT COMPLETE 3 VIEW LEFT    CLINICAL HISTORY:  Cellulitis, unspecified    TECHNIQUE:  Three views of the left foot.    COMPARISON:  11/26/2018.    FINDINGS:  Stable degenerative changes in the foot.  No acute fracture or dislocation identified.  No bony erosion.  Vascular calcifications noted.  There is persistent soft tissue edema at the anterior aspect of the ankle and over the dorsum of the foot.  No distinct soft tissue emphysema.  No radiopaque foreign body.   Impression:       Persistent soft tissue edema at the dorsum of the foot, possibly cellulitis in the correct clinical setting.      Electronically signed by: Toñito Flower MD  Date: 12/12/2018  Time: 19:22   X-Ray Chest AP Portable [[id number]] Resulted: 12/12/18 1919   Order Status: Completed Updated: 12/12/18 1922   Narrative:     EXAMINATION:  XR CHEST AP PORTABLE    CLINICAL HISTORY:  Provided history is "Sepsis;  ".    TECHNIQUE:  One view of the chest.    COMPARISON:  11/29/2018.    FINDINGS:  Multiple radiopaque structures overlie the chest and significantly limit evaluation, including an electronic device which may represent a cell phone.   Per technologist note, the patient refused to comply with exam instructions to remove any overlying material.   Cardiac silhouette is stable.  Right-sided PICC has been slightly retracted, with the tip now overlying the brachiocephalic/SVC junction. "  No large focal area of consolidation or detrimental change in lung aeration.   Impression:       Limited examination, without obvious detrimental change.  Follow-up with upright PA and lateral views with removal of overlying structures as indicated.      Electronically signed by: Toñito Flower MD  Date: 12/12/2018  Time: 19:19

## 2018-12-18 NOTE — SUBJECTIVE & OBJECTIVE
Medications Prior to Admission   Medication Sig Dispense Refill Last Dose    acetaminophen (TYLENOL) 325 MG tablet Take 2 tablets (650 mg total) by mouth every 6 (six) hours as needed.  0 12/12/2018    albuterol 90 mcg/actuation inhaler Inhale 2 puffs into the lungs every 6 (six) hours as needed for Wheezing. 1 each 11 12/12/2018    alendronate (FOSAMAX) 70 MG tablet Take 1 tablet (70 mg total) by mouth every 7 days. 4 tablet 6 Past Week    allopurinol (ZYLOPRIM) 300 MG tablet TAKE 1 TABLET(300 MG) BY MOUTH EVERY DAY 90 tablet 3 12/12/2018    amLODIPine (NORVASC) 5 MG tablet Take 1 tablet (5 mg total) by mouth once daily.   12/12/2018    aspirin (ECOTRIN) 81 MG EC tablet Take 1 tablet (81 mg total) by mouth once daily. 90 tablet 3 12/12/2018    atorvastatin (LIPITOR) 40 MG tablet TAKE 1 TABLET(40 MG) BY MOUTH EVERY DAY 90 tablet 3 12/12/2018    azelastine (ASTELIN) 137 mcg (0.1 %) nasal spray USE 1 SPRAY(137 MCG) IN EACH NOSTRIL TWICE DAILY 30 mL 0 12/12/2018    blood sugar diagnostic (ACCU-CHEK AMELIA PLUS TEST STRP) Strp Checks bg 2 x day before meals 200 strip 4 12/12/2018    cetirizine (ZYRTEC) 10 MG tablet Take 1 tablet (10 mg total) by mouth once daily. 30 tablet 2 12/12/2018    clopidogrel (PLAVIX) 75 mg tablet Take 1 tablet (75 mg total) by mouth once daily. 30 tablet 11 12/12/2018    collagenase (SANTYL) ointment Apply topically once daily.  0 12/12/2018    FERROUS GLUCONATE (FERATE ORAL) Take by mouth once daily at 6am.    12/12/2018    furosemide (LASIX) 40 MG tablet Take 1 tablet (40 mg total) by mouth 2 (two) times daily.   12/12/2018    glipiZIDE (GLUCOTROL) 2.5 MG TR24 Take 1 tablet (2.5 mg total) by mouth daily with breakfast.   12/12/2018    HYDROcodone-acetaminophen (NORCO) 5-325 mg per tablet Take 1 tablet by mouth every 8 (eight) hours as needed for Pain. (Patient taking differently: Take 1 tablet by mouth every 6 (six) hours as needed for Pain. ) 10 tablet 0 12/12/2018     hydrocortisone butyrate (LOCOID) 0.1 % Crea cream AAA buttock bid 180 g 0 12/12/2018    KRILL/OM3/DHA/EPA/OM6/LIP/ASTX (KRILL OIL, OMEGA 3 & 6, ORAL) Take by mouth once daily at 6am.    12/12/2018    levothyroxine (SYNTHROID) 75 MCG tablet Take 1 tablet (75 mcg total) by mouth before breakfast. 90 tablet 0 12/12/2018    losartan (COZAAR) 100 MG tablet Take 1 tablet (100 mg total) by mouth once daily. 90 tablet 3 12/12/2018    magnesium oxide (MAG-OX) 400 mg tablet Take 1 tablet (400 mg total) by mouth once daily.  0 12/12/2018    metoprolol succinate (TOPROL-XL) 50 MG 24 hr tablet Take 1 tablet (50 mg total) by mouth once daily. 90 tablet 3 12/12/2018    miconazole NITRATE 2 % (ZEASORB AF) 2 % top powder Apply topically as needed for Itching. 71 g 0 12/12/2018    ranitidine (ZANTAC) 150 MG tablet Take 1 tablet (150 mg total) by mouth 2 (two) times daily as needed for Heartburn. 120 tablet 5 12/12/2018    SYMBICORT 160-4.5 mcg/actuation HFAA INHALE TWO PUFFS INTO THE LUNGS EVERY 12 HOURS 10.2 g 3 12/12/2018    loperamide (IMODIUM) 2 mg capsule Take 2 mg by mouth 4 (four) times daily as needed for Diarrhea.   Unknown       Review of patient's allergies indicates:   Allergen Reactions    Sulfamethoxazole-trimethoprim Nausea And Vomiting    Latex, natural rubber Rash    Pcn [penicillins] Rash       Past Medical History:   Diagnosis Date    Adverse effect of glucocorticoid or synthetic analogue     Allergy     Arthritis     Cancer     CHF (congestive heart failure)     Chronic kidney disease     Coronary artery disease involving native coronary artery of native heart without angina pectoris 10/11/2016    Diabetic neuropathy 10/6/2014    Giant cell arteritis 9/24/2012    Hyperlipidemia     Hypertension     Hypothyroid     Obesity (BMI 30-39.9) 10/6/2014    Osteopenia     Primary osteoarthritis of both knees 9/24/2012    Type II or unspecified type diabetes mellitus with renal manifestations,  uncontrolled(250.42)      Past Surgical History:   Procedure Laterality Date    APPENDECTOMY      CATARACT EXTRACTION      CATARACT EXTRACTION, BILATERAL      CHOLECYSTECTOMY      COLONOSCOPY N/A 12/27/2013    Performed by [facility]amar [facility]ow [facility] at [facility] ENDO (4TH FLR)    EYE SURGERY      HYSTERECTOMY      JOINT REPLACEMENT      bilateral total knee    SKIN BIOPSY      STRIPPING-TARSAL Bilateral 3/7/2018    Performed by Anastasia Nieto MD at [facility] OR 2ND FLR    TONSILLECTOMY       Family History     Problem Relation (Age of Onset)    Diabetes Mother    Hypertension Mother, Father        Tobacco Use    Smoking status: Never Smoker    Smokeless tobacco: Never Used   Substance and Sexual Activity    Alcohol use: No    Drug use: No    Sexual activity: No     Review of Systems   Constitutional: Positive for activity change. Negative for fever.   HENT: Negative for congestion.    Cardiovascular: Positive for leg swelling.   Gastrointestinal: Negative for abdominal pain.   Musculoskeletal: Positive for gait problem and joint swelling.   Neurological: Negative for dizziness.   Psychiatric/Behavioral: Positive for confusion.     Objective:     Vital Signs (Most Recent):  Temp: 97.9 °F (36.6 °C) (12/18/18 1217)  Pulse: 76 (12/18/18 1217)  Resp: 20 (12/18/18 1217)  BP: (!) 106/57 (12/18/18 1217)  SpO2: 96 % (12/18/18 1217) Vital Signs (24h Range):  Temp:  [97.9 °F (36.6 °C)-98.9 °F (37.2 °C)] 97.9 °F (36.6 °C)  Pulse:  [74-82] 76  Resp:  [16-20] 20  SpO2:  [96 %-97 %] 96 %  BP: (106-138)/(57-66) 106/57     Weight: 106.6 kg (235 lb 0.2 oz)  Body mass index is 39.11 kg/m².    Physical Exam   Constitutional: She appears well-developed and well-nourished. No distress.   HENT:   Head: Normocephalic and atraumatic.   Eyes: No scleral icterus.   Cardiovascular: Normal rate.   2+ DP and PT bilaterally   Pulmonary/Chest: No respiratory distress.   Abdominal: Soft.   Musculoskeletal: She exhibits no edema.   Neurological: She is  alert.   Skin: Skin is warm and dry.   Psychiatric: She has a normal mood and affect.         Significant Labs:  Cardiac markers: No results for input(s): CKMB, CPKMB, TROPONINT, TROPONINI, MYOGLOBIN in the last 48 hours.  CBC:   Recent Labs   Lab 12/18/18  0457   WBC 5.61   RBC 2.84*   HGB 8.8*   HCT 28.5*      *   MCH 31.0   MCHC 30.9*     CMP:   Recent Labs   Lab 12/18/18 0457   *   CALCIUM 9.0   ALBUMIN 1.6*   PROT 5.4*      K 5.0   CO2 21*      BUN 35*   CREATININE 0.9   ALKPHOS 193*   ALT 52*   AST 71*   BILITOT 0.5     All pertinent labs from the last 24 hours have been reviewed.    Significant Diagnostics:  I have reviewed all pertinent imaging results/findings within the past 24 hours.     Results:  Lower Extremities Segmental Pressure [mmHg]:                      Right             Left  _______________________________________________________________  Brachial            118                 Posterior Tibial   255               255  Dorsalis Pedis     175               255  MARIMAR (Post. Tib.)   2.16              2.16  MARIMAR (Dors. Ped.)   1.48              2.16    Impression:   Rt MARIMAR (2.16) Segment/Brachial Index is unreliable due to suspected medial calcinosis, however; PVR waveforms appear to be within normal limits.    Lt MARIMAR (2.16) Segment/Brachial Index is unreliable due to suspected medial calcinosis. PVR waveforms indicate mild peripheral arterial obstructive disease.

## 2018-12-18 NOTE — ASSESSMENT & PLAN NOTE
79 y/o female transferred to [facility] for LLE cellulitis that has progressed to osteomyelitis. Orthopedics is debating between and I&D or an amputation, they have requested vascular input for whether patient would heal a BKA.     - patient with bounding 2+ DP and PT pulses on exam, she should not have any problems healing a BKA  - MARIMAR's on L consistent with mild PAD and no obstructive PAD on the R  - family requested further surgical recommendations from vascular surgery, please refer to Dr. Arora's staff note

## 2018-12-18 NOTE — PROGRESS NOTES
[facility]  Orthopedics  Progress Note    Patient Name: Krissy Marino  MRN: [id number]  Admission Date: 12/12/2018  Hospital Length of Stay: 5 days  Attending Provider: Dc Liao MD  Primary Care Provider: Emily Mcpherson MD    Subjective:     Principal Problem:Osteomyelitis of left tibia    Principal Orthopedic Problem: Same    Interval History: Patient seen and examined at bedside.  No acute events overnight.  Comfortable today..      Review of patient's allergies indicates:   Allergen Reactions    Sulfamethoxazole-trimethoprim Nausea And Vomiting    Latex, natural rubber Rash    Pcn [penicillins] Rash       Current Facility-Administered Medications   Medication    0.9%  NaCl infusion (for blood administration)    acetaminophen tablet 650 mg    allopurinol tablet 300 mg    amLODIPine tablet 5 mg    aspirin EC tablet 81 mg    atorvastatin tablet 40 mg    ceFEPIme injection 2 g    cetirizine tablet 10 mg    clopidogrel tablet 75 mg    dextrose 50% injection 12.5 g    dextrose 50% injection 25 g    enoxaparin injection 40 mg    famotidine tablet 20 mg    fluticasone-vilanterol 100-25 mcg/dose diskus inhaler 1 puff    furosemide tablet 40 mg    glucagon (human recombinant) injection 1 mg    glucose chewable tablet 16 g    glucose chewable tablet 24 g    insulin aspart U-100 pen 0-5 Units    levothyroxine tablet 75 mcg    losartan tablet 100 mg    metoprolol succinate (TOPROL-XL) 24 hr tablet 50 mg    metroNIDAZOLE tablet 500 mg    ondansetron disintegrating tablet 8 mg    polyethylene glycol packet 17 g    senna-docusate 8.6-50 mg per tablet 1 tablet    sodium chloride 0.9% flush 5 mL     Objective:     Vital Signs (Most Recent):  Temp: 98.9 °F (37.2 °C) (12/17/18 1658)  Pulse: 77 (12/17/18 1658)  Resp: 20 (12/17/18 1658)  BP: (!) 125/57 (12/17/18 1658)  SpO2: 97 % (12/17/18 1658) Vital Signs (24h Range):  Temp:  [98.2 °F (36.8 °C)-98.9 °F (37.2 °C)] 98.9 °F  "(37.2 °C)  Pulse:  [72-77] 77  Resp:  [16-20] 20  SpO2:  [94 %-98 %] 97 %  BP: (121-139)/(57-63) 125/57     Weight: 106.6 kg (235 lb 0.2 oz)  Height: 5' 5" (165.1 cm)  Body mass index is 39.11 kg/m².      Intake/Output Summary (Last 24 hours) at 12/17/2018 1941  Last data filed at 12/17/2018 1900  Gross per 24 hour   Intake 580 ml   Output 4100 ml   Net -3520 ml       Ortho/SPM Exam         RLE:  -chronic stasis dermatits appearance with darkening of the skin over the lower leg,  There is no ttp and no erythema or signs of infection   - AROM and PROM of hip knee and ankle is intact.    - TA/EHL/Gastroc/FHL assessed in isolation without deficit  - SILT throughout  - DP and PT palpated  2+  - Capillary Refill <3s      Significant Labs: All pertinent labs within the past 24 hours have been reviewed.    Significant Imaging: I have reviewed and interpreted all pertinent imaging results/findings.    Assessment/Plan:     Chronic osteomyelitis of left tibia with draining sinus    Krissy Marino is a 78 y.o. female with bilateral TKAs (done many years ago), DMII, CAD s/p PCI in7/18 and PVD who is admitted for left ankle osteomyelitis and multiple abscess.     -  Had a long discussion with patient about this difficult problem.  She has a large ulcerated lesion to left lateral ankle and abscess up to proximal tibia near TKA.  She has vascular disease, venous insufficiency, and poor nutritional status.  Any debridement may be a futile effort, pt would require flap coverage of large distal ankle defect and would be a poor candidate for this surgery. definitive treatment would likely be an AKA.  Will consult vascular surgery for a second opinion on whether pt would heal any below knee surgery.  Will discuss further with family tomorrow.             Garry Ceja MD  Orthopedics  [facility]  "

## 2018-12-18 NOTE — SUBJECTIVE & OBJECTIVE
"Principal Problem:Osteomyelitis of left tibia    Principal Orthopedic Problem: Same    Interval History: Patient seen and examined at bedside.  No acute events overnight.  Comfortable today..      Review of patient's allergies indicates:   Allergen Reactions    Sulfamethoxazole-trimethoprim Nausea And Vomiting    Latex, natural rubber Rash    Pcn [penicillins] Rash       Current Facility-Administered Medications   Medication    0.9%  NaCl infusion (for blood administration)    acetaminophen tablet 650 mg    allopurinol tablet 300 mg    amLODIPine tablet 5 mg    aspirin EC tablet 81 mg    atorvastatin tablet 40 mg    ceFEPIme injection 2 g    cetirizine tablet 10 mg    clopidogrel tablet 75 mg    dextrose 50% injection 12.5 g    dextrose 50% injection 25 g    enoxaparin injection 40 mg    famotidine tablet 20 mg    fluticasone-vilanterol 100-25 mcg/dose diskus inhaler 1 puff    furosemide tablet 40 mg    glucagon (human recombinant) injection 1 mg    glucose chewable tablet 16 g    glucose chewable tablet 24 g    insulin aspart U-100 pen 0-5 Units    levothyroxine tablet 75 mcg    losartan tablet 100 mg    metoprolol succinate (TOPROL-XL) 24 hr tablet 50 mg    metroNIDAZOLE tablet 500 mg    ondansetron disintegrating tablet 8 mg    polyethylene glycol packet 17 g    senna-docusate 8.6-50 mg per tablet 1 tablet    sodium chloride 0.9% flush 5 mL     Objective:     Vital Signs (Most Recent):  Temp: 98.9 °F (37.2 °C) (12/17/18 1658)  Pulse: 77 (12/17/18 1658)  Resp: 20 (12/17/18 1658)  BP: (!) 125/57 (12/17/18 1658)  SpO2: 97 % (12/17/18 1658) Vital Signs (24h Range):  Temp:  [98.2 °F (36.8 °C)-98.9 °F (37.2 °C)] 98.9 °F (37.2 °C)  Pulse:  [72-77] 77  Resp:  [16-20] 20  SpO2:  [94 %-98 %] 97 %  BP: (121-139)/(57-63) 125/57     Weight: 106.6 kg (235 lb 0.2 oz)  Height: 5' 5" (165.1 cm)  Body mass index is 39.11 kg/m².      Intake/Output Summary (Last 24 hours) at 12/17/2018 1941  Last data " filed at 12/17/2018 1900  Gross per 24 hour   Intake 580 ml   Output 4100 ml   Net -3520 ml       Ortho/SPM Exam         RLE:  -chronic stasis dermatits appearance with darkening of the skin over the lower leg,  There is no ttp and no erythema or signs of infection   - AROM and PROM of hip knee and ankle is intact.    - TA/EHL/Gastroc/FHL assessed in isolation without deficit  - SILT throughout  - DP and PT palpated  2+  - Capillary Refill <3s      Significant Labs: All pertinent labs within the past 24 hours have been reviewed.    Significant Imaging: I have reviewed and interpreted all pertinent imaging results/findings.

## 2018-12-18 NOTE — CONSULTS
[facility]  Vascular Surgery  Consult Note    Ms. Krissy Marino is a 78 y.o. female [place] [facility] [place] cellulitis that has progressed to osteomyelitis. She is currently on antibiotics and orthopedics is debating between and I&D or an amputation.     At [facility] she was living alone, walking without assistance and doing her ADLs without any assistance. She twisted her left ankle and ended up in inpatient rehab for physical therapy. While there she developed a large left lateral ankle ulcer (decubitus?) which became progressively worse and required transfer to [facility] for higher level of care about Dec 11.    Podiatry and then Orthopaedics were consulted for wound management and ID for ABX therapy. At presentation she has cellulitis extending up her leg which has improved with ABX. CT scan concerning for tibial osteo and gas in the soft tissues of the leg.     Currently she is resting comfortably in bed. Some pain in her ankle but much better than at admission. She has been afebrile since admission and her WBC has normalized. She has not walked since admission but was walking with assistance at rehab prior to transfer. She has non-compressible MARIMAR with normal doppler waveforms and palpable pedal pulses.    Her lateral ankle ulcer is dry with no odor and minimal drainage. No cellulitis on the leg.    I discussed multiple options with Ms Marino and her niece - other family were not available for our discussion.    1. Limb salvage would require radical wound debridement(s) and subsequent skin grafting along with an extended course of IV ABX. Given her frailty and age this is not a good option. Her wound is not likely to heal and she would be more debilitated by the prolonged hospitalization.    2. Above knee amputation wound rid her of the wound and allow her to move forward with recovery but she would certainly lose her independence and would require assistance with all of her ADLs. She  and her family do not seem ready for this although I think it is inevitable.    3. Local wound care and IV ABX. Her wound is not currently life threatening and could be managed with pressure off loading and IV ABX. I explained to the family that it will not heal but this could allow her to recover and maintain some independence at least for a short time.    Family will continue to think about their options as they move forward. I answered all of their questions. Please call with any additional questions or concerns.    Sandra Arora MD FACS [facility]  Vascular & Endovascular Surgery

## 2018-12-18 NOTE — ASSESSMENT & PLAN NOTE
79 yo female with hx of bilateral TKA,  recent GBS bacteremia, recurrent LLE cellulitis, PVD now found to have osteomyelitis of the hindfoot including the distal tibia and tarsal bones with associated cellulitis, myositis, and multiloculated abscesses in the deep soft tissues contiguous with lateral distal foreleg ulceration. Repeat CT shows infection to knee level and gas near ankle.  On vanc, cefepime and flagyl.  Ortho Sx following and rec AKA.  Vasc Sx has seen patient - awaiting recs.  Given malnutrition, extent of wound, and underlying comorbidities, AKA appears to be best option.  BCXs NGTD. Stable non septic.  Vanc DC'd and level still supratherapeutic.  Had long discussion with family and Ortho Sx with patient about further plan. Suspect will need AKA, if not now, then in the future.    Plan   - Continue Vanc (when levels return to normal), Cefepime 2g IV q8 to cover pseudomonas and Flagyl 500mg po tid as gas seen on CT scan  - Vanc supratherapeutic, contniue to hold.  Random ordered for tomorrow morning. Restart once pt levels WNL.  - F/u ortho plan as family wished to further discuss options..  - ID will continue to follow

## 2018-12-18 NOTE — PLAN OF CARE
Problem: Adult Inpatient Plan of Care  Goal: Plan of Care Review  Outcome: Ongoing (interventions implemented as appropriate)   12/18/18 0609   Plan of Care Review   Plan of Care Reviewed With patient    Mark heels elevated off bed and remains elevated, strict I&O maintained,   Pt turned q2h, pericare provided, burnham care provided, Coccyx wound cleaned as ordered and clear barrier cream applied as ordered, blood glucose monitored as ordered,  No distress/discomfort noted in pt, all precautions maintained, will continue to monitor pt.

## 2018-12-18 NOTE — ASSESSMENT & PLAN NOTE
Krissy Marino is a 78 y.o. female with bilateral TKAs (done many years ago), DMII, CAD s/p PCI in7/18 and PVD who is admitted for left ankle osteomyelitis and multiple abscess.     -  Had a long discussion with patient about this difficult problem.  She has a large ulcerated lesion to left lateral ankle and abscess up to proximal tibia near TKA.  She has vascular disease, venous insufficiency, and poor nutritional status.  Any debridement may be a futile effort, pt would require flap coverage of large distal ankle defect and would be a poor candidate for this surgery. definitive treatment would likely be an AKA.  Will consult vascular surgery for a second opinion on whether pt would heal any below knee surgery.  Will discuss further with family tomorrow.

## 2018-12-18 NOTE — CONSULTS
[facility]  Vascular Surgery  Consult Note    Inpatient consult to Vascular Surgery  Consult performed by: Tresa Dennis MD  Consult ordered by: Garry Ceja MD        Subjective:     Chief Complaint/Reason for Admission: evaluate inflow to foot for wound healing    History of Present Illness: 79 y/o female with pmhx of T2DM, osteopenia, hypothyroid, HTN, HLD, obesity, CAD, diabetic nephropathy, and arthritis who [place] [facility] LLE cellulitis that has progressed to osteomyelitis.     Patient was functional and performing ADL's on her own around [facility]. She sprained her ankle and went to inpatient rehab, developing a lateral ankle ulcer while there. This worsened and she was [place] [facility] [place] for further evaluation. Medicine, podiatry and orthopedics involved in care up to this point, treating her for cellulitis and osteo with IV abx.    Medications Prior to Admission   Medication Sig Dispense Refill Last Dose    acetaminophen (TYLENOL) 325 MG tablet Take 2 tablets (650 mg total) by mouth every 6 (six) hours as needed.  0 12/12/2018    albuterol 90 mcg/actuation inhaler Inhale 2 puffs into the lungs every 6 (six) hours as needed for Wheezing. 1 each 11 12/12/2018    alendronate (FOSAMAX) 70 MG tablet Take 1 tablet (70 mg total) by mouth every 7 days. 4 tablet 6 Past Week    allopurinol (ZYLOPRIM) 300 MG tablet TAKE 1 TABLET(300 MG) BY MOUTH EVERY DAY 90 tablet 3 12/12/2018    amLODIPine (NORVASC) 5 MG tablet Take 1 tablet (5 mg total) by mouth once daily.   12/12/2018    aspirin (ECOTRIN) 81 MG EC tablet Take 1 tablet (81 mg total) by mouth once daily. 90 tablet 3 12/12/2018    atorvastatin (LIPITOR) 40 MG tablet TAKE 1 TABLET(40 MG) BY MOUTH EVERY DAY 90 tablet 3 12/12/2018    azelastine (ASTELIN) 137 mcg (0.1 %) nasal spray USE 1 SPRAY(137 MCG) IN EACH NOSTRIL TWICE DAILY 30 mL 0 12/12/2018    blood sugar diagnostic (ACCU-CHEK  AMELIA PLUS TEST STRP) Strp Checks bg 2 x day before meals 200 strip 4 12/12/2018    cetirizine (ZYRTEC) 10 MG tablet Take 1 tablet (10 mg total) by mouth once daily. 30 tablet 2 12/12/2018    clopidogrel (PLAVIX) 75 mg tablet Take 1 tablet (75 mg total) by mouth once daily. 30 tablet 11 12/12/2018    collagenase (SANTYL) ointment Apply topically once daily.  0 12/12/2018    FERROUS GLUCONATE (FERATE ORAL) Take by mouth once daily at 6am.    12/12/2018    furosemide (LASIX) 40 MG tablet Take 1 tablet (40 mg total) by mouth 2 (two) times daily.   12/12/2018    glipiZIDE (GLUCOTROL) 2.5 MG TR24 Take 1 tablet (2.5 mg total) by mouth daily with breakfast.   12/12/2018    HYDROcodone-acetaminophen (NORCO) 5-325 mg per tablet Take 1 tablet by mouth every 8 (eight) hours as needed for Pain. (Patient taking differently: Take 1 tablet by mouth every 6 (six) hours as needed for Pain. ) 10 tablet 0 12/12/2018    hydrocortisone butyrate (LOCOID) 0.1 % Crea cream AAA buttock bid 180 g 0 12/12/2018    KRILL/OM3/DHA/EPA/OM6/LIP/ASTX (KRILL OIL, OMEGA 3 & 6, ORAL) Take by mouth once daily at 6am.    12/12/2018    levothyroxine (SYNTHROID) 75 MCG tablet Take 1 tablet (75 mcg total) by mouth before breakfast. 90 tablet 0 12/12/2018    losartan (COZAAR) 100 MG tablet Take 1 tablet (100 mg total) by mouth once daily. 90 tablet 3 12/12/2018    magnesium oxide (MAG-OX) 400 mg tablet Take 1 tablet (400 mg total) by mouth once daily.  0 12/12/2018    metoprolol succinate (TOPROL-XL) 50 MG 24 hr tablet Take 1 tablet (50 mg total) by mouth once daily. 90 tablet 3 12/12/2018    miconazole NITRATE 2 % (ZEASORB AF) 2 % top powder Apply topically as needed for Itching. 71 g 0 12/12/2018    ranitidine (ZANTAC) 150 MG tablet Take 1 tablet (150 mg total) by mouth 2 (two) times daily as needed for Heartburn. 120 tablet 5 12/12/2018    SYMBICORT 160-4.5 mcg/actuation HFAA INHALE TWO PUFFS INTO THE LUNGS EVERY 12 HOURS 10.2 g 3  12/12/2018    loperamide (IMODIUM) 2 mg capsule Take 2 mg by mouth 4 (four) times daily as needed for Diarrhea.   Unknown       Review of patient's allergies indicates:   Allergen Reactions    Sulfamethoxazole-trimethoprim Nausea And Vomiting    Latex, natural rubber Rash    Pcn [penicillins] Rash       Past Medical History:   Diagnosis Date    Adverse effect of glucocorticoid or synthetic analogue     Allergy     Arthritis     Cancer     CHF (congestive heart failure)     Chronic kidney disease     Coronary artery disease involving native coronary artery of native heart without angina pectoris 10/11/2016    Diabetic neuropathy 10/6/2014    Giant cell arteritis 9/24/2012    Hyperlipidemia     Hypertension     Hypothyroid     Obesity (BMI 30-39.9) 10/6/2014    Osteopenia     Primary osteoarthritis of both knees 9/24/2012    Type II or unspecified type diabetes mellitus with renal manifestations, uncontrolled(250.42)      Past Surgical History:   Procedure Laterality Date    APPENDECTOMY      CATARACT EXTRACTION      CATARACT EXTRACTION, BILATERAL      CHOLECYSTECTOMY      COLONOSCOPY N/A 12/27/2013    Performed by Shamar Sow MD at [facility] ENDO (4TH FLR)    EYE SURGERY      HYSTERECTOMY      JOINT REPLACEMENT      bilateral total knee    SKIN BIOPSY      STRIPPING-TARSAL Bilateral 3/7/2018    Performed by Anastasia Nieto MD at [facility] OR 2ND FLR    TONSILLECTOMY       Family History     Problem Relation (Age of Onset)    Diabetes Mother    Hypertension Mother, Father        Tobacco Use    Smoking status: Never Smoker    Smokeless tobacco: Never Used   Substance and Sexual Activity    Alcohol use: No    Drug use: No    Sexual activity: No     Review of Systems   Constitutional: Positive for activity change. Negative for fever.   HENT: Negative for congestion.    Cardiovascular: Positive for leg swelling.   Gastrointestinal: Negative for abdominal pain.   Musculoskeletal: Positive for gait  problem and joint swelling.   Neurological: Negative for dizziness.   Psychiatric/Behavioral: Positive for confusion.     Objective:     Vital Signs (Most Recent):  Temp: 97.9 °F (36.6 °C) (12/18/18 1217)  Pulse: 76 (12/18/18 1217)  Resp: 20 (12/18/18 1217)  BP: (!) 106/57 (12/18/18 1217)  SpO2: 96 % (12/18/18 1217) Vital Signs (24h Range):  Temp:  [97.9 °F (36.6 °C)-98.9 °F (37.2 °C)] 97.9 °F (36.6 °C)  Pulse:  [74-82] 76  Resp:  [16-20] 20  SpO2:  [96 %-97 %] 96 %  BP: (106-138)/(57-66) 106/57     Weight: 106.6 kg (235 lb 0.2 oz)  Body mass index is 39.11 kg/m².    Physical Exam   Constitutional: She appears well-developed and well-nourished. No distress.   HENT:   Head: Normocephalic and atraumatic.   Eyes: No scleral icterus.   Cardiovascular: Normal rate.   2+ DP and PT bilaterally   Pulmonary/Chest: No respiratory distress.   Abdominal: Soft.   Musculoskeletal: She exhibits no edema.   Neurological: She is alert.   Skin: Skin is warm and dry.   Psychiatric: She has a normal mood and affect.     Wound without purulent drainage, mildly blanching erythema surrounding wound.     Significant Labs:  Cardiac markers: No results for input(s): CKMB, CPKMB, TROPONINT, TROPONINI, MYOGLOBIN in the last 48 hours.  CBC:   Recent Labs   Lab 12/18/18 0457   WBC 5.61   RBC 2.84*   HGB 8.8*   HCT 28.5*      *   MCH 31.0   MCHC 30.9*     CMP:   Recent Labs   Lab 12/18/18 0457   *   CALCIUM 9.0   ALBUMIN 1.6*   PROT 5.4*      K 5.0   CO2 21*      BUN 35*   CREATININE 0.9   ALKPHOS 193*   ALT 52*   AST 71*   BILITOT 0.5     All pertinent labs from the last 24 hours have been reviewed.    Significant Diagnostics:  I have reviewed all pertinent imaging results/findings within the past 24 hours.     Results:  Lower Extremities Segmental Pressure [mmHg]:                      Right             Left  _______________________________________________________________  Brachial            118                  Posterior Tibial   255               255  Dorsalis Pedis     175               255  MARIMAR (Post. Tib.)   2.16              2.16  MARIMAR (Dors. Ped.)   1.48              2.16    Impression:   Rt MARIMAR (2.16) Segment/Brachial Index is unreliable due to suspected medial calcinosis, however; PVR waveforms appear to be within normal limits.    Lt MARIMAR (2.16) Segment/Brachial Index is unreliable due to suspected medial calcinosis. PVR waveforms indicate mild peripheral arterial obstructive disease.     Assessment/Plan:     Cellulitis of left lower extremity    79 y/o [place] [facility] [place] cellulitis that has progressed to osteomyelitis. Orthopedics is debating between and I&D or an amputation, they have requested vascular input for whether patient would heal a BKA.     - patient with bounding 2+ DP and PT pulses on exam, she should not have any problems healing a BKA  - MARIMAR's on L consistent with mild PAD and no obstructive PAD on the R, normal waveforms  - family requested further surgical recommendations from vascular surgery, please refer to Dr. Arora's staff note         Tresa Campos MD  Vascular Surgery  [facility]

## 2018-12-19 PROBLEM — R23.9 ALTERATION IN SKIN INTEGRITY: Status: ACTIVE | Noted: 2018-12-19

## 2018-12-19 LAB
ALBUMIN SERPL BCP-MCNC: 1.7 G/DL
ALP SERPL-CCNC: 202 U/L
ALT SERPL W/O P-5'-P-CCNC: 58 U/L
ANION GAP SERPL CALC-SCNC: 7 MMOL/L
AST SERPL-CCNC: 73 U/L
BASOPHILS # BLD AUTO: 0.03 K/UL
BASOPHILS NFR BLD: 0.5 %
BILIRUB SERPL-MCNC: 0.5 MG/DL
BUN SERPL-MCNC: 35 MG/DL
CALCIUM SERPL-MCNC: 8.7 MG/DL
CHLORIDE SERPL-SCNC: 103 MMOL/L
CO2 SERPL-SCNC: 25 MMOL/L
CREAT SERPL-MCNC: 1 MG/DL
DIFFERENTIAL METHOD: ABNORMAL
EOSINOPHIL # BLD AUTO: 0.2 K/UL
EOSINOPHIL NFR BLD: 2.7 %
ERYTHROCYTE [DISTWIDTH] IN BLOOD BY AUTOMATED COUNT: 14.8 %
EST. GFR  (AFRICAN AMERICAN): >60 ML/MIN/1.73 M^2
EST. GFR  (NON AFRICAN AMERICAN): 53.3 ML/MIN/1.73 M^2
GLUCOSE SERPL-MCNC: 161 MG/DL
HCT VFR BLD AUTO: 30.3 %
HGB BLD-MCNC: 9.5 G/DL
IMM GRANULOCYTES # BLD AUTO: 0.06 K/UL
IMM GRANULOCYTES NFR BLD AUTO: 0.9 %
LYMPHOCYTES # BLD AUTO: 0.8 K/UL
LYMPHOCYTES NFR BLD: 12.5 %
MAGNESIUM SERPL-MCNC: 1.2 MG/DL
MCH RBC QN AUTO: 30.8 PG
MCHC RBC AUTO-ENTMCNC: 31.4 G/DL
MCV RBC AUTO: 98 FL
MONOCYTES # BLD AUTO: 0.4 K/UL
MONOCYTES NFR BLD: 6.1 %
NEUTROPHILS # BLD AUTO: 4.9 K/UL
NEUTROPHILS NFR BLD: 77.3 %
NRBC BLD-RTO: 0 /100 WBC
PHOSPHATE SERPL-MCNC: 3 MG/DL
PLATELET # BLD AUTO: 146 K/UL
PMV BLD AUTO: 11 FL
POCT GLUCOSE: 170 MG/DL (ref 70–110)
POCT GLUCOSE: 245 MG/DL (ref 70–110)
POCT GLUCOSE: 263 MG/DL (ref 70–110)
POCT GLUCOSE: 280 MG/DL (ref 70–110)
POCT GLUCOSE: 300 MG/DL (ref 70–110)
POTASSIUM SERPL-SCNC: 4.1 MMOL/L
PREALB SERPL-MCNC: 13 MG/DL
PROT SERPL-MCNC: 5.4 G/DL
RBC # BLD AUTO: 3.08 M/UL
SODIUM SERPL-SCNC: 135 MMOL/L
VANCOMYCIN SERPL-MCNC: 20.8 UG/ML
VIT B12 SERPL-MCNC: 357 PG/ML
WBC # BLD AUTO: 6.38 K/UL

## 2018-12-19 PROCEDURE — 80053 COMPREHEN METABOLIC PANEL: CPT

## 2018-12-19 PROCEDURE — 82607 VITAMIN B-12: CPT

## 2018-12-19 PROCEDURE — 99233 SBSQ HOSP IP/OBS HIGH 50: CPT | Mod: ,,, | Performed by: HOSPITALIST

## 2018-12-19 PROCEDURE — 83735 ASSAY OF MAGNESIUM: CPT

## 2018-12-19 PROCEDURE — 63600175 PHARM REV CODE 636 W HCPCS: Performed by: HOSPITALIST

## 2018-12-19 PROCEDURE — 99233 SBSQ HOSP IP/OBS HIGH 50: CPT | Mod: ,,, | Performed by: INTERNAL MEDICINE

## 2018-12-19 PROCEDURE — 86580 TB INTRADERMAL TEST: CPT | Performed by: HOSPITALIST

## 2018-12-19 PROCEDURE — 85025 COMPLETE CBC W/AUTO DIFF WBC: CPT

## 2018-12-19 PROCEDURE — 11000001 HC ACUTE MED/SURG PRIVATE ROOM

## 2018-12-19 PROCEDURE — 80202 ASSAY OF VANCOMYCIN: CPT

## 2018-12-19 PROCEDURE — 97530 THERAPEUTIC ACTIVITIES: CPT

## 2018-12-19 PROCEDURE — 25000003 PHARM REV CODE 250: Performed by: HOSPITALIST

## 2018-12-19 PROCEDURE — 97535 SELF CARE MNGMENT TRAINING: CPT

## 2018-12-19 PROCEDURE — 84134 ASSAY OF PREALBUMIN: CPT

## 2018-12-19 PROCEDURE — 84100 ASSAY OF PHOSPHORUS: CPT

## 2018-12-19 PROCEDURE — 25000003 PHARM REV CODE 250: Performed by: PHYSICIAN ASSISTANT

## 2018-12-19 PROCEDURE — 63600175 PHARM REV CODE 636 W HCPCS: Performed by: PHYSICIAN ASSISTANT

## 2018-12-19 RX ORDER — MAGNESIUM SULFATE HEPTAHYDRATE 40 MG/ML
2 INJECTION, SOLUTION INTRAVENOUS
Status: DISCONTINUED | OUTPATIENT
Start: 2018-12-19 | End: 2018-12-19

## 2018-12-19 RX ORDER — MAGNESIUM SULFATE HEPTAHYDRATE 40 MG/ML
2 INJECTION, SOLUTION INTRAVENOUS
Status: COMPLETED | OUTPATIENT
Start: 2018-12-19 | End: 2018-12-19

## 2018-12-19 RX ORDER — VANCOMYCIN HCL IN 5 % DEXTROSE 1.5G/250ML
1500 PLASTIC BAG, INJECTION (ML) INTRAVENOUS
Status: DISCONTINUED | OUTPATIENT
Start: 2018-12-19 | End: 2018-12-20

## 2018-12-19 RX ORDER — CEFEPIME HYDROCHLORIDE 2 G/1
2 INJECTION, POWDER, FOR SOLUTION INTRAVENOUS
Status: DISCONTINUED | OUTPATIENT
Start: 2018-12-19 | End: 2018-12-21

## 2018-12-19 RX ADMIN — ALLOPURINOL 300 MG: 300 TABLET ORAL at 10:12

## 2018-12-19 RX ADMIN — MAGNESIUM OXIDE TAB 400 MG (241.3 MG ELEMENTAL MG) 400 MG: 400 (241.3 MG) TAB at 10:12

## 2018-12-19 RX ADMIN — CLOPIDOGREL 75 MG: 75 TABLET, FILM COATED ORAL at 10:12

## 2018-12-19 RX ADMIN — MICONAZOLE NITRATE: 20 OINTMENT TOPICAL at 09:12

## 2018-12-19 RX ADMIN — Medication 5 UNITS: at 06:12

## 2018-12-19 RX ADMIN — ATORVASTATIN CALCIUM 40 MG: 20 TABLET, FILM COATED ORAL at 10:12

## 2018-12-19 RX ADMIN — VANCOMYCIN HYDROCHLORIDE 1500 MG: 10 INJECTION, POWDER, LYOPHILIZED, FOR SOLUTION INTRAVENOUS at 06:12

## 2018-12-19 RX ADMIN — LEVOTHYROXINE SODIUM 75 MCG: 75 TABLET ORAL at 06:12

## 2018-12-19 RX ADMIN — FUROSEMIDE 40 MG: 40 TABLET ORAL at 10:12

## 2018-12-19 RX ADMIN — INSULIN ASPART 2 UNITS: 100 INJECTION, SOLUTION INTRAVENOUS; SUBCUTANEOUS at 06:12

## 2018-12-19 RX ADMIN — THERA TABS 1 TABLET: TAB at 10:12

## 2018-12-19 RX ADMIN — FAMOTIDINE 20 MG: 20 TABLET ORAL at 09:12

## 2018-12-19 RX ADMIN — CEFEPIME 2 G: 2 INJECTION, POWDER, FOR SOLUTION INTRAVENOUS at 06:12

## 2018-12-19 RX ADMIN — METRONIDAZOLE 500 MG: 500 TABLET ORAL at 09:12

## 2018-12-19 RX ADMIN — MAGNESIUM SULFATE IN WATER 2 G: 40 INJECTION, SOLUTION INTRAVENOUS at 04:12

## 2018-12-19 RX ADMIN — METRONIDAZOLE 500 MG: 500 TABLET ORAL at 06:12

## 2018-12-19 RX ADMIN — MAGNESIUM SULFATE IN WATER 2 G: 40 INJECTION, SOLUTION INTRAVENOUS at 06:12

## 2018-12-19 RX ADMIN — FAMOTIDINE 20 MG: 20 TABLET ORAL at 10:12

## 2018-12-19 RX ADMIN — MICONAZOLE NITRATE: 20 OINTMENT TOPICAL at 02:12

## 2018-12-19 RX ADMIN — METOPROLOL SUCCINATE 50 MG: 50 TABLET, EXTENDED RELEASE ORAL at 10:12

## 2018-12-19 RX ADMIN — METRONIDAZOLE 500 MG: 500 TABLET ORAL at 02:12

## 2018-12-19 RX ADMIN — LOSARTAN POTASSIUM 50 MG: 50 TABLET, FILM COATED ORAL at 10:12

## 2018-12-19 RX ADMIN — MAGNESIUM SULFATE IN WATER 2 G: 40 INJECTION, SOLUTION INTRAVENOUS at 02:12

## 2018-12-19 RX ADMIN — INSULIN ASPART 1 UNITS: 100 INJECTION, SOLUTION INTRAVENOUS; SUBCUTANEOUS at 09:12

## 2018-12-19 RX ADMIN — ASPIRIN 81 MG: 81 TABLET, COATED ORAL at 10:12

## 2018-12-19 RX ADMIN — FLUTICASONE FUROATE AND VILANTEROL TRIFENATATE 1 PUFF: 100; 25 POWDER RESPIRATORY (INHALATION) at 10:12

## 2018-12-19 RX ADMIN — ENOXAPARIN SODIUM 40 MG: 100 INJECTION SUBCUTANEOUS at 04:12

## 2018-12-19 NOTE — PLAN OF CARE
Problem: Occupational Therapy Goal  Goal: Occupational Therapy Goal  Goals to be met by: 12/31     Patient will increase functional independence with ADLs by performing:    UE Dressing with [place].   LE Dressing with Minimal Assistance.  Grooming while seated with Set-up Assistance. Goal met  Toileting from bedside commode with Minimal Assistance for hygiene and clothing management.   Bathing from  edge of bed with Moderate Assistance.      Pt. Total A x 2 SQPT from bedside chair to bed

## 2018-12-19 NOTE — PT/OT/SLP PROGRESS
Physical Therapy Treatment    Patient Name:  Krissy Marino   MRN:  [id number]    Recommendations:     Discharge Recommendations:  nursing facility, skilled (long term)  Discharge Equipment Recommendations: none   Barriers to discharge: None    Assessment:     Krissy Marino is a 78 y.o. female admitted with a medical diagnosis of Osteomyelitis of left tibia.  She presents with the following impairments/functional limitations:  weakness, impaired endurance, impaired self care skills, impaired functional mobilty, gait instability, impaired balance, decreased lower extremity function, orthopedic precautions, decreased safety awareness, pain, impaired skin, edema. Pt tolerates tx well with focus on bed mobility and transfers. Pt progression is slow and limited by pts continued inability to maintain LLE NWB and confusion this day. Pts NSG aware of confused state. Pt re-oriented as necessary this session. Pt performs transfer with initial attempt at stand pivot with PTA transitioning to dependent pivot to prevent LLE WB. Pt left [facility] and NSG informed. OT requests assistance with transfer of pt as pt is unable to assist with transfer to return to bed from bedside chair. PTA returns to assist with squat pivot back to bed with OT assisting with t/f and technician assisting with LLE mgmt. Pts daughter present for OT session and PTA return, educated on pts current functional status, assistance needed, and PT POC. Pt will continue to benefit from therapy services to improve impairments listed above. PT notified of pts poor progression towards goals of care.     Rehab Prognosis:  Fair to good; patient would benefit from acute skilled PT services to address these deficits and reach maximum level of function.      Recent Surgery: * No surgery found *      Plan:     During this hospitalization, patient to be seen 4 x/week to address the above listed problems via gait training, therapeutic activities, therapeutic exercises  · Plan of  "Care Expires:  01/13/19   Plan of Care Reviewed with: patient, daughter    Subjective     Communicated with NSG prior to session.  Patient found supine upon PTA entry to room, agreeable to treatment.      Chief Complaint: no c/o  Patient comments/goals: "Well today is Sunday and I want to stand up." - pt poorly oriented with mild confusion noted this day.   Pain/Comfort:  · Pain Rating 1: 0/10  · Pain Rating Post-Intervention 1: 0/10    Patients cultural, spiritual, Nondenominational conflicts given the current situation:      Objective:     Patient found with: peripheral IV, telemetry     General Precautions: Standard, fall   Orthopedic Precautions:LLE non weight bearing   Braces: N/A     Functional Mobility:  · Bed Mobility:     · Rolling Left:  stand by assistance  · Rolling Right: stand by assistance  · Scooting: stand by assistance  · Supine to Sit: minimum assistance  · Sit to Supine: maximal assistance  · Transfers:     · Sit to Stand:  maximal assistance with rolling walker  · Bed to Chair: maximal assistance with rolling walker  using  Stand Pivot for bed > chair and total assistance with  no AD using Squat Pivot for chair > bed  · Gait: Pt unable d/t inability to maintain NWB on LLE  · Balance: Pt sits with SBA. Pt stands with Max A, only maintains NWB briefly before becoming noncompliant with WB status despite cues and assistance.       AM-PAC 6 CLICK MOBILITY  Turning over in bed (including adjusting bedclothes, sheets and blankets)?: 3  Sitting down on and standing up from a chair with arms (e.g., wheelchair, bedside commode, etc.): 2  Moving from lying on back to sitting on the side of the bed?: 3  Moving to and from a bed to a chair (including a wheelchair)?: 2  Need to walk in hospital room?: 1  Climbing 3-5 steps with a railing?: 1  Basic Mobility Total Score: 12       Therapeutic Activities and Exercises:   Pt assisted with functional mobility as noted above.   Pt performs bed mobility including rolling " and scooting while PTA assists with donning clean adult brief. Back gown donned at EOB.   PTA assists with t/f to bedside chair with pt agreeable to remaining UIC.     PTA returns to assist OT in returning pt to bed d/t pt fatigue/weakness and poor ability to assist with t/f from low surface.   PTA educated pts daughter who is now present on pts current functional status, assistance needed, and PT POC.     Patient left up in chair with call button in reach, chair alarm on and NSG notified.    GOALS:   Multidisciplinary Problems     Physical Therapy Goals        Problem: Physical Therapy Goal    Goal Priority Disciplines Outcome Goal Variances Interventions   Physical Therapy Goal     PT, PT/OT Ongoing (interventions implemented as appropriate)     Description:  Goals to be met by: 10 days ()    Patient will increase functional independence with mobility by performin. Supine to sit with Stand-by Assistance  2. Sit to supine with Stand-by Assistance  3. Sit to stand transfer with Minimal Assistance using RW while maintaining NWB L LE status  4. Bed to chair with Moderate Assistance using RW while maintaining NWB L LE  5. Lower extremity exercise program x30 reps per handout, with independence to improve muscular strength and endurance.                       Time Tracking:     PT Received On: 18  PT Start Time: 929 Return 10:24     PT Stop Time: 959 Return end 1034  PT Total Time (min): 40 min     Billable Minutes: Therapeutic Activity 40    Treatment Type: Treatment  PT/PTA: PTA     PTA Visit Number: 2     John Pizarro, TREMAYNE  2018

## 2018-12-19 NOTE — PROGRESS NOTES
P[place]leton catheter discontinued per MD order. Patient tolerated well. Patient instructed to call for assistance. Will continue to monitor.

## 2018-12-19 NOTE — PROGRESS NOTES
Pharmacist Renal Dose Adjustment Note    Krissy Marino is a 78 y.o. female being treated with the medication cefepime    Patient Data:    Vital Signs (Most Recent):  Temp: 97.9 °F (36.6 °C) (12/19/18 0910)  Pulse: 81 (12/19/18 0910)  Resp: 17 (12/19/18 0910)  BP: (!) 176/69 (12/19/18 0910)  SpO2: 95 % (12/19/18 0910)   Vital Signs (72h Range):  Temp:  [97.7 °F (36.5 °C)-99.2 °F (37.3 °C)]   Pulse:  [72-86]   Resp:  [16-20]   BP: (106-176)/(54-69)   SpO2:  [94 %-98 %]      Recent Labs   Lab 12/17/18  0525 12/18/18  0457 12/19/18  0733   CREATININE 0.9 0.9 1.0     Serum creatinine: 1 mg/dL 12/19/18 0733  Estimated creatinine clearance: 54.4 mL/min    Medication: cefepime 2g q8h will be changed to cefepime 2g q12h    Pharmacist's Name: Anthony Montoya  Pharmacist's Extension: [phone]

## 2018-12-19 NOTE — SUBJECTIVE & OBJECTIVE
Interval History: No AEON.  Afebrile and WBC WNL.  Cr up slightly to 1 and Vanc random 20.8 this AM.  Patient has elected for IV abx alone as treatment.  The patient denies any recent fever, chills, or sweats.      Review of Systems   Constitutional: Negative for activity change, chills, diaphoresis and fever.   Respiratory: Negative for cough, shortness of breath and wheezing.    Cardiovascular: Negative for chest pain.   Gastrointestinal: Negative for abdominal pain, constipation, diarrhea, nausea and vomiting.   Genitourinary: Negative for dysuria, frequency and urgency.   Skin: Positive for wound.   Neurological: Negative for dizziness.   Hematological: Does not bruise/bleed easily.     Objective:     Vital Signs (Most Recent):  Temp: 97.9 °F (36.6 °C) (12/19/18 0910)  Pulse: 81 (12/19/18 0910)  Resp: 17 (12/19/18 0910)  BP: (!) 176/69 (12/19/18 0910)  SpO2: 95 % (12/19/18 0910) Vital Signs (24h Range):  Temp:  [97.7 °F (36.5 °C)-98.9 °F (37.2 °C)] 97.9 °F (36.6 °C)  Pulse:  [76-86] 81  Resp:  [17-20] 17  SpO2:  [94 %-96 %] 95 %  BP: (106-176)/(55-69) 176/69     Weight: 106.6 kg (235 lb 0.2 oz)  Body mass index is 39.11 kg/m².    Estimated Creatinine Clearance: 54.4 mL/min (based on SCr of 1 mg/dL).    Physical Exam   Constitutional: She is oriented to person, place, and time. No distress.   Eyes: Pupils are equal, round, and reactive to light.   Cardiovascular: Normal rate and regular rhythm.   No murmur heard.  Pulmonary/Chest: Effort normal and breath sounds normal. No respiratory distress. She has no wheezes.   Abdominal: Soft. Bowel sounds are normal. She exhibits no distension. There is no tenderness.   Musculoskeletal: She exhibits edema (BLE). She exhibits no tenderness.   Neurological: She is alert and oriented to person, place, and time.   Skin: She is not diaphoretic. There is erythema (LLE).   Ulcers present at lateral aspect of left foot - edema and erythema improved   Psychiatric: She has a normal  mood and affect. Her behavior is normal.               Significant Labs:   Blood Culture:   Recent Labs   Lab 18  0810 18  0858 18  0859 18  1849 18  1907   LABBLOO Gram stain anais bottle: Gram positive cocci in chains resembling Strep  Results called to and read back by:James Hooker,RN  STREPTOCOCCUS AGALACTIAE (GROUP B)  Beta-hemolytic streptococci are routinely susceptible to   penicillins,cephalosporins and carbapenems.   No growth after 5 days. No growth after 5 days. No growth after 5 days. No growth after 5 days.     CBC:   Recent Labs   Lab 18  0457 18  0733   WBC 5.61 6.38   HGB 8.8* 9.5*   HCT 28.5* 30.3*    146*     CMP:   Recent Labs   Lab 18  0457 18  0733    135*   K 5.0 4.1    103   CO2 21* 25   * 161*   BUN 35* 35*   CREATININE 0.9 1.0   CALCIUM 9.0 8.7   PROT 5.4* 5.4*   ALBUMIN 1.6* 1.7*   BILITOT 0.5 0.5   ALKPHOS 193* 202*   AST 71* 73*   ALT 52* 58*   ANIONGAP 8 7*   EGFRNONAA >60.0 53.3*     All pertinent labs within the past 24 hours have been reviewed.    Significant Imaging: I have reviewed all pertinent imaging results/findings within the past 24 hours.   VAS Ankle Brachial Indices Resting [[id number]] Collected: 18 08   Order Status: Completed Updated: 18 1111   Narrative:     PAT NAME: JARROD NEGRON  [facility] REC#: [id number]  :      1940  SEX:      F  EXAM JACK: 2018 08:26  REF PHYS: SELF, AAAREFERRAL      Indication:  -PVD   -Edema.  Results:  Lower Extremities Segmental Pressure [mmHg]:                    Right             Left  _______________________________________________________________  Brachial          118                 Posterior Tibial  255               255  Dorsalis Pedis    175               255  MARIMAR (Post. Tib.)  2.16              2.16  MARIMAR (Dors. Ped.)  1.48              2.16    Report Summary:  Impression:   Rt MARIMAR (2.16) Segment/Brachial Index is unreliable due to  suspected medial calcinosis, however; PVR waveforms appear to be within normal limits.    Lt MARIMAR (2.16) Segment/Brachial Index is unreliable due to suspected medial calcinosis. PVR waveforms indicate mild peripheral arterial obstructive disease.     Sonographer: Almita Hannah RVT    Electronically Signed by:  Asif Barros MD [[id number]]                        On: 12/18/2018 11:11   X-Ray Chest 1 View Pre-OP [[id number]] Resulted: 12/13/18 2020   Order Status: Completed Updated: 12/13/18 2022   Narrative:     EXAMINATION:  XR CHEST 1 VIEW PRE-OP    CLINICAL HISTORY:  pre op;    TECHNIQUE:  Single frontal view of the chest was performed.    COMPARISON:  Chest radiograph 12/12/2018.    FINDINGS:  Redemonstration of multiple radiopaque structures overlying the chest the limiting evaluation.  The stable position of a right PICC with catheter tip overlying the brachiocephalic/SVC junction.    The cardiac silhouette and pulmonary vasculature is stable.    Lungs show no large focal consolidation, large pleural effusion or pneumothorax.    Bones are intact, with stable degenerative changes at the shoulders.   Impression:       No acute radiographic findings.    Electronically signed by resident: Eliazar Ordonez  Date: 12/13/2018  Time: 19:54    Electronically signed by: Toñito Flower MD  Date: 12/13/2018  Time: 20:20   CT Ankle (Including Hindfoot) W W/O Contrast Left [[id number]] (Abnormal) Resulted: 12/13/18 1816   Order Status: Completed Updated: 12/13/18 1819   Narrative:     EXAMINATION:  CT LEG (TIBIA-FIBULA) W W/O CONTRAST LEFT; CT ANKLE (INCLUDING HINDFOOT) W W/O CONTRAST LEFT; CT FOOT W W/O CONTRAST LEFT    CLINICAL HISTORY:  osteomyelitis and abscess  Osteomyelitis, unspecified    TECHNIQUE:  Axial images of were acquired from the distal femur to the forefoot before and after the administration of Omnipaque 350 IV contrast.  Sagittal and coronal reformats were provided.    COMPARISON:  Knee and tibia  radiograph 12/13/2018, MRI ankle 12/12/2018    FINDINGS:  Small rim enhancing multiloculated air fluid collections extends into the interosseous membrane cranially at least to the level of the fibular head.  There are small fluid collections posterior and medial to the knee which possibly communicate (axial series 4, image 137) and possibly enhance although extensive beam hardening artifact from the metallic knee prosthesis limits assessment of that area.  There is ulceration and subcutaneous gas at the lateral distal foreleg.  There is prominent surrounding skin thickening and diffuse subcutaneous fluid attenuation predominantly through the forefoot and hindfoot as well as the distal lower extremity.  Multiple lucent lesions are identified throughout the talus and midfoot which contain may sclerotic rim suggesting chronic osteomyelitis.  Lucency within the lateral talus contains air without sadia cortical disruption..  No fracture identified.  Vascular calcifications are present.   Impression:       Rim enhancing multiloculated abscess extends at least to the level of the fibular head noting there are separate fluid collections posterior and medial to the knee (axial series 4, image 137) which possibly communicates although extensive beam hardening artifact from metallic knee prosthesis significantly limits assessment.    Multifocal osteolytic lesions with sclerotic rim in the foot and distal tibia likely representing components of acute and chronic osteomyelitis.  No definite calcification within a lucent lesion to suggest sequestrum although cannot be completely excluded.    Ulceration in the subcutaneous gas at the lateral distal foreleg is concerning for gas gangrene.    This report was flagged in Epic as abnormal.    Electronically signed by resident: Raymond Vidales  Date: 12/13/2018  Time: 17:25    Electronically signed by: Germán Flowers MD  Date: 12/13/2018  Time: 18:16   CT Foot W W/O Contrast Left  [[id number]] (Abnormal) Resulted: 12/13/18 1816   Order Status: Completed Updated: 12/13/18 1819   Narrative:     EXAMINATION:  CT LEG (TIBIA-FIBULA) W W/O CONTRAST LEFT; CT ANKLE (INCLUDING HINDFOOT) W W/O CONTRAST LEFT; CT FOOT W W/O CONTRAST LEFT    CLINICAL HISTORY:  osteomyelitis and abscess  Osteomyelitis, unspecified    TECHNIQUE:  Axial images of were acquired from the distal femur to the forefoot before and after the administration of Omnipaque 350 IV contrast.  Sagittal and coronal reformats were provided.    COMPARISON:  Knee and tibia radiograph 12/13/2018, MRI ankle 12/12/2018    FINDINGS:  Small rim enhancing multiloculated air fluid collections extends into the interosseous membrane cranially at least to the level of the fibular head.  There are small fluid collections posterior and medial to the knee which possibly communicate (axial series 4, image 137) and possibly enhance although extensive beam hardening artifact from the metallic knee prosthesis limits assessment of that area.  There is ulceration and subcutaneous gas at the lateral distal foreleg.  There is prominent surrounding skin thickening and diffuse subcutaneous fluid attenuation predominantly through the forefoot and hindfoot as well as the distal lower extremity.  Multiple lucent lesions are identified throughout the talus and midfoot which contain may sclerotic rim suggesting chronic osteomyelitis.  Lucency within the lateral talus contains air without sadia cortical disruption..  No fracture identified.  Vascular calcifications are present.   Impression:       Rim enhancing multiloculated abscess extends at least to the level of the fibular head noting there are separate fluid collections posterior and medial to the knee (axial series 4, image 137) which possibly communicates although extensive beam hardening artifact from metallic knee prosthesis significantly limits assessment.    Multifocal osteolytic lesions with sclerotic rim in  the foot and distal tibia likely representing components of acute and chronic osteomyelitis.  No definite calcification within a lucent lesion to suggest sequestrum although cannot be completely excluded.    Ulceration in the subcutaneous gas at the lateral distal foreleg is concerning for gas gangrene.    This report was flagged in Epic as abnormal.    Electronically signed by resident: Raymond Vidales  Date: 12/13/2018  Time: 17:25    Electronically signed by: Germán Flowers MD  Date: 12/13/2018  Time: 18:16   CT Leg (Tibia-Fibula) W W/O Contrast Left [[id number]] (Abnormal) Resulted: 12/13/18 1816   Order Status: Completed Updated: 12/13/18 1819   Narrative:     EXAMINATION:  CT LEG (TIBIA-FIBULA) W W/O CONTRAST LEFT; CT ANKLE (INCLUDING HINDFOOT) W W/O CONTRAST LEFT; CT FOOT W W/O CONTRAST LEFT    CLINICAL HISTORY:  osteomyelitis and abscess  Osteomyelitis, unspecified    TECHNIQUE:  Axial images of were acquired from the distal femur to the forefoot before and after the administration of Omnipaque 350 IV contrast.  Sagittal and coronal reformats were provided.    COMPARISON:  Knee and tibia radiograph 12/13/2018, MRI ankle 12/12/2018    FINDINGS:  Small rim enhancing multiloculated air fluid collections extends into the interosseous membrane cranially at least to the level of the fibular head.  There are small fluid collections posterior and medial to the knee which possibly communicate (axial series 4, image 137) and possibly enhance although extensive beam hardening artifact from the metallic knee prosthesis limits assessment of that area.  There is ulceration and subcutaneous gas at the lateral distal foreleg.  There is prominent surrounding skin thickening and diffuse subcutaneous fluid attenuation predominantly through the forefoot and hindfoot as well as the distal lower extremity.  Multiple lucent lesions are identified throughout the talus and midfoot which contain may sclerotic rim suggesting chronic  osteomyelitis.  Lucency within the lateral talus contains air without sadia cortical disruption..  No fracture identified.  Vascular calcifications are present.   Impression:       Rim enhancing multiloculated abscess extends at least to the level of the fibular head noting there are separate fluid collections posterior and medial to the knee (axial series 4, image 137) which possibly communicates although extensive beam hardening artifact from metallic knee prosthesis significantly limits assessment.    Multifocal osteolytic lesions with sclerotic rim in the foot and distal tibia likely representing components of acute and chronic osteomyelitis.  No definite calcification within a lucent lesion to suggest sequestrum although cannot be completely excluded.    Ulceration in the subcutaneous gas at the lateral distal foreleg is concerning for gas gangrene.    This report was flagged in Epic as abnormal.    Electronically signed by resident: Raymond Vidales  Date: 12/13/2018  Time: 17:25    Electronically signed by: Germán Flowers MD  Date: 12/13/2018  Time: 18:16   X-Ray Knee 1 or 2 View Bilateral [[id number]] Resulted: 12/13/18 1432   Order Status: Completed Updated: 12/13/18 1434   Narrative:     EXAMINATION:  XR KNEE 1 OR 2 VIEW BILATERAL    CLINICAL HISTORY:  hx of tka;  Osteomyelitis, unspecified    COMPARISON:  None 08/10/2016    FINDINGS:  Bilateral knee arthroplasties are identified.  Position alignment is satisfactory and unchanged as compared to the previous study.  No fracture or bone destruction identified   Impression:       See above      Electronically signed by: Hector Escamilla MD  Date: 12/13/2018  Time: 14:32   X-Ray Tibia Fibula 2 View Left [[id number]] Resulted: 12/13/18 1212   Order Status: Completed Updated: 12/13/18 1215   Narrative:     EXAMINATION:  XR TIBIA FIBULA 2 VIEW LEFT    CLINICAL HISTORY:  osteomyelitis;  Osteomyelitis, unspecified    TECHNIQUE:  AP and lateral views of the left tibia and  fibula were performed.    COMPARISON:  None.    FINDINGS:  Status post total knee arthroplasty without evidence for complication.  Degenerative changes are noted at the ankle.  Vascular calcifications are present.  There is soft tissue gas at the lateral aspect of the ankle.   Impression:       Soft tissue gas at the lateral ankle.      Electronically signed by: Kevin Newby MD  Date: 12/13/2018  Time: 12:12   MRI Ankle W WO Contrast Left [[id number]] Resulted: 12/12/18 2255   Order Status: Completed Updated: 12/12/18 2257   Narrative:     EXAMINATION:  MRI ANKLE W WO CONTRAST LEFT    CLINICAL HISTORY:  Ankle erythema, swelling, cellulitis suspected    TECHNIQUE:  MRI ankle performed before and after the administration 10 mL Gadavist IV contrast.    COMPARISON:  Foot radiograph 12/12/2018, foot radiograph 12/22/2017.    FINDINGS:  There is a lateral malleolar ulcer and circumferential subcutaneous edema with skin thickening and enhancement of the hindfoot.  Large multiloculated peripherally enhancing fluid collections throughout the visualized soft tissues of the lateral distal foreleg at least 10 cm craniocaudad dimension contiguous with ulceration and extending proximal outside the field of view which are concerning for soft tissue abscesses.  There is diffuse muscular edema and enhancement suggesting significant myositis.    There is abnormal marrow edema and extensive irregular T1 marrow replacement in the distal tibia, calcaneus, talus, and navicular suggesting osteomyelitis.   Impression:       Findings concerning for osteomyelitis of the hindfoot including the distal tibia and tarsal bones with associated cellulitis, myositis, and multiloculated abscesses in the deep soft tissues contiguous with lateral distal foreleg ulceration.  Component of abscess extends cranially outside the field of view, possibly more proximal portions of the left lower extremity.    COMMUNICATION  This critical result was  discovered/received at 12/12/2018 at 22:00. The critical information above was relayed directly by me by telephone to Dr. Guzman On 12/12/2018 at 22:05.    Electronically signed by resident: Raymond Vidales  Date: 12/12/2018  Time: 21:35    Electronically signed by: Toñito Flower MD  Date: 12/12/2018  Time: 22:55   US Lower Extremity Veins Left [[id number]] Resulted: 12/12/18 2232   Order Status: Completed Updated: 12/12/18 2234   Narrative:     EXAMINATION:  US LOWER EXTREMITY VEINS LEFT    CLINICAL HISTORY:  Acute embolism and thrombosis of unspecified deep veins of unspecified lower extremity    TECHNIQUE:  Duplex and color flow Doppler evaluation and graded compression of the left lower extremity veins was performed.    COMPARISON:  None.    FINDINGS:  Left thigh veins: The common femoral, femoral, popliteal, upper greater saphenous, and deep femoral veins are patent and free of thrombus. The veins are normally compressible and have normal phasic flow and augmentation response.    Left calf veins: The visualized calf veins are patent.    Contralateral CFV: The contralateral (right) common femoral vein is patent and free of thrombus.    Miscellaneous: Soft tissue edema and abnormal fluid about the left ankle, better characterized on recent MRI.   Impression:       No evidence of DVT in the left lower extremity.    Electronically signed by resident: Eliazar Ordonez  Date: 12/12/2018  Time: 22:22    Electronically signed by: Toñito Flower MD  Date: 12/12/2018  Time: 22:32   X-Ray Foot Complete Left [[id number]] Resulted: 12/12/18 1922   Order Status: Completed Updated: 12/12/18 1925   Narrative:     EXAMINATION:  XR FOOT COMPLETE 3 VIEW LEFT    CLINICAL HISTORY:  Cellulitis, unspecified    TECHNIQUE:  Three views of the left foot.    COMPARISON:  11/26/2018.    FINDINGS:  Stable degenerative changes in the foot.  No acute fracture or dislocation identified.  No bony erosion.  Vascular calcifications noted.  There is  "persistent soft tissue edema at the anterior aspect of the ankle and over the dorsum of the foot.  No distinct soft tissue emphysema.  No radiopaque foreign body.   Impression:       Persistent soft tissue edema at the dorsum of the foot, possibly cellulitis in the correct clinical setting.      Electronically signed by: Toñito Flower MD  Date: 12/12/2018  Time: 19:22   X-Ray Chest AP Portable [[id number]] Resulted: 12/12/18 1919   Order Status: Completed Updated: 12/12/18 1922   Narrative:     EXAMINATION:  XR CHEST AP PORTABLE    CLINICAL HISTORY:  Provided history is "Sepsis;  ".    TECHNIQUE:  One view of the chest.    COMPARISON:  11/29/2018.    FINDINGS:  Multiple radiopaque structures overlie the chest and significantly limit evaluation, including an electronic device which may represent a cell phone.   Per technologist note, the patient refused to comply with exam instructions to remove any overlying material.   Cardiac silhouette is stable.  Right-sided PICC has been slightly retracted, with the tip now overlying the brachiocephalic/SVC junction.  No large focal area of consolidation or detrimental change in lung aeration.   Impression:       Limited examination, without obvious detrimental change.  Follow-up with upright PA and lateral views with removal of overlying structures as indicated.      Electronically signed by: Toñito Flower MD  Date: 12/12/2018  Time: 19:19       "

## 2018-12-19 NOTE — PLAN OF CARE
Problem: Physical Therapy Goal  Goal: Physical Therapy Goal  Goals to be met by: 10 days ()    Patient will increase functional independence with mobility by performin. Supine to sit with Stand-by Assistance  2. Sit to supine with Stand-by Assistance  3. Sit to stand transfer with Minimal Assistance using RW while maintaining NWB L LE status  4. Bed to chair with Moderate Assistance using RW while maintaining NWB L LE  5. Lower extremity exercise program x30 reps per handout, with independence to improve muscular strength and endurance.      Outcome: Ongoing (interventions implemented as appropriate)  PT to be informed of pts slow progression to re-evaluate goals of care.

## 2018-12-19 NOTE — PLAN OF CARE
Problem: Adult Inpatient Plan of Care  Goal: Plan of Care Review  Patient AAOx1 to self. Safety measures in place. D/C burnham catheter. 1700 mL urine noted.  Medications administered at scheduled times without complication. BG monitored and insulin given as needed per sliding scale. Vitals within normal limits. Son at bedside in evening. Will continue to monitor.

## 2018-12-19 NOTE — PLAN OF CARE
Problem: Adult Inpatient Plan of Care  Goal: Plan of Care Review  Outcome: Ongoing (interventions implemented as appropriate)  Pt remains free of falls and injury. Pt provided with frequent turns/repositioning. Provided with PRN acetaminophen for back pain with positive results. Bed low and locked, Call light within reach.

## 2018-12-19 NOTE — PT/OT/SLP PROGRESS
Occupational Therapy   Treatment    Name: Krissy Marino  MRN: [id number]  Admitting Diagnosis:  Osteomyelitis of left tibia       Recommendations:     Discharge Recommendations: nursing facility, skilled(long term)  Discharge Equipment Recommendations:  (TBD at next level of care)  Barriers to discharge:  None    Subjective     Pain/Comfort:  · Pain Rating 1: (no pain reported during session)    Objective:     Communicated with: nurse prior to session.  Patient found with all lines intact, call button in reach and family present and (seated in bedside chair with daughter present) upon OT entry to room.    General Precautions: Standard, fall   Orthopedic Precautions:LLE non weight bearing   Braces:       Occupational Performance:    Bed Mobility:    · Patient completed Sit to Supine with total assistance and 2 persons     Functional Mobility/Transfers:  · Patient completed Sit <> Stand Transfer with total assistance and of 2 persons  with  no assistive device  and 3rd person managing LLE  · Functional Mobility: not tested    Activities of Daily Living:  · Feeding:  set up A breakfast      [facility] 6 Click ADL: 12    Treatment & Education:  Pt. Educated on role of OT and POC  Pt. Family educated on difference between short-term and long term SNF  OT attempted to stand pt. Multiple times from bedside chair with assist of second person but unable to clear buttocks from chair   OT required second person to assist with transfer from low surface (chair) SQPT with Total A x 2 and third person managing LLE.     Patient left supine with all lines intact, call button in reach and family present  Education:    Assessment:     Krissy Marino is a 78 y.o. female with a medical diagnosis of Osteomyelitis of left tibia.  She presents with the following performance deficits affecting function are impaired sensation, impaired self care skills, impaired functional mobilty, decreased lower extremity function, orthopedic precautions. Pt. Very  deconditioned and requires extensive assist for transfers especially from low surface.     Rehab Prognosis:  Fair; patient would benefit from acute skilled OT services to address these deficits and reach maximum level of function.       Plan:     Patient to be seen 4 x/week to address the above listed problems via self-care/home management, therapeutic exercises, therapeutic activities  · Plan of Care Expires: 01/14/18  · Plan of Care Reviewed with: patient, daughter    This Plan of care has been discussed with the patient who was involved in its development and understands and is in agreement with the identified goals and treatment plan    GOALS:   Multidisciplinary Problems     Occupational Therapy Goals        Problem: Occupational Therapy Goal    Goal Priority Disciplines Outcome Interventions   Occupational Therapy Goal     OT, PT/OT Ongoing (interventions implemented as appropriate)    Description:  Goals to be met by: 12/31     Patient will increase functional independence with ADLs by performing:    UE Dressing with [place].   LE Dressing with Minimal Assistance.  Grooming while seated with Set-up Assistance. Goal met  Toileting from bedside commode with Minimal Assistance for hygiene and clothing management.   Bathing from  edge of bed with Moderate Assistance.                       Time Tracking:     OT Date of Treatment: 12/19/18  OT Start Time: 1015  OT Stop Time: 1038  OT Total Time (min): 23 min    Billable Minutes:Self Care/Home Management 23    ZACHARIAH Cruz  12/19/2018

## 2018-12-19 NOTE — PROGRESS NOTES
[facility]  Infectious Disease  Progress Note    Patient Name: Krissy Marino  MRN: [id number]  Admission Date: 12/12/2018  Length of Stay: 7 days  Attending Physician: Livia Hernandez MD  Primary Care Provider: Emily Mcpherson MD    Isolation Status: No active isolations  Assessment/Plan:      Cellulitis of left lower extremity    79 yo female with hx of bilateral TKA,  recent GBS bacteremia, recurrent LLE cellulitis, PVD now found to have osteomyelitis of the hindfoot including the distal tibia and tarsal bones with associated cellulitis, myositis, and multiloculated abscesses in the deep soft tissues contiguous with lateral distal foreleg ulceration. Repeat CT shows infection to knee level and gas near ankle.  On vanc, cefepime and flagyl.  Ortho Sx following and rec AKA.  Vasc Sx has seen patient - awaiting recs.  Given malnutrition, extent of wound, and underlying comorbidities, AKA appears to be best option.  BCXs NGTD. Stable non septic.  Vanc now therapeutic.  Had long discussion with family and Ortho Sx with patient about further plan. Suspect will need AKA, if not now, then in the future though patient has elected for IV abx alone.      Plan   - Restart Vanc 1.5g IV q24  - Vanc trough goal 15-20 trough prior to 4th dose  - Continue Cefepime 2g IV q12 and Flagyl 500mg po tid as gas seen on CT scan  - Weekly (on Mondays) ESR, CRP, CBC, CMP and Vanc trough   - Will need re-imagine of LLE in 4-5 weeks or if she develops worsening signs/sx of infection  - Patient and son have been made aware that surgery is the definitive treatment for her infection and that treatment with abx there is low chance of resolution and increased risk of further infectious complications or even death.  They understand   - Plan discussed with primary team - will sign off             Anticipated Disposition: tbd    Thank you for your consult. I will follow-up with patient. Please contact us if you have any  additional questions.    JILLIAN Sheridan  Infectious Disease  [facility]    Subjective:     Principal Problem:Osteomyelitis of left tibia    HPI: Ms. Krissy Marino is a 78 y.o. female with type 2 diabetes, CAD status post PCI (7/2018), and peripheral vascular disease who presents to the emergency department from [facility] for evaluation of left lower extremity cellulitis.  She was just admitted to [facility] from 11/26-12/5 for left lower extremity cellulitis s/p a left ankle sprain, where she was found to have group B strep bacteremia.  During that admission, a PICC line was placed and she was discharged on Ceftriaxone to complete a 14 day course that ended on 12/11.  Since her discharge, family mentions that her left lower extremity cellulitis has not been improving.  They endorse persistent erythema as well as occasional yellow drainage from the left lateral malleolus.  At baseline, the patient is fairly bed-bound and spends most of her time with her lower extremities dangling down.  Labs were done at her facility on 12/07 which showed a white blood cell count 20.  She was told to come to the emergency department for further evaluation.  She continues to endorse pain in her left as well as pain with movement.  She has never had any debridement of her wound, but mentions that it has been cleaned with Clorox by [facility].  She denies any fevers or chills.  Of note, the patient was told that because she has such severe peripheral vascular disease, she would require stent placement.  However, because of her recent cardiac stent placement, she is unable to get leg stents placed for at least 1 year, which will continue to prevent good wound healing.     In the emergency department, labs were notable for a white blood cell count 10, sed rate 56, and CRP 67.  X-rays were ordered which showed edema, but no cellulitis.  She was given IV vancomycin and was admitted to  [facility] for further management.  MRI of LLE c/f large abscess and osteomyelitis of distal tibia and tarsal bones.  She has been placed on Vancomycin.  Blood cultures 12/12 are NGTD.      Interval History: No AEON.  Afebrile and WBC WNL.  Cr up slightly to 1 and Vanc random 20.8 this AM.  Patient has elected for IV abx alone as treatment.  The patient denies any recent fever, chills, or sweats.      Review of Systems   Constitutional: Negative for activity change, chills, diaphoresis and fever.   Respiratory: Negative for cough, shortness of breath and wheezing.    Cardiovascular: Negative for chest pain.   Gastrointestinal: Negative for abdominal pain, constipation, diarrhea, nausea and vomiting.   Genitourinary: Negative for dysuria, frequency and urgency.   Skin: Positive for wound.   Neurological: Negative for dizziness.   Hematological: Does not bruise/bleed easily.     Objective:     Vital Signs (Most Recent):  Temp: 97.9 °F (36.6 °C) (12/19/18 0910)  Pulse: 81 (12/19/18 0910)  Resp: 17 (12/19/18 0910)  BP: (!) 176/69 (12/19/18 0910)  SpO2: 95 % (12/19/18 0910) Vital Signs (24h Range):  Temp:  [97.7 °F (36.5 °C)-98.9 °F (37.2 °C)] 97.9 °F (36.6 °C)  Pulse:  [76-86] 81  Resp:  [17-20] 17  SpO2:  [94 %-96 %] 95 %  BP: (106-176)/(55-69) 176/69     Weight: 106.6 kg (235 lb 0.2 oz)  Body mass index is 39.11 kg/m².    Estimated Creatinine Clearance: 54.4 mL/min (based on SCr of 1 mg/dL).    Physical Exam   Constitutional: She is oriented to person, place, and time. No distress.   Eyes: Pupils are equal, round, and reactive to light.   Cardiovascular: Normal rate and regular rhythm.   No murmur heard.  Pulmonary/Chest: Effort normal and breath sounds normal. No respiratory distress. She has no wheezes.   Abdominal: Soft. Bowel sounds are normal. She exhibits no distension. There is no tenderness.   Musculoskeletal: She exhibits edema (BLE). She exhibits no tenderness.   Neurological: She is alert and  oriented to person, place, and time.   Skin: She is not diaphoretic. There is erythema (LLE).   Ulcers present at lateral aspect of left foot - edema and erythema improved   Psychiatric: She has a normal mood and affect. Her behavior is normal.               Significant Labs:   Blood Culture:   Recent Labs   Lab 18  0810 18  0858 18  0859 18  1849 18  1907   LABBLOO Gram stain anais bottle: Gram positive cocci in chains resembling Strep  Results called to and read back by:James Hooker RN  STREPTOCOCCUS AGALACTIAE (GROUP B)  Beta-hemolytic streptococci are routinely susceptible to   penicillins,cephalosporins and carbapenems.   No growth after 5 days. No growth after 5 days. No growth after 5 days. No growth after 5 days.     CBC:   Recent Labs   Lab 18  0457 18  0733   WBC 5.61 6.38   HGB 8.8* 9.5*   HCT 28.5* 30.3*    146*     CMP:   Recent Labs   Lab 18  0457 18  0733    135*   K 5.0 4.1    103   CO2 21* 25   * 161*   BUN 35* 35*   CREATININE 0.9 1.0   CALCIUM 9.0 8.7   PROT 5.4* 5.4*   ALBUMIN 1.6* 1.7*   BILITOT 0.5 0.5   ALKPHOS 193* 202*   AST 71* 73*   ALT 52* 58*   ANIONGAP 8 7*   EGFRNONAA >60.0 53.3*     All pertinent labs within the past 24 hours have been reviewed.    Significant Imaging: I have reviewed all pertinent imaging results/findings within the past 24 hours.   VAS Ankle Brachial Indices Resting [[id number]] Collected: 18   Order Status: Completed Updated: 18 1111   Narrative:     PAT NAME: JARROD NEGRON  [facility] REC#: [id number]  :      1940  SEX:      F  EXAM JACK: 2018 08:26  REF PHYS: SELF, AAAREFERRAL      Indication:  -PVD   -Edema.  Results:  Lower Extremities Segmental Pressure [mmHg]:                    Right             Left  _______________________________________________________________  Brachial          118                 Posterior Tibial  255               255  Donnellis Melanie     175               255  MARIMAR (Post. Tib.)  2.16              2.16  MARIMAR (Dors. Ped.)  1.48              2.16    Report Summary:  Impression:   Rt MARIMAR (2.16) Segment/Brachial Index is unreliable due to suspected medial calcinosis, however; PVR waveforms appear to be within normal limits.    Lt MARIMAR (2.16) Segment/Brachial Index is unreliable due to suspected medial calcinosis. PVR waveforms indicate mild peripheral arterial obstructive disease.     Sonographer: Almita Hannah T    Electronically Signed by:  Asif Barros MD [[id number]]                        On: 12/18/2018 11:11   X-Ray Chest 1 View Pre-OP [[id number]] Resulted: 12/13/18 2020   Order Status: Completed Updated: 12/13/18 2022   Narrative:     EXAMINATION:  XR CHEST 1 VIEW PRE-OP    CLINICAL HISTORY:  pre op;    TECHNIQUE:  Single frontal view of the chest was performed.    COMPARISON:  Chest radiograph 12/12/2018.    FINDINGS:  Redemonstration of multiple radiopaque structures overlying the chest the limiting evaluation.  The stable position of a right PICC with catheter tip overlying the brachiocephalic/SVC junction.    The cardiac silhouette and pulmonary vasculature is stable.    Lungs show no large focal consolidation, large pleural effusion or pneumothorax.    Bones are intact, with stable degenerative changes at the shoulders.   Impression:       No acute radiographic findings.    Electronically signed by resident: Eliazar Ordonez  Date: 12/13/2018  Time: 19:54    Electronically signed by: Toñito Flower MD  Date: 12/13/2018  Time: 20:20   CT Ankle (Including Hindfoot) W W/O Contrast Left [[id number]] (Abnormal) Resulted: 12/13/18 1816   Order Status: Completed Updated: 12/13/18 1819   Narrative:     EXAMINATION:  CT LEG (TIBIA-FIBULA) W W/O CONTRAST LEFT; CT ANKLE (INCLUDING HINDFOOT) W W/O CONTRAST LEFT; CT FOOT W W/O CONTRAST LEFT    CLINICAL HISTORY:  osteomyelitis and abscess  Osteomyelitis, unspecified    TECHNIQUE:  Axial images of  were acquired from the distal femur to the forefoot before and after the administration of Omnipaque 350 IV contrast.  Sagittal and coronal reformats were provided.    COMPARISON:  Knee and tibia radiograph 12/13/2018, MRI ankle 12/12/2018    FINDINGS:  Small rim enhancing multiloculated air fluid collections extends into the interosseous membrane cranially at least to the level of the fibular head.  There are small fluid collections posterior and medial to the knee which possibly communicate (axial series 4, image 137) and possibly enhance although extensive beam hardening artifact from the metallic knee prosthesis limits assessment of that area.  There is ulceration and subcutaneous gas at the lateral distal foreleg.  There is prominent surrounding skin thickening and diffuse subcutaneous fluid attenuation predominantly through the forefoot and hindfoot as well as the distal lower extremity.  Multiple lucent lesions are identified throughout the talus and midfoot which contain may sclerotic rim suggesting chronic osteomyelitis.  Lucency within the lateral talus contains air without sadia cortical disruption..  No fracture identified.  Vascular calcifications are present.   Impression:       Rim enhancing multiloculated abscess extends at least to the level of the fibular head noting there are separate fluid collections posterior and medial to the knee (axial series 4, image 137) which possibly communicates although extensive beam hardening artifact from metallic knee prosthesis significantly limits assessment.    Multifocal osteolytic lesions with sclerotic rim in the foot and distal tibia likely representing components of acute and chronic osteomyelitis.  No definite calcification within a lucent lesion to suggest sequestrum although cannot be completely excluded.    Ulceration in the subcutaneous gas at the lateral distal foreleg is concerning for gas gangrene.    This report was flagged in Epic as  abnormal.    Electronically signed by resident: Raymond Vidales  Date: 12/13/2018  Time: 17:25    Electronically signed by: Germán Flowers MD  Date: 12/13/2018  Time: 18:16   CT Foot W W/O Contrast Left [[id number]] (Abnormal) Resulted: 12/13/18 1816   Order Status: Completed Updated: 12/13/18 1819   Narrative:     EXAMINATION:  CT LEG (TIBIA-FIBULA) W W/O CONTRAST LEFT; CT ANKLE (INCLUDING HINDFOOT) W W/O CONTRAST LEFT; CT FOOT W W/O CONTRAST LEFT    CLINICAL HISTORY:  osteomyelitis and abscess  Osteomyelitis, unspecified    TECHNIQUE:  Axial images of were acquired from the distal femur to the forefoot before and after the administration of Omnipaque 350 IV contrast.  Sagittal and coronal reformats were provided.    COMPARISON:  Knee and tibia radiograph 12/13/2018, MRI ankle 12/12/2018    FINDINGS:  Small rim enhancing multiloculated air fluid collections extends into the interosseous membrane cranially at least to the level of the fibular head.  There are small fluid collections posterior and medial to the knee which possibly communicate (axial series 4, image 137) and possibly enhance although extensive beam hardening artifact from the metallic knee prosthesis limits assessment of that area.  There is ulceration and subcutaneous gas at the lateral distal foreleg.  There is prominent surrounding skin thickening and diffuse subcutaneous fluid attenuation predominantly through the forefoot and hindfoot as well as the distal lower extremity.  Multiple lucent lesions are identified throughout the talus and midfoot which contain may sclerotic rim suggesting chronic osteomyelitis.  Lucency within the lateral talus contains air without sadia cortical disruption..  No fracture identified.  Vascular calcifications are present.   Impression:       Rim enhancing multiloculated abscess extends at least to the level of the fibular head noting there are separate fluid collections posterior and medial to the knee (axial series 4,  image 137) which possibly communicates although extensive beam hardening artifact from metallic knee prosthesis significantly limits assessment.    Multifocal osteolytic lesions with sclerotic rim in the foot and distal tibia likely representing components of acute and chronic osteomyelitis.  No definite calcification within a lucent lesion to suggest sequestrum although cannot be completely excluded.    Ulceration in the subcutaneous gas at the lateral distal foreleg is concerning for gas gangrene.    This report was flagged in Epic as abnormal.    Electronically signed by resident: Raymond Vidales  Date: 12/13/2018  Time: 17:25    Electronically signed by: Germán Flowers MD  Date: 12/13/2018  Time: 18:16   CT Leg (Tibia-Fibula) W W/O Contrast Left [[id number]] (Abnormal) Resulted: 12/13/18 1816   Order Status: Completed Updated: 12/13/18 1819   Narrative:     EXAMINATION:  CT LEG (TIBIA-FIBULA) W W/O CONTRAST LEFT; CT ANKLE (INCLUDING HINDFOOT) W W/O CONTRAST LEFT; CT FOOT W W/O CONTRAST LEFT    CLINICAL HISTORY:  osteomyelitis and abscess  Osteomyelitis, unspecified    TECHNIQUE:  Axial images of were acquired from the distal femur to the forefoot before and after the administration of Omnipaque 350 IV contrast.  Sagittal and coronal reformats were provided.    COMPARISON:  Knee and tibia radiograph 12/13/2018, MRI ankle 12/12/2018    FINDINGS:  Small rim enhancing multiloculated air fluid collections extends into the interosseous membrane cranially at least to the level of the fibular head.  There are small fluid collections posterior and medial to the knee which possibly communicate (axial series 4, image 137) and possibly enhance although extensive beam hardening artifact from the metallic knee prosthesis limits assessment of that area.  There is ulceration and subcutaneous gas at the lateral distal foreleg.  There is prominent surrounding skin thickening and diffuse subcutaneous fluid attenuation predominantly  through the forefoot and hindfoot as well as the distal lower extremity.  Multiple lucent lesions are identified throughout the talus and midfoot which contain may sclerotic rim suggesting chronic osteomyelitis.  Lucency within the lateral talus contains air without sadia cortical disruption..  No fracture identified.  Vascular calcifications are present.   Impression:       Rim enhancing multiloculated abscess extends at least to the level of the fibular head noting there are separate fluid collections posterior and medial to the knee (axial series 4, image 137) which possibly communicates although extensive beam hardening artifact from metallic knee prosthesis significantly limits assessment.    Multifocal osteolytic lesions with sclerotic rim in the foot and distal tibia likely representing components of acute and chronic osteomyelitis.  No definite calcification within a lucent lesion to suggest sequestrum although cannot be completely excluded.    Ulceration in the subcutaneous gas at the lateral distal foreleg is concerning for gas gangrene.    This report was flagged in Epic as abnormal.    Electronically signed by resident: Raymond Vidales  Date: 12/13/2018  Time: 17:25    Electronically signed by: Germán Flowers MD  Date: 12/13/2018  Time: 18:16   X-Ray Knee 1 or 2 View Bilateral [[id number]] Resulted: 12/13/18 1432   Order Status: Completed Updated: 12/13/18 1434   Narrative:     EXAMINATION:  XR KNEE 1 OR 2 VIEW BILATERAL    CLINICAL HISTORY:  hx of tka;  Osteomyelitis, unspecified    COMPARISON:  None 08/10/2016    FINDINGS:  Bilateral knee arthroplasties are identified.  Position alignment is satisfactory and unchanged as compared to the previous study.  No fracture or bone destruction identified   Impression:       See above      Electronically signed by: Hector Escamilla MD  Date: 12/13/2018  Time: 14:32   X-Ray Tibia Fibula 2 View Left [[id number]] Resulted: 12/13/18 1212   Order Status: Completed Updated:  12/13/18 1215   Narrative:     EXAMINATION:  XR TIBIA FIBULA 2 VIEW LEFT    CLINICAL HISTORY:  osteomyelitis;  Osteomyelitis, unspecified    TECHNIQUE:  AP and lateral views of the left tibia and fibula were performed.    COMPARISON:  None.    FINDINGS:  Status post total knee arthroplasty without evidence for complication.  Degenerative changes are noted at the ankle.  Vascular calcifications are present.  There is soft tissue gas at the lateral aspect of the ankle.   Impression:       Soft tissue gas at the lateral ankle.      Electronically signed by: Kevin Newby MD  Date: 12/13/2018  Time: 12:12   MRI Ankle W WO Contrast Left [[id number]] Resulted: 12/12/18 2255   Order Status: Completed Updated: 12/12/18 2257   Narrative:     EXAMINATION:  MRI ANKLE W WO CONTRAST LEFT    CLINICAL HISTORY:  Ankle erythema, swelling, cellulitis suspected    TECHNIQUE:  MRI ankle performed before and after the administration 10 mL Gadavist IV contrast.    COMPARISON:  Foot radiograph 12/12/2018, foot radiograph 12/22/2017.    FINDINGS:  There is a lateral malleolar ulcer and circumferential subcutaneous edema with skin thickening and enhancement of the hindfoot.  Large multiloculated peripherally enhancing fluid collections throughout the visualized soft tissues of the lateral distal foreleg at least 10 cm craniocaudad dimension contiguous with ulceration and extending proximal outside the field of view which are concerning for soft tissue abscesses.  There is diffuse muscular edema and enhancement suggesting significant myositis.    There is abnormal marrow edema and extensive irregular T1 marrow replacement in the distal tibia, calcaneus, talus, and navicular suggesting osteomyelitis.   Impression:       Findings concerning for osteomyelitis of the hindfoot including the distal tibia and tarsal bones with associated cellulitis, myositis, and multiloculated abscesses in the deep soft tissues contiguous with lateral distal  foreleg ulceration.  Component of abscess extends cranially outside the field of view, possibly more proximal portions of the left lower extremity.    COMMUNICATION  This critical result was discovered/received at 12/12/2018 at 22:00. The critical information above was relayed directly by me by telephone to Dr. Guzman On 12/12/2018 at 22:05.    Electronically signed by resident: Raymond Vidales  Date: 12/12/2018  Time: 21:35    Electronically signed by: Toñito Flower MD  Date: 12/12/2018  Time: 22:55   US Lower Extremity Veins Left [[id number]] Resulted: 12/12/18 2232   Order Status: Completed Updated: 12/12/18 2234   Narrative:     EXAMINATION:  US LOWER EXTREMITY VEINS LEFT    CLINICAL HISTORY:  Acute embolism and thrombosis of unspecified deep veins of unspecified lower extremity    TECHNIQUE:  Duplex and color flow Doppler evaluation and graded compression of the left lower extremity veins was performed.    COMPARISON:  None.    FINDINGS:  Left thigh veins: The common femoral, femoral, popliteal, upper greater saphenous, and deep femoral veins are patent and free of thrombus. The veins are normally compressible and have normal phasic flow and augmentation response.    Left calf veins: The visualized calf veins are patent.    Contralateral CFV: The contralateral (right) common femoral vein is patent and free of thrombus.    Miscellaneous: Soft tissue edema and abnormal fluid about the left ankle, better characterized on recent MRI.   Impression:       No evidence of DVT in the left lower extremity.    Electronically signed by resident: Eliazar Ordonez  Date: 12/12/2018  Time: 22:22    Electronically signed by: Toñito Flower MD  Date: 12/12/2018  Time: 22:32   X-Ray Foot Complete Left [[id number]] Resulted: 12/12/18 1922   Order Status: Completed Updated: 12/12/18 1925   Narrative:     EXAMINATION:  XR FOOT COMPLETE 3 VIEW LEFT    CLINICAL HISTORY:  Cellulitis, unspecified    TECHNIQUE:  Three views of the left  "foot.    COMPARISON:  11/26/2018.    FINDINGS:  Stable degenerative changes in the foot.  No acute fracture or dislocation identified.  No bony erosion.  Vascular calcifications noted.  There is persistent soft tissue edema at the anterior aspect of the ankle and over the dorsum of the foot.  No distinct soft tissue emphysema.  No radiopaque foreign body.   Impression:       Persistent soft tissue edema at the dorsum of the foot, possibly cellulitis in the correct clinical setting.      Electronically signed by: Toñito Flower MD  Date: 12/12/2018  Time: 19:22   X-Ray Chest AP Portable [[id number]] Resulted: 12/12/18 1919   Order Status: Completed Updated: 12/12/18 1922   Narrative:     EXAMINATION:  XR CHEST AP PORTABLE    CLINICAL HISTORY:  Provided history is "Sepsis;  ".    TECHNIQUE:  One view of the chest.    COMPARISON:  11/29/2018.    FINDINGS:  Multiple radiopaque structures overlie the chest and significantly limit evaluation, including an electronic device which may represent a cell phone.   Per technologist note, the patient refused to comply with exam instructions to remove any overlying material.   Cardiac silhouette is stable.  Right-sided PICC has been slightly retracted, with the tip now overlying the brachiocephalic/SVC junction.  No large focal area of consolidation or detrimental change in lung aeration.   Impression:       Limited examination, without obvious detrimental change.  Follow-up with upright PA and lateral views with removal of overlying structures as indicated.      Electronically signed by: Toñito Flower MD  Date: 12/12/2018  Time: 19:19         "

## 2018-12-19 NOTE — PROGRESS NOTES
[facility]  Infectious Disease  Progress Note    Patient Name: Krissy Marino  MRN: [id number]  Admission Date: 12/12/2018  Length of Stay: 6 days  Attending Physician: Livia Hernandez MD  Primary Care Provider: Emily Mcpherson MD    Isolation Status: No active isolations  Assessment/Plan:      Cellulitis of left lower extremity    77 yo female with hx of bilateral TKA,  recent GBS bacteremia, recurrent LLE cellulitis, PVD now found to have osteomyelitis of the hindfoot including the distal tibia and tarsal bones with associated cellulitis, myositis, and multiloculated abscesses in the deep soft tissues contiguous with lateral distal foreleg ulceration. Repeat CT shows infection to knee level and gas near ankle.  On vanc, cefepime and flagyl.  Ortho Sx following and rec AKA.  Vasc Sx has seen patient - awaiting recs.  Given malnutrition, extent of wound, and underlying comorbidities, AKA appears to be best option.  BCXs NGTD. Stable non septic.  Vanc DC'd and level still supratherapeutic.  Had long discussion with family and Ortho Sx with patient about further plan. Suspect will need AKA, if not now, then in the future.    Plan   - Continue Vanc (when levels return to normal), Cefepime 2g IV q8 to cover pseudomonas and Flagyl 500mg po tid as gas seen on CT scan  - Vanc supratherapeutic, contniue to hold.  Random ordered for tomorrow morning. Restart once pt levels WNL.  - F/u ortho plan as family wished to further discuss options..  - ID will continue to follow             Anticipated Disposition: tbd    Thank you for your consult. I will follow-up with patient. Please contact us if you have any additional questions.    JILLIAN Sheridan  Infectious Disease  [facility]    Subjective:     Principal Problem:Osteomyelitis of left tibia    HPI: Ms. Krissy Marino is a 78 y.o. female with type 2 diabetes, CAD status post PCI (7/2018), and peripheral vascular disease who  presents to the emergency department from [facility] for evaluation of left lower extremity cellulitis.  She was just admitted to [facility] from 11/26-12/5 for left lower extremity cellulitis s/p a left ankle sprain, where she was found to have group B strep bacteremia.  During that admission, a PICC line was placed and she was discharged on Ceftriaxone to complete a 14 day course that ended on 12/11.  Since her discharge, family mentions that her left lower extremity cellulitis has not been improving.  They endorse persistent erythema as well as occasional yellow drainage from the left lateral malleolus.  At baseline, the patient is fairly bed-bound and spends most of her time with her lower extremities dangling down.  Labs were done at her facility on 12/07 which showed a white blood cell count 20.  She was told to come to the emergency department for further evaluation.  She continues to endorse pain in her left as well as pain with movement.  She has never had any debridement of her wound, but mentions that it has been cleaned with Clorox by [facility].  She denies any fevers or chills.  Of note, the patient was told that because she has such severe peripheral vascular disease, she would require stent placement.  However, because of her recent cardiac stent placement, she is unable to get leg stents placed for at least 1 year, which will continue to prevent good wound healing.     In the emergency department, labs were notable for a white blood cell count 10, sed rate 56, and CRP 67.  X-rays were ordered which showed edema, but no cellulitis.  She was given IV vancomycin and was admitted to Hospital Medicine for further management.  MRI of LLE c/f large abscess and osteomyelitis of distal tibia and tarsal bones.  She has been placed on Vancomycin.  Blood cultures 12/12 are NGTD.      Interval History: No AEON.  Afebrile and WBC WNL.  Per [facility] S[facility], Dr. Renee, there is no ideal  surgical option but AKA still recommended given all known variables.  The patient denies any recent fever, chills, or sweats.      Review of Systems   Constitutional: Negative for activity change, chills, diaphoresis and fever.   Respiratory: Negative for cough, shortness of breath and wheezing.    Cardiovascular: Negative for chest pain.   Gastrointestinal: Negative for abdominal pain, constipation, diarrhea, nausea and vomiting.   Genitourinary: Negative for dysuria, frequency and urgency.   Skin: Positive for wound.   Neurological: Negative for dizziness.   Hematological: Does not bruise/bleed easily.     Objective:     Vital Signs (Most Recent):  Temp: 98.5 °F (36.9 °C) (12/18/18 0745)  Pulse: 74 (12/18/18 0919)  Resp: 16 (12/18/18 0919)  BP: (!) 118/58 (12/18/18 0745)  SpO2: 96 % (12/18/18 0745) Vital Signs (24h Range):  Temp:  [98.2 °F (36.8 °C)-98.9 °F (37.2 °C)] 98.5 °F (36.9 °C)  Pulse:  [74-82] 74  Resp:  [16-20] 16  SpO2:  [96 %-98 %] 96 %  BP: (118-138)/(57-66) 118/58     Weight: 106.6 kg (235 lb 0.2 oz)  Body mass index is 39.11 kg/m².    Estimated Creatinine Clearance: 60.4 mL/min (based on SCr of 0.9 mg/dL).    Physical Exam   Constitutional: She is oriented to person, place, and time. No distress.   Eyes: Pupils are equal, round, and reactive to light.   Cardiovascular: Normal rate and regular rhythm.   No murmur heard.  Pulmonary/Chest: Effort normal and breath sounds normal. No respiratory distress. She has no wheezes.   Abdominal: Soft. Bowel sounds are normal. She exhibits no distension. There is no tenderness.   Musculoskeletal: She exhibits edema (BLE). She exhibits no tenderness.   Neurological: She is alert and oriented to person, place, and time.   Skin: She is not diaphoretic. There is erythema (LLE).   Ulcers present at lateral aspect of left foot - edema and erythema improved   Psychiatric: She has a normal mood and affect. Her behavior is normal.       Significant Labs:   Blood Culture:    Recent Labs   Lab 11/26/18  0810 11/27/18  0858 11/27/18  0859 12/12/18  1849 12/12/18  1907   LABBLOO Gram stain anais bottle: Gram positive cocci in chains resembling Strep  Results called to and read back by:James Hooker RN  STREPTOCOCCUS AGALACTIAE (GROUP B)  Beta-hemolytic streptococci are routinely susceptible to   penicillins,cephalosporins and carbapenems.   No growth after 5 days. No growth after 5 days. No growth after 5 days. No growth after 5 days.     CBC:   Recent Labs   Lab 12/17/18  0525 12/18/18 0457   WBC 5.44 5.61   HGB 8.0* 8.8*   HCT 25.9* 28.5*    154     CMP:   Recent Labs   Lab 12/17/18 0525 12/18/18 0457    137   K 4.9 5.0    108   CO2 24 21*   * 116*   BUN 36* 35*   CREATININE 0.9 0.9   CALCIUM 8.2* 9.0   PROT 4.8* 5.4*   ALBUMIN 1.4* 1.6*   BILITOT 0.3 0.5   ALKPHOS 155* 193*   AST 38 71*   ALT 33 52*   ANIONGAP 6* 8   EGFRNONAA >60.0 >60.0     Wound Culture: No results for input(s): LABAERO in the last 4320 hours.  All pertinent labs within the past 24 hours have been reviewed.    Significant Imaging: I have reviewed all pertinent imaging results/findings within the past 24 hours.   X-Ray Chest 1 View Pre-OP [[id number]] Resulted: 12/13/18 2020   Order Status: Completed Updated: 12/13/18 2022   Narrative:     EXAMINATION:  XR CHEST 1 VIEW PRE-OP    CLINICAL HISTORY:  pre op;    TECHNIQUE:  Single frontal view of the chest was performed.    COMPARISON:  Chest radiograph 12/12/2018.    FINDINGS:  Redemonstration of multiple radiopaque structures overlying the chest the limiting evaluation.  The stable position of a right PICC with catheter tip overlying the brachiocephalic/SVC junction.    The cardiac silhouette and pulmonary vasculature is stable.    Lungs show no large focal consolidation, large pleural effusion or pneumothorax.    Bones are intact, with stable degenerative changes at the shoulders.   Impression:       No acute radiographic  findings.    Electronically signed by resident: Eliazar Ordonez  Date: 12/13/2018  Time: 19:54    Electronically signed by: Toñito Flower MD  Date: 12/13/2018  Time: 20:20   CT Ankle (Including Hindfoot) W W/O Contrast Left [[id number]] (Abnormal) Resulted: 12/13/18 1816   Order Status: Completed Updated: 12/13/18 1819   Narrative:     EXAMINATION:  CT LEG (TIBIA-FIBULA) W W/O CONTRAST LEFT; CT ANKLE (INCLUDING HINDFOOT) W W/O CONTRAST LEFT; CT FOOT W W/O CONTRAST LEFT    CLINICAL HISTORY:  osteomyelitis and abscess  Osteomyelitis, unspecified    TECHNIQUE:  Axial images of were acquired from the distal femur to the forefoot before and after the administration of Omnipaque 350 IV contrast.  Sagittal and coronal reformats were provided.    COMPARISON:  Knee and tibia radiograph 12/13/2018, MRI ankle 12/12/2018    FINDINGS:  Small rim enhancing multiloculated air fluid collections extends into the interosseous membrane cranially at least to the level of the fibular head.  There are small fluid collections posterior and medial to the knee which possibly communicate (axial series 4, image 137) and possibly enhance although extensive beam hardening artifact from the metallic knee prosthesis limits assessment of that area.  There is ulceration and subcutaneous gas at the lateral distal foreleg.  There is prominent surrounding skin thickening and diffuse subcutaneous fluid attenuation predominantly through the forefoot and hindfoot as well as the distal lower extremity.  Multiple lucent lesions are identified throughout the talus and midfoot which contain may sclerotic rim suggesting chronic osteomyelitis.  Lucency within the lateral talus contains air without sadia cortical disruption..  No fracture identified.  Vascular calcifications are present.   Impression:       Rim enhancing multiloculated abscess extends at least to the level of the fibular head noting there are separate fluid collections posterior and medial to the  knee (axial series 4, image 137) which possibly communicates although extensive beam hardening artifact from metallic knee prosthesis significantly limits assessment.    Multifocal osteolytic lesions with sclerotic rim in the foot and distal tibia likely representing components of acute and chronic osteomyelitis.  No definite calcification within a lucent lesion to suggest sequestrum although cannot be completely excluded.    Ulceration in the subcutaneous gas at the lateral distal foreleg is concerning for gas gangrene.    This report was flagged in Epic as abnormal.    Electronically signed by resident: Raymond Vidales  Date: 12/13/2018  Time: 17:25    Electronically signed by: Germán Flowers MD  Date: 12/13/2018  Time: 18:16   CT Foot W W/O Contrast Left [[id number]] (Abnormal) Resulted: 12/13/18 1816   Order Status: Completed Updated: 12/13/18 1819   Narrative:     EXAMINATION:  CT LEG (TIBIA-FIBULA) W W/O CONTRAST LEFT; CT ANKLE (INCLUDING HINDFOOT) W W/O CONTRAST LEFT; CT FOOT W W/O CONTRAST LEFT    CLINICAL HISTORY:  osteomyelitis and abscess  Osteomyelitis, unspecified    TECHNIQUE:  Axial images of were acquired from the distal femur to the forefoot before and after the administration of Omnipaque 350 IV contrast.  Sagittal and coronal reformats were provided.    COMPARISON:  Knee and tibia radiograph 12/13/2018, MRI ankle 12/12/2018    FINDINGS:  Small rim enhancing multiloculated air fluid collections extends into the interosseous membrane cranially at least to the level of the fibular head.  There are small fluid collections posterior and medial to the knee which possibly communicate (axial series 4, image 137) and possibly enhance although extensive beam hardening artifact from the metallic knee prosthesis limits assessment of that area.  There is ulceration and subcutaneous gas at the lateral distal foreleg.  There is prominent surrounding skin thickening and diffuse subcutaneous fluid attenuation  predominantly through the forefoot and hindfoot as well as the distal lower extremity.  Multiple lucent lesions are identified throughout the talus and midfoot which contain may sclerotic rim suggesting chronic osteomyelitis.  Lucency within the lateral talus contains air without sadia cortical disruption..  No fracture identified.  Vascular calcifications are present.   Impression:       Rim enhancing multiloculated abscess extends at least to the level of the fibular head noting there are separate fluid collections posterior and medial to the knee (axial series 4, image 137) which possibly communicates although extensive beam hardening artifact from metallic knee prosthesis significantly limits assessment.    Multifocal osteolytic lesions with sclerotic rim in the foot and distal tibia likely representing components of acute and chronic osteomyelitis.  No definite calcification within a lucent lesion to suggest sequestrum although cannot be completely excluded.    Ulceration in the subcutaneous gas at the lateral distal foreleg is concerning for gas gangrene.    This report was flagged in Epic as abnormal.    Electronically signed by resident: Raymond Vidales  Date: 12/13/2018  Time: 17:25    Electronically signed by: Germán Flowers MD  Date: 12/13/2018  Time: 18:16   CT Leg (Tibia-Fibula) W W/O Contrast Left [[id number]] (Abnormal) Resulted: 12/13/18 1816   Order Status: Completed Updated: 12/13/18 1819   Narrative:     EXAMINATION:  CT LEG (TIBIA-FIBULA) W W/O CONTRAST LEFT; CT ANKLE (INCLUDING HINDFOOT) W W/O CONTRAST LEFT; CT FOOT W W/O CONTRAST LEFT    CLINICAL HISTORY:  osteomyelitis and abscess  Osteomyelitis, unspecified    TECHNIQUE:  Axial images of were acquired from the distal femur to the forefoot before and after the administration of Omnipaque 350 IV contrast.  Sagittal and coronal reformats were provided.    COMPARISON:  Knee and tibia radiograph 12/13/2018, MRI ankle 12/12/2018    FINDINGS:  Small  rim enhancing multiloculated air fluid collections extends into the interosseous membrane cranially at least to the level of the fibular head.  There are small fluid collections posterior and medial to the knee which possibly communicate (axial series 4, image 137) and possibly enhance although extensive beam hardening artifact from the metallic knee prosthesis limits assessment of that area.  There is ulceration and subcutaneous gas at the lateral distal foreleg.  There is prominent surrounding skin thickening and diffuse subcutaneous fluid attenuation predominantly through the forefoot and hindfoot as well as the distal lower extremity.  Multiple lucent lesions are identified throughout the talus and midfoot which contain may sclerotic rim suggesting chronic osteomyelitis.  Lucency within the lateral talus contains air without sadia cortical disruption..  No fracture identified.  Vascular calcifications are present.   Impression:       Rim enhancing multiloculated abscess extends at least to the level of the fibular head noting there are separate fluid collections posterior and medial to the knee (axial series 4, image 137) which possibly communicates although extensive beam hardening artifact from metallic knee prosthesis significantly limits assessment.    Multifocal osteolytic lesions with sclerotic rim in the foot and distal tibia likely representing components of acute and chronic osteomyelitis.  No definite calcification within a lucent lesion to suggest sequestrum although cannot be completely excluded.    Ulceration in the subcutaneous gas at the lateral distal foreleg is concerning for gas gangrene.    This report was flagged in Epic as abnormal.    Electronically signed by resident: Raymond Vidales  Date: 12/13/2018  Time: 17:25    Electronically signed by: Germán Flowers MD  Date: 12/13/2018  Time: 18:16   X-Ray Knee 1 or 2 View Bilateral [[id number]] Resulted: 12/13/18 1432   Order Status: Completed  Updated: 12/13/18 1434   Narrative:     EXAMINATION:  XR KNEE 1 OR 2 VIEW BILATERAL    CLINICAL HISTORY:  hx of tka;  Osteomyelitis, unspecified    COMPARISON:  None 08/10/2016    FINDINGS:  Bilateral knee arthroplasties are identified.  Position alignment is satisfactory and unchanged as compared to the previous study.  No fracture or bone destruction identified   Impression:       See above      Electronically signed by: Hector Escamilla MD  Date: 12/13/2018  Time: 14:32   X-Ray Tibia Fibula 2 View Left [[id number]] Resulted: 12/13/18 1212   Order Status: Completed Updated: 12/13/18 1215   Narrative:     EXAMINATION:  XR TIBIA FIBULA 2 VIEW LEFT    CLINICAL HISTORY:  osteomyelitis;  Osteomyelitis, unspecified    TECHNIQUE:  AP and lateral views of the left tibia and fibula were performed.    COMPARISON:  None.    FINDINGS:  Status post total knee arthroplasty without evidence for complication.  Degenerative changes are noted at the ankle.  Vascular calcifications are present.  There is soft tissue gas at the lateral aspect of the ankle.   Impression:       Soft tissue gas at the lateral ankle.      Electronically signed by: Kevin Newby MD  Date: 12/13/2018  Time: 12:12   MRI Ankle W WO Contrast Left [[id number]] Resulted: 12/12/18 2255   Order Status: Completed Updated: 12/12/18 2257   Narrative:     EXAMINATION:  MRI ANKLE W WO CONTRAST LEFT    CLINICAL HISTORY:  Ankle erythema, swelling, cellulitis suspected    TECHNIQUE:  MRI ankle performed before and after the administration 10 mL Gadavist IV contrast.    COMPARISON:  Foot radiograph 12/12/2018, foot radiograph 12/22/2017.    FINDINGS:  There is a lateral malleolar ulcer and circumferential subcutaneous edema with skin thickening and enhancement of the hindfoot.  Large multiloculated peripherally enhancing fluid collections throughout the visualized soft tissues of the lateral distal foreleg at least 10 cm craniocaudad dimension contiguous with ulceration and  extending proximal outside the field of view which are concerning for soft tissue abscesses.  There is diffuse muscular edema and enhancement suggesting significant myositis.    There is abnormal marrow edema and extensive irregular T1 marrow replacement in the distal tibia, calcaneus, talus, and navicular suggesting osteomyelitis.   Impression:       Findings concerning for osteomyelitis of the hindfoot including the distal tibia and tarsal bones with associated cellulitis, myositis, and multiloculated abscesses in the deep soft tissues contiguous with lateral distal foreleg ulceration.  Component of abscess extends cranially outside the field of view, possibly more proximal portions of the left lower extremity.    COMMUNICATION  This critical result was discovered/received at 12/12/2018 at 22:00. The critical information above was relayed directly by me by telephone to Dr. Guzman On 12/12/2018 at 22:05.    Electronically signed by resident: Raymond Vidales  Date: 12/12/2018  Time: 21:35    Electronically signed by: Toñito Flower MD  Date: 12/12/2018  Time: 22:55   US Lower Extremity Veins Left [[id number]] Resulted: 12/12/18 2232   Order Status: Completed Updated: 12/12/18 2234   Narrative:     EXAMINATION:  US LOWER EXTREMITY VEINS LEFT    CLINICAL HISTORY:  Acute embolism and thrombosis of unspecified deep veins of unspecified lower extremity    TECHNIQUE:  Duplex and color flow Doppler evaluation and graded compression of the left lower extremity veins was performed.    COMPARISON:  None.    FINDINGS:  Left thigh veins: The common femoral, femoral, popliteal, upper greater saphenous, and deep femoral veins are patent and free of thrombus. The veins are normally compressible and have normal phasic flow and augmentation response.    Left calf veins: The visualized calf veins are patent.    Contralateral CFV: The contralateral (right) common femoral vein is patent and free of thrombus.    Miscellaneous: Soft  "tissue edema and abnormal fluid about the left ankle, better characterized on recent MRI.   Impression:       No evidence of DVT in the left lower extremity.    Electronically signed by resident: Eliazar Ordonez  Date: 12/12/2018  Time: 22:22    Electronically signed by: Toñito Flower MD  Date: 12/12/2018  Time: 22:32   X-Ray Foot Complete Left [[id number]] Resulted: 12/12/18 1922   Order Status: Completed Updated: 12/12/18 1925   Narrative:     EXAMINATION:  XR FOOT COMPLETE 3 VIEW LEFT    CLINICAL HISTORY:  Cellulitis, unspecified    TECHNIQUE:  Three views of the left foot.    COMPARISON:  11/26/2018.    FINDINGS:  Stable degenerative changes in the foot.  No acute fracture or dislocation identified.  No bony erosion.  Vascular calcifications noted.  There is persistent soft tissue edema at the anterior aspect of the ankle and over the dorsum of the foot.  No distinct soft tissue emphysema.  No radiopaque foreign body.   Impression:       Persistent soft tissue edema at the dorsum of the foot, possibly cellulitis in the correct clinical setting.      Electronically signed by: Toñito Flower MD  Date: 12/12/2018  Time: 19:22   X-Ray Chest AP Portable [[id number]] Resulted: 12/12/18 1919   Order Status: Completed Updated: 12/12/18 1922   Narrative:     EXAMINATION:  XR CHEST AP PORTABLE    CLINICAL HISTORY:  Provided history is "Sepsis;  ".    TECHNIQUE:  One view of the chest.    COMPARISON:  11/29/2018.    FINDINGS:  Multiple radiopaque structures overlie the chest and significantly limit evaluation, including an electronic device which may represent a cell phone.   Per technologist note, the patient refused to comply with exam instructions to remove any overlying material.   Cardiac silhouette is stable.  Right-sided PICC has been slightly retracted, with the tip now overlying the brachiocephalic/SVC junction.  No large focal area of consolidation or detrimental change in lung aeration.   Impression:       Limited " examination, without obvious detrimental change.  Follow-up with upright PA and lateral views with removal of overlying structures as indicated.      Electronically signed by: Toñito Flower MD  Date: 12/12/2018  Time: 19:19

## 2018-12-20 PROBLEM — L89.152 PRESSURE INJURY OF COCCYGEAL REGION, STAGE 2: Status: ACTIVE | Noted: 2018-12-20

## 2018-12-20 LAB
ABO + RH BLD: NORMAL
ALBUMIN SERPL BCP-MCNC: 1.7 G/DL
ALP SERPL-CCNC: 192 U/L
ALT SERPL W/O P-5'-P-CCNC: 61 U/L
ANION GAP SERPL CALC-SCNC: 10 MMOL/L
AST SERPL-CCNC: 72 U/L
BASOPHILS # BLD AUTO: 0.03 K/UL
BASOPHILS NFR BLD: 0.4 %
BILIRUB SERPL-MCNC: 0.5 MG/DL
BLD GP AB SCN CELLS X3 SERPL QL: NORMAL
BUN SERPL-MCNC: 39 MG/DL
CALCIUM SERPL-MCNC: 8.4 MG/DL
CHLORIDE SERPL-SCNC: 100 MMOL/L
CO2 SERPL-SCNC: 21 MMOL/L
CREAT SERPL-MCNC: 1.1 MG/DL
DIFFERENTIAL METHOD: ABNORMAL
EOSINOPHIL # BLD AUTO: 0.2 K/UL
EOSINOPHIL NFR BLD: 3.1 %
ERYTHROCYTE [DISTWIDTH] IN BLOOD BY AUTOMATED COUNT: 15 %
EST. GFR  (AFRICAN AMERICAN): 54.8 ML/MIN/1.73 M^2
EST. GFR  (NON AFRICAN AMERICAN): 47.5 ML/MIN/1.73 M^2
GLUCOSE SERPL-MCNC: 162 MG/DL
HCT VFR BLD AUTO: 28.8 %
HGB BLD-MCNC: 9.2 G/DL
IMM GRANULOCYTES # BLD AUTO: 0.05 K/UL
IMM GRANULOCYTES NFR BLD AUTO: 0.6 %
LYMPHOCYTES # BLD AUTO: 0.8 K/UL
LYMPHOCYTES NFR BLD: 10.7 %
MAGNESIUM SERPL-MCNC: 2.2 MG/DL
MCH RBC QN AUTO: 31.9 PG
MCHC RBC AUTO-ENTMCNC: 31.9 G/DL
MCV RBC AUTO: 100 FL
MONOCYTES # BLD AUTO: 0.6 K/UL
MONOCYTES NFR BLD: 7.2 %
NEUTROPHILS # BLD AUTO: 6.1 K/UL
NEUTROPHILS NFR BLD: 78 %
NRBC BLD-RTO: 0 /100 WBC
PLATELET # BLD AUTO: 132 K/UL
PMV BLD AUTO: 11.1 FL
POCT GLUCOSE: 185 MG/DL (ref 70–110)
POCT GLUCOSE: 237 MG/DL (ref 70–110)
POCT GLUCOSE: 331 MG/DL (ref 70–110)
POTASSIUM SERPL-SCNC: 3.9 MMOL/L
PROT SERPL-MCNC: 5.1 G/DL
RBC # BLD AUTO: 2.88 M/UL
SODIUM SERPL-SCNC: 131 MMOL/L
VANCOMYCIN SERPL-MCNC: 29.3 UG/ML
WBC # BLD AUTO: 7.79 K/UL

## 2018-12-20 PROCEDURE — 83735 ASSAY OF MAGNESIUM: CPT

## 2018-12-20 PROCEDURE — 80202 ASSAY OF VANCOMYCIN: CPT

## 2018-12-20 PROCEDURE — 85025 COMPLETE CBC W/AUTO DIFF WBC: CPT

## 2018-12-20 PROCEDURE — 99233 SBSQ HOSP IP/OBS HIGH 50: CPT | Mod: ,,, | Performed by: HOSPITALIST

## 2018-12-20 PROCEDURE — 63600175 PHARM REV CODE 636 W HCPCS: Performed by: HOSPITALIST

## 2018-12-20 PROCEDURE — 25000003 PHARM REV CODE 250: Performed by: HOSPITALIST

## 2018-12-20 PROCEDURE — 80053 COMPREHEN METABOLIC PANEL: CPT

## 2018-12-20 PROCEDURE — 86901 BLOOD TYPING SEROLOGIC RH(D): CPT

## 2018-12-20 PROCEDURE — 97530 THERAPEUTIC ACTIVITIES: CPT

## 2018-12-20 PROCEDURE — 25000003 PHARM REV CODE 250: Performed by: PHYSICIAN ASSISTANT

## 2018-12-20 PROCEDURE — 11000001 HC ACUTE MED/SURG PRIVATE ROOM

## 2018-12-20 RX ORDER — LOSARTAN POTASSIUM 100 MG/1
50 TABLET ORAL DAILY
Qty: 45 TABLET | Refills: 3
Start: 2018-12-19 | End: 2018-12-26 | Stop reason: HOSPADM

## 2018-12-20 RX ORDER — VANCOMYCIN HCL IN 5 % DEXTROSE 1.5G/250ML
1500 PLASTIC BAG, INJECTION (ML) INTRAVENOUS DAILY
Start: 2018-12-20 | End: 2018-12-26 | Stop reason: HOSPADM

## 2018-12-20 RX ORDER — FAMOTIDINE 20 MG/1
20 TABLET, FILM COATED ORAL DAILY
Status: DISCONTINUED | OUTPATIENT
Start: 2018-12-21 | End: 2018-12-26 | Stop reason: HOSPADM

## 2018-12-20 RX ORDER — POLYETHYLENE GLYCOL 3350 17 G/17G
17 POWDER, FOR SOLUTION ORAL DAILY
Refills: 0
Start: 2018-12-20 | End: 2018-12-26 | Stop reason: HOSPADM

## 2018-12-20 RX ORDER — CEFEPIME HYDROCHLORIDE 2 G/1
2 INJECTION, POWDER, FOR SOLUTION INTRAVENOUS EVERY 12 HOURS
Qty: 120 G | Refills: 1
Start: 2018-12-20 | End: 2018-12-26 | Stop reason: HOSPADM

## 2018-12-20 RX ORDER — VANCOMYCIN HCL IN 5 % DEXTROSE 1G/250ML
1000 PLASTIC BAG, INJECTION (ML) INTRAVENOUS
Status: DISCONTINUED | OUTPATIENT
Start: 2018-12-20 | End: 2018-12-21

## 2018-12-20 RX ORDER — METRONIDAZOLE 500 MG/1
500 TABLET ORAL EVERY 8 HOURS
Qty: 90 TABLET | Refills: 1
Start: 2018-12-20 | End: 2018-12-26

## 2018-12-20 RX ORDER — FUROSEMIDE 40 MG/1
40 TABLET ORAL DAILY
Qty: 60 TABLET | Refills: 11
Start: 2018-12-19 | End: 2018-12-26 | Stop reason: HOSPADM

## 2018-12-20 RX ADMIN — METRONIDAZOLE 500 MG: 500 TABLET ORAL at 03:12

## 2018-12-20 RX ADMIN — CLOPIDOGREL 75 MG: 75 TABLET, FILM COATED ORAL at 09:12

## 2018-12-20 RX ADMIN — FAMOTIDINE 20 MG: 20 TABLET ORAL at 09:12

## 2018-12-20 RX ADMIN — ENOXAPARIN SODIUM 40 MG: 100 INJECTION SUBCUTANEOUS at 04:12

## 2018-12-20 RX ADMIN — FLUTICASONE FUROATE AND VILANTEROL TRIFENATATE 1 PUFF: 100; 25 POWDER RESPIRATORY (INHALATION) at 09:12

## 2018-12-20 RX ADMIN — CEFEPIME 2 G: 2 INJECTION, POWDER, FOR SOLUTION INTRAVENOUS at 06:12

## 2018-12-20 RX ADMIN — FUROSEMIDE 40 MG: 40 TABLET ORAL at 09:12

## 2018-12-20 RX ADMIN — ASPIRIN 81 MG: 81 TABLET, COATED ORAL at 09:12

## 2018-12-20 RX ADMIN — INSULIN ASPART 4 UNITS: 100 INJECTION, SOLUTION INTRAVENOUS; SUBCUTANEOUS at 06:12

## 2018-12-20 RX ADMIN — POLYETHYLENE GLYCOL 3350 17 G: 17 POWDER, FOR SOLUTION ORAL at 09:12

## 2018-12-20 RX ADMIN — METOPROLOL SUCCINATE 50 MG: 50 TABLET, EXTENDED RELEASE ORAL at 09:12

## 2018-12-20 RX ADMIN — ALLOPURINOL 300 MG: 300 TABLET ORAL at 09:12

## 2018-12-20 RX ADMIN — METRONIDAZOLE 500 MG: 500 TABLET ORAL at 06:12

## 2018-12-20 RX ADMIN — METRONIDAZOLE 500 MG: 500 TABLET ORAL at 09:12

## 2018-12-20 RX ADMIN — VANCOMYCIN HYDROCHLORIDE 1000 MG: 1 INJECTION, POWDER, LYOPHILIZED, FOR SOLUTION INTRAVENOUS at 04:12

## 2018-12-20 RX ADMIN — ATORVASTATIN CALCIUM 40 MG: 20 TABLET, FILM COATED ORAL at 09:12

## 2018-12-20 RX ADMIN — ONDANSETRON 8 MG: 8 TABLET, ORALLY DISINTEGRATING ORAL at 04:12

## 2018-12-20 RX ADMIN — LOSARTAN POTASSIUM 50 MG: 50 TABLET, FILM COATED ORAL at 09:12

## 2018-12-20 RX ADMIN — LEVOTHYROXINE SODIUM 75 MCG: 75 TABLET ORAL at 06:12

## 2018-12-20 RX ADMIN — THERA TABS 1 TABLET: TAB at 09:12

## 2018-12-20 RX ADMIN — MICONAZOLE NITRATE: 20 OINTMENT TOPICAL at 09:12

## 2018-12-20 NOTE — PROGRESS NOTES
Progress Note  Hospital Medicine  [facility]       Patient Name: Krissy Marino  MRN:  [id number]  Hospital Medicine Team: [facility] Livia Hernandez MD  Date of Admission:  12/12/2018     Length of Stay:  LOS: 7 days   Expected Discharge Date: 12/20/2018  Principal Problem:  Osteomyelitis of left tibia     Subjective:     Interval History/Overnight Events:    Doing well. Mental status wnl.   Refused AKA. Family on the same page. ID discussed the case with family extensively, but once again, amputation was deferred. Understand that abx therapy alone is not definitive treatment and pt may deteriorate without proper control of infection , ie amputation     Currently not septic. abx adjusted.   Will work on SNF placement      allopurinol  300 mg Oral Daily    aspirin  81 mg Oral Daily    atorvastatin  40 mg Oral Daily    ceFEPime (MAXIPIME) IVPB  2 g Intravenous Q12H    clopidogrel  75 mg Oral Daily    enoxaparin  40 mg Subcutaneous Daily    famotidine  20 mg Oral BID    fluticasone-vilanterol  1 puff Inhalation Daily    furosemide  40 mg Oral Daily    levothyroxine  75 mcg Oral Before breakfast    losartan  50 mg Oral Daily    metoprolol succinate  50 mg Oral Daily    metroNIDAZOLE  500 mg Oral Q8H    miconazole nitrate 2%   Topical (Top) BID    multivitamin  1 tablet Oral Daily    polyethylene glycol  17 g Oral Daily    vancomycin (VANCOCIN) IVPB  1,500 mg Intravenous Q24H           sodium chloride, acetaminophen, dextrose 50%, dextrose 50%, glucagon (human recombinant), glucose, glucose, insulin aspart U-100, ondansetron, senna-docusate 8.6-50 mg, sodium chloride 0.9%    Review of Systems   Constitutional: Negative for chills, fatigue, fever.   HENT: Negative for sore throat, trouble swallowing.    Eyes: Negative for photophobia, visual disturbance.   Respiratory: Negative for cough, shortness of breath.    Cardiovascular: Negative for chest pain, palpitations,  leg swelling.   Gastrointestinal: Negative for abdominal pain, constipation, diarrhea, nausea, vomiting.   Endocrine: Negative for cold intolerance, heat intolerance.   Genitourinary: Negative for dysuria, frequency.   Musculoskeletal: Negative for arthralgias, myalgias.   Skin: L LE wound- stable   Neurological: confusion   Psychiatric/Behavioral: Negative for confusion, hallucinations, anxiety  All other systems reviewed and are negative.    Objective:     Temp:  [97.3 °F (36.3 °C)-98.6 °F (37 °C)]   Pulse:  [76-86]   Resp:  [17-18]   BP: (112-176)/(52-70)   SpO2:  [93 %-99 %]       Physical Exam:  Constitutional: elderly, appears chronically ill. NAD  Eyes: Pupils are equal, round, and reactive to light.   Cardiovascular: Normal rate and regular rhythm.   No murmur heard.  Pulmonary/Chest: Effort normal and breath sounds normal. No respiratory distress. She has no wheezes.   Abdominal: Soft. Bowel sounds are normal. She exhibits no distension. There is no tenderness.   Musculoskeletal: She exhibits mild edema (bilateral lower extremities worse on the left). She exhibits no tenderness.   Neurological: She is alert and oriented to person, place, and time.   Skin: She is not diaphoretic. There is erythema (LLE erythema, stable from yesterday).   Large ulceration present at lateral aspect of left foot with stable surrounding erythema   Psychiatric: She has a normal mood and affect. Her behavior is normal.             Labs:    Chemistries:   Recent Labs   Lab 12/13/18  0340  12/17/18  0525 12/18/18  0457 12/19/18  0733      < > 137 137 135*   K 4.0   < > 4.9 5.0 4.1      < > 107 108 103   CO2 25   < > 24 21* 25   BUN 35*   < > 36* 35* 35*   CREATININE 0.7   < > 0.9 0.9 1.0   CALCIUM 8.3*   < > 8.2* 9.0 8.7   PROT 5.0*   < > 4.8* 5.4* 5.4*   BILITOT 0.4   < > 0.3 0.5 0.5   ALKPHOS 173*   < > 155* 193* 202*   ALT 28   < > 33 52* 58*   AST 16   < > 38 71* 73*   MG 1.6   < > 1.8 1.7 1.2*   PHOS 3.1  --   --    --  3.0    < > = values in this interval not displayed.        WBC:   Recent Labs   Lab 12/15/18  0539 12/16/18  0406 12/17/18  0525 12/18/18  0457 12/19/18  0733   WBC 5.45 5.02 5.44 5.61 6.38     Bands:     CBC/Anemia Labs: Coags:    Recent Labs   Lab 12/16/18  0406 12/17/18  0525 12/18/18  0457 12/18/18  1903 12/19/18  0733   WBC 5.02 5.44 5.61  --  6.38   HGB 6.9* 8.0* 8.8*  --  9.5*   HCT 22.5* 25.9* 28.5*  --  30.3*    157 154  --  146*   * 103* 100*  --  98   RDW 14.1 15.0* 14.9*  --  14.8*   IRON 37  --   --   --   --    FERRITIN 810*  --   --   --   --    FOLATE  --   --   --  11.9  --    [id number]  --   --   --   --  357    No results for input(s): PT, INR, APTT in the last 168 hours.     POCT Glucose: HbA1c:    Recent Labs   Lab 12/18/18  1754 12/18/18  2355 12/19/18  0917 12/19/18  1255 12/19/18  1828 12/19/18  2119   POCTGLUCOSE 262* 280* 170* 263* 245* 300*    Hemoglobin A1C   Date Value Ref Range Status   12/12/2018 6.9 (H) 4.0 - 5.6 % Final     Comment:   05/30/2018 6.7 (H) 4.0 - 5.6 % Final     Comment:   11/21/2017 7.2 (H) 4.0 - 5.6 % Final     Comment:        Diagnostic Results:    MRI L ankle 12/0/18  Findings concerning for osteomyelitis of the hindfoot including the distal tibia and tarsal bones with associated cellulitis, myositis, and multiloculated abscesses in the deep soft tissues contiguous with lateral distal foreleg ulceration.  Component of abscess extends cranially outside the field of view, possibly more proximal portions of the left lower extremity.    CT L Leg 12/13  Rim enhancing multiloculated abscess extends at least to the level of the fibular head noting there are separate fluid collections posterior and medial to the knee (axial series 4, image 137) which possibly communicates although extensive beam hardening artifact from metallic knee prosthesis significantly limits assessment.    Multifocal osteolytic lesions with sclerotic rim in the foot and distal tibia  likely representing components of acute and chronic osteomyelitis.  No definite calcification within a lucent lesion to suggest sequestrum although cannot be completely excluded.    Ulceration in the subcutaneous gas at the lateral distal foreleg is concerning for gas gangrene.    Assessment and Plan     Hospital Course:    Ms. Krissy Marino is a 78 y.o. female with T2DM, CAD status post PCI (7/2018), and peripheral vascular disease on DAPT who was admitted on 12/12  from [facility] for evaluation of left lower extremity diabetic wound and was found to have L tibia osteomyelitis. f/u CT showed osteolytic lesions and extensive abscess, including infection to knee level and gas near ankle. Placed on IV vancomycin, PO flagyl and IV cefepime , ID following. blood cx during this hospitalization remain NGTD. Orthopedics and vascular surgery recommend AKA . However, patient is reluctant to undergo amputation.     Active Hospital Problems    Diagnosis  POA    *Osteomyelitis of left tibia [M86.9]  Yes     Priority: 1 - High    Gas gangrene of Left lower extremity [A48.0]  Yes     Priority: 1 - High    Chronic osteomyelitis of left tibia with draining sinus [M86.462]  Yes     Priority: 1 - High    Cellulitis of left lower extremity [L03.116]  Yes     Priority: 1 - High    Sepsis due to cellulitis [L03.90, A41.9]  Yes     Priority: 1 - High    Peripheral arterial disease [I73.9]  Yes     Priority: 2     Coronary artery disease involving native coronary artery of native heart with unstable angina pectoris [I25.110]  Yes     Priority: 2     Anemia [D64.9]  Yes     Priority: 3     Chronic diastolic heart failure [I50.32]  Yes     Priority: 4     Hypertension [I10]  Yes     Priority: 6     Alteration in skin integrity [R23.9]  Yes    Delirium [R41.0]  No    Moderate protein-calorie malnutrition [E44.0]  Yes    Hypoalbuminemia due to protein-calorie malnutrition [E46]  Yes    Cellulitis [L03.90]  Yes     Hypomagnesemia [E83.42]  No    Pressure injury of buttock, stage 2 [L89.302]  Yes    Benign hypertensive heart disease with HF (heart failure) [I11.0]  Yes    Macrocytic anemia [D53.9]  Yes    Preoperative cardiovascular examination [Z01.810]  Not Applicable    Obesity (BMI 30-39.9) [E66.9]  Yes    Bilateral leg edema [R60.0]  Yes    Hypothyroidism [E03.9]  Yes    Dyslipidemia [E78.5]  Yes    Type 2 diabetes mellitus, without long-term current use of insulin [E11.9]  Yes      Resolved Hospital Problems    Diagnosis Date Resolved POA    Hyperkalemia [E87.5] 12/17/2018 No     Osteomyelitis of left tibia  Gas gangrene of Left lower extremity  Cellulitis of left lower extremity  - imaging findings as above  - Ortho and Vascular surgery recommend AKA.   - patient and family refused AKA  - PICC line in place  - ID following . Will cont with abx therapy alone, with family and pt understanding that this is not definitely therapy and pt may have deadly complications from untreated infection / sepsis  - Vanc 1.5g IV q24. Vanc trough goal 15-20 trough prior to 4th dose  -  Cefepime 2g IV q12   - Flagyl 500mg po tid as gas seen on CT scan  - will order abx for 6 weeks a this time, however, total therapy course is unclear at this time   - needs Weekly  ESR, CRP, CBC, CMP and Vanc trough   - Will need re-imagine of LLE in 4-5 weeks or if she develops worsening signs/sx of infection    Peripheral arterial disease  - non-compressible MARIMAR with normal doppler waveforms and palpable pedal pulses  - Vascular surgery recommend AKA. Patient is deferring at this time. Ongoing family discussions  - cont DAPT  - cont statin     Delirium - resolved   - noted on 12/18 PM  -No fevers, WBC wnl. BP on lower side and anti-HTN meds were readjusted. No longer on gabapentin. Not on narcotics, as cannot feel pain. Stop cetirizine   -Pendleton in place and will d/c  - Delirium precautions placed  - monitor mental status and infection      Coronary artery disease involving native coronary artery of native heart with unstable angina pectoris  Chronic diastolic heart failure   Dyslipidemia   -Underwent LHC and subsequent PCI in 7/2018 and intervention at Bifurcation of the LAD and D1  - cont DAPT  - cont statin   - cont CAD/ HF meds as below: toprol 50 mg daily, amlodipine stopped, losartan decreased to 50mg (from 100, also K+ on higher limit of normal), lasix changed from 40 PO bid to daily and may actually stop given malnutrition and severe hypoalbuminemia     Hypertension   -lower BPs noted on 12/18, meds readjusted  -conttoprol 50 mg daily  - amlodipine stopped  - losartan decreased to 50mg (from 100, also K+ on higher limit of normal)  -lasix changed from 40 PO bid to daily and may actually stop given malnutrition and severe hypoalbuminemia     Type 2 diabetes mellitus, without long-term current use of insulin    -Will hold home oral antihyperglycemic agents in favor of low-dose aspart SSI + POCT glucose checks    Hypothyroidism   - cont synthroid     Macrocytic Anemia  -anemia of chronic inflammation   -Transfused 12/16 with good response  - monitor hbg, has been stable at 8s  - check B12 and folate  - multivit started    Moderate protein calorie malnutrition  Severe hypoalbuminemia  - PAB of 11, albumin of 1.6  - recheck PAB  - boost TID  - beneprotein TID  - multivit started  - nutrition consult    Diet:  cardiac  GI PPx:  famotidine  DVT PPx:  lovenox  Goals of Care:  full    High Risk Conditions:  OM    Disposition:      SNF with PT OT and IV abx    Signing Physician:     Livia Hernandez MD  Department of [facility] Medicine   [facility]  Pager [phone] [employer] [phone]  12/19/2018

## 2018-12-20 NOTE — CONSULTS
"  [facility]  Adult Nutrition  Consult Note    SUMMARY     Recommendations    Recommendation/Intervention:   1.Continue w/ ADA 2000 kcal/cardiac diet.   2.Encourage PO intake >/= 75% EEN/EPN   3.If PO intake is <50% provide Boost GC.     1.RD to follow  Goals: po intake > 50%  Nutrition Goal Status: new, goal met  Communication of RD Recs: (POC)    Reason for Assessment    Reason For Assessment: consult  Diagnosis: (Osteomyelitis of left tibia )  Relevant Medical History: Cancer, CHF, CKD,   Interdisciplinary Rounds: did not attend  General Information Comments: Pt states po intake is good. pt eating lunch at time of visit.  po intake was ~50-75%, pt states this is about the typical amount she eats. Boost was ordered in epic, but not received. RD updated [employer]. NPFE completed w/ no signs of Malnutrition. wt has trended up slightly over the last year, po intake ~>50%. some fat/muscle wasting r/t age.   Nutrition Discharge Planning: Regular cardiac diet w/ po intake > 75%    Nutrition Risk Screen    Nutrition Risk Screen: no indicators present    Nutrition/Diet History    Patient Reported Diet/Restrictions/Preferences: diabetic diet  Spiritual, Cultural Beliefs, Orthodoxy Practices, Values that Affect Care: no    Anthropometrics    Temp: 98.1 °F (36.7 °C)  Height Method: Stated  Height: 5' 5" (165.1 cm)  Height (inches): 65 in  Weight Method: Bed Scale  Weight: 106.6 kg (235 lb 0.2 oz)  Weight (lb): 235.01 lb  Ideal Body Weight (IBW), Female: 125 lb  % Ideal Body Weight, Female (lb): 188.01 lb  BMI (Calculated): 39.2  BMI Grade: 35 - 39.9 - obesity - grade II       Lab/Procedures/Meds    Pertinent Labs Reviewed: reviewed  Pertinent Labs Comments: Na-131, BUN-39, GFR-47.5, Glu-162, Ca-8.4, Alk phos-192, AST/ALT-72/61, Alb-1.7,   Pertinent Medications Reviewed: reviewed  Pertinent Medications Comments: statin lasix, insulin, losartan, MVI.     Physical Findings/Assessment        Estimated/Assessed " Needs    Weight Used For Calorie Calculations: 106.6 kg (235 lb 0.2 oz)  Energy Calorie Requirements (kcal): 1844kcal/d  Energy Need Method: [place]-St Jeor(x 1.2)  Protein Requirements: 103-114g/d (1.8-2.9g/kg IBW) BMI- 30-39  Weight Used For Protein Calculations: 57 kg (125 lb 10.6 oz)     Estimated Fluid Requirement Method: RDA Method(or per MD)  RDA Method (mL): 1844  CHO Requirement: 230g/d (50% EEN)      Nutrition Prescription Ordered    Current Diet Order: ADA 2000 kcal cardiac  Oral Nutrition Supplement: Boost GC    Evaluation of Received Nutrient/Fluid Intake       % Intake of Estimated Energy Needs: 25 - 50 %  % Meal Intake: 25 - 50 %    Nutrition Risk    Level of Risk/Frequency of Follow-up: low     Assessment and Plan    Nutrition Problem  Altered nutritional lab values (liver)    Related to (etiology):   Likely r/t sepsis    Signs and Symptoms (as evidenced by):   Alk phos-192, AST/ALT-72/61, Alb-1.7    Interventions/Recommendations (treatment strategy):  1.Continue w/ ADA 2000 kcal/cardiac diet.   2.Encourage PO intake >/= 75% EEN/EPN   3.If PO intake is <50% provide Boost GC.     1.RD to follow    Nutrition Diagnosis Status:   New      Monitor and Evaluation    Food and Nutrient Intake: energy intake, food and beverage intake  Food and Nutrient Adminstration: diet order  Anthropometric Measurements: weight, weight change  Biochemical Data, Medical Tests and Procedures: (all labs)  Nutrition-Focused Physical Findings: overall appearance     Malnutrition Assessment  Malnutrition Type: (no criteria met)      Nutrition Follow-Up    RD Follow-up?: Yes

## 2018-12-20 NOTE — PLAN OF CARE
Problem: Adult Inpatient Plan of Care  Goal: Plan of Care Review  Outcome: Ongoing (interventions implemented as appropriate)  Pt remains free of falls and injury. Provided with zofran awaiting results. Provided with frequent position changes. Bed low and locked, Call light within reach.

## 2018-12-20 NOTE — PROGRESS NOTES
Progress Note  Hospital Medicine  [facility]       Patient Name: Krissy Marino  MRN:  [id number]  Hospital Medicine Team: [facility] MED S Livia Hernandez MD  Date of Admission:  12/12/2018     Length of Stay:  LOS: 8 days   Expected Discharge Date: 12/21/2018  Principal Problem:  Osteomyelitis of left tibia     Subjective:     Interval History/Overnight Events:    Doing well. Mental status wnl. HDS. Labs stable   Refused AKA. So will treat with abx and topica wound care.   Currently not septic.   vanco dose decreased given level of 29  Will work on SNF placement      allopurinol  300 mg Oral Daily    aspirin  81 mg Oral Daily    atorvastatin  40 mg Oral Daily    ceFEPime (MAXIPIME) IVPB  2 g Intravenous Q12H    clopidogrel  75 mg Oral Daily    enoxaparin  40 mg Subcutaneous Daily    [START ON 12/21/2018] famotidine  20 mg Oral Daily    fluticasone-vilanterol  1 puff Inhalation Daily    furosemide  40 mg Oral Daily    levothyroxine  75 mcg Oral Before breakfast    losartan  50 mg Oral Daily    metoprolol succinate  50 mg Oral Daily    metroNIDAZOLE  500 mg Oral Q8H    miconazole nitrate 2%   Topical (Top) BID    multivitamin  1 tablet Oral Daily    polyethylene glycol  17 g Oral Daily    vancomycin (VANCOCIN) IVPB  1,000 mg Intravenous Q24H           sodium chloride, acetaminophen, dextrose 50%, dextrose 50%, glucagon (human recombinant), glucose, glucose, insulin aspart U-100, ondansetron, senna-docusate 8.6-50 mg, sodium chloride 0.9%    Review of Systems   Constitutional: Negative for chills, fatigue, fever.   HENT: Negative for sore throat, trouble swallowing.    Eyes: Negative for photophobia, visual disturbance.   Respiratory: Negative for cough, shortness of breath.    Cardiovascular: Negative for chest pain, palpitations, leg swelling.   Gastrointestinal: Negative for abdominal pain, constipation, diarrhea, nausea, vomiting.   Endocrine: Negative for cold  intolerance, heat intolerance.   Genitourinary: Negative for dysuria, frequency.   Musculoskeletal: Negative for arthralgias, myalgias.   Skin: L LE wound- stable   Neurological: confusion   Psychiatric/Behavioral: Negative for confusion, hallucinations, anxiety  All other systems reviewed and are negative.    Objective:     Temp:  [96.8 °F (36 °C)-98.8 °F (37.1 °C)]   Pulse:  [73-81]   Resp:  [18]   BP: ()/(55-70)   SpO2:  [93 %-98 %]       Physical Exam:  Constitutional: elderly, appears chronically ill. NAD  Eyes: Pupils are equal, round, and reactive to light.   Cardiovascular: Normal rate and regular rhythm.   No murmur heard.  Pulmonary/Chest: Effort normal and breath sounds normal. No respiratory distress. She has no wheezes.   Abdominal: Soft. Bowel sounds are normal. She exhibits no distension. There is no tenderness.   Musculoskeletal: She exhibits mild edema (bilateral lower extremities worse on the left). She exhibits no tenderness.   Neurological: She is alert and oriented to person, place, and time.   Skin: She is not diaphoretic. There is erythema (LLE erythema, stable from yesterday).   Large ulceration present at lateral aspect of left foot with stable surrounding erythema   Psychiatric: She has a normal mood and affect. Her behavior is normal.         Labs:    Chemistries:   Recent Labs   Lab 12/18/18  0457 12/19/18  0733 12/20/18  0518    135* 131*   K 5.0 4.1 3.9    103 100   CO2 21* 25 21*   BUN 35* 35* 39*   CREATININE 0.9 1.0 1.1   CALCIUM 9.0 8.7 8.4*   PROT 5.4* 5.4* 5.1*   BILITOT 0.5 0.5 0.5   ALKPHOS 193* 202* 192*   ALT 52* 58* 61*   AST 71* 73* 72*   MG 1.7 1.2* 2.2   PHOS  --  3.0  --         WBC:   Recent Labs   Lab 12/16/18  0406 12/17/18  0525 12/18/18  0457 12/19/18  0733 12/20/18  0518   WBC 5.02 5.44 5.61 6.38 7.79     Bands:     CBC/Anemia Labs: Coags:    Recent Labs   Lab 12/16/18  0406  12/18/18  0457 12/18/18  1903 12/19/18  0733 12/20/18  0518   WBC 5.02    < > 5.61  --  6.38 7.79   HGB 6.9*   < > 8.8*  --  9.5* 9.2*   HCT 22.5*   < > 28.5*  --  30.3* 28.8*      < > 154  --  146* 132*   *   < > 100*  --  98 100*   RDW 14.1   < > 14.9*  --  14.8* 15.0*   IRON 37  --   --   --   --   --    FERRITIN 810*  --   --   --   --   --    FOLATE  --   --   --  11.9  --   --    [id number]  --   --   --   --  357  --     < > = values in this interval not displayed.    No results for input(s): PT, INR, APTT in the last 168 hours.     POCT Glucose: HbA1c:    Recent Labs   Lab 12/19/18  0917 12/19/18  1255 12/19/18  1828 12/19/18  2119 12/20/18  0820 12/20/18  1232   POCTGLUCOSE 170* 263* 245* 300* 185* 237*    Hemoglobin A1C   Date Value Ref Range Status   12/12/2018 6.9 (H) 4.0 - 5.6 % Final     Comment:   05/30/2018 6.7 (H) 4.0 - 5.6 % Final     Comment:   11/21/2017 7.2 (H) 4.0 - 5.6 % Final     Comment:        Diagnostic Results:    MRI L ankle 12/0/18  Findings concerning for osteomyelitis of the hindfoot including the distal tibia and tarsal bones with associated cellulitis, myositis, and multiloculated abscesses in the deep soft tissues contiguous with lateral distal foreleg ulceration.  Component of abscess extends cranially outside the field of view, possibly more proximal portions of the left lower extremity.    CT L Leg 12/13  Rim enhancing multiloculated abscess extends at least to the level of the fibular head noting there are separate fluid collections posterior and medial to the knee (axial series 4, image 137) which possibly communicates although extensive beam hardening artifact from metallic knee prosthesis significantly limits assessment.    Multifocal osteolytic lesions with sclerotic rim in the foot and distal tibia likely representing components of acute and chronic osteomyelitis.  No definite calcification within a lucent lesion to suggest sequestrum although cannot be completely excluded.    Ulceration in the subcutaneous gas at the lateral  distal foreleg is concerning for gas gangrene.    Assessment and Plan     Hospital Course:    Ms. Krissy Marino is a 78 y.o. female with T2DM, CAD status post PCI (7/2018), and peripheral vascular disease on DAPT who was admitted on 12/12  from [facility] for evaluation of left lower extremity diabetic wound and was found to have L tibia osteomyelitis. f/u CT showed osteolytic lesions and extensive abscess, including infection to knee level and gas near ankle. Placed on IV vancomycin, PO flagyl and IV cefepime , ID following. blood cx during this hospitalization remain NGTD. Orthopedics and vascular surgery recommend AKA . However, patient refused to undergo amputation.     Active Hospital Problems    Diagnosis  POA    *Osteomyelitis of left tibia [M86.9]  Yes     Priority: 1 - High    Gas gangrene of Left lower extremity [A48.0]  Yes     Priority: 1 - High    Chronic osteomyelitis of left tibia with draining sinus [M86.462]  Yes     Priority: 1 - High    Cellulitis of left lower extremity [L03.116]  Yes     Priority: 1 - High    Sepsis due to cellulitis [L03.90, A41.9]  Yes     Priority: 1 - High    Peripheral arterial disease [I73.9]  Yes     Priority: 2     Coronary artery disease involving native coronary artery of native heart with unstable angina pectoris [I25.110]  Yes     Priority: 2     Anemia [D64.9]  Yes     Priority: 3     Chronic diastolic heart failure [I50.32]  Yes     Priority: 4     Hypertension [I10]  Yes     Priority: 6     Pressure injury of coccygeal region, stage 2 [L89.152]  Yes    Alteration in skin integrity [R23.9]  Yes    Delirium [R41.0]  No    Moderate protein-calorie malnutrition [E44.0]  Yes    Hypoalbuminemia due to protein-calorie malnutrition [E46]  Yes    Cellulitis [L03.90]  Yes    Hypomagnesemia [E83.42]  No    Pressure injury of buttock, stage 2 [L89.302]  Yes    Benign hypertensive heart disease with HF (heart failure) [I11.0]  Yes    Macrocytic  anemia [D53.9]  Yes    Preoperative cardiovascular examination [Z01.810]  Not Applicable    Obesity (BMI 30-39.9) [E66.9]  Yes    Bilateral leg edema [R60.0]  Yes    Hypothyroidism [E03.9]  Yes    Dyslipidemia [E78.5]  Yes    Type 2 diabetes mellitus, without long-term current use of insulin [E11.9]  Yes      Resolved Hospital Problems    Diagnosis Date Resolved POA    Hyperkalemia [E87.5] 12/17/2018 No     Osteomyelitis of left tibia  Gas gangrene of Left lower extremity  Cellulitis of left lower extremity  - imaging findings as above  - Ortho and Vascular surgery recommend AKA.   - patient and family refused AKA  - PICC line in place  - ID involved. Will cont with abx therapy alone, with family and pt understanding that this is not definitely therapy and pt may have deadly complications from untreated infection / sepsis  - Vanc 1g IV q24. Vanc trough goal 15-20 trough prior to 4th dose  - Cefepime 2g IV q12   - Flagyl 500mg po tid as gas seen on CT scan  - will order abx for 6 weeks a this time, however, total therapy course is unclear at this time   - needs Weekly  ESR, CRP, CBC, CMP and Vanc trough   - Will need re-imagine of LLE in 4-5 weeks or if she develops worsening signs/sx of infection    Peripheral arterial disease  - non-compressible MARIMAR with normal doppler waveforms and palpable pedal pulses  - Vascular surgery recommend AKA. Patient is deferring at this time. Ongoing family discussions  - cont DAPT  - cont statin     Delirium - resolved   - noted on 12/18 PM  -No fevers, WBC wnl. BP on lower side and anti-HTN meds were readjusted. No longer on gabapentin. Not on narcotics, as cannot feel pain. Stop cetirizine   -Pendleton in place and will d/c  - Delirium precautions placed  - monitor mental status and infection     Coronary artery disease involving native coronary artery of native heart with unstable angina pectoris  Chronic diastolic heart failure   Dyslipidemia   -Underwent LHC and subsequent  PCI in 7/2018 and intervention at Bifurcation of the LAD and D1  - cont DAPT  - cont statin   - cont CAD/ HF meds as below: toprol 50 mg daily, amlodipine stopped, losartan decreased to 50mg (from 100, also K+ on higher limit of normal), lasix changed from 40 PO bid to daily and may actually stop given malnutrition and severe hypoalbuminemia     Hypertension   -lower BPs noted on 12/18, meds readjusted  -conttoprol 50 mg daily  - amlodipine stopped  - losartan decreased to 50mg (from 100, also K+ on higher limit of normal)  -lasix changed from 40 PO bid to daily and may actually stop given malnutrition and severe hypoalbuminemia     Type 2 diabetes mellitus, without long-term current use of insulin    -Will hold home oral antihyperglycemic agents in favor of low-dose aspart SSI + POCT glucose checks    Hypothyroidism   - cont synthroid     Macrocytic Anemia  -anemia of chronic inflammation   -Transfused 12/16 with good response  - monitor hbg, has been stable at 9s  - B12 and folate wnl  - multivit started    Moderate protein calorie malnutrition  Severe hypoalbuminemia  - PAB of 11, albumin of 1.6  - PAB13  - boost TID  - beneprotein TID  - multivit started    Diet:  cardiac  GI PPx:  famotidine  DVT PPx:  lovenox  Goals of Care:  full    High Risk Conditions:  OM    Disposition:      SNF with PT OT and IV abx  Labs spaced out    Signing Physician:     Livia Hernandez MD  Department of Hospital Medicine   [facility]  Pager [phone] [place] [phone]  12/20/2018

## 2018-12-20 NOTE — PLAN OF CARE
Problem: Adult Inpatient Plan of Care  Goal: Plan of Care Review  Outcome: Ongoing (interventions implemented as appropriate)  IV Abx administered as ordered, BS checked as ordered, nod distress noted, will continue to monitor.    Problem: Infection  Goal: Infection Symptom Resolution    Intervention: Prevent or Manage Infection   12/19/18 2013   Prevent or Manage Infection   Fever Reduction/Comfort Measures medication administered

## 2018-12-20 NOTE — PLAN OF CARE
[facility]     Department of Hospital Medicine     [address] [place] [address]     [phone] after hours  [phone] fax       NURSING HOME ORDERS    12/20/2018    Admit to Nursing Home:      Skilled Bed                                               Diagnoses:  Active Hospital Problems    Diagnosis  POA    *Osteomyelitis of left tibia [M86.9]  Yes     Priority: 1 - High    Gas gangrene of Left lower extremity [A48.0]  Yes     Priority: 1 - High    Chronic osteomyelitis of left tibia with draining sinus [M86.462]  Yes     Priority: 1 - High    Cellulitis of left lower extremity [L03.116]  Yes     Priority: 1 - High    Sepsis due to cellulitis [L03.90, A41.9]  Yes     Priority: 1 - High    Peripheral arterial disease [I73.9]  Yes     Priority: 2     Coronary artery disease involving native coronary artery of native heart with unstable angina pectoris [I25.110]  Yes     Priority: 2     Anemia [D64.9]  Yes     Priority: 3     Chronic diastolic heart failure [I50.32]  Yes     Priority: 4     Hypertension [I10]  Yes     Priority: 6     Alteration in skin integrity [R23.9]  Yes    Delirium [R41.0]  No    Moderate protein-calorie malnutrition [E44.0]  Yes    Hypoalbuminemia due to protein-calorie malnutrition [E46]  Yes    Cellulitis [L03.90]  Yes    Hypomagnesemia [E83.42]  No    Pressure injury of buttock, stage 2 [L89.302]  Yes    Benign hypertensive heart disease with HF (heart failure) [I11.0]  Yes    Macrocytic anemia [D53.9]  Yes    Preoperative cardiovascular examination [Z01.810]  Not Applicable    Obesity (BMI 30-39.9) [E66.9]  Yes    Bilateral leg edema [R60.0]  Yes    Hypothyroidism [E03.9]  Yes    Dyslipidemia [E78.5]  Yes    Type 2 diabetes mellitus, without long-term current use of insulin [E11.9]  Yes      Resolved Hospital Problems    Diagnosis Date Resolved POA    Hyperkalemia [E87.5] 12/17/2018 No       Patient is  homebound due to:  Osteomyelitis of left tibia    Allergies:  Review of patient's allergies indicates:   Allergen Reactions    Sulfamethoxazole-trimethoprim Nausea And Vomiting    Latex, natural rubber Rash    Pcn [penicillins] Rash       Vitals:       Every shift (Skilled Nursing patients)    Diet:diabetic     Acitivities:      - Up in a chair each morning as tolerated   - Ambulate with assistance to bathroom   - Weight bearing: L foot      LABS:  Per facility protocol  Please obtain Weekly (on Mondays) ESR, CRP, CBC, CMP and Vanc trough   Please fax results to the ID Department at [phone]    Final antibiotic end date to be determined by ID dept and follow up imaging     Nursing Precautions:     - Aspiration precautions:             - Total assistance with meals            -  Upright 90 degrees befor during and after meals             -  Suction at bedside          - Fall precautions per nursing home protocol   - Decubitus precautions:        -  for positioning   - Pressure reducing foam mattress   - Turn patient every two hours. Use wedge pillows to anchor patient    CONSULTS:     Physical Therapy to evaluate and treat     Occupational Therapy to evaluate and treat      Nutrition to evaluate and recommend diet        MISCELLANEOUS CARE:       Wound care:  L leg- painting ulceration with betadine BID  Stage 2 pressure injury over coccyx - Recommend applying clear barrier cream with miconazole BID.     Routine Skin for Bedridden Patients:  Apply moisture barrier cream to all    skin folds and wet areas in perineal area daily and after baths and                           all bowel movements.                     DIABETES CARE:      Check blood sugar:     Fingerstick blood sugar AC and HS      Report CBG < 60 or > 400 to physician.                                          Insulin Sliding Scale          Glucose  Novolog Insulin Subcutaneous        0 - 60   Orange juice or glucose tablet, hold  insulin      No insulin   201-250  2 units   251-300  4 units   301-350  6 units   351-400  8 units   >400   10 units then call physician      Medications:   JamilaKrissy   Home Medication Instructions GENA:[id number]    Printed on:12/20/18 0001   Medication Information                      acetaminophen (TYLENOL) 325 MG tablet  Take 2 tablets (650 mg total) by mouth every 6 (six) hours as needed.             albuterol 90 mcg/actuation inhaler  Inhale 2 puffs into the lungs every 6 (six) hours as needed for Wheezing.             alendronate (FOSAMAX) 70 MG tablet  Take 1 tablet (70 mg total) by mouth every 7 days.             allopurinol (ZYLOPRIM) 300 MG tablet  TAKE 1 TABLET(300 MG) BY MOUTH EVERY DAY             aspirin (ECOTRIN) 81 MG EC tablet  Take 1 tablet (81 mg total) by mouth once daily.             atorvastatin (LIPITOR) 40 MG tablet  TAKE 1 TABLET(40 MG) BY MOUTH EVERY DAY             azelastine (ASTELIN) 137 mcg (0.1 %) nasal spray  USE 1 SPRAY(137 MCG) IN EACH NOSTRIL TWICE DAILY             blood sugar diagnostic (ACCU-CHEK AMELIA PLUS TEST STRP) Strp  Checks bg 2 x day before meals             ceFEPIme (MAXIPIME) 2 gram injection  Inject 2 g into the vein every 12 (twelve) hours.             clopidogrel (PLAVIX) 75 mg tablet  Take 1 tablet (75 mg total) by mouth once daily.             collagenase (SANTYL) ointment  Apply topically once daily.             furosemide (LASIX) 40 MG tablet  Take 1 tablet (40 mg total) by mouth once daily.             glipiZIDE (GLUCOTROL) 2.5 MG TR24  Take 1 tablet (2.5 mg total) by mouth daily with breakfast.             HYDROcodone-acetaminophen (NORCO) 5-325 mg per tablet  Take 1 tablet by mouth every 8 (eight) hours as needed for Pain.             hydrocortisone butyrate (LOCOID) 0.1 % Crea cream  AAA buttock bid             levothyroxine (SYNTHROID) 75 MCG tablet  Take 1 tablet (75 mcg total) by mouth before breakfast.             loperamide (IMODIUM) 2 mg  capsule  Take 2 mg by mouth 4 (four) times daily as needed for Diarrhea.             losartan (COZAAR) 100 MG tablet  Take 0.5 tablets (50 mg total) by mouth once daily.             magnesium oxide (MAG-OX) 400 mg tablet  Take 1 tablet (400 mg total) by mouth once daily.             metoprolol succinate (TOPROL-XL) 50 MG 24 hr tablet  Take 1 tablet (50 mg total) by mouth once daily.             metroNIDAZOLE (FLAGYL) 500 MG tablet  Take 1 tablet (500 mg total) by mouth every 8 (eight) hours.             miconazole NITRATE 2 % (ZEASORB AF) 2 % top powder  Apply topically as needed for Itching.             multivitamin (THERAGRAN) tablet  Take 1 tablet by mouth once daily.             polyethylene glycol (GLYCOLAX) 17 gram PwPk  Take 17 g by mouth once daily.             ranitidine (ZANTAC) 150 MG tablet  Take 1 tablet (150 mg total) by mouth 2 (two) times daily as needed for Heartburn.             SYMBICORT 160-4.5 mcg/actuation HFAA  INHALE TWO PUFFS INTO THE LUNGS EVERY 12 HOURS             vancomycin HCl in 5 % dextrose (VANCOMYCIN IN 5 % DEXTROSE) 1 gram/250 mL Soln  Inject 250 mLs (1,000 mg total) into the vein once daily.                       _________________________________  Livia Hernandez MD  12/20/2018

## 2018-12-20 NOTE — PLAN OF CARE
Problem: Physical Therapy Goal  Goal: Physical Therapy Goal  Goals to be met by: 10 days ()    Patient will increase functional independence with mobility by performin. Supine to sit with Stand-by Assistance  2. Sit to supine with Stand-by Assistance  3. Sit to stand transfer with Minimal Assistance using RW while maintaining NWB L LE status  4. Bed to chair with Moderate Assistance using RW while maintaining NWB L LE  5. Lower extremity exercise program x30 reps per handout, with independence to improve muscular strength and endurance.      Goals remain appropriate. Continue with Physical therapy Plan of Care. Padmini Sher, PT 2018

## 2018-12-20 NOTE — PROGRESS NOTES
Pharmacist Renal Dose Adjustment Note    Krissy Marino is a 78 y.o. female being treated with the medication pepcid.    Patient Data:    Vital Signs (Most Recent):  Temp: 98.1 °F (36.7 °C) (12/20/18 1236)  Pulse: 73 (12/20/18 1236)  Resp: 18 (12/20/18 1236)  BP: (!) 123/55 (12/20/18 1236)  SpO2: 98 % (12/20/18 1236)   Vital Signs (72h Range):  Temp:  [96.8 °F (36 °C)-98.9 °F (37.2 °C)]   Pulse:  [73-86]   Resp:  [16-20]   BP: ()/(52-70)   SpO2:  [93 %-99 %]      Recent Labs   Lab 12/18/18  0457 12/19/18  0733 12/20/18  0518   CREATININE 0.9 1.0 1.1     Serum creatinine: 1.1 mg/dL 12/20/18 0518  Estimated creatinine clearance: 49.5 mL/min    Medication:pepcid 20mg BID will be changed to medication: pepcid 20mg qdaily     Pharmacist's Name: Anthony Montoya  Pharmacist's Extension: [phone]

## 2018-12-20 NOTE — PHYSICIAN QUERY
"PT Name: Krissy Marino  MR #: [id number]     Physician Query Form - Documentation Clarification      CDS: Shena Dumont RN, CCDS         Contact information :[phone]  [email or web address]       This form is a permanent document in the medical record.     Query Date: December 20, 2018    By submitting this query, we are merely seeking further clarification of documentation. Please utilize your independent clinical judgment when addressing the question(s) below.    The Medical record reflects the following:    Supporting Clinical Findings Location in Medical Record     Stage 2 pressure injury over coccyx - Recommend applying clear barrier cream with miconazole BID".     Pressure injury of buttock, stage 2      "Stage 2 pressure injury over coccyx healed, shearing noted over sacral spine and resolved dry blister noted over right buttocks. periwound reddened and blanchable, areas appear to be fungal in presentation. Recommend applying clear barrier cream with miconazole BID."   "Wound 12/19/18 1053 Shearing Sacral Spine"       Hospital Medicine  Plan of care Note 12/20/18      Hospital Medicine  Plan of care Note 12/20/18    Wound care progress note 12/19/18                                                                                      Doctor, Please specify diagnosis or diagnoses associated with above clinical findings.  Please clarify pressure injury diagnosis by site and POA status.     Please select all that apply.    Provider Use Only      [  X ] Pressure injury of coccyx , Stage 2,  Present on admission     [   ] Pressure injury of coccyx , Stage 2,  Not  present on admission         [   ] Pressure injury of Buttock , Stage 2,  please specify laterality, _______, Present on admission     [   ] Pressure injury of Buttock , Stage 2,  please specify laterality, _______, Not present on admission           [   ] Other clarification of integumentary condition(s), __________________________              "                                                                                                      [  ] Clinically Undetermined

## 2018-12-20 NOTE — PLAN OF CARE
Patient resting quietly in bed with son, Shahid Marino ([phone]), & daughter, Kit Mg ([phone]), at the bedside when CM rounded. CM informed all present that [facility] will not be able to accept the patient, provided a [facility] approved SNF list, & requested additional SNF choices. Kit stated that she would like the patient to either return to [facility] or [facility]. CM informed TRAV Santiago ([id number]) of above. Will continue to follow.

## 2018-12-20 NOTE — PT/OT/SLP PROGRESS
Physical Therapy Treatment    Patient Name:  Krissy Marino   MRN:  [id number]    Recommendations:     Discharge Recommendations:  nursing facility, skilled   Discharge Equipment Recommendations: (TBD next level of care)   Barriers to discharge: Decreased caregiver support    Assessment:     Krissy Marino is a 78 y.o. female admitted with a medical diagnosis of Osteomyelitis of left tibia.  She presents with the following impairments/functional limitations:  weakness, impaired endurance, impaired self care skills, gait instability, impaired functional mobilty, impaired balance, decreased lower extremity function, decreased upper extremity function, impaired skin, edema, orthopedic precautions Patient unable to maintain NWB LLE in standing and transfers. .    Rehab Prognosis:  fair; patient would benefit from acute skilled PT services to address these deficits and reach maximum level of function.      Recent Surgery: * No surgery found *      Plan:     During this hospitalization, patient to be seen 4 x/week to address the above listed problems via gait training, therapeutic activities, therapeutic exercises  · Plan of Care Expires:  01/13/19   Plan of Care Reviewed with: patient    Subjective     Communicated with nurse prior to session.  Patient found supine w/ HOB elevated upon PT entry to room, agreeable to treatment.      Chief Complaint: not well  Patient comments/goals: agreeable to session  Pain/Comfort:  · Pain Rating 1: 0/10  · Pain Rating Post-Intervention 1: 0/10    Patients cultural, spiritual, Gnosticism conflicts given the current situation:      Objective:     Patient found with:       General Precautions: Standard, fall   Orthopedic Precautions:LLE non weight bearing   Braces: N/A     Functional Mobility:  · Bed Mobility:   Multiple rolling left<>right for hygiene, positioning, change pads. Use of side rails, cues for rolling  · Rolling Left:  minimum assistance  · Rolling Right: minimum  assistance  · Supine to Sit: moderate assistance w/ HOB elevated, for trunk guidance, LE guidance, increased time  · Sit to Supine: moderate assistance for BLEs into bed. Then TA of 2 for draw sheet to HOB  · Transfers:     · Sit to Stand:  maximal assistance and of 2 persons with rolling walker x 3 trials from EOB. Assistance for NWB LLE. Patient unable to maintain NWB LLE even with assistance, unable to fully elevate hips from bed.   · Gait: unable. can not maintain NWB LLE      AM-PAC 6 CLICK MOBILITY  Turning over in bed (including adjusting bedclothes, sheets and blankets)?: 3  Sitting down on and standing up from a chair with arms (e.g., wheelchair, bedside commode, etc.): 2  Moving from lying on back to sitting on the side of the bed?: 2  Moving to and from a bed to a chair (including a wheelchair)?: 2  Need to walk in hospital room?: 1  Climbing 3-5 steps with a railing?: 1  Basic Mobility Total Score: 11       Therapeutic Activities and Exercises:   patient performs LAQ, HF seated EOB x10 reps.      Patient left supine with call button in reach and nurse notified..    GOALS:   Multidisciplinary Problems     Physical Therapy Goals        Problem: Physical Therapy Goal    Goal Priority Disciplines Outcome Goal Variances Interventions   Physical Therapy Goal     PT, PT/OT Ongoing (interventions implemented as appropriate)     Description:  Goals to be met by: 10 days ()    Patient will increase functional independence with mobility by performin. Supine to sit with Stand-by Assistance  2. Sit to supine with Stand-by Assistance  3. Sit to stand transfer with Minimal Assistance using RW while maintaining NWB L LE status  4. Bed to chair with Moderate Assistance using RW while maintaining NWB L LE  5. Lower extremity exercise program x30 reps per handout, with independence to improve muscular strength and endurance.                       Time Tracking:     PT Received On: 18  PT Start Time: 1100      PT Stop Time: 1126  PT Total Time (min): 26 min     Billable Minutes: Therapeutic Activity 26    Treatment Type: Treatment  PT/PTA: PT     PTA Visit Number: 0     Padmini Sher, PT  12/20/2018

## 2018-12-20 NOTE — CONSULTS
[facility] Medicine PharmD Consult: Vancomycin Follow-up Note    Krissy Marino is a 78 y.o. female initiated on antimicrobial therapy for osteomyelitis of the left tibia, restarted on vancomycin 1.5g IV q12h, first dose.    Renal function: Estimated Creatinine Clearance: 49.5 mL/min (based on SCr of 1.1 mg/dL).     Vancomycin serum concentration assessment(s):   Vancomycin level drawn this morning was 12 hour level not a true 24 hour level. Level=29 mcg/mL. Goal 15-20 mcg/mL.     Vancomycin regimen plan:   Based on past vancomycin history, patient accumulates very quickly, so although level may be within therapeutic range at 24-hour time point, agree with the dose reduction to 1g IV q24h as it's doubtful patient will be able to continue on 1.5g IV q 24h for a long time period.  · Next level will be due Saturday evening at 1615.       Pharmacy will continue to follow and monitor vancomycin.      Josy Valero, PharmD, BCPS  [id number]    **Note: Consults are reviewed Monday-Friday 7:30am-4:00pm. THE ABOVE RECOMMENDATIONS ARE ONLY SUGGESTED.THE RECOMMENDATIONS SHOULD BE CONSIDERED IN CONJUNCTION WITH ALL PATIENT FACTORS. **

## 2018-12-20 NOTE — PLAN OF CARE
Problem: Adult Inpatient Plan of Care  Goal: Plan of Care Review     Recommendations     Recommendation/Intervention:   1.Continue w/ ADA 2000 kcal/cardiac diet.   2.Encourage PO intake >/= 75% EEN/EPN   3.If PO intake is <50% provide Boost GC.      1.RD to follow  Goals: po intake > 50%  Nutrition Goal Status: new, goal met  Communication of RD Recs: (POC)

## 2018-12-20 NOTE — PLAN OF CARE
12/20/2018      Krissy Marino  [address] [place] [id number]          Hospital Medicine Dept.  [facility] [address] [place] [address]  [phone] after hours  [phone] fax Krissy Marino has been hospitalized at the [facility] since 12/12/2018.  Please excuse the patient from duties.  Patient may return on 1/31/19 or earlier if patient desires.  No restrictions.     Please contact me if you have any questions.                  __________________________  Livia Hernandez MD  12/20/2018

## 2018-12-21 PROBLEM — E83.42 HYPOMAGNESEMIA: Status: RESOLVED | Noted: 2018-12-15 | Resolved: 2018-12-21

## 2018-12-21 PROBLEM — R41.0 DELIRIUM: Status: RESOLVED | Noted: 2018-12-18 | Resolved: 2018-12-21

## 2018-12-21 PROBLEM — L03.90 SEPSIS DUE TO CELLULITIS: Status: RESOLVED | Noted: 2018-11-26 | Resolved: 2018-12-21

## 2018-12-21 PROBLEM — A41.9 SEPSIS DUE TO CELLULITIS: Status: RESOLVED | Noted: 2018-11-26 | Resolved: 2018-12-21

## 2018-12-21 LAB
ALBUMIN SERPL BCP-MCNC: 1.7 G/DL
ALP SERPL-CCNC: 181 U/L
ALT SERPL W/O P-5'-P-CCNC: 51 U/L
ANION GAP SERPL CALC-SCNC: 8 MMOL/L
AST SERPL-CCNC: 48 U/L
BACTERIA #/AREA URNS AUTO: ABNORMAL /HPF
BASOPHILS # BLD AUTO: 0.04 K/UL
BASOPHILS NFR BLD: 0.5 %
BILIRUB SERPL-MCNC: 0.4 MG/DL
BILIRUB UR QL STRIP: NEGATIVE
BUN SERPL-MCNC: 50 MG/DL
CALCIUM SERPL-MCNC: 8 MG/DL
CHLORIDE SERPL-SCNC: 102 MMOL/L
CLARITY UR REFRACT.AUTO: CLEAR
CO2 SERPL-SCNC: 23 MMOL/L
COLOR UR AUTO: YELLOW
CREAT SERPL-MCNC: 1.6 MG/DL
DIFFERENTIAL METHOD: ABNORMAL
EOSINOPHIL # BLD AUTO: 0.2 K/UL
EOSINOPHIL NFR BLD: 2.4 %
ERYTHROCYTE [DISTWIDTH] IN BLOOD BY AUTOMATED COUNT: 15.2 %
EST. GFR  (AFRICAN AMERICAN): 34.8 ML/MIN/1.73 M^2
EST. GFR  (NON AFRICAN AMERICAN): 30.2 ML/MIN/1.73 M^2
GLUCOSE SERPL-MCNC: 204 MG/DL
GLUCOSE UR QL STRIP: ABNORMAL
HCT VFR BLD AUTO: 27.5 %
HGB BLD-MCNC: 9 G/DL
HGB UR QL STRIP: ABNORMAL
HYALINE CASTS UR QL AUTO: 0 /LPF
IMM GRANULOCYTES # BLD AUTO: 0.04 K/UL
IMM GRANULOCYTES NFR BLD AUTO: 0.5 %
KETONES UR QL STRIP: NEGATIVE
LEUKOCYTE ESTERASE UR QL STRIP: ABNORMAL
LYMPHOCYTES # BLD AUTO: 0.9 K/UL
LYMPHOCYTES NFR BLD: 10.8 %
MCH RBC QN AUTO: 32.3 PG
MCHC RBC AUTO-ENTMCNC: 32.7 G/DL
MCV RBC AUTO: 99 FL
MICROSCOPIC COMMENT: ABNORMAL
MONOCYTES # BLD AUTO: 0.7 K/UL
MONOCYTES NFR BLD: 7.9 %
NEUTROPHILS # BLD AUTO: 6.4 K/UL
NEUTROPHILS NFR BLD: 77.9 %
NITRITE UR QL STRIP: NEGATIVE
NRBC BLD-RTO: 0 /100 WBC
PH UR STRIP: 6 [PH] (ref 5–8)
PLATELET # BLD AUTO: 123 K/UL
PMV BLD AUTO: 10.8 FL
POCT GLUCOSE: 158 MG/DL (ref 70–110)
POCT GLUCOSE: 225 MG/DL (ref 70–110)
POCT GLUCOSE: 321 MG/DL (ref 70–110)
POTASSIUM SERPL-SCNC: 3.9 MMOL/L
PROT SERPL-MCNC: 4.9 G/DL
PROT UR QL STRIP: ABNORMAL
RBC # BLD AUTO: 2.79 M/UL
RBC #/AREA URNS AUTO: 23 /HPF (ref 0–4)
SODIUM SERPL-SCNC: 133 MMOL/L
SP GR UR STRIP: 1.01 (ref 1–1.03)
SQUAMOUS #/AREA URNS AUTO: 0 /HPF
URN SPEC COLLECT METH UR: ABNORMAL
VANCOMYCIN SERPL-MCNC: 30.9 UG/ML
WBC # BLD AUTO: 8.25 K/UL
WBC #/AREA URNS AUTO: 6 /HPF (ref 0–5)
YEAST UR QL AUTO: ABNORMAL

## 2018-12-21 PROCEDURE — 99233 SBSQ HOSP IP/OBS HIGH 50: CPT | Mod: ,,, | Performed by: HOSPITALIST

## 2018-12-21 PROCEDURE — 97530 THERAPEUTIC ACTIVITIES: CPT

## 2018-12-21 PROCEDURE — 63600175 PHARM REV CODE 636 W HCPCS: Performed by: HOSPITALIST

## 2018-12-21 PROCEDURE — S5571 INSULIN DISPOS PEN 3 ML: HCPCS | Performed by: HOSPITALIST

## 2018-12-21 PROCEDURE — 80202 ASSAY OF VANCOMYCIN: CPT

## 2018-12-21 PROCEDURE — 25000003 PHARM REV CODE 250: Performed by: PHYSICIAN ASSISTANT

## 2018-12-21 PROCEDURE — G8987 SELF CARE CURRENT STATUS: HCPCS | Mod: CL

## 2018-12-21 PROCEDURE — 36415 COLL VENOUS BLD VENIPUNCTURE: CPT

## 2018-12-21 PROCEDURE — 11000001 HC ACUTE MED/SURG PRIVATE ROOM

## 2018-12-21 PROCEDURE — G8988 SELF CARE GOAL STATUS: HCPCS | Mod: CJ

## 2018-12-21 PROCEDURE — 97535 SELF CARE MNGMENT TRAINING: CPT

## 2018-12-21 PROCEDURE — 85025 COMPLETE CBC W/AUTO DIFF WBC: CPT

## 2018-12-21 PROCEDURE — 25000003 PHARM REV CODE 250: Performed by: HOSPITALIST

## 2018-12-21 PROCEDURE — 80053 COMPREHEN METABOLIC PANEL: CPT

## 2018-12-21 PROCEDURE — 81001 URINALYSIS AUTO W/SCOPE: CPT

## 2018-12-21 RX ORDER — CEFEPIME HYDROCHLORIDE 2 G/1
2 INJECTION, POWDER, FOR SOLUTION INTRAVENOUS
Status: DISCONTINUED | OUTPATIENT
Start: 2018-12-22 | End: 2018-12-26 | Stop reason: HOSPADM

## 2018-12-21 RX ORDER — ENOXAPARIN SODIUM 100 MG/ML
30 INJECTION SUBCUTANEOUS EVERY 24 HOURS
Status: DISCONTINUED | OUTPATIENT
Start: 2018-12-21 | End: 2018-12-26 | Stop reason: HOSPADM

## 2018-12-21 RX ADMIN — ENOXAPARIN SODIUM 30 MG: 100 INJECTION SUBCUTANEOUS at 05:12

## 2018-12-21 RX ADMIN — METRONIDAZOLE 500 MG: 500 TABLET ORAL at 09:12

## 2018-12-21 RX ADMIN — METRONIDAZOLE 500 MG: 500 TABLET ORAL at 02:12

## 2018-12-21 RX ADMIN — INSULIN DETEMIR 4 UNITS: 100 INJECTION, SOLUTION SUBCUTANEOUS at 01:12

## 2018-12-21 RX ADMIN — INSULIN ASPART 3 UNITS: 100 INJECTION, SOLUTION INTRAVENOUS; SUBCUTANEOUS at 09:12

## 2018-12-21 RX ADMIN — MICONAZOLE NITRATE: 20 OINTMENT TOPICAL at 09:12

## 2018-12-21 RX ADMIN — ALLOPURINOL 300 MG: 300 TABLET ORAL at 09:12

## 2018-12-21 RX ADMIN — POLYETHYLENE GLYCOL 3350 17 G: 17 POWDER, FOR SOLUTION ORAL at 09:12

## 2018-12-21 RX ADMIN — CEFEPIME 2 G: 2 INJECTION, POWDER, FOR SOLUTION INTRAVENOUS at 05:12

## 2018-12-21 RX ADMIN — SODIUM CHLORIDE 500 ML: 0.9 INJECTION, SOLUTION INTRAVENOUS at 10:12

## 2018-12-21 RX ADMIN — INSULIN ASPART 2 UNITS: 100 INJECTION, SOLUTION INTRAVENOUS; SUBCUTANEOUS at 05:12

## 2018-12-21 RX ADMIN — METOPROLOL SUCCINATE 50 MG: 50 TABLET, EXTENDED RELEASE ORAL at 09:12

## 2018-12-21 RX ADMIN — THERA TABS 1 TABLET: TAB at 09:12

## 2018-12-21 RX ADMIN — INSULIN ASPART 4 UNITS: 100 INJECTION, SOLUTION INTRAVENOUS; SUBCUTANEOUS at 01:12

## 2018-12-21 RX ADMIN — LEVOTHYROXINE SODIUM 75 MCG: 75 TABLET ORAL at 06:12

## 2018-12-21 RX ADMIN — METRONIDAZOLE 500 MG: 500 TABLET ORAL at 06:12

## 2018-12-21 RX ADMIN — CLOPIDOGREL 75 MG: 75 TABLET, FILM COATED ORAL at 09:12

## 2018-12-21 RX ADMIN — ATORVASTATIN CALCIUM 40 MG: 20 TABLET, FILM COATED ORAL at 09:12

## 2018-12-21 RX ADMIN — ASPIRIN 81 MG: 81 TABLET, COATED ORAL at 09:12

## 2018-12-21 RX ADMIN — FAMOTIDINE 20 MG: 20 TABLET ORAL at 09:12

## 2018-12-21 RX ADMIN — FLUTICASONE FUROATE AND VILANTEROL TRIFENATATE 1 PUFF: 100; 25 POWDER RESPIRATORY (INHALATION) at 09:12

## 2018-12-21 NOTE — CONSULTS
Hospital Medicine PharmD Consult: Vancomycin Follow-up Note     Krissy Marino is a 78 y.o. female initiated on antimicrobial therapy for osteomyelitis of the left tibia, restarted on vancomycin 1.5g IV q12h, first dose.     12/21/2018 Estimated Creatinine Clearance: 34 mL/min (A) (based on SCr of 1.6 mg/dL (H)).        Vancomycin serum concentration assessment(s):  · Vancomycin level drawn this morning was 12 hour level not a true 24 hour level. Level=30 mcg/mL. Goal 15-20 mcg/mL.      Vancomycin regimen plan:  · Based on past vancomycin history, patient accumulates very quickly. Serum Cr has now increased from 1.1 to 1.6, scheduled vancomycin doses have been discontinued and will resume when level  <20 per Dr. Hernandez.  ? Next level will be drawn saturday at 0400 and vancomycin dosing will be based on results.         Pharmacy will continue to follow and monitor vancomycin.         Tonya Higgins, Pharm. D.

## 2018-12-21 NOTE — PLAN OF CARE
Problem: Adult Inpatient Plan of Care  Goal: Plan of Care Review  Outcome: Ongoing (interventions implemented as appropriate)   12/21/18 1620   Plan of Care Review   Plan of Care Reviewed With patient   Pt remains free of falls. Plan of care reviewed with pt, understanding voiced. Will cont to monitor.

## 2018-12-21 NOTE — PT/OT/SLP PROGRESS
Occupational Therapy   Treatment    Name: Krissy Marino  MRN: [id number]  Admitting Diagnosis:  Osteomyelitis of left tibia       Recommendations:     Discharge Recommendations: nursing facility, skilled  Discharge Equipment Recommendations:  (TBD)  Barriers to discharge:  None    Subjective     Pain/Comfort:  · Pain Rating 1: 0/10  · Pain Addressed 1: Reposition, Cessation of Activity, Distraction  · Pain Rating Post-Intervention 1: 0/10    Objective:     Communicated with: RN prior to session.  Patient found with all lines intact and call button in reach and peripheral IV, PureWick upon OT entry to room.    General Precautions: Standard, fall   Orthopedic Precautions:LLE non weight bearing   Braces: N/A     Occupational Performance:    Bed Mobility:    · Patient completed Supine to Sit with contact guard assistance and with side rail  · Patient completed Sit to Supine with minimum assistance     Functional Mobility/Transfers:  · Patient completed Sit <> Stand Transfer with maximal assistance from bed and total assistance from chair with no assistive device   · Patient completed Bed <> Chair Transfer using Stand Pivot technique with maximal assistance bed>chair and total assistance chair>bed with no assistive device  · Functional Mobility: NT; pt unable to maintain NWB LLE unless standing still    Activities of Daily Living:  · Feeding:  setup assistance to eat lunch while seated UIC  · Grooming: setup assistance to brush teeth while seated EOB      AMPAC 6 Click ADL: 14    Treatment & Education:  Pt educated on role of OT/POC  Pt educated on importance of EOB/OOB activity  Pt/family educated on NWB LLE  White board/communication board updated    Patient left HOB elevated (after 2 hours UIC) with all lines intact, call button in reach and family present  Education:    Assessment:     Krissy Marino is a 78 y.o. female with a medical diagnosis of Osteomyelitis of left tibia.  She presents with the following performance  deficits affecting function are weakness, impaired endurance, impaired balance, gait instability, impaired self care skills, impaired functional mobilty, orthopedic precautions, decreased safety awareness, decreased lower extremity function.     Rehab Prognosis:  Fair; patient would benefit from acute skilled OT services to address these deficits and reach maximum level of function.       Plan:     Patient to be seen 4 x/week to address the above listed problems via self-care/home management, therapeutic activities, therapeutic exercises  · Plan of Care Expires: 01/14/19  · Plan of Care Reviewed with: patient, family    This Plan of care has been discussed with the patient who was involved in its development and understands and is in agreement with the identified goals and treatment plan    GOALS:   Multidisciplinary Problems     Occupational Therapy Goals        Problem: Occupational Therapy Goal    Goal Priority Disciplines Outcome Interventions   Occupational Therapy Goal     OT, PT/OT Ongoing (interventions implemented as appropriate)    Description:  Goals to be met by: 12/31     Patient will increase functional independence with ADLs by performing:    UE Dressing with Roberts.   LE Dressing with Minimal Assistance.  Grooming while seated with Set-up Assistance. Goal met  Toileting from bedside commode with Minimal Assistance for hygiene and clothing management.   Bathing from  edge of bed with Moderate Assistance.                       Time Tracking:     OT Date of Treatment: 12/21/18  OT Start Time: 1123(second time in 1358)  OT Stop Time: 1147(second time out 1412)  OT Total Time (min): 38 min    Billable Minutes:Self Care/Home Management 10  Therapeutic Activity 28    Katerina Reilly OT  12/21/2018

## 2018-12-21 NOTE — PLAN OF CARE
12/21- 10:40 AM [facility] ([phone]) reported Pt's son p/u Pt's belongings from their facility and stated Pt is going to another Rehab.    12/21- 10:50 AM  Pt's son (Shahid [phone]) stated he is in agreement with Pt going to [place].  Hallie will submit for auth from [employer] today.    Marimar Villa LMSW

## 2018-12-21 NOTE — PLAN OF CARE
12/21 -7:45 AM  [facility] ([phone]) is waiting for DON review they already have 142 due to Pt. Was a previous SNF Pt at their facility.    Marimar Villa LMSW

## 2018-12-21 NOTE — PLAN OF CARE
12/21/18 1526   Discharge Reassessment   Assessment Type Discharge Planning Reassessment   Do you have any problems affording any of your prescribed medications? No   Discharge Plan A Skilled Nursing Facility   Discharge Plan B Home Health   Anticipated Discharge Disposition SNF   How does the patient rate their overall health at the present time? Fair   Describe the patient's ability to walk at the present time. Major restrictions/daily assistance from another person   How often would a person be available to care for the patient? Whenever needed   Number of comorbid conditions (as recorded on the chart) Five or more     Awaiting [facility] insurance authorization for admission to [facility]. Will continue to follow.

## 2018-12-21 NOTE — PROGRESS NOTES
Progress Note  Hospital Medicine  [facility] [place] [facility]       Patient Name: Krissy Marino  MRN:  [id number]  [place]ospital [place] [facility] Livia Hernandez MD  Date of Admission:  12/12/2018     Length of Stay:  LOS: 9 days   Expected Discharge Date: 12/24/2018  Principal Problem:  Osteomyelitis of left tibia     Subjective:     Interval History/Overnight Events:    Doing well.  Currently not septic.   Has been on IV cefepime and Vanco, as well as PO flagyl  Cr of 1.6 this am , ACEI and lasix were stopped. vanco level of 30. vanco stopped and will plan to dose based on levels. UA ordered    SNF placement once medically stable / cleared     allopurinol  300 mg Oral Daily    aspirin  81 mg Oral Daily    atorvastatin  40 mg Oral Daily    ceFEPime (MAXIPIME) IVPB  2 g Intravenous Q12H    clopidogrel  75 mg Oral Daily    enoxaparin  40 mg Subcutaneous Daily    famotidine  20 mg Oral Daily    fluticasone-vilanterol  1 puff Inhalation Daily    levothyroxine  75 mcg Oral Before breakfast    metoprolol succinate  50 mg Oral Daily    metroNIDAZOLE  500 mg Oral Q8H    miconazole nitrate 2%   Topical (Top) BID    multivitamin  1 tablet Oral Daily    polyethylene glycol  17 g Oral Daily           sodium chloride, acetaminophen, dextrose 50%, dextrose 50%, glucagon (human recombinant), glucose, glucose, insulin aspart U-100, ondansetron, senna-docusate 8.6-50 mg, sodium chloride 0.9%    Review of Systems   Constitutional: Negative for chills, fatigue, fever.   HENT: Negative for sore throat, trouble swallowing.    Eyes: Negative for photophobia, visual disturbance.   Respiratory: Negative for cough, shortness of breath.    Cardiovascular: Negative for chest pain, palpitations, leg swelling.   Gastrointestinal: Negative for abdominal pain, constipation, diarrhea, nausea, vomiting.   Endocrine: Negative for cold intolerance, heat intolerance.   Genitourinary: Negative for dysuria,  frequency.   Musculoskeletal: Negative for arthralgias, myalgias.   Skin: L LE wound- stable   Neurological: confusion   Psychiatric/Behavioral: Negative for confusion, hallucinations, anxiety  All other systems reviewed and are negative.    Objective:     Temp:  [97.3 °F (36.3 °C)-98.1 °F (36.7 °C)]   Pulse:  [72-81]   Resp:  [18]   BP: (102-140)/(53-74)   SpO2:  [93 %-98 %]       Physical Exam:  Constitutional: elderly, appears chronically ill. NAD  Eyes: Pupils are equal, round, and reactive to light.   Cardiovascular: Normal rate and regular rhythm.   No murmur heard.  Pulmonary/Chest: Effort normal and breath sounds normal. No respiratory distress. She has no wheezes.   Abdominal: Soft. Bowel sounds are normal. She exhibits no distension. There is no tenderness.   Musculoskeletal: She exhibits mild edema (bilateral lower extremities worse on the left). She exhibits no tenderness.   Neurological: She is alert and oriented to person, place, and time.   Skin: She is not diaphoretic. There is erythema (LLE erythema, stable from yesterday).   Large ulceration present at lateral aspect of left foot with stable surrounding erythema   Psychiatric: She has a normal mood and affect. Her behavior is normal.         Labs:    Chemistries:   Recent Labs   Lab 12/18/18  0457 12/19/18  0733 12/20/18  0518 12/21/18  0516    135* 131* 133*   K 5.0 4.1 3.9 3.9    103 100 102   CO2 21* 25 21* 23   BUN 35* 35* 39* 50*   CREATININE 0.9 1.0 1.1 1.6*   CALCIUM 9.0 8.7 8.4* 8.0*   PROT 5.4* 5.4* 5.1* 4.9*   BILITOT 0.5 0.5 0.5 0.4   ALKPHOS 193* 202* 192* 181*   ALT 52* 58* 61* 51*   AST 71* 73* 72* 48*   MG 1.7 1.2* 2.2  --    PHOS  --  3.0  --   --         WBC:   Recent Labs   Lab 12/17/18  0525 12/18/18  0457 12/19/18  0733 12/20/18  0518 12/21/18  0848   WBC 5.44 5.61 6.38 7.79 8.25     Bands:     CBC/Anemia Labs: Coags:    Recent Labs   Lab 12/16/18  0406  12/18/18  1903 12/19/18  0733 12/20/18  0518 12/21/18  0848    WBC 5.02   < >  --  6.38 7.79 8.25   HGB 6.9*   < >  --  9.5* 9.2* 9.0*   HCT 22.5*   < >  --  30.3* 28.8* 27.5*      < >  --  146* 132* 123*   *   < >  --  98 100* 99*   RDW 14.1   < >  --  14.8* 15.0* 15.2*   IRON 37  --   --   --   --   --    FERRITIN 810*  --   --   --   --   --    FOLATE  --   --  11.9  --   --   --    [id number]  --   --   --  357  --   --     < > = values in this interval not displayed.    No results for input(s): PT, INR, APTT in the last 168 hours.     POCT Glucose: HbA1c:    Recent Labs   Lab 12/19/18  1828 12/19/18  2119 12/20/18  0820 12/20/18  1232 12/20/18  1809 12/21/18  0810   POCTGLUCOSE 245* 300* 185* 237* 331* 158*    Hemoglobin A1C   Date Value Ref Range Status   12/12/2018 6.9 (H) 4.0 - 5.6 % Final     Comment:   05/30/2018 6.7 (H) 4.0 - 5.6 % Final     Comment:   11/21/2017 7.2 (H) 4.0 - 5.6 % Final     Comment:        Diagnostic Results:    MRI L ankle 12/0/18  Findings concerning for osteomyelitis of the hindfoot including the distal tibia and tarsal bones with associated cellulitis, myositis, and multiloculated abscesses in the deep soft tissues contiguous with lateral distal foreleg ulceration.  Component of abscess extends cranially outside the field of view, possibly more proximal portions of the left lower extremity.    CT L Leg 12/13  Rim enhancing multiloculated abscess extends at least to the level of the fibular head noting there are separate fluid collections posterior and medial to the knee (axial series 4, image 137) which possibly communicates although extensive beam hardening artifact from metallic knee prosthesis significantly limits assessment.    Multifocal osteolytic lesions with sclerotic rim in the foot and distal tibia likely representing components of acute and chronic osteomyelitis.  No definite calcification within a lucent lesion to suggest sequestrum although cannot be completely excluded.    Ulceration in the subcutaneous gas at the  lateral distal foreleg is concerning for gas gangrene.    Assessment and Plan     Hospital Course:    Ms. Krissy Marino is a 78 y.o. female with T2DM, CAD status post PCI (7/2018), and peripheral vascular disease on DAPT who was admitted on 12/12  from [facility] for evaluation of left lower extremity diabetic wound and was found to have L tibia osteomyelitis. f/u CT showed osteolytic lesions and extensive abscess, including infection to knee level and gas near ankle. Placed on IV vancomycin, PO flagyl and IV cefepime , ID following. blood cx during this hospitalization remain NGTD. Orthopedics and vascular surgery recommend AKA . However, patient refused to undergo amputation.     Active Hospital Problems    Diagnosis  POA    *Osteomyelitis of left tibia [M86.9]  Yes     Priority: 1 - High    Gas gangrene of Left lower extremity [A48.0]  Yes     Priority: 1 - High    Chronic osteomyelitis of left tibia with draining sinus [M86.462]  Yes     Priority: 1 - High    Cellulitis of left lower extremity [L03.116]  Yes     Priority: 1 - High    Peripheral arterial disease [I73.9]  Yes     Priority: 2     Coronary artery disease involving native coronary artery of native heart with unstable angina pectoris [I25.110]  Yes     Priority: 2     Anemia [D64.9]  Yes     Priority: 3     Chronic diastolic heart failure [I50.32]  Yes     Priority: 4     Hypertension [I10]  Yes     Priority: 6     Pressure injury of coccygeal region, stage 2 [L89.152]  Yes    Alteration in skin integrity [R23.9]  Yes    Moderate protein-calorie malnutrition [E44.0]  Yes    Hypoalbuminemia due to protein-calorie malnutrition [E46]  Yes    Cellulitis [L03.90]  Yes    Pressure injury of buttock, stage 2 [L89.302]  Yes    Benign hypertensive heart disease with HF (heart failure) [I11.0]  Yes    Macrocytic anemia [D53.9]  Yes    ALVARO (acute kidney injury) [N17.9]  No    Preoperative cardiovascular examination [Z01.810]  Not  Applicable    Obesity (BMI 30-39.9) [E66.9]  Yes    Bilateral leg edema [R60.0]  Yes    Hypothyroidism [E03.9]  Yes    Dyslipidemia [E78.5]  Yes    Type 2 diabetes mellitus, without long-term current use of insulin [E11.9]  Yes      Resolved Hospital Problems    Diagnosis Date Resolved POA    Sepsis due to cellulitis [L03.90, A41.9] 12/21/2018 Yes     Priority: 1 - High    Delirium [R41.0] 12/21/2018 No    Hyperkalemia [E87.5] 12/17/2018 No    Hypomagnesemia [E83.42] 12/21/2018 No     Osteomyelitis of left tibia  Gas gangrene of Left lower extremity  Cellulitis of left lower extremity  - imaging findings as above  - Ortho and Vascular surgery recommend AKA.   - patient and family refused AKA  - PICC line in place  - ID involved. Will cont with abx therapy alone, with family and pt understanding that this is not definitely therapy and pt may have deadly complications from untreated infection / sepsis  - was on Vanc 1g IV q24. Vanc trough goal 15-20 . Vancomycin held due to ALVARO developed on 12/21 and will redose based on levels and CrCl  - Cefepime 2g IV q12   - Flagyl 500mg po tid as gas seen on CT scan  - will order abx for 6 weeks a this time, however, total therapy course is unclear at this time   - needs Weekly  ESR, CRP, CBC, CMP and Vanc trough   - Will need re-imagine of LLE in 4-5 weeks or if she develops worsening signs/sx of infection    ALVARO  - Cr of 1.6 12/21 from normal, concern for possible vancomycin toxicity  - ACEI and lasix were stopped.   - vanco level of 30. vanco stopped and will plan to dose based on levels.   - UA ordered  - monitor daily     Peripheral arterial disease  - non-compressible MARIMAR with normal doppler waveforms and palpable pedal pulses  - Vascular surgery recommend AKA. Patient is deferring at this time. Ongoing family discussions  - cont DAPT  - cont statin     Coronary artery disease involving native coronary artery of native heart with unstable angina pectoris  Chronic  diastolic heart failure   Dyslipidemia   -Underwent LHC and subsequent PCI in 7/2018 and intervention at Bifurcation of the LAD and D1  - cont DAPT  - cont statin   - cont CAD/ HF meds as below: toprol 50 mg daily, amlodipine stopped, losartan decreased to 50mg (from 100, also K+ on higher limit of normal), lasix changed from 40 PO bid to daily and may actually stop given malnutrition and severe hypoalbuminemia     Hypertension   -lower BPs noted on 12/18, meds readjusted  -conttoprol 50 mg daily  - amlodipine stopped  - losartan decreased to 50mg (from 100, also K+ on higher limit of normal) --> stopped when pt developed ALVARO on 12/21  -lasix changed from 40 PO bid to daily and may actually stop given malnutrition and severe hypoalbuminemia  --> stopped when pt developed ALVARO      Type 2 diabetes mellitus, without long-term current use of insulin    -on admit, home oral antihyperglycemic agents held ,in favor of low-dose aspart SSI + POCT glucose checks  - detemir 4 u daily started on 12/21    Hypothyroidism   - cont synthroid     Macrocytic Anemia  -anemia of chronic inflammation   -Transfused 12/16 with good response  - monitor hbg, has been stable at 9s  - B12 and folate wnl  - multivit started    Moderate protein calorie malnutrition  Severe hypoalbuminemia  - PAB of 11, albumin of 1.6  - PAB13  - boost TID  - beneprotein TID  - multivit started    Diet:  cardiac  GI PPx:  famotidine  DVT PPx:  lovenox  Goals of Care:  full    High Risk Conditions:  OM    Disposition:      SNF with PT OT and IV abx- likely Monday/ tuesday    Signing Physician:     Livia Hernandez MD  Department of [facility]   [facility]  Pager [phone]  Spectra [phone]  12/21/2018

## 2018-12-21 NOTE — PLAN OF CARE
Problem: Occupational Therapy Goal  Goal: Occupational Therapy Goal  Goals to be met by: 12/31     Patient will increase functional independence with ADLs by performing:    UE Dressing with [place].   LE Dressing with Minimal Assistance.  Grooming while seated with Set-up Assistance. Goal met  Toileting from bedside commode with Minimal Assistance for hygiene and clothing management.   Bathing from  edge of bed with Moderate Assistance.      Outcome: Ongoing (interventions implemented as appropriate)  Pt continues to work toward established goals    Comments: Continue OT DAVID Reilly OT  12/21/2018

## 2018-12-21 NOTE — PROGRESS NOTES
Pharmacist Renal Dose Adjustment Note    Krissy Marino is a 78 y.o. female being treated with the medication lovenox    Patient Data:    Vital Signs (Most Recent):  Temp: 97.3 °F (36.3 °C) (12/21/18 0811)  Pulse: 81 (12/21/18 0944)  Resp: 18 (12/21/18 0944)  BP: (!) 127/56 (12/21/18 0811)  SpO2: (!) 93 % (12/21/18 0944)   Vital Signs (72h Range):  Temp:  [96.8 °F (36 °C)-98.9 °F (37.2 °C)]   Pulse:  [72-86]   Resp:  [17-20]   BP: ()/(52-74)   SpO2:  [93 %-99 %]      Recent Labs   Lab 12/19/18  0733 12/20/18  0518 12/21/18  0516   CREATININE 1.0 1.1 1.6*     Serum creatinine: 1.6 mg/dL (H) 12/21/18 0516  Estimated creatinine clearance: 34 mL/min (A)    Medication:lovenox 40mg q24h will be changed to medication: lovenox 30mg q24h     Pharmacist's Name: Anthony Montoya  Pharmacist's Extension: [phone]

## 2018-12-21 NOTE — PROGRESS NOTES
Pharmacist Renal Dose Adjustment Note    Krissy Marino is a 78 y.o. female being treated with the medication cefepime    Patient Data:    Vital Signs (Most Recent):  Temp: 97.3 °F (36.3 °C) (12/21/18 0811)  Pulse: 81 (12/21/18 0944)  Resp: 18 (12/21/18 0944)  BP: (!) 127/56 (12/21/18 0811)  SpO2: (!) 93 % (12/21/18 0944)   Vital Signs (72h Range):  Temp:  [96.8 °F (36 °C)-98.9 °F (37.2 °C)]   Pulse:  [72-86]   Resp:  [17-20]   BP: ()/(52-74)   SpO2:  [93 %-99 %]      Recent Labs   Lab 12/19/18  0733 12/20/18  0518 12/21/18  0516   CREATININE 1.0 1.1 1.6*     Serum creatinine: 1.6 mg/dL (H) 12/21/18 0516  Estimated creatinine clearance: 34 mL/min (A)    Medication:cefepime 2g q12h will be changed to medication: cefepime 2g q24h     Pharmacist's Name: Anthony Montoya  Pharmacist's Extension: [phone]

## 2018-12-22 LAB
ALBUMIN SERPL BCP-MCNC: 1.8 G/DL
ALP SERPL-CCNC: 198 U/L
ALT SERPL W/O P-5'-P-CCNC: 51 U/L
ANION GAP SERPL CALC-SCNC: 7 MMOL/L
AST SERPL-CCNC: 42 U/L
BASOPHILS # BLD AUTO: 0.03 K/UL
BASOPHILS NFR BLD: 0.4 %
BILIRUB SERPL-MCNC: 0.5 MG/DL
BUN SERPL-MCNC: 48 MG/DL
CALCIUM SERPL-MCNC: 8.3 MG/DL
CHLORIDE SERPL-SCNC: 102 MMOL/L
CO2 SERPL-SCNC: 23 MMOL/L
CREAT SERPL-MCNC: 1.4 MG/DL
DIFFERENTIAL METHOD: ABNORMAL
EOSINOPHIL # BLD AUTO: 0.2 K/UL
EOSINOPHIL NFR BLD: 2.2 %
ERYTHROCYTE [DISTWIDTH] IN BLOOD BY AUTOMATED COUNT: 15.2 %
EST. GFR  (AFRICAN AMERICAN): 40.9 ML/MIN/1.73 M^2
EST. GFR  (NON AFRICAN AMERICAN): 35.5 ML/MIN/1.73 M^2
GLUCOSE SERPL-MCNC: 167 MG/DL
HCT VFR BLD AUTO: 27.7 %
HGB BLD-MCNC: 8.8 G/DL
IMM GRANULOCYTES # BLD AUTO: 0.06 K/UL
IMM GRANULOCYTES NFR BLD AUTO: 0.7 %
LYMPHOCYTES # BLD AUTO: 1.1 K/UL
LYMPHOCYTES NFR BLD: 12.5 %
MCH RBC QN AUTO: 31.8 PG
MCHC RBC AUTO-ENTMCNC: 31.8 G/DL
MCV RBC AUTO: 100 FL
MONOCYTES # BLD AUTO: 0.5 K/UL
MONOCYTES NFR BLD: 6.3 %
NEUTROPHILS # BLD AUTO: 6.7 K/UL
NEUTROPHILS NFR BLD: 77.9 %
NRBC BLD-RTO: 0 /100 WBC
PLATELET # BLD AUTO: 140 K/UL
PMV BLD AUTO: 11.4 FL
POCT GLUCOSE: 215 MG/DL (ref 70–110)
POCT GLUCOSE: 240 MG/DL (ref 70–110)
POCT GLUCOSE: 253 MG/DL (ref 70–110)
POCT GLUCOSE: 289 MG/DL (ref 70–110)
POCT GLUCOSE: 306 MG/DL (ref 70–110)
POTASSIUM SERPL-SCNC: 3.8 MMOL/L
PROT SERPL-MCNC: 5.4 G/DL
RBC # BLD AUTO: 2.77 M/UL
SODIUM SERPL-SCNC: 132 MMOL/L
TB INDURATION 48 - 72 HR READ: 0 MM
VANCOMYCIN SERPL-MCNC: 25.3 UG/ML
WBC # BLD AUTO: 8.56 K/UL

## 2018-12-22 PROCEDURE — 85025 COMPLETE CBC W/AUTO DIFF WBC: CPT

## 2018-12-22 PROCEDURE — 80053 COMPREHEN METABOLIC PANEL: CPT

## 2018-12-22 PROCEDURE — 36415 COLL VENOUS BLD VENIPUNCTURE: CPT

## 2018-12-22 PROCEDURE — 11000001 HC ACUTE MED/SURG PRIVATE ROOM

## 2018-12-22 PROCEDURE — 63600175 PHARM REV CODE 636 W HCPCS: Performed by: HOSPITALIST

## 2018-12-22 PROCEDURE — 25000003 PHARM REV CODE 250: Performed by: HOSPITALIST

## 2018-12-22 PROCEDURE — 97530 THERAPEUTIC ACTIVITIES: CPT

## 2018-12-22 PROCEDURE — 25000003 PHARM REV CODE 250: Performed by: PHYSICIAN ASSISTANT

## 2018-12-22 PROCEDURE — 80202 ASSAY OF VANCOMYCIN: CPT

## 2018-12-22 PROCEDURE — 99233 SBSQ HOSP IP/OBS HIGH 50: CPT | Mod: ,,, | Performed by: HOSPITALIST

## 2018-12-22 RX ORDER — ACYCLOVIR 200 MG/1
400 CAPSULE ORAL 2 TIMES DAILY
Status: COMPLETED | OUTPATIENT
Start: 2018-12-22 | End: 2018-12-23

## 2018-12-22 RX ADMIN — ATORVASTATIN CALCIUM 40 MG: 20 TABLET, FILM COATED ORAL at 11:12

## 2018-12-22 RX ADMIN — CLOPIDOGREL 75 MG: 75 TABLET, FILM COATED ORAL at 11:12

## 2018-12-22 RX ADMIN — MICONAZOLE NITRATE: 20 OINTMENT TOPICAL at 11:12

## 2018-12-22 RX ADMIN — FLUTICASONE FUROATE AND VILANTEROL TRIFENATATE 1 PUFF: 100; 25 POWDER RESPIRATORY (INHALATION) at 11:12

## 2018-12-22 RX ADMIN — Medication 10 ML: at 02:12

## 2018-12-22 RX ADMIN — ASPIRIN 81 MG: 81 TABLET, COATED ORAL at 11:12

## 2018-12-22 RX ADMIN — Medication 10 ML: at 05:12

## 2018-12-22 RX ADMIN — METRONIDAZOLE 500 MG: 500 TABLET ORAL at 09:12

## 2018-12-22 RX ADMIN — ENOXAPARIN SODIUM 30 MG: 100 INJECTION SUBCUTANEOUS at 05:12

## 2018-12-22 RX ADMIN — METRONIDAZOLE 500 MG: 500 TABLET ORAL at 03:12

## 2018-12-22 RX ADMIN — ALLOPURINOL 300 MG: 300 TABLET ORAL at 11:12

## 2018-12-22 RX ADMIN — THERA TABS 1 TABLET: TAB at 11:12

## 2018-12-22 RX ADMIN — MICONAZOLE NITRATE: 20 OINTMENT TOPICAL at 09:12

## 2018-12-22 RX ADMIN — INSULIN ASPART 1 UNITS: 100 INJECTION, SOLUTION INTRAVENOUS; SUBCUTANEOUS at 09:12

## 2018-12-22 RX ADMIN — CEFEPIME 2 G: 2 INJECTION, POWDER, FOR SOLUTION INTRAVENOUS at 05:12

## 2018-12-22 RX ADMIN — METOPROLOL SUCCINATE 50 MG: 50 TABLET, EXTENDED RELEASE ORAL at 11:12

## 2018-12-22 RX ADMIN — FAMOTIDINE 20 MG: 20 TABLET ORAL at 11:12

## 2018-12-22 RX ADMIN — INSULIN ASPART 2 UNITS: 100 INJECTION, SOLUTION INTRAVENOUS; SUBCUTANEOUS at 05:12

## 2018-12-22 RX ADMIN — ACYCLOVIR 400 MG: 200 CAPSULE ORAL at 06:12

## 2018-12-22 RX ADMIN — METRONIDAZOLE 500 MG: 500 TABLET ORAL at 05:12

## 2018-12-22 RX ADMIN — INSULIN DETEMIR 4 UNITS: 100 INJECTION, SOLUTION SUBCUTANEOUS at 11:12

## 2018-12-22 RX ADMIN — LEVOTHYROXINE SODIUM 75 MCG: 75 TABLET ORAL at 05:12

## 2018-12-22 RX ADMIN — Medication 10 ML: at 09:12

## 2018-12-22 NOTE — PROGRESS NOTES
Progress Note  Hospital Medicine  [facility] [place]       Patient Name: Krissy Marino  MRN:  [id number]  Hospital Medicine Team: [facility] MED O Saúl Huertas MD  Date of Admission:  12/12/2018     Length of Stay:  LOS: 10 days   Expected Discharge Date: 12/24/2018  Principal Problem:  Osteomyelitis of left tibia     Subjective:     Interval History/Overnight Events:    Patient doing well; family at the bedside; patient has some fever blisters and oral thrush; starting on dukes solution and will give 2 doses of acyclovir; planning on SNF placement; likely Monday; Cr improved to 1.4 with holding of lasix; holding vanc today as level is 25;      acyclovir  400 mg Oral BID    allopurinol  300 mg Oral Daily    aspirin  81 mg Oral Daily    atorvastatin  40 mg Oral Daily    ceFEPime (MAXIPIME) IVPB  2 g Intravenous Q24H    clopidogrel  75 mg Oral Daily    duke's soln (benadryl 30 mL, mylanta 30 mL, lidocane 30 mL, nystatin 30 mL) 120 mL  10 mL Oral QID    enoxaparin  30 mg Subcutaneous Daily    famotidine  20 mg Oral Daily    fluticasone-vilanterol  1 puff Inhalation Daily    insulin detemir U-100  4 Units Subcutaneous Daily    levothyroxine  75 mcg Oral Before breakfast    metoprolol succinate  50 mg Oral Daily    metroNIDAZOLE  500 mg Oral Q8H    miconazole nitrate 2%   Topical (Top) BID    multivitamin  1 tablet Oral Daily    polyethylene glycol  17 g Oral Daily           sodium chloride, acetaminophen, dextrose 50%, dextrose 50%, glucagon (human recombinant), glucose, glucose, insulin aspart U-100, ondansetron, senna-docusate 8.6-50 mg, sodium chloride 0.9%    Review of Systems   Constitutional: Negative for chills, fatigue, fever.   HENT: Negative for sore throat, trouble swallowing.    Eyes: Negative for photophobia, visual disturbance.   Respiratory: Negative for cough, shortness of breath.    Cardiovascular: Negative for chest pain, palpitations, leg swelling.    Gastrointestinal: Negative for abdominal pain, constipation, diarrhea, nausea, vomiting.   Endocrine: Negative for cold intolerance, heat intolerance.   Genitourinary: Negative for dysuria, frequency.   Musculoskeletal: Negative for arthralgias, myalgias.   Skin: L LE wound- stable   Neurological: confusion   Psychiatric/Behavioral: Negative for confusion, hallucinations, anxiety  All other systems reviewed and are negative.    Objective:     Temp:  [96 °F (35.6 °C)-98 °F (36.7 °C)]   Pulse:  [70-83]   Resp:  [16-20]   BP: (114-149)/(58-67)   SpO2:  [93 %-99 %]       Physical Exam:  Constitutional: elderly, appears chronically ill. NAD  Eyes: Pupils are equal, round, and reactive to light.   Cardiovascular: Normal rate and regular rhythm.   No murmur heard.  Pulmonary/Chest: Effort normal and breath sounds normal. No respiratory distress. She has no wheezes.   Abdominal: Soft. Bowel sounds are normal. She exhibits no distension. There is no tenderness.   Musculoskeletal: She exhibits mild edema (bilateral lower extremities worse on the left). She exhibits no tenderness.   Neurological: She is alert and oriented to person, place, and time.   Skin: She is not diaphoretic. There is erythema (LLE erythema, stable from yesterday).   Large ulceration present at lateral aspect of left foot with stable surrounding erythema   Psychiatric: She has a normal mood and affect. Her behavior is normal.         Labs:    Chemistries:   Recent Labs   Lab 12/18/18  0457 12/19/18  0733 12/20/18  0518 12/21/18  0516 12/22/18  0525    135* 131* 133* 132*   K 5.0 4.1 3.9 3.9 3.8    103 100 102 102   CO2 21* 25 21* 23 23   BUN 35* 35* 39* 50* 48*   CREATININE 0.9 1.0 1.1 1.6* 1.4   CALCIUM 9.0 8.7 8.4* 8.0* 8.3*   PROT 5.4* 5.4* 5.1* 4.9* 5.4*   BILITOT 0.5 0.5 0.5 0.4 0.5   ALKPHOS 193* 202* 192* 181* 198*   ALT 52* 58* 61* 51* 51*   AST 71* 73* 72* 48* 42*   MG 1.7 1.2* 2.2  --   --    PHOS  --  3.0  --   --   --          WBC:   Recent Labs   Lab 12/18/18  0457 12/19/18  0733 12/20/18  0518 12/21/18  0848 12/22/18  0525   WBC 5.61 6.38 7.79 8.25 8.56     Bands:     CBC/Anemia Labs: Coags:    Recent Labs   Lab 12/16/18  0406  12/18/18  1903 12/19/18  0733 12/20/18  0518 12/21/18  0848 12/22/18  0525   WBC 5.02   < >  --  6.38 7.79 8.25 8.56   HGB 6.9*   < >  --  9.5* 9.2* 9.0* 8.8*   HCT 22.5*   < >  --  30.3* 28.8* 27.5* 27.7*      < >  --  146* 132* 123* 140*   *   < >  --  98 100* 99* 100*   RDW 14.1   < >  --  14.8* 15.0* 15.2* 15.2*   IRON 37  --   --   --   --   --   --    FERRITIN 810*  --   --   --   --   --   --    FOLATE  --   --  11.9  --   --   --   --    XPCWGHGC24  --   --   --  357  --   --   --     < > = values in this interval not displayed.    No results for input(s): PT, INR, APTT in the last 168 hours.     POCT Glucose: HbA1c:    Recent Labs   Lab 12/21/18  1245 12/21/18  1742 12/21/18  2118 12/22/18  1122 12/22/18  1426 12/22/18  1712   POCTGLUCOSE 321* 225* 306* 253* 215* 240*    Hemoglobin A1C   Date Value Ref Range Status   12/12/2018 6.9 (H) 4.0 - 5.6 % Final     Comment:   05/30/2018 6.7 (H) 4.0 - 5.6 % Final     Comment:   11/21/2017 7.2 (H) 4.0 - 5.6 % Final     Comment:        Diagnostic Results:    MRI L ankle 12/0/18  Findings concerning for osteomyelitis of the hindfoot including the distal tibia and tarsal bones with associated cellulitis, myositis, and multiloculated abscesses in the deep soft tissues contiguous with lateral distal foreleg ulceration.  Component of abscess extends cranially outside the field of view, possibly more proximal portions of the left lower extremity.    CT L Leg 12/13  Rim enhancing multiloculated abscess extends at least to the level of the fibular head noting there are separate fluid collections posterior and medial to the knee (axial series 4, image 137) which possibly communicates although extensive beam hardening artifact from metallic knee prosthesis  significantly limits assessment.    Multifocal osteolytic lesions with sclerotic rim in the foot and distal tibia likely representing components of acute and chronic osteomyelitis.  No definite calcification within a lucent lesion to suggest sequestrum although cannot be completely excluded.    Ulceration in the subcutaneous gas at the lateral distal foreleg is concerning for gas gangrene.    Assessment and Plan     Hospital Course:    Ms. Krissy Marino is a 78 y.o. female with T2DM, CAD status post PCI (7/2018), and peripheral vascular disease on DAPT who was admitted on 12/12  from [facility] for evaluation of left lower extremity diabetic wound and was found to have L tibia osteomyelitis. f/u CT showed osteolytic lesions and extensive abscess, including infection to knee level and gas near ankle. Placed on IV vancomycin, PO flagyl and IV cefepime , ID following. blood cx during this hospitalization remain NGTD. Orthopedics and vascular surgery recommend AKA . However, patient refused to undergo amputation.     Active Hospital Problems    Diagnosis  POA    *Osteomyelitis of left tibia [M86.9]  Yes    Pressure injury of coccygeal region, stage 2 [L89.152]  Yes    Alteration in skin integrity [R23.9]  Yes    Gas gangrene of Left lower extremity [A48.0]  Yes    Moderate protein-calorie malnutrition [E44.0]  Yes    Hypoalbuminemia due to protein-calorie malnutrition [E46]  Yes    Cellulitis [L03.90]  Yes    Anemia [D64.9]  Yes    Pressure injury of buttock, stage 2 [L89.302]  Yes    Chronic osteomyelitis of left tibia with draining sinus [M86.462]  Yes    Benign hypertensive heart disease with HF (heart failure) [I11.0]  Yes    Cellulitis of left lower extremity [L03.116]  Yes    Macrocytic anemia [D53.9]  Yes    ALVARO (acute kidney injury) [N17.9]  No    Preoperative cardiovascular examination [Z01.810]  Not Applicable    Peripheral arterial disease [I73.9]  Yes    Coronary artery disease  involving native coronary artery of native heart with unstable angina pectoris [I25.110]  Yes    Chronic diastolic heart failure [I50.32]  Yes    Obesity (BMI 30-39.9) [E66.9]  Yes    Bilateral leg edema [R60.0]  Yes    Hypothyroidism [E03.9]  Yes    Dyslipidemia [E78.5]  Yes    Hypertension [I10]  Yes    Type 2 diabetes mellitus, without long-term current use of insulin [E11.9]  Yes      Resolved Hospital Problems    Diagnosis Date Resolved POA    Delirium [R41.0] 12/21/2018 No    Hyperkalemia [E87.5] 12/17/2018 No    Hypomagnesemia [E83.42] 12/21/2018 No    Sepsis due to cellulitis [L03.90, A41.9] 12/21/2018 Yes     Osteomyelitis of left tibia  Gas gangrene of Left lower extremity  Cellulitis of left lower extremity  - imaging findings as above  - Ortho and Vascular surgery recommend AKA.   - patient and family refused AKA  - PICC line in place  - ID involved. Will cont with abx therapy alone, with family and pt understanding that this is not definitely therapy and pt may have deadly complications from untreated infection / sepsis  - was on Vanc 1g IV q24. Vanc trough goal 15-20 . Vancomycin held due to ALVARO developed on 12/21 and will redose based on levels and CrCl  - Cefepime 2g IV q12   - Flagyl 500mg po tid as gas seen on CT scan  - will order abx for 6 weeks a this time, however, total therapy course is unclear at this time   - needs Weekly  ESR, CRP, CBC, CMP and Vanc trough   - Will need re-imagine of LLE in 4-5 weeks or if she develops worsening signs/sx of infection    ALVARO  - Cr improved to 1.4; continue holding lasix and ace-I;     Peripheral arterial disease  - non-compressible MARIMAR with normal doppler waveforms and palpable pedal pulses  - Vascular surgery recommend AKA. Patient is deferring at this time. Ongoing family discussions  - cont DAPT  - cont statin     Coronary artery disease involving native coronary artery of native heart with unstable angina pectoris  Chronic diastolic heart  failure   Dyslipidemia   -Underwent LHC and subsequent PCI in 7/2018 and intervention at Bifurcation of the LAD and D1  - cont DAPT  - cont statin   - cont CAD/ HF meds as below: toprol 50 mg daily, amlodipine stopped, losartan decreased to 50mg (from 100, also K+ on higher limit of normal), lasix changed from 40 PO bid to daily and may actually stop given malnutrition and severe hypoalbuminemia     Hypertension   -lower BPs noted on 12/18, meds readjusted  -conttoprol 50 mg daily  - amlodipine stopped  - losartan decreased to 50mg (from 100, also K+ on higher limit of normal) --> stopped when pt developed ALVARO on 12/21  -lasix changed from 40 PO bid to daily and may actually stop given malnutrition and severe hypoalbuminemia  --> stopped when pt developed ALVARO      Type 2 diabetes mellitus, without long-term current use of insulin    -on admit, home oral antihyperglycemic agents held ,in favor of low-dose aspart SSI + POCT glucose checks  - detemir 4 u daily started on 12/21    Hypothyroidism   - cont synthroid     Macrocytic Anemia  -anemia of chronic inflammation   -Transfused 12/16 with good response  - monitor hbg, has been stable at 9s  - B12 and folate wnl  - multivit started    Moderate protein calorie malnutrition  Severe hypoalbuminemia  - PAB of 11, albumin of 1.6  - PAB13  - boost TID  - beneprotein TID  - multivit started    Diet:  cardiac  GI PPx:  famotidine  DVT PPx:  lovenox  Goals of Care:  full    High Risk Conditions:  OM    Disposition:      SNF with PT OT and IV abx- likely Monday/ tuesday

## 2018-12-22 NOTE — PLAN OF CARE
Problem: Fall Injury Risk  Goal: Absence of Fall and Fall-Related Injury  Outcome: Ongoing (interventions implemented as appropriate)  Received patient in bed no distress noted call bell in reach respirations even and unlabored. Discussed care plan with patient.

## 2018-12-22 NOTE — PT/OT/SLP PROGRESS
"Physical Therapy Treatment    Patient Name:  Krissy Marino   MRN:  [id number]    Recommendations:     Discharge Recommendations:  nursing facility, skilled   Discharge Equipment Recommendations: (TBD)   Barriers to discharge: Decreased caregiver support    Assessment:     Krissy Marino is a 78 y.o. female admitted with a medical diagnosis of Osteomyelitis of left tibia.  She presents with the following impairments/functional limitations:  weakness, impaired endurance, impaired self care skills, impaired functional mobilty, impaired balance, decreased lower extremity function, pain, impaired skin, orthopedic precautions, edema. Pt tolerated session well with focus on bed mobility and transfers. Pt progressing slowly and is limited this day d/t toileting needs while attempting transfers. Pt remains unable to maintain her LLE NWB without significant physical assistance. Pt will continue to benefit from therapy services to improve impairments listed above.     Rehab Prognosis: Good; patient would benefit from acute skilled PT services to address these deficits and reach maximum level of function.    Recent Surgery: * No surgery found *      Plan:     During this hospitalization, patient to be seen 4 x/week to address the identified rehab impairments via therapeutic activities, therapeutic exercises, wheelchair management/training and progress toward the following goals:    · Plan of Care Expires:  01/13/19    Subjective     Chief Complaint: no c/o   Patient/Family Comments/goals: "I didn't think you were coming back today."   Pain/Comfort:  · Pain Rating 1: 0/10  · Pain Rating Post-Intervention 1: 0/10      Objective:     Communicated with NSG prior to session.  Patient found all lines intact, call button in reach and daughter present and HOB elevated with PureWick  upon PTA entry to room.     General Precautions: Standard, fall   Orthopedic Precautions:LLE non weight bearing   Braces: N/A     Functional Mobility:  · Bed " Mobility:     · Supine to Sit: moderate assistance  · Sit to Supine: maximal assistance  · Transfers:     · Sit to Stand:  maximal assistance with no AD  · Balance: Pt sits with SBA at EOB. Requires Max A to acheive full stand with PTA assisting to maintain pts LLE NWB status.       AM-PAC 6 CLICK MOBILITY  Turning over in bed (including adjusting bedclothes, sheets and blankets)?: 3  Sitting down on and standing up from a chair with arms (e.g., wheelchair, bedside commode, etc.): 2  Moving from lying on back to sitting on the side of the bed?: 2  Moving to and from a bed to a chair (including a wheelchair)?: 2  Need to walk in hospital room?: 1  Climbing 3-5 steps with a railing?: 1  Basic Mobility Total Score: 11       Therapeutic Activities and Exercises:   Pt assisted with functional mobility as noted above.   Pt sits EOB for ~ 5 minutes with SBA. Pt cued for scooting along EOB with PTA assisting with movement and weight shift.   Pt and daughter educated on PT POC and next anticipated treatment.       Patient left supine with all lines intact, call button in reach and daughter present.    GOALS:   Multidisciplinary Problems     Physical Therapy Goals        Problem: Physical Therapy Goal    Goal Priority Disciplines Outcome Goal Variances Interventions   Physical Therapy Goal     PT, PT/OT Ongoing (interventions implemented as appropriate)     Description:  Goals to be met by: 10 days ()    Patient will increase functional independence with mobility by performin. Supine to sit with Stand-by Assistance  2. Sit to supine with Stand-by Assistance  3. Sit to stand transfer with Minimal Assistance using RW while maintaining NWB L LE status  4. Bed to chair with Moderate Assistance using RW while maintaining NWB L LE  5. Lower extremity exercise program x30 reps per handout, with independence to improve muscular strength and endurance.                       Time Tracking:     PT Received On: 18  PT  Start Time: 1430     PT Stop Time: 1455  PT Total Time (min): 25 min     Billable Minutes: Therapeutic Activity 25    Treatment Type: Treatment  PT/PTA: PTA     PTA Visit Number: 1     John Pizarro PTA  12/22/2018

## 2018-12-23 PROBLEM — N28.9 RENAL INSUFFICIENCY: Status: ACTIVE | Noted: 2018-11-26

## 2018-12-23 LAB
ALBUMIN SERPL BCP-MCNC: 1.8 G/DL
ALP SERPL-CCNC: 190 U/L
ALT SERPL W/O P-5'-P-CCNC: 39 U/L
ANION GAP SERPL CALC-SCNC: 7 MMOL/L
AST SERPL-CCNC: 27 U/L
BASOPHILS # BLD AUTO: 0.04 K/UL
BASOPHILS NFR BLD: 0.5 %
BILIRUB SERPL-MCNC: 0.4 MG/DL
BUN SERPL-MCNC: 51 MG/DL
CALCIUM SERPL-MCNC: 8.3 MG/DL
CHLORIDE SERPL-SCNC: 104 MMOL/L
CO2 SERPL-SCNC: 22 MMOL/L
CREAT SERPL-MCNC: 1.4 MG/DL
DIFFERENTIAL METHOD: ABNORMAL
EOSINOPHIL # BLD AUTO: 0.2 K/UL
EOSINOPHIL NFR BLD: 2 %
ERYTHROCYTE [DISTWIDTH] IN BLOOD BY AUTOMATED COUNT: 15.6 %
EST. GFR  (AFRICAN AMERICAN): 40.9 ML/MIN/1.73 M^2
EST. GFR  (NON AFRICAN AMERICAN): 35.5 ML/MIN/1.73 M^2
GLUCOSE SERPL-MCNC: 210 MG/DL
HCT VFR BLD AUTO: 28.8 %
HGB BLD-MCNC: 9.4 G/DL
IMM GRANULOCYTES # BLD AUTO: 0.06 K/UL
IMM GRANULOCYTES NFR BLD AUTO: 0.8 %
LYMPHOCYTES # BLD AUTO: 0.8 K/UL
LYMPHOCYTES NFR BLD: 10.4 %
MAGNESIUM SERPL-MCNC: 1.9 MG/DL
MCH RBC QN AUTO: 32 PG
MCHC RBC AUTO-ENTMCNC: 32.6 G/DL
MCV RBC AUTO: 98 FL
MONOCYTES # BLD AUTO: 0.5 K/UL
MONOCYTES NFR BLD: 6.7 %
NEUTROPHILS # BLD AUTO: 6.1 K/UL
NEUTROPHILS NFR BLD: 79.6 %
NRBC BLD-RTO: 0 /100 WBC
PHOSPHATE SERPL-MCNC: 2.9 MG/DL
PLATELET # BLD AUTO: 145 K/UL
PMV BLD AUTO: 11.7 FL
POCT GLUCOSE: 209 MG/DL (ref 70–110)
POCT GLUCOSE: 238 MG/DL (ref 70–110)
POCT GLUCOSE: 263 MG/DL (ref 70–110)
POCT GLUCOSE: 295 MG/DL (ref 70–110)
POTASSIUM SERPL-SCNC: 4 MMOL/L
PROT SERPL-MCNC: 5.4 G/DL
RBC # BLD AUTO: 2.94 M/UL
SODIUM SERPL-SCNC: 133 MMOL/L
VANCOMYCIN SERPL-MCNC: 21.8 UG/ML
WBC # BLD AUTO: 7.61 K/UL

## 2018-12-23 PROCEDURE — 11000001 HC ACUTE MED/SURG PRIVATE ROOM

## 2018-12-23 PROCEDURE — 25000003 PHARM REV CODE 250: Performed by: PHYSICIAN ASSISTANT

## 2018-12-23 PROCEDURE — 63600175 PHARM REV CODE 636 W HCPCS: Performed by: HOSPITALIST

## 2018-12-23 PROCEDURE — 99232 SBSQ HOSP IP/OBS MODERATE 35: CPT | Mod: ,,, | Performed by: INTERNAL MEDICINE

## 2018-12-23 PROCEDURE — 80053 COMPREHEN METABOLIC PANEL: CPT

## 2018-12-23 PROCEDURE — 25000003 PHARM REV CODE 250: Performed by: HOSPITALIST

## 2018-12-23 PROCEDURE — 80202 ASSAY OF VANCOMYCIN: CPT

## 2018-12-23 PROCEDURE — 85025 COMPLETE CBC W/AUTO DIFF WBC: CPT

## 2018-12-23 PROCEDURE — 83735 ASSAY OF MAGNESIUM: CPT

## 2018-12-23 PROCEDURE — 84100 ASSAY OF PHOSPHORUS: CPT

## 2018-12-23 PROCEDURE — 97535 SELF CARE MNGMENT TRAINING: CPT

## 2018-12-23 RX ADMIN — INSULIN DETEMIR 4 UNITS: 100 INJECTION, SOLUTION SUBCUTANEOUS at 09:12

## 2018-12-23 RX ADMIN — ASPIRIN 81 MG: 81 TABLET, COATED ORAL at 09:12

## 2018-12-23 RX ADMIN — Medication 10 ML: at 09:12

## 2018-12-23 RX ADMIN — METOPROLOL SUCCINATE 50 MG: 50 TABLET, EXTENDED RELEASE ORAL at 09:12

## 2018-12-23 RX ADMIN — ATORVASTATIN CALCIUM 40 MG: 20 TABLET, FILM COATED ORAL at 09:12

## 2018-12-23 RX ADMIN — METRONIDAZOLE 500 MG: 500 TABLET ORAL at 12:12

## 2018-12-23 RX ADMIN — CEFEPIME 2 G: 2 INJECTION, POWDER, FOR SOLUTION INTRAVENOUS at 06:12

## 2018-12-23 RX ADMIN — INSULIN ASPART 1 UNITS: 100 INJECTION, SOLUTION INTRAVENOUS; SUBCUTANEOUS at 09:12

## 2018-12-23 RX ADMIN — FAMOTIDINE 20 MG: 20 TABLET ORAL at 09:12

## 2018-12-23 RX ADMIN — ACYCLOVIR 400 MG: 200 CAPSULE ORAL at 09:12

## 2018-12-23 RX ADMIN — Medication 10 ML: at 05:12

## 2018-12-23 RX ADMIN — FLUTICASONE FUROATE AND VILANTEROL TRIFENATATE 1 PUFF: 100; 25 POWDER RESPIRATORY (INHALATION) at 09:12

## 2018-12-23 RX ADMIN — THERA TABS 1 TABLET: TAB at 09:12

## 2018-12-23 RX ADMIN — LEVOTHYROXINE SODIUM 75 MCG: 75 TABLET ORAL at 06:12

## 2018-12-23 RX ADMIN — INSULIN ASPART 2 UNITS: 100 INJECTION, SOLUTION INTRAVENOUS; SUBCUTANEOUS at 05:12

## 2018-12-23 RX ADMIN — ALLOPURINOL 300 MG: 300 TABLET ORAL at 09:12

## 2018-12-23 RX ADMIN — INSULIN ASPART 2 UNITS: 100 INJECTION, SOLUTION INTRAVENOUS; SUBCUTANEOUS at 09:12

## 2018-12-23 RX ADMIN — Medication 10 ML: at 12:12

## 2018-12-23 RX ADMIN — CLOPIDOGREL 75 MG: 75 TABLET, FILM COATED ORAL at 09:12

## 2018-12-23 RX ADMIN — MICONAZOLE NITRATE: 20 OINTMENT TOPICAL at 09:12

## 2018-12-23 RX ADMIN — METRONIDAZOLE 500 MG: 500 TABLET ORAL at 09:12

## 2018-12-23 RX ADMIN — INSULIN ASPART 3 UNITS: 100 INJECTION, SOLUTION INTRAVENOUS; SUBCUTANEOUS at 12:12

## 2018-12-23 RX ADMIN — METRONIDAZOLE 500 MG: 500 TABLET ORAL at 06:12

## 2018-12-23 RX ADMIN — ENOXAPARIN SODIUM 30 MG: 100 INJECTION SUBCUTANEOUS at 05:12

## 2018-12-23 NOTE — PLAN OF CARE
Problem: Occupational Therapy Goal  Goal: Occupational Therapy Goal  Goals to be met by: 12/31     Patient will increase functional independence with ADLs by performing:    UE Dressing with [place].   LE Dressing with Minimal Assistance.  Grooming while seated with Set-up Assistance. Goal met  Toileting from bedside commode with Minimal Assistance for hygiene and clothing management.   Bathing from  edge of bed with Moderate Assistance.      Outcome: Ongoing (interventions implemented as appropriate)  Goals addressed and unmet.  Cont with POC  Farrah Flower OT  12/23/2018

## 2018-12-23 NOTE — MEDICAL/APP STUDENT
"Hospital Medicine   Progress Note    Patient: Krissy Marino [id number]  Date of Service: 12/23/2018  Team: [facility] HOSP MED D Pamela Anders MD  Hospital Day: 11  Admission Date: 12/12/2018  SUMAN: 12/24/2018  Code status: Full Code    Admission CC: Cellulitis (Patient transferred to [facility] for further evaluation of unresolved cellulitis of left foot. EMS also reports "low blood cell count" as well from patient rehab ([facility]). Patient A&Ox4 and following commands. )    Principal Problem:  Osteomyelitis of left tibia    24-Hour Course / Overnight Events     No significant events reported by Nursing.    Interval HPI / Review of Systems     Patient reports no new compalints.    Review of Systems   Constitutional: Negative for fever.   Respiratory: Negative for shortness of breath.        Physical Examination     Temp:  [97.5 °F (36.4 °C)-99 °F (37.2 °C)]   Pulse:  [74-90]   Resp:  [16-20]   BP: (121-140)/(59-61)   SpO2:  [93 %-98 %]      Temp: 98.1 °F (36.7 °C) (12/23/18 0735)  Pulse: 75 (12/23/18 0735)  Resp: 16 (12/23/18 0735)  BP: (!) 125/59 (12/23/18 0735)  SpO2: 96 % (12/23/18 0735)    Intake/Output Summary (Last 24 hours) at 12/23/2018 0935  Last data filed at 12/23/2018 0620  Gross per 24 hour   Intake 220 ml   Output 700 ml   Net -480 ml     I have personally reviewed the recorded Intake/Output for the past 24 hours:  Unmeasured Output noted.    Body mass index is 39.11 kg/m².  Physical Exam   Constitutional:  Non-toxic appearance.   Eyes: Conjunctivae and lids are normal.   Cardiovascular: S1 normal and S2 normal.   Pulmonary/Chest: Effort normal. She has decreased breath sounds.   Abdominal: Soft. Bowel sounds are normal. There is no tenderness.   Musculoskeletal: She exhibits edema.        Left foot: There is swelling and crepitus.   Neurological: She is alert. She is not disoriented.       Data     Lab, Imaging, and Diagnostic results from 12/23/2018 were reviewed.    Significant Results:    Recent " Labs   Lab 12/20/18  0518 12/21/18  0848 12/22/18  0525 12/23/18  0448   WBC 7.79 8.25 8.56 7.61   HGB 9.2* 9.0* 8.8* 9.4*   HCT 28.8* 27.5* 27.7* 28.8*   * 123* 140* 145*     Recent Labs   Lab 12/19/18  0733 12/20/18  0518 12/21/18  0516 12/22/18  0525 12/23/18  0448   * 131* 133* 132* 133*   K 4.1 3.9 3.9 3.8 4.0    100 102 102 104   CO2 25 21* 23 23 22*   BUN 35* 39* 50* 48* 51*   CREATININE 1.0 1.1 1.6* 1.4 1.4   * 162* 204* 167* 210*   CALCIUM 8.7 8.4* 8.0* 8.3* 8.3*   MG 1.2* 2.2  --   --  1.9   PHOS 3.0  --   --   --  2.9   ALKPHOS 202* 192* 181* 198* 190*   ALT 58* 61* 51* 51* 39   AST 73* 72* 48* 42* 27   ALBUMIN 1.7* 1.7* 1.7* 1.8* 1.8*   PROT 5.4* 5.1* 4.9* 5.4* 5.4*   BILITOT 0.5 0.5 0.4 0.5 0.4     Recent Labs   Lab 12/21/18  2118 12/22/18  1122 12/22/18  1426 12/22/18  1712 12/22/18  2133 12/23/18  0741   POCTGLUCOSE 306* 253* 215* 240* 289* 209*     A1C:   Recent Labs   Lab 12/12/18  1852   HGBA1C 6.9*     TSH:   Recent Labs   Lab 07/10/18  0948   TSH 1.307     Urine Studies: Recent Labs   Lab 12/21/18  1416   COLORU Yellow   APPEARANCEUA Clear   PHUR 6.0   SPECGRAV 1.010   PROTEINUA 1+*   GLUCUA 1+*   KETONESU Negative   BILIRUBINUA Negative   OCCULTUA 1+*   NITRITE Negative   LEUKOCYTESUR 1+*   RBCUA 23*   WBCUA 6*   BACTERIA Rare   SQUAMEPITHEL 0   HYALINECASTS 0       Medications     Scheduled Medications:    allopurinol 300 mg Oral Daily   aspirin 81 mg Oral Daily   atorvastatin 40 mg Oral Daily   ceFEPime (MAXIPIME) IVPB 2 g Intravenous Q24H   clopidogrel 75 mg Oral Daily   duke's soln (benadryl 30 mL, mylanta 30 mL, lidocane 30 mL, nystatin 30 mL) 120 mL 10 mL Oral QID   enoxaparin 30 mg Subcutaneous Daily   famotidine 20 mg Oral Daily   fluticasone-vilanterol 1 puff Inhalation Daily   insulin detemir U-100 4 Units Subcutaneous Daily   levothyroxine 75 mcg Oral Before breakfast   metoprolol succinate 50 mg Oral Daily   metroNIDAZOLE 500 mg Oral Q8H   miconazole nitrate  2%  Topical (Top) BID   multivitamin 1 tablet Oral Daily   polyethylene glycol 17 g Oral Daily     PRN: sodium chloride, acetaminophen, dextrose 50%, dextrose 50%, glucagon (human recombinant), glucose, glucose, insulin aspart U-100, ondansetron, senna-docusate 8.6-50 mg, sodium chloride 0.9%  Infusions:     Problem-Based Assessment and Plan     Overview: 78 year old female with a history of DM2, CAD (s/p PCI 7/2018), PVD and s/p bilateral total knee arthroplasties (years prior) who is admitted for left lower extremity cellulitis and osteomyelitis. Recently hospitalized at OSH with a lower extremity cellulitis which started after a left ankle sprain (11/26-12/5) which didn't improve with 14-day outpt course of ceftriaxone. Recently told she has severe PVD which requires stent placement, but her recent cardiac stent placement presents a contraindication for at least a year, which prevented good wound healing. At [facility], CT showed left sided abscesses distal to the fibular head and osteomyelitis that extended proximal to the ankle joint. Orthopedics recommended AKA, which family and pt refused for antibiotics alone with understanding of possible complications from infection/sepsis. Pt's antibiotics include cefepime (pseudomonas coverage, switched from ceftriaxone), flagyl (gas noted on CT), and vancomycin (held for ALVARO that developed on 12/21). Received 1 U PRBC with good response (12/16).    Active Hospital Problems    Diagnosis  POA    *Osteomyelitis of left tibia [M86.9]  Yes    Pressure injury of coccygeal region, stage 2 [L89.152]  Yes    Alteration in skin integrity [R23.9]  Yes    Gas gangrene of Left lower extremity [A48.0]  Yes    Moderate protein-calorie malnutrition [E44.0]  Yes    Hypoalbuminemia due to protein-calorie malnutrition [E46]  Yes    Cellulitis [L03.90]  Yes    Anemia [D64.9]  Yes    Pressure injury of buttock, stage 2 [L89.302]  Yes    Chronic osteomyelitis of left tibia with  draining sinus [M86.462]  Yes    Benign hypertensive heart disease with HF (heart failure) [I11.0]  Yes    Cellulitis of left lower extremity [L03.116]  Yes    Macrocytic anemia [D53.9]  Yes    Renal insufficiency [N28.9]  No    Preoperative cardiovascular examination [Z01.810]  Not Applicable    Peripheral arterial disease [I73.9]  Yes    Coronary artery disease involving native coronary artery of native heart with unstable angina pectoris [I25.110]  Yes    Chronic diastolic heart failure [I50.32]  Yes    Obesity (BMI 30-39.9) [E66.9]  Yes    Bilateral leg edema [R60.0]  Yes    Hypothyroidism [E03.9]  Yes    Dyslipidemia [E78.5]  Yes    Hypertension [I10]  Yes    Type 2 diabetes mellitus, without long-term current use of insulin [E11.9]  Yes      Resolved Hospital Problems    Diagnosis Date Resolved POA    Delirium [R41.0] 12/21/2018 No    Hyperkalemia [E87.5] 12/17/2018 No    Hypomagnesemia [E83.42] 12/21/2018 No    Sepsis due to cellulitis [L03.90, A41.9] 12/21/2018 Yes       Problems addressed today:    Osteomyelitis of left tibia  Gas gangrene of Left lower extremity  Cellulitis of left lower extremity  · PICC line in place  · Vancomycin held (ALVARO)  · Cefepime 2g IV q12   · Flagyl 500 mg PO TID  · Re-image LLE in 4-5 weeks (or if worsening signs/symptoms of infection)  Antibiotics for 6 weeks at this time (total therapy unclear at this time). Weekly ESR, CRP, CBC, CMP, and Vanc trough for LLE cellulitis, myositis and multiloculated abscesses in deep soft tissue - already complicated by GBS bacteremia. Limb-salvage unlikely, AKA recommended for source control as intravenous antibiotics will not appropriately treat abscess, particularly in the setting of PVD. Vancomycin level trending down, Goal trough 15 - 20. Plan - Restart Vanc 1.5g IV q24. Continue Cefepime 2g IV q12 and Flagyl 500mg po TID as gas seen on CT scan.     Renal insufficiency  · Cr downtrending, continue holding Lasix and ACEi  (12/23)     Oral candidiasis  · Duke's solution (12/22)    Peripheral arterial disease  · Non-compressible MARIMAR with normal doppler waveforms and palpable pedal pulses  · Continue ASA, plavix, atorvastatin     Coronary artery disease involving native coronary artery of native heart with unstable angina pectoris  Chronic diastolic heart failure   Dyslipidemia   Hypertension  · Underwent LHC and PCI in 7/2018  · lower BPs noted, meds adjusted (12/18)  · Continue ASA, plavix, toprol 50 mg QD  · Continue atorvastatin  · Amlodipine stopped  · Losartan 50 mg held for ALVARO that developed on 12/21 (had been reduced to 50 mg from 100 mg for elevated potassium)  · Lasix held for ALVARO, (had been reduced from 40 PO BID to QD)     Type 2 diabetes mellitus, without long-term current use of insulin  · Home oral medications held  · SC insulin, POCT  · detemir 4 units daily (started 12/21)     Coccyx pressure injury, stage 2  · Wound care following    Debility  · PT/OT following    Hypothyroidism   · continue Synthroid     Moderate protein calorie malnutrition  Severe hypoalbuminemia  · Low prealbumin and albumin  · boost TID, beneprotein TID, MVI  · Swallow study, regular diet (12/17)    Macrocytic Anemia  Anemia of chronic inflammation   · Ferritin ? (12/16)  · Stable, monitor  · B12 and folate wnl  · MVI    Hypomagnesemia  · Replaced    Hyperkalemia  · K 5.4, given 500 mL LR bolus, resolved (12/16)      HIGH RISK CONDITION(S):   Patient has a condition that poses threat to life and bodily function: Acute Renal Failure  Patient is currently on drug therapy requiring intensive monitoring for toxicity: Vancomycin   Inpatient Checklist:  Diet:  Diet diabetic [facility]; 2000 Calorie; Cardiac (Low Na/Chol)  DVT Prophylaxis: Anticoagulation     Anticoagulants   Medication Route Frequency    enoxaparin injection 30 mg Subcutaneous Daily     GI Prophylaxis: Not indicated  Lines/ Drains/ Airways:   Central Line Indication: med  administration  Type: PICC  Urinary Catheter Indicated: Patient Does Not Have Urinary Catheter  Other Lines/Tubes/Drains:    Disposition:   Skilled Nursing Facility    Follow up plan:      Provider  Pamela Anders MD  [facility] HOSP MED D  Department of Hospital Medicine  Contact Information:   Spectralink # [phone]  Pager [phone]    I personally scribed for Pamela Anders MD on 12/23/2018 at 9:36 AM. Electronically signed by edson Harp on 12/23/2018 at 9:36 AM

## 2018-12-23 NOTE — PT/OT/SLP PROGRESS
Occupational Therapy   Treatment    Name: Krissy Marino  MRN: [id number]  Admitting Diagnosis:  Osteomyelitis of left tibia       Recommendations:     Discharge Recommendations: nursing facility, skilled  Discharge Equipment Recommendations:  (TBD)  Barriers to discharge:  None    Subjective     Pain/Comfort:  · Pain Rating 1: 0/10    Objective:     Communicated with: RN prior to session.  Patient found with all lines intact and (lines intact ) upon OT entry to room.    General Precautions: Standard, fall   Orthopedic Precautions:LLE non weight bearing   Braces: N/A     Occupational Performance:    Bed Mobility:    Pt max A for bed mobility and positioning     Activities of Daily Living:  · Pt max A for self care       [facility] 6 Click ADL: 15    Treatment & Education:  Pt educated on safety, role of OT, importance of increased participation in self care for gains , expectations for participation, expectations for gains, POC, energy conservation, caregiver strain. White board updated.   - extensive education regarding functional performance with/wiothout amputation.    - bed positioning with poor tolerance     Patient left supine with all lines intact  Education:    Assessment:     Krissy Marino is a 78 y.o. female with a medical diagnosis of Osteomyelitis of left tibia. Pt resist castillo to amputation and discussed and educated at length for benefits of removing infection and that pt is non weight bearing and with non healing wound.  Pt with noted decreased cognitive performance with lethargy and confusion   She presents with the following performance deficits affecting function are weakness, impaired endurance, impaired self care skills, impaired functional mobilty, decreased coordination, impaired balance, gait instability.     Rehab Prognosis:  Fair; patient would benefit from acute skilled OT services to address these deficits and reach maximum level of function.       Plan:     Patient to be seen 4 x/week to address  the above listed problems via self-care/home management, therapeutic activities  · Plan of Care Expires: 01/14/19  · Plan of Care Reviewed with: patient    This Plan of care has been discussed with the patient who was involved in its development and understands and is in agreement with the identified goals and treatment plan    GOALS:   Multidisciplinary Problems     Occupational Therapy Goals        Problem: Occupational Therapy Goal    Goal Priority Disciplines Outcome Interventions   Occupational Therapy Goal     OT, PT/OT Ongoing (interventions implemented as appropriate)    Description:  Goals to be met by: 12/31     Patient will increase functional independence with ADLs by performing:    UE Dressing with [place].   LE Dressing with Minimal Assistance.  Grooming while seated with Set-up Assistance. Goal met  Toileting from bedside commode with Minimal Assistance for hygiene and clothing management.   Bathing from  edge of bed with Moderate Assistance.                       Time Tracking:     OT Date of Treatment: 12/23/18  OT Start Time: 1050  OT Stop Time: 1105  OT Total Time (min): 15 min    Billable Minutes:Therapeutic Activity 15    Farrah Flower, OT  12/23/2018

## 2018-12-23 NOTE — PLAN OF CARE
Problem: Adult Inpatient Plan of Care  Goal: Plan of Care Review  Pt free of falls and injury. Disoriented to time and situation at nighttime.Denies pain during the shift.Perineal care provided, wound care done-pt tolerated well.Bed low, breaks locked, SR up x2, call light within reach, will continue to monitor.

## 2018-12-24 LAB
ALBUMIN SERPL BCP-MCNC: 1.7 G/DL
ALP SERPL-CCNC: 178 U/L
ALT SERPL W/O P-5'-P-CCNC: 33 U/L
ANION GAP SERPL CALC-SCNC: 7 MMOL/L
AST SERPL-CCNC: 22 U/L
BASOPHILS # BLD AUTO: 0.04 K/UL
BASOPHILS NFR BLD: 0.5 %
BILIRUB SERPL-MCNC: 0.4 MG/DL
BUN SERPL-MCNC: 50 MG/DL
CALCIUM SERPL-MCNC: 8.6 MG/DL
CHLORIDE SERPL-SCNC: 108 MMOL/L
CO2 SERPL-SCNC: 21 MMOL/L
CREAT SERPL-MCNC: 1.2 MG/DL
DIFFERENTIAL METHOD: ABNORMAL
EOSINOPHIL # BLD AUTO: 0.2 K/UL
EOSINOPHIL NFR BLD: 2 %
ERYTHROCYTE [DISTWIDTH] IN BLOOD BY AUTOMATED COUNT: 16 %
EST. GFR  (AFRICAN AMERICAN): 49.3 ML/MIN/1.73 M^2
EST. GFR  (NON AFRICAN AMERICAN): 42.8 ML/MIN/1.73 M^2
GLUCOSE SERPL-MCNC: 155 MG/DL
HCT VFR BLD AUTO: 27 %
HGB BLD-MCNC: 8.7 G/DL
IMM GRANULOCYTES # BLD AUTO: 0.07 K/UL
IMM GRANULOCYTES NFR BLD AUTO: 0.9 %
LYMPHOCYTES # BLD AUTO: 1 K/UL
LYMPHOCYTES NFR BLD: 13.5 %
MAGNESIUM SERPL-MCNC: 1.8 MG/DL
MCH RBC QN AUTO: 31.6 PG
MCHC RBC AUTO-ENTMCNC: 32.2 G/DL
MCV RBC AUTO: 98 FL
MONOCYTES # BLD AUTO: 0.6 K/UL
MONOCYTES NFR BLD: 7.9 %
NEUTROPHILS # BLD AUTO: 5.6 K/UL
NEUTROPHILS NFR BLD: 75.2 %
NRBC BLD-RTO: 0 /100 WBC
PHOSPHATE SERPL-MCNC: 2.5 MG/DL
PLATELET # BLD AUTO: 137 K/UL
PMV BLD AUTO: 11.1 FL
POCT GLUCOSE: 166 MG/DL (ref 70–110)
POCT GLUCOSE: 240 MG/DL (ref 70–110)
POCT GLUCOSE: 288 MG/DL (ref 70–110)
POCT GLUCOSE: 290 MG/DL (ref 70–110)
POTASSIUM SERPL-SCNC: 4.3 MMOL/L
PROT SERPL-MCNC: 5.1 G/DL
RBC # BLD AUTO: 2.75 M/UL
SODIUM SERPL-SCNC: 136 MMOL/L
VANCOMYCIN SERPL-MCNC: 17.8 UG/ML
WBC # BLD AUTO: 7.43 K/UL

## 2018-12-24 PROCEDURE — 25000003 PHARM REV CODE 250: Performed by: HOSPITALIST

## 2018-12-24 PROCEDURE — 25000003 PHARM REV CODE 250: Performed by: PHYSICIAN ASSISTANT

## 2018-12-24 PROCEDURE — 83735 ASSAY OF MAGNESIUM: CPT

## 2018-12-24 PROCEDURE — 11000001 HC ACUTE MED/SURG PRIVATE ROOM

## 2018-12-24 PROCEDURE — 85025 COMPLETE CBC W/AUTO DIFF WBC: CPT

## 2018-12-24 PROCEDURE — 25000003 PHARM REV CODE 250: Performed by: INTERNAL MEDICINE

## 2018-12-24 PROCEDURE — 80053 COMPREHEN METABOLIC PANEL: CPT

## 2018-12-24 PROCEDURE — 36415 COLL VENOUS BLD VENIPUNCTURE: CPT

## 2018-12-24 PROCEDURE — 63600175 PHARM REV CODE 636 W HCPCS: Performed by: INTERNAL MEDICINE

## 2018-12-24 PROCEDURE — 84100 ASSAY OF PHOSPHORUS: CPT

## 2018-12-24 PROCEDURE — 99232 SBSQ HOSP IP/OBS MODERATE 35: CPT | Mod: ,,, | Performed by: INTERNAL MEDICINE

## 2018-12-24 PROCEDURE — 80202 ASSAY OF VANCOMYCIN: CPT

## 2018-12-24 PROCEDURE — 63600175 PHARM REV CODE 636 W HCPCS: Performed by: HOSPITALIST

## 2018-12-24 RX ORDER — VANCOMYCIN HCL IN 5 % DEXTROSE 1G/250ML
1000 PLASTIC BAG, INJECTION (ML) INTRAVENOUS ONCE
Status: COMPLETED | OUTPATIENT
Start: 2018-12-24 | End: 2018-12-24

## 2018-12-24 RX ADMIN — ENOXAPARIN SODIUM 30 MG: 100 INJECTION SUBCUTANEOUS at 05:12

## 2018-12-24 RX ADMIN — Medication 10 ML: at 04:12

## 2018-12-24 RX ADMIN — VANCOMYCIN HYDROCHLORIDE 1000 MG: 1 INJECTION, POWDER, LYOPHILIZED, FOR SOLUTION INTRAVENOUS at 04:12

## 2018-12-24 RX ADMIN — ALLOPURINOL 300 MG: 300 TABLET ORAL at 08:12

## 2018-12-24 RX ADMIN — Medication 10 ML: at 01:12

## 2018-12-24 RX ADMIN — ONDANSETRON 8 MG: 8 TABLET, ORALLY DISINTEGRATING ORAL at 05:12

## 2018-12-24 RX ADMIN — LEVOTHYROXINE SODIUM 75 MCG: 75 TABLET ORAL at 06:12

## 2018-12-24 RX ADMIN — INSULIN ASPART 3 UNITS: 100 INJECTION, SOLUTION INTRAVENOUS; SUBCUTANEOUS at 12:12

## 2018-12-24 RX ADMIN — METOPROLOL SUCCINATE 50 MG: 50 TABLET, EXTENDED RELEASE ORAL at 08:12

## 2018-12-24 RX ADMIN — ASPIRIN 81 MG: 81 TABLET, COATED ORAL at 08:12

## 2018-12-24 RX ADMIN — CLOPIDOGREL 75 MG: 75 TABLET, FILM COATED ORAL at 08:12

## 2018-12-24 RX ADMIN — Medication 10 ML: at 09:12

## 2018-12-24 RX ADMIN — THERA TABS 1 TABLET: TAB at 08:12

## 2018-12-24 RX ADMIN — FLUTICASONE FUROATE AND VILANTEROL TRIFENATATE 1 PUFF: 100; 25 POWDER RESPIRATORY (INHALATION) at 08:12

## 2018-12-24 RX ADMIN — METRONIDAZOLE 500 MG: 500 TABLET ORAL at 09:12

## 2018-12-24 RX ADMIN — INSULIN ASPART 1 UNITS: 100 INJECTION, SOLUTION INTRAVENOUS; SUBCUTANEOUS at 09:12

## 2018-12-24 RX ADMIN — FAMOTIDINE 20 MG: 20 TABLET ORAL at 08:12

## 2018-12-24 RX ADMIN — MICONAZOLE NITRATE: 20 OINTMENT TOPICAL at 08:12

## 2018-12-24 RX ADMIN — CEFEPIME 2 G: 2 INJECTION, POWDER, FOR SOLUTION INTRAVENOUS at 06:12

## 2018-12-24 RX ADMIN — INSULIN DETEMIR 4 UNITS: 100 INJECTION, SOLUTION SUBCUTANEOUS at 08:12

## 2018-12-24 RX ADMIN — INSULIN ASPART 3 UNITS: 100 INJECTION, SOLUTION INTRAVENOUS; SUBCUTANEOUS at 05:12

## 2018-12-24 RX ADMIN — ACETAMINOPHEN 325 MG: 325 TABLET ORAL at 08:12

## 2018-12-24 RX ADMIN — ATORVASTATIN CALCIUM 40 MG: 20 TABLET, FILM COATED ORAL at 08:12

## 2018-12-24 RX ADMIN — METRONIDAZOLE 500 MG: 500 TABLET ORAL at 01:12

## 2018-12-24 RX ADMIN — MICONAZOLE NITRATE: 20 OINTMENT TOPICAL at 09:12

## 2018-12-24 RX ADMIN — METRONIDAZOLE 500 MG: 500 TABLET ORAL at 06:12

## 2018-12-24 NOTE — PLAN OF CARE
Roel left message with Maggie with [facility] to inform Sw if Pt's auth is received. Sw to follow. Pt's insurance is closed till Wed per Maggie with [facility], CM aware and Sw to follow Wed.

## 2018-12-24 NOTE — PROGRESS NOTES
[facility] Medicine PharmD Consult: Vancomycin Follow-up Note    Krissy Marino is a 78 y.o. female initiated on antimicrobial therapy for bone/joint with Vancomycin.    Renal function: Estimated Creatinine Clearance: 45.3 mL/min (based on SCr of 1.2 mg/dL).     Vancomycin Random Level= 17.8 on 12/24 at 0358    Vancomycin serum concentration assessment(s):   The random level was drawn correctly and can be used to guide therapy at this time.   This measurement is within the desired definitive target range of 15-20 mcg/mL.    Vancomycin regimen plan:   Recommend re-dosing Vancomycin 1000 mg once.   The next random serum concentration will be drawn at 0400 on 12/25 prior to the next dose.     Pharmacy will continue to follow and monitor vancomycin.      Please let us know if you have any questions regarding this assessment.     Thank you for the consult,  Nancy Chang, PharmD, BCPS  [phone]     **Note: Consults are reviewed Monday-Friday 7:00am-3:30pm. THE ABOVE RECOMMENDATIONS ARE ONLY SUGGESTED.THE RECOMMENDATIONS SHOULD BE CONSIDERED IN CONJUNCTION WITH ALL PATIENT FACTORS. **

## 2018-12-24 NOTE — PLAN OF CARE
Problem: Skin Injury Risk Increased  Goal: Skin Health and Integrity  Outcome: Ongoing (interventions implemented as appropriate)  Pt turned q 2 , ointment applied to wound per md order, will continue  to monitor

## 2018-12-24 NOTE — PHYSICIAN QUERY
"PT Name: Krissy Marino  MR #: [id number]  Physician Query Form - Renal Condition Clarification     CDS: Shena Dumont RN, CCDS         Contact information :[phone]  [email or web address]       This form is a permanent document in the medical record.     QueryDate: December 24, 2018    By submitting this query, we are merely seeking further clarification of documentation. Please utilize your independent clinical judgment when addressing the question(s) below.    The Medical record contains the following:   Indicator Supporting Clinical Findings Location in Medical Record   x Kidney (Renal) Insufficiency   "Renal insufficiency"         Hospital medicine progress note 12/23/18   x Kidney (Renal) Failure / Injury "ALVARO that developed on 12/21"    "ALVARO  "(acute kidney injury)" Hospital medicine progress note 12/23/18  Hospital medicine progress note 12/21/18    Nephrotoxic Agents     x BUN/Creatinine GFR BUN 35, creatinine 0.9, GFR >60  BUN 50, creatinine 1.6, GFR 30.2 Lab 12/18/18  Lab 12/21/18    Urine: Casts         Eosinophils      Dehydration      Nausea/Vomiting      Dialysis/CRRT     x Treatment: -" ACEI and lasix were stopped.   - vanco level of 30. vanco stopped and will plan to dose based on levels."  sodium chloride 0.9% bolus 500 mL    Hospital medicine progress note 12/21/18      MAR 12/21/18   x Other:  "Cr of 1.6 12/21 from normal, concern for possible vancomycin toxicity"  "Cr downtrending, continue holding Lasix and ACEi" Hospital medicine progress note 12/21/18    Hospital medicine progress note 12/23/18   Acute Kidney Injury / Acute Renal Failure has different defining criteria. A generally accepted guideline  is:   A greater than 100% (2X) rise in serum creatinine from baseline* occurring during the course of a single hospital stay.   *Baseline as determined by the providers judgment and consideration of previous lab values and other documentation, if available.    A diagnosis of Acute " Kidney Injury/ Acute Renal Failure should incorporate abnormal labs and clinical findings that are clinically significant    References: 1. Ramana et al. Acute renal failure-definition, outcome measures, animal models, fluid therapy and information technology needs: the Second International Consensus Conference of the Acute Dialysis Quality Initiative (ADQI) Group. Crit Care 2004; 8:B204; 2. Navdeep et al. Acute Kidney Injury Network: report of an initiative to improve outcomes in acute kidney injury. Crit Care 2007; 11:R31; 3. Kidney Disease: Improving Global Outcomes (KDIGO). Acute Kidney Injury Work Group. KDIGO clinical practice guidelines for acute kidney injury. Kidney Int Suppl 2012; 2:1.  The clinical guidelines noted below is only a system guideline, it does not replace the providers clinical judgment.      Doctor, please specify the diagnosis or diagnoses associated with above clinical findings.  Please clarify ALVARO diagnosis.      [   ] Unspecified Acute Kidney Failure/Injury      [  X  ] Acute Renal Insufficiency  Consider if SCr rise is transient and normalizes quickly with no efforts at real resuscitation of vital signs and perfusion   [   ] Other (please specify): _________________________________   [   ]  Clinically Undetermined       Please document in your progress notes daily for the duration of treatment until resolved and include in your discharge summary.

## 2018-12-24 NOTE — PROGRESS NOTES
"Physician Attestation for Scribe:  I, Pamela Anders MD, personally performed the services described in this documentation. All medical record entries made by the scribe were at my direction and in my presence.  I have reviewed this note and agree that the record reflects my personal performance and is accurate and complete.     Hospital Medicine   Progress Note     Patient: Krissy Marino [id number]  Date of Service: 12/23/2018  Team: [facility] [place] [facility] MED D Pamela Anders MD  Hospital Day: 11  Admission Date: 12/12/2018  SUMAN: 12/24/2018  Code status: Full Code     Admission CC: Cellulitis (Patient transferred to [facility] [place] for further evaluation of unresolved cellulitis of left foot. EMS also reports "low blood cell count" as well from patient rehab ([facility]). Patient A&Ox4 and following commands. )     Principal Problem:  Osteomyelitis of left tibia     24-Hour Course / Overnight Events      No significant events reported by Nursing.     Interval HPI / Review of Systems      Patient reports no new compalints.     Review of Systems   Constitutional: Negative for fever.   Respiratory: Negative for shortness of breath.          Physical Examination      Temp:  [97.5 °F (36.4 °C)-99 °F (37.2 °C)]   Pulse:  [74-90]   Resp:  [16-20]   BP: (121-140)/(59-61)   SpO2:  [93 %-98 %]       Temp: 98.1 °F (36.7 °C) (12/23/18 0735)  Pulse: 75 (12/23/18 0735)  Resp: 16 (12/23/18 0735)  BP: (!) 125/59 (12/23/18 0735)  SpO2: 96 % (12/23/18 0735)     Intake/Output Summary (Last 24 hours) at 12/23/2018 0935  Last data filed at 12/23/2018 0620      Gross per 24 hour   Intake 220 ml   Output 700 ml   Net -480 ml      I have personally reviewed the recorded Intake/Output for the past 24 hours:  Unmeasured Output noted.     Body mass index is 39.11 kg/m².  Physical Exam   Constitutional:  Non-toxic appearance.   Eyes: Conjunctivae and lids are normal.   Cardiovascular: S1 normal and S2 normal.   Pulmonary/Chest: Effort normal. She has " decreased breath sounds.   Abdominal: Soft. Bowel sounds are normal. There is no tenderness.   Musculoskeletal: She exhibits edema.        Left foot: There is swelling and crepitus.   Neurological: She is alert. She is not disoriented.         Data      Lab, Imaging, and Diagnostic results from 12/23/2018 were reviewed.     Significant Results:            Recent Labs   Lab 12/20/18  0518 12/21/18  0848 12/22/18  0525 12/23/18  0448   WBC 7.79 8.25 8.56 7.61   HGB 9.2* 9.0* 8.8* 9.4*   HCT 28.8* 27.5* 27.7* 28.8*   * 123* 140* 145*              Recent Labs   Lab 12/19/18  0733 12/20/18  0518 12/21/18  0516 12/22/18  0525 12/23/18  0448   * 131* 133* 132* 133*   K 4.1 3.9 3.9 3.8 4.0    100 102 102 104   CO2 25 21* 23 23 22*   BUN 35* 39* 50* 48* 51*   CREATININE 1.0 1.1 1.6* 1.4 1.4   * 162* 204* 167* 210*   CALCIUM 8.7 8.4* 8.0* 8.3* 8.3*   MG 1.2* 2.2  --   --  1.9   PHOS 3.0  --   --   --  2.9   ALKPHOS 202* 192* 181* 198* 190*   ALT 58* 61* 51* 51* 39   AST 73* 72* 48* 42* 27   ALBUMIN 1.7* 1.7* 1.7* 1.8* 1.8*   PROT 5.4* 5.1* 4.9* 5.4* 5.4*   BILITOT 0.5 0.5 0.4 0.5 0.4               Recent Labs   Lab 12/21/18  2118 12/22/18  1122 12/22/18  1426 12/22/18  1712 12/22/18  2133 12/23/18  0741   POCTGLUCOSE 306* 253* 215* 240* 289* 209*      A1C:       Recent Labs   Lab 12/12/18  1852   HGBA1C 6.9*      TSH:       Recent Labs   Lab 07/10/18  0948   TSH 1.307          Urine Studies: Recent Labs   Lab 12/21/18  1416   COLORU Yellow   APPEARANCEUA Clear   PHUR 6.0   SPECGRAV 1.010   PROTEINUA 1+*   GLUCUA 1+*   KETONESU Negative   BILIRUBINUA Negative   OCCULTUA 1+*   NITRITE Negative   LEUKOCYTESUR 1+*   RBCUA 23*   WBCUA 6*   BACTERIA Rare   SQUAMEPITHEL 0   HYALINECASTS 0         Medications      Scheduled Medications:     allopurinol 300 mg Oral Daily   aspirin 81 mg Oral Daily   atorvastatin 40 mg Oral Daily   ceFEPime (MAXIPIME) IVPB 2 g Intravenous Q24H   clopidogrel 75 mg Oral Daily    duke's soln (benadryl 30 mL, mylanta 30 mL, lidocane 30 mL, nystatin 30 mL) 120 mL 10 mL Oral QID   enoxaparin 30 mg Subcutaneous Daily   famotidine 20 mg Oral Daily   fluticasone-vilanterol 1 puff Inhalation Daily   insulin detemir U-100 4 Units Subcutaneous Daily   levothyroxine 75 mcg Oral Before breakfast   metoprolol succinate 50 mg Oral Daily   metroNIDAZOLE 500 mg Oral Q8H   miconazole nitrate 2%   Topical (Top) BID   multivitamin 1 tablet Oral Daily   polyethylene glycol 17 g Oral Daily      PRN: sodium chloride, acetaminophen, dextrose 50%, dextrose 50%, glucagon (human recombinant), glucose, glucose, insulin aspart U-100, ondansetron, senna-docusate 8.6-50 mg, sodium chloride 0.9%  Infusions:      Problem-Based Assessment and Plan      Overview: 78 year old female with a history of DM2, CAD (s/p PCI 7/2018), PVD and s/p bilateral total knee arthroplasties (years prior) who is admitted for left lower extremity cellulitis and osteomyelitis. Recently hospitalized at OSH with a lower extremity cellulitis which started after a left ankle sprain (11/26-12/5) which didn't improve with 14-day outpt course of ceftriaxone. Recently told she has severe PVD which requires stent placement, but her recent cardiac stent placement presents a contraindication for at least a year, which prevented good wound healing. At [facility], CT showed left sided abscesses distal to the fibular head and osteomyelitis that extended proximal to the ankle joint. Orthopedics recommended AKA, which family and pt refused for antibiotics alone with understanding of possible complications from infection/sepsis. Pt's antibiotics include cefepime (pseudomonas coverage, switched from ceftriaxone), flagyl (gas noted on CT), and vancomycin (held for ALVARO that developed on 12/21). Received 1 U PRBC with good response (12/16).           Active Hospital Problems     Diagnosis   POA    *Osteomyelitis of left tibia [M86.9]   Yes    Pressure injury of  coccygeal region, stage 2 [L89.152]   Yes    Alteration in skin integrity [R23.9]   Yes    Gas gangrene of Left lower extremity [A48.0]   Yes    Moderate protein-calorie malnutrition [E44.0]   Yes    Hypoalbuminemia due to protein-calorie malnutrition [E46]   Yes    Cellulitis [L03.90]   Yes    Anemia [D64.9]   Yes    Pressure injury of buttock, stage 2 [L89.302]   Yes    Chronic osteomyelitis of left tibia with draining sinus [M86.462]   Yes    Benign hypertensive heart disease with HF (heart failure) [I11.0]   Yes    Cellulitis of left lower extremity [L03.116]   Yes    Macrocytic anemia [D53.9]   Yes    Renal insufficiency [N28.9]   No    Preoperative cardiovascular examination [Z01.810]   Not Applicable    Peripheral arterial disease [I73.9]   Yes    Coronary artery disease involving native coronary artery of native heart with unstable angina pectoris [I25.110]   Yes    Chronic diastolic heart failure [I50.32]   Yes    Obesity (BMI 30-39.9) [E66.9]   Yes    Bilateral leg edema [R60.0]   Yes    Hypothyroidism [E03.9]   Yes    Dyslipidemia [E78.5]   Yes    Hypertension [I10]   Yes    Type 2 diabetes mellitus, without long-term current use of insulin [E11.9]   Yes       Resolved Hospital Problems     Diagnosis Date Resolved POA    Delirium [R41.0] 12/21/2018 No    Hyperkalemia [E87.5] 12/17/2018 No    Hypomagnesemia [E83.42] 12/21/2018 No    Sepsis due to cellulitis [L03.90, A41.9] 12/21/2018 Yes         Problems addressed today:     Osteomyelitis of left tibia  Gas gangrene of Left lower extremity  Cellulitis of left lower extremity  · PICC line in place  · Vancomycin held (ALVARO)  · Cefepime 2g IV q12   · Flagyl 500 mg PO TID  · Re-image LLE in 4-5 weeks (or if worsening signs/symptoms of infection)  Antibiotics for 6 weeks at this time (total therapy unclear at this time). Weekly ESR, CRP, CBC, CMP, and Vanc trough for LLE cellulitis, myositis and multiloculated abscesses in deep soft  tissue - already complicated by GBS bacteremia. Limb-salvage unlikely, AKA recommended for source control as intravenous antibiotics will not appropriately treat abscess, particularly in the setting of PVD. Vancomycin level trending down, Goal trough 15 - 20. Plan - Restart Vanc 1.5g IV q24. Continue Cefepime 2g IV q12 and Flagyl 500mg po TID as gas seen on CT scan.     Renal insufficiency  · Cr downtrending, continue holding Lasix and ACEi (12/23)     Oral candidiasis  · Duke's solution (12/22)     Peripheral arterial disease  · Non-compressible MARIMAR with normal doppler waveforms and palpable pedal pulses  · Continue ASA, plavix, atorvastatin     Coronary artery disease involving native coronary artery of native heart with unstable angina pectoris  Chronic diastolic heart failure   Dyslipidemia   Hypertension  · Underwent LHC and PCI in 7/2018  · lower BPs noted, meds adjusted (12/18)  · Continue ASA, plavix, toprol 50 mg QD  · Continue atorvastatin  · Amlodipine stopped  · Losartan 50 mg held for ALVARO that developed on 12/21 (had been reduced to 50 mg from 100 mg for elevated potassium)  · Lasix held for ALVARO, (had been reduced from 40 PO BID to QD)     Type 2 diabetes mellitus, without long-term current use of insulin  · Home oral medications held  · SC insulin, POCT  · detemir 4 units daily (started 12/21)     Coccyx pressure injury, stage 2  · Wound care following     Debility  · PT/OT following     Hypothyroidism   · continue Synthroid      Moderate protein calorie malnutrition  Severe hypoalbuminemia  · Low prealbumin and albumin  · boost TID, beneprotein TID, MVI  · Swallow study, regular diet (12/17)     Macrocytic Anemia  Anemia of chronic inflammation   · Ferritin ? (12/16)  · Stable, monitor  · B12 and folate wnl  · MVI     Hypomagnesemia  · Replaced     Hyperkalemia  · K 5.4, given 500 mL LR bolus, resolved (12/16)        HIGH RISK CONDITION(S):   Patient has a condition that poses threat to life and  bodily function: Acute Renal Failure  Patient is currently on drug therapy requiring intensive monitoring for toxicity: Vancomycin   Inpatient Checklist:  Diet:  Diet diabetic [facility]; 2000 Calorie; Cardiac (Low Na/Chol)  DVT Prophylaxis: Anticoagulation           Anticoagulants   Medication Route Frequency    enoxaparin injection 30 mg Subcutaneous Daily      GI Prophylaxis: Not indicated  Lines/ Drains/ Airways:   Central Line Indication: med administration  Type: PICC  Urinary Catheter Indicated: Patient Does Not Have Urinary Catheter  Other Lines/Tubes/Drains:     Disposition:   Skilled Nursing Facility     Follow up plan:        Provider  Pamela Anders MD  [facility] [place] [facility] [place] D  Department of Hospital Medicine  Contact Information:   Spectralink # [phone]  Pager [phone]     I personally scribed for Pamela Anders MD on 12/23/2018 at 9:36 AM. Electronically signed by edson Harp on 12/23/2018 at 9:36 AM

## 2018-12-25 LAB
ALBUMIN SERPL BCP-MCNC: 1.8 G/DL
ALP SERPL-CCNC: 164 U/L
ALT SERPL W/O P-5'-P-CCNC: 25 U/L
ANION GAP SERPL CALC-SCNC: 5 MMOL/L
AST SERPL-CCNC: 20 U/L
BASOPHILS # BLD AUTO: 0.04 K/UL
BASOPHILS NFR BLD: 0.5 %
BILIRUB SERPL-MCNC: 0.4 MG/DL
BUN SERPL-MCNC: 45 MG/DL
CALCIUM SERPL-MCNC: 8.5 MG/DL
CHLORIDE SERPL-SCNC: 108 MMOL/L
CO2 SERPL-SCNC: 24 MMOL/L
CREAT SERPL-MCNC: 1.1 MG/DL
DIFFERENTIAL METHOD: ABNORMAL
EOSINOPHIL # BLD AUTO: 0.2 K/UL
EOSINOPHIL NFR BLD: 2 %
ERYTHROCYTE [DISTWIDTH] IN BLOOD BY AUTOMATED COUNT: 16.8 %
EST. GFR  (AFRICAN AMERICAN): 54.8 ML/MIN/1.73 M^2
EST. GFR  (NON AFRICAN AMERICAN): 47.5 ML/MIN/1.73 M^2
GLUCOSE SERPL-MCNC: 151 MG/DL
HCT VFR BLD AUTO: 27 %
HGB BLD-MCNC: 8.7 G/DL
IMM GRANULOCYTES # BLD AUTO: 0.07 K/UL
IMM GRANULOCYTES NFR BLD AUTO: 0.9 %
LYMPHOCYTES # BLD AUTO: 0.9 K/UL
LYMPHOCYTES NFR BLD: 11.6 %
MAGNESIUM SERPL-MCNC: 1.7 MG/DL
MCH RBC QN AUTO: 32.5 PG
MCHC RBC AUTO-ENTMCNC: 32.2 G/DL
MCV RBC AUTO: 101 FL
MONOCYTES # BLD AUTO: 0.6 K/UL
MONOCYTES NFR BLD: 7.7 %
NEUTROPHILS # BLD AUTO: 5.9 K/UL
NEUTROPHILS NFR BLD: 77.3 %
NRBC BLD-RTO: 0 /100 WBC
PHOSPHATE SERPL-MCNC: 2.5 MG/DL
PLATELET # BLD AUTO: 139 K/UL
PMV BLD AUTO: 11.6 FL
POCT GLUCOSE: 158 MG/DL (ref 70–110)
POCT GLUCOSE: 278 MG/DL (ref 70–110)
POCT GLUCOSE: 305 MG/DL (ref 70–110)
POCT GLUCOSE: 317 MG/DL (ref 70–110)
POTASSIUM SERPL-SCNC: 4.5 MMOL/L
PROT SERPL-MCNC: 5.1 G/DL
RBC # BLD AUTO: 2.68 M/UL
SODIUM SERPL-SCNC: 137 MMOL/L
VANCOMYCIN SERPL-MCNC: 19.5 UG/ML
WBC # BLD AUTO: 7.58 K/UL

## 2018-12-25 PROCEDURE — 11000001 HC ACUTE MED/SURG PRIVATE ROOM

## 2018-12-25 PROCEDURE — S5571 INSULIN DISPOS PEN 3 ML: HCPCS | Performed by: INTERNAL MEDICINE

## 2018-12-25 PROCEDURE — 25000003 PHARM REV CODE 250: Performed by: HOSPITALIST

## 2018-12-25 PROCEDURE — 85025 COMPLETE CBC W/AUTO DIFF WBC: CPT

## 2018-12-25 PROCEDURE — 63600175 PHARM REV CODE 636 W HCPCS: Performed by: INTERNAL MEDICINE

## 2018-12-25 PROCEDURE — 99232 SBSQ HOSP IP/OBS MODERATE 35: CPT | Mod: ,,, | Performed by: INTERNAL MEDICINE

## 2018-12-25 PROCEDURE — 84100 ASSAY OF PHOSPHORUS: CPT

## 2018-12-25 PROCEDURE — 63600175 PHARM REV CODE 636 W HCPCS: Performed by: HOSPITALIST

## 2018-12-25 PROCEDURE — A4216 STERILE WATER/SALINE, 10 ML: HCPCS | Performed by: HOSPITALIST

## 2018-12-25 PROCEDURE — 80202 ASSAY OF VANCOMYCIN: CPT

## 2018-12-25 PROCEDURE — 83735 ASSAY OF MAGNESIUM: CPT

## 2018-12-25 PROCEDURE — 80053 COMPREHEN METABOLIC PANEL: CPT

## 2018-12-25 PROCEDURE — 36415 COLL VENOUS BLD VENIPUNCTURE: CPT

## 2018-12-25 PROCEDURE — 25000003 PHARM REV CODE 250: Performed by: PHYSICIAN ASSISTANT

## 2018-12-25 RX ORDER — IBUPROFEN 200 MG
24 TABLET ORAL
Status: DISCONTINUED | OUTPATIENT
Start: 2018-12-25 | End: 2018-12-26 | Stop reason: HOSPADM

## 2018-12-25 RX ORDER — IBUPROFEN 200 MG
16 TABLET ORAL
Status: DISCONTINUED | OUTPATIENT
Start: 2018-12-25 | End: 2018-12-26 | Stop reason: HOSPADM

## 2018-12-25 RX ORDER — INSULIN ASPART 100 [IU]/ML
2 INJECTION, SOLUTION INTRAVENOUS; SUBCUTANEOUS
Status: DISCONTINUED | OUTPATIENT
Start: 2018-12-25 | End: 2018-12-26 | Stop reason: HOSPADM

## 2018-12-25 RX ORDER — GLUCAGON 1 MG
1 KIT INJECTION
Status: DISCONTINUED | OUTPATIENT
Start: 2018-12-25 | End: 2018-12-26 | Stop reason: HOSPADM

## 2018-12-25 RX ORDER — INSULIN ASPART 100 [IU]/ML
0-5 INJECTION, SOLUTION INTRAVENOUS; SUBCUTANEOUS
Status: DISCONTINUED | OUTPATIENT
Start: 2018-12-25 | End: 2018-12-26 | Stop reason: HOSPADM

## 2018-12-25 RX ADMIN — METRONIDAZOLE 500 MG: 500 TABLET ORAL at 06:12

## 2018-12-25 RX ADMIN — METRONIDAZOLE 500 MG: 500 TABLET ORAL at 10:12

## 2018-12-25 RX ADMIN — INSULIN ASPART 4 UNITS: 100 INJECTION, SOLUTION INTRAVENOUS; SUBCUTANEOUS at 12:12

## 2018-12-25 RX ADMIN — METRONIDAZOLE 500 MG: 500 TABLET ORAL at 03:12

## 2018-12-25 RX ADMIN — FLUTICASONE FUROATE AND VILANTEROL TRIFENATATE 1 PUFF: 100; 25 POWDER RESPIRATORY (INHALATION) at 09:12

## 2018-12-25 RX ADMIN — CLOPIDOGREL 75 MG: 75 TABLET, FILM COATED ORAL at 09:12

## 2018-12-25 RX ADMIN — MICONAZOLE NITRATE: 20 OINTMENT TOPICAL at 10:12

## 2018-12-25 RX ADMIN — ACETAMINOPHEN 325 MG: 325 TABLET ORAL at 10:12

## 2018-12-25 RX ADMIN — INSULIN DETEMIR 3 UNITS: 100 INJECTION, SOLUTION SUBCUTANEOUS at 10:12

## 2018-12-25 RX ADMIN — Medication 10 ML: at 12:12

## 2018-12-25 RX ADMIN — INSULIN DETEMIR 4 UNITS: 100 INJECTION, SOLUTION SUBCUTANEOUS at 09:12

## 2018-12-25 RX ADMIN — ENOXAPARIN SODIUM 30 MG: 100 INJECTION SUBCUTANEOUS at 04:12

## 2018-12-25 RX ADMIN — INSULIN ASPART 1 UNITS: 100 INJECTION, SOLUTION INTRAVENOUS; SUBCUTANEOUS at 10:12

## 2018-12-25 RX ADMIN — INSULIN ASPART 4 UNITS: 100 INJECTION, SOLUTION INTRAVENOUS; SUBCUTANEOUS at 05:12

## 2018-12-25 RX ADMIN — ASPIRIN 81 MG: 81 TABLET, COATED ORAL at 09:12

## 2018-12-25 RX ADMIN — ATORVASTATIN CALCIUM 40 MG: 20 TABLET, FILM COATED ORAL at 09:12

## 2018-12-25 RX ADMIN — FAMOTIDINE 20 MG: 20 TABLET ORAL at 09:12

## 2018-12-25 RX ADMIN — THERA TABS 1 TABLET: TAB at 09:12

## 2018-12-25 RX ADMIN — Medication 10 ML: at 04:12

## 2018-12-25 RX ADMIN — Medication 5 ML: at 06:12

## 2018-12-25 RX ADMIN — ALLOPURINOL 300 MG: 300 TABLET ORAL at 09:12

## 2018-12-25 RX ADMIN — INSULIN ASPART 2 UNITS: 100 INJECTION, SOLUTION INTRAVENOUS; SUBCUTANEOUS at 05:12

## 2018-12-25 RX ADMIN — Medication 10 ML: at 09:12

## 2018-12-25 RX ADMIN — CEFEPIME 2 G: 2 INJECTION, POWDER, FOR SOLUTION INTRAVENOUS at 06:12

## 2018-12-25 RX ADMIN — METOPROLOL SUCCINATE 50 MG: 50 TABLET, EXTENDED RELEASE ORAL at 09:12

## 2018-12-25 RX ADMIN — LEVOTHYROXINE SODIUM 75 MCG: 75 TABLET ORAL at 06:12

## 2018-12-25 NOTE — PLAN OF CARE
Problem: Adult Inpatient Plan of Care  Goal: Plan of Care Review  Outcome: Ongoing (interventions implemented as appropriate)   12/24/18 8111   Plan of Care Review   Plan of Care Reviewed With patient;son   Pt is A,A,O x 4 . Stable V/S. Pt C/O nausea during the day and PRN nausea med. Given . Pt turned in bed Q 2 hrs and incontinence care done as needed . Wound care done as ordered . Pt received one dose of vancomycin IV today . PICC line care done . Pt is free of falls and injuries per shift . Fall precautions maintained . No significant changes during the day .

## 2018-12-25 NOTE — PROGRESS NOTES
"Physician Attestation for Scribe:  I, Pamela Anders MD, personally performed the services described in this documentation. All medical record entries made by the scribe were at my direction and in my presence.  I have reviewed this note and agree that the record reflects my personal performance and is accurate and complete.     Hospital Medicine   Progress Note     Patient: Krissy Marino [id number]  Date of Service: 12/24/2018  [place]: [facility] [place] [facility] D Pamela Anders MD  Hospital Day: 12  Admission Date: 12/12/2018  SUMAN: 12/24/2018  Code status: Full Code     Admission CC: Cellulitis (Patient transferred to [facility] [place] for further evaluation of unresolved cellulitis of left foot. EMS also reports "low blood cell count" as well from patient rehab ([facility]). Patient A&Ox4 and following commands. )     Principal Problem:  Osteomyelitis of left tibia     24-Hour Course / Overnight Events      No significant events reported by Nursing.     Interval HPI / Review of Systems      Patient reports no new compalints.     Review of Systems   Constitutional: Negative for fever.   Respiratory: Negative for shortness of breath.          Physical Examination      Temp:  [98.2 °F (36.8 °C)-98.5 °F (36.9 °C)]   Pulse:  [75-83]   Resp:  [13-17]   BP: (138-150)/(65-67)   SpO2:  [93 %-95 %]       Temp: 98.2 °F (36.8 °C) (12/24/18 0808)  Pulse: 83 (12/24/18 0808)  Resp: 17 (12/24/18 0808)  BP: (!) 141/66 (12/24/18 0808)  SpO2: 95 % (12/24/18 0808)     Intake/Output Summary (Last 24 hours) at 12/24/2018 0942  Last data filed at 12/24/2018 0600      Gross per 24 hour   Intake 740 ml   Output --   Net 740 ml      I have personally reviewed the recorded Intake/Output for the past 24 hours:  Unmeasured Output noted.     Body mass index is 39.11 kg/m².  Physical Exam   Constitutional:  Non-toxic appearance.   Eyes: Conjunctivae and lids are normal.   Cardiovascular: S1 normal and S2 normal.   Pulmonary/Chest: Effort normal. She has " decreased breath sounds.   Abdominal: Soft. Bowel sounds are normal.   Musculoskeletal: She exhibits edema.        Left foot: There is swelling and crepitus.   Neurological: She is alert. She is not disoriented.         Data      Lab, Imaging, and Diagnostic results from 12/24/2018 were reviewed.     Significant Results:            Recent Labs   Lab 12/21/18  0848 12/22/18  0525 12/23/18  0448 12/24/18  0358   WBC 8.25 8.56 7.61 7.43   HGB 9.0* 8.8* 9.4* 8.7*   HCT 27.5* 27.7* 28.8* 27.0*   * 140* 145* 137*               Recent Labs   Lab 12/19/18  0733 12/20/18  0518   12/22/18  0525 12/23/18  0448 12/24/18  0358   * 131*   < > 132* 133* 136   K 4.1 3.9   < > 3.8 4.0 4.3    100   < > 102 104 108   CO2 25 21*   < > 23 22* 21*   BUN 35* 39*   < > 48* 51* 50*   CREATININE 1.0 1.1   < > 1.4 1.4 1.2   * 162*   < > 167* 210* 155*   CALCIUM 8.7 8.4*   < > 8.3* 8.3* 8.6*   MG 1.2* 2.2  --   --  1.9 1.8   PHOS 3.0  --   --   --  2.9 2.5*   ALKPHOS 202* 192*   < > 198* 190* 178*   ALT 58* 61*   < > 51* 39 33   AST 73* 72*   < > 42* 27 22   ALBUMIN 1.7* 1.7*   < > 1.8* 1.8* 1.7*   PROT 5.4* 5.1*   < > 5.4* 5.4* 5.1*   BILITOT 0.5 0.5   < > 0.5 0.4 0.4    < > = values in this interval not displayed.               Recent Labs   Lab 12/22/18  2133 12/23/18  0741 12/23/18  1251 12/23/18  1713 12/23/18  2148 12/24/18  0816   POCTGLUCOSE 289* 209* 263* 238* 295* 166*      A1C:   Recent Labs   Lab 12/12/18  1852   HGBA1C 6.9*         Medications      Scheduled Medications:     allopurinol 300 mg Oral Daily   aspirin 81 mg Oral Daily   atorvastatin 40 mg Oral Daily   ceFEPime (MAXIPIME) IVPB 2 g Intravenous Q24H   clopidogrel 75 mg Oral Daily   duke's soln (benadryl 30 mL, mylanta 30 mL, lidocane 30 mL, nystatin 30 mL) 120 mL 10 mL Oral QID   enoxaparin 30 mg Subcutaneous Daily   famotidine 20 mg Oral Daily   fluticasone-vilanterol 1 puff Inhalation Daily   insulin detemir U-100 4 Units Subcutaneous Daily    levothyroxine 75 mcg Oral Before breakfast   metoprolol succinate 50 mg Oral Daily   metroNIDAZOLE 500 mg Oral Q8H   miconazole nitrate 2%   Topical (Top) BID   multivitamin 1 tablet Oral Daily   polyethylene glycol 17 g Oral Daily      PRN: sodium chloride, acetaminophen, dextrose 50%, dextrose 50%, glucagon (human recombinant), glucose, glucose, insulin aspart U-100, ondansetron, senna-docusate 8.6-50 mg, sodium chloride 0.9%  Infusions:      Problem-Based Assessment and Plan      Overview: 78 year old female with a history of DM2, CAD (s/p PCI 7/2018), PVD and s/p bilateral total knee arthroplasties (years prior) who is admitted for left lower extremity cellulitis and osteomyelitis. Recently hospitalized at OSH with a lower extremity cellulitis which started after a left ankle sprain (11/26-12/5) which didn't improve with 14-day outpt course of ceftriaxone. Recently told she has severe PVD which requires stent placement, but her recent cardiac stent placement presents a contraindication for at least a year, which prevented good wound healing. At [facility], CT showed left sided abscesses distal to the fibular head and osteomyelitis that extended proximal to the ankle joint. Orthopedics recommended AKA, which family and pt refused for antibiotics alone with understanding of possible complications from infection/sepsis. Pt's antibiotics include cefepime (pseudomonas coverage, switched from ceftriaxone), flagyl (gas noted on CT), and vancomycin (held for ALVARO that developed on 12/21). Received 1 U PRBC with appropriate response (12/16).           Active Hospital Problems     Diagnosis   POA    *Osteomyelitis of left tibia [M86.9]   Yes    Pressure injury of coccygeal region, stage 2 [L89.152]   Yes    Alteration in skin integrity [R23.9]   Yes    Gas gangrene of Left lower extremity [A48.0]   Yes    Moderate protein-calorie malnutrition [E44.0]   Yes    Hypoalbuminemia due to protein-calorie malnutrition [E46]    Yes    Cellulitis [L03.90]   Yes    Anemia [D64.9]   Yes    Pressure injury of buttock, stage 2 [L89.302]   Yes    Chronic osteomyelitis of left tibia with draining sinus [M86.462]   Yes    Benign hypertensive heart disease with HF (heart failure) [I11.0]   Yes    Cellulitis of left lower extremity [L03.116]   Yes    Macrocytic anemia [D53.9]   Yes    Renal insufficiency [N28.9]   No    Preoperative cardiovascular examination [Z01.810]   Not Applicable    Peripheral arterial disease [I73.9]   Yes    Coronary artery disease involving native coronary artery of native heart with unstable angina pectoris [I25.110]   Yes    Chronic diastolic heart failure [I50.32]   Yes    Obesity (BMI 30-39.9) [E66.9]   Yes    Bilateral leg edema [R60.0]   Yes    Hypothyroidism [E03.9]   Yes    Dyslipidemia [E78.5]   Yes    Hypertension [I10]   Yes    Type 2 diabetes mellitus, without long-term current use of insulin [E11.9]   Yes       Resolved Hospital Problems     Diagnosis Date Resolved POA    Delirium [R41.0] 12/21/2018 No    Hyperkalemia [E87.5] 12/17/2018 No    Hypomagnesemia [E83.42] 12/21/2018 No    Sepsis due to cellulitis [L03.90, A41.9] 12/21/2018 Yes         Problems addressed today:     Osteomyelitis of left tibia  Gas gangrene of Left lower extremity  Cellulitis of left lower extremity  · PICC line in place  · Vancomycin held (ALVARO)  · Cefepime 2g IV q12   · Flagyl 500 mg PO TID  · Re-image LLE in 4-5 weeks (or if worsening signs/symptoms of infection). Antibiotics for 6 weeks at this time (total therapy unclear at this time). Weekly ESR, CRP, CBC, CMP, and Vanc trough for LLE cellulitis, myositis and multiloculated abscesses in deep soft tissue - already complicated by GBS bacteremia. Limb-salvage unlikely, AKA recommended for source control as intravenous antibiotics will not appropriately treat abscess, particularly in the setting of PVD.   · Vancomycin level trending down, Goal trough 15 - 20.  Plan - Restart Vanc 1.5g IV q24 when stable. Continue Cefepime 2g IV q12 and Flagyl 500mg po TID as gas seen on CT scan.  · Re-dosed vancomycin 1 g x 1 on 12/24     Renal insufficiency  · Cr downtrending, continue holding Lasix and ACEi (12/23)     Oral candidiasis  · Duke's solution (12/22)     Peripheral arterial disease  · Non-compressible MARIMAR with normal doppler waveforms and palpable pedal pulses  · Continue ASA, Plavix, atorvastatin     Coronary artery disease involving native coronary artery of native heart with unstable angina pectoris  Chronic diastolic heart failure   Dyslipidemia   Hypertension  · Underwent LHC and PCI in 7/2018  · lower BPs noted, meds adjusted (12/18)  · Continue ASA, plavix, toprol 50 mg QD  · Continue atorvastatin  · Amlodipine stopped  · Losartan 50 mg held for ALVARO that developed on 12/21 (had been reduced to 50 mg from 100 mg for elevated potassium)  · Lasix held for ALVARO, (had been reduced from 40 PO BID to QD)     Type 2 diabetes mellitus, without long-term current use of insulin  · Home oral medications held  · SC insulin, POCT  · detemir 4 units daily (started 12/21)     Coccyx pressure injury, stage 2  · Wound care following     Debility  · PT/OT following     Hypothyroidism   · continue Synthroid      Moderate protein calorie malnutrition  Severe hypoalbuminemia  · Low prealbumin and albumin  · boost TID, beneprotein TID, MVI  · Swallow study, regular diet (12/17)     Macrocytic Anemia  Anemia of chronic inflammation   · Ferritin ? (12/16)  · Stable, monitor  · B12 and folate wnl  · MVI     Hypomagnesemia - resolved  · Replaced     Hyperkalemia - resolved  · K 5.4, given 500 mL LR bolus, resolved (12/16)        HIGH RISK CONDITION(S):   Patient has a condition that poses threat to life and bodily function: Acute Renal Failure  Patient is currently on drug therapy requiring intensive monitoring for toxicity: Vancomycin   Inpatient Checklist:  Diet:  Diet diabetic [facility]; 2000 Calorie; Cardiac (Low Na/Chol)  DVT Prophylaxis: Anticoagulation           Anticoagulants   Medication Route Frequency    enoxaparin injection 30 mg Subcutaneous Daily      GI Prophylaxis: Not indicated  Lines/ Drains/ Airways:   Central Line Indication: med administration  Type: PICC  Urinary Catheter Indicated: Patient Does Not Have Urinary Catheter  Other Lines/Tubes/Drains:     Disposition:   Skilled Nursing Facility     Follow up plan:        Provider  Pamela Anders MD  [facility] [place] [facility] [place]  Department of Hospital Medicine  Contact Information:   Spectralink # [phone]  Pager [phone]     I personally scribed for Pamela Anders MD on 12/24/2018 at 9:36 AM. Electronically signed by edson Harp on 12/24/2018 at 9:36 AM

## 2018-12-25 NOTE — PLAN OF CARE
Problem: Adult Inpatient Plan of Care  Goal: Plan of Care Review  Outcome: Ongoing (interventions implemented as appropriate)  Pt remains free of falls and injury. Pt makes statement of no pain. Turned/ Repositioned frequently. Assisted with linen change r/t incont.Bed low and locked, call light within reach.

## 2018-12-25 NOTE — MEDICAL/APP STUDENT
"Hospital Medicine   Progress Note    Patient: Krissy Marino [id number]  Date of Service: 12/25/2018  Team: [facility] MED D Pamela Anders MD  Hospital Day: 13  Admission Date: 12/12/2018  SUMAN: 12/24/2018  Code status: Full Code    Admission CC: Cellulitis (Patient transferred to [facility] for further evaluation of unresolved cellulitis of left foot. EMS also reports "low blood cell count" as well from patient rehab ([facility]). Patient A&Ox4 and following commands. )    Principal Problem:  Osteomyelitis of left tibia    24-Hour Course / Overnight Events     No significant events reported by Nursing.    Interval HPI / Review of Systems     Patient reports no new compalints.    Review of Systems   Constitutional: Negative for fever.   Respiratory: Negative for shortness of breath.        Physical Examination     Temp:  [97.8 °F (36.6 °C)-99.1 °F (37.3 °C)]   Pulse:  [78-91]   Resp:  [16-18]   BP: (118-148)/(56-66)   SpO2:  [93 %-98 %]      Temp: 99.1 °F (37.3 °C) (12/25/18 1215)  Pulse: 86 (12/25/18 1215)  Resp: 18 (12/25/18 0911)  BP: 135/60 (12/25/18 1215)  SpO2: 95 % (12/25/18 1215)    Intake/Output Summary (Last 24 hours) at 12/25/2018 1336  Last data filed at 12/25/2018 0900  Gross per 24 hour   Intake 630 ml   Output --   Net 630 ml     I have personally reviewed the recorded Intake/Output for the past 24 hours:  Unmeasured Output noted.    Body mass index is 39.11 kg/m².  Physical Exam   Constitutional:  Non-toxic appearance.   Eyes: Conjunctivae and lids are normal.   Cardiovascular: S1 normal and S2 normal.   Pulmonary/Chest: Effort normal. She has decreased breath sounds.   Abdominal: Soft. Bowel sounds are normal.   Musculoskeletal: She exhibits edema.        Left foot: There is tenderness and swelling.   Neurological: She is alert. She is not disoriented.       Data     Lab, Imaging, and Diagnostic results from 12/25/2018 were reviewed.    Significant Results:    Recent Labs   Lab 12/22/18  0525 " 12/23/18  0448 12/24/18  0358 12/25/18  0635   WBC 8.56 7.61 7.43 7.58   HGB 8.8* 9.4* 8.7* 8.7*   HCT 27.7* 28.8* 27.0* 27.0*   * 145* 137* 139*     Recent Labs   Lab 12/23/18  0448 12/24/18  0358 12/25/18  0635   * 136 137   K 4.0 4.3 4.5    108 108   CO2 22* 21* 24   BUN 51* 50* 45*   CREATININE 1.4 1.2 1.1   * 155* 151*   CALCIUM 8.3* 8.6* 8.5*   MG 1.9 1.8 1.7   PHOS 2.9 2.5* 2.5*   ALKPHOS 190* 178* 164*   ALT 39 33 25   AST 27 22 20   ALBUMIN 1.8* 1.7* 1.8*   PROT 5.4* 5.1* 5.1*   BILITOT 0.4 0.4 0.4     Recent Labs   Lab 12/24/18  0816 12/24/18  1214 12/24/18  1734 12/24/18  2130 12/25/18  0728 12/25/18  1231   POCTGLUCOSE 166* 288* 290* 240* 158* 305*     A1C:   Recent Labs   Lab 12/12/18  1852   HGBA1C 6.9*       Medications     Scheduled Medications:    allopurinol 300 mg Oral Daily   aspirin 81 mg Oral Daily   atorvastatin 40 mg Oral Daily   ceFEPime (MAXIPIME) IVPB 2 g Intravenous Q24H   clopidogrel 75 mg Oral Daily   duke's soln (benadryl 30 mL, mylanta 30 mL, lidocane 30 mL, nystatin 30 mL) 120 mL 10 mL Oral QID   enoxaparin 30 mg Subcutaneous Daily   famotidine 20 mg Oral Daily   fluticasone-vilanterol 1 puff Inhalation Daily   insulin aspart U-100 2 Units Subcutaneous TIDWM   insulin detemir U-100 5 Units Subcutaneous BID   levothyroxine 75 mcg Oral Before breakfast   metoprolol succinate 50 mg Oral Daily   metroNIDAZOLE 500 mg Oral Q8H   miconazole nitrate 2%  Topical (Top) BID   multivitamin 1 tablet Oral Daily   polyethylene glycol 17 g Oral Daily     PRN: sodium chloride, acetaminophen, dextrose 50%, dextrose 50%, glucagon (human recombinant), glucose, glucose, insulin aspart U-100, ondansetron, senna-docusate 8.6-50 mg, sodium chloride 0.9%  Infusions:     Problem-Based Assessment and Plan     Overview: 78 year old female with a history of DM2, CAD (s/p PCI 7/2018), PVD and s/p bilateral total knee arthroplasties (years prior) admitted for left lower extremity  cellulitis and osteomyelitis. Recently hospitalized at OSH with a lower extremity cellulitis which started after a left ankle sprain (11/26-12/5) which didn't improve with 14-day course of ceftriaxone. Recently told she has severe PVD which requires stent placement, but her recent cardiac stent placement presents a contraindication for at least a year, which prevented good wound healing. At [facility], CT showed left-sided abscesses distal to the fibular head and osteomyelitis that extended proximal to the ankle joint. Orthopedics recommended AKA, which family and patient refused opting for antibiotics alone with understanding of possible complications from infection/sepsis. Pt's antibiotics include cefepime (pseudomonas coverage, changed from ceftriaxone), Flagyl (gas noted on CT), and vancomycin (held for renal insufficiency that developed on 12/21). Received 1 U PRBC with appropriate response (12/16).    Active Hospital Problems    Diagnosis  POA    *Osteomyelitis of left tibia [M86.9]  Yes    Pressure injury of coccygeal region, stage 2 [L89.152]  Yes    Alteration in skin integrity [R23.9]  Yes    Gas gangrene of Left lower extremity [A48.0]  Yes    Moderate protein-calorie malnutrition [E44.0]  Yes    Hypoalbuminemia due to protein-calorie malnutrition [E46]  Yes    Cellulitis [L03.90]  Yes    Anemia [D64.9]  Yes    Pressure injury of buttock, stage 2 [L89.302]  Yes    Chronic osteomyelitis of left tibia with draining sinus [M86.462]  Yes    Benign hypertensive heart disease with HF (heart failure) [I11.0]  Yes    Cellulitis of left lower extremity [L03.116]  Yes    Macrocytic anemia [D53.9]  Yes    Renal insufficiency [N28.9]  No    Preoperative cardiovascular examination [Z01.810]  Not Applicable    Peripheral arterial disease [I73.9]  Yes    Coronary artery disease involving native coronary artery of native heart with unstable angina pectoris [I25.110]  Yes    Chronic diastolic heart failure  [I50.32]  Yes    Obesity (BMI 30-39.9) [E66.9]  Yes    Bilateral leg edema [R60.0]  Yes    Hypothyroidism [E03.9]  Yes    Dyslipidemia [E78.5]  Yes    Hypertension [I10]  Yes    Type 2 diabetes mellitus, without long-term current use of insulin [E11.9]  Yes      Resolved Hospital Problems    Diagnosis Date Resolved POA    Delirium [R41.0] 12/21/2018 No    Hyperkalemia [E87.5] 12/17/2018 No    Hypomagnesemia [E83.42] 12/21/2018 No    Sepsis due to cellulitis [L03.90, A41.9] 12/21/2018 Yes       Problems addressed today:    Osteomyelitis of left tibia  Gas gangrene of Left lower extremity  Cellulitis of left lower extremity  · PICC line in place  · Vancomycin held for renal insufficiency  · Cefepime 2g IV q12   · Flagyl 500 mg PO TID  · Re-image LLE in 4-5 weeks (or if worsening signs/symptoms of infection). Antibiotics for 6 weeks at this time (total therapy unclear at this time). Weekly ESR, CRP, CBC, CMP, and Vanc trough for LLE cellulitis, myositis and multiloculated abscesses in deep soft tissue - already complicated by GBS bacteremia. Limb-salvage unlikely, AKA recommended for source control as intravenous antibiotics will not appropriately treat abscess, particularly in the setting of PVD.   · Vancomycin level trending down, Goal trough 15 - 20.  Continue Cefepime 2g IV q12 and Flagyl 500mg po TID as gas was seen on CT scan. Complete resolution of infection is not expected; continuing antibiotics for an indefinite period of time.  · Re-dosed vancomycin 1 g x 1 on 12/24  · Continue adjusting vancomycin dosing     Renal insufficiency  · sCr downtrending, continue holding Lasix and ACEi (12/23)     Oral candidiasis  · Duke's solution (12/22)    Peripheral arterial disease  · Non-compressible MARIMAR with normal doppler waveforms and palpable pedal pulses  · Continue ASA, Plavix, atorvastatin     Coronary artery disease involving native coronary artery of native heart with unstable angina pectoris  Chronic  diastolic heart failure   Dyslipidemia   Hypertension  · Underwent LHC and PCI in 7/2018  · Lower BPs noted, meds adjusted (12/18)  · Continue ASA, Plavix, Toprol 50 mg QD  · Continue atorvastatin  · Amlodipine stopped  · Losartan 50 mg held (had been reduced to 50 mg from 100 mg for elevated potassium)  · Lasix held for renal insufficiency, (had been reduced from 40 PO BID to QD)     Type 2 diabetes mellitus, without long-term current use of insulin  · Home oral medications held  · SC insulin, POCT  · Monotherapy with detemir 4 units daily (started 12/21)  · Insulin regimen changed to basal/prandial (12/25)     Coccyx pressure injury, stage 2  · Wound care following    Debility  · PT/OT following    Hypothyroidism   · continue Synthroid     Moderate protein calorie malnutrition  Severe hypoalbuminemia  · Low prealbumin and albumin  · boost TID, beneprotein TID, MVI  · Swallow study, regular diet (12/17)    Macrocytic Anemia  Anemia of chronic inflammation   · Ferritin ? (12/16)  · Stable, monitor  · B12 and folate wnl  · MVI    Hypomagnesemia - resolved  · Replaced    Hyperkalemia - resolved  · K 5.4, given 500 mL LR bolus, resolved (12/16)      HIGH RISK CONDITION(S):   Patient has a condition that poses threat to life and bodily function: Acute Renal Failure  Patient is currently on drug therapy requiring intensive monitoring for toxicity: Vancomycin   Inpatient Checklist:  Diet:  Diet diabetic [facility]; 2000 Calorie; Cardiac (Low Na/Chol)  DVT Prophylaxis: Anticoagulation     Anticoagulants   Medication Route Frequency    enoxaparin injection 30 mg Subcutaneous Daily     GI Prophylaxis: Not indicated  Lines/ Drains/ Airways:   Central Line Indication: med administration  Type: PICC  Urinary Catheter Indicated: Patient Does Not Have Urinary Catheter  Other Lines/Tubes/Drains:    Disposition:   Skilled Nursing Facility    Follow up plan:      Provider  Pamela Anders MD  [facility]  Department of  [facility]  Contact Information:   [facility] # [id number]  Pager [phone]    I personally scribed for Pamela Anders MD on 12/25/2018 at 9:36 AM. Electronically signed by edson Harp on 12/25/2018 at 9:36 AM

## 2018-12-25 NOTE — PLAN OF CARE
Problem: Adult Inpatient Plan of Care  Goal: Plan of Care Review  Outcome: Ongoing (interventions implemented as appropriate)   12/25/18 2584   Plan of Care Review   Plan of Care Reviewed With patient;family;son   Pt is A,A,O x 4 . Stable V/s. Pt C/O pain 6/10 in her Lt ankle and PRN tylenol given as needed . Pt slept between care . Pt turned in bed with assistant using wedge and pillows , incontinence care done as needed , barrier cream applied to sacral wound and covered with foam dressing . Wound on Le leg painted with iodine . purewick  in place to measure pt's out put  . Pt is free of falls and injuries . Fall precautions maintained and family at bedside .

## 2018-12-25 NOTE — PROGRESS NOTES
"Physician Attestation for Scribe:  I, Pamela Anders MD, personally performed the services described in this documentation. All medical record entries made by the scribe were at my direction and in my presence.  I have reviewed this note and agree that the record reflects my personal performance and is accurate and complete.     Hospital Medicine   Progress Note     Patient: Krissy Marino [id number]  Date of Service: 12/25/2018  [place]: [facility] D Pamela Anders MD  Hospital Day: 13  Admission Date: 12/12/2018  SUMAN: 12/24/2018  Code status: Full Code     Admission CC: Cellulitis (Patient transferred to [facility] for further evaluation of unresolved cellulitis of left foot. EMS also reports "low blood cell count" as well from patient rehab ([facility]). Patient A&Ox4 and following commands. )     Principal Problem:  Osteomyelitis of left tibia     24-Hour Course / Overnight Events      No significant events reported by Nursing.     Interval HPI / Review of Systems      Patient reports no new compalints.     Review of Systems   Constitutional: Negative for fever.   Respiratory: Negative for shortness of breath.          Physical Examination      Temp:  [97.8 °F (36.6 °C)-99.1 °F (37.3 °C)]   Pulse:  [78-91]   Resp:  [16-18]   BP: (118-148)/(56-66)   SpO2:  [93 %-98 %]       Temp: 99.1 °F (37.3 °C) (12/25/18 1215)  Pulse: 86 (12/25/18 1215)  Resp: 18 (12/25/18 0911)  BP: 135/60 (12/25/18 1215)  SpO2: 95 % (12/25/18 1215)     Intake/Output Summary (Last 24 hours) at 12/25/2018 1336  Last data filed at 12/25/2018 0900      Gross per 24 hour   Intake 630 ml   Output --   Net 630 ml      I have personally reviewed the recorded Intake/Output for the past 24 hours:  Unmeasured Output noted.     Body mass index is 39.11 kg/m².  Physical Exam   Constitutional:  Non-toxic appearance.   Eyes: Conjunctivae and lids are normal.   Cardiovascular: S1 normal and S2 normal.   Pulmonary/Chest: Effort normal. She has decreased " breath sounds.   Abdominal: Soft. Bowel sounds are normal.   Musculoskeletal: She exhibits edema.        Left foot: There is tenderness and swelling.   Neurological: She is alert. She is not disoriented.         Data      Lab, Imaging, and Diagnostic results from 12/25/2018 were reviewed.     Significant Results:            Recent Labs   Lab 12/22/18  0525 12/23/18  0448 12/24/18  0358 12/25/18  0635   WBC 8.56 7.61 7.43 7.58   HGB 8.8* 9.4* 8.7* 8.7*   HCT 27.7* 28.8* 27.0* 27.0*   * 145* 137* 139*            Recent Labs   Lab 12/23/18  0448 12/24/18  0358 12/25/18  0635   * 136 137   K 4.0 4.3 4.5    108 108   CO2 22* 21* 24   BUN 51* 50* 45*   CREATININE 1.4 1.2 1.1   * 155* 151*   CALCIUM 8.3* 8.6* 8.5*   MG 1.9 1.8 1.7   PHOS 2.9 2.5* 2.5*   ALKPHOS 190* 178* 164*   ALT 39 33 25   AST 27 22 20   ALBUMIN 1.8* 1.7* 1.8*   PROT 5.4* 5.1* 5.1*   BILITOT 0.4 0.4 0.4               Recent Labs   Lab 12/24/18  0816 12/24/18  1214 12/24/18  1734 12/24/18  2130 12/25/18  0728 12/25/18  1231   POCTGLUCOSE 166* 288* 290* 240* 158* 305*      A1C:       Recent Labs   Lab 12/12/18  1852   HGBA1C 6.9*         Medications      Scheduled Medications:     allopurinol 300 mg Oral Daily   aspirin 81 mg Oral Daily   atorvastatin 40 mg Oral Daily   ceFEPime (MAXIPIME) IVPB 2 g Intravenous Q24H   clopidogrel 75 mg Oral Daily   duke's soln (benadryl 30 mL, mylanta 30 mL, lidocane 30 mL, nystatin 30 mL) 120 mL 10 mL Oral QID   enoxaparin 30 mg Subcutaneous Daily   famotidine 20 mg Oral Daily   fluticasone-vilanterol 1 puff Inhalation Daily   insulin aspart U-100 2 Units Subcutaneous TIDWM   insulin detemir U-100 5 Units Subcutaneous BID   levothyroxine 75 mcg Oral Before breakfast   metoprolol succinate 50 mg Oral Daily   metroNIDAZOLE 500 mg Oral Q8H   miconazole nitrate 2%   Topical (Top) BID   multivitamin 1 tablet Oral Daily   polyethylene glycol 17 g Oral Daily      PRN: sodium chloride, acetaminophen,  dextrose 50%, dextrose 50%, glucagon (human recombinant), glucose, glucose, insulin aspart U-100, ondansetron, senna-docusate 8.6-50 mg, sodium chloride 0.9%  Infusions:      Problem-Based Assessment and Plan      Overview: 78 year old female with a history of DM2, CAD (s/p PCI 7/2018), PVD and s/p bilateral total knee arthroplasties (years prior) admitted for left lower extremity cellulitis and osteomyelitis. Recently hospitalized at OSH with a lower extremity cellulitis which started after a left ankle sprain (11/26-12/5) which didn't improve with 14-day course of ceftriaxone. Recently told she has severe PVD which requires stent placement, but her recent cardiac stent placement presents a contraindication for at least a year, which prevented good wound healing. At [facility], CT showed left-sided abscesses distal to the fibular head and osteomyelitis that extended proximal to the ankle joint. Orthopedics recommended AKA, which family and patient refused opting for antibiotics alone with understanding of possible complications from infection/sepsis. Pt's antibiotics include cefepime (pseudomonas coverage, changed from ceftriaxone), Flagyl (gas noted on CT), and vancomycin (held for renal insufficiency that developed on 12/21). Received 1 U PRBC with appropriate response (12/16).           Active Hospital Problems     Diagnosis   POA    *Osteomyelitis of left tibia [M86.9]   Yes    Pressure injury of coccygeal region, stage 2 [L89.152]   Yes    Alteration in skin integrity [R23.9]   Yes    Gas gangrene of Left lower extremity [A48.0]   Yes    Moderate protein-calorie malnutrition [E44.0]   Yes    Hypoalbuminemia due to protein-calorie malnutrition [E46]   Yes    Cellulitis [L03.90]   Yes    Anemia [D64.9]   Yes    Pressure injury of buttock, stage 2 [L89.302]   Yes    Chronic osteomyelitis of left tibia with draining sinus [M86.462]   Yes    Benign hypertensive heart disease with HF (heart failure) [I11.0]    Yes    Cellulitis of left lower extremity [L03.116]   Yes    Macrocytic anemia [D53.9]   Yes    Renal insufficiency [N28.9]   No    Preoperative cardiovascular examination [Z01.810]   Not Applicable    Peripheral arterial disease [I73.9]   Yes    Coronary artery disease involving native coronary artery of native heart with unstable angina pectoris [I25.110]   Yes    Chronic diastolic heart failure [I50.32]   Yes    Obesity (BMI 30-39.9) [E66.9]   Yes    Bilateral leg edema [R60.0]   Yes    Hypothyroidism [E03.9]   Yes    Dyslipidemia [E78.5]   Yes    Hypertension [I10]   Yes    Type 2 diabetes mellitus, without long-term current use of insulin [E11.9]   Yes       Resolved Hospital Problems     Diagnosis Date Resolved POA    Delirium [R41.0] 12/21/2018 No    Hyperkalemia [E87.5] 12/17/2018 No    Hypomagnesemia [E83.42] 12/21/2018 No    Sepsis due to cellulitis [L03.90, A41.9] 12/21/2018 Yes         Problems addressed today:     Osteomyelitis of left tibia  Gas gangrene of Left lower extremity  Cellulitis of left lower extremity  · PICC line in place  · Vancomycin held for renal insufficiency  · Cefepime 2g IV q12   · Flagyl 500 mg PO TID  · Re-image LLE in 4-5 weeks (or if worsening signs/symptoms of infection). Antibiotics for 6 weeks at this time (total therapy unclear at this time). Weekly ESR, CRP, CBC, CMP, and Vanc trough for LLE cellulitis, myositis and multiloculated abscesses in deep soft tissue - already complicated by GBS bacteremia. Limb-salvage unlikely, AKA recommended for source control as intravenous antibiotics will not appropriately treat abscess, particularly in the setting of PVD.   · Vancomycin level trending down, Goal trough 15 - 20.  Continue Cefepime 2g IV q12 and Flagyl 500mg po TID as gas was seen on CT scan. Complete resolution of infection is not expected; continuing antibiotics for an indefinite period of time.  · Re-dosed vancomycin 1 g x 1 on 12/24  · Continue  adjusting vancomycin dosing     Renal insufficiency  · sCr downtrending, continue holding Lasix and ACEi (12/23)     Oral candidiasis  · Duke's solution (12/22)     Peripheral arterial disease  · Non-compressible MARIMAR with normal doppler waveforms and palpable pedal pulses  · Continue ASA, Plavix, atorvastatin     Coronary artery disease involving native coronary artery of native heart with unstable angina pectoris  Chronic diastolic heart failure   Dyslipidemia   Hypertension  · Underwent LHC and PCI in 7/2018  · Lower BPs noted, meds adjusted (12/18)  · Continue ASA, Plavix, Toprol 50 mg QD  · Continue atorvastatin  · Amlodipine stopped  · Losartan 50 mg held (had been reduced to 50 mg from 100 mg for elevated potassium)  · Lasix held for renal insufficiency, (had been reduced from 40 PO BID to QD)     Type 2 diabetes mellitus, without long-term current use of insulin  · Home oral medications held  · SC insulin, POCT  · Monotherapy with detemir 4 units daily (started 12/21)  · Insulin regimen changed to basal/prandial (12/25)     Coccyx pressure injury, stage 2  · Wound care following     Debility  · PT/OT following     Hypothyroidism   · continue Synthroid      Moderate protein calorie malnutrition  Severe hypoalbuminemia  · Low prealbumin and albumin  · boost TID, beneprotein TID, MVI  · Swallow study, regular diet (12/17)     Macrocytic Anemia  Anemia of chronic inflammation   · Ferritin ? (12/16)  · Stable, monitor  · B12 and folate wnl  · MVI     Hypomagnesemia - resolved  · Replaced     Hyperkalemia - resolved  · K 5.4, given 500 mL LR bolus, resolved (12/16)        HIGH RISK CONDITION(S):   Patient has a condition that poses threat to life and bodily function: Acute Renal Failure  Patient is currently on drug therapy requiring intensive monitoring for toxicity: Vancomycin   Inpatient Checklist:  Diet:  Diet diabetic [facility]; 2000 Calorie; Cardiac (Low Na/Chol)  DVT Prophylaxis: Anticoagulation            Anticoagulants   Medication Route Frequency    enoxaparin injection 30 mg Subcutaneous Daily      GI Prophylaxis: Not indicated  Lines/ Drains/ Airways:   Central Line Indication: med administration  Type: PICC  Urinary Catheter Indicated: Patient Does Not Have Urinary Catheter  Other Lines/Tubes/Drains:     Disposition:   Skilled Nursing Facility     Follow up plan:        Provider  Pamela Anders MD  [facility]  Department of Hospital Medicine  Contact Information:   [facility] # [id number]  Pager [phone]     I personally scribed for Pamela Anders MD on 12/25/2018 at 9:36 AM. Electronically signed by edson Harp on 12/25/2018 at 9:36 AM

## 2018-12-26 VITALS
DIASTOLIC BLOOD PRESSURE: 60 MMHG | HEART RATE: 84 BPM | TEMPERATURE: 98 F | RESPIRATION RATE: 18 BRPM | SYSTOLIC BLOOD PRESSURE: 119 MMHG | HEIGHT: 65 IN | OXYGEN SATURATION: 96 % | WEIGHT: 235 LBS | BODY MASS INDEX: 39.15 KG/M2

## 2018-12-26 PROBLEM — Z01.810 PREOPERATIVE CARDIOVASCULAR EXAMINATION: Status: RESOLVED | Noted: 2018-02-20 | Resolved: 2018-12-26

## 2018-12-26 LAB
ALBUMIN SERPL BCP-MCNC: 1.8 G/DL
ALP SERPL-CCNC: 151 U/L
ALT SERPL W/O P-5'-P-CCNC: 21 U/L
ANION GAP SERPL CALC-SCNC: 5 MMOL/L
AST SERPL-CCNC: 15 U/L
BASOPHILS # BLD AUTO: 0.03 K/UL
BASOPHILS NFR BLD: 0.4 %
BILIRUB SERPL-MCNC: 0.4 MG/DL
BUN SERPL-MCNC: 46 MG/DL
CALCIUM SERPL-MCNC: 8.7 MG/DL
CHLORIDE SERPL-SCNC: 106 MMOL/L
CO2 SERPL-SCNC: 23 MMOL/L
CREAT SERPL-MCNC: 1.2 MG/DL
DIFFERENTIAL METHOD: ABNORMAL
EOSINOPHIL # BLD AUTO: 0.2 K/UL
EOSINOPHIL NFR BLD: 2.3 %
ERYTHROCYTE [DISTWIDTH] IN BLOOD BY AUTOMATED COUNT: 17.3 %
EST. GFR  (AFRICAN AMERICAN): 49.3 ML/MIN/1.73 M^2
EST. GFR  (NON AFRICAN AMERICAN): 42.8 ML/MIN/1.73 M^2
GLUCOSE SERPL-MCNC: 175 MG/DL
HCT VFR BLD AUTO: 25.8 %
HGB BLD-MCNC: 8.2 G/DL
IMM GRANULOCYTES # BLD AUTO: 0.06 K/UL
IMM GRANULOCYTES NFR BLD AUTO: 0.9 %
LYMPHOCYTES # BLD AUTO: 1 K/UL
LYMPHOCYTES NFR BLD: 14 %
MAGNESIUM SERPL-MCNC: 1.6 MG/DL
MCH RBC QN AUTO: 31.9 PG
MCHC RBC AUTO-ENTMCNC: 31.8 G/DL
MCV RBC AUTO: 100 FL
MONOCYTES # BLD AUTO: 0.6 K/UL
MONOCYTES NFR BLD: 9.3 %
NEUTROPHILS # BLD AUTO: 5 K/UL
NEUTROPHILS NFR BLD: 73.1 %
NRBC BLD-RTO: 0 /100 WBC
PHOSPHATE SERPL-MCNC: 2.7 MG/DL
PLATELET # BLD AUTO: 134 K/UL
PMV BLD AUTO: 11.5 FL
POCT GLUCOSE: 168 MG/DL (ref 70–110)
POCT GLUCOSE: 293 MG/DL (ref 70–110)
POTASSIUM SERPL-SCNC: 4.9 MMOL/L
PROT SERPL-MCNC: 4.9 G/DL
RBC # BLD AUTO: 2.57 M/UL
SODIUM SERPL-SCNC: 134 MMOL/L
VANCOMYCIN SERPL-MCNC: 15.2 UG/ML
WBC # BLD AUTO: 6.87 K/UL

## 2018-12-26 PROCEDURE — 97110 THERAPEUTIC EXERCISES: CPT

## 2018-12-26 PROCEDURE — 25000003 PHARM REV CODE 250: Performed by: HOSPITALIST

## 2018-12-26 PROCEDURE — 84100 ASSAY OF PHOSPHORUS: CPT

## 2018-12-26 PROCEDURE — 97530 THERAPEUTIC ACTIVITIES: CPT

## 2018-12-26 PROCEDURE — 63600175 PHARM REV CODE 636 W HCPCS: Performed by: HOSPITALIST

## 2018-12-26 PROCEDURE — 25000003 PHARM REV CODE 250: Performed by: INTERNAL MEDICINE

## 2018-12-26 PROCEDURE — 83735 ASSAY OF MAGNESIUM: CPT

## 2018-12-26 PROCEDURE — 99239 HOSP IP/OBS DSCHRG MGMT >30: CPT | Mod: ,,, | Performed by: INTERNAL MEDICINE

## 2018-12-26 PROCEDURE — 85025 COMPLETE CBC W/AUTO DIFF WBC: CPT

## 2018-12-26 PROCEDURE — 97535 SELF CARE MNGMENT TRAINING: CPT

## 2018-12-26 PROCEDURE — 25000003 PHARM REV CODE 250: Performed by: PHYSICIAN ASSISTANT

## 2018-12-26 PROCEDURE — 80202 ASSAY OF VANCOMYCIN: CPT

## 2018-12-26 PROCEDURE — 80053 COMPREHEN METABOLIC PANEL: CPT

## 2018-12-26 PROCEDURE — 63600175 PHARM REV CODE 636 W HCPCS: Performed by: INTERNAL MEDICINE

## 2018-12-26 RX ORDER — FAMOTIDINE 20 MG/1
20 TABLET, FILM COATED ORAL DAILY
Qty: 30 TABLET | Refills: 11 | Status: SHIPPED | OUTPATIENT
Start: 2018-12-27 | End: 2019-07-17 | Stop reason: ALTCHOICE

## 2018-12-26 RX ORDER — METRONIDAZOLE 500 MG/1
500 TABLET ORAL EVERY 8 HOURS
Status: ON HOLD
Start: 2018-12-26 | End: 2019-07-19 | Stop reason: HOSPADM

## 2018-12-26 RX ORDER — CEFEPIME HYDROCHLORIDE 2 G/1
2 INJECTION, POWDER, FOR SOLUTION INTRAVENOUS DAILY
Start: 2018-12-26 | End: 2019-07-17 | Stop reason: ALTCHOICE

## 2018-12-26 RX ADMIN — MAGNESIUM OXIDE TAB 400 MG (241.3 MG ELEMENTAL MG) 200 MG: 400 (241.3 MG) TAB at 08:12

## 2018-12-26 RX ADMIN — INSULIN ASPART 4 UNITS: 100 INJECTION, SOLUTION INTRAVENOUS; SUBCUTANEOUS at 12:12

## 2018-12-26 RX ADMIN — ALLOPURINOL 300 MG: 300 TABLET ORAL at 09:12

## 2018-12-26 RX ADMIN — MICONAZOLE NITRATE: 20 OINTMENT TOPICAL at 09:12

## 2018-12-26 RX ADMIN — THERA TABS 1 TABLET: TAB at 08:12

## 2018-12-26 RX ADMIN — CEFEPIME 2 G: 2 INJECTION, POWDER, FOR SOLUTION INTRAVENOUS at 06:12

## 2018-12-26 RX ADMIN — METRONIDAZOLE 500 MG: 500 TABLET ORAL at 02:12

## 2018-12-26 RX ADMIN — ENOXAPARIN SODIUM 30 MG: 100 INJECTION SUBCUTANEOUS at 04:12

## 2018-12-26 RX ADMIN — INSULIN ASPART 2 UNITS: 100 INJECTION, SOLUTION INTRAVENOUS; SUBCUTANEOUS at 12:12

## 2018-12-26 RX ADMIN — INSULIN DETEMIR 3 UNITS: 100 INJECTION, SOLUTION SUBCUTANEOUS at 12:12

## 2018-12-26 RX ADMIN — LEVOTHYROXINE SODIUM 75 MCG: 75 TABLET ORAL at 06:12

## 2018-12-26 RX ADMIN — METOPROLOL SUCCINATE 50 MG: 50 TABLET, EXTENDED RELEASE ORAL at 08:12

## 2018-12-26 RX ADMIN — Medication 10 ML: at 08:12

## 2018-12-26 RX ADMIN — INSULIN DETEMIR 3 UNITS: 100 INJECTION, SOLUTION SUBCUTANEOUS at 08:12

## 2018-12-26 RX ADMIN — ATORVASTATIN CALCIUM 40 MG: 20 TABLET, FILM COATED ORAL at 08:12

## 2018-12-26 RX ADMIN — FLUTICASONE FUROATE AND VILANTEROL TRIFENATATE 1 PUFF: 100; 25 POWDER RESPIRATORY (INHALATION) at 09:12

## 2018-12-26 RX ADMIN — POLYETHYLENE GLYCOL 3350 17 G: 17 POWDER, FOR SOLUTION ORAL at 08:12

## 2018-12-26 RX ADMIN — CLOPIDOGREL 75 MG: 75 TABLET, FILM COATED ORAL at 08:12

## 2018-12-26 RX ADMIN — METRONIDAZOLE 500 MG: 500 TABLET ORAL at 06:12

## 2018-12-26 RX ADMIN — FAMOTIDINE 20 MG: 20 TABLET ORAL at 08:12

## 2018-12-26 RX ADMIN — Medication 10 ML: at 01:12

## 2018-12-26 RX ADMIN — ASPIRIN 81 MG: 81 TABLET, COATED ORAL at 08:12

## 2018-12-26 RX ADMIN — INSULIN ASPART 2 UNITS: 100 INJECTION, SOLUTION INTRAVENOUS; SUBCUTANEOUS at 08:12

## 2018-12-26 RX ADMIN — VANCOMYCIN HYDROCHLORIDE 750 MG: 750 INJECTION, POWDER, LYOPHILIZED, FOR SOLUTION INTRAVENOUS at 08:12

## 2018-12-26 NOTE — PLAN OF CARE
[facility]     Department of Hospital Medicine     [address] [place] [address]     [phone] after hours  [phone] fax       NURSING HOME ORDERS    12/26/2018    Admit to Nursing Home:    Skilled Bed                                                  Diagnoses:  Active Hospital Problems    Diagnosis  POA    *Chronic osteomyelitis of left tibia with draining sinus [M86.462]  Yes    Pressure injury of coccygeal region, stage 2 [L89.152]  Yes    Alteration in skin integrity [R23.9]  Yes    Gas gangrene of Left lower extremity [A48.0]  Yes    Moderate protein-calorie malnutrition [E44.0]  Yes    Hypoalbuminemia due to protein-calorie malnutrition [E46]  Yes    Pressure injury of buttock, stage 2 [L89.302]  Yes    Benign hypertensive heart disease with HF (heart failure) [I11.0]  Yes    Cellulitis of left lower extremity [L03.116]  Yes    Macrocytic anemia [D53.9]  Yes    Renal insufficiency [N28.9]  No    Peripheral arterial disease [I73.9]  Yes    Coronary artery disease involving native coronary artery of native heart with unstable angina pectoris [I25.110]  Yes    Chronic diastolic heart failure [I50.32]  Yes    Obesity (BMI 30-39.9) [E66.9]  Yes    Diabetic neuropathy [E11.40]  Yes    Bilateral leg edema [R60.0]  Yes    Hypothyroidism [E03.9]  Yes    Dyslipidemia [E78.5]  Yes    Monoclonal paraproteinemia [D47.2]  Yes    Hypertension [I10]  Yes    Type 2 diabetes mellitus, without long-term current use of insulin [E11.9]  Yes      Resolved Hospital Problems    Diagnosis Date Resolved POA    Delirium [R41.0] 12/21/2018 No    Hyperkalemia [E87.5] 12/17/2018 No    Hypomagnesemia [E83.42] 12/21/2018 No    Sepsis due to cellulitis [L03.90, A41.9] 12/21/2018 Yes    Preoperative cardiovascular examination [Z01.810] 12/26/2018 Not Applicable       Patient is homebound due to:  Chronic osteomyelitis of left tibia with draining  sinus    Allergies:  Review of patient's allergies indicates:   Allergen Reactions    Sulfamethoxazole-trimethoprim Nausea And Vomiting    Latex, natural rubber Rash    Pcn [penicillins] Rash       Vitals:     Every shift (Skilled Nursing patients)    Diet:  Diabetic 2000    Acitivities:     - Up in a chair each morning as tolerated.   - LLE NWB    LABS:  Per facility protocol. VANCOMYCIN TROUGH twice weekly until stable at 15 - 20; then weekly.  Weekly ESR, CRP, CBC, CMP (and Vanc trough)  - Will need re-imagining of LLE in 4-5 weeks to determine further treatment course.    Nursing Precautions:    - Aspiration precautions:    - Decubitus precautions:        -  for positioning   - Pressure reducing foam mattress   - Turn patient every two hours. Use wedge pillows to anchor patient    CONSULTS:        Physical Therapy to evaluate and treat     Occupational Therapy to evaluate and treat     Nutrition to evaluate and recommend diet      MISCELLANEOUS CARE:      Routine Skin Care:  Apply moisture barrier cream to all    skin folds and wet areas in perineal area daily and after baths and                           all bowel movements.    WOUND CARE:  Left lateral LE ulceration: BID, paint with Betadine, keep elevated with pillows.  Shearing sacral spine: Foam dressing and antifungal cream or powder to buttocks    DIABETES CARE:    Check blood sugar:       Fingerstick blood sugar AC and HS. Report CBG < 70 or > 250 to physician.                                          Insulin Sliding Scale          Glucose  Novolog Insulin Subcutaneous        0 - 60   Hypoglycemia Protocol. (Orange juice or glucose tablet, Hold insulin.)      No insulin   201-250  2 units   251-300  3 units   301-350  5 units then call physician    Medications: Discontinue all previous medication orders, if any. See new list below.    Medications                      acetaminophen (TYLENOL) 325 MG tablet  Take 2 tablets (650 mg total) by  mouth every 6 (six) hours as needed.             albuterol 90 mcg/actuation inhaler  Inhale 2 puffs into the lungs every 6 (six) hours as needed for Wheezing.             alendronate (FOSAMAX) 70 MG tablet  Take 1 tablet (70 mg total) by mouth every 7 days.             allopurinol (ZYLOPRIM) 300 MG tablet  TAKE 1 TABLET(300 MG) BY MOUTH EVERY DAY             aspirin (ECOTRIN) 81 MG EC tablet  Take 1 tablet (81 mg total) by mouth once daily.             atorvastatin (LIPITOR) 40 MG tablet  TAKE 1 TABLET(40 MG) BY MOUTH EVERY DAY             azelastine (ASTELIN) 137 mcg (0.1 %) nasal spray  USE 1 SPRAY(137 MCG) IN EACH NOSTRIL TWICE DAILY             ceFEPIme (MAXIPIME) 2 gram injection  Inject 2 g into the vein Daily.  NO END DATE             clopidogrel (PLAVIX) 75 mg tablet  Take 1 tablet (75 mg total) by mouth once daily.             famotidine (PEPCID) 20 MG tablet  Take 1 tablet (20 mg total) by mouth once daily.             glipiZIDE (GLUCOTROL) 2.5 MG TR24  Take 1 tablet (2.5 mg total) by mouth daily with breakfast.             levothyroxine (SYNTHROID) 75 MCG tablet  Take 1 tablet (75 mcg total) by mouth before breakfast.             magnesium oxide (MAG-OX) 400 mg tablet  Take 1 tablet (400 mg total) by mouth once daily.             metoprolol succinate (TOPROL-XL) 50 MG 24 hr tablet  Take 1 tablet (50 mg total) by mouth once daily.             metroNIDAZOLE (FLAGYL) 500 MG tablet  Take 1 tablet (500 mg total) by mouth every 8 (eight) hours.  NO END DATE             miconazole NITRATE 2 % (ZEASORB AF) 2 % top powder  Apply topically as needed for Itching.             multivitamin (THERAGRAN) tablet  Take 1 tablet by mouth once daily.             nystatin (DUKE'S SOLUTION)  Take 10 mLs by mouth 4 (four) times daily.             SYMBICORT 160-4.5 mcg/actuation HFAA  INHALE TWO PUFFS INTO THE LUNGS EVERY 12 HOURS             vancomycin HCl (VANCOMYCIN 750 MG/250 ML D5W, READY TO MIX SYSTEM,)  Inject 250 mLs  (750 mg total) into the vein Daily.  NO END DATE  NEEDS VANCOMYCIN LEVEL TWICE WEEKLY UNTIL STABLE AT GOAL TROUGH 15 - 20                 _________________________________  Pamela Anders MD  12/26/2018

## 2018-12-26 NOTE — PROGRESS NOTES
[facility] Medicine PharmD Consult: Vancomycin Follow-up Note    Krissy Marino is a 78 y.o. female initiated on antimicrobial therapy for bone/joint with Vancomycin.    Renal function: Estimated Creatinine Clearance: 45.3 mL/min (based on SCr of 1.2 mg/dL).     Vancomycin Random Level= 15.2 on 12/26 at 0342    Vancomycin serum concentration assessment(s):   The random level was drawn correctly and can be used to guide therapy at this time.   This measurement is within the desired definitive target range of 15-20 mcg/mL.    Vancomycin regimen plan:   Agree with resuming Vancomycin at 750 mg IV q24h.   The next random serum concentration will be drawn at 0600 on 12/27.     Pharmacy will continue to follow and monitor vancomycin.      Please let us know if you have any questions regarding this assessment.     Thank you for the consult,  Nancy Chang, PharmD, BCPS  [id number]     **Note: Consults are reviewed Monday-Friday 7:00am-3:30pm. THE ABOVE RECOMMENDATIONS ARE ONLY SUGGESTED.THE RECOMMENDATIONS SHOULD BE CONSIDERED IN CONJUNCTION WITH ALL PATIENT FACTORS. **

## 2018-12-26 NOTE — PT/OT/SLP PROGRESS
Occupational Therapy   Treatment    Name: Krissy Marino  MRN: [id number]  Admitting Diagnosis:  Chronic osteomyelitis of left tibia with draining sinus       Recommendations:     Discharge Recommendations: nursing facility, skilled  Discharge Equipment Recommendations:  (TBD as pt. progresses)  Barriers to discharge:  (requires extensive assist at this time)    Subjective     Pain/Comfort:  · Pain Rating 1: 0/10  · Pain Rating Post-Intervention 1: 0/10    Objective:     Communicated with: nurse prior to session.  Patient found with supine in bed  and (supine on air bed) upon OT entry to room.    General Precautions: Standard, fall   Orthopedic Precautions:LLE non weight bearing   Braces: N/A     Occupational Performance:    Bed Mobility:    · Patient completed Supine to Sit with moderate assistance  With assist at LLE and to initially guide trunk    Functional Mobility/Transfers:  · Patient completed Bed <> Chair Transfer using Slide Board technique with maximal assistance with slide board x 2 people for safety  · Functional Mobility: not tested    Activities of Daily Living:  · Lower Body Dressing: total assistance to donpants in supine through rolling      AMPAC 6 Click ADL: 15    Treatment & Education:  Pt. Able to sit EOB with SBA on this date  Pt. And pt. Daughter educated on safety with ADL task performance as well as SBT to w/c.   Pt. Daughter educated on pt. Need for supervision when up in chair     Patient left up in chair with call button in reach, nurse notified and daughter present and BLE elevated  Education:    Assessment:     Krissy Marino is a 78 y.o. female with a medical diagnosis of Chronic osteomyelitis of left tibia with draining sinus.  She presents with the following performance deficits affecting function are impaired endurance, impaired functional mobilty, impaired self care skills, decreased lower extremity function, orthopedic precautions. Pt. Tolerated SBT to chair fairly on this  date    Rehab Prognosis:  Fair; patient would benefit from acute skilled OT services to address these deficits and reach maximum level of function.       Plan:     Patient to be seen 4 x/week to address the above listed problems via self-care/home management, therapeutic activities, therapeutic exercises  · Plan of Care Expires: 01/14/19  · Plan of Care Reviewed with: patient, daughter    This Plan of care has been discussed with the patient who was involved in its development and understands and is in agreement with the identified goals and treatment plan    GOALS:   Multidisciplinary Problems     Occupational Therapy Goals        Problem: Occupational Therapy Goal    Goal Priority Disciplines Outcome Interventions   Occupational Therapy Goal     OT, PT/OT Ongoing (interventions implemented as appropriate)    Description:  Goals to be met by: 12/31     Patient will increase functional independence with ADLs by performing:    UE Dressing with [place].   LE Dressing with Minimal Assistance.  Grooming while seated with Set-up Assistance. Goal met  Toileting from bedside commode with Minimal Assistance for hygiene and clothing management.   Bathing from  edge of bed with Moderate Assistance.                       Time Tracking:     OT Date of Treatment: 12/26/18  OT Start Time: 1042  OT Stop Time: 1113  OT Total Time (min): 31 min    Billable Minutes:Self Care/Home Management 31    ZACHARIAH Cruz  12/26/2018

## 2018-12-26 NOTE — PLAN OF CARE
Problem: Occupational Therapy Goal  Goal: Occupational Therapy Goal  Goals to be met by: 12/31     Patient will increase functional independence with ADLs by performing:    UE Dressing with [place].   LE Dressing with Minimal Assistance.  Grooming while seated with Set-up Assistance. Goal met  Toileting from bedside commode with Minimal Assistance for hygiene and clothing management.   Bathing from  edge of bed with Moderate Assistance.      Pt. Tolerated session well

## 2018-12-26 NOTE — PLAN OF CARE
Problem: Adult Inpatient Plan of Care  Goal: Plan of Care Review  Outcome: Ongoing (interventions implemented as appropriate)  Pt remains free of falls and injury. Pt provided with frequent turns/reposition. Pt makes no statement of pain. [place] remains in place. Bed low and locked, Call light within reach.

## 2018-12-26 NOTE — DISCHARGE SUMMARY
"Discharge Summary  Hospital Medicine    Patient Name: Krissy Marino  MRN: [id number]  Attending Provider on Discharge: Pamela Anders MD  Hospital Medicine Team: [facility]  Date of Admission:  12/12/2018     Date of Discharge:  12/26/2018  6:20 PM  Code status: Full Code    Active Hospital Problems    Diagnosis  POA    *Chronic osteomyelitis of left tibia with draining sinus [M86.462]  Yes    Pressure injury of coccygeal region, stage 2 [L89.152]  Yes    Alteration in skin integrity [R23.9]  Yes    Gas gangrene of Left lower extremity [A48.0]  Yes    Moderate protein-calorie malnutrition [E44.0]  Yes    Hypoalbuminemia due to protein-calorie malnutrition [E46]  Yes    Pressure injury of buttock, stage 2 [L89.302]  Yes    Benign hypertensive heart disease with HF (heart failure) [I11.0]  Yes    Cellulitis of left lower extremity [L03.116]  Yes    Macrocytic anemia [D53.9]  Yes    Renal insufficiency [N28.9]  Yes    Peripheral arterial disease [I73.9]  Yes    Coronary artery disease involving native coronary artery of native heart with unstable angina pectoris [I25.110]  Yes    Chronic diastolic heart failure [I50.32]  Yes    Obesity (BMI 30-39.9) [E66.9]  Yes    Diabetic neuropathy [E11.40]  Yes    Bilateral leg edema [R60.0]  Yes    Hypothyroidism [E03.9]  Yes    Dyslipidemia [E78.5]  Yes    Monoclonal paraproteinemia [D47.2]  Yes    Hypertension [I10]  Yes    Type 2 diabetes mellitus, without long-term current use of insulin [E11.9]  Yes      Resolved Hospital Problems    Diagnosis Date Resolved POA    Hyperkalemia [E87.5] 12/17/2018 Yes    Hypomagnesemia [E83.42] 12/21/2018 Yes    Sepsis due to cellulitis [L03.90, A41.9] 12/21/2018 Yes    Preoperative cardiovascular examination [Z01.810] 12/26/2018 Not Applicable        HPI:  "78 y.o. female with type 2 diabetes, CAD status post PCI (7/2018), and peripheral vascular disease presented to the emergency department from [facility] " "Center for evaluation of left lower extremity cellulitis.  She was recently admitted to [facility] from 11/26-12/5 for left lower extremity cellulitis s/p a left ankle sprain, where she was found to have group B Strept bacteremia.  During that admission, a PICC line was placed and she was discharged on ceftriaxone to complete a 14-day course that ended on 12/11/2018.  Since her discharge, family mentions that her left lower extremity cellulitis has not been improving.  They endorse persistent erythema as well as occasional yellow drainage from the left lateral malleolus.  At baseline, the patient is fairly bed-bound and spends most of her time with her lower extremities dangling down.  Labs were done at her facility on 12/07 which showed a white blood cell count 20K.  She was told to come to the emergency department for further evaluation.  She continues to endorse pain in her left LE as well as pain with movement.  She has never had any debridement of her wound, but mentions that it has been cleaned with Clorox by Wound [facility].  She denies any fevers or chills.  She does endorse constipation which she attributes to her pain medication.  Of note, the patient was told that because she has such severe peripheral vascular disease, she would require stent placement.  However, because of her recent cardiac stent placement, she is unable to get leg stents placed for at least 1 year, which will continue to prevent good wound healing.  In the emergency department, labs were notable for a white blood cell count 10, sed rate 56, and CRP 67.  X-rays were ordered which showed edema, but no cellulitis.  She received IV vancomycin and was admitted to Hospital Medicine for further management."    Hospital Course:  Patient with DM2, CAD (s/p PCI 7/2018), PVD and s/p bilateral total knee arthroplasties (years prior) admitted for left lower extremity cellulitis and osteomyelitis. Recently hospitalized at OSH with a " lower extremity cellulitis which started after a left ankle sprain (11/26-12/5) which didn't improve with 14-day course of ceftriaxone. Recently told she has severe PVD which requires stent placement, but her recent cardiac stent placement presents a contraindication for at least a year. At [facility], CT showed left-sided abscesses distal to the fibular head and osteomyelitis that extended proximal to the ankle joint. Orthopedic Surgery recommended AKA, which family and patient refused opting for antibiotics alone with understanding of possible complications from infection/sepsis. Patient's antibiotics include cefepime (pseudomonas coverage, changed from ceftriaxone), Flagyl (gas noted on CT), and vancomycin (held for renal insufficiency that developed on 12/21 then redosed). Received 1 U PRBC with appropriate response (12/16).     Osteomyelitis of left tibia  Gas gangrene of Left lower extremity  Cellulitis of left lower extremity  · PICC line in place  · Vancomycin held for renal insufficiency  · Cefepime 2g IV q12   · Flagyl 500 mg PO TID  · Re-image LLE in 4-5 weeks (or if worsening signs/symptoms of infection). Antibiotics for 6 weeks at this time (total therapy unclear at this time). Weekly ESR, CRP, CBC, CMP, and Vancomycin trough for LLE cellulitis, myositis and multiloculated abscesses in deep soft tissue - already complicated by GBS bacteremia. Limb-salvage unlikely, AKA recommended for source control as intravenous antibiotics will not appropriately treat abscess, particularly in the setting of PVD.   · Vancomycin level trending down, Goal trough 15 - 20.  Continued cefepime 2g IV q12 and Flagyl 500mg po TID as gas was seen on CT scan. Complete resolution of infection is not expected; continuing antibiotics for an indefinite period of time.  · Re-dosed vancomycin 1 g x 1 on 12/24  · Continue adjusting vancomycin dosing. Currently vancomycin 750 mg daily. Cefepime renally dosed to 2 g daily. Needs ID follow  up.     Renal insufficiency  · sCr downtrending, continue holding Lasix and ACEi (12/23)  · POA, patient has had multiple episodes of sCr > 1.0 over the past 2 years.  · Monitor renal function closely, especially if lasix resumed.     Oral candidiasis  · Duke's solution (12/22)     Peripheral arterial disease  · Non-compressible MARIMAR with normal doppler waveforms and palpable pedal pulses  · Continue ASA, Plavix, atorvastatin     Coronary artery disease involving native coronary artery of native heart with unstable angina pectoris  Chronic diastolic heart failure   Dyslipidemia   Hypertension  · Underwent LHC and PCI in 7/2018  · Lower BPs noted, meds adjusted (12/18)  · Continue ASA, Plavix, Toprol 50 mg QD  · Continue atorvastatin  · Amlodipine stopped  · Losartan 50 mg held (had been reduced to 50 mg from 100 mg for elevated potassium)  · Lasix held for renal insufficiency, (had been reduced from 40 PO BID to QD)     Type 2 diabetes mellitus, without long-term current use of insulin  · Home oral medications held  · SC insulin, POCT  · Monotherapy with detemir 4 units daily (started 12/21)  · Insulin regimen changed to basal/prandial (12/25)  · Resume oral management at discharge.     Coccyx pressure injury, stage 2  · Wound care following     Debility  · PT/OT following     Hypothyroidism   · Continue Synthroid      Moderate protein calorie malnutrition  Severe hypoalbuminemia  · Low prealbumin and albumin  · boost TID, beneprotein TID, MVI  · Swallow study, regular diet (12/17)     Macrocytic Anemia  Anemia of chronic inflammation   · Ferritin ? (12/16)  · Stable, monitor  · B12 and folate wnl  · MVI     Hypomagnesemia - resolved  · Replaced     Hyperkalemia - resolved  · K 5.4, given 500 mL LR bolus, resolved (12/16)      Recent Labs   Lab 12/24/18  0358 12/25/18  0635 12/26/18  0342   WBC 7.43 7.58 6.87   HGB 8.7* 8.7* 8.2*   HCT 27.0* 27.0* 25.8*   * 139* 134*     Recent Labs   Lab 12/24/18  9755  12/25/18  0635 12/26/18  0342    137 134*   K 4.3 4.5 4.9    108 106   CO2 21* 24 23   BUN 50* 45* 46*   CREATININE 1.2 1.1 1.2   * 151* 175*   CALCIUM 8.6* 8.5* 8.7   MG 1.8 1.7 1.6   PHOS 2.5* 2.5* 2.7     Recent Labs   Lab 12/24/18  0358 12/25/18  0635 12/26/18  0342   ALKPHOS 178* 164* 151*   ALT 33 25 21   AST 22 20 15   ALBUMIN 1.7* 1.8* 1.8*   PROT 5.1* 5.1* 4.9*   BILITOT 0.4 0.4 0.4      Recent Labs   Lab 12/25/18  0728 12/25/18  1231 12/25/18  1719 12/25/18  2157 12/26/18  0819 12/26/18  1200   POCTGLUCOSE 158* 305* 317* 278* 168* 293*        Procedures: none    Consultants:   Consults (From admission, onward)        Status Ordering Provider     [facility] PharmD Consult  Once     Provider:  (Not yet assigned)    Acknowledged LILIAN RAMIREZ     Inpatient consult to Cardiology  Once     Provider:  (Not yet assigned)    Completed LIBRADO KEBEDE     Inpatient consult to Infectious Diseases  Once     Provider:  (Not yet assigned)    Completed YOEL BUCHANAN     Inpatient consult to Orthopedic Surgery  Once     Provider:  (Not yet assigned)    Completed LIBRADO KEBEDE     Inpatient consult to Podiatry  Once     Provider:  (Not yet assigned)    Completed YOEL BUCHANAN     Inpatient consult to Registered Dietitian/Nutritionist  Once     Provider:  (Not yet assigned)    Completed LILIAN RAMIREZ     Inpatient consult to [facility]  Once     Provider:  (Not yet assigned)    Completed LILIAN RAMIREZ     Inpatient consult to [facility]  Once     Provider:  (Not yet assigned)    Completed LILIAN RAMIREZ     Inpatient consult to Vascular Surgery  Once     Provider:  (Not yet assigned)    Completed JOANIE TA          Medications:    Current Discharge Medication List      START taking these medications    Details   ceFEPIme (MAXIPIME) 2 gram injection Inject 2 g into the vein Daily.      famotidine (PEPCID) 20 MG tablet Take 1 tablet (20 mg total) by mouth  once daily.  Qty: 30 tablet, Refills: 11      metroNIDAZOLE (FLAGYL) 500 MG tablet Take 1 tablet (500 mg total) by mouth every 8 (eight) hours.      multivitamin (THERAGRAN) tablet Take 1 tablet by mouth once daily.      nystatin (DUKE'S SOLUTION) Take 10 mLs by mouth 4 (four) times daily.  Refills: 0      vancomycin HCl (VANCOMYCIN 750 MG/250 ML D5W, READY TO MIX SYSTEM,) Inject 250 mLs (750 mg total) into the vein Daily.         CONTINUE these medications which have NOT CHANGED    Details   acetaminophen (TYLENOL) 325 MG tablet Take 2 tablets (650 mg total) by mouth every 6 (six) hours as needed.  Refills: 0      albuterol 90 mcg/actuation inhaler Inhale 2 puffs into the lungs every 6 (six) hours as needed for Wheezing.  Qty: 1 each, Refills: 11    Associated Diagnoses: Asthma without status asthmaticus, unspecified asthma severity, uncomplicated      alendronate (FOSAMAX) 70 MG tablet Take 1 tablet (70 mg total) by mouth every 7 days.  Qty: 4 tablet, Refills: 6    Associated Diagnoses: Osteopenia, unspecified location      allopurinol (ZYLOPRIM) 300 MG tablet TAKE 1 TABLET(300 MG) BY MOUTH EVERY DAY  Qty: 90 tablet, Refills: 3      aspirin (ECOTRIN) 81 MG EC tablet Take 1 tablet (81 mg total) by mouth once daily.  Qty: 90 tablet, Refills: 3      atorvastatin (LIPITOR) 40 MG tablet TAKE 1 TABLET(40 MG) BY MOUTH EVERY DAY  Qty: 90 tablet, Refills: 3    Associated Diagnoses: Hyperlipidemia, unspecified hyperlipidemia type      azelastine (ASTELIN) 137 mcg (0.1 %) nasal spray USE 1 SPRAY(137 MCG) IN EACH NOSTRIL TWICE DAILY  Qty: 30 mL, Refills: 0    Associated Diagnoses: Environmental allergies      blood sugar diagnostic (ACCU-CHEK AMELIA PLUS TEST STRP) Strp Checks bg 2 x day before meals  Qty: 200 strip, Refills: 4      clopidogrel (PLAVIX) 75 mg tablet Take 1 tablet (75 mg total) by mouth once daily.  Qty: 30 tablet, Refills: 11      glipiZIDE (GLUCOTROL) 2.5 MG TR24 Take 1 tablet (2.5 mg total) by mouth daily  with breakfast.      levothyroxine (SYNTHROID) 75 MCG tablet Take 1 tablet (75 mcg total) by mouth before breakfast.  Qty: 90 tablet, Refills: 0      magnesium oxide (MAG-OX) 400 mg tablet Take 1 tablet (400 mg total) by mouth once daily.  Refills: 0      metoprolol succinate (TOPROL-XL) 50 MG 24 hr tablet Take 1 tablet (50 mg total) by mouth once daily.  Qty: 90 tablet, Refills: 3      miconazole NITRATE 2 % (ZEASORB AF) 2 % top powder Apply topically as needed for Itching.  Qty: 71 g, Refills: 0    Associated Diagnoses: Dermatitis      SYMBICORT 160-4.5 mcg/actuation HFAA INHALE TWO PUFFS INTO THE LUNGS EVERY 12 HOURS  Qty: 10.2 g, Refills: 3    Associated Diagnoses: Asthma without status asthmaticus, unspecified asthma severity, uncomplicated; Other fatigue         STOP taking these medications       amLODIPine (NORVASC) 5 MG tablet Comments:   Reason for Stopping:         cefTRIAXone 2 g in dextrose 5 % 50 mL (ROCEPHIN) 2 g/50 mL PgBk IVPB Comments:   Reason for Stopping:         cetirizine (ZYRTEC) 10 MG tablet Comments:   Reason for Stopping:         collagenase (SANTYL) ointment Comments:   Reason for Stopping:         FERROUS GLUCONATE (FERATE ORAL) Comments:   Reason for Stopping:         HYDROcodone-acetaminophen (NORCO) 5-325 mg per tablet Comments:   Reason for Stopping:         hydrocortisone butyrate (LOCOID) 0.1 % Crea cream Comments:   Reason for Stopping:         KRILL/OM3/DHA/EPA/OM6/LIP/ASTX (KRILL OIL, OMEGA 3 & 6, ORAL) Comments:   Reason for Stopping:         ranitidine (ZANTAC) 150 MG tablet Comments:   Reason for Stopping:         loperamide (IMODIUM) 2 mg capsule Comments:   Reason for Stopping:               Discharge Instructions:  Discharge Procedure Orders   Ambulatory Referral to Infectious Disease   Referral Priority: Routine Referral Type: Consultation   Referral Reason: Specialty Services Required   Requested Specialty: Infectious Diseases   Number of Visits Requested: 1     Diet  Adult Regular     Order Specific Question Answer Comments   Na restriction, if any: 2gNa    Additional restrictions: Diabetic 2000      Notify your health care provider if you experience any of the following:  temperature >100.4     Notify your health care provider if you experience any of the following:  persistent nausea and vomiting or diarrhea     Notify your health care provider if you experience any of the following:  severe uncontrolled pain     Notify your health care provider if you experience any of the following:  difficulty breathing or increased cough     Notify your health care provider if you experience any of the following:  persistent dizziness, light-headedness, or visual disturbances     Notify your health care provider if you experience any of the following:  increased confusion or weakness     No dressing needed   Order Comments: PAINT LLE WOUNDS WITH BETADINE BID     Activity as tolerated     Weight bearing restrictions (specify):   Order Comments: NWB LLE       Discharge Condition: poor    Disposition: Skilled Nursing Facility    Indwelling Lines/Drains at time of discharge: PICC    Tests pending at the time of discharge: none      Time spent on the discharge of the patient including review of hospital course with the patient, reviewing discharge medications and arranging follow-up care: 50 minutes.    Discharge examination Patient was seen and examined on 12/26/2018 and determined to be suitable for discharge.    Discharge plan and follow up:  Follow-up Information     Emily Mcpherson MD. Schedule an appointment as soon as possible for a visit today.    Specialty:  Internal Medicine  Why:  As needed  Contact information:  [id number]  [facility] [address]  [phone]             [facility] INFECTIOUS DISEASE. Schedule an appointment as soon as possible for a visit in 1 month.    Specialty:  Infectious Diseases  Why:  For discharge from hospital follow up  Contact information:  [id number]  [facility] [address]  [phone]               Future Appointments   Date Time Provider Department Center   12/27/2018  3:15 PM Alisson Arora DPM [facility] POD [address]   1/8/2019  1:20 PM Emily Mcpherson MD [facility] IM [address] PCW   1/8/2019  2:00 PM Bebo Gómez MD [facility] ID [address]   1/15/2019  1:00 PM Rock Lopez MD [facility]       Provider  Pamela Anders MD  Department of Hospital Medicine  [facility] - [facility] - [address]

## 2018-12-26 NOTE — PT/OT/SLP PROGRESS
"Physical Therapy Treatment    Patient Name:  Krissy Marino   MRN:  [id number]    Recommendations:     Discharge Recommendations:  nursing facility, skilled   Discharge Equipment Recommendations: (TBD)   Barriers to discharge: Decreased caregiver support    Assessment:     Krissy Marino is a 78 y.o. female admitted with a medical diagnosis of Chronic osteomyelitis of left tibia with draining sinus.  She presents with the following impairments/functional limitations:  weakness, impaired endurance, impaired self care skills, impaired functional mobilty, impaired balance, decreased coordination, decreased lower extremity function, decreased safety awareness, pain, impaired skin, edema, orthopedic precautions. Pt tolerated session well with focus on bed mobility, transfers, and therex. Pt progressing slowly but is tolerating increased time OOB and UIC as well as tolerating transfers with improved active participation with RLE. Pt will continue to benefit from therapy services to improve impairments listed above.     Rehab Prognosis: Good; patient would benefit from acute skilled PT services to address these deficits and reach maximum level of function.    Recent Surgery: * No surgery found *      Plan:     During this hospitalization, patient to be seen 4 x/week to address the identified rehab impairments via therapeutic activities, therapeutic exercises, wheelchair management/training and progress toward the following goals:    · Plan of Care Expires:  01/13/19    Subjective     Chief Complaint: pain in L ankle  Patient/Family Comments/goals: "It's hurting a little bit, the ankle and the foot."   Pain/Comfort:  · Pain Rating 1: 5/10  · Location - Side 1: Right  · Location - Orientation 1: generalized  · Location 1: ankle  · Pain Addressed 1: Pre-medicate for activity, Reposition, Distraction  · Pain Rating Post-Intervention 1: 5/10      Objective:     Communicated with NSG prior to session.  Patient found all lines " intact, call button in reach and daughter present with peripheral IV and pt seated up in w/c with LLE elevated upon PTA entry to room.     General Precautions: Standard, fall   Orthopedic Precautions:LLE non weight bearing   Braces: N/A     Functional Mobility:  · Bed Mobility:     · Scooting: maximal assistance  · Sit to Supine: maximal assistance  · Transfers:     · Wheelchair to Bed: maximal assistance with  no AD  using  Squat Pivot  · Balance: Pt sits at EOB with SBA/CGA for balance. Pt unable to stand.       AM-PAC 6 CLICK MOBILITY  Turning over in bed (including adjusting bedclothes, sheets and blankets)?: 3  Sitting down on and standing up from a chair with arms (e.g., wheelchair, bedside commode, etc.): 2  Moving from lying on back to sitting on the side of the bed?: 2  Moving to and from a bed to a chair (including a wheelchair)?: 2  Need to walk in hospital room?: 1  Climbing 3-5 steps with a railing?: 1  Basic Mobility Total Score: 11       Therapeutic Activities and Exercises:   Pt assisted with functional mobility as noted above.   Pt performs RLE therex: AP, LAQ, Seated Marching, Hip ABD/ADD, SLR and SAQ x 15 reps.   Pt assisted with seated scooting to pts L along EOB with Max A.   Pt and daughter educated on PT POC.     Patient left HOB elevated with all lines intact, call button in reach and daughter present.    GOALS:   Multidisciplinary Problems     Physical Therapy Goals        Problem: Physical Therapy Goal    Goal Priority Disciplines Outcome Goal Variances Interventions   Physical Therapy Goal     PT, PT/OT Ongoing (interventions implemented as appropriate)     Description:  Goals to be met by: 10 days ()    Patient will increase functional independence with mobility by performin. Supine to sit with Stand-by Assistance  2. Sit to supine with Stand-by Assistance  3. Sit to stand transfer with Minimal Assistance using RW while maintaining NWB L LE status  4. Bed to chair with  Moderate Assistance using RW while maintaining NWB L LE  5. Lower extremity exercise program x30 reps per handout, with independence to improve muscular strength and endurance.                       Time Tracking:     PT Received On: 12/26/18  PT Start Time: 1320     PT Stop Time: 1348  PT Total Time (min): 28 min     Billable Minutes: Therapeutic Activity 18 and Therapeutic Exercise 10    Treatment Type: Treatment  PT/PTA: PTA     PTA Visit Number: 2     John Pizarro, TREMAYNE  12/26/2018

## 2018-12-26 NOTE — PLAN OF CARE
Roel did send request to Jazmine to see if auth can be obtained, orders to follow. Orders sent to Maggie at [facility]. Forms that have to be completed by Pt will be faxed to Roel Sevilla to follow. Forms completed and refaxed to [facility], awaiting insurance approval and 142 form as Roel does not see in Pt's documents. Roel to call locet and fax pasrr. Roel informed locet was called in and still good, faxed pasrr, uploaded to Roel Lucero to follow with 142 and auth. Pt accepted, report to be called to [phone] report to Roel Dubois to arrange w/c van transport for 1730pm. Renato Pantoja updated and aware of d/c.

## 2018-12-26 NOTE — PLAN OF CARE
Patient referral sent to [facility] per ZAYRA Shukla.  Noted patient will be on IV antibiotics with no end date (patient refusing surgery for leg, needs amputation).  Facility reviewing orders.  Assigned CM will continue to follow for needs.       12/26/18 1131   Discharge Reassessment   Assessment Type Discharge Planning Reassessment   Discharge Plan A Skilled Nursing Facility   Post-Acute Status   Post-Acute Authorization Placement   Post-Acute Placement Status Pending Medical Review

## 2018-12-27 ENCOUNTER — PATIENT OUTREACH (OUTPATIENT)
Dept: ADMINISTRATIVE | Facility: CLINIC | Age: 78
End: 2018-12-27

## 2018-12-28 NOTE — PLAN OF CARE
Patient discharged to [facility] SNF 12/26/2018.    Future Appointments   Date Time Provider Department Center   1/8/2019  1:20 PM Emily Mcpherson MD [facility] IM [address] PCW   1/8/2019  2:00 PM Bebo Gómez MD [facility] ID [address]   1/15/2019  1:00 PM Rock Lopez MD [facility]          12/28/18 1123   Final Note   Assessment Type Final Discharge Note   Anticipated Discharge Disposition SNF   Right Care Referral Info   Post Acute Recommendation SNF / Sub-Acute Rehab

## 2018-12-31 ENCOUNTER — TELEPHONE (OUTPATIENT)
Dept: INFECTIOUS DISEASES | Facility: HOSPITAL | Age: 78
End: 2018-12-31

## 2019-01-02 NOTE — PROGRESS NOTES
Physical Therapy Discharge Summary    Name: Krissy Marino  MRN: [id number]   Principal Problem: Chronic osteomyelitis of left tibia with draining sinus     Patient Discharged from acute Physical Therapy on 18.  Please refer to prior PT noted date on 18 for functional status.     Assessment:     Patient appropriate for care in another setting.    Objective:     GOALS:   Multidisciplinary Problems     Physical Therapy Goals        Problem: Physical Therapy Goal    Goal Priority Disciplines Outcome Goal Variances Interventions   Physical Therapy Goal     PT, PT/OT Ongoing (interventions implemented as appropriate)     Description:  Goals to be met by: 10 days ()    Patient will increase functional independence with mobility by performin. Supine to sit with Stand-by Assistance  2. Sit to supine with Stand-by Assistance  3. Sit to stand transfer with Minimal Assistance using RW while maintaining NWB L LE status  4. Bed to chair with Moderate Assistance using RW while maintaining NWB L LE  5. Lower extremity exercise program x30 reps per handout, with independence to improve muscular strength and endurance.                       Reasons for Discontinuation of Therapy Services  Transfer to alternate level of care.      Plan:     Patient Discharged to: Skilled Nursing Facility.    Padmini Sher, PT  2019

## 2019-01-08 ENCOUNTER — PES CALL (OUTPATIENT)
Dept: ADMINISTRATIVE | Facility: CLINIC | Age: 79
End: 2019-01-08

## 2019-01-10 DIAGNOSIS — L40.9 PSORIASIS: ICD-10-CM

## 2019-01-10 RX ORDER — FOLIC ACID 1 MG/1
TABLET ORAL
Qty: 30 TABLET | Refills: 0 | Status: SHIPPED | OUTPATIENT
Start: 2019-01-10 | End: 2019-07-17 | Stop reason: ALTCHOICE

## 2019-01-31 RX ORDER — LEVOTHYROXINE SODIUM 75 UG/1
TABLET ORAL
Qty: 90 TABLET | Refills: 0 | Status: ON HOLD | OUTPATIENT
Start: 2019-01-31 | End: 2019-08-31 | Stop reason: DRUGHIGH

## 2019-03-01 NOTE — PROGRESS NOTES
Consult received on patient's Left lower leg ulceration. See flowsheet below for wound documentation and photographs. Podiatry, Orthopedics and Vascular surgery had been previously consult. Surgery recommended amputation, patient deferring amputation at this time. ABIs were performed 12/18/18, left leg MARIMAR 2.16, non-compressible. Patient's prealbumin 12 and albumin 1.7.   Discussed wound care recommendations with Dr. Hernandez, enzymatic debridement with santyl vs. A more conservative approach of painting ulceration with betadine BID. Patient potentially will have poor wound healing with MARIMAR of 2.16 and poor nutrition. After discussion with Dr. Hernandez, in agreement with painting ulceration with betadine BID. In future may have to alternate to different local wound care depending on the presentation of wound.    Stage 2 pressure injury over coccyx healed, shearing noted over sacral spine and resolved dry blister noted over right buttocks. periwound reddened and blanchable, areas appear to be fungal in presentation. Recommend applying clear barrier cream with miconazole BID.   Patient on EHOB waffle overlay, turn every 2hrs.    Telephone order received from Dr. Hernandez, nursing to continue care. Wound care to follow prn.     12/19/18 1053       Wound 11/26/18 1100 Ulceration lower Leg   Date First Assessed/Time First Assessed: 11/26/18 1100   Primary Wound Type: Ulceration  Side: Left  Orientation: lower  Location: Leg   Wound Image    Wound WDL ex   Drainage Amount Scant   Drainage Characteristics/Odor Brown;No odor   Appearance Dry;Eschar;Moist;Red;Adipose   Tissue loss description Full thickness   Periwound Area Redness;Swelling   Wound Length (cm) 7.5 cm   Wound Width (cm) 10 cm   Wound Depth (cm) 0.3 cm   Wound Volume (cm^3) 22.5 cm^3   Wound Surface Area (cm^2) 75 cm^2       Wound 12/19/18 1053 Shearing Sacral Spine   Date First Assessed/Time First Assessed: 12/19/18 1053   Primary Wound Type: Shearing  Location:  Sacral Spine   Wound Image    Wound WDL ex   Drainage Amount None   Drainage Characteristics/Odor No odor   Appearance Moist;Pink   Tissue loss description Partial thickness   Periwound Area Satellite lesion;Redness  (blanchable redness, resolved dry blister over right buttocks)      I will STOP taking the medications listed below when I get home from the hospital:  None

## 2019-04-24 ENCOUNTER — OFFICE VISIT (OUTPATIENT)
Dept: SPORTS MEDICINE | Facility: CLINIC | Age: 79
End: 2019-04-24
Payer: COMMERCIAL

## 2019-04-24 VITALS — BODY MASS INDEX: 39.99 KG/M2 | HEIGHT: 65 IN | WEIGHT: 240 LBS

## 2019-04-24 DIAGNOSIS — M19.012 OSTEOARTHRITIS OF BILATERAL GLENOHUMERAL JOINTS: Primary | ICD-10-CM

## 2019-04-24 DIAGNOSIS — M19.011 OSTEOARTHRITIS OF BILATERAL GLENOHUMERAL JOINTS: Primary | ICD-10-CM

## 2019-04-24 DIAGNOSIS — M12.812 ROTATOR CUFF ARTHROPATHY OF BOTH SHOULDERS: ICD-10-CM

## 2019-04-24 DIAGNOSIS — M12.811 ROTATOR CUFF ARTHROPATHY OF BOTH SHOULDERS: ICD-10-CM

## 2019-04-24 PROCEDURE — 1100F PR PT FALLS ASSESS DOC 2+ FALLS/FALL W/INJURY/YR: ICD-10-PCS | Mod: CPTII,S$GLB,, | Performed by: FAMILY MEDICINE

## 2019-04-24 PROCEDURE — 99999 PR PBB SHADOW E&M-EST. PATIENT-LVL III: CPT | Mod: PBBFAC,,, | Performed by: FAMILY MEDICINE

## 2019-04-24 PROCEDURE — 99214 PR OFFICE/OUTPT VISIT, EST, LEVL IV, 30-39 MIN: ICD-10-PCS | Mod: 25,S$GLB,, | Performed by: FAMILY MEDICINE

## 2019-04-24 PROCEDURE — 3288F PR FALLS RISK ASSESSMENT DOCUMENTED: ICD-10-PCS | Mod: CPTII,S$GLB,, | Performed by: FAMILY MEDICINE

## 2019-04-24 PROCEDURE — 20611 LARGE JOINT ASPIRATION/INJECTION: R SUBACROMIAL BURSA, L SUBACROMIAL BURSA: ICD-10-PCS | Mod: 50,S$GLB,, | Performed by: FAMILY MEDICINE

## 2019-04-24 PROCEDURE — 99214 OFFICE O/P EST MOD 30 MIN: CPT | Mod: 25,S$GLB,, | Performed by: FAMILY MEDICINE

## 2019-04-24 PROCEDURE — 99999 PR PBB SHADOW E&M-EST. PATIENT-LVL III: ICD-10-PCS | Mod: PBBFAC,,, | Performed by: FAMILY MEDICINE

## 2019-04-24 PROCEDURE — 1100F PTFALLS ASSESS-DOCD GE2>/YR: CPT | Mod: CPTII,S$GLB,, | Performed by: FAMILY MEDICINE

## 2019-04-24 PROCEDURE — 3288F FALL RISK ASSESSMENT DOCD: CPT | Mod: CPTII,S$GLB,, | Performed by: FAMILY MEDICINE

## 2019-04-24 PROCEDURE — 20611 DRAIN/INJ JOINT/BURSA W/US: CPT | Mod: 50,S$GLB,, | Performed by: FAMILY MEDICINE

## 2019-04-24 RX ORDER — TRIAMCINOLONE ACETONIDE 40 MG/ML
40 INJECTION, SUSPENSION INTRA-ARTICULAR; INTRAMUSCULAR
Status: DISCONTINUED | OUTPATIENT
Start: 2019-04-24 | End: 2019-04-24 | Stop reason: HOSPADM

## 2019-04-24 RX ADMIN — TRIAMCINOLONE ACETONIDE 40 MG: 40 INJECTION, SUSPENSION INTRA-ARTICULAR; INTRAMUSCULAR at 03:04

## 2019-04-24 NOTE — PROGRESS NOTES
Krissy Marino, a 78 y.o. female, is here for evaluation of RIGHT and LEFT shoulder pain.     HISTORY OF PRESENT ILLNESS  Hand dominance, right     Location: anterior and lateral, bilateral R > L  Onset: Insidious, many years  Palliative:    Relative rest   Oral analgesics (tylenol PM)   R- CSI, SAB, 07/18/16, 80% improvement   R - CSI, SAB, 12/02/17, moderate improvement for ~ 3 weeks    R - CSI, SAB, 01/27/17, no improvement    R - CSI, iaGH/SAB, 02/21/17, mild to moderate relief    fPT @ [facility] - going well, but ROM remains painful   Heat    R - CSI, iaGH/SAB, 05/05/17, moderate%   Sling PRN   R/L - CSI, SAB, 07/12/17, moderate% improvement    R/L - CSI, iaGH/SAB 08/23/17, moderate%   R/L - CSI, SAB, 10/18/17, moderate% improvement, though not for long    R/L - CSI, SAB, 11/15/17, moderate% improvement, for ~ 3weeks    R/L - CSI, SAB 12/13/17, mild% improvement    R/L - CSI, iaGH/SAB 01/23/18, 40-50%   R/L - CSI, SAB, 02/27/18, moderate improvement, though did not last long   L - CSI, SAB, 4/24/18, Moderate Improvement   R/L - CSI, iaGH/SAB 07/25/2018, 75% improvement - intermittent pain    R/L - CSI, iaGH/SAB 18.09.19 100% until about 1.5 weeks ago   CSI, R/L SAB, ia, 18.11.14, significant Improvement   Provocative:    ADLs   Prior: Cellulitis - 18.12  Progression: Pleateaud discomfort   Quality:    Sharp pain at times activity   Throbbing at times with activity    Muscle soreness   Radiation: none  Severity: per nursing documentation  Timing: intermittent with use  Trauma:    17.07: mechanical fall while at PT --> landed on L arm     Review of systems (ROS):  A 10+ review of systems was performed with pertinent positives and negatives noted above in the history of present illness. Other systems were negative unless otherwise specified.      PHYSICAL EXAMINATION  General:  The patient is alert and oriented x 3. Mood is pleasant. Observation of ears, eyes and nose reveal no gross abnormalities. HEENT:  NCAT, sclera anicteric. Lungs: Respirations are equal and unlabored.     RIGHT SHOULDER EXAMINATION     OBSERVATION:     Swelling  none  Deformity  none   Discoloration  none   Scapular winging none   Scars   none  Atrophy  none    TENDERNESS / CREPITUS (T/C):          T/C      T/C   Clavicle   -/-  SUPRAspinatus    -/-     AC Jt.    -/-  INFRAspinatus  -/-    SC Jt.    -/-  Deltoid    -/-      G. Tuberosity  -/-  LH BICEP groove  -/-   Acromion:  -/-  Midline Neck   -/-     Scapular Spine -/-  Trapezium   -/-   SMA Scapula  -/-  GH jt. line - post  -/-     Scapulothoracic  -/-         ROM:     Right shoulder   Left shoulder        AROM (PROM)   AROM (PROM)   FE    120° (155°)*     120° (155°)*     ER at 0°    60°  (65°) *   60°  (65°)*   ER at 90° ABD  90°  (90°) *   90°  (90°)*   IR at 90°  ABD   NA  (40°)  *   NA  (40°)  *    IR (spine level)   T10  *   T10*    STRENGTH: (* = with pain) RIGHT SHOULDER  LEFT SHOULDER   SCAPTION   4/5    4/5   IR    4/5    4/5   ER    4/5    4/5   BICEPS   4/5    4/5   Deltoid    4/5    4/5     SIGNS:  Painful side       NEER   +   OSONNYS        +    CHAPA   +   SPEEDS        +   DROP ARM   -   BELLY PRESS       -    X-Body ADD    +   LIFT-OFF        +   HORNBLOWERS      +              STABILITY TESTING   RIGHT SHOULDER  LEFT SHOULDER     Translation     Anterior up face    up face    Posterior up face   up face    Sulcus  < 10mm   < 10 mm     Signs   Apprehension   neg      neg       Relocation   no change     no change      Jerk test  neg     neg    EXTREMITY NEURO-VASCULAR EXAM    Sensation grossly intact to light touch all dermatomal regions.    DTR 2+ Biceps, Triceps, BR and Negative Chinas sign   Grossly intact motor function at Elbow, Wrist and Hand   Distal pulses radial and ulnar 2+, brisk cap refill, symmetric.      NECK:  Painless FROM and spinous processes non-tender. Negative Spurlings sign.       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   W/ advanced acromialization of humeral head   W/ chronic tearing of superior rotator cuff    No evidence of neurologic pathology  No evidence of vascular pathology    Imaging studies reviewed:   X-ray shoulder, bilateral 18.09    Plan:  We discussed options including:  #1 watchful waiting  #2 re start physical therapy aimed at:   RoM glenohumeral joint   Strengthening rotator cuff   Scapular stability    fpt  #3 injection therapy:   CSI SAB    Right, effective 40-50%, repeat    Left, effective 40-50%, repeat    CSI iaGH    Right,effective 40-50%, repeat     Left, effective 40-50%, repeat   orthobiologics   #4 re consultation re: TSA   Has seen [facility] in the past   Question of surgical candidacy given her CAD     The patient chooses #3 csi sab bilat     Pain management: handout given  Bracing: shoulder sling, prn  Physical therapy:    fPT @ home currently  Activity (e.g. 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

## 2019-04-24 NOTE — PROCEDURES
"Large Joint Aspiration/Injection: R subacromial bursa, L subacromial bursa  Date/Time: 4/24/2019 3:15 PM  Performed by: Rock Lopez MD  Authorized by: Rock Lopez MD     Consent Done?:  Yes (Verbal)  Indications:  Pain  Procedure site marked: Yes    Timeout: Prior to procedure the correct patient, procedure, and site was verified      Location:  Shoulder  Site:  R subacromial bursa and L subacromial bursa  Prep: Patient was prepped and draped in usual sterile fashion    Ultrasonic Guidance for needle placement: Yes  Images are saved and documented.  Needle size:  20 G  Approach:  Posterior  Medications:  40 mg triamcinolone acetonide 40 mg/mL; 40 mg triamcinolone acetonide 40 mg/mL  Patient tolerance:  Patient tolerated the procedure well with no immediate complications    Additional Comments: Description of ultrasound utilization for needle guidance:   Ultrasound guidance used for needle localization. Images saved and stored for documentation. The subacromial / subdeltoid bursa was visualized. Dynamic visualization of the 20g x 1.5" needle was continuous throughout the procedure.        "

## 2019-04-29 ENCOUNTER — OFFICE VISIT (OUTPATIENT)
Dept: UROLOGY | Facility: CLINIC | Age: 79
End: 2019-04-29
Payer: COMMERCIAL

## 2019-04-29 VITALS
WEIGHT: 240 LBS | HEART RATE: 99 BPM | RESPIRATION RATE: 18 BRPM | BODY MASS INDEX: 39.99 KG/M2 | OXYGEN SATURATION: 97 % | DIASTOLIC BLOOD PRESSURE: 54 MMHG | SYSTOLIC BLOOD PRESSURE: 83 MMHG | HEIGHT: 65 IN

## 2019-04-29 DIAGNOSIS — R10.84 GENERALIZED ABDOMINAL PAIN: Primary | ICD-10-CM

## 2019-04-29 DIAGNOSIS — Z87.440 HISTORY OF RECURRENT UTI (URINARY TRACT INFECTION): ICD-10-CM

## 2019-04-29 DIAGNOSIS — K59.00 CONSTIPATION, UNSPECIFIED CONSTIPATION TYPE: ICD-10-CM

## 2019-04-29 DIAGNOSIS — R11.0 NAUSEA: ICD-10-CM

## 2019-04-29 PROCEDURE — 3074F SYST BP LT 130 MM HG: CPT | Mod: CPTII,S$GLB,, | Performed by: NURSE PRACTITIONER

## 2019-04-29 PROCEDURE — 99999 PR PBB SHADOW E&M-EST. PATIENT-LVL V: CPT | Mod: PBBFAC,,, | Performed by: NURSE PRACTITIONER

## 2019-04-29 PROCEDURE — 3074F PR MOST RECENT SYSTOLIC BLOOD PRESSURE < 130 MM HG: ICD-10-PCS | Mod: CPTII,S$GLB,, | Performed by: NURSE PRACTITIONER

## 2019-04-29 PROCEDURE — 99214 OFFICE O/P EST MOD 30 MIN: CPT | Mod: S$GLB,,, | Performed by: NURSE PRACTITIONER

## 2019-04-29 PROCEDURE — 1101F PT FALLS ASSESS-DOCD LE1/YR: CPT | Mod: CPTII,S$GLB,, | Performed by: NURSE PRACTITIONER

## 2019-04-29 PROCEDURE — 99999 PR PBB SHADOW E&M-EST. PATIENT-LVL V: ICD-10-PCS | Mod: PBBFAC,,, | Performed by: NURSE PRACTITIONER

## 2019-04-29 PROCEDURE — 3078F DIAST BP <80 MM HG: CPT | Mod: CPTII,S$GLB,, | Performed by: NURSE PRACTITIONER

## 2019-04-29 PROCEDURE — 1101F PR PT FALLS ASSESS DOC 0-1 FALLS W/OUT INJ PAST YR: ICD-10-PCS | Mod: CPTII,S$GLB,, | Performed by: NURSE PRACTITIONER

## 2019-04-29 PROCEDURE — 99214 PR OFFICE/OUTPT VISIT, EST, LEVL IV, 30-39 MIN: ICD-10-PCS | Mod: S$GLB,,, | Performed by: NURSE PRACTITIONER

## 2019-04-29 PROCEDURE — 3078F PR MOST RECENT DIASTOLIC BLOOD PRESSURE < 80 MM HG: ICD-10-PCS | Mod: CPTII,S$GLB,, | Performed by: NURSE PRACTITIONER

## 2019-04-29 RX ORDER — LACTULOSE 10 G/15ML
20 SOLUTION ORAL 3 TIMES DAILY
Qty: 630 ML | Refills: 0 | Status: SHIPPED | OUTPATIENT
Start: 2019-04-29 | End: 2019-05-06

## 2019-04-29 RX ORDER — ONDANSETRON 4 MG/1
4 TABLET, FILM COATED ORAL EVERY 6 HOURS PRN
Qty: 28 TABLET | Refills: 0 | Status: SHIPPED | OUTPATIENT
Start: 2019-04-29 | End: 2019-05-06

## 2019-04-29 RX ORDER — LOSARTAN POTASSIUM 25 MG/1
TABLET ORAL
Status: ON HOLD | COMMUNITY
Start: 2019-01-24 | End: 2019-08-27

## 2019-04-29 RX ORDER — MULTIVITAMIN
1 TABLET ORAL
Status: ON HOLD | COMMUNITY
End: 2019-07-19 | Stop reason: HOSPADM

## 2019-04-29 RX ORDER — ONDANSETRON 4 MG/1
4 TABLET, FILM COATED ORAL EVERY 6 HOURS PRN
Qty: 28 TABLET | Refills: 0 | Status: SHIPPED | OUTPATIENT
Start: 2019-04-29 | End: 2019-04-29

## 2019-04-29 NOTE — PATIENT INSTRUCTIONS
1. U/A and Urine cx (to be collected at nursing home). Please send to [facility] once collected.   2. Nurse to remove possible fecal impaction manually with gloved finger and lubricant today (4/29/19).  3. Start taking lactulose 30 mL three times daily for 7 days for constipation.  4. Nurse to change burnham catheter next week (5/6/2019) at [facility].  5. Abdominal xray today or tomorrow at [facility].   6. Daily stool softener is needed after constipation has resolved.   7. Keep follow-up appt with Gastro on 5/8/2019.   8. Follow-up in 6 weeks.

## 2019-04-29 NOTE — PROGRESS NOTES
Subjective:       Patient ID: Krissy Marino is a 79 y.o. female.    Chief Complaint: Abdominal Pain and Other (burnham catheter evaluation)    Patient is new to me. She is a 80 yo WF who is here today with complaints of abdominal pain, rectal pain, and burnham catheter evaluation. Patient is here today with her daughter (Kit) who is a good historian for patient's medical history. Patient's daughter states that her mother burnham catheter was placed approximately 6 weeks ago at [facility] to promote wound (sacrum) healing. Patient stayed at [facility] from 1/3-3/28 per patient daughter for osteomyelitis of left foot. Patient is currently in rehab at [facility]. Patient number 1 complaint today is abdominal and rectal pain with pressure. Patient cannot recall the last time she had a full/complete bowel movement. She reports a scant amount of stool noted this morning when her adult depend was changed by the staff. She states she feel like she has poop inside of her.    Abdominal Pain   This is a recurrent problem. The current episode started yesterday (restarted). The onset quality is sudden. The problem occurs daily. The problem has been gradually worsening. The pain is located in the generalized abdominal region. The pain is at a severity of 10/10. The pain is severe. The quality of the pain is cramping (spasms). The abdominal pain does not radiate. Associated symptoms include belching (resolved), constipation, dysuria (sometimes), flatus and nausea. Pertinent negatives include no anorexia, arthralgias, diarrhea, fever, frequency, headaches, hematochezia, hematuria or vomiting. She has tried nothing for the symptoms. Her past medical history is significant for GERD.     Review of Systems   Constitutional: Negative for chills and fever.   Gastrointestinal: Positive for constipation, flatus and nausea. Negative for abdominal pain, anorexia, diarrhea, hematochezia and vomiting.   Genitourinary: Positive for dysuria  (sometimes). Negative for decreased urine volume, difficulty urinating, flank pain, frequency, hematuria, urgency, vaginal bleeding, vaginal discharge and vaginal pain.        Pendleton catheter in place to prevent skin breakdown.    Musculoskeletal: Negative for arthralgias.   Neurological: Positive for weakness. Negative for dizziness and headaches.   Psychiatric/Behavioral: Negative.        Objective:      Physical Exam   Constitutional: She is oriented to person, place, and time. She appears well-developed and well-nourished.   Patient crying with episodes of abdominal and rectal pain/pressure.   HENT:   Head: Normocephalic and atraumatic.   Eyes: Pupils are equal, round, and reactive to light. EOM are normal.   Neck: Normal range of motion.   Cardiovascular: Normal rate.   Pulmonary/Chest: Effort normal. No respiratory distress.   Abdominal: Soft. There is no tenderness.   Genitourinary:   Genitourinary Comments: Pendleton catheter in place.   Musculoskeletal: Normal range of motion. She exhibits no edema.   Neurological: She is alert and oriented to person, place, and time. Coordination normal.   Skin: Skin is warm and dry.   Psychiatric: She has a normal mood and affect. Her behavior is normal. Judgment and thought content normal.   Nursing note and vitals reviewed.      Assessment:       1. Generalized abdominal pain    2. Constipation, unspecified constipation type    3. Nausea    4. History of recurrent UTI (urinary tract infection)        Plan:       Krissy was seen today for other.    Diagnoses and all orders for this visit:    Generalized abdominal pain  -     X-Ray Abdomen AP 1 View; Future    Constipation, unspecified constipation type  -     lactulose 10 gram/15 ml (CHRONULAC) 10 gram/15 mL (15 mL) solution; Take 30 mLs (20 g total) by mouth 3 (three) times daily. for 7 days    Nausea  -     ondansetron (ZOFRAN) 4 MG tablet; Take 1 tablet (4 mg total) by mouth every 6 (six) hours as needed for  Nausea.    History of recurrent UTI (urinary tract infection)  -     Urine culture; Future  -     Urinalysis; Future    Other orders  1. U/A and Urine cx (to be collected at nursing home). Please send to [facility] once collected. Burnham catheter was plugged in clinic today, but was not able to collect any urine.   2. Nurse to remove possible fecal impaction manually with gloved finger and lubricant today (4/29/19).  3. Start taking lactulose 30 mL three times daily for 7 days for constipation.  4. Nurse to change burnham catheter next week (5/6/2019) at [facility].   5. Daily stool softener is needed after constipation has resolved.   6. Keep follow-up appt with Gastro on 5/8/2019.     Follow-up in 6 weeks.    Cady Sneed NP

## 2019-05-02 ENCOUNTER — PATIENT MESSAGE (OUTPATIENT)
Dept: UROLOGY | Facility: CLINIC | Age: 79
End: 2019-05-02

## 2019-05-02 NOTE — TELEPHONE ENCOUNTER
I called [facility] to see if  ever got her ua and culture and kub we have never received the results. I called earlier this afternoon and left a voice mail. I called back again a few minuets ago and spoke with Ms Gonzáles she said she knew nothing about the orders. I explained they were on the orders that were sent back to the nursing home after her visit. She said she would check with Sarah and call me back.

## 2019-05-06 ENCOUNTER — TELEPHONE (OUTPATIENT)
Dept: UROLOGY | Facility: CLINIC | Age: 79
End: 2019-05-06

## 2019-05-06 NOTE — TELEPHONE ENCOUNTER
----- Message from Regi Leo sent at 5/6/2019  9:51 AM CDT -----  Contact: daughter Kit [phone]  Patient's daughter is requesting to speak with you regarding orders for catheter change. Please advise.

## 2019-05-08 ENCOUNTER — OFFICE VISIT (OUTPATIENT)
Dept: GASTROENTEROLOGY | Facility: CLINIC | Age: 79
End: 2019-05-08
Payer: COMMERCIAL

## 2019-05-08 VITALS — DIASTOLIC BLOOD PRESSURE: 50 MMHG | SYSTOLIC BLOOD PRESSURE: 114 MMHG

## 2019-05-08 DIAGNOSIS — R07.9 ACUTE CHEST PAIN: ICD-10-CM

## 2019-05-08 DIAGNOSIS — R07.9 CHEST PAIN: ICD-10-CM

## 2019-05-08 DIAGNOSIS — K59.01 SLOW TRANSIT CONSTIPATION: Primary | ICD-10-CM

## 2019-05-08 DIAGNOSIS — I25.10 CAD (CORONARY ARTERY DISEASE): Primary | ICD-10-CM

## 2019-05-08 PROCEDURE — 1101F PT FALLS ASSESS-DOCD LE1/YR: CPT | Mod: CPTII,S$GLB,, | Performed by: NURSE PRACTITIONER

## 2019-05-08 PROCEDURE — 1101F PR PT FALLS ASSESS DOC 0-1 FALLS W/OUT INJ PAST YR: ICD-10-PCS | Mod: CPTII,S$GLB,, | Performed by: NURSE PRACTITIONER

## 2019-05-08 PROCEDURE — 3078F PR MOST RECENT DIASTOLIC BLOOD PRESSURE < 80 MM HG: ICD-10-PCS | Mod: CPTII,S$GLB,, | Performed by: NURSE PRACTITIONER

## 2019-05-08 PROCEDURE — 99999 PR PBB SHADOW E&M-EST. PATIENT-LVL III: CPT | Mod: PBBFAC,,, | Performed by: NURSE PRACTITIONER

## 2019-05-08 PROCEDURE — 3074F SYST BP LT 130 MM HG: CPT | Mod: CPTII,S$GLB,, | Performed by: NURSE PRACTITIONER

## 2019-05-08 PROCEDURE — 3078F DIAST BP <80 MM HG: CPT | Mod: CPTII,S$GLB,, | Performed by: NURSE PRACTITIONER

## 2019-05-08 PROCEDURE — 99213 PR OFFICE/OUTPT VISIT, EST, LEVL III, 20-29 MIN: ICD-10-PCS | Mod: S$GLB,,, | Performed by: NURSE PRACTITIONER

## 2019-05-08 PROCEDURE — 99213 OFFICE O/P EST LOW 20 MIN: CPT | Mod: S$GLB,,, | Performed by: NURSE PRACTITIONER

## 2019-05-08 PROCEDURE — 3074F PR MOST RECENT SYSTOLIC BLOOD PRESSURE < 130 MM HG: ICD-10-PCS | Mod: CPTII,S$GLB,, | Performed by: NURSE PRACTITIONER

## 2019-05-08 PROCEDURE — 99999 PR PBB SHADOW E&M-EST. PATIENT-LVL III: ICD-10-PCS | Mod: PBBFAC,,, | Performed by: NURSE PRACTITIONER

## 2019-05-08 NOTE — PROGRESS NOTES
Subjective:       Patient ID: Krissy Marino is a 79 y.o. female.    Chief Complaint: Abdominal Pain and Constipation    HPI  Referred for abdominal pain and constipation.  She was seen in ED 4/13/19 due to complaints.  CT scan and labs negative.  She had been on pain medications prior at rehab and had constipation.  She had resolution of pain after bowel movement several days after visit.    Pain returned last week, again associated with constipation.  She is now taking two stool softeners BID and reporting daily soft bowel movements.  She is still in rehab with minimal physical activity and reports she is on fluid restriction.  She is no longer using pain medications.  Denies blood with bowel movements or black stools.  No nausea or vomiting.  She had normal colonoscopy in 12/2013  .  Review of Systems   Constitutional: Negative for activity change and appetite change.   Respiratory: Negative for shortness of breath.    Cardiovascular: Negative for chest pain.   Gastrointestinal: Positive for abdominal pain and constipation. Negative for blood in stool, nausea and vomiting.   Neurological: Negative.    Psychiatric/Behavioral: Negative.        Objective:      Physical Exam   Constitutional: She is oriented to person, place, and time. She appears well-developed and well-nourished. No distress.   Eyes: No scleral icterus.   Cardiovascular: Normal rate.   Pulmonary/Chest: Effort normal.   Abdominal: Soft. Bowel sounds are normal. She exhibits no distension and no mass. There is no tenderness. There is no guarding.   Neurological: She is alert and oriented to person, place, and time.   Skin: Skin is warm and dry. She is not diaphoretic.   Psychiatric: She has a normal mood and affect. Her behavior is normal. Judgment and thought content normal.   Vitals reviewed.      Assessment:       1. Slow transit constipation        Plan:         Krissy was seen today for abdominal pain and constipation.    Diagnoses and all  orders for this visit:    Slow transit constipation         Continue current treatment with stool softeners as she is having a daily formed stool with good volume and resolution of abdominal pain.  Can add miralax 1-2 times daily if constipation returns.    Follow up with continued complaints.

## 2019-06-24 ENCOUNTER — PATIENT MESSAGE (OUTPATIENT)
Dept: UROLOGY | Facility: CLINIC | Age: 79
End: 2019-06-24

## 2019-06-24 PROCEDURE — G0180 MD CERTIFICATION HHA PATIENT: HCPCS | Mod: ,,, | Performed by: INTERNAL MEDICINE

## 2019-06-24 PROCEDURE — G0180 PR HOME HEALTH MD CERTIFICATION: ICD-10-PCS | Mod: ,,, | Performed by: INTERNAL MEDICINE

## 2019-06-25 ENCOUNTER — TELEPHONE (OUTPATIENT)
Dept: SPORTS MEDICINE | Facility: CLINIC | Age: 79
End: 2019-06-25

## 2019-06-25 ENCOUNTER — TELEPHONE (OUTPATIENT)
Dept: UROLOGY | Facility: CLINIC | Age: 79
End: 2019-06-25

## 2019-06-25 DIAGNOSIS — R30.0 DYSURIA: Primary | ICD-10-CM

## 2019-06-25 DIAGNOSIS — N89.8 VAGINAL IRRITATION: ICD-10-CM

## 2019-06-25 RX ORDER — PHENAZOPYRIDINE HYDROCHLORIDE 100 MG/1
100 TABLET, FILM COATED ORAL 3 TIMES DAILY PRN
Qty: 9 TABLET | Refills: 1 | Status: SHIPPED | OUTPATIENT
Start: 2019-06-25 | End: 2019-06-28

## 2019-06-25 RX ORDER — FLUCONAZOLE 150 MG/1
150 TABLET ORAL DAILY
Qty: 1 TABLET | Refills: 0 | Status: SHIPPED | OUTPATIENT
Start: 2019-06-25 | End: 2019-06-26

## 2019-06-25 NOTE — TELEPHONE ENCOUNTER
Scheduled patient via proxy Shahid for f/u [facility] CSI iaGh/SAB    Juan C Mac   Sports Medicine Assistant   [facility]     ----- Message from Juan C Mac MA sent at 6/24/2019  4:25 PM CDT -----  Contact: pt son/Shahid      ----- Message -----  From: Leslie Han  Sent: 6/24/2019  10:45 AM  To: John STOCKTON Staff    Please call Shahid at [phone]    Patient would like to schedule future injections    Thank you

## 2019-06-25 NOTE — TELEPHONE ENCOUNTER
Telephone call placed to patient's daughter (Kit) regarding patient portal message. Kit states her mother is currently a resident at [facility] for rehab S/P leg amputation. She thinks her mother has a UTI because she complains of burning with urination and vaginal irritation. Patient cannot be transported at this time for an office visit, so daughter would like orders to see if her mother has a UTI.     Diagnoses and all orders for this visit:    Dysuria  -     Urinalysis; Future  -     Urine culture; Future  -     phenazopyridine (PYRIDIUM) 100 MG tablet; Take 1 tablet (100 mg total) by mouth 3 (three) times daily as needed for Pain.    Vaginal irritation  -     Urinalysis; Future  -     Urine culture; Future  -     fluconazole (DIFLUCAN) 150 MG Tab; Take 1 tablet (150 mg total) by mouth once daily. for 1 day    Y'Loretta Sneed NP

## 2019-07-02 ENCOUNTER — TELEPHONE (OUTPATIENT)
Dept: UROLOGY | Facility: CLINIC | Age: 79
End: 2019-07-02

## 2019-07-02 NOTE — TELEPHONE ENCOUNTER
----- Message from Anna Arambula sent at 7/2/2019 11:58 AM CDT -----  Contact: Kit, daughter, [phone]  Called in returning your call. Please advise.

## 2019-07-02 NOTE — TELEPHONE ENCOUNTER
----- Message from Rafia Gillespie sent at 7/2/2019  9:54 AM CDT -----  Contact: savannah @ [facility] [phone]  Savannah is requesting a callback to speak with you concerning patients UA results. Please call.

## 2019-07-12 ENCOUNTER — EXTERNAL HOME HEALTH (OUTPATIENT)
Dept: HOME HEALTH SERVICES | Facility: HOSPITAL | Age: 79
End: 2019-07-12
Payer: COMMERCIAL

## 2019-07-12 ENCOUNTER — TELEPHONE (OUTPATIENT)
Dept: SPORTS MEDICINE | Facility: CLINIC | Age: 79
End: 2019-07-12

## 2019-07-12 NOTE — TELEPHONE ENCOUNTER
Attempted to call patient with no answer, not able to LVM. Will try to contact her next week.    Juan C Mac   Sports Medicine Assistant   [facility]

## 2019-07-17 PROBLEM — R41.82 ALTERED MENTAL STATUS: Status: ACTIVE | Noted: 2019-07-17

## 2019-07-18 PROBLEM — G93.41 ACUTE METABOLIC ENCEPHALOPATHY: Status: ACTIVE | Noted: 2019-07-17

## 2019-07-18 PROBLEM — Z87.440 HISTORY OF RECURRENT URINARY TRACT INFECTION: Status: ACTIVE | Noted: 2019-07-18

## 2019-07-18 PROBLEM — E86.0 DEHYDRATION: Status: ACTIVE | Noted: 2019-07-18

## 2019-07-21 PROBLEM — E86.0 DEHYDRATION: Status: RESOLVED | Noted: 2019-07-18 | Resolved: 2019-07-21

## 2019-08-26 PROBLEM — N39.0 URINARY TRACT INFECTION WITHOUT HEMATURIA: Status: ACTIVE | Noted: 2019-08-26

## 2019-08-27 PROBLEM — E87.6 HYPOKALEMIA: Status: ACTIVE | Noted: 2019-08-27

## 2019-08-27 PROBLEM — E83.52 HYPERCALCEMIA: Status: ACTIVE | Noted: 2019-08-27

## 2019-08-27 PROBLEM — B95.1 BACTEREMIA DUE TO GROUP B STREPTOCOCCUS: Status: RESOLVED | Noted: 2018-11-27 | Resolved: 2019-08-27

## 2019-08-27 PROBLEM — R23.9 ALTERATION IN SKIN INTEGRITY: Status: RESOLVED | Noted: 2018-12-19 | Resolved: 2019-08-27

## 2019-08-27 PROBLEM — L03.116 CELLULITIS OF LEFT LOWER EXTREMITY: Status: RESOLVED | Noted: 2018-11-26 | Resolved: 2019-08-27

## 2019-08-27 PROBLEM — R78.81 BACTEREMIA DUE TO GROUP B STREPTOCOCCUS: Status: RESOLVED | Noted: 2018-11-27 | Resolved: 2019-08-27

## 2019-08-29 PROBLEM — E43 SEVERE MALNUTRITION: Status: ACTIVE | Noted: 2019-08-29

## 2019-09-01 PROBLEM — E83.42 HYPOMAGNESEMIA: Status: RESOLVED | Noted: 2018-12-15 | Resolved: 2019-09-01

## 2019-09-01 PROBLEM — E87.6 HYPOKALEMIA: Status: RESOLVED | Noted: 2019-08-27 | Resolved: 2019-09-01

## 2019-09-05 ENCOUNTER — DOCUMENTATION ONLY (OUTPATIENT)
Dept: INTERNAL MEDICINE | Facility: CLINIC | Age: 79
End: 2019-09-05

## 2019-09-05 ENCOUNTER — PATIENT MESSAGE (OUTPATIENT)
Dept: INTERNAL MEDICINE | Facility: CLINIC | Age: 79
End: 2019-09-05

## 2019-09-05 NOTE — PROGRESS NOTES
Spoke with patient's daughter and she stated right now they cannot make any appointments because her mom is bedridden and it would require transportation and a lot of moving parts to make this happen right now. But as soon as her mom is feeling better and gain some strength to move around or at least be able to get in a wheelchair, she will make an appointment to see you.

## 2019-09-10 ENCOUNTER — TELEPHONE (OUTPATIENT)
Dept: INTERNAL MEDICINE | Facility: CLINIC | Age: 79
End: 2019-09-10

## 2019-09-13 NOTE — TRANSFER OF CARE
"Anesthesia Transfer of Care Note    Patient: Krissy Marino    Procedure(s) Performed: Procedure(s) (LRB):  STRIPPING-TARSAL (Bilateral)    Patient location: PACU    Anesthesia Type: general    Transport from OR: Transported from OR on room air with adequate spontaneous ventilation    Post pain: adequate analgesia    Post assessment: no apparent anesthetic complications and tolerated procedure well    Post vital signs: stable    Level of consciousness: alert and awake    Nausea/Vomiting: no nausea/vomiting    Complications: none    Transfer of care protocol was followed      Last vitals:   Visit Vitals  BP (!) 141/54 (BP Location: Left arm, Patient Position: Lying)   Pulse (!) 57   Temp 36.6 °C (97.9 °F) (Oral)   Resp 20   Ht 5' 5" (1.651 m)   Wt 95.3 kg (210 lb)   SpO2 99%   Breastfeeding? No   BMI 34.95 kg/m²     " individual instruction

## 2019-09-27 ENCOUNTER — PES CALL (OUTPATIENT)
Dept: ADMINISTRATIVE | Facility: CLINIC | Age: 79
End: 2019-09-27

## 2019-10-08 PROBLEM — R05.9 COUGH: Status: ACTIVE | Noted: 2019-10-08

## 2019-10-09 DIAGNOSIS — Z12.39 BREAST CANCER SCREENING: ICD-10-CM

## 2019-10-09 DIAGNOSIS — Z12.31 VISIT FOR SCREENING MAMMOGRAM: Primary | ICD-10-CM

## 2019-10-09 PROBLEM — I82.409 DVT (DEEP VENOUS THROMBOSIS): Status: ACTIVE | Noted: 2019-10-09

## 2019-10-09 PROBLEM — I82.401 ACUTE DEEP VEIN THROMBOSIS (DVT) OF RIGHT LOWER EXTREMITY: Status: ACTIVE | Noted: 2019-10-09

## 2019-10-09 PROBLEM — D64.9 ANEMIA: Status: ACTIVE | Noted: 2018-11-26

## 2019-10-10 PROBLEM — R06.02 SOB (SHORTNESS OF BREATH): Status: ACTIVE | Noted: 2019-10-10

## 2019-10-10 PROBLEM — R53.81 DEBILITY: Status: ACTIVE | Noted: 2019-10-10

## 2019-10-23 ENCOUNTER — PATIENT OUTREACH (OUTPATIENT)
Dept: ADMINISTRATIVE | Facility: CLINIC | Age: 79
End: 2019-10-23

## 2019-10-23 ENCOUNTER — EXTERNAL HOSPITAL ADMISSION (OUTPATIENT)
Dept: ADMINISTRATIVE | Facility: CLINIC | Age: 79
End: 2019-10-23

## 2019-10-23 NOTE — TELEPHONE ENCOUNTER
C3 nurse attempted to contact patient for a TCC post hospital discharge follow-up call. The patient is currently in a rehab facility.

## 2019-10-30 ENCOUNTER — TELEPHONE (OUTPATIENT)
Dept: INTERNAL MEDICINE | Facility: CLINIC | Age: 79
End: 2019-10-30

## 2019-11-21 ENCOUNTER — EXTERNAL HOSPITAL ADMISSION (OUTPATIENT)
Dept: ADMINISTRATIVE | Facility: CLINIC | Age: 79
End: 2019-11-21

## 2020-09-29 ENCOUNTER — PATIENT MESSAGE (OUTPATIENT)
Dept: OTHER | Facility: OTHER | Age: 80
End: 2020-09-29

## 2022-07-23 NOTE — MR AVS SNAPSHOT
[facility]  [address]  [facility] [address]  Phone: [phone]  Fax: [phone]                  Krissy Marino   2017 2:40 PM   Office Visit    Description:  Female : 1940   Provider:  Og Cid MD   Department:  [facility]           Reason for Visit     URI     Generalized Body Aches           Diagnoses this Visit        Comments    Cough    -  Primary     Bronchitis         Pneumonia due to infectious organism, unspecified laterality, unspecified part of lung         Type 2 diabetes mellitus with complication, with long-term current use of insulin                To Do List           Future Appointments        Provider Department Dept Phone    2017  3:00 PM NOM [id number] LIMIT [facility] [phone]    2017 1:00 PM Rock Lopez MD [place] [facility] [phone]    3/4/2017 8:00 AM LAB, [facility] [phone]    3/28/2017 1:00 PM Og Cid MD [facility] [phone]    3/31/2017 10:40 AM Jaky Alejandre MD [facility] [phone]      Goals (5 Years of Data)     None      Follow-Up and Disposition     Return in about 3 months (around 2017), or if symptoms worsen or fail to improve.       These Medications        Disp Refills Start End    doxycycline (VIBRA-TABS) 100 MG tablet 20 tablet 0 2017 3/2/2017    Take 1 tablet (100 mg total) by mouth 2 (two) times daily. - Oral    Pharmacy: [facility] [address] [place] [address] AT [facility] [address]  Ph #: [phone]         [facility]     [facility] Care [facility] -  Assistance  Registered nurses in the [facility] provide clinical advisement, health education, appointment booking, and other advisory services.  Call for this free service at [phone].             Medications           Message  regarding Medications     Verify the changes and/or additions to your medication regime listed below are the same as discussed with your clinician today.  If any of these changes or additions are incorrect, please notify your healthcare provider.        START taking these NEW medications        Refills    doxycycline (VIBRA-TABS) 100 MG tablet 0    Sig: Take 1 tablet (100 mg total) by mouth 2 (two) times daily.    Class: Normal    Route: Oral           Verify that the below list of medications is an accurate representation of the medications you are currently taking.  If none reported, the list may be blank. If incorrect, please contact your healthcare provider. Carry this list with you in case of emergency.           Current Medications     albuterol 90 mcg/actuation inhaler Inhale 2 puffs into the lungs every 6 (six) hours as needed for Wheezing.    allopurinol (ZYLOPRIM) 300 MG tablet Take 1 tablet (300 mg total) by mouth once daily.    aspirin (ECOTRIN) 81 MG EC tablet Take 1 tablet (81 mg total) by mouth once daily.    atorvastatin (LIPITOR) 40 MG tablet Take 1 tablet (40 mg total) by mouth once daily.    azelastine (ASTELIN) 137 mcg (0.1 %) nasal spray 1 spray (137 mcg total) by Nasal route 2 (two) times daily.    BIOTIN ORAL Take by mouth.    blood sugar diagnostic (ACCU-CHEK AMELIA PLUS TEST STRP) Strp Checks bg 2 x day before meals    ciclopirox (PENLAC) 8 % Soln Apply topically nightly.    clopidogrel (PLAVIX) 75 mg tablet Take 1 tablet (75 mg total) by mouth once daily.    desonide (DESOWEN) 0.05 % cream AAA bid    doxycycline (VIBRA-TABS) 100 MG tablet Take 1 tablet (100 mg total) by mouth 2 (two) times daily.    ergocalciferol (VITAMIN D2) 50,000 unit Cap TAKE 1 CAPSULE BY MOUTH EVERY 2 WEEKS    erythromycin (ROMYCIN) ophthalmic ointment Place into the right eye 3 (three) times daily.    FERROUS GLUCONATE (FERATE ORAL) Take by mouth.    folic acid (FOLVITE) 1 MG tablet Take 1 tablet (1,000 mcg total) by  "mouth once daily.    furosemide (LASIX) 40 MG tablet Take 1 tablet (40 mg total) by mouth once daily.    insulin lispro (HUMALOG KWIKPEN) 100 unit/mL InPn pen Inject 5 Units into the skin 3 (three) times daily with meals.    insulin needles, disposable, (BD ULTRA-FINE ILIANA PEN NEEDLES) 32 x 5/32 " Ndle Injects insulin 3 x day    KRILL/OM3/DHA/EPA/OM6/LIP/ASTX (KRILL OIL, OMEGA 3 & 6, ORAL) Take by mouth.    levothyroxine (SYNTHROID) 75 MCG tablet TAKE 1 TABLET(75 MCG) BY MOUTH BEFORE BREAKFAST    lidocaine HCL 2% (XYLOCAINE) 2 % jelly Apply topically as needed.    lisinopril (PRINIVIL,ZESTRIL) 2.5 MG tablet Take 1 tablet (2.5 mg total) by mouth once daily.    metoprolol succinate (TOPROL-XL) 50 MG 24 hr tablet Take 1 tablet (50 mg total) by mouth once daily.    miconazole NITRATE 2 % (ZEASORB AF) 2 % top powder Apply topically as needed for Itching.    mometasone (NASONEX) 50 mcg/actuation nasal spray 2 sprays by Nasal route once daily.    nystatin-triamcinolone (MYCOLOG II) cream Apply topically 2 (two) times daily.    omeprazole (PRILOSEC) 40 MG capsule Take 1 capsule (40 mg total) by mouth daily as needed.    ondansetron (ZOFRAN-ODT) 8 MG TbDL Take 1 tablet (8 mg total) by mouth every 12 (twelve) hours as needed.    oseltamivir (TAMIFLU) 75 MG capsule Take 1 capsule (75 mg total) by mouth 2 (two) times daily.    predniSONE (DELTASONE) 2.5 MG tablet Take 2 tablets (5 mg total) by mouth once daily.    ranitidine (ZANTAC) 150 MG tablet Take 1 tablet (150 mg total) by mouth 2 (two) times daily as needed for Heartburn.    silver sulfADIAZINE 1% (SILVADENE) 1 % cream aaa buttock bid    SORIATANE 10 mg capsule     SYMBICORT 160-4.5 mcg/actuation HFAA INHALE 2 PUFFS BY MOUTH INTO THE LUNGS EVERY 12 HOURS    triamcinolone acetonide 0.1% (KENALOG) 0.1 % cream APPLY TO THE AFFECTED AREA TWICE DAILY           Clinical Reference Information           Your Vitals Were     BP Pulse Height Weight BMI    138/82 96 5' 5" (1.651 m) " 93 kg (205 lb 0.4 oz) 34.12 kg/m2      Blood Pressure          Most Recent Value    BP  138/82      Allergies as of 2/20/2017     Sulfamethoxazole-trimethoprim    Latex, Natural Rubber    Pcn [Penicillins]      Immunizations Administered on Date of Encounter - 2/20/2017     None      Orders Placed During Today's Visit     Future Labs/Procedures Expected by Expires    X-Ray Chest PA And Lateral  2/20/2017 2/20/2018      Language Assistance Services     ATTENTION: Language assistance services are available, free of charge. Please call [phone].      ATENCIÓN: Si habla español, tiene a doyle disposición servicios gratuitos de asistencia lingüística. Llame al [phone].     CHÚ Ý: N?u b?n nói Ti?ng Vi?t, có các d?ch v? h? tr? ngôn ng? mi?n phí dành cho b?n. G?i s? [phone].         [facility] complies with applicable Federal civil rights laws and does not discriminate on the basis of race, color, national origin, age, disability, or sex.         Pt states she lives in a house alone with one step to enter, with her daughter living next door. Pt reports transferring and ambulating for short distances with a rolling walker independently and also being independent in ADLs.    Following evaluation, pt was left semireclined in bed in no distress, all lines in tact, call bell in reach. RN aware.

## 2023-10-27 NOTE — PROGRESS NOTES
Krissy Marino, a 77 y.o. female, is here for evaluation of RIGHT and LEFT shoulder pain.     HISTORY OF PRESENT ILLNESS  Hand dominance, right     Location: anterior and lateral, bilateral R > L  Onset: Insidious, many years  Palliative:    Relative rest   Oral analgesics (tylenol PM)   R- CSI, SAB, 07/18/16, 80% improvement   R - CSI, SAB, 12/02/17, moderate improvement for ~ 3 weeks    R - CSI, SAB, 01/27/17, no improvement    R - CSI, iaGH/SAB, 02/21/17, mild to moderate relief    fPT @ [facility] - going well, but ROM remains painful   Heat    R - CSI, iaGH/SAB, 05/05/17, moderate%   Sling PRN   R/L - CSI, SAB, 07/12/17, moderate% improvement    R/L - CSI, iaGH/SAB 08/23/17, moderate%   R/L - CSI, SAB, 10/18/17, moderate% improvement, though not for long    R/L - CSI, SAB, 11/15/17, moderate% improvement, for ~ 3weeks    R/L - CSI, SAB 12/13/17, mild% improvement    R/L - CSI, iaGH/SAB 01/23/18, 40-50%  Provocative:    ADLs   Prior:   Progression: slowly resolving discomfort   Quality:    Sharp pain at times activity   Throbbing at times with activity    Muscle soreness   Radiation: none  Severity: per nursing documentation  Timing: intermittent with use  Trauma:    17.07: mechanical fall while at PT --> landed on L arm     Review of systems (ROS):  A 10+ review of systems was performed with pertinent positives and negatives noted above in the history of present illness. Other systems were negative unless otherwise specified.      PHYSICAL EXAMINATION  General:  The patient is alert and oriented x 3. Mood is pleasant. Observation of ears, eyes and nose reveal no gross abnormalities. HEENT: NCAT, sclera anicteric. Lungs: Respirations are equal and unlabored.     RIGHT SHOULDER EXAMINATION     OBSERVATION:     Swelling  none  Deformity  none   Discoloration  none   Scapular winging none   Scars   none  Atrophy  none    TENDERNESS / CREPITUS (T/C):          T/C      T/C   Clavicle   -/-  SUPRAspinatus     -/-     AC Jt.    -/-  INFRAspinatus  -/-    SC Jt.    -/-  Deltoid    -/-      G. Tuberosity  -/-  LH BICEP groove  -/-   Acromion:  -/-  Midline Neck   -/-     Scapular Spine -/-  Trapezium   -/-   SMA Scapula  -/-  GH jt. line - post  -/-     Scapulothoracic  -/-         ROM:     Right shoulder   Left shoulder        AROM (PROM)   AROM (PROM)   FE    120° (155°)*     120° (155°)*     ER at 0°    60°  (65°) *   60°  (65°)*   ER at 90° ABD  90°  (90°) *   90°  (90°)*   IR at 90°  ABD   NA  (40°)  *   NA  (40°)  *    IR (spine level)   T10  *   T10*    STRENGTH: (* = with pain) RIGHT SHOULDER  LEFT SHOULDER   SCAPTION   4/5    4/5   IR    4/5    4/5   ER    4/5    4/5   BICEPS   4/5    4/5   Deltoid    4/5    4/5     SIGNS:  Painful side       NEER   +   OSONNYS        +    CHAPA   +   SPEEDS        +   DROP ARM   -   BELLY PRESS       -    X-Body ADD    +   LIFT-OFF        +   HORNBLOWERS      +              STABILITY TESTING   RIGHT SHOULDER  LEFT SHOULDER     Translation     Anterior up face    up face    Posterior up face   up face    Sulcus  < 10mm   < 10 mm     Signs   Apprehension   neg      neg       Relocation   no change     no change      Jerk test  neg     neg    EXTREMITY NEURO-VASCULAR EXAM    Sensation grossly intact to light touch all dermatomal regions.    DTR 2+ Biceps, Triceps, BR and Negative Chinas sign   Grossly intact motor function at Elbow, Wrist and Hand   Distal pulses radial and ulnar 2+, brisk cap refill, symmetric.      NECK:  Painless FROM and spinous processes non-tender. Negative Spurlings sign.       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   W/ advanced acromialization of humeral head   W/ chronic tearing of superior rotator cuff    No evidence of neurologic pathology  No evidence of vascular pathology    Imaging studies reviewed:   X-ray shoulder, right 17.05  X-ray shoulder, left 17.07    Plan:  We discussed  options including:  #1 watchful waiting  #2 re start physical therapy aimed at:   RoM glenohumeral joint   Strengthening rotator cuff   Scapular stability    fpt  #3 injection therapy:   CSI SAB    Right, effective 40-50%, repeat    Left, effective 40-50%, repeat    CSI iaGH    Right,effective 40-50%, repeat     Left, effective 40-50%, repeat   orthobiologics   #4 consultation re: rTSA   Likely poor surgical candidate given CAD     The patient chooses #3 csi sab bilat    Pain management: handout given  Bracing: shoulder sling, prn  Physical therapy:    fPT, @ [facility], prior as above   Activity (e.g. sports, work) restrictions: as tolerated   school/vocation: works at [employer]     Follow up in 8 w  A/e csis  Should symptoms worsen or fail to resolve, consider:  Revisiting the above options     Specialty Care (Immediate)...

## 2024-08-09 NOTE — ASSESSMENT & PLAN NOTE
77 yo female with hx of bilateral TKA,  recent GBS bacteremia, recurrent LLE cellulitis, PVD now found to have osteomyelitis of the hindfoot including the distal tibia and tarsal bones with associated cellulitis, myositis, and multiloculated abscesses in the deep soft tissues contiguous with lateral distal foreleg ulceration. Repeat CT shows infection to knee level and gas near ankle.  On vanc, cefepime and flagyl.  Ortho Sx following and rec AKA.  Vasc Sx has seen patient - awaiting recs.  Given malnutrition, extent of wound, and underlying comorbidities, AKA appears to be best option.  BCXs NGTD. Stable non septic.  Vanc now therapeutic.  Had long discussion with family and Ortho Sx with patient about further plan. Suspect will need AKA, if not now, then in the future though patient has elected for IV abx alone.      Plan   - Restart Vanc 1.5g IV q24  - Vanc trough goal 15-20 trough prior to 4th dose  - Continue Cefepime 2g IV q12 and Flagyl 500mg po tid as gas seen on CT scan  - Weekly (on Mondays) ESR, CRP, CBC, CMP and Vanc trough   - Will need re-imagine of LLE in 4-5 weeks or if she develops worsening signs/sx of infection  - Patient and son have been made aware that surgery is the definitive treatment for her infection and that treatment with abx there is low chance of resolution and increased risk of further infectious complications or even death.  They understand   - Plan discussed with primary team - will sign off       Universal Safety Interventions

## (undated) DEVICE — NDL 22GA X1 1/2 REG BEVEL

## (undated) DEVICE — GOWN SURGICAL X-LARGE

## (undated) DEVICE — COVER LIGHT HANDLE 80/CA

## (undated) DEVICE — SKINMARKER & RULER REGULAR X-F

## (undated) DEVICE — COVER LIGHT HANDLE

## (undated) DEVICE — NDL HYPO A BEVEL 30X1/2

## (undated) DEVICE — CUP MEDICINE STERILE 2OZ

## (undated) DEVICE — NDL STRAIGHT 4CM LEIBINGER

## (undated) DEVICE — DROPPER MEDICINE

## (undated) DEVICE — SEE MEDLINE ITEM 152622

## (undated) DEVICE — SOL BSS BALANCED SALT

## (undated) DEVICE — SYR 10CC LUER LOCK

## (undated) DEVICE — DRAPE STERI-DRAPE 1000 17X11IN

## (undated) DEVICE — SOL BETADINE 5%

## (undated) DEVICE — PENCIL ROCKER SWITCH 10FT CORD

## (undated) DEVICE — BLADE SURG #15 CARBON STEEL

## (undated) DEVICE — SHEET EENT SPLIT

## (undated) DEVICE — CLEANER TIP ELECSURG 2X2IN

## (undated) DEVICE — DRAPE STERI INSTRUMENT 1018

## (undated) DEVICE — APPLICATOR STERILE 3IN

## (undated) DEVICE — GAUZE SPONGE 4X4 12PLY

## (undated) DEVICE — SOL WATER STRL IRR 1000ML

## (undated) DEVICE — ELECTRODE REM PLYHSV RETURN 9

## (undated) DEVICE — TRAY MUSCLE LID EYE